# Patient Record
Sex: MALE | Race: OTHER | HISPANIC OR LATINO
[De-identification: names, ages, dates, MRNs, and addresses within clinical notes are randomized per-mention and may not be internally consistent; named-entity substitution may affect disease eponyms.]

---

## 2017-08-08 PROBLEM — Z00.00 ENCOUNTER FOR PREVENTIVE HEALTH EXAMINATION: Status: ACTIVE | Noted: 2017-08-08

## 2017-08-14 ENCOUNTER — APPOINTMENT (OUTPATIENT)
Dept: SURGERY | Facility: CLINIC | Age: 83
End: 2017-08-14

## 2017-08-14 VITALS
HEART RATE: 53 BPM | HEIGHT: 69 IN | OXYGEN SATURATION: 99 % | SYSTOLIC BLOOD PRESSURE: 195 MMHG | DIASTOLIC BLOOD PRESSURE: 80 MMHG | WEIGHT: 162.38 LBS | TEMPERATURE: 98 F | BODY MASS INDEX: 24.05 KG/M2

## 2017-08-18 ENCOUNTER — OUTPATIENT (OUTPATIENT)
Dept: OUTPATIENT SERVICES | Facility: HOSPITAL | Age: 83
LOS: 1 days | End: 2017-08-18
Payer: MEDICARE

## 2017-08-18 VITALS
DIASTOLIC BLOOD PRESSURE: 57 MMHG | HEART RATE: 50 BPM | WEIGHT: 162.92 LBS | SYSTOLIC BLOOD PRESSURE: 150 MMHG | TEMPERATURE: 98 F | HEIGHT: 69 IN | OXYGEN SATURATION: 98 % | RESPIRATION RATE: 16 BRPM

## 2017-08-18 DIAGNOSIS — D36.7 BENIGN NEOPLASM OF OTHER SPECIFIED SITES: ICD-10-CM

## 2017-08-18 DIAGNOSIS — Z01.818 ENCOUNTER FOR OTHER PREPROCEDURAL EXAMINATION: ICD-10-CM

## 2017-08-18 DIAGNOSIS — Z98.890 OTHER SPECIFIED POSTPROCEDURAL STATES: Chronic | ICD-10-CM

## 2017-08-18 DIAGNOSIS — Z98.42 CATARACT EXTRACTION STATUS, LEFT EYE: Chronic | ICD-10-CM

## 2017-08-18 DIAGNOSIS — I10 ESSENTIAL (PRIMARY) HYPERTENSION: ICD-10-CM

## 2017-08-18 PROCEDURE — G0463: CPT

## 2017-08-18 RX ORDER — SODIUM CHLORIDE 9 MG/ML
3 INJECTION INTRAMUSCULAR; INTRAVENOUS; SUBCUTANEOUS EVERY 8 HOURS
Qty: 0 | Refills: 0 | Status: DISCONTINUED | OUTPATIENT
Start: 2017-08-25 | End: 2017-08-25

## 2017-08-18 NOTE — H&P PST ADULT - HISTORY OF PRESENT ILLNESS
87 y/o male with PMH of hypertension, hyperlipidemia, BPH w/o LUTS, glaucoma (both eyes) on medication, presents to PST with complaints of mass localized on right shoulder. He is scheduled for Excision of Right Shoulder Mass on 8/18/2017

## 2017-08-18 NOTE — H&P PST ADULT - PROBLEM SELECTOR PLAN 2
Instructed to take antihypertensive medication with a sip of water the morning of surgery and follow up with PCP post surgery for management of hypertension and other comorbid conditions

## 2017-08-18 NOTE — H&P PST ADULT - ASSESSMENT
87 y/o male with PMH of hypertension, asymptomatic bradycardia, hyperlipidemia, BPH w/o LUTS, glaucoma (both eyes), and benign neoplasm of right shoulder scheduled for Excision of Right Shoulder Mass on 8/18/2017. Patient is at low risk for planned procedure

## 2017-08-18 NOTE — H&P PST ADULT - NEGATIVE GENERAL SYMPTOMS
no polyphagia/no polyuria/no malaise/no fatigue/no anorexia/no fever/no chills/no sweating/no weight gain/no polydipsia/no weight loss

## 2017-08-18 NOTE — H&P PST ADULT - PROBLEM SELECTOR PLAN 1
Scheduled for Excision of Right Shoulder Mass on 8/18/2017. Preoperative instructions discussed and given to patient. Verbalized understanding of same

## 2017-08-18 NOTE — H&P PST ADULT - NEGATIVE CARDIOVASCULAR SYMPTOMS
no dyspnea on exertion/no paroxysmal nocturnal dyspnea/no claudication/no peripheral edema/no orthopnea/no chest pain/no palpitations

## 2017-08-18 NOTE — H&P PST ADULT - RS GEN PE MLT RESP DETAILS PC
good air movement/respirations non-labored/no chest wall tenderness/no intercostal retractions/normal/no subcutaneous emphysema/breath sounds equal/no wheezes/no rales/no rhonchi/airway patent/clear to auscultation bilaterally

## 2017-08-18 NOTE — H&P PST ADULT - GASTROINTESTINAL DETAILS
no distention/no masses palpable/normal/bowel sounds normal/nontender/no organomegaly/no rebound tenderness/no rigidity/no guarding/soft

## 2017-08-18 NOTE — H&P PST ADULT - PMH
BPH without obstruction/lower urinary tract symptoms    Essential hypertension    Glaucoma (increased eye pressure)  both eyes  Hyperlipidemia, unspecified hyperlipidemia type

## 2017-08-18 NOTE — H&P PST ADULT - PSH
Cataract extraction status of left eye  w/lens implant - 2014  H/O hemicolectomy  1973  History of lung biopsy  2016 - negative results

## 2017-08-18 NOTE — H&P PST ADULT - CARDIOVASCULAR COMMENTS
asymptomatic bradycardia. Ran 20 marathons asymptomatic bradycardia. Ran 20 marathons. Case discussed with PCP who stated "he is fine, no follow up"

## 2017-08-18 NOTE — H&P PST ADULT - NEGATIVE GASTROINTESTINAL SYMPTOMS
no diarrhea/no change in bowel habits/no flatulence/no constipation/no abdominal pain/no vomiting/no nausea

## 2017-08-18 NOTE — H&P PST ADULT - CARDIOVASCULAR DETAILS
bradycardia/positive S1/positive S2 murmur/irregular rate and rhythm/bradycardia/positive S1/positive S2

## 2017-08-25 ENCOUNTER — OUTPATIENT (OUTPATIENT)
Dept: OUTPATIENT SERVICES | Facility: HOSPITAL | Age: 83
LOS: 1 days | Discharge: HOME | End: 2017-08-25
Payer: MEDICARE

## 2017-08-25 VITALS
WEIGHT: 162.92 LBS | HEART RATE: 50 BPM | RESPIRATION RATE: 16 BRPM | TEMPERATURE: 98 F | DIASTOLIC BLOOD PRESSURE: 57 MMHG | SYSTOLIC BLOOD PRESSURE: 150 MMHG | OXYGEN SATURATION: 98 % | HEIGHT: 69 IN

## 2017-08-25 VITALS
TEMPERATURE: 98 F | DIASTOLIC BLOOD PRESSURE: 62 MMHG | OXYGEN SATURATION: 99 % | SYSTOLIC BLOOD PRESSURE: 135 MMHG | RESPIRATION RATE: 16 BRPM | HEART RATE: 55 BPM

## 2017-08-25 DIAGNOSIS — Z98.890 OTHER SPECIFIED POSTPROCEDURAL STATES: Chronic | ICD-10-CM

## 2017-08-25 DIAGNOSIS — Z79.82 LONG TERM (CURRENT) USE OF ASPIRIN: ICD-10-CM

## 2017-08-25 DIAGNOSIS — D36.7 BENIGN NEOPLASM OF OTHER SPECIFIED SITES: ICD-10-CM

## 2017-08-25 DIAGNOSIS — N40.0 BENIGN PROSTATIC HYPERPLASIA WITHOUT LOWER URINARY TRACT SYMPTOMS: ICD-10-CM

## 2017-08-25 DIAGNOSIS — I10 ESSENTIAL (PRIMARY) HYPERTENSION: ICD-10-CM

## 2017-08-25 DIAGNOSIS — M67.411 GANGLION, RIGHT SHOULDER: ICD-10-CM

## 2017-08-25 DIAGNOSIS — M71.311 OTHER BURSAL CYST, RIGHT SHOULDER: ICD-10-CM

## 2017-08-25 DIAGNOSIS — Z87.891 PERSONAL HISTORY OF NICOTINE DEPENDENCE: ICD-10-CM

## 2017-08-25 DIAGNOSIS — E78.5 HYPERLIPIDEMIA, UNSPECIFIED: ICD-10-CM

## 2017-08-25 DIAGNOSIS — Z98.42 CATARACT EXTRACTION STATUS, LEFT EYE: Chronic | ICD-10-CM

## 2017-08-25 DIAGNOSIS — H40.9 UNSPECIFIED GLAUCOMA: ICD-10-CM

## 2017-08-25 PROCEDURE — 88304 TISSUE EXAM BY PATHOLOGIST: CPT | Mod: 26

## 2017-08-25 PROCEDURE — 23076 EXC SHOULDER TUM DEEP < 5 CM: CPT | Mod: RT

## 2017-08-25 PROCEDURE — 88304 TISSUE EXAM BY PATHOLOGIST: CPT

## 2017-08-25 RX ORDER — ACETAMINOPHEN WITH CODEINE 300MG-30MG
1 TABLET ORAL EVERY 6 HOURS
Qty: 0 | Refills: 0 | Status: DISCONTINUED | OUTPATIENT
Start: 2017-08-25 | End: 2017-08-25

## 2017-08-25 RX ORDER — ACETAMINOPHEN WITH CODEINE 300MG-30MG
1 TABLET ORAL
Qty: 8 | Refills: 0
Start: 2017-08-25 | End: 2017-08-27

## 2017-08-25 RX ORDER — SODIUM CHLORIDE 9 MG/ML
1000 INJECTION, SOLUTION INTRAVENOUS
Qty: 0 | Refills: 0 | Status: DISCONTINUED | OUTPATIENT
Start: 2017-08-25 | End: 2017-09-02

## 2017-08-25 RX ADMIN — SODIUM CHLORIDE 3 MILLILITER(S): 9 INJECTION INTRAMUSCULAR; INTRAVENOUS; SUBCUTANEOUS at 08:43

## 2017-08-25 NOTE — ASU DISCHARGE PLAN (ADULT/PEDIATRIC). - MEDICATION SUMMARY - MEDICATIONS TO TAKE
I will START or STAY ON the medications listed below when I get home from the hospital:    finasteride 5 mg oral tablet  -- 1 tab(s) by mouth once a day  -- Indication: For as directed    aspirin 81 mg oral tablet  -- 1 tab(s) by mouth once a day  -- Indication: For as directed    acetaminophen-codeine 300 mg-30 mg oral tablet  -- 1 tab(s) by mouth every 6 hours, As needed, Moderate Pain (4 - 6) MDD:4  -- Indication: For pain     losartan 50 mg oral tablet  -- 1 tab(s) by mouth once a day  -- Indication: For as directed    tamsulosin 0.4 mg oral capsule  -- 1 cap(s) by mouth once a day  -- Indication: For as directed    simvastatin 10 mg oral tablet  -- 1 tab(s) by mouth once a day (at bedtime)  -- Indication: For as directed    Combigan 0.2%-0.5% ophthalmic solution  -- 1 drop(s) to each affected eye every 12 hours  -- Indication: For as directed    Azopt 1% ophthalmic suspension  -- 1 drop(s) to each affected eye 3 times a day  -- Indication: For as directed    Lumigan 0.01% ophthalmic solution  -- 1 drop(s) to each affected eye once a day (in the evening)  -- Indication: For as directed    Centrum oral tablet  -- 1 tab(s) by mouth once a day  -- Indication: For as directed

## 2017-08-30 LAB — SURGICAL PATHOLOGY FINAL REPORT - CH: SIGNIFICANT CHANGE UP

## 2017-08-31 ENCOUNTER — APPOINTMENT (OUTPATIENT)
Dept: SURGERY | Facility: CLINIC | Age: 83
End: 2017-08-31

## 2017-09-06 PROBLEM — I10 HIGH BLOOD PRESSURE: Status: ACTIVE | Noted: 2017-08-14

## 2017-09-06 PROBLEM — Z63.4 WIDOWER: Status: ACTIVE | Noted: 2017-09-06

## 2017-09-06 PROBLEM — Z86.39 HISTORY OF HIGH CHOLESTEROL: Status: RESOLVED | Noted: 2017-09-06 | Resolved: 2017-09-06

## 2017-09-06 PROBLEM — Z87.891 FORMER SMOKER: Status: RESOLVED | Noted: 2017-08-14 | Resolved: 2017-09-06

## 2017-09-06 PROBLEM — F15.90 CAFFEINE USE: Status: ACTIVE | Noted: 2017-09-06

## 2017-09-06 PROBLEM — Z87.438 HISTORY OF BENIGN PROSTATIC HYPERPLASIA: Status: RESOLVED | Noted: 2017-09-06 | Resolved: 2017-09-06

## 2017-09-06 PROBLEM — Z82.3 FAMILY HISTORY OF CEREBROVASCULAR ACCIDENT (CVA): Status: ACTIVE | Noted: 2017-08-14

## 2017-09-06 PROBLEM — Z86.79 HISTORY OF ESSENTIAL HYPERTENSION: Status: RESOLVED | Noted: 2017-09-06 | Resolved: 2017-09-06

## 2017-09-06 PROBLEM — Z83.3 FAMILY HISTORY OF DIABETES MELLITUS: Status: ACTIVE | Noted: 2017-08-14

## 2017-09-06 PROBLEM — Z78.9 CONSUMES ALCOHOL OCCASIONALLY: Status: ACTIVE | Noted: 2017-08-14

## 2017-09-06 PROBLEM — E78.00 HIGH CHOLESTEROL: Status: ACTIVE | Noted: 2017-08-14

## 2017-09-06 PROBLEM — Z86.69 HISTORY OF GLAUCOMA: Status: RESOLVED | Noted: 2017-09-06 | Resolved: 2017-09-06

## 2017-09-07 ENCOUNTER — APPOINTMENT (OUTPATIENT)
Dept: SURGERY | Facility: CLINIC | Age: 83
End: 2017-09-07

## 2017-09-07 VITALS
BODY MASS INDEX: 24.59 KG/M2 | TEMPERATURE: 97.7 F | OXYGEN SATURATION: 97 % | SYSTOLIC BLOOD PRESSURE: 122 MMHG | HEIGHT: 69 IN | WEIGHT: 166 LBS | HEART RATE: 58 BPM | DIASTOLIC BLOOD PRESSURE: 69 MMHG

## 2017-09-07 DIAGNOSIS — Z86.79 PERSONAL HISTORY OF OTHER DISEASES OF THE CIRCULATORY SYSTEM: ICD-10-CM

## 2017-09-07 DIAGNOSIS — Z78.9 OTHER SPECIFIED HEALTH STATUS: ICD-10-CM

## 2017-09-07 DIAGNOSIS — I51.9 HEART DISEASE, UNSPECIFIED: ICD-10-CM

## 2017-09-07 DIAGNOSIS — Z86.39 PERSONAL HISTORY OF OTHER ENDOCRINE, NUTRITIONAL AND METABOLIC DISEASE: ICD-10-CM

## 2017-09-07 DIAGNOSIS — Z82.3 FAMILY HISTORY OF STROKE: ICD-10-CM

## 2017-09-07 DIAGNOSIS — F15.90 OTHER STIMULANT USE, UNSPECIFIED, UNCOMPLICATED: ICD-10-CM

## 2017-09-07 DIAGNOSIS — R22.30 LOCALIZED SWELLING, MASS AND LUMP, UNSPECIFIED UPPER LIMB: ICD-10-CM

## 2017-09-07 DIAGNOSIS — E78.00 PURE HYPERCHOLESTEROLEMIA, UNSPECIFIED: ICD-10-CM

## 2017-09-07 DIAGNOSIS — Z87.438 PERSONAL HISTORY OF OTHER DISEASES OF MALE GENITAL ORGANS: ICD-10-CM

## 2017-09-07 DIAGNOSIS — Z86.69 PERSONAL HISTORY OF OTHER DISEASES OF THE NERVOUS SYSTEM AND SENSE ORGANS: ICD-10-CM

## 2017-09-07 DIAGNOSIS — Z63.4 DISAPPEARANCE AND DEATH OF FAMILY MEMBER: ICD-10-CM

## 2017-09-07 DIAGNOSIS — I10 ESSENTIAL (PRIMARY) HYPERTENSION: ICD-10-CM

## 2017-09-07 DIAGNOSIS — Z87.891 PERSONAL HISTORY OF NICOTINE DEPENDENCE: ICD-10-CM

## 2017-09-07 DIAGNOSIS — Z83.3 FAMILY HISTORY OF DIABETES MELLITUS: ICD-10-CM

## 2017-09-07 RX ORDER — FINASTERIDE 5 MG/1
5 TABLET, FILM COATED ORAL
Refills: 0 | Status: ACTIVE | COMMUNITY

## 2017-09-07 RX ORDER — TAMSULOSIN HYDROCHLORIDE 0.4 MG/1
0.4 CAPSULE ORAL
Refills: 0 | Status: ACTIVE | COMMUNITY

## 2017-09-07 RX ORDER — SIMVASTATIN 10 MG/1
10 TABLET, FILM COATED ORAL
Refills: 0 | Status: ACTIVE | COMMUNITY

## 2017-09-07 RX ORDER — LOSARTAN POTASSIUM 50 MG/1
50 TABLET, FILM COATED ORAL
Refills: 0 | Status: ACTIVE | COMMUNITY

## 2017-09-07 RX ORDER — BRIMONIDINE TARTRATE, TIMOLOL MALEATE 2; 5 MG/ML; MG/ML
0.2-0.5 SOLUTION/ DROPS OPHTHALMIC
Refills: 0 | Status: ACTIVE | COMMUNITY

## 2017-09-07 SDOH — SOCIAL STABILITY - SOCIAL INSECURITY: DISSAPEARANCE AND DEATH OF FAMILY MEMBER: Z63.4

## 2019-06-09 PROBLEM — I51.9 HEART DISEASE: Status: ACTIVE | Noted: 2017-08-14

## 2023-01-17 PROBLEM — I10 ESSENTIAL (PRIMARY) HYPERTENSION: Chronic | Status: ACTIVE | Noted: 2017-08-18

## 2023-01-17 PROBLEM — H40.9 UNSPECIFIED GLAUCOMA: Chronic | Status: ACTIVE | Noted: 2017-08-18

## 2023-01-17 PROBLEM — E78.5 HYPERLIPIDEMIA, UNSPECIFIED: Chronic | Status: ACTIVE | Noted: 2017-08-18

## 2023-01-17 PROBLEM — N40.0 BENIGN PROSTATIC HYPERPLASIA WITHOUT LOWER URINARY TRACT SYMPTOMS: Chronic | Status: ACTIVE | Noted: 2017-08-18

## 2023-01-23 ENCOUNTER — APPOINTMENT (OUTPATIENT)
Dept: SURGERY | Facility: CLINIC | Age: 89
End: 2023-01-23
Payer: MEDICARE

## 2023-01-23 VITALS
HEART RATE: 79 BPM | HEIGHT: 60 IN | WEIGHT: 160 LBS | SYSTOLIC BLOOD PRESSURE: 178 MMHG | DIASTOLIC BLOOD PRESSURE: 64 MMHG | BODY MASS INDEX: 31.41 KG/M2

## 2023-01-23 DIAGNOSIS — M79.89 OTHER SPECIFIED SOFT TISSUE DISORDERS: ICD-10-CM

## 2023-01-23 PROCEDURE — 99203 OFFICE O/P NEW LOW 30 MIN: CPT

## 2023-01-23 RX ORDER — ASPIRIN ENTERIC COATED TABLETS 81 MG 81 MG/1
81 TABLET, DELAYED RELEASE ORAL
Refills: 0 | Status: COMPLETED | COMMUNITY
End: 2023-01-23

## 2023-01-25 PROBLEM — M79.89 SOFT TISSUE MASS: Status: ACTIVE | Noted: 2023-01-25

## 2023-01-25 NOTE — PLAN
[FreeTextEntry1] : Mr. ORANTES  was told significance of findings, options, risks and benefits were explained.  Informed consent for     and potential risks, benefits and alternatives (surgical options were discussed including non-surgical options or the option of no surgery) to the planned surgery were discussed in depth.  All surgical options were discussed including non-surgical treatments.  He wishes to proceed with surgery.  We will plan for surgery on at the next available date, pending any required insurance pre-certification or pre-approval. He agrees to obtain any necessary pre-operative evaluations and testing prior to surgery.\par Patient advised to seek immediate medical attention with any acute change in symptoms or with the development of any new or worsening symptoms.  Patient's questions and concerns addressed to patient's satisfaction, and patient verbalized an understanding of the information discussed.\par \par

## 2023-01-25 NOTE — PHYSICAL EXAM
[Alert] : alert [Oriented to Person] : oriented to person [Oriented to Place] : oriented to place [Oriented to Time] : oriented to time [Calm] : calm [de-identified] : He  is alert, well-groomed, and in NAD\par   [de-identified] : anicteric.  Nasal mucosa pink, septum midline. Oral mucosa pink.  Tongue midline, Pharynx without exudates.\par   [de-identified] : . [de-identified] : left shoulder mass is mobile, Firm,  Smooth, non-tender,   Well defined.  deep.  erythematous No palpable lymph nodes.   Mass size -  4 cm x   4 cm.

## 2023-01-25 NOTE — CONSULT LETTER
[Dear  ___] : Dear  [unfilled], [Consult Letter:] : I had the pleasure of evaluating your patient, [unfilled]. [Please see my note below.] : Please see my note below. [Consult Closing:] : Thank you very much for allowing me to participate in the care of this patient.  If you have any questions, please do not hesitate to contact me. [Sincerely,] : Sincerely, [FreeTextEntry3] : Vimal Nicholas MD, FACS

## 2023-01-25 NOTE — HISTORY OF PRESENT ILLNESS
[de-identified] : This is a 89 year  old patient who was referred by Dr. Weston Garcia with the chief complaint of having left shoulder mass .  He reports having this condition for 2 months. He denies any trauma to the area.   He denies any fever or  night sweats. Appetite is good and weight is stable.  He states that recently the mass started to  get  bigger and  more symptomatic. He wants to know if it could  be surgically  removed.\par \par

## 2023-02-01 ENCOUNTER — OUTPATIENT (OUTPATIENT)
Dept: OUTPATIENT SERVICES | Facility: HOSPITAL | Age: 89
LOS: 1 days | End: 2023-02-01
Payer: MEDICARE

## 2023-02-01 VITALS
WEIGHT: 160.06 LBS | DIASTOLIC BLOOD PRESSURE: 62 MMHG | RESPIRATION RATE: 18 BRPM | TEMPERATURE: 97 F | HEIGHT: 69 IN | HEART RATE: 62 BPM | SYSTOLIC BLOOD PRESSURE: 145 MMHG | OXYGEN SATURATION: 96 %

## 2023-02-01 DIAGNOSIS — E78.5 HYPERLIPIDEMIA, UNSPECIFIED: ICD-10-CM

## 2023-02-01 DIAGNOSIS — Z01.818 ENCOUNTER FOR OTHER PREPROCEDURAL EXAMINATION: ICD-10-CM

## 2023-02-01 DIAGNOSIS — Z98.890 OTHER SPECIFIED POSTPROCEDURAL STATES: Chronic | ICD-10-CM

## 2023-02-01 DIAGNOSIS — Z98.42 CATARACT EXTRACTION STATUS, LEFT EYE: Chronic | ICD-10-CM

## 2023-02-01 DIAGNOSIS — M79.89 OTHER SPECIFIED SOFT TISSUE DISORDERS: ICD-10-CM

## 2023-02-01 DIAGNOSIS — I10 ESSENTIAL (PRIMARY) HYPERTENSION: ICD-10-CM

## 2023-02-01 DIAGNOSIS — I48.91 UNSPECIFIED ATRIAL FIBRILLATION: ICD-10-CM

## 2023-02-01 PROCEDURE — G0463: CPT

## 2023-02-01 RX ORDER — ASPIRIN/CALCIUM CARB/MAGNESIUM 324 MG
1 TABLET ORAL
Qty: 0 | Refills: 0 | DISCHARGE

## 2023-02-01 RX ORDER — SODIUM CHLORIDE 9 MG/ML
3 INJECTION INTRAMUSCULAR; INTRAVENOUS; SUBCUTANEOUS EVERY 8 HOURS
Refills: 0 | Status: DISCONTINUED | OUTPATIENT
Start: 2023-02-01 | End: 2023-02-15

## 2023-02-01 NOTE — H&P PST ADULT - SKIN COMMENTS
SUBJECTIVE:                                                      Alta Lane is a 12 year old male, here for a routine health maintenance visit.    Patient was roomed by: Carmelina Shin    [facility] Sonam Alfonso, a trained medical scribe. [id number]]  - TDAP VACCINE (ADACEL) [[id number].Marybeth Siddiqui MD  [facility]     left shoulder mass about 3x3 cm

## 2023-02-01 NOTE — H&P PST ADULT - PROBLEM SELECTOR PLAN 1
Patient scheduled for excision of left shoulder mass on 2/8/23. Preop instruction given both verbal and written, instructed to take shower daily x 3 including the morning of surgery with chlorhexidine wash, bottle provided. Instructed to avoid aspirin and over the counter medications including vitamins and herbal medications one week before surgery.   Stop bang score is 3 , patient intermediate risk for DORIS.  Patient scheduled for Covid PCR on 2/4/23, results pending.  Blood work and EKG done by a PCP on 1/30/23. Medical optimization pending, report to be obtained.

## 2023-02-01 NOTE — H&P PST ADULT - PROBLEM SELECTOR PLAN 2
Patient instructed to take antihypertensive medications the morning of surgery with just a sips of water.

## 2023-02-01 NOTE — H&P PST ADULT - NSICDXPASTSURGICALHX_GEN_ALL_CORE_FT
PAST SURGICAL HISTORY:  Cataract extraction status of left eye w/lens implant - 2014    H/O hemicolectomy 1973    History of lung biopsy 2016 - negative results

## 2023-02-01 NOTE — H&P PST ADULT - ASSESSMENT
89 years old male with PMH of HTN, HLD, BPH, Atrial fibrillation on Xarelto presents to PST today for presurgical evaluation. Patient with left shoulder mass for 2 months, painless and is now scheduled for excision of left shoulder mass on 2/8/23.

## 2023-02-01 NOTE — H&P PST ADULT - NSICDXPASTMEDICALHX_GEN_ALL_CORE_FT
PAST MEDICAL HISTORY:  Atrial fibrillation     BPH without obstruction/lower urinary tract symptoms     Essential hypertension     Glaucoma (increased eye pressure) both eyes    Hyperlipidemia, unspecified hyperlipidemia type

## 2023-02-01 NOTE — H&P PST ADULT - HEIGHT IN CM
ELENI power PICC Left Placement 2/7/2020    Product number: [id number]   Lot Number: [id number]      Ultrasound: yes   L Brachial      Upper Arm Circumference: 35CM    Size: 50CM    Exposed Length: 0CM   Internal Length: 47CM   Cut: 3CM   Vein Measurement: 0.62CM  Bullseye obtained with VPS, tip of catheter in the lower 1/3 of the SVC at the CAJ, pressure held and 2x2 placed over biopatch, opsite placed, no bleeding noted, RN aware to cont to monitor for bleeding, okay to use    MagMe  2/7/2020  1:11 PM 175.26

## 2023-02-03 PROBLEM — I48.91 UNSPECIFIED ATRIAL FIBRILLATION: Chronic | Status: ACTIVE | Noted: 2023-02-01

## 2023-02-07 ENCOUNTER — TRANSCRIPTION ENCOUNTER (OUTPATIENT)
Age: 89
End: 2023-02-07

## 2023-02-08 ENCOUNTER — APPOINTMENT (OUTPATIENT)
Dept: SURGERY | Facility: HOSPITAL | Age: 89
End: 2023-02-08

## 2023-02-08 ENCOUNTER — TRANSCRIPTION ENCOUNTER (OUTPATIENT)
Age: 89
End: 2023-02-08

## 2023-02-08 ENCOUNTER — OUTPATIENT (OUTPATIENT)
Dept: OUTPATIENT SERVICES | Facility: HOSPITAL | Age: 89
LOS: 1 days | End: 2023-02-08
Payer: MEDICARE

## 2023-02-08 ENCOUNTER — RESULT REVIEW (OUTPATIENT)
Age: 89
End: 2023-02-08

## 2023-02-08 VITALS
HEIGHT: 69 IN | RESPIRATION RATE: 16 BRPM | WEIGHT: 160.06 LBS | SYSTOLIC BLOOD PRESSURE: 137 MMHG | TEMPERATURE: 98 F | OXYGEN SATURATION: 97 % | DIASTOLIC BLOOD PRESSURE: 62 MMHG | HEART RATE: 68 BPM

## 2023-02-08 VITALS
DIASTOLIC BLOOD PRESSURE: 52 MMHG | HEART RATE: 52 BPM | RESPIRATION RATE: 16 BRPM | TEMPERATURE: 98 F | OXYGEN SATURATION: 98 % | SYSTOLIC BLOOD PRESSURE: 120 MMHG

## 2023-02-08 DIAGNOSIS — M79.89 OTHER SPECIFIED SOFT TISSUE DISORDERS: ICD-10-CM

## 2023-02-08 DIAGNOSIS — Z98.42 CATARACT EXTRACTION STATUS, LEFT EYE: Chronic | ICD-10-CM

## 2023-02-08 DIAGNOSIS — Z98.890 OTHER SPECIFIED POSTPROCEDURAL STATES: Chronic | ICD-10-CM

## 2023-02-08 PROCEDURE — 23073 EXC SHOULDER TUM DEEP 5 CM/>: CPT | Mod: LT

## 2023-02-08 PROCEDURE — 23073 EXC SHOULDER TUM DEEP 5 CM/>: CPT | Mod: AS,LT

## 2023-02-08 PROCEDURE — 88305 TISSUE EXAM BY PATHOLOGIST: CPT | Mod: 26

## 2023-02-08 PROCEDURE — 88305 TISSUE EXAM BY PATHOLOGIST: CPT

## 2023-02-08 RX ORDER — LOSARTAN POTASSIUM 100 MG/1
1 TABLET, FILM COATED ORAL
Qty: 0 | Refills: 0 | DISCHARGE

## 2023-02-08 RX ORDER — ONDANSETRON 8 MG/1
4 TABLET, FILM COATED ORAL ONCE
Refills: 0 | Status: DISCONTINUED | OUTPATIENT
Start: 2023-02-08 | End: 2023-02-22

## 2023-02-08 RX ORDER — OXYCODONE AND ACETAMINOPHEN 5; 325 MG/1; MG/1
1 TABLET ORAL
Qty: 5 | Refills: 0
Start: 2023-02-08

## 2023-02-08 RX ORDER — OXYCODONE AND ACETAMINOPHEN 5; 325 MG/1; MG/1
1 TABLET ORAL EVERY 6 HOURS
Refills: 0 | Status: DISCONTINUED | OUTPATIENT
Start: 2023-02-08 | End: 2023-02-08

## 2023-02-08 RX ORDER — SIMVASTATIN 20 MG/1
1 TABLET, FILM COATED ORAL
Qty: 0 | Refills: 0 | DISCHARGE

## 2023-02-08 RX ORDER — RIVAROXABAN 15 MG-20MG
1 KIT ORAL
Qty: 0 | Refills: 0 | DISCHARGE

## 2023-02-08 RX ORDER — SODIUM CHLORIDE 9 MG/ML
3 INJECTION INTRAMUSCULAR; INTRAVENOUS; SUBCUTANEOUS EVERY 8 HOURS
Refills: 0 | Status: DISCONTINUED | OUTPATIENT
Start: 2023-02-08 | End: 2023-02-08

## 2023-02-08 RX ORDER — BIMATOPROST 0.3 MG/ML
1 SOLUTION/ DROPS OPHTHALMIC
Qty: 0 | Refills: 0 | DISCHARGE

## 2023-02-08 NOTE — BRIEF OPERATIVE NOTE - NSICDXBRIEFPROCEDURE_GEN_ALL_CORE_FT
PROCEDURES:  Excision of subfascial soft tissue tumor of upper arm; 5 cm or greater 08-Feb-2023 13:50:16  Brooke Pimentel

## 2023-02-08 NOTE — ASU DISCHARGE PLAN (ADULT/PEDIATRIC) - CARE PROVIDER_API CALL
Vimal Nicholas)  Surgery  [address] [facility], [place] [address]  Phone: [phone]  Fax: [phone]  Follow Up Time:    Vimal Nicholas)  Surgery  [address] [facility], [place] [address]  Phone: [phone]  Fax: [phone]  Follow Up Time: 2 weeks

## 2023-02-08 NOTE — ASU DISCHARGE PLAN (ADULT/PEDIATRIC) - NS MD DC FALL RISK RISK
For information on Fall & Injury Prevention, visit: [email or web address] OR  [email or web address] OR  https://www.cdc.gov/steadi/patient.html

## 2023-02-08 NOTE — ASU PATIENT PROFILE, ADULT - TEACHING/LEARNING FACTORS INFLUENCE READINESS TO LEARN
acuteness of illness/anxiety/lack of motivation Preop teaching done and emotional support provided to patient and family. Safety provided.

## 2023-02-08 NOTE — ASU PATIENT PROFILE, ADULT - FALL HARM RISK - HARM RISK INTERVENTIONS

## 2023-02-16 LAB — SURGICAL PATHOLOGY STUDY: SIGNIFICANT CHANGE UP

## 2023-03-02 ENCOUNTER — APPOINTMENT (OUTPATIENT)
Dept: SURGERY | Facility: CLINIC | Age: 89
End: 2023-03-02
Payer: MEDICARE

## 2023-03-02 DIAGNOSIS — C79.9 SECONDARY MALIGNANT NEOPLASM OF UNSPECIFIED SITE: ICD-10-CM

## 2023-03-02 PROCEDURE — 99024 POSTOP FOLLOW-UP VISIT: CPT

## 2023-03-02 NOTE — ASSESSMENT
[FreeTextEntry1] : Mr. ORANTES is doing well, with excellent post-operative recovery. The surgical incision is healing well and as expected. There is no evidence of infection or complication, and patient is progressing as expected. Post-operative wound care, activity, restrictions and precautions reinforced.  Pathology results were discussed in details.   He has  a Metastatic adenocarcinoma consistent with lung primary . case was also discussed with DR Garcia. Patient will see    Dr Garcia who will   refer him to the oncologist and monitor further care.        Patient's questions and concerns addressed to patient's satisfaction.\par

## 2023-03-02 NOTE — PHYSICAL EXAM
[Alert] : alert [Oriented to Person] : oriented to person [Oriented to Place] : oriented to place [Oriented to Time] : oriented to time [Calm] : calm [de-identified] : He  is alert, well-groomed, and in NAD\par   [de-identified] : anicteric.  Nasal mucosa pink, septum midline. Oral mucosa pink.  Tongue midline, Pharynx without exudates.\par   [de-identified] : left shoulder  Surgical wound is healing well.   no signs of  inflammation or infection.

## 2023-03-02 NOTE — DATA REVIEWED
[FreeTextEntry1] :   [facility] Accession Number : [id number]\par Patient:   DONTA ORANTES\par \par \par Accession:                             [id number]\par \par Collected Date/Time:                   2/8/2023 12:16 EST\par Received Date/Time:                    2/9/2023 08:00 EST\par \par Addendum Report - Auth (Verified)\par \par Addendum\par - Metastatic adenocarcinoma consistent with lung primary. See comment.\par \par Immunohistochemical Analysis\par Antibody - Results\par AE1/AE3 - Positive\par CK7 - Positive\par CK20 - Negative\par TTF1 - Positive\par CDX2 - Negative\par S100 - Negative\par p63 - Negative\par Napsin A - Positive\par CK5/6 - Negative\par JESSY-3 - Negative\par \par (KEYTRUDA) PD-L1 Head  T  Neck Squamous Cell Carcinoma (HNSCC) Results\par \par PD-L1 Head  T  Neck Squamous Cell Carcinoma\par - Combined Positive Score: 80 (CPS>=1/Expressed)\par \par Comment:\par The tumor is poorly differentiated and hence does not exhibit definitive\par \par glandular or squamous  features. Immunohistochemically the tumor is best\par classified as adenocarcinoma.\par \par This biopsy contains sufficient (>100) viable tumor cells).  A positive\par control for each antibody has been reviewed and accepted.\par \par This test was performed by an outside laboratory.    For all patient care\par and clinical decision making purposes refer solely to original laboratory\par report.   The information transcribed here is not the entire report.     This\par shortened version has been placed here for the purpose of the electronic\par record.\par \par This test was performed and interpreted by [facility]\par [employer] [address] [place] [address].     \par [phone].\par Specimen ID: [id number]\par Outside report electronically signed by: Irina Forte M.D.\par Block tested - 1C\par Verified by: Olivia Carpenter MD\par (Electronic Signature)\par \par Reported on: 02/23/23 20:52 EST, [address]\par Phone: [phone]   Fax: [phone]\par _________________________________________________________________\par Surgical Pathology Report - Auth (Verified)\par \par Specimen(s) Submitted\par 1  Left shoulder mass\par 2  Deep margin left shoulder mass\par \par Final Diagnosis\par 1. Mass, left shoulder, excision:\par - Invasive squamous cell carcinoma, poorly differentiated, completely\par excised, pendind immunohistochemical analysis.\par - Squamous cell carcinoma measures 2.5 cm, is located below and not\par connected to the dermis, extending into subcutaneous adipose tissue and\par infiltrating underlying striated muscle tissue.\par - All excision margins are negative for malignancy.\par \par 2. deep margin of left shoulder mass; excision:\par - Striated muscle, negative for  malignancy.\par \par Verified by: Olivia Carpenter MD\par (Electronic Signature)\par Reported on: 02/16/23 14:52 EST, [address]\par Phone: [phone]   Fax: [phone]\par _________________________________________________________________\par \par Clinical Information\par Left shoulder mass\par

## 2023-03-02 NOTE — HISTORY OF PRESENT ILLNESS
[de-identified] : Mr. ORANTES  is s/p wide  excision Left shoulder mass on 02/08/2023. Patient's pathology results were  consistent with Metastatic adenocarcinoma consistent with lung primary.  Today  Mr. ORANTES offers no complaints. patient reports no fever or  chills. patient reports occasional discomfort in the surgical area.  His surgical incisions are healing well. No signs of inflammation, infection or exudate. patient reports good bowel movements and appetite. \par

## 2023-03-02 NOTE — PLAN
[FreeTextEntry1] : .oncology consult as per DR Garcia\par Mr. ORANTES will follow up  if needed. Warning signs, follow up, and restrictions were discussed with the patient.

## 2023-08-16 LAB — SARS-COV-2 N GENE NPH QL NAA+PROBE: NOT DETECTED

## 2023-12-20 ENCOUNTER — INPATIENT (INPATIENT)
Facility: HOSPITAL | Age: 89
LOS: 1 days | Discharge: HOME | DRG: 194 | End: 2023-12-22
Attending: INTERNAL MEDICINE | Admitting: INTERNAL MEDICINE
Payer: MEDICARE

## 2023-12-20 VITALS
SYSTOLIC BLOOD PRESSURE: 152 MMHG | DIASTOLIC BLOOD PRESSURE: 91 MMHG | RESPIRATION RATE: 20 BRPM | OXYGEN SATURATION: 96 % | HEART RATE: 119 BPM | WEIGHT: 154.76 LBS | TEMPERATURE: 99 F

## 2023-12-20 DIAGNOSIS — Z98.890 OTHER SPECIFIED POSTPROCEDURAL STATES: Chronic | ICD-10-CM

## 2023-12-20 DIAGNOSIS — Z98.42 CATARACT EXTRACTION STATUS, LEFT EYE: Chronic | ICD-10-CM

## 2023-12-20 LAB
ALBUMIN SERPL ELPH-MCNC: 4 G/DL — SIGNIFICANT CHANGE UP (ref 3.5–5)
ALBUMIN SERPL ELPH-MCNC: 4 G/DL — SIGNIFICANT CHANGE UP (ref 3.5–5)
ALP SERPL-CCNC: 73 U/L — SIGNIFICANT CHANGE UP (ref 40–120)
ALP SERPL-CCNC: 73 U/L — SIGNIFICANT CHANGE UP (ref 40–120)
ALT FLD-CCNC: 40 U/L DA — SIGNIFICANT CHANGE UP (ref 10–60)
ALT FLD-CCNC: 40 U/L DA — SIGNIFICANT CHANGE UP (ref 10–60)
ANION GAP SERPL CALC-SCNC: 9 MMOL/L — SIGNIFICANT CHANGE UP (ref 5–17)
ANION GAP SERPL CALC-SCNC: 9 MMOL/L — SIGNIFICANT CHANGE UP (ref 5–17)
APPEARANCE UR: ABNORMAL
APPEARANCE UR: ABNORMAL
APTT BLD: 28.5 SEC — SIGNIFICANT CHANGE UP (ref 24.5–35.6)
APTT BLD: 28.5 SEC — SIGNIFICANT CHANGE UP (ref 24.5–35.6)
AST SERPL-CCNC: 59 U/L — HIGH (ref 10–40)
AST SERPL-CCNC: 59 U/L — HIGH (ref 10–40)
BACTERIA # UR AUTO: ABNORMAL /HPF
BACTERIA # UR AUTO: ABNORMAL /HPF
BASOPHILS # BLD AUTO: 0.02 K/UL — SIGNIFICANT CHANGE UP (ref 0–0.2)
BASOPHILS # BLD AUTO: 0.02 K/UL — SIGNIFICANT CHANGE UP (ref 0–0.2)
BASOPHILS NFR BLD AUTO: 0.4 % — SIGNIFICANT CHANGE UP (ref 0–2)
BASOPHILS NFR BLD AUTO: 0.4 % — SIGNIFICANT CHANGE UP (ref 0–2)
BILIRUB SERPL-MCNC: 1 MG/DL — SIGNIFICANT CHANGE UP (ref 0.2–1.2)
BILIRUB SERPL-MCNC: 1 MG/DL — SIGNIFICANT CHANGE UP (ref 0.2–1.2)
BILIRUB UR-MCNC: NEGATIVE — SIGNIFICANT CHANGE UP
BILIRUB UR-MCNC: NEGATIVE — SIGNIFICANT CHANGE UP
BUN SERPL-MCNC: 31 MG/DL — HIGH (ref 7–18)
BUN SERPL-MCNC: 31 MG/DL — HIGH (ref 7–18)
CALCIUM SERPL-MCNC: 9.3 MG/DL — SIGNIFICANT CHANGE UP (ref 8.4–10.5)
CALCIUM SERPL-MCNC: 9.3 MG/DL — SIGNIFICANT CHANGE UP (ref 8.4–10.5)
CHLORIDE SERPL-SCNC: 105 MMOL/L — SIGNIFICANT CHANGE UP (ref 96–108)
CHLORIDE SERPL-SCNC: 105 MMOL/L — SIGNIFICANT CHANGE UP (ref 96–108)
CO2 SERPL-SCNC: 22 MMOL/L — SIGNIFICANT CHANGE UP (ref 22–31)
CO2 SERPL-SCNC: 22 MMOL/L — SIGNIFICANT CHANGE UP (ref 22–31)
COLOR SPEC: YELLOW — SIGNIFICANT CHANGE UP
COLOR SPEC: YELLOW — SIGNIFICANT CHANGE UP
CREAT SERPL-MCNC: 1.54 MG/DL — HIGH (ref 0.5–1.3)
CREAT SERPL-MCNC: 1.54 MG/DL — HIGH (ref 0.5–1.3)
DIFF PNL FLD: ABNORMAL
DIFF PNL FLD: ABNORMAL
EGFR: 42 ML/MIN/1.73M2 — LOW
EGFR: 42 ML/MIN/1.73M2 — LOW
EOSINOPHIL # BLD AUTO: 0.14 K/UL — SIGNIFICANT CHANGE UP (ref 0–0.5)
EOSINOPHIL # BLD AUTO: 0.14 K/UL — SIGNIFICANT CHANGE UP (ref 0–0.5)
EOSINOPHIL NFR BLD AUTO: 2.5 % — SIGNIFICANT CHANGE UP (ref 0–6)
EOSINOPHIL NFR BLD AUTO: 2.5 % — SIGNIFICANT CHANGE UP (ref 0–6)
EPI CELLS # UR: PRESENT
EPI CELLS # UR: PRESENT
FLUAV H1 2009 PAND RNA SPEC QL NAA+PROBE: DETECTED
FLUAV H1 2009 PAND RNA SPEC QL NAA+PROBE: DETECTED
GLUCOSE SERPL-MCNC: 114 MG/DL — HIGH (ref 70–99)
GLUCOSE SERPL-MCNC: 114 MG/DL — HIGH (ref 70–99)
GLUCOSE UR QL: NEGATIVE MG/DL — SIGNIFICANT CHANGE UP
GLUCOSE UR QL: NEGATIVE MG/DL — SIGNIFICANT CHANGE UP
HCT VFR BLD CALC: 41.3 % — SIGNIFICANT CHANGE UP (ref 39–50)
HCT VFR BLD CALC: 41.3 % — SIGNIFICANT CHANGE UP (ref 39–50)
HGB BLD-MCNC: 13.8 G/DL — SIGNIFICANT CHANGE UP (ref 13–17)
HGB BLD-MCNC: 13.8 G/DL — SIGNIFICANT CHANGE UP (ref 13–17)
IMM GRANULOCYTES NFR BLD AUTO: 0.5 % — SIGNIFICANT CHANGE UP (ref 0–0.9)
IMM GRANULOCYTES NFR BLD AUTO: 0.5 % — SIGNIFICANT CHANGE UP (ref 0–0.9)
INR BLD: 1.07 RATIO — SIGNIFICANT CHANGE UP (ref 0.85–1.18)
INR BLD: 1.07 RATIO — SIGNIFICANT CHANGE UP (ref 0.85–1.18)
KETONES UR-MCNC: 15 MG/DL
KETONES UR-MCNC: 15 MG/DL
LACTATE SERPL-SCNC: 1.4 MMOL/L — SIGNIFICANT CHANGE UP (ref 0.7–2)
LACTATE SERPL-SCNC: 1.4 MMOL/L — SIGNIFICANT CHANGE UP (ref 0.7–2)
LEUKOCYTE ESTERASE UR-ACNC: ABNORMAL
LEUKOCYTE ESTERASE UR-ACNC: ABNORMAL
LYMPHOCYTES # BLD AUTO: 0.78 K/UL — LOW (ref 1–3.3)
LYMPHOCYTES # BLD AUTO: 0.78 K/UL — LOW (ref 1–3.3)
LYMPHOCYTES # BLD AUTO: 13.7 % — SIGNIFICANT CHANGE UP (ref 13–44)
LYMPHOCYTES # BLD AUTO: 13.7 % — SIGNIFICANT CHANGE UP (ref 13–44)
MAGNESIUM SERPL-MCNC: 2 MG/DL — SIGNIFICANT CHANGE UP (ref 1.6–2.6)
MAGNESIUM SERPL-MCNC: 2 MG/DL — SIGNIFICANT CHANGE UP (ref 1.6–2.6)
MCHC RBC-ENTMCNC: 32.9 PG — SIGNIFICANT CHANGE UP (ref 27–34)
MCHC RBC-ENTMCNC: 32.9 PG — SIGNIFICANT CHANGE UP (ref 27–34)
MCHC RBC-ENTMCNC: 33.4 GM/DL — SIGNIFICANT CHANGE UP (ref 32–36)
MCHC RBC-ENTMCNC: 33.4 GM/DL — SIGNIFICANT CHANGE UP (ref 32–36)
MCV RBC AUTO: 98.6 FL — SIGNIFICANT CHANGE UP (ref 80–100)
MCV RBC AUTO: 98.6 FL — SIGNIFICANT CHANGE UP (ref 80–100)
MONOCYTES # BLD AUTO: 0.63 K/UL — SIGNIFICANT CHANGE UP (ref 0–0.9)
MONOCYTES # BLD AUTO: 0.63 K/UL — SIGNIFICANT CHANGE UP (ref 0–0.9)
MONOCYTES NFR BLD AUTO: 11.1 % — SIGNIFICANT CHANGE UP (ref 2–14)
MONOCYTES NFR BLD AUTO: 11.1 % — SIGNIFICANT CHANGE UP (ref 2–14)
NEUTROPHILS # BLD AUTO: 4.09 K/UL — SIGNIFICANT CHANGE UP (ref 1.8–7.4)
NEUTROPHILS # BLD AUTO: 4.09 K/UL — SIGNIFICANT CHANGE UP (ref 1.8–7.4)
NEUTROPHILS NFR BLD AUTO: 71.8 % — SIGNIFICANT CHANGE UP (ref 43–77)
NEUTROPHILS NFR BLD AUTO: 71.8 % — SIGNIFICANT CHANGE UP (ref 43–77)
NITRITE UR-MCNC: NEGATIVE — SIGNIFICANT CHANGE UP
NITRITE UR-MCNC: NEGATIVE — SIGNIFICANT CHANGE UP
NRBC # BLD: 0 /100 WBCS — SIGNIFICANT CHANGE UP (ref 0–0)
NRBC # BLD: 0 /100 WBCS — SIGNIFICANT CHANGE UP (ref 0–0)
PH UR: 5.5 — SIGNIFICANT CHANGE UP (ref 5–8)
PH UR: 5.5 — SIGNIFICANT CHANGE UP (ref 5–8)
PHOSPHATE SERPL-MCNC: 3.4 MG/DL — SIGNIFICANT CHANGE UP (ref 2.5–4.5)
PHOSPHATE SERPL-MCNC: 3.4 MG/DL — SIGNIFICANT CHANGE UP (ref 2.5–4.5)
PLATELET # BLD AUTO: 134 K/UL — LOW (ref 150–400)
PLATELET # BLD AUTO: 134 K/UL — LOW (ref 150–400)
POTASSIUM SERPL-MCNC: 4.4 MMOL/L — SIGNIFICANT CHANGE UP (ref 3.5–5.3)
POTASSIUM SERPL-MCNC: 4.4 MMOL/L — SIGNIFICANT CHANGE UP (ref 3.5–5.3)
POTASSIUM SERPL-SCNC: 4.4 MMOL/L — SIGNIFICANT CHANGE UP (ref 3.5–5.3)
POTASSIUM SERPL-SCNC: 4.4 MMOL/L — SIGNIFICANT CHANGE UP (ref 3.5–5.3)
PROT SERPL-MCNC: 7.9 G/DL — SIGNIFICANT CHANGE UP (ref 6–8.3)
PROT SERPL-MCNC: 7.9 G/DL — SIGNIFICANT CHANGE UP (ref 6–8.3)
PROT UR-MCNC: 100 MG/DL
PROT UR-MCNC: 100 MG/DL
PROTHROM AB SERPL-ACNC: 12.2 SEC — SIGNIFICANT CHANGE UP (ref 9.5–13)
PROTHROM AB SERPL-ACNC: 12.2 SEC — SIGNIFICANT CHANGE UP (ref 9.5–13)
RAPID RVP RESULT: DETECTED
RAPID RVP RESULT: DETECTED
RBC # BLD: 4.19 M/UL — LOW (ref 4.2–5.8)
RBC # BLD: 4.19 M/UL — LOW (ref 4.2–5.8)
RBC # FLD: 13.9 % — SIGNIFICANT CHANGE UP (ref 10.3–14.5)
RBC # FLD: 13.9 % — SIGNIFICANT CHANGE UP (ref 10.3–14.5)
RBC CASTS # UR COMP ASSIST: 5 /HPF — HIGH (ref 0–4)
RBC CASTS # UR COMP ASSIST: 5 /HPF — HIGH (ref 0–4)
SARS-COV-2 RNA SPEC QL NAA+PROBE: SIGNIFICANT CHANGE UP
SARS-COV-2 RNA SPEC QL NAA+PROBE: SIGNIFICANT CHANGE UP
SODIUM SERPL-SCNC: 136 MMOL/L — SIGNIFICANT CHANGE UP (ref 135–145)
SODIUM SERPL-SCNC: 136 MMOL/L — SIGNIFICANT CHANGE UP (ref 135–145)
SP GR SPEC: 1.02 — SIGNIFICANT CHANGE UP (ref 1–1.03)
SP GR SPEC: 1.02 — SIGNIFICANT CHANGE UP (ref 1–1.03)
TROPONIN I, HIGH SENSITIVITY RESULT: 62.7 NG/L — SIGNIFICANT CHANGE UP
TROPONIN I, HIGH SENSITIVITY RESULT: 62.7 NG/L — SIGNIFICANT CHANGE UP
UROBILINOGEN FLD QL: 0.2 MG/DL — SIGNIFICANT CHANGE UP (ref 0.2–1)
UROBILINOGEN FLD QL: 0.2 MG/DL — SIGNIFICANT CHANGE UP (ref 0.2–1)
WBC # BLD: 5.69 K/UL — SIGNIFICANT CHANGE UP (ref 3.8–10.5)
WBC # BLD: 5.69 K/UL — SIGNIFICANT CHANGE UP (ref 3.8–10.5)
WBC # FLD AUTO: 5.69 K/UL — SIGNIFICANT CHANGE UP (ref 3.8–10.5)
WBC # FLD AUTO: 5.69 K/UL — SIGNIFICANT CHANGE UP (ref 3.8–10.5)
WBC UR QL: 45 /HPF — HIGH (ref 0–5)
WBC UR QL: 45 /HPF — HIGH (ref 0–5)

## 2023-12-20 PROCEDURE — 99285 EMERGENCY DEPT VISIT HI MDM: CPT

## 2023-12-20 PROCEDURE — 71045 X-RAY EXAM CHEST 1 VIEW: CPT | Mod: 26

## 2023-12-20 RX ORDER — SODIUM CHLORIDE 9 MG/ML
1000 INJECTION, SOLUTION INTRAVENOUS ONCE
Refills: 0 | Status: COMPLETED | OUTPATIENT
Start: 2023-12-20 | End: 2023-12-20

## 2023-12-20 RX ORDER — ACETAMINOPHEN 500 MG
1000 TABLET ORAL ONCE
Refills: 0 | Status: COMPLETED | OUTPATIENT
Start: 2023-12-20 | End: 2023-12-20

## 2023-12-20 RX ADMIN — Medication 1000 MILLIGRAM(S): at 22:15

## 2023-12-20 RX ADMIN — SODIUM CHLORIDE 1000 MILLILITER(S): 9 INJECTION, SOLUTION INTRAVENOUS at 21:45

## 2023-12-20 RX ADMIN — Medication 1000 MILLIGRAM(S): at 22:00

## 2023-12-20 RX ADMIN — SODIUM CHLORIDE 1000 MILLILITER(S): 9 INJECTION, SOLUTION INTRAVENOUS at 22:45

## 2023-12-20 RX ADMIN — Medication 400 MILLIGRAM(S): at 21:45

## 2023-12-20 NOTE — ED PROVIDER NOTE - PHYSICAL EXAMINATION
CONSTITUTIONAL: non-toxic, well appearing  SKIN: no rash, no petechiae.  EYES: PERRL, EOMI, pink conjunctiva, anicteric  ENT: tongue and uvular midline, no exudates, dry mucous membranes  NECK: Supple; no meningismus, no JVD  CARD: RRR, no murmurs, equal radial pulses bilaterally 2+  RESP: CTAB, no respiratory distress  ABD: Soft, non-tender, non-distended, no peritoneal signs, no CVA tenderness  EXT: Normal ROM x4. No edema.   NEURO: Alert, oriented. Neuro exam nonfocal, equal strength bilaterally. CN II-XII intact.   PSYCH: Cooperative, appropriate.

## 2023-12-20 NOTE — ED ADULT TRIAGE NOTE - CHIEF COMPLAINT QUOTE
Pt c/o cough, runny nose, fever, poor appetite X4 days, diarrhea X1 day due to medication (Gilotrif) pt is taking for lung cancer, last took Tylenol 1000AM today  hx lung cancer, hypothyroid, HTN, irregular heart beat, BPH Pt c/o productive cough, runny nose, fever, poor appetite X4 days, diarrhea X1 day due to medication (Gilotrif) pt is taking for lung cancer, last took Tylenol 1000AM today  hx lung cancer, hypothyroid, HTN, irregular heart beat, BPH

## 2023-12-20 NOTE — ED ADULT NURSE NOTE - NSFALLUNIVINTERV_ED_ALL_ED
Bed/Stretcher in lowest position, wheels locked, appropriate side rails in place/Call bell, personal items and telephone in reach/Instruct patient to call for assistance before getting out of bed/chair/stretcher/Non-slip footwear applied when patient is off stretcher/[place] to call system/Physically safe environment - no spills, clutter or unnecessary equipment/Purposeful proactive rounding/Room/bathroom lighting operational, light cord in reach Bed/Stretcher in lowest position, wheels locked, appropriate side rails in place/Call bell, personal items and telephone in reach/Instruct patient to call for assistance before getting out of bed/chair/stretcher/Non-slip footwear applied when patient is off stretcher/[place] to call system/Physically safe environment - no spills, clutter or unnecessary equipment/Purposeful proactive rounding/Room/bathroom lighting operational, light cord in reach

## 2023-12-20 NOTE — ED PROVIDER NOTE - PROGRESS NOTE DETAILS
Lucks-DO: pt with persistent diarrhea, generalized weakness, lives alone, will admit. Discussed with hospitalist re: admission.

## 2023-12-20 NOTE — ED PROVIDER NOTE - NS ED ATTENDING STATEMENT MOD
Sig - Route: Place 1-2 drops into both eyes 2 times daily - Both Eyes   Sent to pharmacy as: Dorzolamide HCl-Timolol Mal 22.3-6.8 MG/ML Ophthalmic Solution (COSOPT)   Class: E-Prescribe   Order: [id number]   E-Prescribing Status: Receipt confirmed by pharmacy (1/24/2023  3:26 PM CST)     Per pharmacy they are stating this is a high dose and wanting to confirm if this is correct     22.3-6.8 MG/ ML    Thanks,     V   Attending Only

## 2023-12-20 NOTE — ED PROVIDER NOTE - OBJECTIVE STATEMENT
89-year-old male with past medical history lung cancer on Gilotrif, hypothyroidism, hypertension, atrial fibrillation on Xarelto, BPH presents with flulike symptoms x 3 days.  Patient reports fevers Tmax 102F, nasal congestion, dry cough, and watery diarrhea with past 3 days.  Patient reports approximately 8 episodes of watery diarrhea today.  Patient reports generalized weakness, not eating and drinking due to decreased appetite.  Patient states he lives alone, friend at bedside expressing concern as patient lives alone and generally weak and has not been tolerating oral intake.  Denies any difficulty breathing, abdominal pain, vomiting, bloody stools, black tarry stools, dysuria, numbness, focal weakness, rash.  Denies any additional complaints.

## 2023-12-20 NOTE — ED ADULT NURSE NOTE - OBJECTIVE STATEMENT
Patient presented to the ED complaining of diarrhea. Patient states he is taking a medication for his lung ca and is supposed to take immodium with the medication however he skipped his doses because he thought he was taking too many medication.

## 2023-12-20 NOTE — ED PROVIDER NOTE - CLINICAL SUMMARY MEDICAL DECISION MAKING FREE TEXT BOX
Ran: 89-year-old male with past medical history lung cancer on Gilotrif, hypothyroidism, hypertension, atrial fibrillation on Xarelto, BPH presents with flulike symptoms x 3 days.  Patient reports fevers Tmax 102F, nasal congestion, dry cough, and watery diarrhea with past 3 days.  Patient reports approximately 8 episodes of watery diarrhea today.  Patient reports generalized weakness, not eating and drinking due to decreased appetite.  Patient states he lives alone, friend at bedside expressing concern as patient lives alone and generally weak and has not been tolerating oral intake.  Denies any difficulty breathing, abdominal pain, vomiting, bloody stools, black tarry stools, dysuria, numbness, focal weakness, rash.  Physical exam per above. Likely infectious trigger, ?viral syndrome, will obtain labs, imaging, provide supportive treatment, may require admission.

## 2023-12-20 NOTE — ED ADULT NURSE NOTE - CHIEF COMPLAINT QUOTE
Pt c/o productive cough, runny nose, fever, poor appetite X4 days, diarrhea X1 day due to medication (Gilotrif) pt is taking for lung cancer, last took Tylenol 1000AM today  hx lung cancer, hypothyroid, HTN, irregular heart beat, BPH

## 2023-12-21 DIAGNOSIS — I48.91 UNSPECIFIED ATRIAL FIBRILLATION: ICD-10-CM

## 2023-12-21 DIAGNOSIS — S37.009A UNSPECIFIED INJURY OF UNSPECIFIED KIDNEY, INITIAL ENCOUNTER: ICD-10-CM

## 2023-12-21 DIAGNOSIS — C34.90 MALIGNANT NEOPLASM OF UNSPECIFIED PART OF UNSPECIFIED BRONCHUS OR LUNG: ICD-10-CM

## 2023-12-21 DIAGNOSIS — J11.1 INFLUENZA DUE TO UNIDENTIFIED INFLUENZA VIRUS WITH OTHER RESPIRATORY MANIFESTATIONS: ICD-10-CM

## 2023-12-21 DIAGNOSIS — A41.9 SEPSIS, UNSPECIFIED ORGANISM: ICD-10-CM

## 2023-12-21 DIAGNOSIS — R19.7 DIARRHEA, UNSPECIFIED: ICD-10-CM

## 2023-12-21 DIAGNOSIS — I10 ESSENTIAL (PRIMARY) HYPERTENSION: ICD-10-CM

## 2023-12-21 DIAGNOSIS — J18.9 PNEUMONIA, UNSPECIFIED ORGANISM: ICD-10-CM

## 2023-12-21 DIAGNOSIS — E03.9 HYPOTHYROIDISM, UNSPECIFIED: ICD-10-CM

## 2023-12-21 DIAGNOSIS — J10.1 INFLUENZA DUE TO OTHER IDENTIFIED INFLUENZA VIRUS WITH OTHER RESPIRATORY MANIFESTATIONS: ICD-10-CM

## 2023-12-21 DIAGNOSIS — Z29.9 ENCOUNTER FOR PROPHYLACTIC MEASURES, UNSPECIFIED: ICD-10-CM

## 2023-12-21 LAB
C DIFF BY PCR RESULT: SIGNIFICANT CHANGE UP
C DIFF BY PCR RESULT: SIGNIFICANT CHANGE UP
CREAT ?TM UR-MCNC: 147 MG/DL — SIGNIFICANT CHANGE UP
CREAT ?TM UR-MCNC: 147 MG/DL — SIGNIFICANT CHANGE UP
GI PCR PANEL: SIGNIFICANT CHANGE UP
GI PCR PANEL: SIGNIFICANT CHANGE UP
SODIUM UR-SCNC: 50 MMOL/L — SIGNIFICANT CHANGE UP
SODIUM UR-SCNC: 50 MMOL/L — SIGNIFICANT CHANGE UP

## 2023-12-21 PROCEDURE — 99222 1ST HOSP IP/OBS MODERATE 55: CPT

## 2023-12-21 RX ORDER — INFLUENZA VIRUS VACCINE 15; 15; 15; 15 UG/.5ML; UG/.5ML; UG/.5ML; UG/.5ML
0.7 SUSPENSION INTRAMUSCULAR ONCE
Refills: 0 | Status: DISCONTINUED | OUTPATIENT
Start: 2023-12-21 | End: 2023-12-22

## 2023-12-21 RX ORDER — DORZOLAMIDE HYDROCHLORIDE TIMOLOL MALEATE 20; 5 MG/ML; MG/ML
0 SOLUTION/ DROPS OPHTHALMIC
Qty: 0 | Refills: 0 | DISCHARGE

## 2023-12-21 RX ORDER — SIMVASTATIN 20 MG/1
10 TABLET, FILM COATED ORAL AT BEDTIME
Refills: 0 | Status: DISCONTINUED | OUTPATIENT
Start: 2023-12-21 | End: 2023-12-22

## 2023-12-21 RX ORDER — MULTIVIT-MIN/FERROUS GLUCONATE 9 MG/15 ML
1 LIQUID (ML) ORAL
Qty: 0 | Refills: 0 | DISCHARGE

## 2023-12-21 RX ORDER — FINASTERIDE 5 MG/1
5 TABLET, FILM COATED ORAL DAILY
Refills: 0 | Status: DISCONTINUED | OUTPATIENT
Start: 2023-12-21 | End: 2023-12-22

## 2023-12-21 RX ORDER — AZITHROMYCIN 500 MG/1
500 TABLET, FILM COATED ORAL ONCE
Refills: 0 | Status: COMPLETED | OUTPATIENT
Start: 2023-12-21 | End: 2023-12-21

## 2023-12-21 RX ORDER — NETARSUDIL AND LATANOPROST OPHTHALMIC SOLUTION, 0.02%/0.005% .2; .05 MG/ML; MG/ML
0 SOLUTION/ DROPS OPHTHALMIC; TOPICAL
Qty: 0 | Refills: 0 | DISCHARGE

## 2023-12-21 RX ORDER — SODIUM CHLORIDE 9 MG/ML
1000 INJECTION, SOLUTION INTRAVENOUS
Refills: 0 | Status: DISCONTINUED | OUTPATIENT
Start: 2023-12-21 | End: 2023-12-22

## 2023-12-21 RX ORDER — AZITHROMYCIN 500 MG/1
500 TABLET, FILM COATED ORAL EVERY 24 HOURS
Refills: 0 | Status: DISCONTINUED | OUTPATIENT
Start: 2023-12-22 | End: 2023-12-22

## 2023-12-21 RX ORDER — METOPROLOL TARTRATE 50 MG
0 TABLET ORAL
Qty: 0 | Refills: 1 | DISCHARGE

## 2023-12-21 RX ORDER — BRIMONIDINE TARTRATE, TIMOLOL MALEATE 2; 5 MG/ML; MG/ML
1 SOLUTION/ DROPS OPHTHALMIC
Qty: 0 | Refills: 0 | DISCHARGE

## 2023-12-21 RX ORDER — BRINZOLAMIDE 10 MG/ML
1 SUSPENSION/ DROPS OPHTHALMIC
Qty: 0 | Refills: 0 | DISCHARGE

## 2023-12-21 RX ORDER — TAMSULOSIN HYDROCHLORIDE 0.4 MG/1
0.4 CAPSULE ORAL AT BEDTIME
Refills: 0 | Status: DISCONTINUED | OUTPATIENT
Start: 2023-12-21 | End: 2023-12-22

## 2023-12-21 RX ORDER — LEVOTHYROXINE SODIUM 125 MCG
0 TABLET ORAL
Qty: 0 | Refills: 4 | DISCHARGE

## 2023-12-21 RX ORDER — LEVOTHYROXINE SODIUM 125 MCG
25 TABLET ORAL DAILY
Refills: 0 | Status: DISCONTINUED | OUTPATIENT
Start: 2023-12-21 | End: 2023-12-22

## 2023-12-21 RX ORDER — CEFTRIAXONE 500 MG/1
INJECTION, POWDER, FOR SOLUTION INTRAMUSCULAR; INTRAVENOUS
Refills: 0 | Status: DISCONTINUED | OUTPATIENT
Start: 2023-12-21 | End: 2023-12-22

## 2023-12-21 RX ORDER — CEFTRIAXONE 500 MG/1
1000 INJECTION, POWDER, FOR SOLUTION INTRAMUSCULAR; INTRAVENOUS EVERY 24 HOURS
Refills: 0 | Status: DISCONTINUED | OUTPATIENT
Start: 2023-12-22 | End: 2023-12-22

## 2023-12-21 RX ORDER — AZITHROMYCIN 500 MG/1
TABLET, FILM COATED ORAL
Refills: 0 | Status: DISCONTINUED | OUTPATIENT
Start: 2023-12-21 | End: 2023-12-22

## 2023-12-21 RX ORDER — RIVAROXABAN 15 MG-20MG
15 KIT ORAL
Refills: 0 | Status: DISCONTINUED | OUTPATIENT
Start: 2023-12-21 | End: 2023-12-22

## 2023-12-21 RX ORDER — CEFTRIAXONE 500 MG/1
1000 INJECTION, POWDER, FOR SOLUTION INTRAMUSCULAR; INTRAVENOUS ONCE
Refills: 0 | Status: COMPLETED | OUTPATIENT
Start: 2023-12-21 | End: 2023-12-21

## 2023-12-21 RX ORDER — ACETAMINOPHEN 500 MG
650 TABLET ORAL EVERY 6 HOURS
Refills: 0 | Status: DISCONTINUED | OUTPATIENT
Start: 2023-12-21 | End: 2023-12-22

## 2023-12-21 RX ADMIN — SODIUM CHLORIDE 100 MILLILITER(S): 9 INJECTION, SOLUTION INTRAVENOUS at 04:09

## 2023-12-21 RX ADMIN — TAMSULOSIN HYDROCHLORIDE 0.4 MILLIGRAM(S): 0.4 CAPSULE ORAL at 22:09

## 2023-12-21 RX ADMIN — SIMVASTATIN 10 MILLIGRAM(S): 20 TABLET, FILM COATED ORAL at 22:04

## 2023-12-21 RX ADMIN — Medication 100 MILLIGRAM(S): at 22:08

## 2023-12-21 RX ADMIN — RIVAROXABAN 15 MILLIGRAM(S): KIT at 19:35

## 2023-12-21 RX ADMIN — SODIUM CHLORIDE 100 MILLILITER(S): 9 INJECTION, SOLUTION INTRAVENOUS at 19:36

## 2023-12-21 RX ADMIN — Medication 25 MICROGRAM(S): at 05:14

## 2023-12-21 RX ADMIN — CEFTRIAXONE 100 MILLIGRAM(S): 500 INJECTION, POWDER, FOR SOLUTION INTRAMUSCULAR; INTRAVENOUS at 04:09

## 2023-12-21 RX ADMIN — AZITHROMYCIN 255 MILLIGRAM(S): 500 TABLET, FILM COATED ORAL at 04:08

## 2023-12-21 RX ADMIN — Medication 75 MILLIGRAM(S): at 04:09

## 2023-12-21 RX ADMIN — Medication 30 MILLIGRAM(S): at 16:32

## 2023-12-21 RX ADMIN — Medication 30 MILLIGRAM(S): at 19:34

## 2023-12-21 NOTE — H&P ADULT - HISTORY OF PRESENT ILLNESS
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 Patient w/ Temp of 101F at home. Denies night sweats or weight loss. Denies recent travel or sick contact. He also mentioned having at least 8 to 10 episodes of non bloody, non mucoid or fatty stools for the last 2 days w/ mild generalized abdominal pain. Denies hematuria, hematochezia or melena. Patient follows with Dr Martinez at [facility] for his cancer treatment. He does not have any other concerns.      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 Patient w/ Temp of 101F at home. Denies night sweats or weight loss. Denies recent travel or sick contact. He also mentioned having at least 8 to 10 episodes of non bloody, non mucoid or fatty stools for the last 2 days w/ mild generalized abdominal pain. Denies hematuria, hematochezia or melena. Patient follows with Dr Martinez at [facility] for his cancer treatment. He does not have any other concerns.

## 2023-12-21 NOTE — CONSULT NOTE ADULT - SUBJECTIVE AND OBJECTIVE BOX
History of Present Illness:  Reason for Admission: Sepsis  History of Present Illness:   A 89 year old male, from home, w/ PMHx of Non­Small Cell Lung Cancer on Afatinib, AFib on Xarelto, HTN, BPH, and Hypothyroidism presented to the ED complaining of cough with yellow sputum, intermittent dyspnea, fever, chills, nasal congestion and rhinorrhea, and malaise for the last 3 days. Patient w/ Temp of 101F at home. Denies night sweats or weight loss. Denies recent travel or sick contact. He also mentioned having at least 8 to 10 episodes of non bloody, non mucoid or fatty stools for the last 2 days w/ mild generalized abdominal pain. Denies hematuria, hematochezia or melena. Patient follows with Dr Martinez at [facility] for his cancer treatment. He does not have any other concerns.        Review of Systems:  Other Review of Systems: All other review of systems negative, except as noted in HPI      Allergies and Intolerances:        Allergies:  	No Known Allergies:     Home Medications:   * Incomplete Medication History as of 21-Dec-2023 03:53 documented in Structured Notes  · 	acetaminophen-oxycodone 325 mg-5 mg oral tablet: Last Dose Taken:  , 1 tab(s) orally every 6 hours, As Needed -for moderate pain MDD:4  · 	metoprolol tartrate 25 mg oral tablet: 1 tab(s) orally 2 times a day  · 	finasteride 5 mg oral tablet: 1 tab(s) orally once a day  · 	simvastatin 10 mg oral tablet: 1 tab(s) orally once a day (at bedtime)  · 	losartan 50 mg oral tablet: 1 tab(s) orally once a day  · 	tamsulosin 0.4 mg oral capsule: 1 cap(s) orally once a day  · 	Xarelto 15 mg oral tablet: 1 tab(s) orally once a day (in the evening)  · 	metoprolol succinate ER 25 mg tablet,extended release 24 hr:   · 	levothyroxine 25 mcg tablet:   · 	finasteride 5 mg tablet:   · 	simvastatin 10 mg tablet:   · 	Centrum oral tablet: Last Dose Taken:  , 1 tab(s) orally once a day  · 	losartan 50 mg tablet:   · 	tamsulosin 0.4 mg capsule:   · 	Azopt 1% ophthalmic suspension: Last Dose Taken:  , 1 drop(s) to each affected eye 3 times a day  · 	dorzolamide 22.3 mg-timolol 6.8 mg/mL eye drops:   · 	Lumigan 0.01% ophthalmic solution: 1 drop(s) to each affected eye once a day (in the evening)  · 	Combigan 0.2%-0.5% ophthalmic solution: Last Dose Taken:  , 1 drop(s) to each affected eye every 12 hours  · 	Xarelto 15 mg tablet:   · 	Rocklatan 0.02 %-0.005 % eye drops:     .    Patient History:    Past Medical, Past Surgical, and Family History:  PAST MEDICAL HISTORY:  Atrial fibrillation     BPH without obstruction/lower urinary tract symptoms     Essential hypertension     Glaucoma (increased eye pressure) both eyes    Hyperlipidemia, unspecified hyperlipidemia type.     PAST SURGICAL HISTORY:  Cataract extraction status of left eye w/lens implant - 2014    H/O hemicolectomy 1973    History of lung biopsy 2016 - negative results.     FAMILY HISTORY:  Family history unknown. No pertinent family history of: Unknown.     Social History:  · Substance use	No  · Social History (marital status, living situation, occupation, and sexual history)	Lives at home.  Ambulates w/o assistance  Retired  Former smoker      Vital Signs Last 24 Hrs  T(C): 36.6 (21 Dec 2023 13:24), Max: 37.8 (20 Dec 2023 18:42)  T(F): 97.9 (21 Dec 2023 13:24), Max: 100 (20 Dec 2023 18:42)  HR: 87 (21 Dec 2023 13:24) (87 - 119)  BP: 115/73 (21 Dec 2023 13:24) (88/52 - 152/91)  BP(mean): 97 (21 Dec 2023 08:00) (97 - 97)  RR: 17 (21 Dec 2023 13:24) (17 - 20)  SpO2: 99% (21 Dec 2023 13:24) (95% - 99%)    Parameters below as of 21 Dec 2023 13:24  Patient On (Oxygen Delivery Method): room air      Physical Exam:   Physical Exam: PHYSICAL EXAMINATION:  GENERAL: NAD, lying comfortable in bed  HEAD:  Atraumatic, Normocephalic  EYES:  Conjunctiva and sclera clear, pupils are equal, round, and reactive to light and accommodation. Dry mucous membranes  NECK: Supple, No JVD, trachea is midline, no evidence of thyroid enlargement, no lymphadenopathy or tenderness.  CHEST/LUNG: Crackles noted on the right middle and lower zone and on the left lower zone, no rhonchi, wheezing, or rubs  HEART: Regular rate and rhythm; No murmurs, rubs, or gallops  ABDOMEN: Soft, Nontender, Nondistended; Bowel sounds present  NERVOUS SYSTEM:  Alert & Oriented X3; No focal sensory or motor deficits are noted; Recent and remote memory is intact; Appropriate mood and affect.  EXTREMITIES:  2+ Peripheral Pulses, No clubbing, cyanosis, or edema  SKIN: Warm, dry, and well perfused; Good turgor; No lesions, nodules or rashes are noted.         Laboratory:                           13.8   5.69  )-----------( 134      ( 20 Dec 2023 20:55 )             41.3   12-20    136  |  105  |  31<H>  ----------------------------<  114<H>  4.4   |  22  |  1.54<H>    Ca    9.3      20 Dec 2023 20:55  Phos  3.4     12-20  Mg     2.0     12-20    TPro  7.9  /  Alb  4.0  /  TBili  1.0  /  DBili  x   /  AST  59<H>  /  ALT  40  /  AlkPhos  73  12-20    Radiology:    < from: Xray Chest 1 View- PORTABLE-Urgent (12.20.23 @ 19:45) >    ACC: [id number] EXAM:  XR CHEST PORTABLE URGENT 1V   ORDERED BY: SHARAD ESCAMILLA     PROCEDURE DATE:  12/20/2023          INTERPRETATION:  AP semierect chest on December 20, 2023 at 7:18 PM.   Patient has sepsis, cough, and diarrhea.    COMPARISON:None available.    Heart size normal.    Lungs are clear.    IMPRESSION: No acute finding.      < end of copied text >      History of Present Illness:  Reason for Admission: Sepsis  History of Present Illness:   A 89 year old male, from home, w/ PMHx of Non­Small Cell Lung Cancer on Afatinib, AFib on Xarelto, HTN, BPH, and Hypothyroidism presented to the ED complaining of cough with yellow sputum, intermittent dyspnea, fever, chills, nasal congestion and rhinorrhea, and malaise for the last 3 days. Patient w/ Temp of 101F at home. Denies night sweats or weight loss. Denies recent travel or sick contact. He also mentioned having at least 8 to 10 episodes of non bloody, non mucoid or fatty stools for the last 2 days w/ mild generalized abdominal pain. Denies hematuria, hematochezia or melena. Patient follows with Dr Martinez at [facility] for his cancer treatment. He does not have any other concerns.        Review of Systems:  Other Review of Systems: All other review of systems negative, except as noted in HPI      Allergies and Intolerances:        Allergies:  	No Known Allergies:     Home Medications:   * Incomplete Medication History as of 21-Dec-2023 03:53 documented in Structured Notes  · 	acetaminophen-oxycodone 325 mg-5 mg oral tablet: Last Dose Taken:  , 1 tab(s) orally every 6 hours, As Needed -for moderate pain MDD:4  · 	metoprolol tartrate 25 mg oral tablet: 1 tab(s) orally 2 times a day  · 	finasteride 5 mg oral tablet: 1 tab(s) orally once a day  · 	simvastatin 10 mg oral tablet: 1 tab(s) orally once a day (at bedtime)  · 	losartan 50 mg oral tablet: 1 tab(s) orally once a day  · 	tamsulosin 0.4 mg oral capsule: 1 cap(s) orally once a day  · 	Xarelto 15 mg oral tablet: 1 tab(s) orally once a day (in the evening)  · 	metoprolol succinate ER 25 mg tablet,extended release 24 hr:   · 	levothyroxine 25 mcg tablet:   · 	finasteride 5 mg tablet:   · 	simvastatin 10 mg tablet:   · 	Centrum oral tablet: Last Dose Taken:  , 1 tab(s) orally once a day  · 	losartan 50 mg tablet:   · 	tamsulosin 0.4 mg capsule:   · 	Azopt 1% ophthalmic suspension: Last Dose Taken:  , 1 drop(s) to each affected eye 3 times a day  · 	dorzolamide 22.3 mg-timolol 6.8 mg/mL eye drops:   · 	Lumigan 0.01% ophthalmic solution: 1 drop(s) to each affected eye once a day (in the evening)  · 	Combigan 0.2%-0.5% ophthalmic solution: Last Dose Taken:  , 1 drop(s) to each affected eye every 12 hours  · 	Xarelto 15 mg tablet:   · 	Rocklatan 0.02 %-0.005 % eye drops:     .    Patient History:    Past Medical, Past Surgical, and Family History:  PAST MEDICAL HISTORY:  Atrial fibrillation     BPH without obstruction/lower urinary tract symptoms     Essential hypertension     Glaucoma (increased eye pressure) both eyes    Hyperlipidemia, unspecified hyperlipidemia type.     PAST SURGICAL HISTORY:  Cataract extraction status of left eye w/lens implant - 2014    H/O hemicolectomy 1973    History of lung biopsy 2016 - negative results.     FAMILY HISTORY:  Family history unknown. No pertinent family history of: Unknown.     Social History:  · Substance use	No  · Social History (marital status, living situation, occupation, and sexual history)	Lives at home.  Ambulates w/o assistance  Retired  Former smoker      Vital Signs Last 24 Hrs  T(C): 36.6 (21 Dec 2023 13:24), Max: 37.8 (20 Dec 2023 18:42)  T(F): 97.9 (21 Dec 2023 13:24), Max: 100 (20 Dec 2023 18:42)  HR: 87 (21 Dec 2023 13:24) (87 - 119)  BP: 115/73 (21 Dec 2023 13:24) (88/52 - 152/91)  BP(mean): 97 (21 Dec 2023 08:00) (97 - 97)  RR: 17 (21 Dec 2023 13:24) (17 - 20)  SpO2: 99% (21 Dec 2023 13:24) (95% - 99%)    Parameters below as of 21 Dec 2023 13:24  Patient On (Oxygen Delivery Method): room air      Physical Exam:   Physical Exam: PHYSICAL EXAMINATION:  GENERAL: NAD, lying comfortable in bed  HEAD:  Atraumatic, Normocephalic  EYES:  Conjunctiva and sclera clear, pupils are equal, round, and reactive to light and accommodation. Dry mucous membranes  NECK: Supple, No JVD, trachea is midline, no evidence of thyroid enlargement, no lymphadenopathy or tenderness.  CHEST/LUNG: Crackles noted on the right middle and lower zone and on the left lower zone, no rhonchi, wheezing, or rubs  HEART: Regular rate and rhythm; No murmurs, rubs, or gallops  ABDOMEN: Soft, Nontender, Nondistended; Bowel sounds present  NERVOUS SYSTEM:  Alert & Oriented X3; No focal sensory or motor deficits are noted; Recent and remote memory is intact; Appropriate mood and affect.  EXTREMITIES:  2+ Peripheral Pulses, No clubbing, cyanosis, or edema  SKIN: Warm, dry, and well perfused; Good turgor; No lesions, nodules or rashes are noted.         Laboratory:                           13.8   5.69  )-----------( 134      ( 20 Dec 2023 20:55 )             41.3   12-20    136  |  105  |  31<H>  ----------------------------<  114<H>  4.4   |  22  |  1.54<H>    Ca    9.3      20 Dec 2023 20:55  Phos  3.4     12-20  Mg     2.0     12-20    TPro  7.9  /  Alb  4.0  /  TBili  1.0  /  DBili  x   /  AST  59<H>  /  ALT  40  /  AlkPhos  73  12-20    Radiology:    < from: Xray Chest 1 View- PORTABLE-Urgent (12.20.23 @ 19:45) >    ACC: [id number] EXAM:  XR CHEST PORTABLE URGENT 1V   ORDERED BY: SHARAD ESCAMILLA     PROCEDURE DATE:  12/20/2023          INTERPRETATION:  AP semierect chest on December 20, 2023 at 7:18 PM.   Patient has sepsis, cough, and diarrhea.    COMPARISON:None available.    Heart size normal.    Lungs are clear.    IMPRESSION: No acute finding.      < end of copied text >

## 2023-12-21 NOTE — H&P ADULT - PROBLEM SELECTOR PLAN 8
MORNING TEAM CONFIRM MED[facility]  Hold medication for now in the setting of sepsis   Restart medications as tolerate later in the hospital course  DASH/TLC diet  Adjust medications as needed to meet target.

## 2023-12-21 NOTE — H&P ADULT - PROBLEM SELECTOR PLAN 9
Patient follows with Dr Martinez at [facility] for his cancer treatment.  Morning team to consult [facility] for further follow up Patient follows with Dr Martinez at [facility] for his cancer treatment.  Morning team to consult [facility] for further follow up

## 2023-12-21 NOTE — H&P ADULT - NS ATTEND AMEND GEN_ALL_CORE FT
89 year old male with a history of lung Ca , adenocarcinoma left upper arm, DM, A-fib, HTN, HLD was admitted with  c/o cough , fever, diarrhea.  Chest Xray showed no acute infiltartes  Positive for Influenza A   Symptomatic treatment. D/c planning.

## 2023-12-21 NOTE — H&P ADULT - PROBLEM SELECTOR PLAN 1
Likely secondary to Influenza, UTI and/or superimposed bacterial PNA.  No urinary symptoms.   F/U Blood and Urine cultures  Patient having diarrhea. F/U Stool studies  Normal lactate  Patient w/ Temp of 101F at home  s/p 1L LR Bolus  Continue IVF  Ceftriaxone and Azithromycin for PNA  Tamiflu renally adjusted for Influenza A  Isolation precautions  Guaifenesin PRN for cough

## 2023-12-21 NOTE — PATIENT PROFILE ADULT - FALL HARM RISK - UNIVERSAL INTERVENTIONS
Bed in lowest position, wheels locked, appropriate side rails in place/Call bell, personal items and telephone in reach/Instruct patient to call for assistance before getting out of bed or chair/Non-slip footwear when patient is out of bed/[place] to call system/Physically safe environment - no spills, clutter or unnecessary equipment/Purposeful Proactive Rounding/Room/bathroom lighting operational, light cord in reach Bed in lowest position, wheels locked, appropriate side rails in place/Call bell, personal items and telephone in reach/Instruct patient to call for assistance before getting out of bed or chair/Non-slip footwear when patient is out of bed/[place] to call system/Physically safe environment - no spills, clutter or unnecessary equipment/Purposeful Proactive Rounding/Room/bathroom lighting operational, light cord in reach

## 2023-12-21 NOTE — H&P ADULT - PROBLEM SELECTOR PLAN 4
As mentioned above  Isolation precautions for C Diff until ruled out  Diarrhea could also be related to current viral illness and/or medication side effect from Afatinib  F/U Stool studies  IVF

## 2023-12-21 NOTE — H&P ADULT - PROBLEM SELECTOR PLAN 5
Noted to have BUN/Cr 31/1.54  Unclear baseline  HONG vs HONG on CKD  F/U Urine studies  Calculate FeNa  s/p 1L LR Bolus  Continue IVF

## 2023-12-21 NOTE — H&P ADULT - NSHPPHYSICALEXAM_GEN_ALL_CORE
PHYSICAL EXAMINATION:  GENERAL: NAD, lying comfortable in bed  HEAD:  Atraumatic, Normocephalic  EYES:  Conjunctiva and sclera clear, pupils are equal, round, and reactive to light and accommodation. Dry mucous membranes  NECK: Supple, No JVD, trachea is midline, no evidence of thyroid enlargement, no lymphadenopathy or tenderness.  CHEST/LUNG: Crackles noted on the right middle and lower zone and on the left lower zone, no rhonchi, wheezing, or rubs  HEART: Regular rate and rhythm; No murmurs, rubs, or gallops  ABDOMEN: Soft, Nontender, Nondistended; Bowel sounds present  NERVOUS SYSTEM:  Alert & Oriented X3; No focal sensory or motor deficits are noted; Recent and remote memory is intact; Appropriate mood and affect.  EXTREMITIES:  2+ Peripheral Pulses, No clubbing, cyanosis, or edema  SKIN: Warm, dry, and well perfused; Good turgor; No lesions, nodules or rashes are noted.     Vital Signs Last 24 Hrs  T(C): 36.6 (21 Dec 2023 04:56), Max: 37.8 (20 Dec 2023 18:42)  T(F): 97.8 (21 Dec 2023 04:56), Max: 100 (20 Dec 2023 18:42)  HR: 103 (21 Dec 2023 04:56) (102 - 119)  BP: 112/69 (21 Dec 2023 04:56) (101/65 - 152/91)  BP(mean): --  RR: 18 (21 Dec 2023 04:56) (18 - 20)  SpO2: 97% (21 Dec 2023 04:56) (96% - 97%)    Parameters below as of 21 Dec 2023 04:56  Patient On (Oxygen Delivery Method): room air

## 2023-12-21 NOTE — H&P ADULT - PROBLEM SELECTOR PLAN 3
Infiltrates on the right middle and lower zones on CXR wt read  PSI/PORT Score 142 points Risk Class V  Ceftriaxone 1g IV for now  Azithromycin 500 mg IV for now   Guaifenesin PRN for cough  F/U Strep  F/U Mycoplasma  F/U Legionella  Tylenol PRN Infiltrates on the right middle and lower zones on CXR wt read  PSI/PORT Score 142 points Risk Class V  Ceftriaxone 1g IV for now  Azithromycin 500 mg IV for now   Guaifenesin PRN for cough  F/U Strep  F/U Mycoplasma  F/U Legionella  Tylenol PRN  Pulm Dr Murillo consulted

## 2023-12-21 NOTE — CONSULT NOTE ADULT - ASSESSMENT
Influenza A viral bronchitis  CXR clear, no evidence of PNA  on immunotherapy for lung ca  99% on RA, no supplemental O2 requirement  no wheezing on exam.  No leukocytosis.    Recommend:  conservative management for viral URI bronchitis  Albuterol MDI prn  Mucinex BID  no indication for abx or steroids at this time Influenza A viral bronchitis  CXR clear, no evidence of PNA  on immunotherapy for lung ca  99% on RA, no supplemental O2 requirement  no wheezing on exam.  No leukocytosis.    Recommend:  conservative management for viral URI bronchitis  Albuterol MDI prn  Mucinex BID  Tamiflu  no indication for abx or steroids at this time

## 2023-12-21 NOTE — H&P ADULT - ASSESSMENT
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 Admitted to medicine for sepsis likely secondary to Influenza, UTI,  and/or superimposed bacterial PNA.     ***MORNING TEAM CONFIRM MEDREC***

## 2023-12-21 NOTE — H&P ADULT - PROBLEM SELECTOR PLAN 6
Continue Xarelto 15 mg at dinner  MORNING TEAM CONFIRM MED[facility]  Goal HR <110  Rate control w/ Metoprolol.   Currently held in the setting of sepsis. Restart later in the hospital course as tolerated  DASH/TLC diet

## 2023-12-22 ENCOUNTER — TRANSCRIPTION ENCOUNTER (OUTPATIENT)
Age: 89
End: 2023-12-22

## 2023-12-22 VITALS
OXYGEN SATURATION: 96 % | SYSTOLIC BLOOD PRESSURE: 103 MMHG | RESPIRATION RATE: 16 BRPM | TEMPERATURE: 98 F | DIASTOLIC BLOOD PRESSURE: 51 MMHG | HEART RATE: 81 BPM

## 2023-12-22 LAB
ANION GAP SERPL CALC-SCNC: 7 MMOL/L — SIGNIFICANT CHANGE UP (ref 5–17)
ANION GAP SERPL CALC-SCNC: 7 MMOL/L — SIGNIFICANT CHANGE UP (ref 5–17)
BUN SERPL-MCNC: 29 MG/DL — HIGH (ref 7–18)
BUN SERPL-MCNC: 29 MG/DL — HIGH (ref 7–18)
CALCIUM SERPL-MCNC: 8.5 MG/DL — SIGNIFICANT CHANGE UP (ref 8.4–10.5)
CALCIUM SERPL-MCNC: 8.5 MG/DL — SIGNIFICANT CHANGE UP (ref 8.4–10.5)
CHLORIDE SERPL-SCNC: 111 MMOL/L — HIGH (ref 96–108)
CHLORIDE SERPL-SCNC: 111 MMOL/L — HIGH (ref 96–108)
CO2 SERPL-SCNC: 21 MMOL/L — LOW (ref 22–31)
CO2 SERPL-SCNC: 21 MMOL/L — LOW (ref 22–31)
CREAT SERPL-MCNC: 1.26 MG/DL — SIGNIFICANT CHANGE UP (ref 0.5–1.3)
CREAT SERPL-MCNC: 1.26 MG/DL — SIGNIFICANT CHANGE UP (ref 0.5–1.3)
CULTURE RESULTS: SIGNIFICANT CHANGE UP
EGFR: 54 ML/MIN/1.73M2 — LOW
EGFR: 54 ML/MIN/1.73M2 — LOW
GLUCOSE SERPL-MCNC: 102 MG/DL — HIGH (ref 70–99)
GLUCOSE SERPL-MCNC: 102 MG/DL — HIGH (ref 70–99)
HCT VFR BLD CALC: 36.3 % — LOW (ref 39–50)
HCT VFR BLD CALC: 36.3 % — LOW (ref 39–50)
HGB BLD-MCNC: 12.1 G/DL — LOW (ref 13–17)
HGB BLD-MCNC: 12.1 G/DL — LOW (ref 13–17)
M PNEUMO IGM SER-ACNC: 0.13 INDEX — SIGNIFICANT CHANGE UP (ref 0–0.9)
M PNEUMO IGM SER-ACNC: 0.13 INDEX — SIGNIFICANT CHANGE UP (ref 0–0.9)
MCHC RBC-ENTMCNC: 32.4 PG — SIGNIFICANT CHANGE UP (ref 27–34)
MCHC RBC-ENTMCNC: 32.4 PG — SIGNIFICANT CHANGE UP (ref 27–34)
MCHC RBC-ENTMCNC: 33.3 GM/DL — SIGNIFICANT CHANGE UP (ref 32–36)
MCHC RBC-ENTMCNC: 33.3 GM/DL — SIGNIFICANT CHANGE UP (ref 32–36)
MCV RBC AUTO: 97.3 FL — SIGNIFICANT CHANGE UP (ref 80–100)
MCV RBC AUTO: 97.3 FL — SIGNIFICANT CHANGE UP (ref 80–100)
MYCOPLASMA AG SPEC QL: NEGATIVE — SIGNIFICANT CHANGE UP
MYCOPLASMA AG SPEC QL: NEGATIVE — SIGNIFICANT CHANGE UP
NRBC # BLD: 0 /100 WBCS — SIGNIFICANT CHANGE UP (ref 0–0)
NRBC # BLD: 0 /100 WBCS — SIGNIFICANT CHANGE UP (ref 0–0)
PLATELET # BLD AUTO: 127 K/UL — LOW (ref 150–400)
PLATELET # BLD AUTO: 127 K/UL — LOW (ref 150–400)
POTASSIUM SERPL-MCNC: 4 MMOL/L — SIGNIFICANT CHANGE UP (ref 3.5–5.3)
POTASSIUM SERPL-MCNC: 4 MMOL/L — SIGNIFICANT CHANGE UP (ref 3.5–5.3)
POTASSIUM SERPL-SCNC: 4 MMOL/L — SIGNIFICANT CHANGE UP (ref 3.5–5.3)
POTASSIUM SERPL-SCNC: 4 MMOL/L — SIGNIFICANT CHANGE UP (ref 3.5–5.3)
RBC # BLD: 3.73 M/UL — LOW (ref 4.2–5.8)
RBC # BLD: 3.73 M/UL — LOW (ref 4.2–5.8)
RBC # FLD: 13.8 % — SIGNIFICANT CHANGE UP (ref 10.3–14.5)
RBC # FLD: 13.8 % — SIGNIFICANT CHANGE UP (ref 10.3–14.5)
SODIUM SERPL-SCNC: 139 MMOL/L — SIGNIFICANT CHANGE UP (ref 135–145)
SODIUM SERPL-SCNC: 139 MMOL/L — SIGNIFICANT CHANGE UP (ref 135–145)
SPECIMEN SOURCE: SIGNIFICANT CHANGE UP
WBC # BLD: 2.87 K/UL — LOW (ref 3.8–10.5)
WBC # BLD: 2.87 K/UL — LOW (ref 3.8–10.5)
WBC # FLD AUTO: 2.87 K/UL — LOW (ref 3.8–10.5)
WBC # FLD AUTO: 2.87 K/UL — LOW (ref 3.8–10.5)

## 2023-12-22 PROCEDURE — 83605 ASSAY OF LACTIC ACID: CPT

## 2023-12-22 PROCEDURE — 36415 COLL VENOUS BLD VENIPUNCTURE: CPT

## 2023-12-22 PROCEDURE — 85027 COMPLETE CBC AUTOMATED: CPT

## 2023-12-22 PROCEDURE — 99232 SBSQ HOSP IP/OBS MODERATE 35: CPT

## 2023-12-22 PROCEDURE — 84484 ASSAY OF TROPONIN QUANT: CPT

## 2023-12-22 PROCEDURE — 82570 ASSAY OF URINE CREATININE: CPT

## 2023-12-22 PROCEDURE — 86738 MYCOPLASMA ANTIBODY: CPT

## 2023-12-22 PROCEDURE — 87040 BLOOD CULTURE FOR BACTERIA: CPT

## 2023-12-22 PROCEDURE — 87045 FECES CULTURE AEROBIC BACT: CPT

## 2023-12-22 PROCEDURE — 87493 C DIFF AMPLIFIED PROBE: CPT

## 2023-12-22 PROCEDURE — 87177 OVA AND PARASITES SMEARS: CPT

## 2023-12-22 PROCEDURE — 96361 HYDRATE IV INFUSION ADD-ON: CPT

## 2023-12-22 PROCEDURE — 87507 IADNA-DNA/RNA PROBE TQ 12-25: CPT

## 2023-12-22 PROCEDURE — 87086 URINE CULTURE/COLONY COUNT: CPT

## 2023-12-22 PROCEDURE — 83735 ASSAY OF MAGNESIUM: CPT

## 2023-12-22 PROCEDURE — 85730 THROMBOPLASTIN TIME PARTIAL: CPT

## 2023-12-22 PROCEDURE — 80053 COMPREHEN METABOLIC PANEL: CPT

## 2023-12-22 PROCEDURE — 71045 X-RAY EXAM CHEST 1 VIEW: CPT

## 2023-12-22 PROCEDURE — 96374 THER/PROPH/DIAG INJ IV PUSH: CPT

## 2023-12-22 PROCEDURE — 99285 EMERGENCY DEPT VISIT HI MDM: CPT | Mod: 25

## 2023-12-22 PROCEDURE — 84300 ASSAY OF URINE SODIUM: CPT

## 2023-12-22 PROCEDURE — 81001 URINALYSIS AUTO W/SCOPE: CPT

## 2023-12-22 PROCEDURE — 87046 STOOL CULTR AEROBIC BACT EA: CPT

## 2023-12-22 PROCEDURE — 93005 ELECTROCARDIOGRAM TRACING: CPT

## 2023-12-22 PROCEDURE — 85610 PROTHROMBIN TIME: CPT

## 2023-12-22 PROCEDURE — 80048 BASIC METABOLIC PNL TOTAL CA: CPT

## 2023-12-22 PROCEDURE — 84100 ASSAY OF PHOSPHORUS: CPT

## 2023-12-22 PROCEDURE — 85025 COMPLETE CBC W/AUTO DIFF WBC: CPT

## 2023-12-22 PROCEDURE — 0225U NFCT DS DNA&RNA 21 SARSCOV2: CPT

## 2023-12-22 RX ORDER — RIVAROXABAN 15 MG-20MG
0 KIT ORAL
Qty: 0 | Refills: 3 | DISCHARGE

## 2023-12-22 RX ORDER — FINASTERIDE 5 MG/1
0 TABLET, FILM COATED ORAL
Qty: 0 | Refills: 0 | DISCHARGE

## 2023-12-22 RX ORDER — METOPROLOL TARTRATE 50 MG
1 TABLET ORAL
Qty: 0 | Refills: 0 | DISCHARGE

## 2023-12-22 RX ORDER — SIMVASTATIN 20 MG/1
0 TABLET, FILM COATED ORAL
Qty: 0 | Refills: 1 | DISCHARGE

## 2023-12-22 RX ORDER — ALBUTEROL 90 UG/1
2 AEROSOL, METERED ORAL
Qty: 1 | Refills: 0
Start: 2023-12-22 | End: 2023-12-26

## 2023-12-22 RX ORDER — LOSARTAN POTASSIUM 100 MG/1
0 TABLET, FILM COATED ORAL
Qty: 0 | Refills: 1 | DISCHARGE

## 2023-12-22 RX ORDER — TAMSULOSIN HYDROCHLORIDE 0.4 MG/1
1 CAPSULE ORAL
Qty: 0 | Refills: 0 | DISCHARGE

## 2023-12-22 RX ORDER — FINASTERIDE 5 MG/1
1 TABLET, FILM COATED ORAL
Qty: 0 | Refills: 0 | DISCHARGE

## 2023-12-22 RX ADMIN — AZITHROMYCIN 255 MILLIGRAM(S): 500 TABLET, FILM COATED ORAL at 04:46

## 2023-12-22 RX ADMIN — Medication 25 MICROGRAM(S): at 06:51

## 2023-12-22 RX ADMIN — Medication 30 MILLIGRAM(S): at 06:51

## 2023-12-22 RX ADMIN — Medication 600 MILLIGRAM(S): at 08:57

## 2023-12-22 RX ADMIN — CEFTRIAXONE 100 MILLIGRAM(S): 500 INJECTION, POWDER, FOR SOLUTION INTRAMUSCULAR; INTRAVENOUS at 04:38

## 2023-12-22 RX ADMIN — FINASTERIDE 5 MILLIGRAM(S): 5 TABLET, FILM COATED ORAL at 11:21

## 2023-12-22 NOTE — DISCHARGE NOTE PROVIDER - NSDCCPCAREPLAN_GEN_ALL_CORE_FT
PRINCIPAL DISCHARGE DIAGNOSIS  Diagnosis: Sepsis  Assessment and Plan of Treatment: You presented with fever and diarrhea, likey due to influenza, YOu were found to have Flu positive. YOu were seen by Pulmonologist , You were given medications to help you manage your symstoms. YOur primary care provider Dr Garcia saw you here .Your blood cultures are negative.  continue taking Tamiflu for 3 more days.  Follow up with Dr Garcia and PUlmonologsit Dr Hartley in 1-2 weeks      SECONDARY DISCHARGE DIAGNOSES  Diagnosis: Influenza A  Assessment and Plan of Treatment: Influenza (the flu) is an infection caused by the influenza virus. The virus spreads through direct contact with someone who has the flu. For example, a person with the virus on his or her hands can spread it by shaking hands with someone. You may be able to spread the flu to others for 1 week or longer after signs or symptoms appear.  Call your local emergency number (911 in the [place]) if:  You have trouble breathing, and your lips look purple or blue.  You have a seizure.  You have new pain or pressure in your chest.  When should I seek immediate care?  You are dizzy, or you are urinating less or not at all.  You have a headache with a stiff neck, and you feel tired or confused.  You have new pain or pressure in your chest.  Your symptoms, such as shortness of breath, vomiting, or diarrhea, get worse.  Your symptoms, such as fever and coughing, seem to get better, but then get worse.      Diagnosis: HTN (hypertension)  Assessment and Plan of Treatment: continue to take your lesartan    Diagnosis: Atrial fibrillation  Assessment and Plan of Treatment: contune to take your xarelto and metoprolol.    Diagnosis: Diarrhea  Assessment and Plan of Treatment: YOu diarrhea has resolved here.continue to keep yourself hydrated.

## 2023-12-22 NOTE — DISCHARGE NOTE PROVIDER - PROVIDER TOKENS
PROVIDER:[TOKEN:[[id number]],FOLLOWUP:[2 weeks]],PROVIDER:[TOKEN:[[id number]],FOLLOWUP:[2 weeks]] PROVIDER:[TOKEN:[[id number]],FOLLOWUP:[2 weeks]],PROVIDER:[TOKEN:[[id number]],FOLLOWUP:[2 weeks]]

## 2023-12-22 NOTE — PROGRESS NOTE ADULT - SUBJECTIVE AND OBJECTIVE BOX
History of Present Illness:  Reason for Admission: Sepsis  History of Present Illness:   A 89 year old male, from home, w/ PMHx of Non­Small Cell Lung Cancer on Afatinib, AFib on Xarelto, HTN, BPH, and Hypothyroidism presented to the ED complaining of cough with yellow sputum, intermittent dyspnea, fever, chills, nasal congestion and rhinorrhea, and malaise for the last 3 days. Patient w/ Temp of 101F at home. Denies night sweats or weight loss. Denies recent travel or sick contact. He also mentioned having at least 8 to 10 episodes of non bloody, non mucoid or fatty stools for the last 2 days w/ mild generalized abdominal pain. Denies hematuria, hematochezia or melena. Patient follows with Dr Martinez at [facility] for his cancer treatment. He does not have any other concerns.        Review of Systems:  Other Review of Systems: All other review of systems negative, except as noted in HPI      Allergies and Intolerances:        Allergies:  	No Known Allergies:     Home Medications:   * Incomplete Medication History as of 21-Dec-2023 03:53 documented in Structured Notes  · 	acetaminophen-oxycodone 325 mg-5 mg oral tablet: Last Dose Taken:  , 1 tab(s) orally every 6 hours, As Needed -for moderate pain MDD:4  · 	metoprolol tartrate 25 mg oral tablet: 1 tab(s) orally 2 times a day  · 	finasteride 5 mg oral tablet: 1 tab(s) orally once a day  · 	simvastatin 10 mg oral tablet: 1 tab(s) orally once a day (at bedtime)  · 	losartan 50 mg oral tablet: 1 tab(s) orally once a day  · 	tamsulosin 0.4 mg oral capsule: 1 cap(s) orally once a day  · 	Xarelto 15 mg oral tablet: 1 tab(s) orally once a day (in the evening)  · 	metoprolol succinate ER 25 mg tablet,extended release 24 hr:   · 	levothyroxine 25 mcg tablet:   · 	finasteride 5 mg tablet:   · 	simvastatin 10 mg tablet:   · 	Centrum oral tablet: Last Dose Taken:  , 1 tab(s) orally once a day  · 	losartan 50 mg tablet:   · 	tamsulosin 0.4 mg capsule:   · 	Azopt 1% ophthalmic suspension: Last Dose Taken:  , 1 drop(s) to each affected eye 3 times a day  · 	dorzolamide 22.3 mg-timolol 6.8 mg/mL eye drops:   · 	Lumigan 0.01% ophthalmic solution: 1 drop(s) to each affected eye once a day (in the evening)  · 	Combigan 0.2%-0.5% ophthalmic solution: Last Dose Taken:  , 1 drop(s) to each affected eye every 12 hours  · 	Xarelto 15 mg tablet:   · 	Rocklatan 0.02 %-0.005 % eye drops:     .    Patient History:    Past Medical, Past Surgical, and Family History:  PAST MEDICAL HISTORY:  Atrial fibrillation     BPH without obstruction/lower urinary tract symptoms     Essential hypertension     Glaucoma (increased eye pressure) both eyes    Hyperlipidemia, unspecified hyperlipidemia type.     PAST SURGICAL HISTORY:  Cataract extraction status of left eye w/lens implant - 2014    H/O hemicolectomy 1973    History of lung biopsy 2016 - negative results.     FAMILY HISTORY:  Family history unknown. No pertinent family history of: Unknown.     Social History:  · Substance use	No  · Social History (marital status, living situation, occupation, and sexual history)	Lives at home.  Ambulates w/o assistance  Retired  Former smoker      Vital Signs Last 24 Hrs  T(C): 36.6 (22 Dec 2023 11:44), Max: 36.7 (21 Dec 2023 13:17)  T(F): 97.8 (22 Dec 2023 11:44), Max: 98.1 (21 Dec 2023 13:17)  HR: 81 (22 Dec 2023 11:44) (81 - 99)  BP: 103/51 (22 Dec 2023 11:44) (103/51 - 146/83)  BP(mean): 65 (22 Dec 2023 11:44) (65 - 65)  RR: 16 (22 Dec 2023 11:44) (16 - 20)  SpO2: 96% (22 Dec 2023 11:44) (96% - 99%)    Parameters below as of 22 Dec 2023 11:44  Patient On (Oxygen Delivery Method): room air    Physical Exam:   Physical Exam: PHYSICAL EXAMINATION:  GENERAL: NAD, lying comfortable in bed  HEAD:  Atraumatic, Normocephalic  EYES:  Conjunctiva and sclera clear, pupils are equal, round, and reactive to light and accommodation. Dry mucous membranes  NECK: Supple, No JVD, trachea is midline, no evidence of thyroid enlargement, no lymphadenopathy or tenderness.  CHEST/LUNG: Crackles noted on the right middle and lower zone and on the left lower zone, no rhonchi, wheezing, or rubs  HEART: Regular rate and rhythm; No murmurs, rubs, or gallops  ABDOMEN: Soft, Nontender, Nondistended; Bowel sounds present  NERVOUS SYSTEM:  Alert & Oriented X3; No focal sensory or motor deficits are noted; Recent and remote memory is intact; Appropriate mood and affect.  EXTREMITIES:  2+ Peripheral Pulses, No clubbing, cyanosis, or edema  SKIN: Warm, dry, and well perfused; Good turgor; No lesions, nodules or rashes are noted.         Laboratory:                                      12.1   2.87  )-----------( 127      ( 22 Dec 2023 05:55 )             36.3   12-22    139  |  111<H>  |  29<H>  ----------------------------<  102<H>  4.0   |  21<L>  |  1.26    Ca    8.5      22 Dec 2023 05:55  Phos  3.4     12-20  Mg     2.0     12-20    TPro  7.9  /  Alb  4.0  /  TBili  1.0  /  DBili  x   /  AST  59<H>  /  ALT  40  /  AlkPhos  73  12-20      Radiology:    < from: Xray Chest 1 View- PORTABLE-Urgent (12.20.23 @ 19:45) >    ACC: [id number] EXAM:  XR CHEST PORTABLE URGENT 1V   ORDERED BY: SHARAD ESCAMILLA     PROCEDURE DATE:  12/20/2023          INTERPRETATION:  AP semierect chest on December 20, 2023 at 7:18 PM.   Patient has sepsis, cough, and diarrhea.    COMPARISON:None available.    Heart size normal.    Lungs are clear.    IMPRESSION: No acute finding.      < end of copied text >      History of Present Illness:  Reason for Admission: Sepsis  History of Present Illness:   A 89 year old male, from home, w/ PMHx of Non­Small Cell Lung Cancer on Afatinib, AFib on Xarelto, HTN, BPH, and Hypothyroidism presented to the ED complaining of cough with yellow sputum, intermittent dyspnea, fever, chills, nasal congestion and rhinorrhea, and malaise for the last 3 days. Patient w/ Temp of 101F at home. Denies night sweats or weight loss. Denies recent travel or sick contact. He also mentioned having at least 8 to 10 episodes of non bloody, non mucoid or fatty stools for the last 2 days w/ mild generalized abdominal pain. Denies hematuria, hematochezia or melena. Patient [facility] with Dr Martinez at [facility] for his cancer treatment. He does not have any other concerns.        Review of Systems:  Other Review of Systems: All other review of systems negative, except as noted in HPI      Allergies and Intolerances:        Allergies:  	No Known Allergies:     Home Medications:   * Incomplete Medication History as of 21-Dec-2023 03:53 documented in Structured Notes  · 	acetaminophen-oxycodone 325 mg-5 mg oral tablet: Last Dose Taken:  , 1 tab(s) orally every 6 hours, As Needed -for moderate pain MDD:4  · 	metoprolol tartrate 25 mg oral tablet: 1 tab(s) orally 2 times a day  · 	finasteride 5 mg oral tablet: 1 tab(s) orally once a day  · 	simvastatin 10 mg oral tablet: 1 tab(s) orally once a day (at bedtime)  · 	losartan 50 mg oral tablet: 1 tab(s) orally once a day  · 	tamsulosin 0.4 mg oral capsule: 1 cap(s) orally once a day  · 	Xarelto 15 mg oral tablet: 1 tab(s) orally once a day (in the evening)  · 	metoprolol succinate ER 25 mg tablet,extended release 24 hr:   · 	levothyroxine 25 mcg tablet:   · 	finasteride 5 mg tablet:   · 	simvastatin 10 mg tablet:   · 	Centrum oral tablet: Last Dose Taken:  , 1 tab(s) orally once a day  · 	losartan 50 mg tablet:   · 	tamsulosin 0.4 mg capsule:   · 	Azopt 1% ophthalmic suspension: Last Dose Taken:  , 1 drop(s) to each affected eye 3 times a day  · 	dorzolamide 22.3 mg-timolol 6.8 mg/mL eye drops:   · 	Lumigan 0.01% ophthalmic solution: 1 drop(s) to each affected eye once a day (in the evening)  · 	Combigan 0.2%-0.5% ophthalmic solution: Last Dose Taken:  , 1 drop(s) to each affected eye every 12 hours  · 	Xarelto 15 mg tablet:   · 	Rocklatan 0.02 %-0.005 % eye drops:     .    Patient History:    Past Medical, Past Surgical, and Family History:  PAST MEDICAL HISTORY:  Atrial fibrillation     BPH without obstruction/lower urinary tract symptoms     Essential hypertension     Glaucoma (increased eye pressure) both eyes    Hyperlipidemia, unspecified hyperlipidemia type.     PAST SURGICAL HISTORY:  Cataract extraction status of left eye w/lens implant - 2014    H/O hemicolectomy 1973    History of lung biopsy 2016 - negative results.     FAMILY HISTORY:  Family history unknown. No pertinent family history of: Unknown.     Social History:  · Substance use	No  · Social History (marital status, living situation, occupation, and sexual history)	Lives at home.  Ambulates w/o assistance  Retired  Former smoker      Vital Signs Last 24 Hrs  T(C): 36.6 (22 Dec 2023 11:44), Max: 36.7 (21 Dec 2023 13:17)  T(F): 97.8 (22 Dec 2023 11:44), Max: 98.1 (21 Dec 2023 13:17)  HR: 81 (22 Dec 2023 11:44) (81 - 99)  BP: 103/51 (22 Dec 2023 11:44) (103/51 - 146/83)  BP(mean): 65 (22 Dec 2023 11:44) (65 - 65)  RR: 16 (22 Dec 2023 11:44) (16 - 20)  SpO2: 96% (22 Dec 2023 11:44) (96% - 99%)    Parameters below as of 22 Dec 2023 11:44  Patient On (Oxygen Delivery Method): room air    Physical Exam:   Physical Exam: PHYSICAL EXAMINATION:  GENERAL: NAD, lying comfortable in bed  HEAD:  Atraumatic, Normocephalic  EYES:  Conjunctiva and sclera clear, pupils are equal, round, and reactive to light and accommodation. Dry mucous membranes  NECK: Supple, No JVD, trachea is midline, no evidence of thyroid enlargement, no lymphadenopathy or tenderness.  CHEST/LUNG: Crackles noted on the right middle and lower zone and on the left lower zone, no rhonchi, wheezing, or rubs  HEART: Regular rate and rhythm; No murmurs, rubs, or gallops  ABDOMEN: Soft, Nontender, Nondistended; Bowel sounds present  NERVOUS SYSTEM:  Alert & Oriented X3; No focal sensory or motor deficits are noted; Recent and remote memory is intact; Appropriate mood and affect.  EXTREMITIES:  2+ Peripheral Pulses, No clubbing, cyanosis, or edema  SKIN: Warm, dry, and well perfused; Good turgor; No lesions, nodules or rashes are noted.         Laboratory:                                      12.1   2.87  )-----------( 127      ( 22 Dec 2023 05:55 )             36.3   12-22    139  |  111<H>  |  29<H>  ----------------------------<  102<H>  4.0   |  21<L>  |  1.26    Ca    8.5      22 Dec 2023 05:55  Phos  3.4     12-20  Mg     2.0     12-20    TPro  7.9  /  Alb  4.0  /  TBili  1.0  /  DBili  x   /  AST  59<H>  /  ALT  40  /  AlkPhos  73  12-20      Radiology:    < from: Xray Chest 1 View- PORTABLE-Urgent (12.20.23 @ 19:45) >    ACC: [id number] EXAM:  XR CHEST PORTABLE URGENT 1V   ORDERED BY: SHARAD ESCAMILLA     PROCEDURE DATE:  12/20/2023          INTERPRETATION:  AP semierect chest on December 20, 2023 at 7:18 PM.   Patient has sepsis, cough, and diarrhea.    COMPARISON:None available.    Heart size normal.    Lungs are clear.    IMPRESSION: No acute finding.      < end of copied text >

## 2023-12-22 NOTE — DISCHARGE NOTE PROVIDER - CARE PROVIDER_API CALL
Weston Garcia  Internal Medicine  [address], [address]  [place] [address]  Phone: [phone]  Fax: [phone]  Follow Up Time: 2 weeks    Bonita Murillo  Pulmonary Disease  [address]  [place] [address]  Phone: [phone]  Fax: [phone]  Follow Up Time: 2 weeks   Weston Garcia  Internal Medicine  [address], [address]  [place] [address]  Phone: [phone]  Fax: [phone]  Follow Up Time: 2 weeks    Bonita Murillo  Pulmonary Disease  [address]  [place] [address]  Phone: [phone]  Fax: [phone]  Follow Up Time: 2 weeks

## 2023-12-22 NOTE — DISCHARGE NOTE PROVIDER - NSDCMRMEDTOKEN_GEN_ALL_CORE_FT
acetaminophen-oxycodone 325 mg-5 mg oral tablet: 1 tab(s) orally every 6 hours, As Needed -for moderate pain MDD:4  Albuterol (Eqv-ProAir HFA) 90 mcg/inh inhalation aerosol: 2 puff(s) inhaled every 6 hours  Azopt 1% ophthalmic suspension: 1 drop(s) to each affected eye 3 times a day  Centrum oral tablet: 1 tab(s) orally once a day  Combigan 0.2%-0.5% ophthalmic solution: 1 drop(s) to each affected eye every 12 hours  dorzolamide 22.3 mg-timolol 6.8 mg/mL eye drops:   finasteride 5 mg tablet: orally once a day  guaiFENesin 600 mg oral tablet, extended release: 1 tab(s) orally every 12 hours  levothyroxine 25 mcg tablet:   losartan 50 mg oral tablet: 1 tab(s) orally once a day  Lumigan 0.01% ophthalmic solution: 1 drop(s) to each affected eye once a day (in the evening)  metoprolol succinate ER 25 mg tablet,extended release 24 hr:   oseltamivir 75 mg oral capsule: 1 cap(s) orally 2 times a day  Rocklatan 0.02 %-0.005 % eye drops:   simvastatin 10 mg oral tablet: 1 tab(s) orally once a day (at bedtime)  tamsulosin 0.4 mg capsule: 1 tab(s) orally once a day (at bedtime)  Xarelto 15 mg oral tablet: 1 tab(s) orally once a day (in the evening)

## 2023-12-22 NOTE — DISCHARGE NOTE PROVIDER - CARE PROVIDERS DIRECT ADDRESSES
,DirectAddress_Unknown,[email or web address] ,DirectAddress_Unknown,[email or web address]

## 2023-12-22 NOTE — DISCHARGE NOTE NURSING/CASE MANAGEMENT/SOCIAL WORK - NSDCPEFALRISK_GEN_ALL_CORE
For information on Fall & Injury Prevention, visit: [email or web address] OR  [email or web address] OR  https://www.cdc.gov/steadi/patient.html For information on Fall & Injury Prevention, visit: [email or web address]/[email or web address] OR  [email or web address] OR  https://www.cdc.gov/steadi/patient.html

## 2023-12-22 NOTE — PROGRESS NOTE ADULT - SUBJECTIVE AND OBJECTIVE BOX
89 YEAR OLD MALE WITH A HISTIRY OF LUNG cA, htn,  dm, a-FIB was admitted with c/o fever, cough and diarrhea.  Chest Xray was negative for infiltrates.  Patient tested positive for Influenza A. Pulmonary consult is appreciated.  Today patient is feeling fine. Denies having cough, fever, diarrhea.    Vital Signs Last 24 Hrs  T(C): 36.3 (22 Dec 2023 04:58), Max: 36.7 (21 Dec 2023 13:17)  T(F): 97.3 (22 Dec 2023 04:58), Max: 98.1 (21 Dec 2023 13:17)  HR: 99 (22 Dec 2023 04:58) (86 - 100)  BP: 111/72 (22 Dec 2023 04:58) (88/52 - 146/83)  BP(mean): --  RR: 20 (22 Dec 2023 04:58) (17 - 20)  SpO2: 98% (22 Dec 2023 04:58) (95% - 99%)    Parameters below as of 22 Dec 2023 04:58  Patient On (Oxygen Delivery Method): room air    T(C): 36.3 (12-22-23 @ 04:58), Max: 36.7 (12-21-23 @ 13:17)  HR: 99 (12-22-23 @ 04:58) (86 - 100)  BP: 111/72 (12-22-23 @ 04:58) (88/52 - 146/83)  RR: 20 (12-22-23 @ 04:58) (17 - 20)  SpO2: 98% (12-22-23 @ 04:58) (95% - 99%)    CONSTITUTIONAL: Well groomed, no apparent distress  EYES: PERRLA and symmetric, EOMI, No conjunctival or scleral injection, non-icteric  ENMT: Oral mucosa with moist membranes. Normal dentition; no pharyngeal injection or exudates             NECK: Supple, symmetric and without tracheal deviation   RESP: No respiratory distress, no use of accessory muscles; CTA b/l, no WRR  CV: RRR, +S1S2, no MRG; no JVD; no peripheral edema  GI: Soft, NT, ND, no rebound, no guarding; no palpable masses; no hepatosplenomegaly; no hernia palpated  LYMPH: No cervical LAD or tenderness; no axillary LAD or tenderness; no inguinal LAD or tenderness  MSK: Normal gait; No digital clubbing or cyanosis; examination of the (head/neck/spine/ribs/pelvis, RUE, LUE, RLE, LLE) without misalignment,            Normal ROM without pain, no spinal tenderness, normal muscle strength/tone  SKIN: No rashes or ulcers noted; no subcutaneous nodules or induration palpable  NEURO: CN II-XII intact; normal reflexes in upper and lower extremities, sensation intact in upper and lower extremities b/l to light touch   PSYCH: Appropriate insight/judgment; A+O x 3, mood and affect appropriate, recent/remote memory intact    CBC Full  -  ( 22 Dec 2023 05:55 )  WBC Count : 2.87 K/uL  RBC Count : 3.73 M/uL  Hemoglobin : 12.1 g/dL  Hematocrit : 36.3 %  Platelet Count - Automated : 127 K/uL  Mean Cell Volume : 97.3 fl  Mean Cell Hemoglobin : 32.4 pg  Mean Cell Hemoglobin Concentration : 33.3 gm/dL  Auto Neutrophil # : x  Auto Lymphocyte # : x  Auto Monocyte # : x  Auto Eosinophil # : x  Auto Basophil # : x  Auto Neutrophil % : x  Auto Lymphocyte % : x  Auto Monocyte % : x  Auto Eosinophil % : x  Auto Basophil % : x    12-22    139  |  111<H>  |  29<H>  ----------------------------<  102<H>  4.0   |  21<L>  |  1.26    Ca    8.5      22 Dec 2023 05:55  Phos  3.4     12-20  Mg     2.0     12-20    TPro  7.9  /  Alb  4.0  /  TBili  1.0  /  DBili  x   /  AST  59<H>  /  ALT  40  /  AlkPhos  73  12-20

## 2023-12-22 NOTE — PROGRESS NOTE ADULT - ASSESSMENT
Influenza A viral bronchitis  CXR clear, no evidence of PNA  on immunotherapy for lung ca  99% on RA, no supplemental O2 requirement  no wheezing on exam.  No leukocytosis.    Recommend:  conservative management for viral URI bronchitis  Albuterol MDI prn  Mucinex BID  Tamiflu  no indication for abx or steroids at this time
89 year old male with a history of lung Ca, A-fib, DM HTN  was admitted with c/o cough, diarrhea, fever.  Tested positive for Influenza A.  All symptoms resolved.  All goals of treatment were met.  D/c home.

## 2023-12-22 NOTE — DISCHARGE NOTE NURSING/CASE MANAGEMENT/SOCIAL WORK - PATIENT PORTAL LINK FT
You can access the FollowMyHealth Patient Portal offered by [facility] by registering at the following website: [email or web address]. By joining [employer]’s FollowMyHealth portal, you will also be able to view your health information using other applications (apps) compatible with our system. You can access the FollowMyHealth Patient Portal offered by [facility] by registering at the following website: [email or web address]. By joining [employer]’s FollowMyHealth portal, you will also be able to view your health information using other applications (apps) compatible with our system.

## 2023-12-22 NOTE — DISCHARGE NOTE PROVIDER - HOSPITAL COURSE
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 Admitted to medicine for sepsis likely secondary to Influenza, UTI,  and/or superimposed bacterial PNA. pt was seen by Pulmonologist, no concern for PNA, abx d/c .  blood Cx negative. Pt diarrhea resolving, clinically improved. Pt is ambulating in room and hallway independently . stated feeling much better.  Pt is optimized for d/c with f/u appointment with PCP and Pulm as OP.

## 2023-12-23 LAB
CULTURE RESULTS: ABNORMAL
CULTURE RESULTS: ABNORMAL
SPECIMEN SOURCE: SIGNIFICANT CHANGE UP
SPECIMEN SOURCE: SIGNIFICANT CHANGE UP

## 2023-12-26 LAB
CULTURE RESULTS: SIGNIFICANT CHANGE UP
SPECIMEN SOURCE: SIGNIFICANT CHANGE UP

## 2024-04-04 ENCOUNTER — OUTPATIENT (OUTPATIENT)
Dept: OUTPATIENT SERVICES | Facility: HOSPITAL | Age: 89
LOS: 1 days | Discharge: HOME | End: 2024-04-04

## 2024-04-04 DIAGNOSIS — Z98.890 OTHER SPECIFIED POSTPROCEDURAL STATES: Chronic | ICD-10-CM

## 2024-04-04 DIAGNOSIS — Z98.42 CATARACT EXTRACTION STATUS, LEFT EYE: Chronic | ICD-10-CM

## 2024-04-08 DIAGNOSIS — C34.00 MALIGNANT NEOPLASM OF UNSPECIFIED MAIN BRONCHUS: ICD-10-CM

## 2024-05-29 ENCOUNTER — OUTPATIENT (OUTPATIENT)
Dept: OUTPATIENT SERVICES | Facility: HOSPITAL | Age: 89
LOS: 1 days | Discharge: HOME | End: 2024-05-29

## 2024-05-29 DIAGNOSIS — Z98.890 OTHER SPECIFIED POSTPROCEDURAL STATES: Chronic | ICD-10-CM

## 2024-05-29 DIAGNOSIS — Z98.42 CATARACT EXTRACTION STATUS, LEFT EYE: Chronic | ICD-10-CM

## 2024-06-10 DIAGNOSIS — C34.00 MALIGNANT NEOPLASM OF UNSPECIFIED MAIN BRONCHUS: ICD-10-CM

## 2024-06-14 ENCOUNTER — APPOINTMENT (OUTPATIENT)
Dept: HOME HEALTH SERVICES | Facility: HOME HEALTH | Age: 89
End: 2024-06-14

## 2024-10-04 ENCOUNTER — EMERGENCY (EMERGENCY)
Facility: HOSPITAL | Age: 89
LOS: 1 days | Discharge: HOME | End: 2024-10-04
Attending: EMERGENCY MEDICINE
Payer: MEDICARE

## 2024-10-04 VITALS
RESPIRATION RATE: 18 BRPM | TEMPERATURE: 97 F | OXYGEN SATURATION: 97 % | WEIGHT: 156.09 LBS | HEIGHT: 68 IN | DIASTOLIC BLOOD PRESSURE: 68 MMHG | HEART RATE: 67 BPM | SYSTOLIC BLOOD PRESSURE: 99 MMHG

## 2024-10-04 VITALS
DIASTOLIC BLOOD PRESSURE: 82 MMHG | TEMPERATURE: 98 F | SYSTOLIC BLOOD PRESSURE: 121 MMHG | OXYGEN SATURATION: 96 % | HEART RATE: 75 BPM | RESPIRATION RATE: 18 BRPM

## 2024-10-04 DIAGNOSIS — Z98.890 OTHER SPECIFIED POSTPROCEDURAL STATES: Chronic | ICD-10-CM

## 2024-10-04 DIAGNOSIS — Z98.42 CATARACT EXTRACTION STATUS, LEFT EYE: Chronic | ICD-10-CM

## 2024-10-04 LAB
ALBUMIN SERPL ELPH-MCNC: 3.6 G/DL — SIGNIFICANT CHANGE UP (ref 3.5–5)
ALP SERPL-CCNC: 109 U/L — SIGNIFICANT CHANGE UP (ref 40–120)
ALT FLD-CCNC: 73 U/L DA — HIGH (ref 10–60)
ANION GAP SERPL CALC-SCNC: 6 MMOL/L — SIGNIFICANT CHANGE UP (ref 5–17)
APPEARANCE UR: CLEAR — SIGNIFICANT CHANGE UP
APTT BLD: 37 SEC — HIGH (ref 24.5–35.6)
AST SERPL-CCNC: 49 U/L — HIGH (ref 10–40)
BACTERIA # UR AUTO: ABNORMAL /HPF
BASOPHILS # BLD AUTO: 0.03 K/UL — SIGNIFICANT CHANGE UP (ref 0–0.2)
BASOPHILS NFR BLD AUTO: 0.7 % — SIGNIFICANT CHANGE UP (ref 0–2)
BILIRUB SERPL-MCNC: 0.8 MG/DL — SIGNIFICANT CHANGE UP (ref 0.2–1.2)
BILIRUB UR-MCNC: NEGATIVE — SIGNIFICANT CHANGE UP
BUN SERPL-MCNC: 42 MG/DL — HIGH (ref 7–18)
CALCIUM SERPL-MCNC: 9 MG/DL — SIGNIFICANT CHANGE UP (ref 8.4–10.5)
CHLORIDE SERPL-SCNC: 109 MMOL/L — HIGH (ref 96–108)
CK SERPL-CCNC: 233 U/L — HIGH (ref 35–232)
CO2 SERPL-SCNC: 25 MMOL/L — SIGNIFICANT CHANGE UP (ref 22–31)
COLOR SPEC: YELLOW — SIGNIFICANT CHANGE UP
CREAT SERPL-MCNC: 1.69 MG/DL — HIGH (ref 0.5–1.3)
DIFF PNL FLD: NEGATIVE — SIGNIFICANT CHANGE UP
EGFR: 38 ML/MIN/1.73M2 — LOW
EOSINOPHIL # BLD AUTO: 0.2 K/UL — SIGNIFICANT CHANGE UP (ref 0–0.5)
EOSINOPHIL NFR BLD AUTO: 4.6 % — SIGNIFICANT CHANGE UP (ref 0–6)
EPI CELLS # UR: PRESENT
GLUCOSE SERPL-MCNC: 108 MG/DL — HIGH (ref 70–99)
GLUCOSE UR QL: NEGATIVE MG/DL — SIGNIFICANT CHANGE UP
HCT VFR BLD CALC: 34.1 % — LOW (ref 39–50)
HGB BLD-MCNC: 11.5 G/DL — LOW (ref 13–17)
IMM GRANULOCYTES NFR BLD AUTO: 0.2 % — SIGNIFICANT CHANGE UP (ref 0–0.9)
INR BLD: 1.58 RATIO — HIGH (ref 0.85–1.16)
KETONES UR-MCNC: NEGATIVE MG/DL — SIGNIFICANT CHANGE UP
LEUKOCYTE ESTERASE UR-ACNC: ABNORMAL
LYMPHOCYTES # BLD AUTO: 1.16 K/UL — SIGNIFICANT CHANGE UP (ref 1–3.3)
LYMPHOCYTES # BLD AUTO: 26.9 % — SIGNIFICANT CHANGE UP (ref 13–44)
MAGNESIUM SERPL-MCNC: 2.1 MG/DL — SIGNIFICANT CHANGE UP (ref 1.6–2.6)
MCHC RBC-ENTMCNC: 32.7 PG — SIGNIFICANT CHANGE UP (ref 27–34)
MCHC RBC-ENTMCNC: 33.7 GM/DL — SIGNIFICANT CHANGE UP (ref 32–36)
MCV RBC AUTO: 96.9 FL — SIGNIFICANT CHANGE UP (ref 80–100)
MONOCYTES # BLD AUTO: 0.44 K/UL — SIGNIFICANT CHANGE UP (ref 0–0.9)
MONOCYTES NFR BLD AUTO: 10.2 % — SIGNIFICANT CHANGE UP (ref 2–14)
NEUTROPHILS # BLD AUTO: 2.47 K/UL — SIGNIFICANT CHANGE UP (ref 1.8–7.4)
NEUTROPHILS NFR BLD AUTO: 57.4 % — SIGNIFICANT CHANGE UP (ref 43–77)
NITRITE UR-MCNC: NEGATIVE — SIGNIFICANT CHANGE UP
NRBC # BLD: 0 /100 WBCS — SIGNIFICANT CHANGE UP (ref 0–0)
PH UR: 6 — SIGNIFICANT CHANGE UP (ref 5–8)
PLATELET # BLD AUTO: 79 K/UL — LOW (ref 150–400)
POTASSIUM SERPL-MCNC: 4.9 MMOL/L — SIGNIFICANT CHANGE UP (ref 3.5–5.3)
POTASSIUM SERPL-SCNC: 4.9 MMOL/L — SIGNIFICANT CHANGE UP (ref 3.5–5.3)
PROT SERPL-MCNC: 6.5 G/DL — SIGNIFICANT CHANGE UP (ref 6–8.3)
PROT UR-MCNC: NEGATIVE MG/DL — SIGNIFICANT CHANGE UP
PROTHROM AB SERPL-ACNC: 18.3 SEC — HIGH (ref 9.9–13.4)
RBC # BLD: 3.52 M/UL — LOW (ref 4.2–5.8)
RBC # FLD: 15.6 % — HIGH (ref 10.3–14.5)
RBC CASTS # UR COMP ASSIST: 0 /HPF — SIGNIFICANT CHANGE UP (ref 0–4)
SODIUM SERPL-SCNC: 140 MMOL/L — SIGNIFICANT CHANGE UP (ref 135–145)
SP GR SPEC: 1.01 — SIGNIFICANT CHANGE UP (ref 1–1.03)
TROPONIN I, HIGH SENSITIVITY RESULT: 15.5 NG/L — SIGNIFICANT CHANGE UP
UROBILINOGEN FLD QL: 1 MG/DL — SIGNIFICANT CHANGE UP (ref 0.2–1)
WBC # BLD: 4.31 K/UL — SIGNIFICANT CHANGE UP (ref 3.8–10.5)
WBC # FLD AUTO: 4.31 K/UL — SIGNIFICANT CHANGE UP (ref 3.8–10.5)
WBC UR QL: 7 /HPF — HIGH (ref 0–5)

## 2024-10-04 PROCEDURE — 99285 EMERGENCY DEPT VISIT HI MDM: CPT

## 2024-10-04 PROCEDURE — 85610 PROTHROMBIN TIME: CPT

## 2024-10-04 PROCEDURE — 80053 COMPREHEN METABOLIC PANEL: CPT

## 2024-10-04 PROCEDURE — 96360 HYDRATION IV INFUSION INIT: CPT

## 2024-10-04 PROCEDURE — 71045 X-RAY EXAM CHEST 1 VIEW: CPT | Mod: 26

## 2024-10-04 PROCEDURE — 36415 COLL VENOUS BLD VENIPUNCTURE: CPT

## 2024-10-04 PROCEDURE — 87086 URINE CULTURE/COLONY COUNT: CPT

## 2024-10-04 PROCEDURE — 96361 HYDRATE IV INFUSION ADD-ON: CPT

## 2024-10-04 PROCEDURE — 82962 GLUCOSE BLOOD TEST: CPT

## 2024-10-04 PROCEDURE — 70450 CT HEAD/BRAIN W/O DYE: CPT | Mod: MC

## 2024-10-04 PROCEDURE — 83735 ASSAY OF MAGNESIUM: CPT

## 2024-10-04 PROCEDURE — 70450 CT HEAD/BRAIN W/O DYE: CPT | Mod: 26,MC

## 2024-10-04 PROCEDURE — 85025 COMPLETE CBC W/AUTO DIFF WBC: CPT

## 2024-10-04 PROCEDURE — 85730 THROMBOPLASTIN TIME PARTIAL: CPT

## 2024-10-04 PROCEDURE — 81001 URINALYSIS AUTO W/SCOPE: CPT

## 2024-10-04 PROCEDURE — 99285 EMERGENCY DEPT VISIT HI MDM: CPT | Mod: 25

## 2024-10-04 PROCEDURE — 82550 ASSAY OF CK (CPK): CPT

## 2024-10-04 PROCEDURE — 84484 ASSAY OF TROPONIN QUANT: CPT

## 2024-10-04 PROCEDURE — 71045 X-RAY EXAM CHEST 1 VIEW: CPT

## 2024-10-04 RX ORDER — MECLIZINE HYDROCLORIDE 25 MG/1
50 TABLET ORAL ONCE
Refills: 0 | Status: COMPLETED | OUTPATIENT
Start: 2024-10-04 | End: 2024-10-04

## 2024-10-04 RX ORDER — SODIUM CHLORIDE IRRIG SOLUTION 0.9 %
1000 SOLUTION, IRRIGATION IRRIGATION ONCE
Refills: 0 | Status: COMPLETED | OUTPATIENT
Start: 2024-10-04 | End: 2024-10-04

## 2024-10-04 RX ORDER — MECLIZINE HYDROCLORIDE 25 MG/1
1 TABLET ORAL
Qty: 10 | Refills: 0
Start: 2024-10-04 | End: 2024-10-08

## 2024-10-04 RX ADMIN — Medication 1000 MILLILITER(S): at 17:00

## 2024-10-04 RX ADMIN — Medication 1000 MILLILITER(S): at 19:09

## 2024-10-04 RX ADMIN — MECLIZINE HYDROCLORIDE 50 MILLIGRAM(S): 25 TABLET ORAL at 16:57

## 2024-10-04 NOTE — ED PROVIDER NOTE - PROGRESS NOTE DETAILS
johnson: improve. pt dizziness resolved. ambulating with steady gait. pt still c/o b.l thigh weakness sensation. work up no acute findings old cva noted on ct- offer admission and pt prefer to go home and return if anything worsens otherwise will see pcp - mild cristiana -based on ratio appears pre-renal - 1 l given

## 2024-10-04 NOTE — ED PROVIDER NOTE - MUSCULOSKELETAL, MLM
Spine appears normal, range of motion is not limited, no muscle or joint tenderness. strength 4/5 b/l upper and lower

## 2024-10-04 NOTE — ED PROVIDER NOTE - OBJECTIVE STATEMENT
89 yr old male with hx of Non­Small Cell Lung Cancer on Afatinib, AFib on Xarelto, HTN, BPH, and Hypothyroidism presents to ed c/o dizziness and worsening left leg proximal weakness since 12p. pt admits chronic unsteady gait x 2 months and weakness of legs having to use cane. no trauma. no fever, no incontinence, no abd pain, no paresthesia. pt states left leg weaker than usual and dizziness new. took xarelto this am.

## 2024-10-04 NOTE — ED PROVIDER NOTE - NIH STROKE SCALE: 10. DYSARTHRIA, QM
2023  Francoise Melgoza  : 1961    To whom it may concern    This is to certify that the above named patient has been under my care.    Francoise Melgoza was seen in the [facility] on 2023.  Please excuse Francoise from work today and until 2023.    At this time I am recommending that patient does not return to work due to ongoing symptoms related to her treatment.     If you have additional questions or concerns you can reach out to our office.    Patient is aware and will be requesting short term disability paper work to be completed by our office once obtained.      Thank you,        SIGNATURE:____________________________________________________                                                               Dr. Kristen Kerr      
(0) Normal

## 2024-10-04 NOTE — ED PROVIDER NOTE - CARDIAC, MLM
Standing/Walking/Toileting/Moving from bed to stretcher iregular rate, regular rhythm.  Heart sounds S1, S2.  No murmurs, rubs or gallops. 2+ femoral pulses, cap refill 2 sec toes b/l

## 2024-10-04 NOTE — ED PROVIDER NOTE - NEUROLOGICAL, MLM
Alert and oriented, no focal deficits, no motor or sensory deficits. cn ii-xii intact, no dysmetria, neck supple

## 2024-10-04 NOTE — ED ADULT NURSE NOTE - NSFALLHARMRISKINTERV_ED_ALL_ED

## 2024-10-04 NOTE — ED PROVIDER NOTE - NSFOLLOWUPINSTRUCTIONS_ED_ALL_ED_FT
dizziness- take medication as prescribed. return if worsens. see neurologist/pcp    thigh weakness- please monitor symptoms and make sure to remain active. return if worsens

## 2024-10-04 NOTE — ED PROVIDER NOTE - PATIENT PORTAL LINK FT
You can access the FollowMyHealth Patient Portal offered by [facility] by registering at the following website: [email or web address]. By joining [employer]’s FollowMyHealth portal, you will also be able to view your health information using other applications (apps) compatible with our system.

## 2024-10-04 NOTE — ED ADULT NURSE NOTE - OBJECTIVE STATEMENT
s/p dizziness today while walking pt reports dizziness is happening when he moves around pt denies any fall today no cp no sob no nausea no vomiting denies any recent illness pt AAOx4, breathing room air afib on cardiac monitoring pt denies any dizziness at this time

## 2024-10-04 NOTE — ED PROVIDER NOTE - CONSTITUTIONAL, MLM
Well appearing, awake, alert, oriented to person, place, time/situation and in no apparent distress. slender normal...

## 2024-10-04 NOTE — ED PROVIDER NOTE - CLINICAL SUMMARY MEDICAL DECISION MAKING FREE TEXT BOX
89 yr old male with hx of Non­Small Cell Lung Cancer on Afatinib, AFib on Xarelto, HTN, BPH, and Hypothyroidism presents to ed c/o dizziness and worsening left leg proximal weakness since 12p. pt admits chronic unsteady gait x 2 months and weakness of legs having to use cane. no trauma. no fever, no incontinence, no abd pain, no paresthesia. pt states left leg weaker than usual and dizziness new. took xarelto this am.    weakness possible uti vs rhabdo vs ic mass vs lytes imbalance vs anemia - clinically no focal sign suggestive of cva. labs, cth, ua, meds, re-assess

## 2024-10-06 LAB
CULTURE RESULTS: SIGNIFICANT CHANGE UP
SPECIMEN SOURCE: SIGNIFICANT CHANGE UP

## 2024-11-11 ENCOUNTER — INPATIENT (INPATIENT)
Facility: HOSPITAL | Age: 89
LOS: 7 days | Discharge: HOME | DRG: 193 | End: 2024-11-19
Attending: INTERNAL MEDICINE | Admitting: INTERNAL MEDICINE
Payer: MEDICARE

## 2024-11-11 VITALS
WEIGHT: 162.92 LBS | HEART RATE: 93 BPM | TEMPERATURE: 98 F | SYSTOLIC BLOOD PRESSURE: 113 MMHG | OXYGEN SATURATION: 97 % | RESPIRATION RATE: 18 BRPM | HEIGHT: 68 IN | DIASTOLIC BLOOD PRESSURE: 63 MMHG

## 2024-11-11 DIAGNOSIS — Z98.890 OTHER SPECIFIED POSTPROCEDURAL STATES: Chronic | ICD-10-CM

## 2024-11-11 DIAGNOSIS — Z98.42 CATARACT EXTRACTION STATUS, LEFT EYE: Chronic | ICD-10-CM

## 2024-11-11 PROCEDURE — 99285 EMERGENCY DEPT VISIT HI MDM: CPT

## 2024-11-11 PROCEDURE — 99053 MED SERV 10PM-8AM 24 HR FAC: CPT

## 2024-11-11 NOTE — ED ADULT TRIAGE NOTE - CHIEF COMPLAINT QUOTE
Pt  reports that  he has chest congestion , nasal congestion , sore throat  for 2 weeks .  Zithromax  finished 10 days ago  . pt  noticed cough with slight bloody sputum x2 weeks  , on Xarelto

## 2024-11-12 DIAGNOSIS — C34.90 MALIGNANT NEOPLASM OF UNSPECIFIED PART OF UNSPECIFIED BRONCHUS OR LUNG: ICD-10-CM

## 2024-11-12 DIAGNOSIS — J18.9 PNEUMONIA, UNSPECIFIED ORGANISM: ICD-10-CM

## 2024-11-12 DIAGNOSIS — Z29.9 ENCOUNTER FOR PROPHYLACTIC MEASURES, UNSPECIFIED: ICD-10-CM

## 2024-11-12 DIAGNOSIS — R06.02 SHORTNESS OF BREATH: ICD-10-CM

## 2024-11-12 DIAGNOSIS — Z87.438 PERSONAL HISTORY OF OTHER DISEASES OF MALE GENITAL ORGANS: ICD-10-CM

## 2024-11-12 DIAGNOSIS — E03.9 HYPOTHYROIDISM, UNSPECIFIED: ICD-10-CM

## 2024-11-12 DIAGNOSIS — I48.91 UNSPECIFIED ATRIAL FIBRILLATION: ICD-10-CM

## 2024-11-12 DIAGNOSIS — I10 ESSENTIAL (PRIMARY) HYPERTENSION: ICD-10-CM

## 2024-11-12 LAB
ALBUMIN SERPL ELPH-MCNC: 3.3 G/DL — LOW (ref 3.5–5)
ALBUMIN SERPL ELPH-MCNC: 3.3 G/DL — LOW (ref 3.5–5)
ALP SERPL-CCNC: 111 U/L — SIGNIFICANT CHANGE UP (ref 40–120)
ALP SERPL-CCNC: 114 U/L — SIGNIFICANT CHANGE UP (ref 40–120)
ALT FLD-CCNC: 64 U/L DA — HIGH (ref 10–60)
ALT FLD-CCNC: 64 U/L DA — HIGH (ref 10–60)
ANION GAP SERPL CALC-SCNC: 4 MMOL/L — LOW (ref 5–17)
ANION GAP SERPL CALC-SCNC: 4 MMOL/L — LOW (ref 5–17)
APTT BLD: 40.8 SEC — HIGH (ref 24.5–35.6)
APTT BLD: 41.1 SEC — HIGH (ref 24.5–35.6)
AST SERPL-CCNC: 40 U/L — SIGNIFICANT CHANGE UP (ref 10–40)
AST SERPL-CCNC: 41 U/L — HIGH (ref 10–40)
BASOPHILS # BLD AUTO: 0.03 K/UL — SIGNIFICANT CHANGE UP (ref 0–0.2)
BASOPHILS # BLD AUTO: 0.04 K/UL — SIGNIFICANT CHANGE UP (ref 0–0.2)
BASOPHILS NFR BLD AUTO: 0.6 % — SIGNIFICANT CHANGE UP (ref 0–2)
BASOPHILS NFR BLD AUTO: 0.9 % — SIGNIFICANT CHANGE UP (ref 0–2)
BILIRUB SERPL-MCNC: 0.9 MG/DL — SIGNIFICANT CHANGE UP (ref 0.2–1.2)
BILIRUB SERPL-MCNC: 1 MG/DL — SIGNIFICANT CHANGE UP (ref 0.2–1.2)
BUN SERPL-MCNC: 27 MG/DL — HIGH (ref 7–18)
BUN SERPL-MCNC: 28 MG/DL — HIGH (ref 7–18)
CALCIUM SERPL-MCNC: 8.6 MG/DL — SIGNIFICANT CHANGE UP (ref 8.4–10.5)
CALCIUM SERPL-MCNC: 8.8 MG/DL — SIGNIFICANT CHANGE UP (ref 8.4–10.5)
CHLORIDE SERPL-SCNC: 112 MMOL/L — HIGH (ref 96–108)
CHLORIDE SERPL-SCNC: 113 MMOL/L — HIGH (ref 96–108)
CO2 SERPL-SCNC: 24 MMOL/L — SIGNIFICANT CHANGE UP (ref 22–31)
CO2 SERPL-SCNC: 25 MMOL/L — SIGNIFICANT CHANGE UP (ref 22–31)
CREAT SERPL-MCNC: 1.31 MG/DL — HIGH (ref 0.5–1.3)
CREAT SERPL-MCNC: 1.41 MG/DL — HIGH (ref 0.5–1.3)
EGFR: 46 ML/MIN/1.73M2 — LOW
EGFR: 51 ML/MIN/1.73M2 — LOW
EOSINOPHIL # BLD AUTO: 0.27 K/UL — SIGNIFICANT CHANGE UP (ref 0–0.5)
EOSINOPHIL # BLD AUTO: 0.29 K/UL — SIGNIFICANT CHANGE UP (ref 0–0.5)
EOSINOPHIL NFR BLD AUTO: 5.8 % — SIGNIFICANT CHANGE UP (ref 0–6)
EOSINOPHIL NFR BLD AUTO: 6.2 % — HIGH (ref 0–6)
FLUAV AG NPH QL: SIGNIFICANT CHANGE UP
FLUBV AG NPH QL: SIGNIFICANT CHANGE UP
GLUCOSE SERPL-MCNC: 102 MG/DL — HIGH (ref 70–99)
GLUCOSE SERPL-MCNC: 99 MG/DL — SIGNIFICANT CHANGE UP (ref 70–99)
HCT VFR BLD CALC: 32.4 % — LOW (ref 39–50)
HCT VFR BLD CALC: 33.5 % — LOW (ref 39–50)
HGB BLD-MCNC: 11 G/DL — LOW (ref 13–17)
HGB BLD-MCNC: 11.3 G/DL — LOW (ref 13–17)
IMM GRANULOCYTES NFR BLD AUTO: 0.2 % — SIGNIFICANT CHANGE UP (ref 0–0.9)
IMM GRANULOCYTES NFR BLD AUTO: 0.4 % — SIGNIFICANT CHANGE UP (ref 0–0.9)
INR BLD: 2.32 RATIO — HIGH (ref 0.85–1.16)
INR BLD: 2.8 RATIO — HIGH (ref 0.85–1.16)
LACTATE SERPL-SCNC: 0.9 MMOL/L — SIGNIFICANT CHANGE UP (ref 0.7–2)
LYMPHOCYTES # BLD AUTO: 1 K/UL — SIGNIFICANT CHANGE UP (ref 1–3.3)
LYMPHOCYTES # BLD AUTO: 1.02 K/UL — SIGNIFICANT CHANGE UP (ref 1–3.3)
LYMPHOCYTES # BLD AUTO: 20 % — SIGNIFICANT CHANGE UP (ref 13–44)
LYMPHOCYTES # BLD AUTO: 23.3 % — SIGNIFICANT CHANGE UP (ref 13–44)
M PNEUMO IGM SER-ACNC: 0.13 INDEX — SIGNIFICANT CHANGE UP (ref 0–0.9)
MCHC RBC-ENTMCNC: 32.8 PG — SIGNIFICANT CHANGE UP (ref 27–34)
MCHC RBC-ENTMCNC: 32.9 PG — SIGNIFICANT CHANGE UP (ref 27–34)
MCHC RBC-ENTMCNC: 33.7 G/DL — SIGNIFICANT CHANGE UP (ref 32–36)
MCHC RBC-ENTMCNC: 34 G/DL — SIGNIFICANT CHANGE UP (ref 32–36)
MCV RBC AUTO: 97 FL — SIGNIFICANT CHANGE UP (ref 80–100)
MCV RBC AUTO: 97.1 FL — SIGNIFICANT CHANGE UP (ref 80–100)
MONOCYTES # BLD AUTO: 0.45 K/UL — SIGNIFICANT CHANGE UP (ref 0–0.9)
MONOCYTES # BLD AUTO: 0.64 K/UL — SIGNIFICANT CHANGE UP (ref 0–0.9)
MONOCYTES NFR BLD AUTO: 10.3 % — SIGNIFICANT CHANGE UP (ref 2–14)
MONOCYTES NFR BLD AUTO: 12.8 % — SIGNIFICANT CHANGE UP (ref 2–14)
MYCOPLASMA AG SPEC QL: NEGATIVE — SIGNIFICANT CHANGE UP
NEUTROPHILS # BLD AUTO: 2.59 K/UL — SIGNIFICANT CHANGE UP (ref 1.8–7.4)
NEUTROPHILS # BLD AUTO: 3.03 K/UL — SIGNIFICANT CHANGE UP (ref 1.8–7.4)
NEUTROPHILS NFR BLD AUTO: 59.1 % — SIGNIFICANT CHANGE UP (ref 43–77)
NEUTROPHILS NFR BLD AUTO: 60.4 % — SIGNIFICANT CHANGE UP (ref 43–77)
NRBC # BLD: 0 /100 WBCS — SIGNIFICANT CHANGE UP (ref 0–0)
NRBC # BLD: 0 /100 WBCS — SIGNIFICANT CHANGE UP (ref 0–0)
NT-PROBNP SERPL-SCNC: 5219 PG/ML — HIGH (ref 0–450)
PLATELET # BLD AUTO: 110 K/UL — LOW (ref 150–400)
PLATELET # BLD AUTO: 119 K/UL — LOW (ref 150–400)
POTASSIUM SERPL-MCNC: 4.4 MMOL/L — SIGNIFICANT CHANGE UP (ref 3.5–5.3)
POTASSIUM SERPL-MCNC: 4.6 MMOL/L — SIGNIFICANT CHANGE UP (ref 3.5–5.3)
POTASSIUM SERPL-SCNC: 4.4 MMOL/L — SIGNIFICANT CHANGE UP (ref 3.5–5.3)
POTASSIUM SERPL-SCNC: 4.6 MMOL/L — SIGNIFICANT CHANGE UP (ref 3.5–5.3)
PROCALCITONIN SERPL-MCNC: 0.07 NG/ML — SIGNIFICANT CHANGE UP (ref 0.02–0.1)
PROT SERPL-MCNC: 6.5 G/DL — SIGNIFICANT CHANGE UP (ref 6–8.3)
PROT SERPL-MCNC: 6.7 G/DL — SIGNIFICANT CHANGE UP (ref 6–8.3)
PROTHROM AB SERPL-ACNC: 26.7 SEC — HIGH (ref 9.9–13.4)
PROTHROM AB SERPL-ACNC: 32.1 SEC — HIGH (ref 9.9–13.4)
RBC # BLD: 3.34 M/UL — LOW (ref 4.2–5.8)
RBC # BLD: 3.45 M/UL — LOW (ref 4.2–5.8)
RBC # FLD: 16.3 % — HIGH (ref 10.3–14.5)
RBC # FLD: 16.6 % — HIGH (ref 10.3–14.5)
RSV RNA NPH QL NAA+NON-PROBE: SIGNIFICANT CHANGE UP
SARS-COV-2 RNA SPEC QL NAA+PROBE: SIGNIFICANT CHANGE UP
SODIUM SERPL-SCNC: 140 MMOL/L — SIGNIFICANT CHANGE UP (ref 135–145)
SODIUM SERPL-SCNC: 142 MMOL/L — SIGNIFICANT CHANGE UP (ref 135–145)
TROPONIN I, HIGH SENSITIVITY RESULT: 16.3 NG/L — SIGNIFICANT CHANGE UP
WBC # BLD: 4.38 K/UL — SIGNIFICANT CHANGE UP (ref 3.8–10.5)
WBC # BLD: 5.01 K/UL — SIGNIFICANT CHANGE UP (ref 3.8–10.5)
WBC # FLD AUTO: 4.38 K/UL — SIGNIFICANT CHANGE UP (ref 3.8–10.5)
WBC # FLD AUTO: 5.01 K/UL — SIGNIFICANT CHANGE UP (ref 3.8–10.5)

## 2024-11-12 PROCEDURE — 71045 X-RAY EXAM CHEST 1 VIEW: CPT | Mod: 26

## 2024-11-12 RX ORDER — AZITHROMYCIN 250 MG/1
500 TABLET, FILM COATED ORAL EVERY 24 HOURS
Refills: 0 | Status: DISCONTINUED | OUTPATIENT
Start: 2024-11-13 | End: 2024-11-15

## 2024-11-12 RX ORDER — CEFTRIAXONE SODIUM 1 G
1000 VIAL (EA) INJECTION EVERY 24 HOURS
Refills: 0 | Status: DISCONTINUED | OUTPATIENT
Start: 2024-11-12 | End: 2024-11-12

## 2024-11-12 RX ORDER — AZITHROMYCIN 250 MG/1
TABLET, FILM COATED ORAL
Refills: 0 | Status: DISCONTINUED | OUTPATIENT
Start: 2024-11-12 | End: 2024-11-15

## 2024-11-12 RX ORDER — CEFTRIAXONE SODIUM 1 G
VIAL (EA) INJECTION
Refills: 0 | Status: COMPLETED | OUTPATIENT
Start: 2024-11-12 | End: 2024-11-18

## 2024-11-12 RX ORDER — GUAIFENESIN/DEXTROMETHORPHAN 100-10MG/5
10 SYRUP ORAL EVERY 4 HOURS
Refills: 0 | Status: DISCONTINUED | OUTPATIENT
Start: 2024-11-12 | End: 2024-11-19

## 2024-11-12 RX ORDER — CEFTRIAXONE SODIUM 1 G
1000 VIAL (EA) INJECTION ONCE
Refills: 0 | Status: COMPLETED | OUTPATIENT
Start: 2024-11-12 | End: 2024-11-12

## 2024-11-12 RX ORDER — CEFTRIAXONE SODIUM 1 G
1000 VIAL (EA) INJECTION EVERY 24 HOURS
Refills: 0 | Status: COMPLETED | OUTPATIENT
Start: 2024-11-13 | End: 2024-11-17

## 2024-11-12 RX ORDER — AZITHROMYCIN 250 MG/1
500 TABLET, FILM COATED ORAL ONCE
Refills: 0 | Status: COMPLETED | OUTPATIENT
Start: 2024-11-12 | End: 2024-11-12

## 2024-11-12 RX ORDER — ACETAMINOPHEN 500MG 500 MG/1
650 TABLET, COATED ORAL EVERY 6 HOURS
Refills: 0 | Status: DISCONTINUED | OUTPATIENT
Start: 2024-11-12 | End: 2024-11-19

## 2024-11-12 RX ORDER — BENZONATATE 100 MG/1
100 CAPSULE ORAL EVERY 8 HOURS
Refills: 0 | Status: DISCONTINUED | OUTPATIENT
Start: 2024-11-12 | End: 2024-11-19

## 2024-11-12 RX ADMIN — Medication 10 MILLILITER(S): at 09:40

## 2024-11-12 RX ADMIN — AZITHROMYCIN 500 MILLIGRAM(S): 250 TABLET, FILM COATED ORAL at 07:34

## 2024-11-12 RX ADMIN — Medication 1000 MILLIGRAM(S): at 11:15

## 2024-11-12 NOTE — H&P ADULT - PROBLEM SELECTOR PLAN 2
c/w home med Xarelto -BNP elevated to 5 K  - pt endorses SOB worse when laying flat   -CXR noted with increased vascular congestion    -consider TTE

## 2024-11-12 NOTE — H&P ADULT - ASSESSMENT
89-year-old male history of A-fib on Xarelto.  Patient states has had productive coughing for approximately 2 weeks completed course of Zithromax 10 days ago.  Patient still symptomatic with coughing and occasional blood-tinged sputum.  Patient denies any significant weight loss or night sweats.  Patient does not endorse any fever.  No chest pain on my evaluation.   89-year-old male history of A-fib on Xarelto.  Patient states has had productive coughing for approximately 2 weeks completed course of Zithromax 10 days ago.  Patient still symptomatic with coughing and occasional blood-tinged sputum.  Patient denies any significant weight loss or night sweats.  Patient does not endorse any fever.  No chest pain on my evaluation.      male, from home, w/ PMHx of Non­Small Cell Lung Cancer on Afatinib, AFib on Xarelto, HTN, BPH, and Hypothyroidism    Pt is a 89 y/o M, AAOx3, ambulates with walker, lives alone at home, w/ w/ PMHx of Non­Small Cell Lung Cancer on oral chemotherapy, AFib on Xarelto, HTN, BPH, and Hypothyroidism who presented to the ED from his PCP Dr. Garcia's office for 2 weeks of worsening productive cough, SOB on exertion, and fatigue. Patient endorses increased sputum production which is usually white in color, however over last few days with worsening cough, he has also brought up blood tinged sputum and blood (pt states approx 1 cup full). Patient denies any fevers, chills, chest pain. Patient recently completed a Zithromax course 10 days ago, however his cough continues to persist and get worse. He endorses SOB that is worse when laying flat in bed and upon exertion. He denies any recent travel or known sick contacts. Denies any significant weight loss or night sweats. Patient states that his overall health and ambulatory abilities have declined in the last 2-3 months after he had to have his toe nail removed and started requiring a cane and walker to ambulate steadily. Admitted to medicine for R sided PNA.     PATIENT DOES NOT REMEMBER ALL OF HIS MEDICATIONS. PLEASE OBTAIN MED REC IN AM. Patient states he obtains medications from [facility].

## 2024-11-12 NOTE — H&P ADULT - HISTORY OF PRESENT ILLNESS
Pt is a 89 y/o M, AAOx3, ambulates with walker, lives alone at home, w/ w/ PMHx of Non­Small Cell Lung Cancer on oral chemotherapy, AFib on Xarelto, HTN, BPH, and Hypothyroidism who presented to the ED from his PCP Dr. Garcia's office for 2 weeks of worsening productive cough, SOB on exertion, and fatigue. Patient endorses increased sputum production which is usually white in color, however over last few days with worsening cough, he has also brought up blood tinged sputum and blood (pt states approx 1 cup full). Patient denies any fevers, chills, chest pain. Patient recently completed a Zithromax course 10 days ago, however his cough continues to persist and get worse. He endorses SOB that is worse when laying flat in bed and upon exertion. He denies any recent travel or known sick contacts. Denies any significant weight loss or night sweats. Patient states that his overall health and ambulatory abilities have declined in the last 2-3 months after he had to have his toe nail removed and started requiring a cane and walker to ambulate steadily. Admitted to medicine for R sided PNA.

## 2024-11-12 NOTE — H&P ADULT - NSHPSOCIALHISTORY_GEN_ALL_CORE
Lives alone at home, manages with help from friend  recently requiring use of walker  retired CPA (retired 3 years ago)  Former Smoker (quit 15+ years ago)

## 2024-11-12 NOTE — H&P ADULT - PROBLEM SELECTOR PLAN 4
pt with history of non small cell lung Ca on oral chemotherapy  Patient follows with Dr Martinez at [facility] for his cancer treatment.

## 2024-11-12 NOTE — ED PROVIDER NOTE - CLINICAL SUMMARY MEDICAL DECISION MAKING FREE TEXT BOX
Chest x-ray demonstrates right middle lobe wedge-shaped infiltrate compared to previous chest x-ray.  Case discussed with PMD Dr. Garcia.  Patient admitted for IV ceftriaxone and Zithromax and monitoring.  I had a detailed discussion with the patient and/or guardian regarding the historical points, exam findings, and any diagnostic results supporting the admit diagnosis.

## 2024-11-12 NOTE — H&P ADULT - PROBLEM SELECTOR PLAN 1
p/w sob, cough, and fever  CXR shows R middle lobe PNA  pt recently finished Z pack outpt  based on imaging and sxs  c/w azithromycin 500mg qd + rocephin 1g qd  f/u strep ag, legionella, procalcitonin, mycoplasma igm  f/u blood cultures  f/u procal  f/u flu, covid panel  tylenol prn p/w sob, cough for 2+ weeks  CXR shows R middle lobe PNA  pt recently finished Z pack outpt  WBC wnl    c/w azithromycin 500mg qd + rocephin 1g qd  f/u strep ag, legionella, procalcitonin, mycoplasma igm  f/u blood cultures  f/u procal  f/u flu, covid panel  supportive care- Mucinex, Benzonate  tylenol prn

## 2024-11-12 NOTE — PATIENT PROFILE ADULT - FALL HARM RISK - HARM RISK INTERVENTIONS

## 2024-11-12 NOTE — H&P ADULT - NSHPPHYSICALEXAM_GEN_ALL_CORE
PHYSICAL EXAM:  GENERAL: NAD, speaks in full sentences, no signs of respiratory distress  HEAD:  Atraumatic, Normocephalic  EYES: EOMI, PERRLA, conjunctiva and sclera clear  NECK: Supple, No JVD  CHEST/LUNG: Clear to auscultation bilaterally; No wheeze; No crackles; No accessory muscles used. +Occasional cough  HEART: Regular rate and rhythm; No murmurs;   ABDOMEN: Soft, Nontender, Nondistended; Bowel sounds present; No guarding  EXTREMITIES:  2+ Peripheral Pulses, No cyanosis or edema  PSYCH: AAOx3  NEUROLOGY: non-focal  SKIN: No rashes or lesions

## 2024-11-12 NOTE — H&P ADULT - NSHPREVIEWOFSYSTEMS_GEN_ALL_CORE
- CONSTITUTIONAL: Denies fever and chills  - HEENT: Denies changes in vision and hearing.  - RESPIRATORY: +SOB and cough  - CV: Denies chest pain and palpitations  - GI: Denies abdominal pain, nausea, vomiting and diarrhea.  - : Denies dysuria and urinary frequency.  - SKIN: Denies rash and pruritus.  - NEUROLOGICAL: Denies headache and syncope.  - PSYCHIATRIC: Denies recent changes in mood. Denies anxiety and depression.

## 2024-11-12 NOTE — PATIENT PROFILE ADULT - FUNCTIONAL ASSESSMENT - DAILY ACTIVITY 6.
Spoke to [place] at [address] [facility] who scheduled pt for 1300 today at [facility] diagnostics main for stat US LLE. Order entered per MD below. Spoke to pt and advised on MD message below; pt verbalized understanding. Advised on appt at [facility] for [address].  Scheduled appt ten 4 = No assist / stand by assistance

## 2024-11-12 NOTE — H&P ADULT - NS ATTEND AMEND GEN_ALL_CORE FT
89 year old male with a history of lung Ca, DM, HTN, A-fib was admitted with c/o cough ,   Patient was foud to have RML PNA  Admitted for IV AB treatment as treatment with oral AB failed.

## 2024-11-12 NOTE — ED PROVIDER NOTE - OBJECTIVE STATEMENT
89-year-old male history of A-fib on Xarelto.  Patient states has had productive coughing for approximately 2 weeks completed course of Zithromax 10 days ago.  Patient still symptomatic with coughing and occasional blood-tinged sputum.  Patient denies any significant weight loss or night sweats.  Patient does not endorse any fever.  No chest pain on my evaluation.

## 2024-11-12 NOTE — H&P ADULT - CONVERSATION DETAILS
An extensive goals of care conversation was held including options, risks and benefits of many medical treatments including CPR, intubation, and tube feeding. Pt chose for pt to be (DNR/DNI).    In case he is unable to make decisions, patient would like to name his health care proxy to be his friend Luly Kohli.

## 2024-11-12 NOTE — PATIENT PROFILE ADULT - FUNCTIONAL ASSESSMENT - DAILY ACTIVITY ASSESSMENT TYPE
[de-identified] : At this time, due to left knee osteoarthritis, the patient is advised to do home exercises and anti-inflammatories, only as needed.  He will return to the office only as needed.
Admission

## 2024-11-13 ENCOUNTER — RESULT REVIEW (OUTPATIENT)
Age: 89
End: 2024-11-13

## 2024-11-13 LAB
ANION GAP SERPL CALC-SCNC: 6 MMOL/L — SIGNIFICANT CHANGE UP (ref 5–17)
BUN SERPL-MCNC: 24 MG/DL — HIGH (ref 7–18)
CALCIUM SERPL-MCNC: 8.7 MG/DL — SIGNIFICANT CHANGE UP (ref 8.4–10.5)
CHLORIDE SERPL-SCNC: 113 MMOL/L — HIGH (ref 96–108)
CO2 SERPL-SCNC: 22 MMOL/L — SIGNIFICANT CHANGE UP (ref 22–31)
CREAT SERPL-MCNC: 1.28 MG/DL — SIGNIFICANT CHANGE UP (ref 0.5–1.3)
EGFR: 52 ML/MIN/1.73M2 — LOW
GLUCOSE SERPL-MCNC: 97 MG/DL — SIGNIFICANT CHANGE UP (ref 70–99)
HCT VFR BLD CALC: 32.5 % — LOW (ref 39–50)
HGB BLD-MCNC: 11.1 G/DL — LOW (ref 13–17)
LEGIONELLA AG UR QL: NEGATIVE — SIGNIFICANT CHANGE UP
MCHC RBC-ENTMCNC: 32.6 PG — SIGNIFICANT CHANGE UP (ref 27–34)
MCHC RBC-ENTMCNC: 34.2 G/DL — SIGNIFICANT CHANGE UP (ref 32–36)
MCV RBC AUTO: 95.6 FL — SIGNIFICANT CHANGE UP (ref 80–100)
NRBC # BLD: 0 /100 WBCS — SIGNIFICANT CHANGE UP (ref 0–0)
PLATELET # BLD AUTO: 119 K/UL — LOW (ref 150–400)
POTASSIUM SERPL-MCNC: 3.9 MMOL/L — SIGNIFICANT CHANGE UP (ref 3.5–5.3)
POTASSIUM SERPL-SCNC: 3.9 MMOL/L — SIGNIFICANT CHANGE UP (ref 3.5–5.3)
RBC # BLD: 3.4 M/UL — LOW (ref 4.2–5.8)
RBC # FLD: 16.6 % — HIGH (ref 10.3–14.5)
S PNEUM AG UR QL: NEGATIVE — SIGNIFICANT CHANGE UP
SODIUM SERPL-SCNC: 141 MMOL/L — SIGNIFICANT CHANGE UP (ref 135–145)
WBC # BLD: 5.16 K/UL — SIGNIFICANT CHANGE UP (ref 3.8–10.5)
WBC # FLD AUTO: 5.16 K/UL — SIGNIFICANT CHANGE UP (ref 3.8–10.5)

## 2024-11-13 RX ORDER — LEVOTHYROXINE SODIUM 150 MCG
25 TABLET ORAL DAILY
Refills: 0 | Status: DISCONTINUED | OUTPATIENT
Start: 2024-11-13 | End: 2024-11-19

## 2024-11-13 RX ORDER — RIVAROXABAN 10 MG/1
15 TABLET, FILM COATED ORAL
Refills: 0 | Status: DISCONTINUED | OUTPATIENT
Start: 2024-11-13 | End: 2024-11-19

## 2024-11-13 RX ORDER — TAMSULOSIN HYDROCHLORIDE 0.4 MG/1
0.4 CAPSULE ORAL AT BEDTIME
Refills: 0 | Status: DISCONTINUED | OUTPATIENT
Start: 2024-11-13 | End: 2024-11-19

## 2024-11-13 RX ADMIN — BENZONATATE 100 MILLIGRAM(S): 100 CAPSULE ORAL at 22:05

## 2024-11-13 RX ADMIN — BENZONATATE 100 MILLIGRAM(S): 100 CAPSULE ORAL at 12:38

## 2024-11-13 RX ADMIN — Medication 10 MILLILITER(S): at 06:40

## 2024-11-13 RX ADMIN — Medication 5 MILLIGRAM(S): at 12:39

## 2024-11-13 RX ADMIN — RIVAROXABAN 15 MILLIGRAM(S): 10 TABLET, FILM COATED ORAL at 18:23

## 2024-11-13 RX ADMIN — TAMSULOSIN HYDROCHLORIDE 0.4 MILLIGRAM(S): 0.4 CAPSULE ORAL at 22:05

## 2024-11-13 RX ADMIN — Medication 10 MILLILITER(S): at 18:26

## 2024-11-13 RX ADMIN — Medication 100 MILLIGRAM(S): at 08:05

## 2024-11-13 RX ADMIN — AZITHROMYCIN 255 MILLIGRAM(S): 250 TABLET, FILM COATED ORAL at 08:05

## 2024-11-13 NOTE — PROGRESS NOTE ADULT - ASSESSMENT
89 year old male with a history of lung cancer, A-fib on Xarelto,  DM, HTN, Hypothyroidism, BPH, lumbar radiculopathy was admitted for PNA.  Continue Ceftriaxone and Azithromycin  Blood glucose control.  Continue home meds .

## 2024-11-13 NOTE — PROGRESS NOTE ADULT - ASSESSMENT
89 y/o M, AAOx3, ambulates with walker, lives alone at home, w/ w/ PMHx of Non­Small Cell Lung Cancer on oral chemotherapy, AFib on Xarelto, HTN, BPH, and Hypothyroidism who presented to the ED from his PCP Dr. Garcia's office for 2 weeks of worsening productive cough, SOB on exertion, and fatigue, recently completed a Zithromax course 10 days ago, Admitted to medicine for R sided PNA, started on ceftriax, and azithro. PT consult pending

## 2024-11-14 LAB
ANION GAP SERPL CALC-SCNC: 5 MMOL/L — SIGNIFICANT CHANGE UP (ref 5–17)
BUN SERPL-MCNC: 25 MG/DL — HIGH (ref 7–18)
CALCIUM SERPL-MCNC: 8.9 MG/DL — SIGNIFICANT CHANGE UP (ref 8.4–10.5)
CHLORIDE SERPL-SCNC: 112 MMOL/L — HIGH (ref 96–108)
CO2 SERPL-SCNC: 24 MMOL/L — SIGNIFICANT CHANGE UP (ref 22–31)
CREAT SERPL-MCNC: 1.3 MG/DL — SIGNIFICANT CHANGE UP (ref 0.5–1.3)
EGFR: 51 ML/MIN/1.73M2 — LOW
GLUCOSE BLDC GLUCOMTR-MCNC: 83 MG/DL — SIGNIFICANT CHANGE UP (ref 70–99)
GLUCOSE SERPL-MCNC: 97 MG/DL — SIGNIFICANT CHANGE UP (ref 70–99)
HCT VFR BLD CALC: 32.1 % — LOW (ref 39–50)
HGB BLD-MCNC: 11.1 G/DL — LOW (ref 13–17)
MCHC RBC-ENTMCNC: 32.6 PG — SIGNIFICANT CHANGE UP (ref 27–34)
MCHC RBC-ENTMCNC: 34.6 G/DL — SIGNIFICANT CHANGE UP (ref 32–36)
MCV RBC AUTO: 94.4 FL — SIGNIFICANT CHANGE UP (ref 80–100)
NRBC # BLD: 0 /100 WBCS — SIGNIFICANT CHANGE UP (ref 0–0)
PLATELET # BLD AUTO: 115 K/UL — LOW (ref 150–400)
POTASSIUM SERPL-MCNC: 4.1 MMOL/L — SIGNIFICANT CHANGE UP (ref 3.5–5.3)
POTASSIUM SERPL-SCNC: 4.1 MMOL/L — SIGNIFICANT CHANGE UP (ref 3.5–5.3)
RBC # BLD: 3.4 M/UL — LOW (ref 4.2–5.8)
RBC # FLD: 16.6 % — HIGH (ref 10.3–14.5)
SODIUM SERPL-SCNC: 141 MMOL/L — SIGNIFICANT CHANGE UP (ref 135–145)
T3 SERPL-MCNC: 88 NG/DL — SIGNIFICANT CHANGE UP (ref 80–200)
T4 AB SER-ACNC: 8.4 UG/DL — SIGNIFICANT CHANGE UP (ref 4.6–12)
TSH SERPL-MCNC: 6.51 UU/ML — HIGH (ref 0.34–4.82)
WBC # BLD: 4.85 K/UL — SIGNIFICANT CHANGE UP (ref 3.8–10.5)
WBC # FLD AUTO: 4.85 K/UL — SIGNIFICANT CHANGE UP (ref 3.8–10.5)

## 2024-11-14 RX ADMIN — BENZONATATE 100 MILLIGRAM(S): 100 CAPSULE ORAL at 17:49

## 2024-11-14 RX ADMIN — Medication 100 MILLIGRAM(S): at 08:29

## 2024-11-14 RX ADMIN — AZITHROMYCIN 255 MILLIGRAM(S): 250 TABLET, FILM COATED ORAL at 08:29

## 2024-11-14 RX ADMIN — Medication 25 MICROGRAM(S): at 05:44

## 2024-11-14 RX ADMIN — TAMSULOSIN HYDROCHLORIDE 0.4 MILLIGRAM(S): 0.4 CAPSULE ORAL at 21:15

## 2024-11-14 RX ADMIN — BENZONATATE 100 MILLIGRAM(S): 100 CAPSULE ORAL at 08:28

## 2024-11-14 RX ADMIN — Medication 5 MILLIGRAM(S): at 12:22

## 2024-11-14 RX ADMIN — Medication 10 MILLILITER(S): at 12:22

## 2024-11-14 RX ADMIN — RIVAROXABAN 15 MILLIGRAM(S): 10 TABLET, FILM COATED ORAL at 17:50

## 2024-11-14 NOTE — PROGRESS NOTE ADULT - ASSESSMENT
89 year old male with a history of lung cancer, A-fib on Xarelto,  DM, HTN, Hypothyroidism, BPH, lumbar radiculopathy was admitted for PNA.  PATIENT IS FEELING MUCH BETTER.  Continue Ceftriaxone and Azithromycin  Blood glucose control.  Continue home meds .  If stable may d/c tomorrow on oral meds.  Case ws discussed with NP.

## 2024-11-14 NOTE — PHYSICAL THERAPY INITIAL EVALUATION ADULT - PERTINENT HX OF CURRENT PROBLEM, REHAB EVAL
Patient admitted to hospital 2-days ago (11/12/2024) from his PCP Dr. Garcia's office for 2 weeks of worsening productive cough, SOB on exertion, and fatigue. Patient endorses increased sputum production which is usually white in color, however over last few days with worsening cough, he has also brought up blood tinged sputum and blood. PMHx of Non­Small Cell Lung Cancer on oral chemotherapy, AFib on Xarelto, HTN, BPH, and Hypothyroidism.

## 2024-11-14 NOTE — PHYSICAL THERAPY INITIAL EVALUATION ADULT - ADDITIONAL COMMENTS
The patient uses a rollator and rolling walker independently, but receives assistance when needed from a friend.

## 2024-11-14 NOTE — PHYSICAL THERAPY INITIAL EVALUATION ADULT - AMBULATION SKILLS, REHAB EVAL
Patient owns a RW and rollator./independent/needs device Patient owns a RW and rollator./needs device and assist

## 2024-11-14 NOTE — PHYSICAL THERAPY INITIAL EVALUATION ADULT - DIAGNOSIS, PT EVAL
Pt. presents with worsening productive cough, SOB on exertion, and fatigue. Pt. presents w/deficits in Body Structures/Function (Impairments), incl: Strength, Balance, Endurance leading to deficits in performing activities of daily living.

## 2024-11-14 NOTE — PHYSICAL THERAPY INITIAL EVALUATION ADULT - ACTIVE RANGE OF MOTION EXAMINATION, REHAB EVAL
onelia. upper extremity Active ROM was WNL (within normal limits)/bilateral lower extremity Active ROM was WNL (within normal limits)

## 2024-11-14 NOTE — PHYSICAL THERAPY INITIAL EVALUATION ADULT - LIVES WITH, PROFILE
Patient lives alone in an apartment on the 1st floor, no steps. Patient has someone that comes daily to help him./alone Patient lives alone in an apartment on the 1st floor, no steps. The patient states that he has a private aide that comes daily to help him./alone

## 2024-11-14 NOTE — PROGRESS NOTE ADULT - ASSESSMENT
89 y/o M, AAOx3, ambulates with walker, lives alone at home, w/ w/ PMHx of Non­Small Cell Lung Cancer on oral chemotherapy, AFib on Xarelto, HTN, BPH, and Hypothyroidism who presented to the ED from his PCP Dr. Garcia's office for 2 weeks of worsening productive cough, SOB on exertion, and fatigue, recently completed a Zithromax course 10 days ago, Admitted to medicine for R sided PNA, started on ceftriax, and azithro. PT reccs HPT  Possible d/c tomorrow

## 2024-11-15 ENCOUNTER — TRANSCRIPTION ENCOUNTER (OUTPATIENT)
Age: 89
End: 2024-11-15

## 2024-11-15 DIAGNOSIS — I50.9 HEART FAILURE, UNSPECIFIED: ICD-10-CM

## 2024-11-15 LAB
ANION GAP SERPL CALC-SCNC: 4 MMOL/L — LOW (ref 5–17)
BUN SERPL-MCNC: 25 MG/DL — HIGH (ref 7–18)
CALCIUM SERPL-MCNC: 8.6 MG/DL — SIGNIFICANT CHANGE UP (ref 8.4–10.5)
CHLORIDE SERPL-SCNC: 112 MMOL/L — HIGH (ref 96–108)
CO2 SERPL-SCNC: 26 MMOL/L — SIGNIFICANT CHANGE UP (ref 22–31)
CREAT SERPL-MCNC: 1.31 MG/DL — HIGH (ref 0.5–1.3)
EGFR: 51 ML/MIN/1.73M2 — LOW
GLUCOSE SERPL-MCNC: 113 MG/DL — HIGH (ref 70–99)
HCT VFR BLD CALC: 32.5 % — LOW (ref 39–50)
HGB BLD-MCNC: 11 G/DL — LOW (ref 13–17)
MCHC RBC-ENTMCNC: 32.8 PG — SIGNIFICANT CHANGE UP (ref 27–34)
MCHC RBC-ENTMCNC: 33.8 G/DL — SIGNIFICANT CHANGE UP (ref 32–36)
MCV RBC AUTO: 97 FL — SIGNIFICANT CHANGE UP (ref 80–100)
NRBC # BLD: 0 /100 WBCS — SIGNIFICANT CHANGE UP (ref 0–0)
PLATELET # BLD AUTO: 114 K/UL — LOW (ref 150–400)
POTASSIUM SERPL-MCNC: 4 MMOL/L — SIGNIFICANT CHANGE UP (ref 3.5–5.3)
POTASSIUM SERPL-SCNC: 4 MMOL/L — SIGNIFICANT CHANGE UP (ref 3.5–5.3)
RBC # BLD: 3.35 M/UL — LOW (ref 4.2–5.8)
RBC # FLD: 16.7 % — HIGH (ref 10.3–14.5)
SODIUM SERPL-SCNC: 142 MMOL/L — SIGNIFICANT CHANGE UP (ref 135–145)
WBC # BLD: 3.72 K/UL — LOW (ref 3.8–10.5)
WBC # FLD AUTO: 3.72 K/UL — LOW (ref 3.8–10.5)

## 2024-11-15 RX ORDER — FUROSEMIDE 40 MG/1
20 TABLET ORAL ONCE
Refills: 0 | Status: COMPLETED | OUTPATIENT
Start: 2024-11-15 | End: 2024-11-15

## 2024-11-15 RX ORDER — CARVEDILOL 25 MG/1
3.12 TABLET, FILM COATED ORAL EVERY 12 HOURS
Refills: 0 | Status: DISCONTINUED | OUTPATIENT
Start: 2024-11-15 | End: 2024-11-19

## 2024-11-15 RX ORDER — SACUBITRIL AND VALSARTAN 24; 26 MG/1; MG/1
1 TABLET, FILM COATED ORAL
Refills: 0 | Status: DISCONTINUED | OUTPATIENT
Start: 2024-11-15 | End: 2024-11-17

## 2024-11-15 RX ORDER — DIGOXIN 250 MCG
125 TABLET ORAL EVERY OTHER DAY
Refills: 0 | Status: DISCONTINUED | OUTPATIENT
Start: 2024-11-15 | End: 2024-11-19

## 2024-11-15 RX ADMIN — CARVEDILOL 3.12 MILLIGRAM(S): 25 TABLET, FILM COATED ORAL at 17:55

## 2024-11-15 RX ADMIN — Medication 10 MILLILITER(S): at 05:05

## 2024-11-15 RX ADMIN — Medication 100 MILLIGRAM(S): at 07:45

## 2024-11-15 RX ADMIN — TAMSULOSIN HYDROCHLORIDE 0.4 MILLIGRAM(S): 0.4 CAPSULE ORAL at 21:29

## 2024-11-15 RX ADMIN — AZITHROMYCIN 255 MILLIGRAM(S): 250 TABLET, FILM COATED ORAL at 06:29

## 2024-11-15 RX ADMIN — SACUBITRIL AND VALSARTAN 1 TABLET(S): 24; 26 TABLET, FILM COATED ORAL at 17:54

## 2024-11-15 RX ADMIN — BENZONATATE 100 MILLIGRAM(S): 100 CAPSULE ORAL at 17:52

## 2024-11-15 RX ADMIN — BENZONATATE 100 MILLIGRAM(S): 100 CAPSULE ORAL at 05:03

## 2024-11-15 RX ADMIN — Medication 25 MICROGRAM(S): at 05:03

## 2024-11-15 RX ADMIN — Medication 5 MILLIGRAM(S): at 11:23

## 2024-11-15 RX ADMIN — Medication 10 MILLILITER(S): at 17:55

## 2024-11-15 RX ADMIN — RIVAROXABAN 15 MILLIGRAM(S): 10 TABLET, FILM COATED ORAL at 17:52

## 2024-11-15 RX ADMIN — Medication 125 MICROGRAM(S): at 16:14

## 2024-11-15 NOTE — CONSULT NOTE ADULT - SUBJECTIVE AND OBJECTIVE BOX
Date of Service  11-15-24 @ 12:56    CHIEF COMPLAINT:Patient is a 89y old  Male who presents with a chief complaint of PNA.      HPI:  Pt is a 89 y/o M, AAOx3, ambulates with walker, lives alone at home, w/ w/ PMHx of Non­Small Cell Lung Cancer on oral chemotherapy, AFib on Xarelto, HTN, BPH, and Hypothyroidism who presented to the ED from his PCP Dr. Garcia's office for 2 weeks of worsening productive cough, SOB on exertion, and fatigue. Patient endorses increased sputum production which is usually white in color, however over last few days with worsening cough, he has also brought up blood tinged sputum and blood (pt states approx 1 cup full). Patient denies any fevers, chills, chest pain. Patient recently completed a Zithromax course 10 days ago, however his cough continues to persist and get worse. He endorses SOB that is worse when laying flat in bed and upon exertion. He denies any recent travel or known sick contacts. Denies any significant weight loss or night sweats. Patient states that his overall health and ambulatory abilities have declined in the last 2-3 months after he had to have his toe nail removed and started requiring a cane and walker to ambulate steadily. Admitted to medicine for R sided PNA.          PAST MEDICAL & SURGICAL HISTORY:  Essential hypertension      Hyperlipidemia, unspecified hyperlipidemia type      BPH without obstruction/lower urinary tract symptoms      Glaucoma (increased eye pressure)  both eyes      Atrial fibrillation      H/O hemicolectomy  1973      Cataract extraction status of left eye  w/lens implant - 2014      History of lung biopsy  2016 - negative results          MEDICATIONS  (STANDING):  azithromycin  IVPB 500 milliGRAM(s) IV Intermittent every 24 hours  azithromycin  IVPB      cefTRIAXone   IVPB 1000 milliGRAM(s) IV Intermittent every 24 hours  cefTRIAXone   IVPB      finasteride 5 milliGRAM(s) Oral daily  levothyroxine 25 MICROGram(s) Oral daily  rivaroxaban 15 milliGRAM(s) Oral with dinner  tamsulosin 0.4 milliGRAM(s) Oral at bedtime    MEDICATIONS  (PRN):  acetaminophen     Tablet .. 650 milliGRAM(s) Oral every 6 hours PRN Temp greater or equal to 38C (100.4F), Mild Pain (1 - 3)  benzonatate 100 milliGRAM(s) Oral every 8 hours PRN Cough  guaifenesin/dextromethorphan Oral Liquid 10 milliLiter(s) Oral every 4 hours PRN Cough      FAMILY HISTORY:No hx o CAD      SOCIAL HISTORY:    [ x] Non-smoker    [x ] Alcohol-denies    Allergies    No Known Allergies    Intolerances    	    REVIEW OF SYSTEMS:  CONSTITUTIONAL: No fever, weight loss, or fatigue  EYES: No eye pain, visual disturbances, or discharge  ENT:  No difficulty hearing, tinnitus, vertigo; No sinus or throat pain  NECK: No pain or stiffness  RESPIRATORY: No cough, wheezing, chills or hemoptysis; + Shortness of Breath  CARDIOVASCULAR: No chest pain, palpitations, passing out, dizziness, or leg swelling,+orthopnea  GASTROINTESTINAL: No abdominal or epigastric pain. No nausea, vomiting, or hematemesis; No diarrhea or constipation. No melena or hematochezia.  GENITOURINARY: No dysuria, frequency, hematuria, or incontinence  NEUROLOGICAL: No headaches, memory loss, loss of strength, numbness, or tremors  SKIN: No itching, burning, rashes, or lesions   LYMPH Nodes: No enlarged glands  ENDOCRINE: No heat or cold intolerance; No hair loss  MUSCULOSKELETAL: No joint pain or swelling; No muscle, back, or extremity pain  PSYCHIATRIC: No depression, anxiety, mood swings, or difficulty sleeping  HEME/LYMPH: No easy bruising, or bleeding gums  ALLERGY AND IMMUNOLOGIC: No hives or eczema	    PHYSICAL EXAM:  T(C): 36.6 (11-15-24 @ 05:21), Max: 36.6 (11-14-24 @ 14:24)  HR: 93 (11-15-24 @ 05:21) (88 - 93)  BP: 123/76 (11-15-24 @ 05:21) (95/55 - 123/76)  RR: 17 (11-15-24 @ 05:21) (16 - 18)  SpO2: 94% (11-15-24 @ 05:21) (94% - 96%)  Wt(kg): --  I&O's Summary      Appearance: Normal	  HEENT:   Normal oral mucosa, PERRL, EOMI	  Lymphatic: No lymphadenopathy  Cardiovascular: Normal S1 S2, No JVD, No murmurs, No edema  Respiratory: Dec BS at bases	  Psychiatry: A & O x 3, Mood & affect appropriate  Gastrointestinal:  Soft, Non-tender, + BS	  Skin: No rashes, No ecchymoses, No cyanosis	  Neurologic: Non-focal  Extremities: Normal range of motion, No clubbing, cyanosis or edema  Vascular: Peripheral pulses palpable 2+ bilaterally        ECG:  afib,q anterior leads	    LABS:	 	                        11.0   3.72  )-----------( 114      ( 15 Nov 2024 06:13 )             32.5     11-15    142  |  112[H]  |  25[H]  ----------------------------<  113[H]  4.0   |  26  |  1.31[H]    Ca    8.6      15 Nov 2024 06:13      < from: CT Head No Cont (10.04.24 @ 17:39) >    ACC: [id number] EXAM:  CT BRAIN   ORDERED BY: LUIS ALBERTO ALFARO     PROCEDURE DATE:  10/04/2024          INTERPRETATION:  CLINICAL INFORMATION: lower leg weakness worse left side    COMPARISON: None.    Multiple axial sections were performed from the base skull to vertex   without contrast enhancement. Coronal and sagittal reconstructions were   performed    Parenchymal volume loss and chronic microvessel ischemic changes are   identified    Tiny foci of dystrophic desiccation are seen involving the high left   frontal cortex and posterior left vertex region. These findings are   likely related to old infections.    No evidence of acute hemorrhage is seen.    Evaluation of the osseous structures with the appropriate window appears   unremarkable    Thevisualized paranasal sinuses appear clear.    Partial opacification of the right mastoid region is seen which is   compatible with underlying inflammatory change.    Impression: Areas of dystrophic calcifications involving the brain as   described above.    --- End of Report ---            MENDEL GONGORA MD; Attending Radiologist  This document has been electronically signed. Oct  4 2024  6:06PM    < end of copied text >  < from: Xray Chest 1 View-PORTABLE IMMEDIATE (11.12.24 @ 01:27) >  ACC: [id number] EXAM:  XR CHEST PORTABLE IMMED 1V   ORDERED BY: KLEBER MALDONADO     PROCEDURE DATE:  11/12/2024          INTERPRETATION:  HISTORY: Shortness of breath    TECHNIQUE: A single AP view of the chest was obtained.    COMPARISON: 10/4/2024    FINDINGS: The heart size cannot be adequately assessed on this single   view. There are small bilateral pleural effusions and/or bibasilar   atelectasis, not seen previously. An underlying basilar pneumonia cannot   be entirely excluded. There is a patchy airspace opacity in the right   upper lobe, not seen previously. The hilar and mediastinal structures   appear unremarkable. The osseous structures are intact.    IMPRESSION: Small bilateral pleural effusions and/or bibasilar   atelectasis, not seenpreviously. A basilar pneumonia cannot be excluded.   Patchy airspace opacity in the right upper lobe, not seen previously,   likely pneumonia.    --- End of Report ---            TERRY BRIONES MD; Attending Radiologist  This document has been electronically signed. Nov 12 2024  9:45AM    < end of copied text >

## 2024-11-15 NOTE — DISCHARGE NOTE PROVIDER - HOSPITAL COURSE
89 y/o M, AAOx3, ambulates with walker, lives alone at home, w/ w/ PMHx of Non­Small Cell Lung Cancer on oral chemotherapy, AFib on Xarelto, HTN, BPH, and Hypothyroidism who presented to the ED from his PCP Dr. Garcia's office for 2 weeks of worsening productive cough, SOB on exertion, and fatigue after a 10 day course of abx. CXR shows R middle lobe PNA. Admitted to medicine for R sided PNA. Pt started on abx. strep ag, legionella, procalcitonin, mycoplasma igm NEG & blood cultures NGTD.     CXR noted with increased vascular congestion; echo showed TTE with mildly reduced EF 48%.     PT recc HOME PT.    case discussed with attending, pt medically stable for d/c . Please note that this a brief summary of hospital course please refer to daily progress notes and consult notes for full course and events

## 2024-11-15 NOTE — DISCHARGE NOTE PROVIDER - NSDCMRMEDTOKEN_GEN_ALL_CORE_FT
Albuterol (Eqv-ProAir HFA) 90 mcg/inh inhalation aerosol: 2 puff(s) inhaled every 6 hours  Azopt 1% ophthalmic suspension: 1 drop(s) to each affected eye 3 times a day  Centrum oral tablet: 1 tab(s) orally once a day  Combigan 0.2%-0.5% ophthalmic solution: 1 drop(s) to each affected eye every 12 hours  dorzolamide 22.3 mg-timolol 6.8 mg/mL eye drops:   finasteride 5 mg tablet: orally once a day  levothyroxine 25 mcg tablet:   Lumigan 0.01% ophthalmic solution: 1 drop(s) to each affected eye once a day (in the evening)  Rocklatan 0.02 %-0.005 % eye drops:   simvastatin 10 mg oral tablet: 1 tab(s) orally once a day (at bedtime)  tamsulosin 0.4 mg capsule: 1 tab(s) orally once a day (at bedtime)  Xarelto 15 mg oral tablet: 1 tab(s) orally once a day (in the evening)   Albuterol (Eqv-ProAir HFA) 90 mcg/inh inhalation aerosol: 2 puff(s) inhaled every 6 hours  Azopt 1% ophthalmic suspension: 1 drop(s) to each affected eye 3 times a day  carvedilol 3.125 mg oral tablet: 1 tab(s) orally every 12 hours  Centrum oral tablet: 1 tab(s) orally once a day  Combigan 0.2%-0.5% ophthalmic solution: 1 drop(s) to each affected eye every 12 hours  digoxin 125 mcg (0.125 mg) oral tablet: 1 tab(s) orally every other day  dorzolamide 22.3 mg-timolol 6.8 mg/mL eye drops:   finasteride 5 mg tablet: orally once a day  furosemide 20 mg oral tablet: 1 tab(s) orally once a day  levothyroxine 25 mcg tablet: 1 tab(s) orally once a day  Lumigan 0.01% ophthalmic solution: 1 drop(s) to each affected eye once a day (in the evening)  Rocklatan 0.02 %-0.005 % eye drops:   simvastatin 10 mg oral tablet: 1 tab(s) orally once a day (at bedtime)  tamsulosin 0.4 mg capsule: 1 tab(s) orally once a day (at bedtime)  Xarelto 15 mg oral tablet: 1 tab(s) orally once a day (in the evening)   Albuterol (Eqv-ProAir HFA) 90 mcg/inh inhalation aerosol: 2 puff(s) inhaled every 6 hours  Azopt 1% ophthalmic suspension: 1 drop(s) to each affected eye 3 times a day  carvedilol 3.125 mg oral tablet: 1 tab(s) orally every 12 hours  Centrum oral tablet: 1 tab(s) orally once a day  Combigan 0.2%-0.5% ophthalmic solution: 1 drop(s) to each affected eye every 12 hours  digoxin 125 mcg (0.125 mg) oral tablet: 1 tab(s) orally every other day  dorzolamide 22.3 mg-timolol 6.8 mg/mL eye drops:   finasteride 5 mg tablet: orally once a day  furosemide 20 mg oral tablet: 1 tab(s) orally once a day  levothyroxine 25 mcg tablet: 1 tab(s) orally once a day  lisinopril 2.5 mg oral tablet: 1 tab(s) orally once a day (at bedtime)  Lumigan 0.01% ophthalmic solution: 1 drop(s) to each affected eye once a day (in the evening)  Rocklatan 0.02 %-0.005 % eye drops:   simvastatin 10 mg oral tablet: 1 tab(s) orally once a day (at bedtime)  tamsulosin 0.4 mg capsule: 1 tab(s) orally once a day (at bedtime)  Xarelto 15 mg oral tablet: 1 tab(s) orally once a day (in the evening)   Albuterol (Eqv-ProAir HFA) 90 mcg/inh inhalation aerosol: 2 puff(s) inhaled every 6 hours  Azopt 1% ophthalmic suspension: 1 drop(s) to each affected eye 3 times a day  carvedilol 3.125 mg oral tablet: 1 tab(s) orally every 12 hours  cefpodoxime 200 mg oral tablet: 1 tab(s) orally 2 times a day Stop after 6 days  Centrum oral tablet: 1 tab(s) orally once a day  Combigan 0.2%-0.5% ophthalmic solution: 1 drop(s) to each affected eye every 12 hours  digoxin 125 mcg (0.125 mg) oral tablet: 1 tab(s) orally every other day  dorzolamide 22.3 mg-timolol 6.8 mg/mL eye drops:   finasteride 5 mg tablet: orally once a day  furosemide 20 mg oral tablet: 1 tab(s) orally once a day  levothyroxine 25 mcg tablet: 1 tab(s) orally once a day  lisinopril 2.5 mg oral tablet: 1 tab(s) orally once a day (at bedtime)  Lumigan 0.01% ophthalmic solution: 1 drop(s) to each affected eye once a day (in the evening)  Rocklatan 0.02 %-0.005 % eye drops:   simvastatin 10 mg oral tablet: 1 tab(s) orally once a day (at bedtime)  tamsulosin 0.4 mg capsule: 1 tab(s) orally once a day (at bedtime)  Xarelto 15 mg oral tablet: 1 tab(s) orally once a day (in the evening)   Albuterol (Eqv-ProAir HFA) 90 mcg/inh inhalation aerosol: 2 puff(s) inhaled every 6 hours  Azopt 1% ophthalmic suspension: 1 drop(s) to each affected eye 3 times a day  carvedilol 3.125 mg oral tablet: 1 tab(s) orally every 12 hours  cefpodoxime 200 mg oral tablet: 1 tab(s) orally 2 times a day Stop after 6 days  Centrum oral tablet: 1 tab(s) orally once a day  Combigan 0.2%-0.5% ophthalmic solution: 1 drop(s) to each affected eye every 12 hours  digoxin 125 mcg (0.125 mg) oral tablet: 1 tab(s) orally every other day  dorzolamide 22.3 mg-timolol 6.8 mg/mL eye drops:   finasteride 5 mg tablet: orally once a day  furosemide 20 mg oral tablet: 1 tab(s) orally once a day  levothyroxine 25 mcg tablet: 1 tab(s) orally once a day  lisinopril 2.5 mg oral tablet: 1 tab(s) orally once a day (at bedtime) Start on 11/21/24  Lumigan 0.01% ophthalmic solution: 1 drop(s) to each affected eye once a day (in the evening)  Rocklatan 0.02 %-0.005 % eye drops:   simvastatin 10 mg oral tablet: 1 tab(s) orally once a day (at bedtime)  tamsulosin 0.4 mg capsule: 1 tab(s) orally once a day (at bedtime)  Xarelto 15 mg oral tablet: 1 tab(s) orally once a day (in the evening)   Albuterol (Eqv-ProAir HFA) 90 mcg/inh inhalation aerosol: 2 puff(s) inhaled every 6 hours  Azopt 1% ophthalmic suspension: 1 drop(s) to each affected eye 3 times a day  carvedilol 3.125 mg oral tablet: 1 tab(s) orally every 12 hours  cefpodoxime 200 mg oral tablet: 1 tab(s) orally 2 times a day Stop after 6 days  Centrum oral tablet: 1 tab(s) orally once a day  Combigan 0.2%-0.5% ophthalmic solution: 1 drop(s) to each affected eye every 12 hours  digoxin 125 mcg (0.125 mg) oral tablet: 1 tab(s) orally every other day  dorzolamide 22.3 mg-timolol 6.8 mg/mL eye drops:   finasteride 5 mg tablet: orally once a day  furosemide 20 mg oral tablet: 1 tab(s) orally once a day  levothyroxine 25 mcg tablet: 1 tab(s) orally once a day  lisinopril 2.5 mg oral tablet: 1 tab(s) orally once a day (at bedtime) Start on 11/21/24  Lumigan 0.01% ophthalmic solution: 1 drop(s) to each affected eye once a day (in the evening)  midodrine 2.5 mg oral tablet: 1 tab(s) orally 3 times a day  Rocklatan 0.02 %-0.005 % eye drops:   simvastatin 10 mg oral tablet: 1 tab(s) orally once a day (at bedtime)  tamsulosin 0.4 mg capsule: 1 tab(s) orally once a day (at bedtime)  Xarelto 15 mg oral tablet: 1 tab(s) orally once a day (in the evening)   Albuterol (Eqv-ProAir HFA) 90 mcg/inh inhalation aerosol: 2 puff(s) inhaled every 6 hours  Azopt 1% ophthalmic suspension: 1 drop(s) to each affected eye 3 times a day  carvedilol 3.125 mg oral tablet: 1 tab(s) orally every 12 hours  cefpodoxime 200 mg oral tablet: 1 tab(s) orally 2 times a day Stop after 6 days  Centrum oral tablet: 1 tab(s) orally once a day  Combigan 0.2%-0.5% ophthalmic solution: 1 drop(s) to each affected eye every 12 hours  digoxin 125 mcg (0.125 mg) oral tablet: 1 tab(s) orally every other day  dorzolamide 22.3 mg-timolol 6.8 mg/mL eye drops:   finasteride 5 mg tablet: orally once a day  furosemide 20 mg oral tablet: 1 tab(s) orally once a day  levothyroxine 25 mcg tablet: 1 tab(s) orally once a day  lisinopril 2.5 mg oral tablet: 1 tab(s) orally once a day (at bedtime) Start on 11/21/24  Lumigan 0.01% ophthalmic solution: 1 drop(s) to each affected eye once a day (in the evening)  midodrine 2.5 mg oral tablet: 1 tab(s) orally 3 times a day  Rocklatan 0.02 %-0.005 % eye drops:   simvastatin 10 mg oral tablet: 1 tab(s) orally once a day (at bedtime)  Tagrisso 80 mg oral tablet: 80 milligram(s) orally once a day  tamsulosin 0.4 mg capsule: 1 tab(s) orally once a day (at bedtime)  Xarelto 15 mg oral tablet: 1 tab(s) orally once a day (in the evening)

## 2024-11-15 NOTE — DISCHARGE NOTE PROVIDER - PROVIDER TOKENS
PROVIDER:[TOKEN:[[id number]],FOLLOWUP:[1 week],ESTABLISHEDPATIENT:[T]] PROVIDER:[TOKEN:[[id number]],FOLLOWUP:[1 week],ESTABLISHEDPATIENT:[T]],PROVIDER:[TOKEN:[[id number]],FOLLOWUP:[1 week],ESTABLISHEDPATIENT:[T]]

## 2024-11-15 NOTE — PROGRESS NOTE ADULT - ASSESSMENT
89 year old male with a history of lung cancer, A-fib on Xarelto,  DM, HTN, Hypothyroidism, BPH, lumbar radiculopathy was admitted for PNA.  Coughing has significantly lessened but patient developed orthopnea.  Cardiology consult was obtained.  patient was started on Furosemide IV.  Continue Ceftriaxone and Azithromycin  Blood glucose control.  Continue home meds .  Case was discussed with NP.

## 2024-11-15 NOTE — DISCHARGE NOTE PROVIDER - NSDCCPCAREPLAN_GEN_ALL_CORE_FT
PRINCIPAL DISCHARGE DIAGNOSIS  Diagnosis: Pneumonia  Assessment and Plan of Treatment: You presented to the ED with complaints of shortness of breath, cough and fatigue. You had a chest xray which showed you have pneumonia. You were treated with IV antibiotics and your condition improved.   Pneumonia is a lung infection that can cause a fever, cough, and trouble breathing.  Continue all antibiotics as ordered until complete.  Nutrition is important, eat small frequent meals.  Get lots of rest and drink fluids.  Call your health care provider upon arrival home from hospital and make a follow up appointment for one week.  If your cough worsens, you develop fever greater than 101', you have shaking chills, a fast heartbeat, trouble breathing and/or feel your are breathing much faster than usual, call your healthcare provider.  Make sure you wash your hands frequently.        SECONDARY DISCHARGE DIAGNOSES  Diagnosis: Heart failure  Assessment and Plan of Treatment: You presented to the Ed with complaints of shortness of breath. You had an echocardiogram which showed :  1. Left atrium is mildly dilated.   2. Moderate to severe mitral regurgitation.   3. Aortic valve anatomy cannot be determined with reduced systolic excursion. Mild aortic stenosis.   4. The peak transaortic velocity is 1.28 m/s, peak transaortic gradient is 6.6 mmHg and mean transaortic gradient is 2.7 mmHg with an LVOT/aortic valve VTI ratio of 0.62. The effective orifice area is estimated at 1.95 cm² by the continuity equation.   5. Left ventricular systolic function is mildly decreased with an ejection fraction of 48 % by Naidu's method of disks.   6. The right ventricle is not well visualized.   7. Estimated pulmonary artery systolic pressure is 46 mmHg, consistent with mild pulmonary hypertension.  Heart failure is a condition in which the heart is no longer able to pump oxygen-rich blood to the rest of the body efficiently. When symptoms become severe, a hospital stay may be necessary.   Weigh yourself daily.  If you gain 3lbs in 3 days, or 5lbs in a week call your Health Care Provider.  Do not eat or drink foods containing more than 2000mg of salt (sodium) in your diet every day.  Call your Health Care Provider if you have any swelling or increased swelling in your feet, ankles, and/or stomach.  Take all of your medication as directed.  If you become dizzy call your Health Care Provider.      Diagnosis: Hypothyroidism  Assessment and Plan of Treatment: you do not make enough thyroid hormone  signs & symptoms of low levels of thyroid hormone - tired, getting cold easily, coarse or thin hair, constipation, shortness of breath, swelling, irregular periods  your doctor will do thyroid hormone blood tests at least once a year to monitor if medication dose is adequate  take your thyroid medicine as directed by your doctor & on empty stomach      Diagnosis: Hypertension  Assessment and Plan of Treatment: You have a history of high blood pressure. High blood pressure is a condition that puts you at risk for heart attack, stroke and kidney disease. During your hospital stay your blood pressure was controlled without medications. Please follow up with your PCP on when to resume your blood pressure medications. You can also help control your blood pressure by maintaining a healthy weight, eating a diet low in fat and rich in fruits and vegetables, reduce the amount of salt in your diet. Also, reduce alcohol and try to include some form of physical activity daily for at least 30 mins. Follow up with your medical doctor to establish long term blood pressure treatment goals.  Notify your doctor if you have any of the following symptoms:   Dizziness, Lightheadedness, Blurry vision, Headache, Chest pain, Shortness of breath      Diagnosis: History of BPH  Assessment and Plan of Treatment: You have an enlarged prostate gland which gets bigger as men get older - it is a very common problem and has nothing to do with prostate cancer  Call your doctor if you are urinating more frequently, have trouble starting to urinate, have weak stream, urine leaking or dribbling, and feeling as though bladder is not empty after urination  Your doctor will monitor your prostate with a rectal exam as well as urine or blood testing  You can help yourself by reducing the amount of fluid you drink before going to bed, limiting the amount of alcohol & caffeine you drink   Avoid cold & allergy medication that contain decongestants or antihistamines which make BPH symptoms worse  You can also "double void" by waiting a moment after urinating & trying again  Take your medication as prescribed - one medication helps to relax the muscle around the urethra and the other medication you may take prevents the prostate from growing more or even shrinking the prostate      Diagnosis: Lung cancer  Assessment and Plan of Treatment: You have a history of non small cell lung Ca on oral chemotherapy  Please follow up with Dr Martinez at [facility] for further management .  Please continue to take your medications as prescribed.    Diagnosis: Atrial fibrillation  Assessment and Plan of Treatment:   Atrial fibrillation is the most common heart rhythm problem & has the risk of stroke & heart attack  It helps if you control your blood pressure, not drink more than 1-2 alcohol drinks per day, cut down on caffeine, getting treatment for over active thyroid gland, & getting exercise  Call your doctor if you feel your heart racing or beating unusually, chest tightness or pain, lightheaded, faint, shortness of breath especially with exercise  It is important to take your heart medication as prescribed  You may be on anticoagulation which is very important to take as directed - you may need blood work to monitor drug levels       PRINCIPAL DISCHARGE DIAGNOSIS  Diagnosis: Pneumonia  Assessment and Plan of Treatment: You presented to the ED with complaints of shortness of breath, cough and fatigue. You had a chest xray which showed you have pneumonia. You were treated with IV antibiotics and your condition improved.   Pneumonia is a lung infection that can cause a fever, cough, and trouble breathing.  Continue all antibiotics as ordered until complete.   Continue oral antibiotics Cefpodoxime 200mg 2 times/day for 6 days.  Nutrition is important, eat small frequent meals.  Get lots of rest and drink fluids.  Call your health care provider upon arrival home from hospital and make a follow up appointment for one week.  If your cough worsens, you develop fever greater than 101', you have shaking chills, a fast heartbeat, trouble breathing and/or feel your are breathing much faster than usual, call your healthcare provider.  Make sure you wash your hands frequently.        SECONDARY DISCHARGE DIAGNOSES  Diagnosis: Atrial fibrillation  Assessment and Plan of Treatment:   Atrial fibrillation is the most common heart rhythm problem & has the risk of stroke & heart attack  It helps if you control your blood pressure, not drink more than 1-2 alcohol drinks per day, cut down on caffeine, getting treatment for over active thyroid gland, & getting exercise  Call your doctor if you feel your heart racing or beating unusually, chest tightness or pain, lightheaded, faint, shortness of breath especially with exercise  It is important to take your heart medication as prescribed  You may be on anticoagulation which is very important to take as directed - you may need blood work to monitor drug levels      Diagnosis: Hypothyroidism  Assessment and Plan of Treatment: you do not make enough thyroid hormone  signs & symptoms of low levels of thyroid hormone - tired, getting cold easily, coarse or thin hair, constipation, shortness of breath, swelling, irregular periods  your doctor will do thyroid hormone blood tests at least once a year to monitor if medication dose is adequate  take your thyroid medicine as directed by your doctor & on empty stomach      Diagnosis: Hypertension  Assessment and Plan of Treatment: You have a history of high blood pressure. High blood pressure is a condition that puts you at risk for heart attack, stroke and kidney disease. During your hospital stay your blood pressure was controlled without medications. Please follow up with your PCP on when to resume your blood pressure medications. You can also help control your blood pressure by maintaining a healthy weight, eating a diet low in fat and rich in fruits and vegetables, reduce the amount of salt in your diet. Also, reduce alcohol and try to include some form of physical activity daily for at least 30 mins. Follow up with your medical doctor to establish long term blood pressure treatment goals.  Notify your doctor if you have any of the following symptoms:   Dizziness, Lightheadedness, Blurry vision, Headache, Chest pain, Shortness of breath      Diagnosis: History of BPH  Assessment and Plan of Treatment: You have an enlarged prostate gland which gets bigger as men get older - it is a very common problem and has nothing to do with prostate cancer  Call your doctor if you are urinating more frequently, have trouble starting to urinate, have weak stream, urine leaking or dribbling, and feeling as though bladder is not empty after urination  Your doctor will monitor your prostate with a rectal exam as well as urine or blood testing  You can help yourself by reducing the amount of fluid you drink before going to bed, limiting the amount of alcohol & caffeine you drink   Avoid cold & allergy medication that contain decongestants or antihistamines which make BPH symptoms worse  You can also "double void" by waiting a moment after urinating & trying again  Take your medication as prescribed - one medication helps to relax the muscle around the urethra and the other medication you may take prevents the prostate from growing more or even shrinking the prostate      Diagnosis: Lung cancer  Assessment and Plan of Treatment: You have a history of non small cell lung Ca on oral chemotherapy  Please follow up with Dr Martinez at [facility] for further management .  Please continue to take your medications as prescribed.    Diagnosis: Heart failure  Assessment and Plan of Treatment: c    Diagnosis: Acute on chronic end-stage systolic heart failure  Assessment and Plan of Treatment:     Diagnosis: Acute systolic congestive heart failure  Assessment and Plan of Treatment:      PRINCIPAL DISCHARGE DIAGNOSIS  Diagnosis: Pneumonia  Assessment and Plan of Treatment: You presented to the ED with complaints of shortness of breath, cough and fatigue. You had a chest xray which showed you have pneumonia. You were treated with IV antibiotics and your condition improved.   Pneumonia is a lung infection that can cause a fever, cough, and trouble breathing.  Continue all antibiotics as ordered until complete.   Continue oral antibiotics Cefpodoxime 200mg 2 times/day for 6 days.  Nutrition is important, eat small frequent meals.  Get lots of rest and drink fluids.  Call your health care provider upon arrival home from hospital and make a follow up appointment for one week.  If your cough worsens, you develop fever greater than 101', you have shaking chills, a fast heartbeat, trouble breathing and/or feel your are breathing much faster than usual, call your healthcare provider.  Make sure you wash your hands frequently.        SECONDARY DISCHARGE DIAGNOSES  Diagnosis: Atrial fibrillation  Assessment and Plan of Treatment:   Atrial fibrillation is the most common heart rhythm problem & has the risk of stroke & heart attack  It helps if you control your blood pressure, not drink more than 1-2 alcohol drinks per day, cut down on caffeine, getting treatment for over active thyroid gland, & getting exercise  Call your doctor if you feel your heart racing or beating unusually, chest tightness or pain, lightheaded, faint, shortness of breath especially with exercise  It is important to take your heart medication as prescribed  You may be on anticoagulation which is very important to take as directed - you may need blood work to monitor drug levels      Diagnosis: Hypothyroidism  Assessment and Plan of Treatment: you do not make enough thyroid hormone  signs & symptoms of low levels of thyroid hormone - tired, getting cold easily, coarse or thin hair, constipation, shortness of breath, swelling, irregular periods  your doctor will do thyroid hormone blood tests at least once a year to monitor if medication dose is adequate  take your thyroid medicine as directed by your doctor & on empty stomach      Diagnosis: Hypertension  Assessment and Plan of Treatment: You have a history of high blood pressure. High blood pressure is a condition that puts you at risk for heart attack, stroke and kidney disease. During your hospital stay your blood pressure was controlled without medications. Please follow up with your PCP on when to resume your blood pressure medications. You can also help control your blood pressure by maintaining a healthy weight, eating a diet low in fat and rich in fruits and vegetables, reduce the amount of salt in your diet. Also, reduce alcohol and try to include some form of physical activity daily for at least 30 mins. Follow up with your medical doctor to establish long term blood pressure treatment goals.  Notify your doctor if you have any of the following symptoms:   Dizziness, Lightheadedness, Blurry vision, Headache, Chest pain, Shortness of breath      Diagnosis: History of BPH  Assessment and Plan of Treatment: You have an enlarged prostate gland which gets bigger as men get older - it is a very common problem and has nothing to do with prostate cancer  Call your doctor if you are urinating more frequently, have trouble starting to urinate, have weak stream, urine leaking or dribbling, and feeling as though bladder is not empty after urination  Your doctor will monitor your prostate with a rectal exam as well as urine or blood testing  You can help yourself by reducing the amount of fluid you drink before going to bed, limiting the amount of alcohol & caffeine you drink   Avoid cold & allergy medication that contain decongestants or antihistamines which make BPH symptoms worse  You can also "double void" by waiting a moment after urinating & trying again  Take your medication as prescribed - one medication helps to relax the muscle around the urethra and the other medication you may take prevents the prostate from growing more or even shrinking the prostate      Diagnosis: Lung cancer  Assessment and Plan of Treatment: You have a history of non small cell lung Ca on oral chemotherapy  Please follow up with Dr Martinez at [facility] for further management .  Please continue to take your medications as prescribed.    Diagnosis: Acute systolic congestive heart failure  Assessment and Plan of Treatment: You were noted with heart failure. You were evaluated by a heart doctor & started on some heart medications. Continue to take your medication as prescribed.   Follow up with your primary doctor & heart doctor after discharge.  Weigh yourself daily.  If you gain 3lbs in 3 days, or 5lbs in a week call your Health Care Provider.  Do not eat or drink foods containing more than 2000mg of salt (sodium) in your diet every day.  Call your Health Care Provider if you have any swelling or increased swelling in your feet, ankles, and/or stomach.  Take all of your medication as directed.  If you become dizzy call your Health Care Provider.      Diagnosis: Orthostatic hypotension  Assessment and Plan of Treatment: You were also noted with low blood pressure usually happening during change in body position, also known as orthostatic hypotension. You were evaluated by a heart doctor for this condition.  You are advised to start Midodrin 2.5mg 3 times/day for management of this condition.   Follow up with a cardiologist after dischrge.

## 2024-11-15 NOTE — DISCHARGE NOTE PROVIDER - CARE PROVIDERS DIRECT ADDRESSES
[email or web address]

## 2024-11-15 NOTE — CONSULT NOTE ADULT - ASSESSMENT
89 y/o M, AAOx3, ambulates with walker, lives alone at home, w/ w/ PMHx of Non­Small Cell Lung Cancer on oral chemotherapy,  Chronic AFib on Xarelto, HTN, BPH, and Hypothyroidism who presented to the ED from his PCP Dr. Garcia's office for 2 weeks of worsening productive cough, SOB on exertion, and fatigue,pneumonia,acute systolic HF with  mod-severe MR.  1.Acute systolic HF-IV lasix 20mg x1.Add dig .125mg qod,coreg 3.125mg bid and entresto 24/26.  2.Pneumonia-abx.  3.Chronic afib-xarelto,dig.coreg.  4.Hypothyroid-synthroid.  5.Lung ca-Onc f/u as outpatient.  6.PPI.

## 2024-11-15 NOTE — DISCHARGE NOTE PROVIDER - CARE PROVIDER_API CALL
Weston Garcia  Internal Medicine  [address], [address]  [place] [address]  Phone: [phone]  Fax: [phone]  Established Patient  Follow Up Time: 1 week   Weston Garcia  Internal Medicine  [address], [address]  [place] [address]  Phone: [phone]  Fax: [phone]  Established Patient  Follow Up Time: 1 week    Lupe Dominique  Cardiology  [address] [place] [address]  Phone: [phone]  Fax: [phone]  Established Patient  Follow Up Time: 1 week

## 2024-11-16 RX ORDER — FUROSEMIDE 40 MG/1
20 TABLET ORAL DAILY
Refills: 0 | Status: DISCONTINUED | OUTPATIENT
Start: 2024-11-16 | End: 2024-11-19

## 2024-11-16 RX ADMIN — Medication 5 MILLIGRAM(S): at 11:17

## 2024-11-16 RX ADMIN — RIVAROXABAN 15 MILLIGRAM(S): 10 TABLET, FILM COATED ORAL at 18:32

## 2024-11-16 RX ADMIN — Medication 100 MILLIGRAM(S): at 08:20

## 2024-11-16 RX ADMIN — CARVEDILOL 3.12 MILLIGRAM(S): 25 TABLET, FILM COATED ORAL at 05:58

## 2024-11-16 RX ADMIN — Medication 25 MICROGRAM(S): at 05:58

## 2024-11-16 RX ADMIN — SACUBITRIL AND VALSARTAN 1 TABLET(S): 24; 26 TABLET, FILM COATED ORAL at 18:32

## 2024-11-16 RX ADMIN — TAMSULOSIN HYDROCHLORIDE 0.4 MILLIGRAM(S): 0.4 CAPSULE ORAL at 22:21

## 2024-11-16 RX ADMIN — SACUBITRIL AND VALSARTAN 1 TABLET(S): 24; 26 TABLET, FILM COATED ORAL at 05:58

## 2024-11-16 RX ADMIN — CARVEDILOL 3.12 MILLIGRAM(S): 25 TABLET, FILM COATED ORAL at 18:31

## 2024-11-16 NOTE — PROGRESS NOTE ADULT - ASSESSMENT
89 y/o M, AAOx3, ambulates with walker, lives alone at home, w/ w/ PMHx of Non­Small Cell Lung Cancer on oral chemotherapy,  Chronic AFib on Xarelto, HTN, BPH, and Hypothyroidism who presented to the ED from his PCP Dr. Garcia's office for 2 weeks of worsening productive cough, SOB on exertion, and fatigue,pneumonia,acute systolic HF with  mod-severe MR.  1.Acute systolic HF-lasix 20mg po qd x1.Cont dig .125mg qod,coreg 3.125mg bid and entresto 24/26.  2.Pneumonia-abx.  3.Chronic afib-xarelto,dig.coreg.  4.Hypothyroid-synthroid.  5.Lung ca-Onc f/u as outpatient.  6.PPI.

## 2024-11-16 NOTE — PROGRESS NOTE ADULT - ASSESSMENT
89 year old male with a history of lung cancer, A-fib on Xarelto,  DM, HTN, Hypothyroidism, BPH, lumbar radiculopathy was admitted for PNA.  Coughing has significantly lessened but patient developed orthopnea.  Cardiology consult was obtained.  patient was started on Furosemide,  Digoxin, Entresto.  Continue Ceftriaxone and Azithromycin  Blood glucose control.  Continue home meds .  Start physical therapy.  Case was discussed with NP.

## 2024-11-17 LAB
ANION GAP SERPL CALC-SCNC: 5 MMOL/L — SIGNIFICANT CHANGE UP (ref 5–17)
BUN SERPL-MCNC: 24 MG/DL — HIGH (ref 7–18)
CALCIUM SERPL-MCNC: 8.7 MG/DL — SIGNIFICANT CHANGE UP (ref 8.4–10.5)
CHLORIDE SERPL-SCNC: 113 MMOL/L — HIGH (ref 96–108)
CO2 SERPL-SCNC: 24 MMOL/L — SIGNIFICANT CHANGE UP (ref 22–31)
CREAT SERPL-MCNC: 1.21 MG/DL — SIGNIFICANT CHANGE UP (ref 0.5–1.3)
CULTURE RESULTS: SIGNIFICANT CHANGE UP
CULTURE RESULTS: SIGNIFICANT CHANGE UP
EGFR: 56 ML/MIN/1.73M2 — LOW
GLUCOSE SERPL-MCNC: 107 MG/DL — HIGH (ref 70–99)
MAGNESIUM SERPL-MCNC: 2 MG/DL — SIGNIFICANT CHANGE UP (ref 1.6–2.6)
PHOSPHATE SERPL-MCNC: 3.7 MG/DL — SIGNIFICANT CHANGE UP (ref 2.5–4.5)
POTASSIUM SERPL-MCNC: 4.2 MMOL/L — SIGNIFICANT CHANGE UP (ref 3.5–5.3)
POTASSIUM SERPL-SCNC: 4.2 MMOL/L — SIGNIFICANT CHANGE UP (ref 3.5–5.3)
SODIUM SERPL-SCNC: 142 MMOL/L — SIGNIFICANT CHANGE UP (ref 135–145)
SPECIMEN SOURCE: SIGNIFICANT CHANGE UP
SPECIMEN SOURCE: SIGNIFICANT CHANGE UP

## 2024-11-17 PROCEDURE — 71045 X-RAY EXAM CHEST 1 VIEW: CPT | Mod: 26

## 2024-11-17 RX ORDER — GABAPENTIN 300 MG/1
100 CAPSULE ORAL AT BEDTIME
Refills: 0 | Status: DISCONTINUED | OUTPATIENT
Start: 2024-11-17 | End: 2024-11-19

## 2024-11-17 RX ADMIN — Medication 125 MICROGRAM(S): at 11:53

## 2024-11-17 RX ADMIN — Medication 5 MILLIGRAM(S): at 11:51

## 2024-11-17 RX ADMIN — Medication 25 MICROGRAM(S): at 05:21

## 2024-11-17 RX ADMIN — SACUBITRIL AND VALSARTAN 1 TABLET(S): 24; 26 TABLET, FILM COATED ORAL at 06:53

## 2024-11-17 RX ADMIN — GABAPENTIN 100 MILLIGRAM(S): 300 CAPSULE ORAL at 22:10

## 2024-11-17 RX ADMIN — Medication 100 MILLIGRAM(S): at 07:49

## 2024-11-17 RX ADMIN — FUROSEMIDE 20 MILLIGRAM(S): 40 TABLET ORAL at 06:53

## 2024-11-17 RX ADMIN — CARVEDILOL 3.12 MILLIGRAM(S): 25 TABLET, FILM COATED ORAL at 06:53

## 2024-11-17 RX ADMIN — RIVAROXABAN 15 MILLIGRAM(S): 10 TABLET, FILM COATED ORAL at 17:34

## 2024-11-17 RX ADMIN — TAMSULOSIN HYDROCHLORIDE 0.4 MILLIGRAM(S): 0.4 CAPSULE ORAL at 22:10

## 2024-11-17 NOTE — PROGRESS NOTE ADULT - ASSESSMENT
89 y/o M, AAOx3, ambulates with walker, lives alone at home, w/ w/ PMHx of Non­Small Cell Lung Cancer on oral chemotherapy,  Chronic AFib on Xarelto, HTN, BPH, and Hypothyroidism who presented to the ED from his PCP Dr. Garcia's office for 2 weeks of worsening productive cough, SOB on exertion, and fatigue,pneumonia,acute systolic HF with  mod-severe MR.  1.Acute systolic HF-lasix 20mg po qd x1.Cont dig .125mg qod,coreg 3.125mg bid and entresto 24/26.CXR ordered.  2.Pneumonia-abx.  3.Chronic afib-xarelto,dig.coreg.  4.Hypothyroid-synthroid.  5.Lung ca-Onc f/u as outpatient.  6.Dizzy-orthostatic bp.  7.PPI. 89 y/o M, AAOx3, ambulates with walker, lives alone at home, w/ w/ PMHx of Non­Small Cell Lung Cancer on oral chemotherapy,  Chronic AFib on Xarelto, HTN, BPH, and Hypothyroidism who presented to the ED from his PCP Dr. Garcia's office for 2 weeks of worsening productive cough, SOB on exertion, and fatigue,pneumonia,acute systolic HF with  mod-severe MR.  1.Acute systolic HF-lasix 20mg po qd.Cont dig .125mg qod,coreg 3.125mg bid and dc entresto 24/26 since orthostatic +.CXR ordered.  2.Pneumonia-abx.  3.Chronic afib-xarelto,dig.coreg.  4.Hypothyroid-synthroid.  5.Lung ca-Onc f/u as outpatient.  6.Dizzy-orthostatic bp+-d/c entresto..  7.PPI.

## 2024-11-18 ENCOUNTER — TRANSCRIPTION ENCOUNTER (OUTPATIENT)
Age: 89
End: 2024-11-18

## 2024-11-18 DIAGNOSIS — Z75.8 OTHER PROBLEMS RELATED TO MEDICAL FACILITIES AND OTHER HEALTH CARE: ICD-10-CM

## 2024-11-18 DIAGNOSIS — I50.21 ACUTE SYSTOLIC (CONGESTIVE) HEART FAILURE: ICD-10-CM

## 2024-11-18 LAB
ANION GAP SERPL CALC-SCNC: 6 MMOL/L — SIGNIFICANT CHANGE UP (ref 5–17)
BUN SERPL-MCNC: 27 MG/DL — HIGH (ref 7–18)
CALCIUM SERPL-MCNC: 8.4 MG/DL — SIGNIFICANT CHANGE UP (ref 8.4–10.5)
CHLORIDE SERPL-SCNC: 112 MMOL/L — HIGH (ref 96–108)
CO2 SERPL-SCNC: 23 MMOL/L — SIGNIFICANT CHANGE UP (ref 22–31)
CREAT SERPL-MCNC: 1.28 MG/DL — SIGNIFICANT CHANGE UP (ref 0.5–1.3)
EGFR: 52 ML/MIN/1.73M2 — LOW
GLUCOSE SERPL-MCNC: 103 MG/DL — HIGH (ref 70–99)
HCT VFR BLD CALC: 37.7 % — LOW (ref 39–50)
HGB BLD-MCNC: 12.7 G/DL — LOW (ref 13–17)
MAGNESIUM SERPL-MCNC: 2.2 MG/DL — SIGNIFICANT CHANGE UP (ref 1.6–2.6)
MCHC RBC-ENTMCNC: 31.5 PG — SIGNIFICANT CHANGE UP (ref 27–34)
MCHC RBC-ENTMCNC: 33.7 G/DL — SIGNIFICANT CHANGE UP (ref 32–36)
MCV RBC AUTO: 93.5 FL — SIGNIFICANT CHANGE UP (ref 80–100)
NRBC # BLD: 0 /100 WBCS — SIGNIFICANT CHANGE UP (ref 0–0)
PHOSPHATE SERPL-MCNC: 3.6 MG/DL — SIGNIFICANT CHANGE UP (ref 2.5–4.5)
PLATELET # BLD AUTO: 132 K/UL — LOW (ref 150–400)
POTASSIUM SERPL-MCNC: 4 MMOL/L — SIGNIFICANT CHANGE UP (ref 3.5–5.3)
POTASSIUM SERPL-SCNC: 4 MMOL/L — SIGNIFICANT CHANGE UP (ref 3.5–5.3)
RBC # BLD: 4.03 M/UL — LOW (ref 4.2–5.8)
RBC # FLD: 16.6 % — HIGH (ref 10.3–14.5)
SODIUM SERPL-SCNC: 141 MMOL/L — SIGNIFICANT CHANGE UP (ref 135–145)
WBC # BLD: 4.4 K/UL — SIGNIFICANT CHANGE UP (ref 3.8–10.5)
WBC # FLD AUTO: 4.4 K/UL — SIGNIFICANT CHANGE UP (ref 3.8–10.5)

## 2024-11-18 RX ORDER — CEFPODOXIME PROXETIL 100 MG/5ML
1 GRANULE, FOR SUSPENSION ORAL
Qty: 12 | Refills: 0
Start: 2024-11-18 | End: 2024-11-23

## 2024-11-18 RX ORDER — FUROSEMIDE 40 MG/1
1 TABLET ORAL
Qty: 30 | Refills: 0
Start: 2024-11-18 | End: 2024-12-17

## 2024-11-18 RX ORDER — CARVEDILOL 25 MG/1
1 TABLET, FILM COATED ORAL
Qty: 0 | Refills: 0 | DISCHARGE
Start: 2024-11-18

## 2024-11-18 RX ORDER — DIGOXIN 250 MCG
1 TABLET ORAL
Qty: 15 | Refills: 0
Start: 2024-11-18 | End: 2024-12-17

## 2024-11-18 RX ORDER — DIGOXIN 250 MCG
1 TABLET ORAL
Qty: 0 | Refills: 0 | DISCHARGE
Start: 2024-11-18

## 2024-11-18 RX ORDER — FUROSEMIDE 40 MG/1
1 TABLET ORAL
Qty: 0 | Refills: 0 | DISCHARGE
Start: 2024-11-18

## 2024-11-18 RX ORDER — CARVEDILOL 25 MG/1
1 TABLET, FILM COATED ORAL
Qty: 58 | Refills: 0
Start: 2024-11-18 | End: 2024-12-16

## 2024-11-18 RX ORDER — LISINOPRIL 20 MG/1
1 TABLET ORAL
Qty: 30 | Refills: 0
Start: 2024-11-18 | End: 2024-12-17

## 2024-11-18 RX ADMIN — TAMSULOSIN HYDROCHLORIDE 0.4 MILLIGRAM(S): 0.4 CAPSULE ORAL at 21:26

## 2024-11-18 RX ADMIN — RIVAROXABAN 15 MILLIGRAM(S): 10 TABLET, FILM COATED ORAL at 17:39

## 2024-11-18 RX ADMIN — ACETAMINOPHEN 500MG 650 MILLIGRAM(S): 500 TABLET, COATED ORAL at 22:00

## 2024-11-18 RX ADMIN — Medication 25 MICROGRAM(S): at 05:55

## 2024-11-18 RX ADMIN — Medication 5 MILLIGRAM(S): at 13:49

## 2024-11-18 RX ADMIN — GABAPENTIN 100 MILLIGRAM(S): 300 CAPSULE ORAL at 21:25

## 2024-11-18 RX ADMIN — ACETAMINOPHEN 500MG 650 MILLIGRAM(S): 500 TABLET, COATED ORAL at 21:26

## 2024-11-18 NOTE — PROGRESS NOTE ADULT - PROBLEM SELECTOR PLAN 9
Pt is from home, ambulatory  PT recs Home PT  Still hypotensive today 84/56  Will hold d/c today & monitor b/p overnight

## 2024-11-18 NOTE — PROGRESS NOTE ADULT - PROBLEM SELECTOR PLAN 1
p/w sob, cough for 2+ weeks  CXR shows R middle lobe PNA  pt recently finished Z pack outpt.  c/w azithromycin 500mg qd + rocephin 1g qd  strep ag, legionella, procalcitonin, mycoplasma igm NEG  blood cultures NGTD  procal WNL  flu, covid panel neg-  supportive care- Mucinex, Benzonate  tylenol prn
p/w sob, cough for 2+ weeks  CXR shows R middle lobe PNA  pt recently finished Z pack outpt.  c/w azithromycin 500mg qd + rocephin 1g qd  strep ag, legionella, procalcitonin, mycoplasma igm NEG  blood cultures NGTD  procal WNL  flu, covid panel neg-  supportive care- Mucinex, Benzonate  tylenol prn
p/w sob, cough for 2+ weeks  CXR shows R middle lobe PNA  pt recently finished Z pack outpt.  S/P azithromycin 500mg qd + rocephin 1g qd  strep ag, legionella, procalcitonin, mycoplasma igm NEG  blood cultures NGTD  procal WNL  flu, covid panel neg-  supportive care- Mucinex, Benzonate  tylenol prn

## 2024-11-18 NOTE — PROGRESS NOTE ADULT - ASSESSMENT
89 y/o M, AAOx3, ambulates with walker, lives alone at home, w/ w/ PMHx of Non­Small Cell Lung Cancer on oral chemotherapy,  Chronic AFib on Xarelto, HTN, BPH, and Hypothyroidism who presented to the ED from his PCP Dr. Garcia's office for 2 weeks of worsening productive cough, SOB on exertion, and fatigue,pneumonia,acute systolic HF with  mod-severe MR.  1.Acute systolic HF-lasix 20mg po qd.Cont dig .125mg qod,coreg 3.125mg bid and off entresto 24/26 since orthostatic +.Add lisinopril 2.5mg qhs in 3 days  2.Pneumonia-abx.  3.Chronic afib-xarelto,dig.coreg.  4.Hypothyroid-synthroid.  5.Lung ca-Onc f/u as outpatient.  6.Dizzy-orthostatic bp+-off entresto..  7.PPI.

## 2024-11-18 NOTE — DISCHARGE NOTE NURSING/CASE MANAGEMENT/SOCIAL WORK - FINANCIAL ASSISTANCE
[facility] provides services at a reduced cost to those who are determined to be eligible through [facility]’s financial assistance program. Information regarding [facility]’s financial assistance program can be found by going to [email or web address] or by calling [phone].

## 2024-11-18 NOTE — DISCHARGE NOTE NURSING/CASE MANAGEMENT/SOCIAL WORK - NSDCPEFALRISK_GEN_ALL_CORE
For information on Fall & Injury Prevention, visit: [email or web address] OR  [email or web address] OR  https://www.cdc.gov/steadi/patient.html

## 2024-11-18 NOTE — PROGRESS NOTE ADULT - PROBLEM SELECTOR PLAN 2
-BNP elevated to 5 K  - pt endorses SOB worse when laying flat   -CXR noted with increased vascular congestion  -TTE pending
-BNP elevated to 5 K  - pt endorses SOB worse when laying flat   -CXR noted with increased vascular congestion  -TTE with mildly reduced EF 48%  -mod to severe regurgitation
-BNP elevated to 5 K  - pt endorses SOB worse when laying flat   -CXR noted with increased vascular congestion  -TTE with mildly reduced EF 48%, mod to severe Mitral regurgitation  -Cardio: Dr Dominique consulted  -Started Lasix, Dig, Entresto, & Coreg  -Add lisinopril 2.5mg qhs in 3 days  - D/C Entresto iso hypotension

## 2024-11-18 NOTE — PROGRESS NOTE ADULT - PROBLEM SELECTOR PLAN 3
home med xarelto  resumed  HR controlled

## 2024-11-18 NOTE — PROGRESS NOTE ADULT - PROBLEM SELECTOR PLAN 6
noted to be on Flomax and finasteride  home meds resumed

## 2024-11-18 NOTE — PROGRESS NOTE ADULT - ASSESSMENT
89 y/o M, AAOx3, ambulates with walker, lives alone at home, w/ w/ PMHx of Non­Small Cell Lung Cancer on oral chemotherapy, AFib on Xarelto, HTN, BPH, and Hypothyroidism who presented to the ED from his PCP Dr. Garcia's office for 2 weeks of worsening productive cough, SOB on exertion, and fatigue, recently completed a Zithromax course 10 days ago, Admitted to medicine for R sided PNA, started on ceftriax, and azithro. PT reccs HPT

## 2024-11-18 NOTE — PROGRESS NOTE ADULT - PROBLEM SELECTOR PLAN 7
per [facility] noted to be on metoprolol and losartan  patient provided written home med list   will monitor off BP meds in light of hypotension
per [facility] noted to be on metoprolo and losartan  patient provided written home med list   will monitor off BP meds  BP controlled
per [facility] noted to be on metoprolo and losartan  patient provided written home med list   will monitor off BP meds  BP controlled

## 2024-11-18 NOTE — PROGRESS NOTE ADULT - ASSESSMENT
89 year old male with a history of lung cancer, A-fib on Xarelto,  DM, HTN, Hypothyroidism, BPH, lumbar radiculopathy was admitted for PNA.  Coughing has significantly lessened but patient developed orthopnea.  Cardiology consult was obtained.  patient was started on Furosemide,  Digoxin, Entresto.  Continue Ceftriaxone and Azithromycin  Blood glucose control.  Continue home meds .  Patient was about to be discharged when suddenly felt very dizzy and imbalanced.  BP was low at 85/60  Fluids  Cardiology f/u  Continue physical therapy.  Case was discussed with NP.

## 2024-11-18 NOTE — DISCHARGE NOTE NURSING/CASE MANAGEMENT/SOCIAL WORK - PATIENT PORTAL LINK FT
You can access the FollowMyHealth Patient Portal offered by [facility] by registering at the following website: [email or web address]. By joining [employer]’s FollowMyHealth portal, you will also be able to view your health information using other applications (apps) compatible with our system.

## 2024-11-18 NOTE — PROGRESS NOTE ADULT - PROBLEM SELECTOR PLAN 4
pt with history of non small cell lung Ca on oral chemotherapy  Patient follows with Dr Martinez at [facility] for his cancer treatment
pt with history of non small cell lung Ca on oral chemotherapy  Patient follows with Dr Martinez at [facility] for his cancer treatment
pt with history of non small cell lung Ca on oral chemotherapy  Patient follows with Dr Martinez at [facility] for his cancer treatment

## 2024-11-18 NOTE — CHART NOTE - NSCHARTNOTEFT_GEN_A_CORE
Patient was noted with hypotension 84/56, pt denies dizziness or lightheadedness. Lasix & Carvedilol on hold today due to persistent hypotension. Patient is A&Ox3, mental status intact, verbalizes no symptoms.  I spoke with Dr. Garcia & updated him of pt's clinical status regarding persistent hypotension. In light of persistent hypotension, discharge is held today.

## 2024-11-19 VITALS — DIASTOLIC BLOOD PRESSURE: 61 MMHG | HEART RATE: 77 BPM | SYSTOLIC BLOOD PRESSURE: 102 MMHG

## 2024-11-19 LAB
ANION GAP SERPL CALC-SCNC: 6 MMOL/L — SIGNIFICANT CHANGE UP (ref 5–17)
BUN SERPL-MCNC: 31 MG/DL — HIGH (ref 7–18)
CALCIUM SERPL-MCNC: 8.6 MG/DL — SIGNIFICANT CHANGE UP (ref 8.4–10.5)
CHLORIDE SERPL-SCNC: 113 MMOL/L — HIGH (ref 96–108)
CO2 SERPL-SCNC: 23 MMOL/L — SIGNIFICANT CHANGE UP (ref 22–31)
CREAT SERPL-MCNC: 1.35 MG/DL — HIGH (ref 0.5–1.3)
EGFR: 49 ML/MIN/1.73M2 — LOW
GLUCOSE SERPL-MCNC: 95 MG/DL — SIGNIFICANT CHANGE UP (ref 70–99)
HCT VFR BLD CALC: 35.9 % — LOW (ref 39–50)
HGB BLD-MCNC: 12 G/DL — LOW (ref 13–17)
MCHC RBC-ENTMCNC: 32.4 PG — SIGNIFICANT CHANGE UP (ref 27–34)
MCHC RBC-ENTMCNC: 33.4 G/DL — SIGNIFICANT CHANGE UP (ref 32–36)
MCV RBC AUTO: 97 FL — SIGNIFICANT CHANGE UP (ref 80–100)
NRBC # BLD: 0 /100 WBCS — SIGNIFICANT CHANGE UP (ref 0–0)
PLATELET # BLD AUTO: 128 K/UL — LOW (ref 150–400)
POTASSIUM SERPL-MCNC: 4.1 MMOL/L — SIGNIFICANT CHANGE UP (ref 3.5–5.3)
POTASSIUM SERPL-SCNC: 4.1 MMOL/L — SIGNIFICANT CHANGE UP (ref 3.5–5.3)
RBC # BLD: 3.7 M/UL — LOW (ref 4.2–5.8)
RBC # FLD: 16.6 % — HIGH (ref 10.3–14.5)
SODIUM SERPL-SCNC: 142 MMOL/L — SIGNIFICANT CHANGE UP (ref 135–145)
WBC # BLD: 3.98 K/UL — SIGNIFICANT CHANGE UP (ref 3.8–10.5)
WBC # FLD AUTO: 3.98 K/UL — SIGNIFICANT CHANGE UP (ref 3.8–10.5)

## 2024-11-19 RX ORDER — MIDODRINE HYDROCHLORIDE 5 MG/1
1 TABLET ORAL
Qty: 90 | Refills: 0
Start: 2024-11-19 | End: 2024-12-18

## 2024-11-19 RX ORDER — MIDODRINE HYDROCHLORIDE 5 MG/1
2.5 TABLET ORAL THREE TIMES A DAY
Refills: 0 | Status: DISCONTINUED | OUTPATIENT
Start: 2024-11-19 | End: 2024-11-19

## 2024-11-19 RX ORDER — OSIMERTINIB 40 1/1
1 TABLET, FILM COATED ORAL
Refills: 0 | DISCHARGE

## 2024-11-19 RX ORDER — OSIMERTINIB 40 1/1
0 TABLET, FILM COATED ORAL
Refills: 0 | DISCHARGE

## 2024-11-19 RX ADMIN — Medication 125 MICROGRAM(S): at 11:02

## 2024-11-19 RX ADMIN — Medication 5 MILLIGRAM(S): at 11:05

## 2024-11-19 RX ADMIN — FUROSEMIDE 20 MILLIGRAM(S): 40 TABLET ORAL at 05:51

## 2024-11-19 RX ADMIN — CARVEDILOL 3.12 MILLIGRAM(S): 25 TABLET, FILM COATED ORAL at 05:53

## 2024-11-19 RX ADMIN — Medication 25 MICROGRAM(S): at 05:51

## 2024-11-19 RX ADMIN — MIDODRINE HYDROCHLORIDE 2.5 MILLIGRAM(S): 5 TABLET ORAL at 11:05

## 2024-11-19 NOTE — PROGRESS NOTE ADULT - ASSESSMENT
89 y/o M, AAOx3, ambulates with walker, lives alone at home, w/ w/ PMHx of Non­Small Cell Lung Cancer on oral chemotherapy,  Chronic AFib on Xarelto, HTN, BPH, and Hypothyroidism who presented to the ED from his PCP Dr. Garcia's office for 2 weeks of worsening productive cough, SOB on exertion, and fatigue,pneumonia,acute systolic HF with  mod-severe MR.  1.Acute systolic HF-lasix 20mg po qd.Cont dig .125mg qod,coreg 3.125mg bid and off entresto 24/26 since orthostatic +.Add lisinopril 2.5mg qhs in 2 days.  2.Pneumonia-abx.  3.Chronic afib-xarelto,dig.coreg.  4.Hypothyroid-synthroid.  5.Lung ca-Onc f/u as outpatient.  6.Dizzy-orthostatic bp+-off entresto..  7.Hypotension-midodrine 2.52mg tid.  8.PPI.

## 2024-11-19 NOTE — DIETITIAN INITIAL EVALUATION ADULT - PERTINENT LABORATORY DATA
11-19    142  |  113[H]  |  31[H]  ----------------------------<  95  4.1   |  23  |  1.35[H]    Ca    8.6      19 Nov 2024 06:15  Phos  3.6     11-18  Mg     2.2     11-18

## 2024-11-19 NOTE — DIETITIAN INITIAL EVALUATION ADULT - PROBLEM SELECTOR PLAN 4
pt with history of non small cell lung Ca on oral chemotherapy  Patient follows with Dr Martinez at [facility] for his cancer treatment.

## 2024-11-19 NOTE — DIETITIAN INITIAL EVALUATION ADULT - ORAL INTAKE PTA/DIET HISTORY
Spoke to pt at bedside, pt reported no new food allergies or intolerances. Pt's intake was good PTA. Reported UBW:160 lbs, no recent significant weight changes. HIE: 154 lbs -12/12/23.  Nutrition interview: No recent episodes of nausea, vomiting, diarrhea or constipation per pt. Pt stated his coughing has dramatically improved the last two days. Last BM noted on 11/19 per pt. Denies any chewing/swallowing difficulties. Intake is % per pt. Food preferences explored and forwarded to dietary.

## 2024-11-19 NOTE — PHARMACOTHERAPY INTERVENTION NOTE - COMMENTS
Provided patient counseling on pt's new medications and side effects of those medications.  Also provided pt with printed information. Briefly went over inpatient medications as well.  Answered pt's questions. 
Pt requested melatonin 5mg at bedtime for sleep.  He said that he was on this medication at home. 
Benzonatate and guaifenesin DM are both prn cough.  Recommended to adjust prn indication, or make one of the agents around the clock, or d/c one of them. 
Pt is on Tagrisso (osimertinib) 80mg po daily (for lung cancer) from home. Pt had requested that he continue to take this medication while in the hospital.  Pt has his own supply.   NP covering the pt was informed.

## 2024-11-19 NOTE — PROGRESS NOTE ADULT - ASSESSMENT
89 year old male with a history of lung cancer, A-fib on Xarelto,  DM, HTN, Hypothyroidism, BPH, lumbar radiculopathy was admitted for PNA.  Coughing has significantly lessened but patient developed orthopnea.  Cardiology consult was obtained.  patient was started on Furosemide,  Digoxin, Entresto.  Continue Ceftriaxone and Azithromycin  Blood glucose control.  Continue home meds .  Patient's condition markedly improved.  He was discharged today in stable condition.  All goals of treatment were met.  Case was discussed with NP.

## 2024-11-19 NOTE — DIETITIAN INITIAL EVALUATION ADULT - PROBLEM SELECTOR PLAN 1
p/w sob, cough for 2+ weeks  CXR shows R middle lobe PNA  pt recently finished Z pack outpt  WBC wnl    c/w azithromycin 500mg qd + rocephin 1g qd  f/u strep ag, legionella, procalcitonin, mycoplasma igm  f/u blood cultures  f/u procal  f/u flu, covid panel  supportive care- Mucinex, Benzonate  tylenol prn

## 2024-11-19 NOTE — DIETITIAN INITIAL EVALUATION ADULT - PERTINENT MEDS FT
MEDICATIONS  (STANDING):  carvedilol 3.125 milliGRAM(s) Oral every 12 hours  digoxin     Tablet 125 MICROGram(s) Oral every other day  finasteride 5 milliGRAM(s) Oral daily  furosemide    Tablet 20 milliGRAM(s) Oral daily  gabapentin 100 milliGRAM(s) Oral at bedtime  levothyroxine 25 MICROGram(s) Oral daily  midodrine. 2.5 milliGRAM(s) Oral three times a day  rivaroxaban 15 milliGRAM(s) Oral with dinner  tamsulosin 0.4 milliGRAM(s) Oral at bedtime    MEDICATIONS  (PRN):  acetaminophen     Tablet .. 650 milliGRAM(s) Oral every 6 hours PRN Temp greater or equal to 38C (100.4F), Mild Pain (1 - 3)  benzonatate 100 milliGRAM(s) Oral every 8 hours PRN Cough  guaifenesin/dextromethorphan Oral Liquid 10 milliLiter(s) Oral every 4 hours PRN Cough

## 2024-11-19 NOTE — PROGRESS NOTE ADULT - SUBJECTIVE AND OBJECTIVE BOX
ATTENDING MD PROGRESS NOTE    89 year old male with a history of Lung cancer, A-fib, DM,  HTN, HLD, lumbar radiculopathy, BPH, Hypothyroidism   was admitted with c/o cough and profound weakness.  Patient  was found to have PNA.  Today patient is feeling better, coughing is practically gone.  C/o orthopnea, leg swelling.      Vital Signs Last 24 Hrs  T(C): 36.8 (15 Nov 2024 14:12), Max: 36.8 (15 Nov 2024 14:12)  T(F): 98.2 (15 Nov 2024 14:12), Max: 98.2 (15 Nov 2024 14:12)  HR: 84 (15 Nov 2024 14:12) (84 - 93)  BP: 97/53 (15 Nov 2024 14:12) (95/55 - 123/76)  BP(mean): 84 (14 Nov 2024 20:42) (84 - 84)  RR: 16 (15 Nov 2024 14:12) (16 - 18)  SpO2: 95% (15 Nov 2024 14:12) (94% - 96%)    Parameters below as of 15 Nov 2024 14:12  Patient On (Oxygen Delivery Method): room air      T(C): 36.8 (11-15-24 @ 14:12), Max: 36.8 (11-15-24 @ 14:12)  HR: 84 (11-15-24 @ 14:12) (84 - 93)  BP: 97/53 (11-15-24 @ 14:12) (95/55 - 123/76)  RR: 16 (11-15-24 @ 14:12) (16 - 18)  SpO2: 95% (11-15-24 @ 14:12) (94% - 96%)    CONSTITUTIONAL: Well groomed, no apparent distress  EYES: PERRLA and symmetric, EOMI, No conjunctival or scleral injection, non-icteric  ENMT: Oral mucosa with moist membranes. Normal dentition; no pharyngeal injection or exudates             NECK: Supple, symmetric and without tracheal deviation   RESP: rales at right base, no use of accessory muscles; CTA b/l, no WRR  CV: RRR, +S1S2, no MRG; no JVD; no peripheral edema  GI: Soft, NT, ND, no rebound, no guarding; no palpable masses; no hepatosplenomegaly; no hernia palpated  LYMPH: No cervical LAD or tenderness; no axillary LAD or tenderness; no inguinal LAD or tenderness  MSK: Normal gait; No digital clubbing or cyanosis; examination of the (head/neck/spine/ribs/pelvis, RUE, LUE, RLE, LLE) without misalignment,            Normal ROM without pain, no spinal tenderness, normal muscle strength/tone  SKIN: No rashes or ulcers noted; no subcutaneous nodules or induration palpable  NEURO: CN II-XII intact; normal reflexes in upper and lower extremities, sensation intact in upper and lower extremities b/l to light touch   PSYCH: Appropriate insight/judgment; A+O x 3, mood and affect appropriate, recent/remote memory intact      CBC Full  -  ( 15 Nov 2024 06:13 )  WBC Count : 3.72 K/uL  RBC Count : 3.35 M/uL  Hemoglobin : 11.0 g/dL  Hematocrit : 32.5 %  Platelet Count - Automated : 114 K/uL  Mean Cell Volume : 97.0 fl  Mean Cell Hemoglobin : 32.8 pg  Mean Cell Hemoglobin Concentration : 33.8 g/dL  Auto Neutrophil # : x  Auto Lymphocyte # : x  Auto Monocyte # : x  Auto Eosinophil # : x  Auto Basophil # : x  Auto Neutrophil % : x  Auto Lymphocyte % : x  Auto Monocyte % : x  Auto Eosinophil % : x  Auto Basophil % : x      11-15    142  |  112[H]  |  25[H]  ----------------------------<  113[H]  4.0   |  26  |  1.31[H]    Ca    8.6      15 Nov 2024 06:13      MEDICATIONS  (STANDING):  azithromycin  IVPB 500 milliGRAM(s) IV Intermittent every 24 hours  azithromycin  IVPB      carvedilol 3.125 milliGRAM(s) Oral every 12 hours  cefTRIAXone   IVPB 1000 milliGRAM(s) IV Intermittent every 24 hours  cefTRIAXone   IVPB      digoxin     Tablet 125 MICROGram(s) Oral every other day  finasteride 5 milliGRAM(s) Oral daily  furosemide   Injectable 20 milliGRAM(s) IV Push once  levothyroxine 25 MICROGram(s) Oral daily  rivaroxaban 15 milliGRAM(s) Oral with dinner  sacubitril 24 mG/valsartan 26 mG 1 Tablet(s) Oral two times a day  tamsulosin 0.4 milliGRAM(s) Oral at bedtime      
ATTENDING MD PROGRESS NOTE    89 year old male with a history of Lung cancer, A-fib, DM,  HTN, HLD, lumbar radiculopathy, BPH, Hypothyroidism   was admitted with c/o cough and profound weakness.  Patient  was found to have PNA.  Today patient is feeling better, coughing is practically gone.  Patient developed orthopnea, leg swelling.  Started on Furosemide and Digoxin.  Patient is feeling better, orthopnea has markedly lessened.  Patient was seen in am, offered no complaints.    Vital Signs Last 24 Hrs  T(C): 36.9 (19 Nov 2024 12:15), Max: 36.9 (19 Nov 2024 12:15)  T(F): 98.5 (19 Nov 2024 12:15), Max: 98.5 (19 Nov 2024 12:15)  HR: 77 (19 Nov 2024 13:22) (77 - 95)  BP: 102/61 (19 Nov 2024 13:22) (90/57 - 116/73)  BP(mean): 87 (19 Nov 2024 05:00) (87 - 87)  RR: 18 (19 Nov 2024 12:15) (18 - 18)  SpO2: 96% (19 Nov 2024 12:15) (95% - 96%)    Parameters below as of 19 Nov 2024 12:15  Patient On (Oxygen Delivery Method): room air      T(C): 36.9 (11-19-24 @ 12:15), Max: 36.9 (11-19-24 @ 12:15)  HR: 77 (11-19-24 @ 13:22) (77 - 95)  BP: 102/61 (11-19-24 @ 13:22) (90/57 - 116/73)  RR: 18 (11-19-24 @ 12:15) (18 - 18)  SpO2: 96% (11-19-24 @ 12:15) (95% - 96%)    CONSTITUTIONAL: Well groomed, no apparent distress  EYES: PERRLA and symmetric, EOMI, No conjunctival or scleral injection, non-icteric  ENMT: Oral mucosa with moist membranes. Normal dentition; no pharyngeal injection or exudates             NECK: Supple, symmetric and without tracheal deviation   RESP: No respiratory distress, no use of accessory muscles; CTA b/l, no WRR  CV: RRR, +S1S2, no MRG; no JVD; no peripheral edema  GI: Soft, NT, ND, no rebound, no guarding; no palpable masses; no hepatosplenomegaly; no hernia palpated  LYMPH: No cervical LAD or tenderness; no axillary LAD or tenderness; no inguinal LAD or tenderness  MSK: Normal gait; No digital clubbing or cyanosis; examination of the (head/neck/spine/ribs/pelvis, RUE, LUE, RLE, LLE) without misalignment,            Normal ROM without pain, no spinal tenderness, normal muscle strength/tone  SKIN: No rashes or ulcers noted; no subcutaneous nodules or induration palpable  NEURO: CN II-XII intact; normal reflexes in upper and lower extremities, sensation intact in upper and lower extremities b/l to light touch   PSYCH: Appropriate insight/judgment; A+O x 3, mood and affect appropriate, recent/remote memory intact                          12.0   3.98  )-----------( 128      ( 19 Nov 2024 06:15 )             35.9       11-19    142  |  113[H]  |  31[H]  ----------------------------<  95  4.1   |  23  |  1.35[H]    Ca    8.6      19 Nov 2024 06:15  Phos  3.6     11-18  Mg     2.2     11-18      
Date of Service 11-16-24 @ 11:38    CHIEF COMPLAINT:Patient is a 89y old  Male who presents with a chief complaint of PNA.Pt appears comfortable.    	  REVIEW OF SYSTEMS:  CONSTITUTIONAL: No fever, weight loss, or fatigue  EYES: No eye pain, visual disturbances, or discharge  ENT:  No difficulty hearing, tinnitus, vertigo; No sinus or throat pain  NECK: No pain or stiffness  RESPIRATORY: No cough, wheezing, chills or hemoptysis; No Shortness of Breath  CARDIOVASCULAR: No chest pain, palpitations, passing out, dizziness, or leg swelling  GASTROINTESTINAL: No abdominal or epigastric pain. No nausea, vomiting, or hematemesis; No diarrhea or constipation. No melena or hematochezia.  GENITOURINARY: No dysuria, frequency, hematuria, or incontinence  NEUROLOGICAL: No headaches, memory loss, loss of strength, numbness, or tremors  SKIN: No itching, burning, rashes, or lesions   LYMPH Nodes: No enlarged glands  ENDOCRINE: No heat or cold intolerance; No hair loss  MUSCULOSKELETAL: No joint pain or swelling; No muscle, back, or extremity pain  PSYCHIATRIC: No depression, anxiety, mood swings, or difficulty sleeping  HEME/LYMPH: No easy bruising, or bleeding gums  ALLERGY AND IMMUNOLOGIC: No hives or eczema	      PHYSICAL EXAM:  T(C): 36.8 (11-16-24 @ 05:00), Max: 36.8 (11-15-24 @ 14:12)  HR: 82 (11-16-24 @ 05:00) (82 - 95)  BP: 119/79 (11-16-24 @ 05:00) (97/53 - 119/79)  RR: 18 (11-16-24 @ 05:00) (16 - 18)  SpO2: 99% (11-16-24 @ 05:00) (95% - 99%)  Wt(kg): --  I&O's Summary      Appearance: Normal	  HEENT:   Normal oral mucosa, PERRL, EOMI	  Lymphatic: No lymphadenopathy  Cardiovascular: Normal S1 S2, No JVD, No murmurs, No edema  Respiratory: DEc BS at bases  Psychiatry: A & O x 3, Mood & affect appropriate  Gastrointestinal:  Soft, Non-tender, + BS	  Skin: No rashes, No ecchymoses, No cyanosis	  Neurologic: Non-focal  Extremities: Normal range of motion, No clubbing, cyanosis or edema  Vascular: Peripheral pulses palpable 2+ bilaterally    MEDICATIONS  (STANDING):  carvedilol 3.125 milliGRAM(s) Oral every 12 hours  cefTRIAXone   IVPB 1000 milliGRAM(s) IV Intermittent every 24 hours  cefTRIAXone   IVPB      digoxin     Tablet 125 MICROGram(s) Oral every other day  finasteride 5 milliGRAM(s) Oral daily  furosemide    Tablet 20 milliGRAM(s) Oral daily  levothyroxine 25 MICROGram(s) Oral daily  rivaroxaban 15 milliGRAM(s) Oral with dinner  sacubitril 24 mG/valsartan 26 mG 1 Tablet(s) Oral two times a day  tamsulosin 0.4 milliGRAM(s) Oral at bedtime      LABS:	 	                          11.0   3.72  )-----------( 114      ( 15 Nov 2024 06:13 )             32.5     11-15    142  |  112[H]  |  25[H]  ----------------------------<  113[H]  4.0   |  26  |  1.31[H]    Ca    8.6      15 Nov 2024 06:13      proBNP:   Lipid Profile:   HgA1c:   TSH: Thyroid Stimulating Hormone, Serum: 6.51 uU/mL (11-14 @ 07:20)      	        
Date of Service 11-19-24 @ 12:17    CHIEF COMPLAINT:Patient is a 89y old  Male who presents with a chief complaint of PNA.Pt appears comfortable.    	  REVIEW OF SYSTEMS:  CONSTITUTIONAL: No fever, weight loss, or fatigue  EYES: No eye pain, visual disturbances, or discharge  ENT:  No difficulty hearing, tinnitus, vertigo; No sinus or throat pain  NECK: No pain or stiffness  RESPIRATORY: No cough, wheezing, chills or hemoptysis; No Shortness of Breath  CARDIOVASCULAR: No chest pain, palpitations, passing out, dizziness, or leg swelling  GASTROINTESTINAL: No abdominal or epigastric pain. No nausea, vomiting, or hematemesis; No diarrhea or constipation. No melena or hematochezia.  GENITOURINARY: No dysuria, frequency, hematuria, or incontinence  NEUROLOGICAL: No headaches, memory loss, loss of strength, numbness, or tremors  SKIN: No itching, burning, rashes, or lesions   LYMPH Nodes: No enlarged glands  ENDOCRINE: No heat or cold intolerance; No hair loss  MUSCULOSKELETAL: No joint pain or swelling; No muscle, back, or extremity pain  PSYCHIATRIC: No depression, anxiety, mood swings, or difficulty sleeping  HEME/LYMPH: No easy bruising, or bleeding gums  ALLERGY AND IMMUNOLOGIC: No hives or eczema	        PHYSICAL EXAM:  T(C): 36.4 (11-19-24 @ 05:00), Max: 36.6 (11-18-24 @ 20:14)  HR: 88 (11-19-24 @ 05:00) (82 - 94)  BP: 90/57 (11-19-24 @ 11:11) (84/56 - 116/73)  RR: 18 (11-19-24 @ 05:00) (17 - 18)  SpO2: 95% (11-19-24 @ 05:00) (95% - 96%)  Wt(kg): --  I&O's Summary      Appearance: Normal	  HEENT:   Normal oral mucosa, PERRL, EOMI	  Lymphatic: No lymphadenopathy  Cardiovascular: Normal S1 S2, No JVD, No murmurs, No edema  Respiratory: Lungs clear to auscultation	  Psychiatry: A & O x 3, Mood & affect appropriate  Gastrointestinal:  Soft, Non-tender, + BS	  Skin: No rashes, No ecchymoses, No cyanosis	  Neurologic: Non-focal  Extremities: Normal range of motion, No clubbing, cyanosis or edema  Vascular: Peripheral pulses palpable 2+ bilaterally    MEDICATIONS  (STANDING):  carvedilol 3.125 milliGRAM(s) Oral every 12 hours  digoxin     Tablet 125 MICROGram(s) Oral every other day  finasteride 5 milliGRAM(s) Oral daily  furosemide    Tablet 20 milliGRAM(s) Oral daily  gabapentin 100 milliGRAM(s) Oral at bedtime  levothyroxine 25 MICROGram(s) Oral daily  midodrine. 2.5 milliGRAM(s) Oral three times a day  rivaroxaban 15 milliGRAM(s) Oral with dinner  tamsulosin 0.4 milliGRAM(s) Oral at bedtime      	  	  LABS:	 	                      12.0   3.98  )-----------( 128      ( 19 Nov 2024 06:15 )             35.9     11-19    142  |  113[H]  |  31[H]  ----------------------------<  95  4.1   |  23  |  1.35[H]    Ca    8.6      19 Nov 2024 06:15  Phos  3.6     11-18  Mg     2.2     11-18        TSH: Thyroid Stimulating Hormone, Serum: 6.51 uU/mL (11-14 @ 07:20)      	        
ATTENDING MD PROGRESS NOTE    89 year old male with a history of Lung cancer, A-fib, DM,  HTN, HLD, lumbar radiculopathy, BPH, Hypothyroidism   was admitted with c/o cough and profound weakness.  Patient  was found to have PNA.  Today patient is feeling better, still coughing.      Vital Signs Last 24 Hrs  T(C): 36.6 (13 Nov 2024 14:30), Max: 36.8 (12 Nov 2024 20:56)  T(F): 97.9 (13 Nov 2024 14:30), Max: 98.2 (12 Nov 2024 20:56)  HR: 85 (13 Nov 2024 14:30) (85 - 95)  BP: 115/71 (13 Nov 2024 14:30) (106/66 - 117/73)  BP(mean): 88 (13 Nov 2024 05:00) (88 - 88)  RR: 16 (13 Nov 2024 14:30) (16 - 18)  SpO2: 95% (13 Nov 2024 14:30) (95% - 97%)    Parameters below as of 13 Nov 2024 14:30  Patient On (Oxygen Delivery Method): nasal cannula        T(C): 36.6 (11-13-24 @ 14:30), Max: 36.8 (11-12-24 @ 20:56)  HR: 85 (11-13-24 @ 14:30) (85 - 95)  BP: 115/71 (11-13-24 @ 14:30) (106/66 - 117/73)  RR: 16 (11-13-24 @ 14:30) (16 - 18)  SpO2: 95% (11-13-24 @ 14:30) (95% - 97%)    CONSTITUTIONAL: Well groomed, no apparent distress  EYES: PERRLA and symmetric, EOMI, No conjunctival or scleral injection, non-icteric  ENMT: Oral mucosa with moist membranes. Normal dentition; no pharyngeal injection or exudates             NECK: Supple, symmetric and without tracheal deviation   RESP: bibasilar rales, no use of accessory muscles; CTA b/l, no WRR  CV: RRR, +S1S2, no MRG; no JVD; no peripheral edema  GI: Soft, NT, ND, no rebound, no guarding; no palpable masses; no hepatosplenomegaly; no hernia palpated  LYMPH: No cervical LAD or tenderness; no axillary LAD or tenderness; no inguinal LAD or tenderness  MSK: Normal gait; No digital clubbing or cyanosis; examination of the (head/neck/spine/ribs/pelvis, RUE, LUE, RLE, LLE) without misalignment,            Normal ROM without pain, no spinal tenderness, normal muscle strength/tone  SKIN: No rashes or ulcers noted; no subcutaneous nodules or induration palpable  NEURO: CN II-XII intact; normal reflexes in upper and lower extremities, sensation intact in upper and lower extremities b/l to light touch   PSYCH: Appropriate insight/judgment; A+O x 3, mood and affect appropriate, recent/remote memory intact      MEDICATIONS  (STANDING):  azithromycin  IVPB 500 milliGRAM(s) IV Intermittent every 24 hours  azithromycin  IVPB      cefTRIAXone   IVPB      cefTRIAXone   IVPB 1000 milliGRAM(s) IV Intermittent every 24 hours  finasteride 5 milliGRAM(s) Oral daily  levothyroxine 25 MICROGram(s) Oral daily  rivaroxaban 15 milliGRAM(s) Oral with dinner  tamsulosin 0.4 milliGRAM(s) Oral at bedtime      CBC Full  -  ( 13 Nov 2024 06:35 )  WBC Count : 5.16 K/uL  RBC Count : 3.40 M/uL  Hemoglobin : 11.1 g/dL  Hematocrit : 32.5 %  Platelet Count - Automated : 119 K/uL  Mean Cell Volume : 95.6 fl  Mean Cell Hemoglobin : 32.6 pg  Mean Cell Hemoglobin Concentration : 34.2 g/dL  Auto Neutrophil # : x  Auto Lymphocyte # : x  Auto Monocyte # : x  Auto Eosinophil # : x  Auto Basophil # : x  Auto Neutrophil % : x  Auto Lymphocyte % : x  Auto Monocyte % : x  Auto Eosinophil % : x  Auto Basophil % : x      11-13    141  |  113[H]  |  24[H]  ----------------------------<  97  3.9   |  22  |  1.28    Ca    8.7      13 Nov 2024 06:35    TPro  6.5  /  Alb  3.3[L]  /  TBili  1.0  /  DBili  x   /  AST  40  /  ALT  64[H]  /  AlkPhos  111  11-12    
ATTENDING MD PROGRESS NOTE    89 year old male with a history of Lung cancer, A-fib, DM,  HTN, HLD, lumbar radiculopathy, BPH, Hypothyroidism   was admitted with c/o cough and profound weakness.  Patient  was found to have PNA.  Today patient is feeling better, coughing is practically gone, denies dyspnea.     Vital Signs Last 24 Hrs  T(C): 36.6 (14 Nov 2024 14:24), Max: 36.8 (14 Nov 2024 05:25)  T(F): 97.8 (14 Nov 2024 14:24), Max: 98.3 (14 Nov 2024 05:25)  HR: 89 (14 Nov 2024 14:24) (86 - 97)  BP: 95/55 (14 Nov 2024 14:24) (95/55 - 115/77)  BP(mean): 74 (14 Nov 2024 10:45) (74 - 74)  RR: 16 (14 Nov 2024 14:24) (16 - 16)  SpO2: 96% (14 Nov 2024 14:24) (94% - 96%)    Parameters below as of 14 Nov 2024 14:24  Patient On (Oxygen Delivery Method): room air      T(C): 36.6 (11-14-24 @ 14:24), Max: 36.8 (11-14-24 @ 05:25)  HR: 89 (11-14-24 @ 14:24) (86 - 97)  BP: 95/55 (11-14-24 @ 14:24) (95/55 - 115/77)  RR: 16 (11-14-24 @ 14:24) (16 - 16)  SpO2: 96% (11-14-24 @ 14:24) (94% - 96%)    CONSTITUTIONAL: Well groomed, no apparent distress  EYES: PERRLA and symmetric, EOMI, No conjunctival or scleral injection, non-icteric  ENMT: Oral mucosa with moist membranes. Normal dentition; no pharyngeal injection or exudates             NECK: Supple, symmetric and without tracheal deviation   RESP: No respiratory distress, no use of accessory muscles; CTA b/l, no WRR  CV: RRR, +S1S2, no MRG; no JVD; no peripheral edema  GI: Soft, NT, ND, no rebound, no guarding; no palpable masses; no hepatosplenomegaly; no hernia palpated  LYMPH: No cervical LAD or tenderness; no axillary LAD or tenderness; no inguinal LAD or tenderness  MSK: Normal gait; No digital clubbing or cyanosis; examination of the (head/neck/spine/ribs/pelvis, RUE, LUE, RLE, LLE) without misalignment,            Normal ROM without pain, no spinal tenderness, normal muscle strength/tone  SKIN: No rashes or ulcers noted; no subcutaneous nodules or induration palpable  NEURO: CN II-XII intact; normal reflexes in upper and lower extremities, sensation intact in upper and lower extremities b/l to light touch   PSYCH: Appropriate insight/judgment; A+O x 3, mood and affect appropriate, recent/remote memory intact                          11.1   4.85  )-----------( 115      ( 14 Nov 2024 07:20 )             32.1     11-14    141  |  112[H]  |  25[H]  ----------------------------<  97  4.1   |  24  |  1.30    Ca    8.9      14 Nov 2024 07:20      MEDICATIONS  (STANDING):  azithromycin  IVPB 500 milliGRAM(s) IV Intermittent every 24 hours  azithromycin  IVPB      cefTRIAXone   IVPB 1000 milliGRAM(s) IV Intermittent every 24 hours  cefTRIAXone   IVPB      finasteride 5 milliGRAM(s) Oral daily  levothyroxine 25 MICROGram(s) Oral daily  rivaroxaban 15 milliGRAM(s) Oral with dinner  tamsulosin 0.4 milliGRAM(s) Oral at bedtime        
ATTENDING MD PROGRESS NOTE    89 year old male with a history of Lung cancer, A-fib, DM,  HTN, HLD, lumbar radiculopathy, BPH, Hypothyroidism   was admitted with c/o cough and profound weakness.  Patient  was found to have PNA.  Today patient is feeling better, coughing is practically gone.  Patient developed orthopnea, leg swelling.  Started on Furosemide and Digoxin.    Vital Signs Last 24 Hrs  T(C): 36.8 (16 Nov 2024 05:00), Max: 36.8 (15 Nov 2024 14:12)  T(F): 98.3 (16 Nov 2024 05:00), Max: 98.3 (16 Nov 2024 05:00)  HR: 82 (16 Nov 2024 05:00) (82 - 95)  BP: 119/79 (16 Nov 2024 05:00) (97/53 - 119/79)  BP(mean): 92 (16 Nov 2024 05:00) (92 - 92)  RR: 18 (16 Nov 2024 05:00) (16 - 18)  SpO2: 99% (16 Nov 2024 05:00) (95% - 99%)    Parameters below as of 16 Nov 2024 05:00  Patient On (Oxygen Delivery Method): room air      T(C): 36.8 (11-16-24 @ 05:00), Max: 36.8 (11-15-24 @ 14:12)  HR: 82 (11-16-24 @ 05:00) (82 - 95)  BP: 119/79 (11-16-24 @ 05:00) (97/53 - 119/79)  RR: 18 (11-16-24 @ 05:00) (16 - 18)  SpO2: 99% (11-16-24 @ 05:00) (95% - 99%)    CONSTITUTIONAL: Well groomed, no apparent distress  EYES: PERRLA and symmetric, EOMI, No conjunctival or scleral injection, non-icteric  ENMT: Oral mucosa with moist membranes. Normal dentition; no pharyngeal injection or exudates             NECK: Supple, symmetric and without tracheal deviation   RESP: No respiratory distress, no use of accessory muscles; CTA b/l, no WRR  CV: RRR, +S1S2, no MRG; no JVD; no peripheral edema  GI: Soft, NT, ND, no rebound, no guarding; no palpable masses; no hepatosplenomegaly; no hernia palpated  LYMPH: No cervical LAD or tenderness; no axillary LAD or tenderness; no inguinal LAD or tenderness  MSK: Normal gait; No digital clubbing or cyanosis; examination of the (head/neck/spine/ribs/pelvis, RUE, LUE, RLE, LLE) without misalignment,            Normal ROM without pain, no spinal tenderness, normal muscle strength/tone  SKIN: No rashes or ulcers noted; no subcutaneous nodules or induration palpable  NEURO: CN II-XII intact; normal reflexes in upper and lower extremities, sensation intact in upper and lower extremities b/l to light touch   PSYCH: Appropriate insight/judgment; A+O x 3, mood and affect appropriate, recent/remote memory intact    CBC Full  -  ( 15 Nov 2024 06:13 )  WBC Count : 3.72 K/uL  RBC Count : 3.35 M/uL  Hemoglobin : 11.0 g/dL  Hematocrit : 32.5 %  Platelet Count - Automated : 114 K/uL  Mean Cell Volume : 97.0 fl  Mean Cell Hemoglobin : 32.8 pg  Mean Cell Hemoglobin Concentration : 33.8 g/dL  Auto Neutrophil # : x  Auto Lymphocyte # : x  Auto Monocyte # : x  Auto Eosinophil # : x  Auto Basophil # : x  Auto Neutrophil % : x  Auto Lymphocyte % : x  Auto Monocyte % : x  Auto Eosinophil % : x  Auto Basophil % : x      11-15    142  |  112[H]  |  25[H]  ----------------------------<  113[H]  4.0   |  26  |  1.31[H]    Ca    8.6      15 Nov 2024 06:13      MEDICATIONS  (STANDING):  carvedilol 3.125 milliGRAM(s) Oral every 12 hours  cefTRIAXone   IVPB 1000 milliGRAM(s) IV Intermittent every 24 hours  cefTRIAXone   IVPB      digoxin     Tablet 125 MICROGram(s) Oral every other day  finasteride 5 milliGRAM(s) Oral daily  furosemide    Tablet 20 milliGRAM(s) Oral daily  levothyroxine 25 MICROGram(s) Oral daily  rivaroxaban 15 milliGRAM(s) Oral with dinner  sacubitril 24 mG/valsartan 26 mG 1 Tablet(s) Oral two times a day  tamsulosin 0.4 milliGRAM(s) Oral at bedtime        
Date of Service 11-17-24 @ 11:35    CHIEF COMPLAINT:Patient is a 89y old  Male who presents with a chief complaint of PNA.Pt reports get dizzy when walked to bathroom.    	  REVIEW OF SYSTEMS:  CONSTITUTIONAL: No fever, weight loss, or fatigue  EYES: No eye pain, visual disturbances, or discharge  ENT:  No difficulty hearing, tinnitus, vertigo; No sinus or throat pain  NECK: No pain or stiffness  RESPIRATORY: No cough, wheezing, chills or hemoptysis; No Shortness of Breath  CARDIOVASCULAR: No chest pain, palpitations, passing out, dizziness, or leg swelling  GASTROINTESTINAL: No abdominal or epigastric pain. No nausea, vomiting, or hematemesis; No diarrhea or constipation. No melena or hematochezia.  GENITOURINARY: No dysuria, frequency, hematuria, or incontinence  NEUROLOGICAL: No headaches, memory loss, loss of strength, numbness, or tremors  SKIN: No itching, burning, rashes, or lesions   LYMPH Nodes: No enlarged glands  ENDOCRINE: No heat or cold intolerance; No hair loss  MUSCULOSKELETAL: No joint pain or swelling; No muscle, back, or extremity pain  PSYCHIATRIC: No depression, anxiety, mood swings, or difficulty sleeping  HEME/LYMPH: No easy bruising, or bleeding gums  ALLERGY AND IMMUNOLOGIC: No hives or eczema	      PHYSICAL EXAM:  T(C): 36.5 (11-17-24 @ 05:33), Max: 37 (11-16-24 @ 13:18)  HR: 81 (11-17-24 @ 05:33) (78 - 84)  BP: 114/71 (11-17-24 @ 05:33) (97/63 - 114/71)  RR: 18 (11-17-24 @ 05:33) (18 - 18)  SpO2: 95% (11-17-24 @ 05:33) (95% - 98%)  Wt(kg): --  I&O's Summary    16 Nov 2024 07:01  -  17 Nov 2024 07:00  --------------------------------------------------------  IN: 0 mL / OUT: 1200 mL / NET: -1200 mL        Appearance: Normal	  HEENT:   Normal oral mucosa, PERRL, EOMI	  Lymphatic: No lymphadenopathy  Cardiovascular: Normal S1 S2, No JVD, No murmurs, No edema  Respiratory: Dec BS at bases	  Psychiatry: A & O x 3, Mood & affect appropriate  Gastrointestinal:  Soft, Non-tender, + BS	  Skin: No rashes, No ecchymoses, No cyanosis	  Neurologic: Non-focal  Extremities: Normal range of motion, No clubbing, cyanosis or edema  Vascular: Peripheral pulses palpable 2+ bilaterally    MEDICATIONS  (STANDING):  carvedilol 3.125 milliGRAM(s) Oral every 12 hours  digoxin     Tablet 125 MICROGram(s) Oral every other day  finasteride 5 milliGRAM(s) Oral daily  furosemide    Tablet 20 milliGRAM(s) Oral daily  levothyroxine 25 MICROGram(s) Oral daily  rivaroxaban 15 milliGRAM(s) Oral with dinner  sacubitril 24 mG/valsartan 26 mG 1 Tablet(s) Oral two times a day  tamsulosin 0.4 milliGRAM(s) Oral at bedtime      TELEMETRY: 	    ECG:  	  RADIOLOGY:  OTHER: 	  	  LABS:	 	    CARDIAC MARKERS:            11-17    142  |  113[H]  |  24[H]  ----------------------------<  107[H]  4.2   |  24  |  1.21    Ca    8.7      17 Nov 2024 06:18  Phos  3.7     11-17  Mg     2.0     11-17      proBNP:   Lipid Profile:   HgA1c:   TSH: Thyroid Stimulating Hormone, Serum: 6.51 uU/mL (11-14 @ 07:20)      	        
NP Note discussed with  Primary Attending    Patient is a 89y old  Male who presents with a chief complaint of PNA (12 Nov 2024 03:25)      INTERVAL HPI/OVERNIGHT EVENTS: no acute events overnight     MEDICATIONS  (STANDING):  azithromycin  IVPB      azithromycin  IVPB 500 milliGRAM(s) IV Intermittent every 24 hours  cefTRIAXone   IVPB 1000 milliGRAM(s) IV Intermittent every 24 hours  cefTRIAXone   IVPB        MEDICATIONS  (PRN):  acetaminophen     Tablet .. 650 milliGRAM(s) Oral every 6 hours PRN Temp greater or equal to 38C (100.4F), Mild Pain (1 - 3)  benzonatate 100 milliGRAM(s) Oral every 8 hours PRN Cough  guaifenesin/dextromethorphan Oral Liquid 10 milliLiter(s) Oral every 4 hours PRN Cough      __________________________________________________  REVIEW OF SYSTEMS:    CONSTITUTIONAL: No fever,   EYES: no acute visual disturbances  NECK: No pain or stiffness  RESPIRATORY: No cough; No shortness of breath  CARDIOVASCULAR: No chest pain, no palpitations  GASTROINTESTINAL: No pain. No nausea or vomiting; No diarrhea   NEUROLOGICAL: No headache or numbness, no tremors  MUSCULOSKELETAL: No joint pain, no muscle pain  GENITOURINARY: no dysuria, no frequency, no hesitancy  PSYCHIATRY: no depression , no anxiety  ALL OTHER  ROS negative        Vital Signs Last 24 Hrs  T(C): 36.7 (13 Nov 2024 05:00), Max: 36.8 (12 Nov 2024 20:56)  T(F): 98.1 (13 Nov 2024 05:00), Max: 98.2 (12 Nov 2024 20:56)  HR: 95 (13 Nov 2024 05:00) (85 - 95)  BP: 117/73 (13 Nov 2024 05:00) (95/95 - 117/73)  BP(mean): 88 (13 Nov 2024 05:00) (88 - 88)  RR: 18 (13 Nov 2024 05:00) (18 - 18)  SpO2: 97% (13 Nov 2024 05:00) (95% - 97%)    Parameters below as of 13 Nov 2024 05:00  Patient On (Oxygen Delivery Method): room air        ________________________________________________  PHYSICAL EXAM:  GENERAL: NAD  HEENT: Normocephalic  NECK : supple  CHEST/LUNG: Clear to auscultation bilaterally  HEART: S1 S2  regular  ABDOMEN: Soft, Nontender, Nondistended; Bowel sounds present  EXTREMITIES: no edema  SKIN: warm and dry; no rash  NERVOUS SYSTEM:  Awake and alert; Oriented  to place, person and time    _________________________________________________  LABS:                        11.1   5.16  )-----------( 119      ( 13 Nov 2024 06:35 )             32.5     11-13    141  |  113[H]  |  24[H]  ----------------------------<  97  3.9   |  22  |  1.28    Ca    8.7      13 Nov 2024 06:35    TPro  6.5  /  Alb  3.3[L]  /  TBili  1.0  /  DBili  x   /  AST  40  /  ALT  64[H]  /  AlkPhos  111  11-12    PT/INR - ( 12 Nov 2024 05:05 )   PT: 26.7 sec;   INR: 2.32 ratio         PTT - ( 12 Nov 2024 05:05 )  PTT:40.8 sec  Urinalysis Basic - ( 13 Nov 2024 06:35 )    Color: x / Appearance: x / SG: x / pH: x  Gluc: 97 mg/dL / Ketone: x  / Bili: x / Urobili: x   Blood: x / Protein: x / Nitrite: x   Leuk Esterase: x / RBC: x / WBC x   Sq Epi: x / Non Sq Epi: x / Bacteria: x      CAPILLARY BLOOD GLUCOSE            RADIOLOGY & ADDITIONAL TESTS:  < from: Xray Chest 1 View-PORTABLE IMMEDIATE (11.12.24 @ 01:27) >  FINDINGS: The heart size cannot be adequately assessed on this single   view. There are small bilateral pleural effusions and/or bibasilar   atelectasis, not seen previously. An underlying basilar pneumonia cannot   be entirely excluded. There is a patchy airspace opacity in the right   upper lobe, not seen previously. The hilar and mediastinal structures   appear unremarkable. The osseous structures are intact.    IMPRESSION: Small bilateral pleural effusions and/or bibasilar   atelectasis, not seenpreviously. A basilar pneumonia cannot be excluded.   Patchy airspace opacity in the right upper lobe, not seen previously,   likely pneumonia.    --- End of Report ---    < end of copied text >      Imaging Personally Reviewed:  YES    Consultant(s) Notes Reviewed:   YES    Plan of care was discussed with patient and /or primary care giver; all questions and concerns were addressed and care was aligned with patient's wishes.    
ATTENDING MD PROGRESS NOTE    89 year old male with a history of Lung cancer, A-fib, DM,  HTN, HLD, lumbar radiculopathy, BPH, Hypothyroidism   was admitted with c/o cough and profound weakness.  Patient  was found to have PNA.  Today patient is feeling better, coughing is practically gone.  Patient developed orthopnea, leg swelling.  Started on Furosemide and Digoxin.  Patient is feeling better, orthopnea has markedly lessened.      Vital Signs Last 24 Hrs  T(C): 36.5 (17 Nov 2024 05:33), Max: 37 (16 Nov 2024 13:18)  T(F): 97.7 (17 Nov 2024 05:33), Max: 98.6 (16 Nov 2024 13:18)  HR: 81 (17 Nov 2024 05:33) (78 - 84)  BP: 114/71 (17 Nov 2024 05:33) (97/63 - 114/71)  BP(mean): 80 (17 Nov 2024 05:00) (80 - 80)  RR: 18 (17 Nov 2024 05:33) (18 - 18)  SpO2: 95% (17 Nov 2024 05:33) (95% - 98%)    Parameters below as of 17 Nov 2024 05:33  Patient On (Oxygen Delivery Method): room air        T(C): 36.5 (11-17-24 @ 05:33), Max: 37 (11-16-24 @ 13:18)  HR: 81 (11-17-24 @ 05:33) (78 - 84)  BP: 114/71 (11-17-24 @ 05:33) (97/63 - 114/71)  RR: 18 (11-17-24 @ 05:33) (18 - 18)  SpO2: 95% (11-17-24 @ 05:33) (95% - 98%)    CONSTITUTIONAL: Well groomed, no apparent distress  EYES: PERRLA and symmetric, EOMI, No conjunctival or scleral injection, non-icteric  ENMT: Oral mucosa with moist membranes. Normal dentition; no pharyngeal injection or exudates             NECK: Supple, symmetric and without tracheal deviation   RESP: No respiratory distress, no use of accessory muscles; CTA b/l, no WRR  CV: RRR, +S1S2, no MRG; no JVD; no peripheral edema  GI: Soft, NT, ND, no rebound, no guarding; no palpable masses; no hepatosplenomegaly; no hernia palpated  LYMPH: No cervical LAD or tenderness; no axillary LAD or tenderness; no inguinal LAD or tenderness  MSK: Normal gait; No digital clubbing or cyanosis; examination of the (head/neck/spine/ribs/pelvis, RUE, LUE, RLE, LLE) without misalignment,            Normal ROM without pain, no spinal tenderness, normal muscle strength/tone  SKIN: No rashes or ulcers noted; no subcutaneous nodules or induration palpable  NEURO: CN II-XII intact; normal reflexes in upper and lower extremities, sensation intact in upper and lower extremities b/l to light touch   PSYCH: Appropriate insight/judgment; A+O x 3, mood and affect appropriate, recent/remote memory intact      11-17    142  |  113[H]  |  24[H]  ----------------------------<  107[H]  4.2   |  24  |  1.21    Ca    8.7      17 Nov 2024 06:18  Phos  3.7     11-17  Mg     2.0     11-17      MEDICATIONS  (STANDING):  carvedilol 3.125 milliGRAM(s) Oral every 12 hours  digoxin     Tablet 125 MICROGram(s) Oral every other day  finasteride 5 milliGRAM(s) Oral daily  furosemide    Tablet 20 milliGRAM(s) Oral daily  levothyroxine 25 MICROGram(s) Oral daily  rivaroxaban 15 milliGRAM(s) Oral with dinner  sacubitril 24 mG/valsartan 26 mG 1 Tablet(s) Oral two times a day  tamsulosin 0.4 milliGRAM(s) Oral at bedtime    MEDICATIONS  (PRN):  acetaminophen     Tablet .. 650 milliGRAM(s) Oral every 6 hours PRN Temp greater or equal to 38C (100.4F), Mild Pain (1 - 3)  benzonatate 100 milliGRAM(s) Oral every 8 hours PRN Cough  guaifenesin/dextromethorphan Oral Liquid 10 milliLiter(s) Oral every 4 hours PRN Cough      
ATTENDING MD PROGRESS NOTE    89 year old male with a history of Lung cancer, A-fib, DM,  HTN, HLD, lumbar radiculopathy, BPH, Hypothyroidism   was admitted with c/o cough and profound weakness.  Patient  was found to have PNA.  Today patient is feeling better, coughing is practically gone.  Patient developed orthopnea, leg swelling.  Started on Furosemide and Digoxin.  Patient is feeling better, orthopnea has markedly lessened.  Patient was in good condition this morning and was scheduled    to be discharged when he suddenly felt very dizzy and imbalanced.   Discharge was canceled.    Vital Signs Last 24 Hrs  T(C): 36.3 (18 Nov 2024 13:40), Max: 36.3 (17 Nov 2024 20:45)  T(F): 97.3 (18 Nov 2024 13:40), Max: 97.3 (17 Nov 2024 20:45)  HR: 84 (18 Nov 2024 13:40) (83 - 86)  BP: 84/56 (18 Nov 2024 13:40) (84/56 - 98/61)  BP(mean): 67 (18 Nov 2024 05:48) (67 - 74)  RR: 17 (18 Nov 2024 13:40) (17 - 18)  SpO2: 96% (18 Nov 2024 13:40) (96% - 96%)    Parameters below as of 18 Nov 2024 13:40  Patient On (Oxygen Delivery Method): room air      T(C): 36.3 (11-18-24 @ 13:40), Max: 36.3 (11-17-24 @ 20:45)  HR: 84 (11-18-24 @ 13:40) (83 - 86)  BP: 84/56 (11-18-24 @ 13:40) (84/56 - 98/61)  RR: 17 (11-18-24 @ 13:40) (17 - 18)  SpO2: 96% (11-18-24 @ 13:40) (96% - 96%)    CONSTITUTIONAL: Well groomed, no apparent distress  EYES: PERRLA and symmetric, EOMI, No conjunctival or scleral injection, non-icteric  ENMT: Oral mucosa with moist membranes. Normal dentition; no pharyngeal injection or exudates             NECK: Supple, symmetric and without tracheal deviation   RESP: No respiratory distress, no use of accessory muscles; CTA b/l, no WRR  CV: RRR, +S1S2, no MRG; no JVD; no peripheral edema  GI: Soft, NT, ND, no rebound, no guarding; no palpable masses; no hepatosplenomegaly; no hernia palpated  LYMPH: No cervical LAD or tenderness; no axillary LAD or tenderness; no inguinal LAD or tenderness  MSK: Normal gait; No digital clubbing or cyanosis; examination of the (head/neck/spine/ribs/pelvis, RUE, LUE, RLE, LLE) without misalignment,            Normal ROM without pain, no spinal tenderness, normal muscle strength/tone  SKIN: No rashes or ulcers noted; no subcutaneous nodules or induration palpable  NEURO: CN II-XII intact; normal reflexes in upper and lower extremities, sensation intact in upper and lower extremities b/l to light touch   PSYCH: Appropriate insight/judgment; A+O x 3, mood and affect appropriate, recent/remote memory intact                          12.7   4.40  )-----------( 132      ( 18 Nov 2024 06:25 )             37.7       11-18    141  |  112[H]  |  27[H]  ----------------------------<  103[H]  4.0   |  23  |  1.28    Ca    8.4      18 Nov 2024 06:25  Phos  3.6     11-18  Mg     2.2     11-18      MEDICATIONS  (STANDING):  carvedilol 3.125 milliGRAM(s) Oral every 12 hours  digoxin     Tablet 125 MICROGram(s) Oral every other day  finasteride 5 milliGRAM(s) Oral daily  furosemide    Tablet 20 milliGRAM(s) Oral daily  gabapentin 100 milliGRAM(s) Oral at bedtime  levothyroxine 25 MICROGram(s) Oral daily  rivaroxaban 15 milliGRAM(s) Oral with dinner  tamsulosin 0.4 milliGRAM(s) Oral at bedtime        
Date of Service 11-18-24 @ 11:47    CHIEF COMPLAINT:Patient is a 89y old  Male who presents with a chief complaint of PNA.Pt appears comfortable.    	  REVIEW OF SYSTEMS:  CONSTITUTIONAL: No fever, weight loss, or fatigue  EYES: No eye pain, visual disturbances, or discharge  ENT:  No difficulty hearing, tinnitus, vertigo; No sinus or throat pain  NECK: No pain or stiffness  RESPIRATORY: No cough, wheezing, chills or hemoptysis; No Shortness of Breath  CARDIOVASCULAR: No chest pain, palpitations, passing out, dizziness, or leg swelling  GASTROINTESTINAL: No abdominal or epigastric pain. No nausea, vomiting, or hematemesis; No diarrhea or constipation. No melena or hematochezia.  GENITOURINARY: No dysuria, frequency, hematuria, or incontinence  NEUROLOGICAL: No headaches, memory loss, loss of strength, numbness, or tremors  SKIN: No itching, burning, rashes, or lesions   LYMPH Nodes: No enlarged glands  ENDOCRINE: No heat or cold intolerance; No hair loss  MUSCULOSKELETAL: No joint pain or swelling; No muscle, back, or extremity pain  PSYCHIATRIC: No depression, anxiety, mood swings, or difficulty sleeping  HEME/LYMPH: No easy bruising, or bleeding gums  ALLERGY AND IMMUNOLOGIC: No hives or eczema	      PHYSICAL EXAM:  T(C): 36.2 (11-18-24 @ 05:48), Max: 36.6 (11-17-24 @ 12:16)  HR: 83 (11-18-24 @ 05:48) (79 - 86)  BP: 92/54 (11-18-24 @ 05:48) (83/54 - 98/61)  RR: 17 (11-18-24 @ 05:48) (16 - 19)  SpO2: 96% (11-18-24 @ 05:48) (95% - 96%)  Wt(kg): --  I&O's Summary      Appearance: Normal	  HEENT:   Normal oral mucosa, PERRL, EOMI	  Lymphatic: No lymphadenopathy  Cardiovascular: Normal S1 S2, No JVD, No murmurs, No edema  Respiratory: Lungs clear to auscultation	  Psychiatry: A & O x 3, Mood & affect appropriate  Gastrointestinal:  Soft, Non-tender, + BS	  Skin: No rashes, No ecchymoses, No cyanosis	  Neurologic: Non-focal  Extremities: Normal range of motion, No clubbing, cyanosis or edema  Vascular: Peripheral pulses palpable 2+ bilaterally    MEDICATIONS  (STANDING):  carvedilol 3.125 milliGRAM(s) Oral every 12 hours  digoxin     Tablet 125 MICROGram(s) Oral every other day  finasteride 5 milliGRAM(s) Oral daily  furosemide    Tablet 20 milliGRAM(s) Oral daily  gabapentin 100 milliGRAM(s) Oral at bedtime  levothyroxine 25 MICROGram(s) Oral daily  rivaroxaban 15 milliGRAM(s) Oral with dinner  tamsulosin 0.4 milliGRAM(s) Oral at bedtime      	  	  LABS:	 	    CARDIAC MARKERS:                                12.7   4.40  )-----------( 132      ( 18 Nov 2024 06:25 )             37.7     11-18    141  |  112[H]  |  27[H]  ----------------------------<  103[H]  4.0   |  23  |  1.28    Ca    8.4      18 Nov 2024 06:25  Phos  3.6     11-18  Mg     2.2     11-18        TSH: Thyroid Stimulating Hormone, Serum: 6.51 uU/mL (11-14 @ 07:20)    < from: Xray Chest 1 View- PORTABLE-Urgent (Xray Chest 1 View- PORTABLE-Urgent .) (11.17.24 @ 12:17) >  ACC: [id number] EXAM:  XR CHEST PORTABLE URGENT 1V   ORDERED BY: HORACIO WOO     PROCEDURE DATE:  11/17/2024          INTERPRETATION:  Exam:XR CHEST URGENT    clinical history:chf    Improved aeration since November 12. Residual bilateral pleural   effusions. No pneumothorax.    IMPRESSION: Improved aeration    --- End of Report ---            MITRA GARCIA MD; Attending Radiologist  This document has been electronically signed. Nov 17 2024  6:25PM    < end of copied text >    	        
NP Note discussed with  primary attending    Patient is a 89y old  Male who presents with a chief complaint of PNA (18 Nov 2024 17:00)      INTERVAL HPI/OVERNIGHT EVENTS: no new complaints    MEDICATIONS  (STANDING):  carvedilol 3.125 milliGRAM(s) Oral every 12 hours  digoxin     Tablet 125 MICROGram(s) Oral every other day  finasteride 5 milliGRAM(s) Oral daily  furosemide    Tablet 20 milliGRAM(s) Oral daily  gabapentin 100 milliGRAM(s) Oral at bedtime  levothyroxine 25 MICROGram(s) Oral daily  rivaroxaban 15 milliGRAM(s) Oral with dinner  tamsulosin 0.4 milliGRAM(s) Oral at bedtime    MEDICATIONS  (PRN):  acetaminophen     Tablet .. 650 milliGRAM(s) Oral every 6 hours PRN Temp greater or equal to 38C (100.4F), Mild Pain (1 - 3)  benzonatate 100 milliGRAM(s) Oral every 8 hours PRN Cough  guaifenesin/dextromethorphan Oral Liquid 10 milliLiter(s) Oral every 4 hours PRN Cough      __________________________________________________  REVIEW OF SYSTEMS:    CONSTITUTIONAL: No fever,   EYES: no acute visual disturbances  NECK: No pain or stiffness  RESPIRATORY: No cough; No shortness of breath  CARDIOVASCULAR: No chest pain, no palpitations  GASTROINTESTINAL: No pain. No nausea or vomiting; No diarrhea   NEUROLOGICAL: No headache or numbness, no tremors  MUSCULOSKELETAL: No joint pain, no muscle pain  GENITOURINARY: no dysuria, no frequency, no hesitancy  PSYCHIATRY: no depression , no anxiety  ALL OTHER  ROS negative        Vital Signs Last 24 Hrs  T(C): 36.3 (18 Nov 2024 13:40), Max: 36.3 (17 Nov 2024 20:45)  T(F): 97.3 (18 Nov 2024 13:40), Max: 97.3 (17 Nov 2024 20:45)  HR: 84 (18 Nov 2024 13:40) (83 - 86)  BP: 84/56 (18 Nov 2024 13:40) (84/56 - 98/61)  BP(mean): 67 (18 Nov 2024 05:48) (67 - 74)  RR: 17 (18 Nov 2024 13:40) (17 - 18)  SpO2: 96% (18 Nov 2024 13:40) (96% - 96%)    Parameters below as of 18 Nov 2024 13:40  Patient On (Oxygen Delivery Method): room air        ________________________________________________  PHYSICAL EXAM:  GENERAL: NAD  HEENT: Normocephalic;  conjunctivae and sclerae clear; moist mucous membranes;   NECK : supple  CHEST/LUNG: Clear to ausculitation bilaterally with good air entry   HEART: S1 S2  regular; no murmurs, gallops or rubs  ABDOMEN: Soft, Nontender, Nondistended; Bowel sounds present  EXTREMITIES: no cyanosis; no edema; no calf tenderness  SKIN: warm and dry; no rash  NERVOUS SYSTEM:  Awake and alert; Oriented  to place, person and time ; no new deficits    _________________________________________________  LABS:                        12.7   4.40  )-----------( 132      ( 18 Nov 2024 06:25 )             37.7     11-18    141  |  112[H]  |  27[H]  ----------------------------<  103[H]  4.0   |  23  |  1.28    Ca    8.4      18 Nov 2024 06:25  Phos  3.6     11-18  Mg     2.2     11-18        Urinalysis Basic - ( 18 Nov 2024 06:25 )    Color: x / Appearance: x / SG: x / pH: x  Gluc: 103 mg/dL / Ketone: x  / Bili: x / Urobili: x   Blood: x / Protein: x / Nitrite: x   Leuk Esterase: x / RBC: x / WBC x   Sq Epi: x / Non Sq Epi: x / Bacteria: x      CAPILLARY BLOOD GLUCOSE            RADIOLOGY & ADDITIONAL TESTS:  < from: Xray Chest 1 View- PORTABLE-Urgent (Xray Chest 1 View- PORTABLE-Urgent .) (11.17.24 @ 12:17) >  Improved aeration since November 12. Residual bilateral pleural   effusions. No pneumothorax.    IMPRESSION: Improved aeration    --- End of Report ---      < end of copied text >    Imaging Personally Reviewed:  YES    Consultant(s) Notes Reviewed:   YES    Care Discussed with Consultants :     Plan of care was discussed with patient and /or primary care giver; all questions and concerns were addressed and care was aligned with patient's wishes.    
NP Note discussed with  Primary Attending    Patient is a 89y old  Male who presents with a chief complaint of PNA (13 Nov 2024 16:19)      INTERVAL HPI/OVERNIGHT EVENTS: no acute events overnight    MEDICATIONS  (STANDING):  azithromycin  IVPB      azithromycin  IVPB 500 milliGRAM(s) IV Intermittent every 24 hours  cefTRIAXone   IVPB      cefTRIAXone   IVPB 1000 milliGRAM(s) IV Intermittent every 24 hours  finasteride 5 milliGRAM(s) Oral daily  levothyroxine 25 MICROGram(s) Oral daily  rivaroxaban 15 milliGRAM(s) Oral with dinner  tamsulosin 0.4 milliGRAM(s) Oral at bedtime    MEDICATIONS  (PRN):  acetaminophen     Tablet .. 650 milliGRAM(s) Oral every 6 hours PRN Temp greater or equal to 38C (100.4F), Mild Pain (1 - 3)  benzonatate 100 milliGRAM(s) Oral every 8 hours PRN Cough  guaifenesin/dextromethorphan Oral Liquid 10 milliLiter(s) Oral every 4 hours PRN Cough      __________________________________________________  REVIEW OF SYSTEMS:    CONSTITUTIONAL: No fever,   EYES: no acute visual disturbances  NECK: No pain or stiffness  RESPIRATORY: No cough; No shortness of breath  CARDIOVASCULAR: No chest pain, no palpitations  GASTROINTESTINAL: No pain. No nausea or vomiting; No diarrhea   NEUROLOGICAL: No headache or numbness, no tremors  MUSCULOSKELETAL: No joint pain, no muscle pain  GENITOURINARY: no dysuria, no frequency, no hesitancy  PSYCHIATRY: no depression , no anxiety  ALL OTHER  ROS negative        Vital Signs Last 24 Hrs  T(C): 36.8 (14 Nov 2024 05:25), Max: 36.8 (14 Nov 2024 05:25)  T(F): 98.3 (14 Nov 2024 05:25), Max: 98.3 (14 Nov 2024 05:25)  HR: 97 (14 Nov 2024 10:45) (85 - 97)  BP: 100/61 (14 Nov 2024 10:45) (100/61 - 115/77)  BP(mean): 74 (14 Nov 2024 10:45) (74 - 74)  RR: 16 (14 Nov 2024 05:25) (16 - 16)  SpO2: 95% (14 Nov 2024 10:45) (94% - 96%)    Parameters below as of 14 Nov 2024 10:45  Patient On (Oxygen Delivery Method): room air        ________________________________________________  PHYSICAL EXAM:  GENERAL: NAD  HEENT: Normocephalic  NECK : supple  CHEST/LUNG: Clear to auscultation bilaterally   HEART: S1 S2  regular  ABDOMEN: Soft, Nontender, Nondistended; Bowel sounds present  EXTREMITIES: no edema  SKIN: warm and dry; no rash  NERVOUS SYSTEM:  Awake and alert; Oriented  to place, person and time    _________________________________________________  LABS:                        11.1   4.85  )-----------( 115      ( 14 Nov 2024 07:20 )             32.1     11-14    141  |  112[H]  |  25[H]  ----------------------------<  97  4.1   |  24  |  1.30    Ca    8.9      14 Nov 2024 07:20        Urinalysis Basic - ( 14 Nov 2024 07:20 )    Color: x / Appearance: x / SG: x / pH: x  Gluc: 97 mg/dL / Ketone: x  / Bili: x / Urobili: x   Blood: x / Protein: x / Nitrite: x   Leuk Esterase: x / RBC: x / WBC x   Sq Epi: x / Non Sq Epi: x / Bacteria: x      CAPILLARY BLOOD GLUCOSE      POCT Blood Glucose.: 83 mg/dL (14 Nov 2024 07:31)        RADIOLOGY & ADDITIONAL TESTS:    < from: Xray Chest 1 View-PORTABLE IMMEDIATE (11.12.24 @ 01:27) >  FINDINGS: The heart size cannot be adequately assessed on this single   view. There are small bilateral pleural effusions and/or bibasilar   atelectasis, not seen previously. An underlying basilar pneumonia cannot   be entirely excluded. There is a patchy airspace opacity in the right   upper lobe, not seen previously. The hilar and mediastinal structures   appear unremarkable. The osseous structures are intact.    IMPRESSION: Small bilateral pleural effusions and/or bibasilar   atelectasis, not seenpreviously. A basilar pneumonia cannot be excluded.   Patchy airspace opacity in the right upper lobe, not seen previously,   likely pneumonia.    --- End of Report ---    < end of copied text >    Imaging Personally Reviewed:  YES    Consultant(s) Notes Reviewed:   YES    Plan of care was discussed with patient and /or primary care giver; all questions and concerns were addressed and care was aligned with patient's wishes.

## 2024-11-19 NOTE — DIETITIAN INITIAL EVALUATION ADULT - PROBLEM SELECTOR PLAN 2
-BNP elevated to 5 K  - pt endorses SOB worse when laying flat   -CXR noted with increased vascular congestion    -consider TTE

## 2024-11-19 NOTE — PROGRESS NOTE ADULT - PROVIDER SPECIALTY LIST ADULT
Cardiology
Internal Medicine
Internal Medicine
Cardiology
Cardiology
Internal Medicine
Internal Medicine
Cardiology
Internal Medicine

## 2024-11-21 RX ORDER — LISINOPRIL 20 MG/1
1 TABLET ORAL
Qty: 30 | Refills: 0
Start: 2024-11-21 | End: 2024-12-20

## 2024-11-26 ENCOUNTER — OUTPATIENT (OUTPATIENT)
Dept: OUTPATIENT SERVICES | Facility: HOSPITAL | Age: 89
LOS: 1 days | Discharge: HOME | End: 2024-11-26

## 2024-11-26 DIAGNOSIS — Z98.890 OTHER SPECIFIED POSTPROCEDURAL STATES: Chronic | ICD-10-CM

## 2024-11-26 DIAGNOSIS — Z98.42 CATARACT EXTRACTION STATUS, LEFT EYE: Chronic | ICD-10-CM

## 2024-11-26 PROCEDURE — 86901 BLOOD TYPING SEROLOGIC RH(D): CPT

## 2024-11-26 PROCEDURE — 36415 COLL VENOUS BLD VENIPUNCTURE: CPT

## 2024-11-26 PROCEDURE — 86738 MYCOPLASMA ANTIBODY: CPT

## 2024-11-26 PROCEDURE — 97530 THERAPEUTIC ACTIVITIES: CPT

## 2024-11-26 PROCEDURE — 87449 NOS EACH ORGANISM AG IA: CPT

## 2024-11-26 PROCEDURE — 83605 ASSAY OF LACTIC ACID: CPT

## 2024-11-26 PROCEDURE — 71045 X-RAY EXAM CHEST 1 VIEW: CPT

## 2024-11-26 PROCEDURE — 84480 ASSAY TRIIODOTHYRONINE (T3): CPT

## 2024-11-26 PROCEDURE — 83880 ASSAY OF NATRIURETIC PEPTIDE: CPT

## 2024-11-26 PROCEDURE — 80053 COMPREHEN METABOLIC PANEL: CPT

## 2024-11-26 PROCEDURE — 84484 ASSAY OF TROPONIN QUANT: CPT

## 2024-11-26 PROCEDURE — 84145 PROCALCITONIN (PCT): CPT

## 2024-11-26 PROCEDURE — 83735 ASSAY OF MAGNESIUM: CPT

## 2024-11-26 PROCEDURE — 87040 BLOOD CULTURE FOR BACTERIA: CPT

## 2024-11-26 PROCEDURE — 84436 ASSAY OF TOTAL THYROXINE: CPT

## 2024-11-26 PROCEDURE — 85025 COMPLETE CBC W/AUTO DIFF WBC: CPT

## 2024-11-26 PROCEDURE — 87899 AGENT NOS ASSAY W/OPTIC: CPT

## 2024-11-26 PROCEDURE — 85610 PROTHROMBIN TIME: CPT

## 2024-11-26 PROCEDURE — 80048 BASIC METABOLIC PNL TOTAL CA: CPT

## 2024-11-26 PROCEDURE — 0241U: CPT

## 2024-11-26 PROCEDURE — 86850 RBC ANTIBODY SCREEN: CPT

## 2024-11-26 PROCEDURE — 84443 ASSAY THYROID STIM HORMONE: CPT

## 2024-11-26 PROCEDURE — 93005 ELECTROCARDIOGRAM TRACING: CPT

## 2024-11-26 PROCEDURE — 84100 ASSAY OF PHOSPHORUS: CPT

## 2024-11-26 PROCEDURE — 93306 TTE W/DOPPLER COMPLETE: CPT

## 2024-11-26 PROCEDURE — 82962 GLUCOSE BLOOD TEST: CPT

## 2024-11-26 PROCEDURE — 86900 BLOOD TYPING SEROLOGIC ABO: CPT

## 2024-11-26 PROCEDURE — 87637 SARSCOV2&INF A&B&RSV AMP PRB: CPT

## 2024-11-26 PROCEDURE — 85027 COMPLETE CBC AUTOMATED: CPT

## 2024-11-26 PROCEDURE — 85730 THROMBOPLASTIN TIME PARTIAL: CPT

## 2024-11-26 PROCEDURE — 99285 EMERGENCY DEPT VISIT HI MDM: CPT

## 2024-11-26 PROCEDURE — 97116 GAIT TRAINING THERAPY: CPT

## 2024-11-27 DIAGNOSIS — C34.00 MALIGNANT NEOPLASM OF UNSPECIFIED MAIN BRONCHUS: ICD-10-CM

## 2024-12-12 ENCOUNTER — EMERGENCY (EMERGENCY)
Facility: HOSPITAL | Age: 89
LOS: 1 days | Discharge: ANOTHER HOSPITAL | End: 2024-12-12
Attending: STUDENT IN AN ORGANIZED HEALTH CARE EDUCATION/TRAINING PROGRAM
Payer: MEDICARE

## 2024-12-12 ENCOUNTER — INPATIENT (INPATIENT)
Facility: HOSPITAL | Age: 89
LOS: 6 days | Discharge: HOME HEALTH | End: 2024-12-19
Attending: STUDENT IN AN ORGANIZED HEALTH CARE EDUCATION/TRAINING PROGRAM | Admitting: STUDENT IN AN ORGANIZED HEALTH CARE EDUCATION/TRAINING PROGRAM
Payer: MEDICARE

## 2024-12-12 VITALS
WEIGHT: 149.91 LBS | SYSTOLIC BLOOD PRESSURE: 158 MMHG | HEIGHT: 68 IN | TEMPERATURE: 98 F | DIASTOLIC BLOOD PRESSURE: 88 MMHG | OXYGEN SATURATION: 97 % | HEART RATE: 86 BPM

## 2024-12-12 VITALS
DIASTOLIC BLOOD PRESSURE: 84 MMHG | OXYGEN SATURATION: 98 % | RESPIRATION RATE: 18 BRPM | HEART RATE: 86 BPM | TEMPERATURE: 98 F | SYSTOLIC BLOOD PRESSURE: 150 MMHG

## 2024-12-12 VITALS
RESPIRATION RATE: 16 BRPM | HEIGHT: 68 IN | WEIGHT: 149.91 LBS | TEMPERATURE: 98 F | DIASTOLIC BLOOD PRESSURE: 68 MMHG | HEART RATE: 76 BPM | OXYGEN SATURATION: 99 % | SYSTOLIC BLOOD PRESSURE: 124 MMHG

## 2024-12-12 DIAGNOSIS — Z98.890 OTHER SPECIFIED POSTPROCEDURAL STATES: Chronic | ICD-10-CM

## 2024-12-12 DIAGNOSIS — Z98.42 CATARACT EXTRACTION STATUS, LEFT EYE: Chronic | ICD-10-CM

## 2024-12-12 LAB
ALBUMIN SERPL ELPH-MCNC: 3.8 G/DL — SIGNIFICANT CHANGE UP (ref 3.5–5)
ALP SERPL-CCNC: 99 U/L — SIGNIFICANT CHANGE UP (ref 40–120)
ALT FLD-CCNC: 45 U/L DA — SIGNIFICANT CHANGE UP (ref 10–60)
ANION GAP SERPL CALC-SCNC: 5 MMOL/L — SIGNIFICANT CHANGE UP (ref 5–17)
APTT BLD: 34.1 SEC — SIGNIFICANT CHANGE UP (ref 24.5–35.6)
AST SERPL-CCNC: 34 U/L — SIGNIFICANT CHANGE UP (ref 10–40)
BASOPHILS # BLD AUTO: 0.04 K/UL — SIGNIFICANT CHANGE UP (ref 0–0.2)
BASOPHILS NFR BLD AUTO: 0.7 % — SIGNIFICANT CHANGE UP (ref 0–2)
BILIRUB SERPL-MCNC: 0.7 MG/DL — SIGNIFICANT CHANGE UP (ref 0.2–1.2)
BUN SERPL-MCNC: 37 MG/DL — HIGH (ref 7–18)
CALCIUM SERPL-MCNC: 9 MG/DL — SIGNIFICANT CHANGE UP (ref 8.4–10.5)
CHLORIDE SERPL-SCNC: 110 MMOL/L — HIGH (ref 96–108)
CO2 SERPL-SCNC: 26 MMOL/L — SIGNIFICANT CHANGE UP (ref 22–31)
CREAT SERPL-MCNC: 1.37 MG/DL — HIGH (ref 0.5–1.3)
EGFR: 48 ML/MIN/1.73M2 — LOW
EOSINOPHIL # BLD AUTO: 0.26 K/UL — SIGNIFICANT CHANGE UP (ref 0–0.5)
EOSINOPHIL NFR BLD AUTO: 4.8 % — SIGNIFICANT CHANGE UP (ref 0–6)
GLUCOSE SERPL-MCNC: 92 MG/DL — SIGNIFICANT CHANGE UP (ref 70–99)
HCT VFR BLD CALC: 35.6 % — LOW (ref 39–50)
HGB BLD-MCNC: 11.9 G/DL — LOW (ref 13–17)
IMM GRANULOCYTES NFR BLD AUTO: 0.2 % — SIGNIFICANT CHANGE UP (ref 0–0.9)
INR BLD: 1.41 RATIO — HIGH (ref 0.85–1.16)
LYMPHOCYTES # BLD AUTO: 1.17 K/UL — SIGNIFICANT CHANGE UP (ref 1–3.3)
LYMPHOCYTES # BLD AUTO: 21.7 % — SIGNIFICANT CHANGE UP (ref 13–44)
MCHC RBC-ENTMCNC: 32.8 PG — SIGNIFICANT CHANGE UP (ref 27–34)
MCHC RBC-ENTMCNC: 33.4 G/DL — SIGNIFICANT CHANGE UP (ref 32–36)
MCV RBC AUTO: 98.1 FL — SIGNIFICANT CHANGE UP (ref 80–100)
MONOCYTES # BLD AUTO: 0.62 K/UL — SIGNIFICANT CHANGE UP (ref 0–0.9)
MONOCYTES NFR BLD AUTO: 11.5 % — SIGNIFICANT CHANGE UP (ref 2–14)
NEUTROPHILS # BLD AUTO: 3.29 K/UL — SIGNIFICANT CHANGE UP (ref 1.8–7.4)
NEUTROPHILS NFR BLD AUTO: 61.1 % — SIGNIFICANT CHANGE UP (ref 43–77)
NRBC # BLD: 0 /100 WBCS — SIGNIFICANT CHANGE UP (ref 0–0)
PLATELET # BLD AUTO: 101 K/UL — LOW (ref 150–400)
POTASSIUM SERPL-MCNC: 4.5 MMOL/L — SIGNIFICANT CHANGE UP (ref 3.5–5.3)
POTASSIUM SERPL-SCNC: 4.5 MMOL/L — SIGNIFICANT CHANGE UP (ref 3.5–5.3)
PROT SERPL-MCNC: 7 G/DL — SIGNIFICANT CHANGE UP (ref 6–8.3)
PROTHROM AB SERPL-ACNC: 16.4 SEC — HIGH (ref 9.9–13.4)
RBC # BLD: 3.63 M/UL — LOW (ref 4.2–5.8)
RBC # FLD: 16.3 % — HIGH (ref 10.3–14.5)
SODIUM SERPL-SCNC: 141 MMOL/L — SIGNIFICANT CHANGE UP (ref 135–145)
WBC # BLD: 5.39 K/UL — SIGNIFICANT CHANGE UP (ref 3.8–10.5)
WBC # FLD AUTO: 5.39 K/UL — SIGNIFICANT CHANGE UP (ref 3.8–10.5)

## 2024-12-12 PROCEDURE — 85610 PROTHROMBIN TIME: CPT

## 2024-12-12 PROCEDURE — 80053 COMPREHEN METABOLIC PANEL: CPT

## 2024-12-12 PROCEDURE — 99053 MED SERV 10PM-8AM 24 HR FAC: CPT

## 2024-12-12 PROCEDURE — 36415 COLL VENOUS BLD VENIPUNCTURE: CPT

## 2024-12-12 PROCEDURE — 85730 THROMBOPLASTIN TIME PARTIAL: CPT

## 2024-12-12 PROCEDURE — 96375 TX/PRO/DX INJ NEW DRUG ADDON: CPT | Mod: XU

## 2024-12-12 PROCEDURE — 99285 EMERGENCY DEPT VISIT HI MDM: CPT

## 2024-12-12 PROCEDURE — 99285 EMERGENCY DEPT VISIT HI MDM: CPT | Mod: 25

## 2024-12-12 PROCEDURE — 87040 BLOOD CULTURE FOR BACTERIA: CPT

## 2024-12-12 PROCEDURE — 70487 CT MAXILLOFACIAL W/DYE: CPT | Mod: MC

## 2024-12-12 PROCEDURE — 85025 COMPLETE CBC W/AUTO DIFF WBC: CPT

## 2024-12-12 PROCEDURE — 96374 THER/PROPH/DIAG INJ IV PUSH: CPT | Mod: XU

## 2024-12-12 PROCEDURE — 70487 CT MAXILLOFACIAL W/DYE: CPT | Mod: 26,MC

## 2024-12-12 RX ORDER — VANCOMYCIN HCL 900 MCG/MG
1000 POWDER (GRAM) MISCELLANEOUS ONCE
Refills: 0 | Status: COMPLETED | OUTPATIENT
Start: 2024-12-12 | End: 2024-12-12

## 2024-12-12 RX ORDER — CEFEPIME 2 G/1
1000 INJECTION, POWDER, FOR SOLUTION INTRAVENOUS ONCE
Refills: 0 | Status: COMPLETED | OUTPATIENT
Start: 2024-12-12 | End: 2024-12-12

## 2024-12-12 RX ADMIN — Medication 250 MILLIGRAM(S): at 15:23

## 2024-12-12 RX ADMIN — CEFEPIME 100 MILLIGRAM(S): 2 INJECTION, POWDER, FOR SOLUTION INTRAVENOUS at 14:51

## 2024-12-12 NOTE — ED PROVIDER NOTE - PROGRESS NOTE DETAILS
MD Mohr: I&D performed by OMFS. Cultures sent. Per OMFS: would like to start unasyn and hold patient until AM for re-evaluation with attending and team. Bharat Flower, ED attending: OMFS recommended no surgical intervention at this time. Per discussion with the patient's son, the patient had previously been on oral Abx for this facial infection prescribed by his PCP (though he cannot recall which one). The patient has now failed trial of PO antibiotics and is at high risk of worsening infection if we discharge and simply restart PO antibiotics. Patient's son raised this concern with us as well. I agree that the patient should be admitted for IV antibiotics. Patient is not a candidate for CDU given his active cancer status, AF on Xarelto, and high risk for falls. Will admit to Medicine to continue IV antibiotics and monitoring for response.

## 2024-12-12 NOTE — ED PROVIDER NOTE - PHYSICAL EXAMINATION
Right preauricular area 2.5 cm diameter abscess with fluctuance and central white discoloration.  Induration of the top part of the helix with the erythema extending to face adjacent to the lobe on the preauricular side.  No postauricular erythema induration or tenderness to percussion.  No discharge from the ear.  No open wounds.

## 2024-12-12 NOTE — ED ADULT NURSE NOTE - NSFALLCONCLUSION_ED_ALL_ED
Preventive Health Maintenance:   Immunizations recommendations reviewed.   Cancer Screening Recommendations reviewed as appropriate to patient.  Preventative diet & exercise related life-style changes discussed & recommended.    Injury prevention counseling completed: seatbelts, helmets, smoke detectors,  & sun safety discussed .    Adequate calcium and vitamin d intake reviewed and recommended.    Self-breast exam taught & recommended.    Counseled on alcohol usage & associated health risks & benefits.    Proper usage and side effects of medications reviewed & discussed.    Take medication as directed.    Patient education completed on disease process, etiology & prognosis.    Return to clinic as clinically indicated as discussed with patient who verbalized understanding of & agreement with the plan.    Written handout given.        Universal Safety Interventions

## 2024-12-12 NOTE — ED PROVIDER NOTE - CLINICAL SUMMARY MEDICAL DECISION MAKING FREE TEXT BOX
In summary this patient was transferred for a concerning preauricular abscess, already given antibiotics but no incision and drainage performed, pending OMFS evaluation here, service already notified. Will repeat basic labs + get type and screen. Abscess already draining, I applied additional pressure for symptomatic relief. Repeat antibiotics to be dosed when due. In summary this patient was transferred for a concerning preauricular abscess, already given antibiotics but no incision and drainage performed, pending OMFS evaluation here, service already notified. Will repeat basic labs + get type and screen. Abscess already draining, I applied additional pressure for symptomatic relief. Repeat antibiotics to be dosed when due.    OMFS requests patient to be held until morning for repeat evaluation, wound culture sent to lab. No CDU beds available.

## 2024-12-12 NOTE — ED PROVIDER NOTE - ATTENDING CONTRIBUTION TO CARE
89-year-old male, past medical history of lung cancer, A-fib (on Xarelto), DM2, hypertension, and hyperlipidemia, transferred from [facility] for right periauricular abscess 1 week.  Patient was transferred for OMFS evaluation.  Patient noted to fail outpatient cefpodoxime.  Labs and imaging reviewed at outside hospital: Labs unremarkable; CT significant for Right preauricular multiloculated abscess w/ surrounding cellulitic changes. Patient was started on cefepime and vancomycin at outside hospital. VSS currently. Physical exam significant for erythematous swelling to superior preauricular region with actively drainage of purulent material; no crepitus palpated. Plan to repeat basic labs and consult OMFS for evaluation and I&D. Dispo pending results and reassessment.

## 2024-12-12 NOTE — ED ADULT NURSE NOTE - DRUG PRE-SCREENING (DAST -1)
Getting SOB with lying flat need office eval for volume status and consideration of chest xray  
Statement Selected

## 2024-12-12 NOTE — ED ADULT NURSE NOTE - CHIEF COMPLAINT QUOTE
pt. transferred from [facility] for facial abscess on the R ear. Pt. received cefepime and vanco prior to arrival. minimal drainage noted from abscess. Hx of afib.

## 2024-12-12 NOTE — ED ADULT TRIAGE NOTE - CHIEF COMPLAINT QUOTE
pt. transferred from [facility] for facial abscess on the R ear. Pt. received cefepime and vanco prior to arrival. minimal drainage noted from abscess. Hx of afib.

## 2024-12-12 NOTE — ED ADULT NURSE NOTE - OBJECTIVE STATEMENT
Pt arrives to room 2 A&Ox3 and ambulatory with a walker at baseline. PH of Hypotension, BPH and HLD. Pt comes to ED as transfer from [place] for Maxofacial evaluation. Pt states x1 week ago went to PCP for lump on R ear. Pt states he was given antibiotics without relief of symptoms. Pt states he woke up today and noticed purulent drainage from R ear abscess. Denies headache, dizziness, chest pain, SOB, fevers, chills, nausea, vomiting and diarrhea at this time. Respirations even and unlabored on room air. Pt noted with R ear abscess with serosanguinous drainage. Pt arrives with 20 gauge to L forearm, labs drawn and sent. Plan of care ongoing, comfort measures provided and safety measures maintained. Awaiting consult.

## 2024-12-12 NOTE — ED PROVIDER NOTE - PHYSICAL EXAMINATION
General: alert, oriented to person, time, place  Psych: mood appropriate  Head: normocephalic; atraumatic  Eyes: conjunctivae clear bilaterally, sclerae anicteric  ENT: erythematous swelling to superior preauricular region with actively drainage of purulent material; no crepitus palpated  Cardio: non-tachycardic; skin warm and well perfused  Resp: normal respiratory effort; no accessory muscle use  GI: no active vomiting  Neuro: normal sensation, moving all four extremities equally  Skin: No evidence of rash or bruising  MSK: normal movement of all extremities  Lymph/Vasc: no LE edema

## 2024-12-12 NOTE — ED ADULT TRIAGE NOTE - CHIEF COMPLAINT QUOTE
walked  in with  c/o  abscess on the  rt  ear  . pt  on antibiotic  oral not working. denies  any  fever

## 2024-12-12 NOTE — ED ADULT NURSE NOTE - NSFALLUNIVINTERV_ED_ALL_ED
Bed/Stretcher in lowest position, wheels locked, appropriate side rails in place/Call bell, personal items and telephone in reach/Instruct patient to call for assistance before getting out of bed/chair/stretcher/Non-slip footwear applied when patient is off stretcher/[place] to call system/Physically safe environment - no spills, clutter or unnecessary equipment/Purposeful proactive rounding/Room/bathroom lighting operational, light cord in reach

## 2024-12-12 NOTE — ED PROVIDER NOTE - CLINICAL SUMMARY MEDICAL DECISION MAKING FREE TEXT BOX
89-year-old male, presents with right sided facial abscess adjacent to the top of the ear for 1 week.  Failed outpatient oral cefpodoxime.  Denies any systemic symptoms.  Vital signs within norm limits, afebrile.  Location and appearance of abscess suggest a possible preauricular sinus abscess.  Will do CT to assess extent of the infection.  Will start patient on vancomycin for MRSA/gram-positive coverage and cefepime for Pseudomonas coverage as the helix appeared to be early perichondritis.  Will also do blood cultures, labs and reassess.  Patient will need admission for IV antibiotics and will need incision and drainage

## 2024-12-12 NOTE — CONSULT NOTE ADULT - ASSESSMENT
89 M with PMH of lung cancer (on Tagrisso), A.fib (on Xarelto), BPH, HLD, HTN and DM, transferred to [facility] ED from [facility] for a right preauricular abscess (2.4x 1.5 cm) present for one week.    -  -  -  -  -    Carmita Estevez  Oral & Maxillofacial Surgery   #[id number]  Available on Teams    89 M with PMH of lung cancer (on Tagrisso), A.fib (on Xarelto), BPH, HLD, HTN and DM, transferred to [facility] ED from [facility] for a right preauricular abscess (2.4x 1.5 cm) present for one week.    Procedure:   drainage of preauricular abscess with blunt instrument.  1 carpule of 2% lido with 1:100,00 epi delivered via local infiltration.  culture swabs done  access to the existing opening with blunt hemostat acheived and inoculations broken  Remaining keratinaceous material manipulated out with finger pressure.  Site irrigated with normal saline, covered with guaze, no sutures placed.  Post op instructions discussed with patient.    - Multimodal pain management  - rec Unasyn  - F/U culturesx2  - OMFS will re-eval during AM rounds    Carmita Estevez  Oral & Maxillofacial Surgery   #[id number]  Available on Teams

## 2024-12-12 NOTE — ED PROVIDER NOTE - OBJECTIVE STATEMENT
89 year-old male, history of lung cancer (on Tagrisso), A.fib (on Xarelto), DM, referred by PCP for further evaluation of R ear abscess x 1 week. Gradual onset of progressively worsening painful mass above R ear. Was started on Cefpodoxime ~ 1 week ago without improvement. Denies any fever, chills or any other complaints.

## 2024-12-12 NOTE — ED PROVIDER NOTE - ATTENDING APP SHARED VISIT CONTRIBUTION OF CARE
89 year old male with right facial abscess x 1 week with failure of outpatient treatment, no fevers or systemic symptoms. Plan includes labs, imaging, empiric abx, anticipate admission vs. transfer.

## 2024-12-12 NOTE — CONSULT NOTE ADULT - SUBJECTIVE AND OBJECTIVE BOX
89 M with PMH of lung cancer (on Tagrisso), A.fib (on Xarelto), BPH, HLD, HTN and DM, transferred to [facility] ED from [facility] for a right preauricular abscess present for one week with concerns for early perichondritis.    [place]:    Physical Exam:   General: AAOx3, NAD  H: Normocephalic             Maxillofacial:             Intraoral: FOM soft/NT/NE, no draining purulence  E: PERRLA, EOMI  E: Hearing grossly intact, no otorrhea  N: No septal hematoma, no crepitus, no epistaxis  T: Trachea midline  Neuro: No CN V1-3 or VII neuro deficits.     Vitals:     Vital Signs Last 24 Hrs  T(C): 36.8 (12 Dec 2024 22:55), Max: 36.9 (12 Dec 2024 14:03)  T(F): 98.3 (12 Dec 2024 22:55), Max: 98.4 (12 Dec 2024 14:03)  HR: 86 (12 Dec 2024 22:55) (70 - 86)  BP: 158/88 (12 Dec 2024 22:55) (124/68 - 158/88)  BP(mean): --  RR: 18 (12 Dec 2024 22:00) (16 - 18)  SpO2: 97% (12 Dec 2024 22:55) (97% - 99%)        I&O's Detail      Medications:    MEDICATIONS  (STANDING):    MEDICATIONS  (PRN):      Labs:                          11.9   5.39  )-----------( 101      ( 12 Dec 2024 14:35 )             35.6       12-12    141  |  110[H]  |  37[H]  ----------------------------<  92  4.5   |  26  |  1.37[H]    Ca    9.0      12 Dec 2024 14:35    TPro  7.0  /  Alb  3.8  /  TBili  0.7  /  DBili  x   /  AST  34  /  ALT  45  /  AlkPhos  99  12-12    CT:    IMPRESSION:    Right preauricular multiloculated abscess measuring approximately 2.4 x 1.5 cm in greatest dimension. There is inflammation in the surrounding subcutaneous soft tissues. Rest of the exam is unremarkable.      --- End of Report ---     89 M with PMH of lung cancer (on Tagrisso), A.fib (on Xarelto), BPH, HLD, HTN and DM, transferred to [facility] ED from [facility] for a right preauricular abscess present for one week with concerns for early perichondritis.    [place]:  Pt reports a h/o pneumonia 2 weeks ago. Since last Wednesday pt felt a pimple on his ear. He saw his PCP on Friday who recommended warm compresses and antibiotics. Pt reports that the pimple continued to grow and got bigger until it popped yesterday afternoon. Reports pain 8/10    Physical Exam:   General: AAOx3, NAD  H: Normocephalic             Maxillofacial: 2x1.5cm cystic lesion noted on the preauricular area on the helical crux of the right ear with actively draining serosanguinous/ blood and keratinaceous material. 3mm opening noted at the site of drainage with another 0.5mm drainage site anterior to it. TOP +ve with erythema extending down on the right face             Intraoral: FOM soft/NT/NE, no draining purulence, occlusion stable, no active drainage or signs of infection intraorally  E: PERRLA, EOMI  E: Hearing grossly intact, no otorrhea  N: No septal hematoma, no crepitus, no epistaxis  T: Trachea midline  Neuro: No CN V1-3 or VII neuro deficits.     Vitals:     Vital Signs Last 24 Hrs  T(C): 36.8 (12 Dec 2024 22:55), Max: 36.9 (12 Dec 2024 14:03)  T(F): 98.3 (12 Dec 2024 22:55), Max: 98.4 (12 Dec 2024 14:03)  HR: 86 (12 Dec 2024 22:55) (70 - 86)  BP: 158/88 (12 Dec 2024 22:55) (124/68 - 158/88)  BP(mean): --  RR: 18 (12 Dec 2024 22:00) (16 - 18)  SpO2: 97% (12 Dec 2024 22:55) (97% - 99%)        I&O's Detail      Medications:    MEDICATIONS  (STANDING):    MEDICATIONS  (PRN):      Labs:                          11.9   5.39  )-----------( 101      ( 12 Dec 2024 14:35 )             35.6       12-12    141  |  110[H]  |  37[H]  ----------------------------<  92  4.5   |  26  |  1.37[H]    Ca    9.0      12 Dec 2024 14:35    TPro  7.0  /  Alb  3.8  /  TBili  0.7  /  DBili  x   /  AST  34  /  ALT  45  /  AlkPhos  99  12-12    CT:    IMPRESSION:    Right preauricular multiloculated abscess measuring approximately 2.4 x 1.5 cm in greatest dimension. There is inflammation in the surrounding subcutaneous soft tissues. Rest of the exam is unremarkable.      --- End of Report ---

## 2024-12-12 NOTE — ED PROVIDER NOTE - OBJECTIVE STATEMENT
89 year-old male, history of lung cancer (on Tagrisso), A.fib (on Xarelto), DM, transferred here from [facility] for a right preauricular abscess present for one week with concern by initial team for early perichondritis, case discussed with OMFS DAILY Baker for transfer. Patient given cefepime and vancomycin there, labwork otherwise nonactionable as far as cell counts and electrolytes.

## 2024-12-12 NOTE — ED ADULT NURSE NOTE - NSFALLHARMRISKINTERV_ED_ALL_ED

## 2024-12-13 DIAGNOSIS — L02.811 CUTANEOUS ABSCESS OF HEAD [ANY PART, EXCEPT FACE]: ICD-10-CM

## 2024-12-13 DIAGNOSIS — C34.90 MALIGNANT NEOPLASM OF UNSPECIFIED PART OF UNSPECIFIED BRONCHUS OR LUNG: ICD-10-CM

## 2024-12-13 DIAGNOSIS — H40.9 UNSPECIFIED GLAUCOMA: ICD-10-CM

## 2024-12-13 DIAGNOSIS — N40.0 BENIGN PROSTATIC HYPERPLASIA WITHOUT LOWER URINARY TRACT SYMPTOMS: ICD-10-CM

## 2024-12-13 DIAGNOSIS — I10 ESSENTIAL (PRIMARY) HYPERTENSION: ICD-10-CM

## 2024-12-13 DIAGNOSIS — H60.01 ABSCESS OF RIGHT EXTERNAL EAR: ICD-10-CM

## 2024-12-13 DIAGNOSIS — I48.20 CHRONIC ATRIAL FIBRILLATION, UNSPECIFIED: ICD-10-CM

## 2024-12-13 LAB
ALBUMIN SERPL ELPH-MCNC: 3.9 G/DL — SIGNIFICANT CHANGE UP (ref 3.3–5)
ALP SERPL-CCNC: 100 U/L — SIGNIFICANT CHANGE UP (ref 40–120)
ALT FLD-CCNC: 34 U/L — SIGNIFICANT CHANGE UP (ref 4–41)
ANION GAP SERPL CALC-SCNC: 12 MMOL/L — SIGNIFICANT CHANGE UP (ref 7–14)
AST SERPL-CCNC: 34 U/L — SIGNIFICANT CHANGE UP (ref 4–40)
BASOPHILS # BLD AUTO: 0.08 K/UL — SIGNIFICANT CHANGE UP (ref 0–0.2)
BASOPHILS NFR BLD AUTO: 1.8 % — SIGNIFICANT CHANGE UP (ref 0–2)
BILIRUB SERPL-MCNC: 1 MG/DL — SIGNIFICANT CHANGE UP (ref 0.2–1.2)
BLD GP AB SCN SERPL QL: NEGATIVE — SIGNIFICANT CHANGE UP
BUN SERPL-MCNC: 30 MG/DL — HIGH (ref 7–23)
CALCIUM SERPL-MCNC: 9 MG/DL — SIGNIFICANT CHANGE UP (ref 8.4–10.5)
CHLORIDE SERPL-SCNC: 105 MMOL/L — SIGNIFICANT CHANGE UP (ref 98–107)
CO2 SERPL-SCNC: 23 MMOL/L — SIGNIFICANT CHANGE UP (ref 22–31)
CREAT SERPL-MCNC: 1.18 MG/DL — SIGNIFICANT CHANGE UP (ref 0.5–1.3)
EGFR: 58 ML/MIN/1.73M2 — LOW
EOSINOPHIL # BLD AUTO: 0.27 K/UL — SIGNIFICANT CHANGE UP (ref 0–0.5)
EOSINOPHIL NFR BLD AUTO: 6.1 % — HIGH (ref 0–6)
GLUCOSE SERPL-MCNC: 106 MG/DL — HIGH (ref 70–99)
HCT VFR BLD CALC: 34.2 % — LOW (ref 39–50)
HGB BLD-MCNC: 11.5 G/DL — LOW (ref 13–17)
IANC: 2.8 K/UL — SIGNIFICANT CHANGE UP (ref 1.8–7.4)
LYMPHOCYTES # BLD AUTO: 0.61 K/UL — LOW (ref 1–3.3)
LYMPHOCYTES # BLD AUTO: 13.9 % — SIGNIFICANT CHANGE UP (ref 13–44)
MCHC RBC-ENTMCNC: 31.5 PG — SIGNIFICANT CHANGE UP (ref 27–34)
MCHC RBC-ENTMCNC: 33.6 G/DL — SIGNIFICANT CHANGE UP (ref 32–36)
MCV RBC AUTO: 93.7 FL — SIGNIFICANT CHANGE UP (ref 80–100)
MONOCYTES # BLD AUTO: 0.23 K/UL — SIGNIFICANT CHANGE UP (ref 0–0.9)
MONOCYTES NFR BLD AUTO: 5.2 % — SIGNIFICANT CHANGE UP (ref 2–14)
NEUTROPHILS # BLD AUTO: 3.14 K/UL — SIGNIFICANT CHANGE UP (ref 1.8–7.4)
NEUTROPHILS NFR BLD AUTO: 70.4 % — SIGNIFICANT CHANGE UP (ref 43–77)
PLATELET # BLD AUTO: 94 K/UL — LOW (ref 150–400)
POTASSIUM SERPL-MCNC: 4.1 MMOL/L — SIGNIFICANT CHANGE UP (ref 3.5–5.3)
POTASSIUM SERPL-SCNC: 4.1 MMOL/L — SIGNIFICANT CHANGE UP (ref 3.5–5.3)
PROT SERPL-MCNC: 6.6 G/DL — SIGNIFICANT CHANGE UP (ref 6–8.3)
RBC # BLD: 3.65 M/UL — LOW (ref 4.2–5.8)
RBC # FLD: 16 % — HIGH (ref 10.3–14.5)
RH IG SCN BLD-IMP: POSITIVE — SIGNIFICANT CHANGE UP
SODIUM SERPL-SCNC: 140 MMOL/L — SIGNIFICANT CHANGE UP (ref 135–145)
WBC # BLD: 4.41 K/UL — SIGNIFICANT CHANGE UP (ref 3.8–10.5)
WBC # FLD AUTO: 4.41 K/UL — SIGNIFICANT CHANGE UP (ref 3.8–10.5)

## 2024-12-13 PROCEDURE — 99223 1ST HOSP IP/OBS HIGH 75: CPT

## 2024-12-13 RX ORDER — DIGOXIN 250 MCG
125 TABLET ORAL DAILY
Refills: 0 | Status: DISCONTINUED | OUTPATIENT
Start: 2024-12-13 | End: 2024-12-13

## 2024-12-13 RX ORDER — LEVOTHYROXINE SODIUM 150 MCG
25 TABLET ORAL DAILY
Refills: 0 | Status: DISCONTINUED | OUTPATIENT
Start: 2024-12-13 | End: 2024-12-19

## 2024-12-13 RX ORDER — RIVAROXABAN 10 MG/1
15 TABLET, FILM COATED ORAL DAILY
Refills: 0 | Status: DISCONTINUED | OUTPATIENT
Start: 2024-12-13 | End: 2024-12-19

## 2024-12-13 RX ORDER — DIGOXIN 250 MCG
125 TABLET ORAL DAILY
Refills: 0 | Status: DISCONTINUED | OUTPATIENT
Start: 2024-12-13 | End: 2024-12-19

## 2024-12-13 RX ORDER — LISINOPRIL 20 MG/1
2.5 TABLET ORAL DAILY
Refills: 0 | Status: DISCONTINUED | OUTPATIENT
Start: 2024-12-13 | End: 2024-12-16

## 2024-12-13 RX ORDER — CYANOCOBALAMIN/FOLIC AC/VIT B6 1-2.2-25MG
1 TABLET ORAL DAILY
Refills: 0 | Status: DISCONTINUED | OUTPATIENT
Start: 2024-12-13 | End: 2024-12-19

## 2024-12-13 RX ORDER — AMPICILLIN AND SULBACTAM 1; .5 G/1; G/1
3 INJECTION, POWDER, FOR SOLUTION INTRAVENOUS EVERY 6 HOURS
Refills: 0 | Status: DISCONTINUED | OUTPATIENT
Start: 2024-12-13 | End: 2024-12-19

## 2024-12-13 RX ORDER — TAMSULOSIN HYDROCHLORIDE 0.4 MG/1
0.4 CAPSULE ORAL AT BEDTIME
Refills: 0 | Status: DISCONTINUED | OUTPATIENT
Start: 2024-12-13 | End: 2024-12-19

## 2024-12-13 RX ORDER — OSIMERTINIB 40 1/1
80 TABLET, FILM COATED ORAL
Refills: 0 | DISCHARGE

## 2024-12-13 RX ORDER — MIDODRINE HYDROCHLORIDE 5 MG/1
2.5 TABLET ORAL THREE TIMES A DAY
Refills: 0 | Status: DISCONTINUED | OUTPATIENT
Start: 2024-12-13 | End: 2024-12-15

## 2024-12-13 RX ORDER — ACETAMINOPHEN 500MG 500 MG/1
650 TABLET, COATED ORAL EVERY 6 HOURS
Refills: 0 | Status: DISCONTINUED | OUTPATIENT
Start: 2024-12-13 | End: 2024-12-17

## 2024-12-13 RX ORDER — CARVEDILOL 25 MG/1
3.12 TABLET, FILM COATED ORAL EVERY 12 HOURS
Refills: 0 | Status: COMPLETED | OUTPATIENT
Start: 2024-12-13 | End: 2024-12-17

## 2024-12-13 RX ORDER — ALBUTEROL 90 MCG
2 AEROSOL (GRAM) INHALATION EVERY 6 HOURS
Refills: 0 | Status: DISCONTINUED | OUTPATIENT
Start: 2024-12-13 | End: 2024-12-19

## 2024-12-13 RX ORDER — DORZOLAMIDE HYDROCHLORIDE AND TIMOLOL MALEATE 20; 5 MG/ML; MG/ML
1 SOLUTION OPHTHALMIC EVERY 12 HOURS
Refills: 0 | Status: COMPLETED | OUTPATIENT
Start: 2024-12-13 | End: 2024-12-18

## 2024-12-13 RX ORDER — LATANOPROST 50 UG/ML
1 SOLUTION/ DROPS OPHTHALMIC AT BEDTIME
Refills: 0 | Status: DISCONTINUED | OUTPATIENT
Start: 2024-12-13 | End: 2024-12-19

## 2024-12-13 RX ORDER — AMPICILLIN AND SULBACTAM 1; .5 G/1; G/1
INJECTION, POWDER, FOR SOLUTION INTRAVENOUS
Refills: 0 | Status: DISCONTINUED | OUTPATIENT
Start: 2024-12-13 | End: 2024-12-19

## 2024-12-13 RX ORDER — FUROSEMIDE 40 MG/1
20 TABLET ORAL DAILY
Refills: 0 | Status: DISCONTINUED | OUTPATIENT
Start: 2024-12-13 | End: 2024-12-16

## 2024-12-13 RX ORDER — AMPICILLIN AND SULBACTAM 1; .5 G/1; G/1
3 INJECTION, POWDER, FOR SOLUTION INTRAVENOUS ONCE
Refills: 0 | Status: COMPLETED | OUTPATIENT
Start: 2024-12-13 | End: 2024-12-13

## 2024-12-13 RX ADMIN — AMPICILLIN AND SULBACTAM 200 GRAM(S): 1; .5 INJECTION, POWDER, FOR SOLUTION INTRAVENOUS at 19:13

## 2024-12-13 RX ADMIN — Medication 5 MILLIGRAM(S): at 11:01

## 2024-12-13 RX ADMIN — CARVEDILOL 3.12 MILLIGRAM(S): 25 TABLET, FILM COATED ORAL at 19:12

## 2024-12-13 RX ADMIN — AMPICILLIN AND SULBACTAM 200 GRAM(S): 1; .5 INJECTION, POWDER, FOR SOLUTION INTRAVENOUS at 11:02

## 2024-12-13 RX ADMIN — Medication 10 MILLIGRAM(S): at 21:48

## 2024-12-13 RX ADMIN — Medication 125 MICROGRAM(S): at 11:02

## 2024-12-13 RX ADMIN — AMPICILLIN AND SULBACTAM 200 GRAM(S): 1; .5 INJECTION, POWDER, FOR SOLUTION INTRAVENOUS at 08:31

## 2024-12-13 RX ADMIN — TAMSULOSIN HYDROCHLORIDE 0.4 MILLIGRAM(S): 0.4 CAPSULE ORAL at 21:48

## 2024-12-13 RX ADMIN — LATANOPROST 1 DROP(S): 50 SOLUTION/ DROPS OPHTHALMIC at 21:48

## 2024-12-13 RX ADMIN — DORZOLAMIDE HYDROCHLORIDE AND TIMOLOL MALEATE 1 DROP(S): 20; 5 SOLUTION OPHTHALMIC at 14:05

## 2024-12-13 RX ADMIN — Medication 25 MICROGRAM(S): at 11:02

## 2024-12-13 RX ADMIN — MIDODRINE HYDROCHLORIDE 2.5 MILLIGRAM(S): 5 TABLET ORAL at 19:53

## 2024-12-13 RX ADMIN — MIDODRINE HYDROCHLORIDE 2.5 MILLIGRAM(S): 5 TABLET ORAL at 11:02

## 2024-12-13 RX ADMIN — Medication 2 MILLIGRAM(S): at 00:48

## 2024-12-13 RX ADMIN — AMPICILLIN AND SULBACTAM 200 GRAM(S): 1; .5 INJECTION, POWDER, FOR SOLUTION INTRAVENOUS at 01:49

## 2024-12-13 RX ADMIN — DORZOLAMIDE HYDROCHLORIDE AND TIMOLOL MALEATE 1 DROP(S): 20; 5 SOLUTION OPHTHALMIC at 21:48

## 2024-12-13 RX ADMIN — FUROSEMIDE 20 MILLIGRAM(S): 40 TABLET ORAL at 11:01

## 2024-12-13 NOTE — ED ADULT NURSE REASSESSMENT NOTE - NS ED NURSE REASSESS COMMENT FT1
patient received from Night shift nurse. A&O x 4 presenting with an abscess on his right ear, there is a clean bandage on his right ear with no drainage noted. Denies any headache, changes in hearing or vision at this time. States that his ear is tender from the drainage procedure but does not want any medication for it. breathing is even and unlabored and the patient is in no acute distress at this time. Plan of care ongoing.

## 2024-12-13 NOTE — PROGRESS NOTE ADULT - ASSESSMENT
89M with PMH of lung cancer (on Tagrisso), A.fib (on Xarelto), BPH, HLD, HTN and DM, transferred to [facility] ED from [facility] for a right preauricular abscess (2.4x 1.5 cm) present for one week.    Recommendations:  - No acute OMFS surgical intervention, out-patient FU in 1wk, please call #[phone].  - F/U culturesx2

## 2024-12-13 NOTE — PATIENT PROFILE ADULT - NSPROGENPREVTRANSF_GEN_A_NUR
Attended one of two groups    Problem: Ineffective Coping  Goal: Participates in unit activities  Description: Interventions:  - Provide therapeutic environment   - Provide required programming   - Redirect inappropriate behaviors   Outcome: Progressing no history of blood product transfusion

## 2024-12-13 NOTE — PATIENT PROFILE ADULT - FALL HARM RISK - HARM RISK INTERVENTIONS

## 2024-12-13 NOTE — ED ADULT NURSE REASSESSMENT NOTE - NS ED NURSE REASSESS COMMENT FT1
Patient is resting comfortably in bed, no complaints at this time besides baseline tenderness in left ear. patients denies any dizziness, weakness, SOB, or nausea at this time. Report given, pending clean room. plan of care ongoing.

## 2024-12-13 NOTE — ED ADULT NURSE REASSESSMENT NOTE - NS ED NURSE REASSESS COMMENT FT1
Patient drained about 300 ccs of d Patient drained about 300 ccs of clear yellow urine with no issues. Pending placement for room upstairs. Plan of care ongoing.

## 2024-12-13 NOTE — H&P ADULT - NSHPPHYSICALEXAM_GEN_ALL_CORE
T(C): 36.3 (12-13-24 @ 13:53), Max: 36.8 (12-12-24 @ 22:55)  HR: 80 (12-13-24 @ 13:53) (70 - 90)  BP: 127/81 (12-13-24 @ 13:53) (105/56 - 158/88)  RR: 17 (12-13-24 @ 13:53) (16 - 18)  SpO2: 100% (12-13-24 @ 13:53) (97% - 100%)    GENERAL: No acute distress  HEAD:  Atraumatic, Normocephalic  ENT: EOMI, PERRLA, conjunctiva and sclera clear, Neck supple, No JVD, moist mucosa, no pharyngeal erythema, no tonsillar enlargement or exudate  CHEST/LUNG: Clear to auscultation bilaterally; No wheeze, equal breath sounds bilaterally   HEART: Regular rate and rhythm; No murmurs, rubs, or gallops  ABDOMEN: Soft, Nontender, Nondistended; Bowel sounds present, no organomegaly  EXTREMITIES:  2+ Peripheral Pulses, No clubbing, cyanosis, or edema  PSYCH: AAOx3, normal affect, normal behavior   NEUROLOGY: non-focal, cranial nerves intact  SKIN: Normal color, No rashes or lesions

## 2024-12-13 NOTE — H&P ADULT - NSHPLABSRESULTS_GEN_ALL_CORE
.  LABS:                         11.5   4.41  )-----------( 94       ( 13 Dec 2024 00:14 )             34.2     12-13    140  |  105  |  30[H]  ----------------------------<  106[H]  4.1   |  23  |  1.18    Ca    9.0      13 Dec 2024 00:14    TPro  6.6  /  Alb  3.9  /  TBili  1.0  /  DBili  x   /  AST  34  /  ALT  34  /  AlkPhos  100  12-13    PT/INR - ( 12 Dec 2024 14:35 )   PT: 16.4 sec;   INR: 1.41 ratio         PTT - ( 12 Dec 2024 14:35 )  PTT:34.1 sec  Urinalysis Basic - ( 13 Dec 2024 00:14 )    Color: x / Appearance: x / SG: x / pH: x  Gluc: 106 mg/dL / Ketone: x  / Bili: x / Urobili: x   Blood: x / Protein: x / Nitrite: x   Leuk Esterase: x / RBC: x / WBC x   Sq Epi: x / Non Sq Epi: x / Bacteria: x                RADIOLOGY, EKG & ADDITIONAL TESTS: Reviewed.

## 2024-12-13 NOTE — ED ADULT NURSE REASSESSMENT NOTE - NS ED NURSE REASSESS COMMENT FT1
Report received from rony Pineda. Pt sitting up in stretcher resting comfortably. Respirations even and unlabored on room air. No acute distress noted. Pt endorsing relief of pain with medication. Denies headache, dizziness, chest pain and SOB at this time. Pt assisted with urinal at this time. No active bleeding noted from R ear abscess. Plan of care ongoing, comfort measures provided and safety measures maintained. Awaiting disposition.

## 2024-12-13 NOTE — H&P ADULT - TIME BILLING
Reviewing laboratory data, consultants' recommendations, documentation in [place], performing medically appropriate examinations/evaluations, discussion with patient/family/RN/ACP/Residents and interdisciplinary staff (such as , social workers, etc), counseling patient/family/care giver, ordering medically appropriate medication, tests, or procedures

## 2024-12-13 NOTE — PROGRESS NOTE ADULT - SUBJECTIVE AND OBJECTIVE BOX
89 M with PMH of lung cancer (on Tagrisso), A.fib (on Xarelto), BPH, HLD, HTN and DM, transferred to [facility] ED from [facility] for a right preauricular abscess present for one week with concerns for early perichondritis.    Interval: NAEON, AVSS on RA    Physical Exam:   General: AAOx3, NAD  H: Normocephalic             Maxillofacial: 2x1.5cm cystic lesion noted on the preauricular area on the helical crux of the right ear with actively draining serosanguinous/ blood and keratinaceous material. 3mm opening noted at the site of drainage with another 0.5mm drainage site anterior to it. TOP +ve with erythema extending down on the right face             Intraoral: FOM soft/NT/NE, no draining purulence, occlusion stable, no active drainage or signs of infection intraorally  E: PERRLA, EOMI  E: Hearing grossly intact, no otorrhea  N: No septal hematoma, no crepitus, no epistaxis  T: Trachea midline  Neuro: No CN V1-3 or VII neuro deficits.     Vital Signs Last 24 Hrs  T(C): 36.4 (13 Dec 2024 08:55), Max: 36.9 (12 Dec 2024 14:03)  T(F): 97.6 (13 Dec 2024 08:55), Max: 98.4 (12 Dec 2024 14:03)  HR: 80 (13 Dec 2024 08:55) (70 - 88)  BP: 122/77 (13 Dec 2024 08:55) (120/70 - 158/88)  BP(mean): --  RR: 16 (13 Dec 2024 08:55) (16 - 18)  SpO2: 99% (13 Dec 2024 08:55) (97% - 100%)    Parameters below as of 13 Dec 2024 08:55  Patient On (Oxygen Delivery Method): room air    MEDICATIONS  (STANDING):  ampicillin/sulbactam  IVPB 3 Gram(s) IV Intermittent every 6 hours  ampicillin/sulbactam  IVPB        MEDICATIONS  (PRN):    I&O's Detail    Labs:                      11.5   4.41  )-----------( 94       ( 13 Dec 2024 00:14 )             34.2   12-13    140  |  105  |  30[H]  ----------------------------<  106[H]  4.1   |  23  |  1.18    Ca    9.0      13 Dec 2024 00:14    TPro  6.6  /  Alb  3.9  /  TBili  1.0  /  DBili  x   /  AST  34  /  ALT  34  /  AlkPhos  100  12-13

## 2024-12-13 NOTE — H&P ADULT - NSHPREVIEWOFSYSTEMS_GEN_ALL_CORE
REVIEW OF SYSTEMS:    CONSTITUTIONAL: No weakness, fevers or chills, no weight loss  EYES/ENT: + Right ear pain, No visual changes;  No dysphagia or odynophagia, no tinnitus  NECK: No pain or stiffness  RESPIRATORY: No cough, wheezing, hemoptysis; No shortness of breath  CARDIOVASCULAR: No chest pain or palpitations; No lower extremity edema  GASTROINTESTINAL: No abdominal or epigastric pain. No nausea, vomiting, or hematemesis; No diarrhea or constipation. No melena or hematochezia.  MUSCULOSKELETAL: No joint pain, swelling, erythema or warmth, no back pain  GENITOURINARY: No dysuria, frequency or hematuria, no suprapubic pain  NEUROLOGICAL: No numbness or weakness, no headache, no syncope, no gait abnormalities   SKIN: No itching, burning, rashes, or lesions   All other review of systems is negative unless indicated above.

## 2024-12-13 NOTE — H&P ADULT - PROBLEM SELECTOR PLAN 3
- Hypercoagulable state secondary to active malignancy + CHADS VASC 3  - Continue Digoxin, Coreg and Xarelto

## 2024-12-13 NOTE — H&P ADULT - ASSESSMENT
89 year-old male, history of lung cancer (on Tagrisso), A.fib (on Xarelto), DM a/w periauricular abscess s/p I&D. Admitted for IV abx

## 2024-12-13 NOTE — ED ADULT NURSE REASSESSMENT NOTE - NS ED NURSE REASSESS COMMENT FT1
Break RN: Received report from JASMIN Agrawal. Pt is A&Ox4, resting in stretcher with no complaints at this time. Respirations even and unlabored, chest rise equal b/l. Pt denies chest pain, SOB, abdominal pain, N/V/D, h/a, dizziness, numbness/tingling or any urinary symptoms at this time. Pt given food and juice at this time. No acute distress noted. Safety maintained throughout.

## 2024-12-13 NOTE — H&P ADULT - PROBLEM SELECTOR PLAN 2
- Continue Tagrisso  - f/u outpt oncologist Dr Martinez - Continue Tagrisso- not on formulary patient's aide/family would need to bring from home. Pt lives alone   - f/u outpt oncologist Dr Martinez - Continue Tagrisso- not on formulary. patient's aide/family would need to bring from home. Pt lives alone   - f/u outpt oncologist Dr Martinez

## 2024-12-13 NOTE — H&P ADULT - HISTORY OF PRESENT ILLNESS
89 year-old male, history of lung cancer (on Tagrisso), A.fib (on Xarelto), DM, 89 year-old male, history of lung cancer (on Tagrisso), A.fib (on Xarelto), DM sent from onc clinic for R ear infection.  Pt has had R ear pain for about 1 week. His pcp prescribed a course of Cefpodoxime. However, his pain has not improved and the day prior to admission noted discharge from his ear. He denies any fevers, no change in hearing.  He was seen at oncology clinic and sent to ED at [facility] for ear infection refractory to oral abx. CT at [facility] showed periauricular abscess so pt was transferred to  [facility] for ENT eval. Pt is now s/p I and D. His pain has improved.     In ED pt was given Unasyn, Morphine 2mg IV, Midodrine 2.5mg and Digoxin 125  VS: 105/56  90  97.6  16  99% on RA

## 2024-12-13 NOTE — PATIENT PROFILE ADULT - FUNCTIONAL SCREEN CURRENT LEVEL: SWALLOWING (IF SCORE 2 OR MORE FOR ANY ITEM, CONSULT REHAB SERVICES), MLM)
No adenopathy or splenomegaly. No cervical or inguinal lymphadenopathy.
0 = swallows foods/liquids without difficulty

## 2024-12-13 NOTE — PATIENT PROFILE ADULT - NSPROGENOTHERPROVIDER_GEN_A_NUR
none

## 2024-12-13 NOTE — PATIENT PROFILE ADULT - FUNCTIONAL ASSESSMENT - BASIC MOBILITY 6.
3-calculated by average/Not able to assess (calculate score using [facility] averaging method)

## 2024-12-13 NOTE — ED ADULT NURSE REASSESSMENT NOTE - NS ED NURSE REASSESS COMMENT FT1
Upon reassessment pt sleeping in stretcher at this time, arousable to verbal and tactile stimuli. Respirations even and unlabored on room air. No acute distress noted. Denies any complaints at this time. No drainage noted from R ear at this time. Plan of care ongoing, comfort measures provided and safety measures maintained. Awaiting OMFS.

## 2024-12-14 LAB
ANION GAP SERPL CALC-SCNC: 13 MMOL/L — SIGNIFICANT CHANGE UP (ref 7–14)
BASOPHILS # BLD AUTO: 0.02 K/UL — SIGNIFICANT CHANGE UP (ref 0–0.2)
BASOPHILS NFR BLD AUTO: 0.5 % — SIGNIFICANT CHANGE UP (ref 0–2)
BUN SERPL-MCNC: 28 MG/DL — HIGH (ref 7–23)
CALCIUM SERPL-MCNC: 8.8 MG/DL — SIGNIFICANT CHANGE UP (ref 8.4–10.5)
CHLORIDE SERPL-SCNC: 105 MMOL/L — SIGNIFICANT CHANGE UP (ref 98–107)
CO2 SERPL-SCNC: 24 MMOL/L — SIGNIFICANT CHANGE UP (ref 22–31)
CREAT SERPL-MCNC: 1.34 MG/DL — HIGH (ref 0.5–1.3)
EGFR: 49 ML/MIN/1.73M2 — LOW
EOSINOPHIL # BLD AUTO: 0.26 K/UL — SIGNIFICANT CHANGE UP (ref 0–0.5)
EOSINOPHIL NFR BLD AUTO: 6.7 % — HIGH (ref 0–6)
GLUCOSE SERPL-MCNC: 98 MG/DL — SIGNIFICANT CHANGE UP (ref 70–99)
GRAM STN FLD: ABNORMAL
HCT VFR BLD CALC: 35.8 % — LOW (ref 39–50)
HGB BLD-MCNC: 12.1 G/DL — LOW (ref 13–17)
IANC: 2.23 K/UL — SIGNIFICANT CHANGE UP (ref 1.8–7.4)
IMM GRANULOCYTES NFR BLD AUTO: 0.3 % — SIGNIFICANT CHANGE UP (ref 0–0.9)
LYMPHOCYTES # BLD AUTO: 0.97 K/UL — LOW (ref 1–3.3)
LYMPHOCYTES # BLD AUTO: 25.1 % — SIGNIFICANT CHANGE UP (ref 13–44)
MAGNESIUM SERPL-MCNC: 2.2 MG/DL — SIGNIFICANT CHANGE UP (ref 1.6–2.6)
MCHC RBC-ENTMCNC: 31.8 PG — SIGNIFICANT CHANGE UP (ref 27–34)
MCHC RBC-ENTMCNC: 33.8 G/DL — SIGNIFICANT CHANGE UP (ref 32–36)
MCV RBC AUTO: 94.2 FL — SIGNIFICANT CHANGE UP (ref 80–100)
MONOCYTES # BLD AUTO: 0.37 K/UL — SIGNIFICANT CHANGE UP (ref 0–0.9)
MONOCYTES NFR BLD AUTO: 9.6 % — SIGNIFICANT CHANGE UP (ref 2–14)
MRSA PCR RESULT.: SIGNIFICANT CHANGE UP
NEUTROPHILS # BLD AUTO: 2.23 K/UL — SIGNIFICANT CHANGE UP (ref 1.8–7.4)
NEUTROPHILS NFR BLD AUTO: 57.8 % — SIGNIFICANT CHANGE UP (ref 43–77)
NRBC # BLD: 0 /100 WBCS — SIGNIFICANT CHANGE UP (ref 0–0)
NRBC # FLD: 0 K/UL — SIGNIFICANT CHANGE UP (ref 0–0)
PHOSPHATE SERPL-MCNC: 3.2 MG/DL — SIGNIFICANT CHANGE UP (ref 2.5–4.5)
PLATELET # BLD AUTO: 103 K/UL — LOW (ref 150–400)
POTASSIUM SERPL-MCNC: 4.3 MMOL/L — SIGNIFICANT CHANGE UP (ref 3.5–5.3)
POTASSIUM SERPL-SCNC: 4.3 MMOL/L — SIGNIFICANT CHANGE UP (ref 3.5–5.3)
RBC # BLD: 3.8 M/UL — LOW (ref 4.2–5.8)
RBC # FLD: 16 % — HIGH (ref 10.3–14.5)
S AUREUS DNA NOSE QL NAA+PROBE: SIGNIFICANT CHANGE UP
SODIUM SERPL-SCNC: 142 MMOL/L — SIGNIFICANT CHANGE UP (ref 135–145)
SPECIMEN SOURCE: SIGNIFICANT CHANGE UP
WBC # BLD: 3.86 K/UL — SIGNIFICANT CHANGE UP (ref 3.8–10.5)
WBC # FLD AUTO: 3.86 K/UL — SIGNIFICANT CHANGE UP (ref 3.8–10.5)

## 2024-12-14 PROCEDURE — 99232 SBSQ HOSP IP/OBS MODERATE 35: CPT

## 2024-12-14 RX ORDER — CHLORHEXIDINE GLUCONATE 1.2 MG/ML
1 RINSE ORAL
Refills: 0 | Status: DISCONTINUED | OUTPATIENT
Start: 2024-12-14 | End: 2024-12-19

## 2024-12-14 RX ORDER — SODIUM CHLORIDE 9 MG/ML
1000 INJECTION, SOLUTION INTRAMUSCULAR; INTRAVENOUS; SUBCUTANEOUS
Refills: 0 | Status: DISCONTINUED | OUTPATIENT
Start: 2024-12-14 | End: 2024-12-17

## 2024-12-14 RX ADMIN — SODIUM CHLORIDE 50 MILLILITER(S): 9 INJECTION, SOLUTION INTRAMUSCULAR; INTRAVENOUS; SUBCUTANEOUS at 11:38

## 2024-12-14 RX ADMIN — RIVAROXABAN 15 MILLIGRAM(S): 10 TABLET, FILM COATED ORAL at 13:24

## 2024-12-14 RX ADMIN — MIDODRINE HYDROCHLORIDE 2.5 MILLIGRAM(S): 5 TABLET ORAL at 18:49

## 2024-12-14 RX ADMIN — AMPICILLIN AND SULBACTAM 200 GRAM(S): 1; .5 INJECTION, POWDER, FOR SOLUTION INTRAVENOUS at 05:36

## 2024-12-14 RX ADMIN — AMPICILLIN AND SULBACTAM 200 GRAM(S): 1; .5 INJECTION, POWDER, FOR SOLUTION INTRAVENOUS at 11:38

## 2024-12-14 RX ADMIN — CHLORHEXIDINE GLUCONATE 1 APPLICATION(S): 1.2 RINSE ORAL at 05:44

## 2024-12-14 RX ADMIN — Medication 1 TABLET(S): at 11:32

## 2024-12-14 RX ADMIN — CARVEDILOL 3.12 MILLIGRAM(S): 25 TABLET, FILM COATED ORAL at 05:37

## 2024-12-14 RX ADMIN — DORZOLAMIDE HYDROCHLORIDE AND TIMOLOL MALEATE 1 DROP(S): 20; 5 SOLUTION OPHTHALMIC at 18:50

## 2024-12-14 RX ADMIN — AMPICILLIN AND SULBACTAM 200 GRAM(S): 1; .5 INJECTION, POWDER, FOR SOLUTION INTRAVENOUS at 18:48

## 2024-12-14 RX ADMIN — LATANOPROST 1 DROP(S): 50 SOLUTION/ DROPS OPHTHALMIC at 23:15

## 2024-12-14 RX ADMIN — LISINOPRIL 2.5 MILLIGRAM(S): 20 TABLET ORAL at 05:44

## 2024-12-14 RX ADMIN — DORZOLAMIDE HYDROCHLORIDE AND TIMOLOL MALEATE 1 DROP(S): 20; 5 SOLUTION OPHTHALMIC at 05:38

## 2024-12-14 RX ADMIN — MIDODRINE HYDROCHLORIDE 2.5 MILLIGRAM(S): 5 TABLET ORAL at 13:23

## 2024-12-14 RX ADMIN — Medication 10 MILLIGRAM(S): at 23:15

## 2024-12-14 RX ADMIN — FUROSEMIDE 20 MILLIGRAM(S): 40 TABLET ORAL at 05:37

## 2024-12-14 RX ADMIN — AMPICILLIN AND SULBACTAM 200 GRAM(S): 1; .5 INJECTION, POWDER, FOR SOLUTION INTRAVENOUS at 23:16

## 2024-12-14 RX ADMIN — MIDODRINE HYDROCHLORIDE 2.5 MILLIGRAM(S): 5 TABLET ORAL at 05:36

## 2024-12-14 RX ADMIN — Medication 25 MICROGRAM(S): at 05:37

## 2024-12-14 RX ADMIN — Medication 125 MICROGRAM(S): at 05:36

## 2024-12-14 RX ADMIN — AMPICILLIN AND SULBACTAM 200 GRAM(S): 1; .5 INJECTION, POWDER, FOR SOLUTION INTRAVENOUS at 01:00

## 2024-12-14 RX ADMIN — Medication 5 MILLIGRAM(S): at 11:32

## 2024-12-14 RX ADMIN — TAMSULOSIN HYDROCHLORIDE 0.4 MILLIGRAM(S): 0.4 CAPSULE ORAL at 23:16

## 2024-12-14 NOTE — PROGRESS NOTE ADULT - ASSESSMENT
89M with PMH of lung cancer (on Tagrisso), A.fib (on Xarelto), BPH, HLD, HTN and DM, transferred to [facility] ED from [facility] for a right preauricular abscess (2.4x 1.5 cm) present for one week.    Recommendations:  - Continue abx  - Patient to FU output in 1wk following discharge, please call #[phone] for appointment  - No additional OMFS interventions neede, will sign off at this time.  Please re-engage with any additional concerns    Nataly Tolbert DMD (Jin)  Oral and Maxillofacial Surgery, PGY-1  [facility] OMFS Pager #[phone]  [facility] Pager: [phone]  [facility] Pager: [phone]  Available on Teams

## 2024-12-14 NOTE — PROGRESS NOTE ADULT - SUBJECTIVE AND OBJECTIVE BOX
Dr. Barbie Simmons  Pager [phone]    PROGRESS NOTE:     Patient is a 89y old  Male who presents with a chief complaint of pre-auricular abscess (14 Dec 2024 08:03)      SUBJECTIVE / OVERNIGHT EVENTS: denies chest pain or sob , feels better, Rt preauricular abscess drained yesterday  ADDITIONAL REVIEW OF SYSTEMS: afebrile     MEDICATIONS  (STANDING):  ampicillin/sulbactam  IVPB 3 Gram(s) IV Intermittent every 6 hours  ampicillin/sulbactam  IVPB      atorvastatin 10 milliGRAM(s) Oral at bedtime  carvedilol 3.125 milliGRAM(s) Oral every 12 hours  chlorhexidine 2% Cloths 1 Application(s) Topical <User Schedule>  digoxin     Tablet 125 MICROGram(s) Oral daily  dorzolamide 2%/timolol 0.5% Ophthalmic Solution 1 Drop(s) Both EYES every 12 hours  finasteride 5 milliGRAM(s) Oral daily  furosemide    Tablet 20 milliGRAM(s) Oral daily  latanoprost 0.005% Ophthalmic Solution 1 Drop(s) Both EYES at bedtime  levothyroxine 25 MICROGram(s) Oral daily  lisinopril 2.5 milliGRAM(s) Oral daily  midodrine. 2.5 milliGRAM(s) Oral three times a day  multivitamin 1 Tablet(s) Oral daily  rivaroxaban 15 milliGRAM(s) Oral daily  sodium chloride 0.9%. 1000 milliLiter(s) (50 mL/Hr) IV Continuous <Continuous>  tamsulosin 0.4 milliGRAM(s) Oral at bedtime    MEDICATIONS  (PRN):  acetaminophen     Tablet .. 650 milliGRAM(s) Oral every 6 hours PRN Temp greater or equal to 38C (100.4F), Mild Pain (1 - 3), Moderate Pain (4 - 6)  albuterol    90 MICROgram(s) HFA Inhaler 2 Puff(s) Inhalation every 6 hours PRN Shortness of Breath and/or Wheezing      CAPILLARY BLOOD GLUCOSE        I&O's Summary    13 Dec 2024 07:01  -  14 Dec 2024 07:00  --------------------------------------------------------  IN: 250 mL / OUT: 0 mL / NET: 250 mL        PHYSICAL EXAM:  Vital Signs Last 24 Hrs  T(C): 36.4 (14 Dec 2024 15:28), Max: 36.7 (13 Dec 2024 18:45)  T(F): 97.5 (14 Dec 2024 15:28), Max: 98.1 (13 Dec 2024 18:45)  HR: 59 (14 Dec 2024 15:28) (59 - 79)  BP: 97/68 (14 Dec 2024 15:28) (95/60 - 119/66)  BP(mean): --  RR: 17 (14 Dec 2024 15:28) (17 - 116)  SpO2: 98% (14 Dec 2024 15:28) (95% - 100%)    Parameters below as of 14 Dec 2024 05:00  Patient On (Oxygen Delivery Method): room air    GENERAL: No acute distress  HEAD:  right preauricular drained, wound dressing in place   CHEST/LUNG: Clear to auscultation bilaterally; No wheeze, equal breath sounds bilaterally   HEART: Regular rate and rhythm; No murmurs, rubs, or gallops  ABDOMEN: Soft, Nontender, Nondistended; Bowel sounds present, no organomegaly  EXTREMITIES:  2+ Peripheral Pulses, No clubbing, cyanosis, or edema  PSYCH: AAOx3, normal affect, normal behavior   NEUROLOGY: non-focal, cranial nerves intact  SKIN: Normal color, No rashes or lesions    LABS:                        12.1   3.86  )-----------( 103      ( 14 Dec 2024 04:23 )             35.8     12-14    142  |  105  |  28[H]  ----------------------------<  98  4.3   |  24  |  1.34[H]    Ca    8.8      14 Dec 2024 04:23  Phos  3.2     12-14  Mg     2.20     12-14    TPro  6.6  /  Alb  3.9  /  TBili  1.0  /  DBili  x   /  AST  34  /  ALT  34  /  AlkPhos  100  12-13          Urinalysis Basic - ( 14 Dec 2024 04:23 )    Color: x / Appearance: x / SG: x / pH: x  Gluc: 98 mg/dL / Ketone: x  / Bili: x / Urobili: x   Blood: x / Protein: x / Nitrite: x   Leuk Esterase: x / RBC: x / WBC x   Sq Epi: x / Non Sq Epi: x / Bacteria: x        Culture - Wound Aerobic/Anaerobic (collected 13 Dec 2024 08:27)  Source: Skin/Wound  Preliminary Report (14 Dec 2024 14:47):    Few Enterococcus faecalis    Culture - Abscess with Gram Stain (collected 13 Dec 2024 08:27)  Source: .Abscess  Gram Stain (14 Dec 2024 00:46):    Moderate polymorphonuclear leukocytes per low power field    Few Gram Positive Rods per oil power field  Preliminary Report (14 Dec 2024 14:20):    Few Enterococcus faecalis    Culture - Blood (collected 12 Dec 2024 14:50)  Source: .Blood BLOOD  Preliminary Report (13 Dec 2024 21:01):    No growth at 24 hours    Culture - Blood (collected 12 Dec 2024 14:35)  Source: .Blood BLOOD  Preliminary Report (13 Dec 2024 21:01):    No growth at 24 hours        RADIOLOGY & ADDITIONAL TESTS:  Results Reviewed:   Imaging Personally Reviewed:  Electrocardiogram Personally Reviewed:    COORDINATION OF CARE:  Care Discussed with Consultants/Other Providers [Y/N]:  Prior or Outpatient Records Reviewed [Y/N]:

## 2024-12-14 NOTE — PROGRESS NOTE ADULT - SUBJECTIVE AND OBJECTIVE BOX
89 M with PMH of lung cancer (on Tagrisso), A.fib (on Xarelto), BPH, HLD, HTN and DM, transferred to [facility] ED from [facility] for a right preauricular abscess present for one week with concerns for early perichondritis.    Interval: NAEON, AVSS on RA. Culture results gram positive rods    Physical Exam:   General: AAOx3, NAD  H: Normocephalic             Maxillofacial: 2x1.5cm cystic lesion noted on the preauricular area on the helical crux of the R ear with actively draining serosanguinous and keratinaceous material. 3mm opening noted at the site of drainage with another 0.5mm drainage site anterior to it. TOP +ve with erythema extending down on the R face             Intraoral: FOM soft/NT/NE, no draining purulence, occlusion stable, no active drainage or signs of infection intraorally  E: PERRLA, EOMI  E: Hearing grossly intact, no otorrhea  N: No septal hematoma, no crepitus, no epistaxis  T: Trachea midline  Neuro: No CN V1-3 or VII neuro deficits.     Vitals:  Vital Signs Last 24 Hrs  T(C): 36.4 (14 Dec 2024 05:00), Max: 36.7 (13 Dec 2024 18:45)  T(F): 97.6 (14 Dec 2024 05:00), Max: 98.1 (13 Dec 2024 18:45)  HR: 63 (14 Dec 2024 05:00) (63 - 90)  BP: 105/65 (14 Dec 2024 05:00) (95/60 - 127/81)  BP(mean): --  RR: 17 (14 Dec 2024 05:00) (16 - 116)  SpO2: 95% (14 Dec 2024 05:00) (95% - 100%)    Parameters below as of 14 Dec 2024 05:00  Patient On (Oxygen Delivery Method): room air        I&O's Detail    13 Dec 2024 07:01  -  14 Dec 2024 07:00  --------------------------------------------------------  IN:    Oral Fluid: 250 mL  Total IN: 250 mL    OUT:  Total OUT: 0 mL    Total NET: 250 mL          Medications:    MEDICATIONS  (STANDING):  ampicillin/sulbactam  IVPB 3 Gram(s) IV Intermittent every 6 hours  ampicillin/sulbactam  IVPB      atorvastatin 10 milliGRAM(s) Oral at bedtime  carvedilol 3.125 milliGRAM(s) Oral every 12 hours  chlorhexidine 2% Cloths 1 Application(s) Topical <User Schedule>  digoxin     Tablet 125 MICROGram(s) Oral daily  dorzolamide 2%/timolol 0.5% Ophthalmic Solution 1 Drop(s) Both EYES every 12 hours  finasteride 5 milliGRAM(s) Oral daily  furosemide    Tablet 20 milliGRAM(s) Oral daily  latanoprost 0.005% Ophthalmic Solution 1 Drop(s) Both EYES at bedtime  levothyroxine 25 MICROGram(s) Oral daily  lisinopril 2.5 milliGRAM(s) Oral daily  midodrine. 2.5 milliGRAM(s) Oral three times a day  multivitamin 1 Tablet(s) Oral daily  rivaroxaban 15 milliGRAM(s) Oral daily  tamsulosin 0.4 milliGRAM(s) Oral at bedtime    MEDICATIONS  (PRN):  acetaminophen     Tablet .. 650 milliGRAM(s) Oral every 6 hours PRN Temp greater or equal to 38C (100.4F), Mild Pain (1 - 3), Moderate Pain (4 - 6)  albuterol    90 MICROgram(s) HFA Inhaler 2 Puff(s) Inhalation every 6 hours PRN Shortness of Breath and/or Wheezing      Labs:                          12.1   3.86  )-----------( 103      ( 14 Dec 2024 04:23 )             35.8       12-14    142  |  105  |  28[H]  ----------------------------<  98  4.3   |  24  |  1.34[H]    Ca    8.8      14 Dec 2024 04:23  Phos  3.2     12-14  Mg     2.20     12-14    TPro  6.6  /  Alb  3.9  /  TBili  1.0  /  DBili  x   /  AST  34  /  ALT  34  /  AlkPhos  100  12-13

## 2024-12-14 NOTE — PROGRESS NOTE ADULT - PROBLEM SELECTOR PLAN 1
- s/p I and D of right preauricular abscess  - c/w Unasyn, abscess culture growing E. faecalis, f/up  sensitivity  - Pain significantly improved. Tylenol prn pain  - dispo: likely DC tomorrow on Augmentin, outpt f/up OMFS   output follow up in 1wk following discharge, please call #[phone] for appointment

## 2024-12-15 LAB
-  AMPICILLIN: SIGNIFICANT CHANGE UP
-  AMPICILLIN: SIGNIFICANT CHANGE UP
-  GENTAMICIN SYNERGY: SIGNIFICANT CHANGE UP
-  STREPTOMYCIN SYNERGY: SIGNIFICANT CHANGE UP
-  VANCOMYCIN: SIGNIFICANT CHANGE UP
-  VANCOMYCIN: SIGNIFICANT CHANGE UP
ANION GAP SERPL CALC-SCNC: 10 MMOL/L — SIGNIFICANT CHANGE UP (ref 7–14)
BASOPHILS # BLD AUTO: 0.03 K/UL — SIGNIFICANT CHANGE UP (ref 0–0.2)
BASOPHILS NFR BLD AUTO: 0.9 % — SIGNIFICANT CHANGE UP (ref 0–2)
BUN SERPL-MCNC: 31 MG/DL — HIGH (ref 7–23)
CALCIUM SERPL-MCNC: 8.4 MG/DL — SIGNIFICANT CHANGE UP (ref 8.4–10.5)
CHLORIDE SERPL-SCNC: 106 MMOL/L — SIGNIFICANT CHANGE UP (ref 98–107)
CHLORIDE UR-SCNC: 67 MMOL/L — SIGNIFICANT CHANGE UP
CO2 SERPL-SCNC: 24 MMOL/L — SIGNIFICANT CHANGE UP (ref 22–31)
CREAT ?TM UR-MCNC: 51 MG/DL — SIGNIFICANT CHANGE UP
CREAT SERPL-MCNC: 1.34 MG/DL — HIGH (ref 0.5–1.3)
CULTURE RESULTS: ABNORMAL
CULTURE RESULTS: ABNORMAL
EGFR: 49 ML/MIN/1.73M2 — LOW
EOSINOPHIL # BLD AUTO: 0.35 K/UL — SIGNIFICANT CHANGE UP (ref 0–0.5)
EOSINOPHIL NFR BLD AUTO: 10.3 % — HIGH (ref 0–6)
GLUCOSE SERPL-MCNC: 106 MG/DL — HIGH (ref 70–99)
HCT VFR BLD CALC: 32.4 % — LOW (ref 39–50)
HGB BLD-MCNC: 11 G/DL — LOW (ref 13–17)
IANC: 1.65 K/UL — LOW (ref 1.8–7.4)
IMM GRANULOCYTES NFR BLD AUTO: 0.3 % — SIGNIFICANT CHANGE UP (ref 0–0.9)
LYMPHOCYTES # BLD AUTO: 0.91 K/UL — LOW (ref 1–3.3)
LYMPHOCYTES # BLD AUTO: 26.8 % — SIGNIFICANT CHANGE UP (ref 13–44)
MAGNESIUM SERPL-MCNC: 2.1 MG/DL — SIGNIFICANT CHANGE UP (ref 1.6–2.6)
MCHC RBC-ENTMCNC: 31.9 PG — SIGNIFICANT CHANGE UP (ref 27–34)
MCHC RBC-ENTMCNC: 34 G/DL — SIGNIFICANT CHANGE UP (ref 32–36)
MCV RBC AUTO: 93.9 FL — SIGNIFICANT CHANGE UP (ref 80–100)
METHOD TYPE: SIGNIFICANT CHANGE UP
METHOD TYPE: SIGNIFICANT CHANGE UP
MONOCYTES # BLD AUTO: 0.44 K/UL — SIGNIFICANT CHANGE UP (ref 0–0.9)
MONOCYTES NFR BLD AUTO: 13 % — SIGNIFICANT CHANGE UP (ref 2–14)
NEUTROPHILS # BLD AUTO: 1.65 K/UL — LOW (ref 1.8–7.4)
NEUTROPHILS NFR BLD AUTO: 48.7 % — SIGNIFICANT CHANGE UP (ref 43–77)
NRBC # BLD: 0 /100 WBCS — SIGNIFICANT CHANGE UP (ref 0–0)
NRBC # FLD: 0 K/UL — SIGNIFICANT CHANGE UP (ref 0–0)
ORGANISM # SPEC MICROSCOPIC CNT: ABNORMAL
PHOSPHATE SERPL-MCNC: 3.5 MG/DL — SIGNIFICANT CHANGE UP (ref 2.5–4.5)
PLATELET # BLD AUTO: 93 K/UL — LOW (ref 150–400)
POTASSIUM SERPL-MCNC: 4.2 MMOL/L — SIGNIFICANT CHANGE UP (ref 3.5–5.3)
POTASSIUM SERPL-SCNC: 4.2 MMOL/L — SIGNIFICANT CHANGE UP (ref 3.5–5.3)
POTASSIUM UR-SCNC: 25.5 MMOL/L — SIGNIFICANT CHANGE UP
RBC # BLD: 3.45 M/UL — LOW (ref 4.2–5.8)
RBC # FLD: 16.1 % — HIGH (ref 10.3–14.5)
SODIUM SERPL-SCNC: 140 MMOL/L — SIGNIFICANT CHANGE UP (ref 135–145)
SODIUM UR-SCNC: 89 MMOL/L — SIGNIFICANT CHANGE UP
SPECIMEN SOURCE: SIGNIFICANT CHANGE UP
SPECIMEN SOURCE: SIGNIFICANT CHANGE UP
WBC # BLD: 3.39 K/UL — LOW (ref 3.8–10.5)
WBC # FLD AUTO: 3.39 K/UL — LOW (ref 3.8–10.5)

## 2024-12-15 PROCEDURE — 99232 SBSQ HOSP IP/OBS MODERATE 35: CPT

## 2024-12-15 RX ADMIN — CARVEDILOL 3.12 MILLIGRAM(S): 25 TABLET, FILM COATED ORAL at 05:31

## 2024-12-15 RX ADMIN — AMPICILLIN AND SULBACTAM 200 GRAM(S): 1; .5 INJECTION, POWDER, FOR SOLUTION INTRAVENOUS at 05:32

## 2024-12-15 RX ADMIN — CHLORHEXIDINE GLUCONATE 1 APPLICATION(S): 1.2 RINSE ORAL at 05:34

## 2024-12-15 RX ADMIN — DORZOLAMIDE HYDROCHLORIDE AND TIMOLOL MALEATE 1 DROP(S): 20; 5 SOLUTION OPHTHALMIC at 05:32

## 2024-12-15 RX ADMIN — DORZOLAMIDE HYDROCHLORIDE AND TIMOLOL MALEATE 1 DROP(S): 20; 5 SOLUTION OPHTHALMIC at 17:09

## 2024-12-15 RX ADMIN — TAMSULOSIN HYDROCHLORIDE 0.4 MILLIGRAM(S): 0.4 CAPSULE ORAL at 21:51

## 2024-12-15 RX ADMIN — Medication 25 MICROGRAM(S): at 05:31

## 2024-12-15 RX ADMIN — RIVAROXABAN 15 MILLIGRAM(S): 10 TABLET, FILM COATED ORAL at 11:50

## 2024-12-15 RX ADMIN — MIDODRINE HYDROCHLORIDE 2.5 MILLIGRAM(S): 5 TABLET ORAL at 05:32

## 2024-12-15 RX ADMIN — MIDODRINE HYDROCHLORIDE 2.5 MILLIGRAM(S): 5 TABLET ORAL at 11:50

## 2024-12-15 RX ADMIN — Medication 1 TABLET(S): at 11:51

## 2024-12-15 RX ADMIN — FUROSEMIDE 20 MILLIGRAM(S): 40 TABLET ORAL at 05:30

## 2024-12-15 RX ADMIN — AMPICILLIN AND SULBACTAM 200 GRAM(S): 1; .5 INJECTION, POWDER, FOR SOLUTION INTRAVENOUS at 23:30

## 2024-12-15 RX ADMIN — Medication 5 MILLIGRAM(S): at 11:51

## 2024-12-15 RX ADMIN — AMPICILLIN AND SULBACTAM 200 GRAM(S): 1; .5 INJECTION, POWDER, FOR SOLUTION INTRAVENOUS at 11:48

## 2024-12-15 RX ADMIN — LATANOPROST 1 DROP(S): 50 SOLUTION/ DROPS OPHTHALMIC at 22:04

## 2024-12-15 RX ADMIN — AMPICILLIN AND SULBACTAM 200 GRAM(S): 1; .5 INJECTION, POWDER, FOR SOLUTION INTRAVENOUS at 17:08

## 2024-12-15 RX ADMIN — Medication 10 MILLIGRAM(S): at 21:51

## 2024-12-15 RX ADMIN — LISINOPRIL 2.5 MILLIGRAM(S): 20 TABLET ORAL at 05:31

## 2024-12-15 RX ADMIN — Medication 125 MICROGRAM(S): at 05:31

## 2024-12-15 NOTE — PHYSICAL THERAPY INITIAL EVALUATION ADULT - PERTINENT HX OF CURRENT PROBLEM, REHAB EVAL
Pt is a 89 year old male who presented as a transfer for a right preauricular abscess status post drainage 12/14.

## 2024-12-15 NOTE — PHYSICAL THERAPY INITIAL EVALUATION ADULT - ADDITIONAL COMMENTS
Pt lives alone in an apartment on the first floor, 0 steps. prior to admission Pt was independent with all mobility/ADLs and ambulated with a straight cane. Pt stated he owns a rollator and a rolling walker.     Pt. left comfortable in bedside chair, call bell in reach and in NAD. RNNicolasa, aware of session and pt current position.

## 2024-12-15 NOTE — PROGRESS NOTE ADULT - PROBLEM SELECTOR PLAN 1
- s/p I and D of right preauricular abscess  - c/w Unasyn, abscess culture growing E. faecalis and Prevotella, sensitivity reviewed  - Pain significantly improved. Tylenol prn pain  - Dispo: DC tomorrow on Augmentin, outpt f/up OMFS   output follow up in 1wk following discharge, please call #[phone] for appointment  PT recs no skilled needs. - s/p I and D of right preauricular abscess  - c/w Unasyn, abscess culture growing E. faecalis and Prevotella, sensitivity reviewed  - Pain significantly improved. Tylenol prn pain  - Dispo: DC tomorrow on Augmentin to complete 10 day course, outpt f/up OMFS   output follow up in 1wk following discharge, please call #[phone] for appointment  PT recs no skilled needs.

## 2024-12-15 NOTE — PROGRESS NOTE ADULT - SUBJECTIVE AND OBJECTIVE BOX
Dr. Barbie Simmons  Pager [phone]    PROGRESS NOTE:     Patient is a 89y old  Male who presents with a chief complaint of periauricular abscess (14 Dec 2024 17:12)      SUBJECTIVE / OVERNIGHT EVENTS: denies chest pain or sob   ADDITIONAL REVIEW OF SYSTEMS: afebrile     MEDICATIONS  (STANDING):  ampicillin/sulbactam  IVPB 3 Gram(s) IV Intermittent every 6 hours  ampicillin/sulbactam  IVPB      atorvastatin 10 milliGRAM(s) Oral at bedtime  carvedilol 3.125 milliGRAM(s) Oral every 12 hours  chlorhexidine 2% Cloths 1 Application(s) Topical <User Schedule>  digoxin     Tablet 125 MICROGram(s) Oral daily  dorzolamide 2%/timolol 0.5% Ophthalmic Solution 1 Drop(s) Both EYES every 12 hours  finasteride 5 milliGRAM(s) Oral daily  furosemide    Tablet 20 milliGRAM(s) Oral daily  latanoprost 0.005% Ophthalmic Solution 1 Drop(s) Both EYES at bedtime  levothyroxine 25 MICROGram(s) Oral daily  lisinopril 2.5 milliGRAM(s) Oral daily  midodrine. 2.5 milliGRAM(s) Oral three times a day  multivitamin 1 Tablet(s) Oral daily  rivaroxaban 15 milliGRAM(s) Oral daily  sodium chloride 0.9%. 1000 milliLiter(s) (50 mL/Hr) IV Continuous <Continuous>  tamsulosin 0.4 milliGRAM(s) Oral at bedtime    MEDICATIONS  (PRN):  acetaminophen     Tablet .. 650 milliGRAM(s) Oral every 6 hours PRN Temp greater or equal to 38C (100.4F), Mild Pain (1 - 3), Moderate Pain (4 - 6)  albuterol    90 MICROgram(s) HFA Inhaler 2 Puff(s) Inhalation every 6 hours PRN Shortness of Breath and/or Wheezing      CAPILLARY BLOOD GLUCOSE        I&O's Summary      PHYSICAL EXAM:  Vital Signs Last 24 Hrs  T(C): 36.3 (15 Dec 2024 12:53), Max: 36.4 (14 Dec 2024 15:28)  T(F): 97.3 (15 Dec 2024 12:53), Max: 97.5 (14 Dec 2024 15:28)  HR: 66 (15 Dec 2024 12:53) (59 - 71)  BP: 90/50 (15 Dec 2024 12:53) (90/50 - 114/56)  BP(mean): --  RR: 16 (15 Dec 2024 12:53) (16 - 18)  SpO2: 98% (15 Dec 2024 12:53) (96% - 98%)    Parameters below as of 15 Dec 2024 12:53  Patient On (Oxygen Delivery Method): room air      GENERAL: No acute distress  HEAD:  right preauricular abscess drained, wound dressing in place   CHEST/LUNG: Clear to auscultation bilaterally; No wheeze, equal breath sounds bilaterally   HEART: Regular rate and rhythm; No murmurs, rubs, or gallops  ABDOMEN: Soft, Nontender, Nondistended; Bowel sounds present, no organomegaly  EXTREMITIES:  2+ Peripheral Pulses, No clubbing, cyanosis, or edema  PSYCH: AAOx3, normal affect, normal behavior   NEUROLOGY: non-focal, cranial nerves intact  SKIN: Normal color, No rashes or lesions      LABS:                        11.0   3.39  )-----------( 93       ( 15 Dec 2024 05:49 )             32.4     12-15    140  |  106  |  31[H]  ----------------------------<  106[H]  4.2   |  24  |  1.34[H]    Ca    8.4      15 Dec 2024 05:49  Phos  3.5     12-15  Mg     2.10     12-15            Urinalysis Basic - ( 15 Dec 2024 05:49 )    Color: x / Appearance: x / SG: x / pH: x  Gluc: 106 mg/dL / Ketone: x  / Bili: x / Urobili: x   Blood: x / Protein: x / Nitrite: x   Leuk Esterase: x / RBC: x / WBC x   Sq Epi: x / Non Sq Epi: x / Bacteria: x        Culture - Wound Aerobic/Anaerobic (collected 13 Dec 2024 08:27)  Source: Skin/Wound  Final Report (15 Dec 2024 11:29):    Few Enterococcus faecalis    Few Prevotella disiens "Susceptibilities not performed"  Organism: Enterococcus faecalis (15 Dec 2024 11:29)  Organism: Enterococcus faecalis (15 Dec 2024 11:29)    Culture - Abscess with Gram Stain (collected 13 Dec 2024 08:27)  Source: .Abscess  Gram Stain (14 Dec 2024 00:46):    Moderate polymorphonuclear leukocytes per low power field    Few Gram Positive Rods per oil power field  Final Report (15 Dec 2024 12:15):    Few Enterococcus faecalis    Few Prevotella disiens "Susceptibilities not performed"  Organism: Enterococcus faecalis (15 Dec 2024 12:15)  Organism: Enterococcus faecalis (15 Dec 2024 12:15)        RADIOLOGY & ADDITIONAL TESTS:  Results Reviewed:   Imaging Personally Reviewed:  Electrocardiogram Personally Reviewed:    COORDINATION OF CARE:  Care Discussed with Consultants/Other Providers [Y/N]:  Prior or Outpatient Records Reviewed [Y/N]:

## 2024-12-16 ENCOUNTER — TRANSCRIPTION ENCOUNTER (OUTPATIENT)
Age: 89
End: 2024-12-16

## 2024-12-16 DIAGNOSIS — I95.9 HYPOTENSION, UNSPECIFIED: ICD-10-CM

## 2024-12-16 LAB
ANION GAP SERPL CALC-SCNC: 11 MMOL/L — SIGNIFICANT CHANGE UP (ref 7–14)
BASOPHILS # BLD AUTO: 0.02 K/UL — SIGNIFICANT CHANGE UP (ref 0–0.2)
BASOPHILS NFR BLD AUTO: 0.6 % — SIGNIFICANT CHANGE UP (ref 0–2)
BUN SERPL-MCNC: 26 MG/DL — HIGH (ref 7–23)
CALCIUM SERPL-MCNC: 8.6 MG/DL — SIGNIFICANT CHANGE UP (ref 8.4–10.5)
CHLORIDE SERPL-SCNC: 107 MMOL/L — SIGNIFICANT CHANGE UP (ref 98–107)
CO2 SERPL-SCNC: 23 MMOL/L — SIGNIFICANT CHANGE UP (ref 22–31)
CREAT SERPL-MCNC: 1.27 MG/DL — SIGNIFICANT CHANGE UP (ref 0.5–1.3)
EGFR: 53 ML/MIN/1.73M2 — LOW
EOSINOPHIL # BLD AUTO: 0.28 K/UL — SIGNIFICANT CHANGE UP (ref 0–0.5)
EOSINOPHIL NFR BLD AUTO: 8.3 % — HIGH (ref 0–6)
GLUCOSE SERPL-MCNC: 92 MG/DL — SIGNIFICANT CHANGE UP (ref 70–99)
HCT VFR BLD CALC: 33.1 % — LOW (ref 39–50)
HGB BLD-MCNC: 11.2 G/DL — LOW (ref 13–17)
IANC: 1.76 K/UL — LOW (ref 1.8–7.4)
IMM GRANULOCYTES NFR BLD AUTO: 0.3 % — SIGNIFICANT CHANGE UP (ref 0–0.9)
LYMPHOCYTES # BLD AUTO: 0.87 K/UL — LOW (ref 1–3.3)
LYMPHOCYTES # BLD AUTO: 25.7 % — SIGNIFICANT CHANGE UP (ref 13–44)
MAGNESIUM SERPL-MCNC: 2.1 MG/DL — SIGNIFICANT CHANGE UP (ref 1.6–2.6)
MCHC RBC-ENTMCNC: 32 PG — SIGNIFICANT CHANGE UP (ref 27–34)
MCHC RBC-ENTMCNC: 33.8 G/DL — SIGNIFICANT CHANGE UP (ref 32–36)
MCV RBC AUTO: 94.6 FL — SIGNIFICANT CHANGE UP (ref 80–100)
MONOCYTES # BLD AUTO: 0.45 K/UL — SIGNIFICANT CHANGE UP (ref 0–0.9)
MONOCYTES NFR BLD AUTO: 13.3 % — SIGNIFICANT CHANGE UP (ref 2–14)
NEUTROPHILS # BLD AUTO: 1.76 K/UL — LOW (ref 1.8–7.4)
NEUTROPHILS NFR BLD AUTO: 51.8 % — SIGNIFICANT CHANGE UP (ref 43–77)
NRBC # BLD: 0 /100 WBCS — SIGNIFICANT CHANGE UP (ref 0–0)
NRBC # FLD: 0 K/UL — SIGNIFICANT CHANGE UP (ref 0–0)
PHOSPHATE SERPL-MCNC: 3.2 MG/DL — SIGNIFICANT CHANGE UP (ref 2.5–4.5)
PLATELET # BLD AUTO: 85 K/UL — LOW (ref 150–400)
POTASSIUM SERPL-MCNC: 4 MMOL/L — SIGNIFICANT CHANGE UP (ref 3.5–5.3)
POTASSIUM SERPL-SCNC: 4 MMOL/L — SIGNIFICANT CHANGE UP (ref 3.5–5.3)
RBC # BLD: 3.5 M/UL — LOW (ref 4.2–5.8)
RBC # FLD: 15.9 % — HIGH (ref 10.3–14.5)
SODIUM SERPL-SCNC: 141 MMOL/L — SIGNIFICANT CHANGE UP (ref 135–145)
WBC # BLD: 3.39 K/UL — LOW (ref 3.8–10.5)
WBC # FLD AUTO: 3.39 K/UL — LOW (ref 3.8–10.5)

## 2024-12-16 PROCEDURE — 99232 SBSQ HOSP IP/OBS MODERATE 35: CPT

## 2024-12-16 RX ORDER — SODIUM CHLORIDE 9 MG/ML
500 INJECTION, SOLUTION INTRAMUSCULAR; INTRAVENOUS; SUBCUTANEOUS ONCE
Refills: 0 | Status: COMPLETED | OUTPATIENT
Start: 2024-12-16 | End: 2024-12-16

## 2024-12-16 RX ORDER — AMOXICILLIN/POTASSIUM CLAV 250-125 MG
875 TABLET ORAL
Qty: 14 | Refills: 0
Start: 2024-12-16 | End: 2024-12-22

## 2024-12-16 RX ORDER — MIDODRINE HYDROCHLORIDE 5 MG/1
2.5 TABLET ORAL ONCE
Refills: 0 | Status: DISCONTINUED | OUTPATIENT
Start: 2024-12-16 | End: 2024-12-16

## 2024-12-16 RX ADMIN — CHLORHEXIDINE GLUCONATE 1 APPLICATION(S): 1.2 RINSE ORAL at 05:56

## 2024-12-16 RX ADMIN — CARVEDILOL 3.12 MILLIGRAM(S): 25 TABLET, FILM COATED ORAL at 05:56

## 2024-12-16 RX ADMIN — LATANOPROST 1 DROP(S): 50 SOLUTION/ DROPS OPHTHALMIC at 21:32

## 2024-12-16 RX ADMIN — FUROSEMIDE 20 MILLIGRAM(S): 40 TABLET ORAL at 05:56

## 2024-12-16 RX ADMIN — LISINOPRIL 2.5 MILLIGRAM(S): 20 TABLET ORAL at 05:56

## 2024-12-16 RX ADMIN — AMPICILLIN AND SULBACTAM 200 GRAM(S): 1; .5 INJECTION, POWDER, FOR SOLUTION INTRAVENOUS at 17:23

## 2024-12-16 RX ADMIN — Medication 25 MICROGRAM(S): at 05:56

## 2024-12-16 RX ADMIN — Medication 125 MICROGRAM(S): at 05:56

## 2024-12-16 RX ADMIN — Medication 1 TABLET(S): at 11:24

## 2024-12-16 RX ADMIN — SODIUM CHLORIDE 500 MILLILITER(S): 9 INJECTION, SOLUTION INTRAMUSCULAR; INTRAVENOUS; SUBCUTANEOUS at 14:02

## 2024-12-16 RX ADMIN — DORZOLAMIDE HYDROCHLORIDE AND TIMOLOL MALEATE 1 DROP(S): 20; 5 SOLUTION OPHTHALMIC at 17:22

## 2024-12-16 RX ADMIN — AMPICILLIN AND SULBACTAM 200 GRAM(S): 1; .5 INJECTION, POWDER, FOR SOLUTION INTRAVENOUS at 05:56

## 2024-12-16 RX ADMIN — Medication 10 MILLIGRAM(S): at 21:32

## 2024-12-16 RX ADMIN — Medication 5 MILLIGRAM(S): at 11:25

## 2024-12-16 RX ADMIN — AMPICILLIN AND SULBACTAM 200 GRAM(S): 1; .5 INJECTION, POWDER, FOR SOLUTION INTRAVENOUS at 11:25

## 2024-12-16 RX ADMIN — TAMSULOSIN HYDROCHLORIDE 0.4 MILLIGRAM(S): 0.4 CAPSULE ORAL at 21:32

## 2024-12-16 RX ADMIN — RIVAROXABAN 15 MILLIGRAM(S): 10 TABLET, FILM COATED ORAL at 11:24

## 2024-12-16 RX ADMIN — DORZOLAMIDE HYDROCHLORIDE AND TIMOLOL MALEATE 1 DROP(S): 20; 5 SOLUTION OPHTHALMIC at 05:56

## 2024-12-16 NOTE — DISCHARGE NOTE PROVIDER - PROVIDER TOKENS
PROVIDER:[TOKEN:[[id number]],ESTABLISHEDPATIENT:[T]] PROVIDER:[TOKEN:[[id number]],ESTABLISHEDPATIENT:[T]],PROVIDER:[TOKEN:[[id number]]]

## 2024-12-16 NOTE — PROGRESS NOTE ADULT - PROBLEM SELECTOR PLAN 1
- s/p I and D of right preauricular abscess  - c/w Unasyn, abscess culture growing E. faecalis and Prevotella, sensitivity reviewed  - Pain significantly improved. Tylenol prn pain  - Dispo: DC tomorrow on Augmentin to complete 10 day course, outpt f/up OMFS   output follow up in 1wk following discharge, please call #[phone] for appointment  PT recs no skilled needs.

## 2024-12-16 NOTE — DIETITIAN INITIAL EVALUATION ADULT - PHYSCIAL ASSESSMENT
Patient reports UBW of ~158lbs about a month ago. Dosing weight 66.1kg/145.7lbs (12/13). Patient reports weight loss, but unknown quantity.  Weight trend is fluctuated per [facility] weight history: 73.9kg(11/11); 72.6kg(11/4); 68.9kg(10/9); 71.8kg(10/5); 68.9kg(8/27); 67.1kg(7/10); 68.9kg(5/27); 70.2kg(12/21). Weight loss of 7.8kg/10.5%x 1 month. Patient is on Lasix, fluid loss and suboptimal po intake in the past 3 weeks both contribute to weight loss. Will continue monitor his weights.

## 2024-12-16 NOTE — DIETITIAN INITIAL EVALUATION ADULT - ORAL INTAKE PTA/DIET HISTORY
Patient reports decreased PO intake in the past 3 weeks at home PTA. Denies prior use of nutrition shakes/ vitamins PTA. No other reported issues.

## 2024-12-16 NOTE — DIETITIAN INITIAL EVALUATION ADULT - REASON FOR ADMISSION
Per chart review, 89 year-old male, history of lung cancer (on Tagrisso), A.fib (on Xarelto), DM a/w periauricular abscess s/p I&D. Admitted for IV abx

## 2024-12-16 NOTE — DISCHARGE NOTE PROVIDER - NSDCFUADDINST_GEN_ALL_CORE_FT
Dressing on R Periauricular site can be changed with (4*4 gauze and tape) Qd   Dressing on R Periauricular site can be changed with (4*4 gauze and tape) Qd and wash with saline as needed   Dressing on R Periauricular site can be changed with (4*4 gauze and tape) daily and wash with saline as needed

## 2024-12-16 NOTE — PROGRESS NOTE ADULT - PROBLEM SELECTOR PLAN 1
- s/p I and D of right preauricular abscess  - c/w Unasyn, abscess culture growing E. faecalis and Prevotella, sensitivity reviewed  - Pain significantly improved. Tylenol prn pain  - Dispo: DC tomorrow on Augmentin to complete 10 day course, outpt f/up OMFS   output follow up in 1wk following discharge, please call #[phone] for appointment  PT recs no skilled needs.

## 2024-12-16 NOTE — PROGRESS NOTE ADULT - SUBJECTIVE AND OBJECTIVE BOX
Hospitalist Progress Note  Balbina Roberts MD Pager # [phone]    OVERNIGHT EVENTS: KEIRA     SUBJECTIVE / INTERVAL HPI: Patient seen and examined at bedside. Patient reports that the pain in his ear is significantly improved. He denies any other symptoms.     VITAL SIGNS:  Vital Signs Last 24 Hrs  T(C): 36.8 (16 Dec 2024 13:18), Max: 36.8 (16 Dec 2024 13:18)  T(F): 98.3 (16 Dec 2024 13:18), Max: 98.3 (16 Dec 2024 13:18)  HR: 78 (16 Dec 2024 13:18) (66 - 78)  BP: 94/48 (16 Dec 2024 15:15) (85/60 - 124/50)  BP(mean): --  RR: 18 (16 Dec 2024 13:18) (17 - 18)  SpO2: 98% (16 Dec 2024 13:18) (98% - 98%)    Parameters below as of 16 Dec 2024 13:18  Patient On (Oxygen Delivery Method): room air        PHYSICAL EXAM:    General: Comfortable  HEENT: NC/AT; PERRL, anicteric sclera; MMM. Right ear with bandage - crusted over with blood - no purulent material observed.   Neck: supple  Cardiovascular: +S1/S2; RRR  Respiratory: CTA B/L; no W/R/R  Gastrointestinal: soft, NT/ND; +BSx4  Extremities: WWP; no edema, clubbing or cyanosis  Vascular: 2+ radial, DP/PT pulses B/L  Neurological: AAOx3; no focal deficits    MEDICATIONS:  MEDICATIONS  (STANDING):  ampicillin/sulbactam  IVPB 3 Gram(s) IV Intermittent every 6 hours  ampicillin/sulbactam  IVPB      atorvastatin 10 milliGRAM(s) Oral at bedtime  carvedilol 3.125 milliGRAM(s) Oral every 12 hours  chlorhexidine 2% Cloths 1 Application(s) Topical <User Schedule>  digoxin     Tablet 125 MICROGram(s) Oral daily  dorzolamide 2%/timolol 0.5% Ophthalmic Solution 1 Drop(s) Both EYES every 12 hours  finasteride 5 milliGRAM(s) Oral daily  latanoprost 0.005% Ophthalmic Solution 1 Drop(s) Both EYES at bedtime  levothyroxine 25 MICROGram(s) Oral daily  multivitamin 1 Tablet(s) Oral daily  rivaroxaban 15 milliGRAM(s) Oral daily  sodium chloride 0.9%. 1000 milliLiter(s) (50 mL/Hr) IV Continuous <Continuous>  tamsulosin 0.4 milliGRAM(s) Oral at bedtime    MEDICATIONS  (PRN):  acetaminophen     Tablet .. 650 milliGRAM(s) Oral every 6 hours PRN Temp greater or equal to 38C (100.4F), Mild Pain (1 - 3), Moderate Pain (4 - 6)  albuterol    90 MICROgram(s) HFA Inhaler 2 Puff(s) Inhalation every 6 hours PRN Shortness of Breath and/or Wheezing      ALLERGIES:  Allergies    No Known Allergies    Intolerances        LABS:                        11.2   3.39  )-----------( 85       ( 16 Dec 2024 06:53 )             33.1     12-16    141  |  107  |  26[H]  ----------------------------<  92  4.0   |  23  |  1.27    Ca    8.6      16 Dec 2024 06:53  Phos  3.2     12-16  Mg     2.10     12-16        Urinalysis Basic - ( 16 Dec 2024 06:53 )    Color: x / Appearance: x / SG: x / pH: x  Gluc: 92 mg/dL / Ketone: x  / Bili: x / Urobili: x   Blood: x / Protein: x / Nitrite: x   Leuk Esterase: x / RBC: x / WBC x   Sq Epi: x / Non Sq Epi: x / Bacteria: x      CAPILLARY BLOOD GLUCOSE          RADIOLOGY & ADDITIONAL TESTS: Reviewed.    ASSESSMENT:    PLAN:

## 2024-12-16 NOTE — CHART NOTE - NSCHARTNOTEFT_GEN_A_CORE
Per OMFS change R preauricular dressing daily with 4x4 gauze, wash with saline as needed. Pt and pt's friend to be educated at bedside at discharge.

## 2024-12-16 NOTE — DISCHARGE NOTE PROVIDER - NSDCMRMEDTOKEN_GEN_ALL_CORE_FT
Albuterol (Eqv-ProAir HFA) 90 mcg/inh inhalation aerosol: 2 puff(s) inhaled every 6 hours  Azopt 1% ophthalmic suspension: 1 drop(s) to each affected eye 3 times a day  carvedilol 3.125 mg oral tablet: 1 tab(s) orally every 12 hours  Centrum oral tablet: 1 tab(s) orally once a day  Combigan 0.2%-0.5% ophthalmic solution: 1 drop(s) to each affected eye every 12 hours  digoxin 125 mcg (0.125 mg) oral tablet: 1 tab(s) orally every other day  dorzolamide 22.3 mg-timolol 6.8 mg/mL eye drops:   finasteride 5 mg tablet: orally once a day  furosemide 20 mg oral tablet: 1 tab(s) orally once a day  levothyroxine 25 mcg tablet: 1 tab(s) orally once a day  lisinopril 2.5 mg oral tablet: 1 tab(s) orally once a day (at bedtime) Start on 11/21/24  Lumigan 0.01% ophthalmic solution: 1 drop(s) to each affected eye once a day (in the evening)  midodrine 2.5 mg oral tablet: 1 tab(s) orally 3 times a day  Rocklatan 0.02 %-0.005 % eye drops:   simvastatin 10 mg oral tablet: 1 tab(s) orally once a day (at bedtime)  Tagrisso 80 mg oral tablet: 80 milligram(s) orally once a day  tamsulosin 0.4 mg capsule: 1 tab(s) orally once a day (at bedtime)  Xarelto 15 mg oral tablet: 1 tab(s) orally once a day (in the evening)   Albuterol (Eqv-ProAir HFA) 90 mcg/inh inhalation aerosol: 2 puff(s) inhaled every 6 hours  amoxicillin-clavulanate 875 mg-125 mg oral tablet: 875 milligram(s) orally every 12 hours Take 1 pill every 12 hours through 12/22 MDD: 2 pills  Azopt 1% ophthalmic suspension: 1 drop(s) to each affected eye 3 times a day  carvedilol 3.125 mg oral tablet: 1 tab(s) orally every 12 hours  Centrum oral tablet: 1 tab(s) orally once a day  Combigan 0.2%-0.5% ophthalmic solution: 1 drop(s) to each affected eye every 12 hours  digoxin 125 mcg (0.125 mg) oral tablet: 1 tab(s) orally every other day  dorzolamide 22.3 mg-timolol 6.8 mg/mL eye drops:   finasteride 5 mg tablet: orally once a day  furosemide 20 mg oral tablet: 1 tab(s) orally once a day  levothyroxine 25 mcg tablet: 1 tab(s) orally once a day  lisinopril 2.5 mg oral tablet: 1 tab(s) orally once a day (at bedtime) Start on 11/21/24  Lumigan 0.01% ophthalmic solution: 1 drop(s) to each affected eye once a day (in the evening)  midodrine 2.5 mg oral tablet: 1 tab(s) orally 3 times a day  Rocklatan 0.02 %-0.005 % eye drops:   simvastatin 10 mg oral tablet: 1 tab(s) orally once a day (at bedtime)  Tagrisso 80 mg oral tablet: 80 milligram(s) orally once a day  tamsulosin 0.4 mg capsule: 1 tab(s) orally once a day (at bedtime)  Xarelto 15 mg oral tablet: 1 tab(s) orally once a day (in the evening)   Albuterol (Eqv-ProAir HFA) 90 mcg/inh inhalation aerosol: 2 puff(s) inhaled every 6 hours  amoxicillin-clavulanate 875 mg-125 mg oral tablet: 875 milligram(s) orally every 12 hours Take 1 pill every 12 hours through 12/22 MDD: 2 pills  Azopt 1% ophthalmic suspension: 1 drop(s) to each affected eye 3 times a day  Centrum oral tablet: 1 tab(s) orally once a day  Combigan 0.2%-0.5% ophthalmic solution: 1 drop(s) to each affected eye every 12 hours  digoxin 125 mcg (0.125 mg) oral tablet: 1 tab(s) orally every other day  dorzolamide 22.3 mg-timolol 6.8 mg/mL eye drops:   finasteride 5 mg tablet: orally once a day  levothyroxine 25 mcg tablet: 1 tab(s) orally once a day  Lumigan 0.01% ophthalmic solution: 1 drop(s) to each affected eye once a day (in the evening)  midodrine 5 mg oral tablet: 1 tab(s) orally 3 times a day  Rocklatan 0.02 %-0.005 % eye drops:   simvastatin 10 mg oral tablet: 1 tab(s) orally once a day (at bedtime)  Tagrisso 80 mg oral tablet: 80 milligram(s) orally once a day  tamsulosin 0.4 mg capsule: 1 tab(s) orally once a day (at bedtime)  Toprol-XL 25 mg oral tablet, extended release: 1 tab(s) orally once a day Please take 1/2 tablet daily  Xarelto 15 mg oral tablet: 1 tab(s) orally once a day (in the evening)

## 2024-12-16 NOTE — DIETITIAN INITIAL EVALUATION ADULT - OTHER INFO
Met patient at bedside. Patient alert, A&O x4. Patient reports he has better appetite in house. Per RN flowsheets intake is %. Patient denies any in house nausea, vomiting, diarrhea, or constipation. Last BM noted on 12/14 per RN flowsheets. Bowel regimen is not in placed. No reported chewing/swallowing issues. No known food allergies. Medications notable for Lasix. Multivitamins noted for micronutrient coverage. Recommend d/c DASH/TLC diet and liberalize to regular diet to promote PO intake. RD will provide Orgain Shake 1x daily (220cal, 16gm pro) to optimize nutrition. Encourage PO intake and honor food preferences as able. Educated on the importance of meeting adequate protein-energy needs. Discussed strategies to increase calorie/protein intake while appetite is suboptimal. Encouraged patient to consume small frequent meals to increase protein calorie intake. Encouraged consumption of oral nutrition supplement to optimize protein-energy intake. RD to remain available for further nutritional interventions as indicated

## 2024-12-16 NOTE — DIETITIAN INITIAL EVALUATION ADULT - PROBLEM SELECTOR PLAN 2
- Continue Tagrisso- not on formulary. patient's aide/family would need to bring from home. Pt lives alone   - f/u outpt oncologist Dr Martinez

## 2024-12-16 NOTE — DISCHARGE NOTE PROVIDER - DETAILS OF MALNUTRITION DIAGNOSIS/DIAGNOSES
This patient has been assessed with a concern for Malnutrition and was treated during this hospitalization for the following Nutrition diagnosis/diagnoses:     -  12/16/2024: Moderate protein-calorie malnutrition

## 2024-12-16 NOTE — DISCHARGE NOTE PROVIDER - ATTENDING DISCHARGE PHYSICAL EXAMINATION:
.  VITAL SIGNS:  T(C): 36.8 (12-19-24 @ 13:43), Max: 36.8 (12-19-24 @ 13:43)  T(F): 98.3 (12-19-24 @ 13:43), Max: 98.3 (12-19-24 @ 13:43)  HR: 71 (12-19-24 @ 13:43) (71 - 80)  BP: 99/52 (12-19-24 @ 13:43) (99/52 - 128/69)  BP(mean): --  RR: 16 (12-19-24 @ 13:43) (16 - 18)  SpO2: 98% (12-19-24 @ 13:43) (98% - 99%)  Wt(kg): --    PHYSICAL EXAM:    Constitutional: Comfortable and resting in bed   Head: NC/AT  Eyes: PERRL, EOMI, anicteric sclera  ENT: no nasal discharge; uvula midline, no oropharyngeal erythema or exudates; MMM - right ear abscess wound without purulence  - healing.   Neck: supple; no JVD or thyromegaly  Respiratory: CTA B/L; no W/R/R, no retractions  Cardiac: +S1/S2; RRR; no M/R/G; PMI non-displaced  Gastrointestinal: abdomen soft, NT/ND; no rebound or guarding; +BSx4  Extremities: WWP, no clubbing or cyanosis; no peripheral edema  Musculoskeletal: NROM x4; no joint swelling, tenderness or erythema  Vascular: 2+ radial, femoral, DP/PT pulses B/L  Neurologic: AAOx3; CNII-XII grossly intact; no focal deficits  Psychiatric: affect and characteristics of appearance, verbalizations, behaviors are appropriate

## 2024-12-16 NOTE — DISCHARGE NOTE PROVIDER - NSDCCPCAREPLAN_GEN_ALL_CORE_FT
PRINCIPAL DISCHARGE DIAGNOSIS  Diagnosis: Periauricular abscess, right  Assessment and Plan of Treatment: You had an abscess on your right face that was drained by oral maxillofacial surgery (OMFS) with a drain placed. Culture of the abscess fluid showed bacteria so you were started on IV antibiotics. On discharge you were prescribed oral antibiotics, please take these every 12 hrs through 12/22. Please change the dressing on your wound site every day and clean with saline as needed. Follow up with OMFS outpatient within a week for follow up about your drain.      SECONDARY DISCHARGE DIAGNOSES  Diagnosis: Metastatic non-small cell lung cancer  Assessment and Plan of Treatment: Please follow up with your oncologist Dr. Martinez.    Diagnosis: Hypotension  Assessment and Plan of Treatment: Your blood pressure was low during admission so your lisinopril and lasix were stopped. You were given IV fluids to increase your blood pressure. Please follow up with your primary doctor to follow up about adjusting your blood pressure medication.    Diagnosis: BPH (benign prostatic hyperplasia)  Assessment and Plan of Treatment: Please continue your finasteride and tamsulosin as instructed and follow up with your primary doctor for further monitoring.    Diagnosis: Glaucoma (increased eye pressure)  Assessment and Plan of Treatment: Please continue your dorzolamide and latanoprost eye drops as instructed and follow up with your primary doctor for further monitoring.    Diagnosis: Chronic atrial fibrillation  Assessment and Plan of Treatment:      PRINCIPAL DISCHARGE DIAGNOSIS  Diagnosis: Periauricular abscess, right  Assessment and Plan of Treatment: You had an abscess on your right face that was drained by oral maxillofacial surgery (OMFS) with a drain placed. Culture of the abscess fluid showed bacteria so you were started on IV antibiotics. On discharge you were prescribed oral antibiotics, please take these every 12 hrs through 12/22. Please change the dressing on your wound site every day and clean with saline as needed. Follow up with OMFS outpatient within a week for follow up about your drain.      SECONDARY DISCHARGE DIAGNOSES  Diagnosis: Metastatic non-small cell lung cancer  Assessment and Plan of Treatment: Please follow up with your oncologist Dr. Martinez for further monitoring.    Diagnosis: Hypotension  Assessment and Plan of Treatment: Your blood pressure was low during admission so your lisinopril and lasix were stopped. You were given IV fluids to increase your blood pressure. Please follow up with your primary doctor to follow up about adjusting your blood pressure medication.    Diagnosis: BPH (benign prostatic hyperplasia)  Assessment and Plan of Treatment: Please continue your finasteride and tamsulosin as instructed and follow up with your primary doctor for further monitoring.    Diagnosis: Glaucoma (increased eye pressure)  Assessment and Plan of Treatment: Please continue your dorzolamide and latanoprost eye drops as instructed and follow up with your primary doctor for further monitoring.    Diagnosis: Chronic atrial fibrillation  Assessment and Plan of Treatment: You have a history of irregular heart rate, which was stable during this admission. Continue your Xarelto and carvedilol as instructed and follow up with your primary doctor or cardiologist for further monitoring.     PRINCIPAL DISCHARGE DIAGNOSIS  Diagnosis: Periauricular abscess, right  Assessment and Plan of Treatment: You had an abscess on your right face that was drained by oral maxillofacial surgery (OMFS) with a drain placed. Culture of the abscess fluid showed bacteria so you were started on IV antibiotics. On discharge you were prescribed oral antibiotics, please take these every 12 hrs through 12/22. Please change the dressing on your wound site every day and clean with saline as needed. Follow up with OMFS outpatient within a week for follow up about your drain.      SECONDARY DISCHARGE DIAGNOSES  Diagnosis: Metastatic non-small cell lung cancer  Assessment and Plan of Treatment: Please follow up with your oncologist Dr. Martinez for further monitoring.    Diagnosis: Hypotension  Assessment and Plan of Treatment: Your blood pressure was low during admission so your lisinopril and lasix were stopped. You were given IV fluids to increase your blood pressure. Please follow up with your primary doctor to follow up about adjusting your blood pressure medication.    Diagnosis: BPH (benign prostatic hyperplasia)  Assessment and Plan of Treatment: Please continue your finasteride and tamsulosin as instructed and follow up with your primary doctor for further monitoring.    Diagnosis: Glaucoma (increased eye pressure)  Assessment and Plan of Treatment: Please continue your dorzolamide and latanoprost eye drops as instructed and follow up with your primary doctor for further monitoring.    Diagnosis: Chronic atrial fibrillation  Assessment and Plan of Treatment: You have a history of irregular heart rate, which was stable during this admission. Continue your Xarelto, Coreg, and Digoxin as instructed and follow up with your primary doctor or cardiologist for further monitoring.    Diagnosis: Hypothyroidism  Assessment and Plan of Treatment: Your TSH was stable this admission. Continue your levothyroxine as instructed and follow up with your primary doctor for further montioring.     PRINCIPAL DISCHARGE DIAGNOSIS  Diagnosis: Periauricular abscess, right  Assessment and Plan of Treatment: You had an abscess on your right face that was drained by oral maxillofacial surgery (OMFS) with a drain placed. Culture of the abscess fluid showed bacteria so you were started on IV antibiotics. On discharge you were prescribed oral antibiotics, please take these every 12 hrs through 12/22. Please change the dressing on your wound site every day and clean with saline as needed. [place] [facility] [place] You have an appointment scheduled with them (Dr. Carr at [facility] 01/08/2025 at 9 AM.      SECONDARY DISCHARGE DIAGNOSES  Diagnosis: Chronic atrial fibrillation  Assessment and Plan of Treatment: You have a history of irregular heart rate, which was stable during this admission. We stopped your Coreg and you were started on Metoprolol 12.5 mg daily instead. Please continue your Xarelto, Digoxin and Metoprolol as instructed and follow up with your primary doctor or cardiologist for further monitoring.    Diagnosis: BPH (benign prostatic hyperplasia)  Assessment and Plan of Treatment: Please continue your finasteride and tamsulosin as instructed and follow up with your primary doctor for further monitoring.    Diagnosis: Glaucoma (increased eye pressure)  Assessment and Plan of Treatment: Please continue your dorzolamide and latanoprost eye drops as instructed and follow up with your primary doctor for further monitoring.    Diagnosis: Metastatic non-small cell lung cancer  Assessment and Plan of Treatment: Please follow up with your oncologist Dr. Martinez for further monitoring.    Diagnosis: Hypotension  Assessment and Plan of Treatment: Your blood pressure was low during admission so your lisinopril and lasix were stopped. You were given IV fluids to increase your blood pressure. You were started on Midodrine. Please continue to take Midodrine as instructed. Please follow up with your primary doctor to follow up about adjusting your blood pressure medication.    Diagnosis: Hypothyroidism  Assessment and Plan of Treatment: Your TSH was stable this admission. Continue your levothyroxine as instructed and follow up with your primary doctor for further montioring.

## 2024-12-16 NOTE — DIETITIAN INITIAL EVALUATION ADULT - ADD RECOMMEND
1. Recommend d/c DASH/TLC diet and liberalize to regular diet to promote PO intake  2. RD will provide Orgain Shake 1x daily (220cal, 16gm pro).   3. Encourage PO intake and honor food preferences as able.   4. Please consistently document %PO intake in nursing flowsheets  5. Monitor PO intake, Labs, weights, BMs, and skin integrity.

## 2024-12-16 NOTE — PROGRESS NOTE ADULT - SUBJECTIVE AND OBJECTIVE BOX
{\rtf1\[id number]\ansi\nkfpvhl7641\ftnbj\uc1\deff0  {\fonttbl{\f0 \fnil Segoe UI;}{\f1 \fnil \fcharset0 Segoe UI;}{\f2 \fnil Times New Sanya;}}  {\colortbl ;\aan319\bfvgp830\fagx062 ;\red0\green0\blue0 ;\red0\green0\dwkm904 ;\red0\green0\blue0 ;}  {\stylesheet{\f0\fs20 Normal;}{\cs1 Default Paragraph Font;}{\cs2\f0\fs16 Line Number;}{\cs3\f2\fs24\ul\cf3 Hyperlink;}}  {\*\revtbl{Unknown;}}  \mbdvty32049\xsmkaw76904\vyktd8323\riynd8149\aasan2150\ifytj8067\xxjwrvd126\tmenznp178\nogrowautofit\lcgpwk102\formshade\nofeaturethrottle1\dntblnsbdb\fet4\aendnotes\aftnnrlc\pgbrdrhead\pgbrdrfoot  \sectd\ljazzn51782\mgmmtz82688\guttersxn0\dwegkkkn0376\sqfgwbqa2227\ylswwsot7104\mjvuczhh8350\vocxbjp271\vcyrmlz086\sbkpage\pgncont\pgndec  \plain\plain\f0\fs24\ql\plain\f0\fs24\plain\f0\fs20\lapw1795\hich\f0\dbch\f0\loch\f0\fs20 c/w IV Unasyn\par  PT to to f/u ---> 12/15 PT \par  \par  \plain\f1\fs16\favp9359\hich\f1\dbch\f1\loch\f1\cf2\fs16 Pain significantly improved. Tylenol prn pain\par  - Dispo: DC tomorrow on Augmentin to complete 10 day course, outpt f/up OMFS\par   output follow up in 1wk following discharge, please call #[phone] for appointment\par  PT recs no skilled needs.\plain\f1\fs16\whdm9994\hich\f1\dbch\f1\loch\f1\cf2\fs16\strike\plain\f0\fs20\zbxw4066\hich\f0\dbch\f0\loch\f0\fs20\par  }   Patient is a 89y old  Male who presents with a chief complaint of periauricular abscess (16 Dec 2024 09:02)      SUBJECTIVE / OVERNIGHT EVENTS:  Patient is Ox3  Resting in bed.  Explained we are looking to transition to PO Augmentin for home as per prior MD  He wants to see OMF as he still has dressing to L ear---> OMGF called to see [id number]  Also he needs PT to f/u    MEDICATIONS  (STANDING):  ampicillin/sulbactam  IVPB 3 Gram(s) IV Intermittent every 6 hours  ampicillin/sulbactam  IVPB      atorvastatin 10 milliGRAM(s) Oral at bedtime  carvedilol 3.125 milliGRAM(s) Oral every 12 hours  chlorhexidine 2% Cloths 1 Application(s) Topical <User Schedule>  digoxin     Tablet 125 MICROGram(s) Oral daily  dorzolamide 2%/timolol 0.5% Ophthalmic Solution 1 Drop(s) Both EYES every 12 hours  finasteride 5 milliGRAM(s) Oral daily  furosemide    Tablet 20 milliGRAM(s) Oral daily  latanoprost 0.005% Ophthalmic Solution 1 Drop(s) Both EYES at bedtime  levothyroxine 25 MICROGram(s) Oral daily  lisinopril 2.5 milliGRAM(s) Oral daily  multivitamin 1 Tablet(s) Oral daily  rivaroxaban 15 milliGRAM(s) Oral daily  sodium chloride 0.9%. 1000 milliLiter(s) (50 mL/Hr) IV Continuous <Continuous>  tamsulosin 0.4 milliGRAM(s) Oral at bedtime    MEDICATIONS  (PRN):  acetaminophen     Tablet .. 650 milliGRAM(s) Oral every 6 hours PRN Temp greater or equal to 38C (100.4F), Mild Pain (1 - 3), Moderate Pain (4 - 6)  albuterol    90 MICROgram(s) HFA Inhaler 2 Puff(s) Inhalation every 6 hours PRN Shortness of Breath and/or Wheezing          PHYSICAL EXAM:  GENERAL: NAD,   HEAD:  Atraumatic, Normocephalic  EYES: EOMI, PERRLA, conjunctiva and sclera clear  R ear --> still with packing that has dry  blood  NECK: Supple, No JVD  CHEST/LUNG: Clear to auscultation bilaterally; No wheeze  HEART: Regular rate and rhythm; No murmurs, rubs, or gallops  ABDOMEN: Soft, Nontender, Nondistended; Bowel sounds present  EXTREMITIES:  2+ Peripheral Pulses, No clubbing, cyanosis, or edema  PSYCH: AAOx3  NEUROLOGY: non-focal  [place] at bedside    LABS:                        11.2   3.39  )-----------( 85       ( 16 Dec 2024 06:53 )             33.1     12-16    141  |  107  |  26[H]  ----------------------------<  92  4.0   |  23  |  1.27    Ca    8.6      16 Dec 2024 06:53  Phos  3.2     12-16  Mg     2.10     12-16        Consultant(s) Notes Reviewed:      Care Discussed with Consultants/Other Providers:  VASU Drew [id number]    c/w IV Unasyn  PT to to f/u ---> 12/15 PT   OMF called to see [phone]    Pain significantly improved. Tylenol prn pain  - Dispo: DC tomorrow on Augmentin to complete 10 day course, outpt f/up OMFS   output follow up in 1wk following discharge, please call #[phone] for appointment  PT recs no skilled needs.?????--> PT to see

## 2024-12-16 NOTE — DIETITIAN INITIAL EVALUATION ADULT - PERTINENT LABORATORY DATA
12-16    141  |  107  |  26[H]  ----------------------------<  92  4.0   |  23  |  1.27    Ca    8.6      16 Dec 2024 06:53  Phos  3.2     12-16  Mg     2.10     12-16

## 2024-12-16 NOTE — DISCHARGE NOTE PROVIDER - HOSPITAL COURSE
89 y/o male, with a PmHx of Lung Cancer (on Tagrisso), BPH, HLD, Hypothyroidism, HTN, Glaucoma, Afib (on Xarelto), transferred from [facility] for a right preauricular abscess present for one week with concern by initial team for early perichondritis. OMFS consulted, s/p drainage 12/14, wound culture and gram stain positive for enterococcus faecalis, provitella disiens on IV Unasyn transitioned to PO augmentin. C/c/b HONG, FeNa indeterminate, IVF prn. C/c/b hypotension, s/p NS bolus and midodrine. Fu repeat BP.    ---HEM/ONC---  #hx NSLCLC on Tagruisso  - follows with Dr. Doris Martinez at [facility]    ---ENT---  #Preauricular Abcess  - 12/12 CT Maxofacial - Right preauricular multiloculated abscess measuring approximately 2.4 x 1.5 cm in greatest dimension. There is inflammation in the surrounding subcutaneous soft tissues. Rest of the exam is unremarkable.  - OMSF consulted,  s/p drainage 12/13  - on Unasyn IV, susceptibilties sensitive to Unasyn --> transition to augmentin PO on d/c per pharmacy  - Per OMSF change dressing qd wiht 4x4 gauze and clean with saline as needed  - Wound Culture: ++Enterococcus feacalis, few provitella disiens    #Glaucoma  - Continue home eye drops.    ---ENDO---  #Hypothyroidism  - monitor tft's  - cont Levothyroxine    ------  #BPH  - cont Flomax and Finasteride.    ---CV---  #Chronic atrial fibrillation.   - Hypercoagulable state secondary to active malignancy  - CHADS VASC 3  - Continue Digoxin, Coreg and Xarelto.    #HTN  - Continue Lasix, Coreg.    #Hypotension  - Pt hypotensive to 70s with repeat manual 85/60- on 4 BP meds and reports takes midodrine 2.5 mg at home  - Lasix and lisinopril on hold__  - S/p 500 ml bolus --> fu repeat BP___     89 y/o male, with a PmHx of Lung Cancer (on Tagrisso), BPH, HLD, Hypothyroidism, HTN, Glaucoma, Afib (on Xarelto), transferred from [facility] for a right preauricular abscess present for one week with concern by initial team for early perichondritis. OMFS consulted, s/p drainage 12/14, wound culture and gram stain positive for enterococcus faecalis, provitella disiens on IV Unasyn transitioned to PO augmentin. C/c/b HONG, FeNa indeterminate, IVF prn. C/c/b hypotension, s/p NS bolus and lasix and lisinopril held. Monitor BP off meds.    ---HEM/ONC---  #hx NSLCLC on Tagruisso  - follows with Dr. Doris Martinez at [facility]    ---ENT---  #Preauricular Abcess  - 12/12 CT Maxofacial - Right preauricular multiloculated abscess measuring approximately 2.4 x 1.5 cm in greatest dimension. There is inflammation in the surrounding subcutaneous soft tissues. Rest of the exam is unremarkable.  - OMSF consulted,  s/p drainage 12/13  - on Unasyn IV, susceptibilties sensitive to Unasyn --> transition to augmentin PO on d/c per pharmacy  - Per OMSF change dressing qd wiht 4x4 gauze and clean with saline as needed, pt and friend were educated  - Wound Culture: ++Enterococcus feacalis, few provitella disiens  - Fu outpt 12/18 9 am with Dr. Carr at [facility] dental clini    #Glaucoma  - Continue home eye drops.    ---ENDO---  #Hypothyroidism  - monitor tft's  - cont Levothyroxine    ------  #BPH  - cont Flomax and Finasteride.    ---CV---  #Chronic atrial fibrillation.   - Hypercoagulable state secondary to active malignancy  - CHADS VASC 3  - Continue Digoxin, Coreg and Xarelto.    #HTN  - Continue Lasix, Coreg.    #Hypotension  - Pt hypotensive to 70s with repeat manual 85/60- on 4 BP meds and reports takes midodrine 2.5 mg at home  - Lasix and lisinopril on hold__  - S/p 500 ml bolus --> repeat BP 94/48  - Cont to monitor BP while off lasix and lisinopril       89 y/o male, with a PmHx of Lung Cancer (on Tagrisso), BPH, HLD, Hypothyroidism, HTN, Glaucoma, Afib (on Xarelto), transferred from [facility] for a right preauricular abscess present for one week with concern by initial team for early perichondritis. OMFS consulted, s/p drainage 12/14, wound culture and gram stain positive for enterococcus faecalis, provitella disiens on IV Unasyn transitioned to PO augmentin. C/c/b HONG, FeNa indeterminate, IVF prn. C/c/b hypotension, s/p NS bolus and lasix and lisinopril held. Monitor BP off meds.    ---HEM/ONC---  #hx NSLCLC on Tagruisso  - follows with Dr. Doris Martinez at [facility]    ---ENT---  #Preauricular Abcess  - 12/12 CT Maxofacial - Right preauricular multiloculated abscess measuring approximately 2.4 x 1.5 cm in greatest dimension. There is inflammation in the surrounding subcutaneous soft tissues. Rest of the exam is unremarkable.  - OMSF consulted,  s/p drainage 12/13  - on Unasyn IV, susceptibilties sensitive to Unasyn --> transition to augmentin PO on d/c per pharmacy  - Per OMSF change dressing qd with 4x4 gauze and clean with saline as needed, pt and friend were educated  - Wound Culture: ++Enterococcus feacalis, few provitella disiens  - Fu outpt 12/18 9 am with Dr. Carr at [facility] dental clinic     #Glaucoma  - Continue home eye drops.    ---ENDO---  #Hypothyroidism  - monitor tft's  - cont Levothyroxine    ------  #BPH  - cont Flomax and Finasteride.    ---CV---  #Chronic atrial fibrillation.   - Hypercoagulable state secondary to active malignancy  - CHADS VASC 3  - Continue Digoxin, Coreg and Xarelto.    #HTN  - Continue Coreg  -- Lasix and Lisonopril on hold for hypotension    #Hypotension  - Pt hypotensive to 70s with repeat manual 85/60- on 4 BP meds and reports takes midodrine 2.5 mg at home  - Lasix and lisinopril on hold  - S/p 500 ml bolus --> repeat BP 94/48  - Cont to monitor BP while off lasix and lisinopril       89 y/o male, with a PmHx of Lung Cancer (on Tagrisso), BPH, HLD, Hypothyroidism, HTN, Glaucoma, Afib (on Xarelto), transferred from [facility] for a right preauricular abscess present for one week with concern by initial team for early perichondritis. OMFS consulted, s/p drainage 12/14, wound culture and gram stain positive for enterococcus faecalis, provitella disiens on IV Unasyn transitioned to PO augmentin. C/c/b HONG, FeNa indeterminate, IVF prn. C/c/b hypotension, s/p NS bolus and lasix and lisinopril held. Monitor BP off meds.    ---HEM/ONC---  #hx NSLCLC on Tagruisso  - follows with Dr. Doris Martinez at [facility]    ---ENT---  #Preauricular Abcess  - 12/12 CT Maxofacial - Right preauricular multiloculated abscess measuring approximately 2.4 x 1.5 cm in greatest dimension. There is inflammation in the surrounding subcutaneous soft tissues. Rest of the exam is unremarkable.  - OMSF consulted,  s/p drainage 12/13  - on Unasyn IV, susceptibilties sensitive to Unasyn --> transition to augmentin PO on d/c per pharmacy  - Per OMSF change dressing qd with 4x4 gauze and clean with saline as needed, pt and friend were educated  - Wound Culture: ++Enterococcus feacalis, few provitella disiens  - Fu outpt 01/089 am with Dr. Carr at [facility] dental [facility]     #Glaucoma  - Continue home eye drops.    ---ENDO---  #Hypothyroidism  - monitor tft's  - cont Levothyroxine    ------  #BPH  - cont Flomax and Finasteride.    ---CV---  #Chronic atrial fibrillation.   - Hypercoagulable state secondary to active malignancy  - CHADS VASC 3  - D/c'ed Coreg  - Continue Digoxin, Metoprolol and Xarelto.    #HTN  - Stopped Coreg, started Metoprolol 25--> switched to 12.5 d/t hypotension    -- Lasix and Lisonopril on hold for hypotension    #Hypotension  - Pt hypotensive to 70s with repeat manual 85/60- on 4 BP meds and reports takes midodrine 2.5 mg at home  - Lasix and lisinopril on hold  - S/p 500 ml bolus --> repeat BP 94/48  - Cont to monitor BP while off lasix and lisinopril  - Started midodrine 5 mg TID

## 2024-12-16 NOTE — DISCHARGE NOTE PROVIDER - NSDCFUADDAPPT_GEN_ALL_CORE_FT
Follow up with oral and maxillofacial surgery outpatient in 1 week following discharge, please call #[phone] for appointment   Follow up with oral and maxillofacial surgery outpatient in 1 week following discharge, please call #[phone] for appointment.       Follow up with oral and maxillofacial surgery outpatient on 12/18 at 9 am (be there by 8:30 for paperwork) with Dr Carr. Enter the main entrance by the [facility]'s [place] and ask for the dental clinic. #[phone] for further questions.       Follow up with oral and maxillofacial surgery outpatient on 01/08 at 9 am (be there by 8:30 for paperwork) with Dr Carr. Enter the main entrance by the [facility] and ask for the dental clinic. Please call #[phone] for further questions or concerns.

## 2024-12-16 NOTE — DISCHARGE NOTE PROVIDER - CARE PROVIDERS DIRECT ADDRESSES
,[email or web address]

## 2024-12-16 NOTE — DISCHARGE NOTE PROVIDER - CARE PROVIDER_API CALL
Doris Martinez  Medical Oncology  [address], [address]  [place] [address]  Phone: [phone]  Fax: [phone]  Established Patient  Follow Up Time:    Doris Martinez  Medical Oncology  [address], [address]  [place] [address]  Phone: [phone]  Fax: [phone]  Established Patient  Follow Up Time:     Moshe Carr  Oral/Maxillofacial Surgery  [address] [place] [address]  Phone: [phone]  Fax: [phone]  Follow Up Time:

## 2024-12-16 NOTE — CHART NOTE - NSCHARTNOTEFT_GEN_A_CORE
OMFS      Dressing on R Periauricular site can be changed with (4*4 gauze and tape) Qd.     Output follow up in 1wk following discharge, please call #[phone] to book an appointment.

## 2024-12-16 NOTE — DIETITIAN INITIAL EVALUATION ADULT - PERTINENT MEDS FT
MEDICATIONS  (STANDING):  ampicillin/sulbactam  IVPB 3 Gram(s) IV Intermittent every 6 hours  ampicillin/sulbactam  IVPB      atorvastatin 10 milliGRAM(s) Oral at bedtime  carvedilol 3.125 milliGRAM(s) Oral every 12 hours  chlorhexidine 2% Cloths 1 Application(s) Topical <User Schedule>  digoxin     Tablet 125 MICROGram(s) Oral daily  dorzolamide 2%/timolol 0.5% Ophthalmic Solution 1 Drop(s) Both EYES every 12 hours  finasteride 5 milliGRAM(s) Oral daily  furosemide    Tablet 20 milliGRAM(s) Oral daily  latanoprost 0.005% Ophthalmic Solution 1 Drop(s) Both EYES at bedtime  levothyroxine 25 MICROGram(s) Oral daily  lisinopril 2.5 milliGRAM(s) Oral daily  multivitamin 1 Tablet(s) Oral daily  rivaroxaban 15 milliGRAM(s) Oral daily  sodium chloride 0.9%. 1000 milliLiter(s) (50 mL/Hr) IV Continuous <Continuous>  tamsulosin 0.4 milliGRAM(s) Oral at bedtime    MEDICATIONS  (PRN):  acetaminophen     Tablet .. 650 milliGRAM(s) Oral every 6 hours PRN Temp greater or equal to 38C (100.4F), Mild Pain (1 - 3), Moderate Pain (4 - 6)  albuterol    90 MICROgram(s) HFA Inhaler 2 Puff(s) Inhalation every 6 hours PRN Shortness of Breath and/or Wheezing

## 2024-12-16 NOTE — PROGRESS NOTE ADULT - ASSESSMENT
89 year-old male, history of lung cancer (on Tagrisso), A.fib (on Xarelto), DM a/w periauricular abscess s/p I&D. Admitted for IV abx  89 year-old male, history of lung cancer (on Tagrisso), A.fib (on Xarelto), DM a/w periauricular abscess s/p I&D. Admitted for IV abx     F/u PT in this 89 yo man going home alone  f/u OMF

## 2024-12-17 LAB
ADD ON TEST-SPECIMEN IN LAB: SIGNIFICANT CHANGE UP
ALBUMIN SERPL ELPH-MCNC: 3.4 G/DL — SIGNIFICANT CHANGE UP (ref 3.3–5)
ALP SERPL-CCNC: 87 U/L — SIGNIFICANT CHANGE UP (ref 40–120)
ALT FLD-CCNC: 27 U/L — SIGNIFICANT CHANGE UP (ref 4–41)
ANION GAP SERPL CALC-SCNC: 10 MMOL/L — SIGNIFICANT CHANGE UP (ref 7–14)
AST SERPL-CCNC: 31 U/L — SIGNIFICANT CHANGE UP (ref 4–40)
BASOPHILS # BLD AUTO: 0.03 K/UL — SIGNIFICANT CHANGE UP (ref 0–0.2)
BASOPHILS NFR BLD AUTO: 0.8 % — SIGNIFICANT CHANGE UP (ref 0–2)
BILIRUB SERPL-MCNC: 0.6 MG/DL — SIGNIFICANT CHANGE UP (ref 0.2–1.2)
BUN SERPL-MCNC: 22 MG/DL — SIGNIFICANT CHANGE UP (ref 7–23)
CALCIUM SERPL-MCNC: 8.5 MG/DL — SIGNIFICANT CHANGE UP (ref 8.4–10.5)
CHLORIDE SERPL-SCNC: 108 MMOL/L — HIGH (ref 98–107)
CO2 SERPL-SCNC: 24 MMOL/L — SIGNIFICANT CHANGE UP (ref 22–31)
CREAT SERPL-MCNC: 1.35 MG/DL — HIGH (ref 0.5–1.3)
CULTURE RESULTS: SIGNIFICANT CHANGE UP
CULTURE RESULTS: SIGNIFICANT CHANGE UP
EGFR: 49 ML/MIN/1.73M2 — LOW
EOSINOPHIL # BLD AUTO: 0.34 K/UL — SIGNIFICANT CHANGE UP (ref 0–0.5)
EOSINOPHIL NFR BLD AUTO: 9.1 % — HIGH (ref 0–6)
GLUCOSE SERPL-MCNC: 88 MG/DL — SIGNIFICANT CHANGE UP (ref 70–99)
HCT VFR BLD CALC: 33.1 % — LOW (ref 39–50)
HGB BLD-MCNC: 11.2 G/DL — LOW (ref 13–17)
IANC: 1.97 K/UL — SIGNIFICANT CHANGE UP (ref 1.8–7.4)
IMM GRANULOCYTES NFR BLD AUTO: 0.3 % — SIGNIFICANT CHANGE UP (ref 0–0.9)
LYMPHOCYTES # BLD AUTO: 0.94 K/UL — LOW (ref 1–3.3)
LYMPHOCYTES # BLD AUTO: 25.1 % — SIGNIFICANT CHANGE UP (ref 13–44)
MAGNESIUM SERPL-MCNC: 2 MG/DL — SIGNIFICANT CHANGE UP (ref 1.6–2.6)
MCHC RBC-ENTMCNC: 32.1 PG — SIGNIFICANT CHANGE UP (ref 27–34)
MCHC RBC-ENTMCNC: 33.8 G/DL — SIGNIFICANT CHANGE UP (ref 32–36)
MCV RBC AUTO: 94.8 FL — SIGNIFICANT CHANGE UP (ref 80–100)
MONOCYTES # BLD AUTO: 0.45 K/UL — SIGNIFICANT CHANGE UP (ref 0–0.9)
MONOCYTES NFR BLD AUTO: 12 % — SIGNIFICANT CHANGE UP (ref 2–14)
NEUTROPHILS # BLD AUTO: 1.97 K/UL — SIGNIFICANT CHANGE UP (ref 1.8–7.4)
NEUTROPHILS NFR BLD AUTO: 52.7 % — SIGNIFICANT CHANGE UP (ref 43–77)
NRBC # BLD: 0 /100 WBCS — SIGNIFICANT CHANGE UP (ref 0–0)
NRBC # FLD: 0 K/UL — SIGNIFICANT CHANGE UP (ref 0–0)
PHOSPHATE SERPL-MCNC: 3.2 MG/DL — SIGNIFICANT CHANGE UP (ref 2.5–4.5)
PLATELET # BLD AUTO: 85 K/UL — LOW (ref 150–400)
POTASSIUM SERPL-MCNC: 4 MMOL/L — SIGNIFICANT CHANGE UP (ref 3.5–5.3)
POTASSIUM SERPL-SCNC: 4 MMOL/L — SIGNIFICANT CHANGE UP (ref 3.5–5.3)
PROT SERPL-MCNC: 5.9 G/DL — LOW (ref 6–8.3)
RBC # BLD: 3.49 M/UL — LOW (ref 4.2–5.8)
RBC # FLD: 15.9 % — HIGH (ref 10.3–14.5)
SODIUM SERPL-SCNC: 142 MMOL/L — SIGNIFICANT CHANGE UP (ref 135–145)
SPECIMEN SOURCE: SIGNIFICANT CHANGE UP
SPECIMEN SOURCE: SIGNIFICANT CHANGE UP
WBC # BLD: 3.74 K/UL — LOW (ref 3.8–10.5)
WBC # FLD AUTO: 3.74 K/UL — LOW (ref 3.8–10.5)

## 2024-12-17 PROCEDURE — 99232 SBSQ HOSP IP/OBS MODERATE 35: CPT

## 2024-12-17 RX ORDER — METOPROLOL TARTRATE 100 MG/1
25 TABLET, FILM COATED ORAL DAILY
Refills: 0 | Status: DISCONTINUED | OUTPATIENT
Start: 2024-12-18 | End: 2024-12-18

## 2024-12-17 RX ORDER — SODIUM CHLORIDE 9 MG/ML
1000 INJECTION, SOLUTION INTRAMUSCULAR; INTRAVENOUS; SUBCUTANEOUS
Refills: 0 | Status: DISCONTINUED | OUTPATIENT
Start: 2024-12-17 | End: 2024-12-18

## 2024-12-17 RX ADMIN — AMPICILLIN AND SULBACTAM 200 GRAM(S): 1; .5 INJECTION, POWDER, FOR SOLUTION INTRAVENOUS at 00:13

## 2024-12-17 RX ADMIN — AMPICILLIN AND SULBACTAM 200 GRAM(S): 1; .5 INJECTION, POWDER, FOR SOLUTION INTRAVENOUS at 05:29

## 2024-12-17 RX ADMIN — Medication 125 MICROGRAM(S): at 05:28

## 2024-12-17 RX ADMIN — DORZOLAMIDE HYDROCHLORIDE AND TIMOLOL MALEATE 1 DROP(S): 20; 5 SOLUTION OPHTHALMIC at 05:29

## 2024-12-17 RX ADMIN — TAMSULOSIN HYDROCHLORIDE 0.4 MILLIGRAM(S): 0.4 CAPSULE ORAL at 21:44

## 2024-12-17 RX ADMIN — AMPICILLIN AND SULBACTAM 200 GRAM(S): 1; .5 INJECTION, POWDER, FOR SOLUTION INTRAVENOUS at 18:30

## 2024-12-17 RX ADMIN — SODIUM CHLORIDE 75 MILLILITER(S): 9 INJECTION, SOLUTION INTRAMUSCULAR; INTRAVENOUS; SUBCUTANEOUS at 10:28

## 2024-12-17 RX ADMIN — CHLORHEXIDINE GLUCONATE 1 APPLICATION(S): 1.2 RINSE ORAL at 05:29

## 2024-12-17 RX ADMIN — Medication 25 MICROGRAM(S): at 05:28

## 2024-12-17 RX ADMIN — Medication 1 TABLET(S): at 11:46

## 2024-12-17 RX ADMIN — Medication 10 MILLIGRAM(S): at 21:44

## 2024-12-17 RX ADMIN — CARVEDILOL 3.12 MILLIGRAM(S): 25 TABLET, FILM COATED ORAL at 05:28

## 2024-12-17 RX ADMIN — DORZOLAMIDE HYDROCHLORIDE AND TIMOLOL MALEATE 1 DROP(S): 20; 5 SOLUTION OPHTHALMIC at 18:30

## 2024-12-17 RX ADMIN — Medication 5 MILLIGRAM(S): at 11:46

## 2024-12-17 RX ADMIN — AMPICILLIN AND SULBACTAM 200 GRAM(S): 1; .5 INJECTION, POWDER, FOR SOLUTION INTRAVENOUS at 11:43

## 2024-12-17 RX ADMIN — CARVEDILOL 3.12 MILLIGRAM(S): 25 TABLET, FILM COATED ORAL at 18:30

## 2024-12-17 RX ADMIN — LATANOPROST 1 DROP(S): 50 SOLUTION/ DROPS OPHTHALMIC at 21:44

## 2024-12-17 RX ADMIN — RIVAROXABAN 15 MILLIGRAM(S): 10 TABLET, FILM COATED ORAL at 11:46

## 2024-12-17 NOTE — CONSULT NOTE ADULT - ASSESSMENT
TTE 11/13/2024   1. Left atrium is mildly dilated.   2. Moderate to severe mitral regurgitation.   3. Aortic valve anatomy cannot be determined with reduced systolic excursion. Mild aortic stenosis.   4. The peak transaortic velocity is 1.28 m/s, peak transaortic gradient is 6.6 mmHg and mean transaortic gradient is 2.7 mmHg with an LVOT/aortic valve VTI ratio of 0.62. The effective orifice area is estimated at 1.95 cm² by the continuity equation.   5. Left ventricular systolic function is mildly decreased with an ejection fraction of 48 % by Naidu's method of disks.   6. The right ventricle is not well visualized.   7. Estimated pulmonary artery systolic pressure is 46 mmHg, consistent with mild pulmonary hypertension.      A/P    89 year-old male, history of Non­Small Cell Lung Cancer on oral chemotherapy, systolic HF with  mod-severe MR, Chronic AFib on Xarelto, HTN, BPH, Hypothyroidism DM a/w periauricular abscess s/p I&D.     1. systolic HF  - TTE 11/2024 mildly decrease LVSF  - SBP 80s-110s  - d/vasiliy lasix 20, lisinopril 2.5mg, coreg 3.125 bid  - started Toprol 25 Qd, c/w digoxin 125mcg qd    2. Periauricular abscess, right.   - s/p I and D of right preauricular abscess  - on Abx    3. Chronic atrial fibrillation.   - Digoxin, Xarelto  - switch coreg to toprol 25    4. CKD3  - Cr elevated  - held lasix and lisinopril, on IVF   - monitor respi status, monitor Cr

## 2024-12-17 NOTE — CONSULT NOTE ADULT - SUBJECTIVE AND OBJECTIVE BOX
Edd Franks MD  Interventional Cardiology / Endovascular Specialist  [place] Office : [address] [place] [address] [place] [address]  Tel:   [place] Office : [address] [facility] [place] [address]  Tel: [phone]  Cell : [phone]    HISTORY OF PRESENTING ILLNESS:  HPI:  89 year-old male, history of lung cancer (on Tagrisso), A.fib (on Xarelto), DM sent from onc clinic for R ear infection.  Pt has had R ear pain for about 1 week. His pcp prescribed a course of Cefpodoxime. However, his pain has not improved and the day prior to admission noted discharge from his ear. He denies any fevers, no change in hearing.  He was seen at oncology clinic and sent to ED at [facility] for ear infection refractory to oral abx. CT at [facility] showed periauricular abscess so pt was transferred to  [facility] for ENT eval. Pt is now s/p I and D. His pain has improved.       	  MEDICATIONS:  carvedilol 3.125 milliGRAM(s) Oral every 12 hours  digoxin     Tablet 125 MICROGram(s) Oral daily  rivaroxaban 15 milliGRAM(s) Oral daily    ampicillin/sulbactam  IVPB 3 Gram(s) IV Intermittent every 6 hours  ampicillin/sulbactam  IVPB        albuterol    90 MICROgram(s) HFA Inhaler 2 Puff(s) Inhalation every 6 hours PRN        atorvastatin 10 milliGRAM(s) Oral at bedtime  finasteride 5 milliGRAM(s) Oral daily  levothyroxine 25 MICROGram(s) Oral daily    chlorhexidine 2% Cloths 1 Application(s) Topical <User Schedule>  dorzolamide 2%/timolol 0.5% Ophthalmic Solution 1 Drop(s) Both EYES every 12 hours  latanoprost 0.005% Ophthalmic Solution 1 Drop(s) Both EYES at bedtime  multivitamin 1 Tablet(s) Oral daily  sodium chloride 0.9%. 1000 milliLiter(s) IV Continuous <Continuous>  tamsulosin 0.4 milliGRAM(s) Oral at bedtime      PAST MEDICAL/SURGICAL HISTORY  PAST MEDICAL & SURGICAL HISTORY:  Essential hypertension      Hyperlipidemia, unspecified hyperlipidemia type      BPH without obstruction/lower urinary tract symptoms      Glaucoma (increased eye pressure)  both eyes      Atrial fibrillation      H/O hemicolectomy  1973      Cataract extraction status of left eye  w/lens implant - 2014      History of lung biopsy  2016 - negative results          SOCIAL HISTORY: Substance Use (street drugs): ( x ) never used  (  ) other:    FAMILY HISTORY:  No pertinent family history in first degree relatives            PHYSICAL EXAM:  T(C): 36.8 (12-17-24 @ 12:59), Max: 36.8 (12-17-24 @ 05:12)  HR: 50 (12-17-24 @ 12:59) (50 - 67)  BP: 90/44 (12-17-24 @ 12:59) (84/36 - 118/63)  RR: 18 (12-17-24 @ 12:59) (18 - 18)  SpO2: 99% (12-17-24 @ 12:59) (95% - 99%)  Wt(kg): --  I&O's Summary    16 Dec 2024 07:01  -  17 Dec 2024 07:00  --------------------------------------------------------  IN: 750 mL / OUT: 0 mL / NET: 750 mL          GENERAL: NAD  EYES: EOMI, PERRLA, conjunctiva and sclera clear  ENMT: No tonsillar erythema, exudates, or enlargement; Moist mucous membranes, Good dentition, No lesions  Cardiovascular: Normal S1 S2, No JVD, No murmurs, No edema  Respiratory: Lungs clear to auscultation	  Gastrointestinal:  Soft, Non-tender, + BS	  Extremities: No edema                                    11.2   3.74  )-----------( 85       ( 17 Dec 2024 07:15 )             33.1     12-17    142  |  108[H]  |  22  ----------------------------<  88  4.0   |  24  |  1.35[H]    Ca    8.5      17 Dec 2024 07:15  Phos  3.2     12-17  Mg     2.00     12-17    TPro  5.9[L]  /  Alb  3.4  /  TBili  0.6  /  DBili  x   /  AST  31  /  ALT  27  /  AlkPhos  87  12-17    proBNP:   Lipid Profile:   HgA1c:   TSH:     Consultant(s) Notes Reviewed:  [x ] YES  [ ] NO    Care Discussed with Consultants/Other Providers [ x] YES  [ ] NO    Imaging Personally Reviewed independently:  [x] YES  [ ] NO    All labs, radiologic studies, vitals, orders and medications list reviewed. Patient is seen and examined at bedside. Case discussed with medical team.

## 2024-12-17 NOTE — PROGRESS NOTE ADULT - PROBLEM SELECTOR PLAN 1
- s/p I and D of right preauricular abscess  - c/w Unasyn, abscess culture growing E. faecalis and Prevotella, sensitivity reviewed  - Pain significantly improved. Tylenol prn pain  - Dispo: DC tomorrow on Augmentin to complete 10 day course, outpt f/up OMFS   output follow up in 1wk following discharge, please call #[phone] for appointment  PT recs no skilled needs.

## 2024-12-17 NOTE — PROGRESS NOTE ADULT - SUBJECTIVE AND OBJECTIVE BOX
Hospitalist Progress Note  Balbina Roberts MD Pager # [phone]    OVERNIGHT EVENTS: KEIRA    SUBJECTIVE / INTERVAL HPI: Patient seen and examined at bedside. Patient reports no dizziness/lightheadedness. Pain in the ear has improved.     VITAL SIGNS:  Vital Signs Last 24 Hrs  T(C): 36.8 (17 Dec 2024 12:59), Max: 36.8 (17 Dec 2024 05:12)  T(F): 98.3 (17 Dec 2024 12:59), Max: 98.3 (17 Dec 2024 12:59)  HR: 50 (17 Dec 2024 12:59) (50 - 67)  BP: 90/44 (17 Dec 2024 12:59) (84/36 - 118/63)  BP(mean): --  RR: 18 (17 Dec 2024 12:59) (18 - 18)  SpO2: 99% (17 Dec 2024 12:59) (95% - 99%)    Parameters below as of 17 Dec 2024 12:59  Patient On (Oxygen Delivery Method): room air        PHYSICAL EXAM:    General: Comfortable and resting in bed   HEENT: NC/AT; PERRL, anicteric sclera; MMM. Right ear with dried blood - no purulence.   Neck: supple  Cardiovascular: +S1/S2; RRR  Respiratory: CTA B/L; no W/R/R  Gastrointestinal: soft, NT/ND; +BSx4  Extremities: WWP; no edema, clubbing or cyanosis  Vascular: 2+ radial, DP/PT pulses B/L  Neurological: AAOx3; no focal deficits    MEDICATIONS:  MEDICATIONS  (STANDING):  ampicillin/sulbactam  IVPB 3 Gram(s) IV Intermittent every 6 hours  ampicillin/sulbactam  IVPB      atorvastatin 10 milliGRAM(s) Oral at bedtime  carvedilol 3.125 milliGRAM(s) Oral every 12 hours  chlorhexidine 2% Cloths 1 Application(s) Topical <User Schedule>  digoxin     Tablet 125 MICROGram(s) Oral daily  dorzolamide 2%/timolol 0.5% Ophthalmic Solution 1 Drop(s) Both EYES every 12 hours  finasteride 5 milliGRAM(s) Oral daily  latanoprost 0.005% Ophthalmic Solution 1 Drop(s) Both EYES at bedtime  levothyroxine 25 MICROGram(s) Oral daily  multivitamin 1 Tablet(s) Oral daily  rivaroxaban 15 milliGRAM(s) Oral daily  sodium chloride 0.9%. 1000 milliLiter(s) (75 mL/Hr) IV Continuous <Continuous>  tamsulosin 0.4 milliGRAM(s) Oral at bedtime    MEDICATIONS  (PRN):  acetaminophen     Tablet .. 650 milliGRAM(s) Oral every 6 hours PRN Temp greater or equal to 38C (100.4F), Mild Pain (1 - 3), Moderate Pain (4 - 6)  albuterol    90 MICROgram(s) HFA Inhaler 2 Puff(s) Inhalation every 6 hours PRN Shortness of Breath and/or Wheezing      ALLERGIES:  Allergies    No Known Allergies    Intolerances        LABS:                        11.2   3.74  )-----------( 85       ( 17 Dec 2024 07:15 )             33.1     12-17    142  |  108[H]  |  22  ----------------------------<  88  4.0   |  24  |  1.35[H]    Ca    8.5      17 Dec 2024 07:15  Phos  3.2     12-17  Mg     2.00     12-17    TPro  5.9[L]  /  Alb  3.4  /  TBili  0.6  /  DBili  x   /  AST  31  /  ALT  27  /  AlkPhos  87  12-17      Urinalysis Basic - ( 17 Dec 2024 07:15 )    Color: x / Appearance: x / SG: x / pH: x  Gluc: 88 mg/dL / Ketone: x  / Bili: x / Urobili: x   Blood: x / Protein: x / Nitrite: x   Leuk Esterase: x / RBC: x / WBC x   Sq Epi: x / Non Sq Epi: x / Bacteria: x      CAPILLARY BLOOD GLUCOSE          RADIOLOGY & ADDITIONAL TESTS: Reviewed.    ASSESSMENT:    PLAN:

## 2024-12-18 ENCOUNTER — TRANSCRIPTION ENCOUNTER (OUTPATIENT)
Age: 89
End: 2024-12-18

## 2024-12-18 ENCOUNTER — APPOINTMENT (OUTPATIENT)
Age: 89
End: 2024-12-18

## 2024-12-18 LAB
ALBUMIN SERPL ELPH-MCNC: 3.3 G/DL — SIGNIFICANT CHANGE UP (ref 3.3–5)
ALP SERPL-CCNC: 85 U/L — SIGNIFICANT CHANGE UP (ref 40–120)
ALT FLD-CCNC: 26 U/L — SIGNIFICANT CHANGE UP (ref 4–41)
ANION GAP SERPL CALC-SCNC: 10 MMOL/L — SIGNIFICANT CHANGE UP (ref 7–14)
AST SERPL-CCNC: 29 U/L — SIGNIFICANT CHANGE UP (ref 4–40)
BASOPHILS # BLD AUTO: 0.02 K/UL — SIGNIFICANT CHANGE UP (ref 0–0.2)
BASOPHILS NFR BLD AUTO: 0.5 % — SIGNIFICANT CHANGE UP (ref 0–2)
BILIRUB SERPL-MCNC: 0.6 MG/DL — SIGNIFICANT CHANGE UP (ref 0.2–1.2)
BUN SERPL-MCNC: 24 MG/DL — HIGH (ref 7–23)
CALCIUM SERPL-MCNC: 8.7 MG/DL — SIGNIFICANT CHANGE UP (ref 8.4–10.5)
CHLORIDE SERPL-SCNC: 108 MMOL/L — HIGH (ref 98–107)
CO2 SERPL-SCNC: 23 MMOL/L — SIGNIFICANT CHANGE UP (ref 22–31)
CREAT SERPL-MCNC: 1.23 MG/DL — SIGNIFICANT CHANGE UP (ref 0.5–1.3)
EGFR: 55 ML/MIN/1.73M2 — LOW
EOSINOPHIL # BLD AUTO: 0.36 K/UL — SIGNIFICANT CHANGE UP (ref 0–0.5)
EOSINOPHIL NFR BLD AUTO: 8.7 % — HIGH (ref 0–6)
GLUCOSE SERPL-MCNC: 87 MG/DL — SIGNIFICANT CHANGE UP (ref 70–99)
HCT VFR BLD CALC: 33.1 % — LOW (ref 39–50)
HGB BLD-MCNC: 11.2 G/DL — LOW (ref 13–17)
IANC: 2.28 K/UL — SIGNIFICANT CHANGE UP (ref 1.8–7.4)
IMM GRANULOCYTES NFR BLD AUTO: 0.5 % — SIGNIFICANT CHANGE UP (ref 0–0.9)
LYMPHOCYTES # BLD AUTO: 1.02 K/UL — SIGNIFICANT CHANGE UP (ref 1–3.3)
LYMPHOCYTES # BLD AUTO: 24.6 % — SIGNIFICANT CHANGE UP (ref 13–44)
MAGNESIUM SERPL-MCNC: 2 MG/DL — SIGNIFICANT CHANGE UP (ref 1.6–2.6)
MCHC RBC-ENTMCNC: 32.1 PG — SIGNIFICANT CHANGE UP (ref 27–34)
MCHC RBC-ENTMCNC: 33.8 G/DL — SIGNIFICANT CHANGE UP (ref 32–36)
MCV RBC AUTO: 94.8 FL — SIGNIFICANT CHANGE UP (ref 80–100)
MONOCYTES # BLD AUTO: 0.44 K/UL — SIGNIFICANT CHANGE UP (ref 0–0.9)
MONOCYTES NFR BLD AUTO: 10.6 % — SIGNIFICANT CHANGE UP (ref 2–14)
NEUTROPHILS # BLD AUTO: 2.28 K/UL — SIGNIFICANT CHANGE UP (ref 1.8–7.4)
NEUTROPHILS NFR BLD AUTO: 55.1 % — SIGNIFICANT CHANGE UP (ref 43–77)
NRBC # BLD: 0 /100 WBCS — SIGNIFICANT CHANGE UP (ref 0–0)
NRBC # FLD: 0 K/UL — SIGNIFICANT CHANGE UP (ref 0–0)
PHOSPHATE SERPL-MCNC: 3.3 MG/DL — SIGNIFICANT CHANGE UP (ref 2.5–4.5)
PLATELET # BLD AUTO: 89 K/UL — LOW (ref 150–400)
POTASSIUM SERPL-MCNC: 4.1 MMOL/L — SIGNIFICANT CHANGE UP (ref 3.5–5.3)
POTASSIUM SERPL-SCNC: 4.1 MMOL/L — SIGNIFICANT CHANGE UP (ref 3.5–5.3)
PROT SERPL-MCNC: 5.7 G/DL — LOW (ref 6–8.3)
RBC # BLD: 3.49 M/UL — LOW (ref 4.2–5.8)
RBC # FLD: 15.9 % — HIGH (ref 10.3–14.5)
SODIUM SERPL-SCNC: 141 MMOL/L — SIGNIFICANT CHANGE UP (ref 135–145)
WBC # BLD: 4.14 K/UL — SIGNIFICANT CHANGE UP (ref 3.8–10.5)
WBC # FLD AUTO: 4.14 K/UL — SIGNIFICANT CHANGE UP (ref 3.8–10.5)

## 2024-12-18 PROCEDURE — 99232 SBSQ HOSP IP/OBS MODERATE 35: CPT

## 2024-12-18 RX ORDER — MIDODRINE HYDROCHLORIDE 5 MG/1
5 TABLET ORAL THREE TIMES A DAY
Refills: 0 | Status: DISCONTINUED | OUTPATIENT
Start: 2024-12-18 | End: 2024-12-19

## 2024-12-18 RX ORDER — SODIUM CHLORIDE 9 MG/ML
1000 INJECTION, SOLUTION INTRAMUSCULAR; INTRAVENOUS; SUBCUTANEOUS
Refills: 0 | Status: DISCONTINUED | OUTPATIENT
Start: 2024-12-18 | End: 2024-12-19

## 2024-12-18 RX ORDER — METOPROLOL TARTRATE 100 MG/1
12.5 TABLET, FILM COATED ORAL DAILY
Refills: 0 | Status: DISCONTINUED | OUTPATIENT
Start: 2024-12-19 | End: 2024-12-19

## 2024-12-18 RX ORDER — SODIUM CHLORIDE 9 MG/ML
1000 INJECTION, SOLUTION INTRAMUSCULAR; INTRAVENOUS; SUBCUTANEOUS
Refills: 0 | Status: DISCONTINUED | OUTPATIENT
Start: 2024-12-18 | End: 2024-12-18

## 2024-12-18 RX ADMIN — Medication 5 MILLIGRAM(S): at 11:46

## 2024-12-18 RX ADMIN — TAMSULOSIN HYDROCHLORIDE 0.4 MILLIGRAM(S): 0.4 CAPSULE ORAL at 21:29

## 2024-12-18 RX ADMIN — MIDODRINE HYDROCHLORIDE 5 MILLIGRAM(S): 5 TABLET ORAL at 16:19

## 2024-12-18 RX ADMIN — AMPICILLIN AND SULBACTAM 200 GRAM(S): 1; .5 INJECTION, POWDER, FOR SOLUTION INTRAVENOUS at 17:40

## 2024-12-18 RX ADMIN — AMPICILLIN AND SULBACTAM 200 GRAM(S): 1; .5 INJECTION, POWDER, FOR SOLUTION INTRAVENOUS at 00:31

## 2024-12-18 RX ADMIN — LATANOPROST 1 DROP(S): 50 SOLUTION/ DROPS OPHTHALMIC at 21:29

## 2024-12-18 RX ADMIN — RIVAROXABAN 15 MILLIGRAM(S): 10 TABLET, FILM COATED ORAL at 11:46

## 2024-12-18 RX ADMIN — Medication 25 MICROGRAM(S): at 06:00

## 2024-12-18 RX ADMIN — Medication 10 MILLIGRAM(S): at 21:29

## 2024-12-18 RX ADMIN — AMPICILLIN AND SULBACTAM 200 GRAM(S): 1; .5 INJECTION, POWDER, FOR SOLUTION INTRAVENOUS at 11:46

## 2024-12-18 RX ADMIN — SODIUM CHLORIDE 62.5 MILLILITER(S): 9 INJECTION, SOLUTION INTRAMUSCULAR; INTRAVENOUS; SUBCUTANEOUS at 12:40

## 2024-12-18 RX ADMIN — Medication 1 TABLET(S): at 11:46

## 2024-12-18 RX ADMIN — AMPICILLIN AND SULBACTAM 200 GRAM(S): 1; .5 INJECTION, POWDER, FOR SOLUTION INTRAVENOUS at 23:01

## 2024-12-18 RX ADMIN — Medication 125 MICROGRAM(S): at 06:00

## 2024-12-18 RX ADMIN — METOPROLOL TARTRATE 25 MILLIGRAM(S): 100 TABLET, FILM COATED ORAL at 06:00

## 2024-12-18 RX ADMIN — DORZOLAMIDE HYDROCHLORIDE AND TIMOLOL MALEATE 1 DROP(S): 20; 5 SOLUTION OPHTHALMIC at 05:17

## 2024-12-18 RX ADMIN — AMPICILLIN AND SULBACTAM 200 GRAM(S): 1; .5 INJECTION, POWDER, FOR SOLUTION INTRAVENOUS at 05:14

## 2024-12-18 RX ADMIN — CHLORHEXIDINE GLUCONATE 1 APPLICATION(S): 1.2 RINSE ORAL at 05:17

## 2024-12-18 NOTE — DISCHARGE NOTE NURSING/CASE MANAGEMENT/SOCIAL WORK - NSDCPEFALRISK_GEN_ALL_CORE
For information on Fall & Injury Prevention, visit: [email or web address] OR  [email or web address] OR  https://www.cdc.gov/steadi/patient.html

## 2024-12-18 NOTE — DISCHARGE NOTE NURSING/CASE MANAGEMENT/SOCIAL WORK - FINANCIAL ASSISTANCE
[facility] provides services at a reduced cost to those who are determined to be eligible through [facility]’s financial assistance program. Information regarding [facility]’s financial assistance program can be found by going to [email or web address] or by calling [phone].

## 2024-12-18 NOTE — DISCHARGE NOTE NURSING/CASE MANAGEMENT/SOCIAL WORK - NSDCFUADDAPPT_GEN_ALL_CORE_FT
Follow up with oral and maxillofacial surgery outpatient on 12/18 at 9 am (be there by 8:30 for paperwork) with Dr Carr. [facility] [place] and ask for the dental clinic. #[phone] for further questions.

## 2024-12-18 NOTE — PROGRESS NOTE ADULT - SUBJECTIVE AND OBJECTIVE BOX
Edd Franks MD   Interventional Cardiology / Endovascular Specialist   [place] Office : [phone] [place] [address] [place] [address]  Tel:    [place] Office : [address] [facility] [place] [address]  Tel: [phone]   Cell : [phone]      Pt seen and examined at bedside, not in distress  	  MEDICATIONS:  digoxin     Tablet 125 MICROGram(s) Oral daily  metoprolol succinate ER 12.5 milliGRAM(s) Oral daily  rivaroxaban 15 milliGRAM(s) Oral daily    ampicillin/sulbactam  IVPB 3 Gram(s) IV Intermittent every 6 hours  ampicillin/sulbactam  IVPB        albuterol    90 MICROgram(s) HFA Inhaler 2 Puff(s) Inhalation every 6 hours PRN        atorvastatin 10 milliGRAM(s) Oral at bedtime  finasteride 5 milliGRAM(s) Oral daily  levothyroxine 25 MICROGram(s) Oral daily    chlorhexidine 2% Cloths 1 Application(s) Topical <User Schedule>  latanoprost 0.005% Ophthalmic Solution 1 Drop(s) Both EYES at bedtime  multivitamin 1 Tablet(s) Oral daily  sodium chloride 0.9%. 1000 milliLiter(s) IV Continuous <Continuous>  tamsulosin 0.4 milliGRAM(s) Oral at bedtime      PAST MEDICAL/SURGICAL HISTORY  PAST MEDICAL & SURGICAL HISTORY:  Essential hypertension      Hyperlipidemia, unspecified hyperlipidemia type      BPH without obstruction/lower urinary tract symptoms      Glaucoma (increased eye pressure)  both eyes      Atrial fibrillation      H/O hemicolectomy  1973      Cataract extraction status of left eye  w/lens implant - 2014      History of lung biopsy  2016 - negative results          SOCIAL HISTORY: Substance Use (street drugs): ( x ) never used  (  ) other:    FAMILY HISTORY:  No pertinent family history in first degree relatives          PHYSICAL EXAM:  T(C): 36.3 (12-18-24 @ 12:14), Max: 36.7 (12-17-24 @ 20:57)  HR: 66 (12-18-24 @ 12:14) (66 - 73)  BP: 92/48 (12-18-24 @ 12:08) (92/48 - 121/61)  RR: 17 (12-18-24 @ 12:14) (17 - 18)  SpO2: 97% (12-18-24 @ 12:14) (95% - 97%)  Wt(kg): --  I&O's Summary    17 Dec 2024 07:01  -  18 Dec 2024 07:00  --------------------------------------------------------  IN: 750 mL / OUT: 0 mL / NET: 750 mL          GENERAL: NAD  EYES:   PERRLA   ENMT:   Moist mucous membranes, Good dentition, No lesions  Cardiovascular: Normal S1 S2, No JVD, No murmurs, No edema  Respiratory: Lungs clear to auscultation	  Gastrointestinal:  Soft, Non-tender, + BS	  Extremities: no edema                                    11.2   4.14  )-----------( 89       ( 18 Dec 2024 05:45 )             33.1     12-18    141  |  108[H]  |  24[H]  ----------------------------<  87  4.1   |  23  |  1.23    Ca    8.7      18 Dec 2024 05:45  Phos  3.3     12-18  Mg     2.00     12-18    TPro  5.7[L]  /  Alb  3.3  /  TBili  0.6  /  DBili  x   /  AST  29  /  ALT  26  /  AlkPhos  85  12-18    proBNP:   Lipid Profile:   HgA1c:   TSH:     Consultant(s) Notes Reviewed:  [x ] YES  [ ] NO    Care Discussed with Consultants/Other Providers [ x] YES  [ ] NO    Imaging Personally Reviewed independently:  [x] YES  [ ] NO    All labs, radiologic studies, vitals, orders and medications list reviewed. Patient is seen and examined at bedside. Case discussed with medical team.

## 2024-12-18 NOTE — PROGRESS NOTE ADULT - ASSESSMENT
TTE 11/13/2024   1. Left atrium is mildly dilated.   2. Moderate to severe mitral regurgitation.   3. Aortic valve anatomy cannot be determined with reduced systolic excursion. Mild aortic stenosis.   4. The peak transaortic velocity is 1.28 m/s, peak transaortic gradient is 6.6 mmHg and mean transaortic gradient is 2.7 mmHg with an LVOT/aortic valve VTI ratio of 0.62. The effective orifice area is estimated at 1.95 cm² by the continuity equation.   5. Left ventricular systolic function is mildly decreased with an ejection fraction of 48 % by Naidu's method of disks.   6. The right ventricle is not well visualized.   7. Estimated pulmonary artery systolic pressure is 46 mmHg, consistent with mild pulmonary hypertension.      A/P    89 year-old male, history of Non­Small Cell Lung Cancer on oral chemotherapy, systolic HF with  mod-severe MR, Chronic AFib on Xarelto, HTN, BPH, Hypothyroidism DM a/w periauricular abscess s/p I&D.     1. systolic HF  - TTE 11/2024 mildly decrease LVSF  - SBP 80s-110s  - d/vasiliy lasix 20, lisinopril 2.5mg, coreg 3.125 bid  - BP soft, decrease Toprol to 12.5 Qd, plan for IVf c/w digoxin 125mcg qd  - no objection for d/c planning from cardiac standpoint if BP ok    2. Periauricular abscess, right.   - s/p I and D of right preauricular abscess  - on Abx    3. Chronic atrial fibrillation.   - Digoxin, Xarelto  - switched coreg to toprol   - decrease toprol to 12.5 for low BP    4. CKD3  - Cr elevated  - held lasix and lisinopril, on IVF   - monitor respi status, monitor Cr   TTE 11/13/2024   1. Left atrium is mildly dilated.   2. Moderate to severe mitral regurgitation.   3. Aortic valve anatomy cannot be determined with reduced systolic excursion. Mild aortic stenosis.   4. The peak transaortic velocity is 1.28 m/s, peak transaortic gradient is 6.6 mmHg and mean transaortic gradient is 2.7 mmHg with an LVOT/aortic valve VTI ratio of 0.62. The effective orifice area is estimated at 1.95 cm² by the continuity equation.   5. Left ventricular systolic function is mildly decreased with an ejection fraction of 48 % by Naidu's method of disks.   6. The right ventricle is not well visualized.   7. Estimated pulmonary artery systolic pressure is 46 mmHg, consistent with mild pulmonary hypertension.      A/P    89 year-old male, history of Non­Small Cell Lung Cancer on oral chemotherapy, systolic HF with  mod-severe MR, Chronic AFib on Xarelto, HTN, BPH, Hypothyroidism DM a/w periauricular abscess s/p I&D.     1. systolic HF  - TTE 11/2024 mildly decrease LVSF  - SBP 80s-110s  - d/vasiliy lasix 20, lisinopril 2.5mg, coreg 3.125 bid  - BP soft, decrease Toprol to 12.5 Qd, plan for IVf c/w digoxin 125mcg qd  - restart midodrine 5 mg po TID     2. Periauricular abscess, right.   - s/p I and D of right preauricular abscess  - on Abx    3. Chronic atrial fibrillation.   - Digoxin, Xarelto  - switched coreg to toprol   - decrease toprol to 12.5 for low BP    4. CKD3  - Cr elevated  - held lasix and lisinopril, on IVF   - monitor respi status, monitor Cr

## 2024-12-18 NOTE — DISCHARGE NOTE NURSING/CASE MANAGEMENT/SOCIAL WORK - PATIENT PORTAL LINK FT
You can access the FollowMyHealth Patient Portal offered by [facility] by registering at the following website: [email or web address]. By joining [facility]’s FollowMyHealth portal, you will also be able to view your health information using other applications (apps) compatible with our system.

## 2024-12-19 VITALS
RESPIRATION RATE: 16 BRPM | TEMPERATURE: 98 F | DIASTOLIC BLOOD PRESSURE: 52 MMHG | OXYGEN SATURATION: 98 % | HEART RATE: 71 BPM | SYSTOLIC BLOOD PRESSURE: 99 MMHG

## 2024-12-19 DIAGNOSIS — Z51.5 ENCOUNTER FOR PALLIATIVE CARE: ICD-10-CM

## 2024-12-19 DIAGNOSIS — Z71.89 OTHER SPECIFIED COUNSELING: ICD-10-CM

## 2024-12-19 LAB
ALBUMIN SERPL ELPH-MCNC: 3.4 G/DL — SIGNIFICANT CHANGE UP (ref 3.3–5)
ALP SERPL-CCNC: 103 U/L — SIGNIFICANT CHANGE UP (ref 40–120)
ALT FLD-CCNC: 28 U/L — SIGNIFICANT CHANGE UP (ref 4–41)
ANION GAP SERPL CALC-SCNC: 12 MMOL/L — SIGNIFICANT CHANGE UP (ref 7–14)
AST SERPL-CCNC: 32 U/L — SIGNIFICANT CHANGE UP (ref 4–40)
BASOPHILS # BLD AUTO: 0.04 K/UL — SIGNIFICANT CHANGE UP (ref 0–0.2)
BASOPHILS NFR BLD AUTO: 1 % — SIGNIFICANT CHANGE UP (ref 0–2)
BILIRUB SERPL-MCNC: 0.4 MG/DL — SIGNIFICANT CHANGE UP (ref 0.2–1.2)
BUN SERPL-MCNC: 27 MG/DL — HIGH (ref 7–23)
CALCIUM SERPL-MCNC: 8.8 MG/DL — SIGNIFICANT CHANGE UP (ref 8.4–10.5)
CHLORIDE SERPL-SCNC: 108 MMOL/L — HIGH (ref 98–107)
CO2 SERPL-SCNC: 21 MMOL/L — LOW (ref 22–31)
CREAT SERPL-MCNC: 1.28 MG/DL — SIGNIFICANT CHANGE UP (ref 0.5–1.3)
EGFR: 52 ML/MIN/1.73M2 — LOW
EOSINOPHIL # BLD AUTO: 0.33 K/UL — SIGNIFICANT CHANGE UP (ref 0–0.5)
EOSINOPHIL NFR BLD AUTO: 7.8 % — HIGH (ref 0–6)
GLUCOSE SERPL-MCNC: 90 MG/DL — SIGNIFICANT CHANGE UP (ref 70–99)
HCT VFR BLD CALC: 33.2 % — LOW (ref 39–50)
HGB BLD-MCNC: 11.2 G/DL — LOW (ref 13–17)
IANC: 2.41 K/UL — SIGNIFICANT CHANGE UP (ref 1.8–7.4)
IMM GRANULOCYTES NFR BLD AUTO: 0.5 % — SIGNIFICANT CHANGE UP (ref 0–0.9)
LYMPHOCYTES # BLD AUTO: 0.99 K/UL — LOW (ref 1–3.3)
LYMPHOCYTES # BLD AUTO: 23.5 % — SIGNIFICANT CHANGE UP (ref 13–44)
MAGNESIUM SERPL-MCNC: 2.1 MG/DL — SIGNIFICANT CHANGE UP (ref 1.6–2.6)
MCHC RBC-ENTMCNC: 31.5 PG — SIGNIFICANT CHANGE UP (ref 27–34)
MCHC RBC-ENTMCNC: 33.7 G/DL — SIGNIFICANT CHANGE UP (ref 32–36)
MCV RBC AUTO: 93.3 FL — SIGNIFICANT CHANGE UP (ref 80–100)
MONOCYTES # BLD AUTO: 0.42 K/UL — SIGNIFICANT CHANGE UP (ref 0–0.9)
MONOCYTES NFR BLD AUTO: 10 % — SIGNIFICANT CHANGE UP (ref 2–14)
NEUTROPHILS # BLD AUTO: 2.41 K/UL — SIGNIFICANT CHANGE UP (ref 1.8–7.4)
NEUTROPHILS NFR BLD AUTO: 57.2 % — SIGNIFICANT CHANGE UP (ref 43–77)
NRBC # BLD: 0 /100 WBCS — SIGNIFICANT CHANGE UP (ref 0–0)
NRBC # FLD: 0 K/UL — SIGNIFICANT CHANGE UP (ref 0–0)
PHOSPHATE SERPL-MCNC: 3.4 MG/DL — SIGNIFICANT CHANGE UP (ref 2.5–4.5)
PLATELET # BLD AUTO: 87 K/UL — LOW (ref 150–400)
POTASSIUM SERPL-MCNC: 4.2 MMOL/L — SIGNIFICANT CHANGE UP (ref 3.5–5.3)
POTASSIUM SERPL-SCNC: 4.2 MMOL/L — SIGNIFICANT CHANGE UP (ref 3.5–5.3)
PROT SERPL-MCNC: 5.9 G/DL — LOW (ref 6–8.3)
RBC # BLD: 3.56 M/UL — LOW (ref 4.2–5.8)
RBC # FLD: 15.6 % — HIGH (ref 10.3–14.5)
SODIUM SERPL-SCNC: 141 MMOL/L — SIGNIFICANT CHANGE UP (ref 135–145)
WBC # BLD: 4.21 K/UL — SIGNIFICANT CHANGE UP (ref 3.8–10.5)
WBC # FLD AUTO: 4.21 K/UL — SIGNIFICANT CHANGE UP (ref 3.8–10.5)

## 2024-12-19 PROCEDURE — 99497 ADVNCD CARE PLAN 30 MIN: CPT | Mod: 25

## 2024-12-19 PROCEDURE — 99223 1ST HOSP IP/OBS HIGH 75: CPT

## 2024-12-19 PROCEDURE — 99239 HOSP IP/OBS DSCHRG MGMT >30: CPT | Mod: GC

## 2024-12-19 RX ORDER — MIDODRINE HYDROCHLORIDE 5 MG/1
1 TABLET ORAL
Qty: 90 | Refills: 0
Start: 2024-12-19 | End: 2025-01-17

## 2024-12-19 RX ORDER — METOPROLOL TARTRATE 100 MG/1
1 TABLET, FILM COATED ORAL
Qty: 15 | Refills: 0
Start: 2024-12-19 | End: 2025-01-17

## 2024-12-19 RX ADMIN — Medication 1 TABLET(S): at 11:43

## 2024-12-19 RX ADMIN — AMPICILLIN AND SULBACTAM 200 GRAM(S): 1; .5 INJECTION, POWDER, FOR SOLUTION INTRAVENOUS at 17:26

## 2024-12-19 RX ADMIN — Medication 5 MILLIGRAM(S): at 11:43

## 2024-12-19 RX ADMIN — Medication 25 MICROGRAM(S): at 06:53

## 2024-12-19 RX ADMIN — MIDODRINE HYDROCHLORIDE 5 MILLIGRAM(S): 5 TABLET ORAL at 17:27

## 2024-12-19 RX ADMIN — METOPROLOL TARTRATE 12.5 MILLIGRAM(S): 100 TABLET, FILM COATED ORAL at 06:54

## 2024-12-19 RX ADMIN — AMPICILLIN AND SULBACTAM 200 GRAM(S): 1; .5 INJECTION, POWDER, FOR SOLUTION INTRAVENOUS at 11:45

## 2024-12-19 RX ADMIN — MIDODRINE HYDROCHLORIDE 5 MILLIGRAM(S): 5 TABLET ORAL at 06:58

## 2024-12-19 RX ADMIN — Medication 125 MICROGRAM(S): at 06:54

## 2024-12-19 RX ADMIN — AMPICILLIN AND SULBACTAM 200 GRAM(S): 1; .5 INJECTION, POWDER, FOR SOLUTION INTRAVENOUS at 06:33

## 2024-12-19 RX ADMIN — CHLORHEXIDINE GLUCONATE 1 APPLICATION(S): 1.2 RINSE ORAL at 06:59

## 2024-12-19 RX ADMIN — RIVAROXABAN 15 MILLIGRAM(S): 10 TABLET, FILM COATED ORAL at 11:43

## 2024-12-19 RX ADMIN — MIDODRINE HYDROCHLORIDE 5 MILLIGRAM(S): 5 TABLET ORAL at 11:44

## 2024-12-19 NOTE — PROVIDER CONTACT NOTE (OTHER) - BACKGROUND
89 year old male with PM[facility]  Admitted with right preauricular abscess, drained 12/14
Patient hypotensive this morning.
89 [facility]  Admitted with right preauricular abscess, drained 12/14
Pt admitted for abscess of his ear. Pt has PMHx of lung cancer.

## 2024-12-19 NOTE — CONSULT NOTE ADULT - ASSESSMENT
Mr. Enriquez is a 89 year-old male, history of lung cancer (on Tagrisso), A.fib (on Xarelto), DM a/w periauricular abscess s/p I&D. Admitted for IV antibiotics. Patient seen and evaluated as part of geriatric oncology program      Patient evaluated for Geriatric Oncology Program.   Mentation - AAOx3. B-CAM Score: negatvie  Mobility - Patient able to ambulate.   AM-PAC Functional Assessment: Basic Mobility Score 20  AM-PAC Functional Assessment: Daily Activity Score  20  Medication -  on xarelto  What matters most - HCP and MOLST completed - yes     - Pt shared he was having pain and drainage from  R ear and was on oral abx from PCP initially   - now s/p I and D of right preauricular abscess  - Evaluated by OMFS  - Currently on IV abx which is being switched to oal augmentin on dc     Metastatic non-small cell lung cancer.   -Follows Dr. Reed at [facility]   - Currently on oral Tagrisso which patient/family brought in and now used as non-formulary  - Per patient, from his understanding his cancer is primarily in the lung from most recent scans     Chronic atrial fibrillation.   - Currently on Digoxin, Xarelto and metoprolol  - Pt reports few falls at home    Mr. Enriquez is a 89 year-old male, history of lung cancer (on Tagrisso), A.fib (on Xarelto), DM a/w periauricular abscess s/p I&D. Admitted for IV antibiotics. Patient seen and evaluated as part of geriatric oncology program      Patient evaluated for Geriatric Oncology Program.   Mentation - AAOx3. B-CAM Score: negatvie  Mobility - Patient able to ambulate.   AM-PAC Functional Assessment: Basic Mobility Score 20  AM-PAC Functional Assessment: Daily Activity Score  20  Medication -  on xarelto  What matters most - HCP and MOLST completed - yes

## 2024-12-19 NOTE — PROVIDER CONTACT NOTE (OTHER) - RECOMMENDATIONS
Provider notified, per provider medications being re-evaluated.
ACP notified and made aware
fluids ordered for pt 75 ml/hr normal saline
ACP notified and made aware

## 2024-12-19 NOTE — CONSULT NOTE ADULT - PROBLEM SELECTOR RECOMMENDATION 9
- Pt shared he was having pain and drainage from  R ear and was on oral abx from PCP initially   - now s/p I and D of right preauricular abscess  - Evaluated by OMFS  - Currently on IV abx which is being switched to oal augmentin on dc

## 2024-12-19 NOTE — PROGRESS NOTE ADULT - PROBLEM SELECTOR PROBLEM 7
Glaucoma (increased eye pressure)

## 2024-12-19 NOTE — CONSULT NOTE ADULT - SUBJECTIVE AND OBJECTIVE BOX
Date of [id number] @ 12:36  HPI:  89 year-old male, history of lung cancer (on Tagrisso), A.fib (on Xarelto), DM sent from onc clinic for R ear infection.  Pt has had R ear pain for about 1 week. His pcp prescribed a course of Cefpodoxime. However, his pain has not improved and the day prior to admission noted discharge from his ear. He denies any fevers, no change in hearing.  He was seen at oncology clinic and sent to ED at [facility] for ear infection refractory to oral abx. CT at [facility] showed periauricular abscess so pt was transferred to  [facility] for ENT eval. Pt is now s/p I and D. His pain has improved.     In ED pt was given Unasyn, Morphine 2mg IV, Midodrine 2.5mg and Digoxin 125  VS: 105/56  90  97.6  16  99% on RA (13 Dec 2024 14:08)      PERTINENT PM/SXH:   Essential hypertension    Hyperlipidemia, unspecified hyperlipidemia type    BPH without obstruction/lower urinary tract symptoms    Glaucoma (increased eye pressure)    Atrial fibrillation      H/O hemicolectomy    Cataract extraction status of left eye    History of lung biopsy      FAMILY HISTORY:  No pertinent family history in first degree relatives      Family Hx substance abuse [ ]yes [ ]no    ITEMS NOT CHECKED ARE NOT PRESENT    SOCIAL HISTORY:   Significant other/partner[ ]  Children[ ]  Sabianist/Spirituality:  Substance hx:  [ ]   Tobacco hx:  [ ]   Alcohol hx: [ ]   Home Opioid hx:  [ ] I-Stop Reference No:  Living Situation: [ ]Home  [ ]Long term care  [ ]Rehab [ ]Other    ADVANCE DIRECTIVES:    DNR/MOLST  [ ]  Living Will  [ ]   DECISION MAKER(s):  [ ] Health Care Proxy(s)  [ ] Surrogate(s)  [ ] Guardian           Name(s): Phone Number(s):    BASELINE (I)ADL(s) (prior to admission):  [place]: [ ]Total  [ ] Moderate [ ]Dependent    Allergies    No Known Allergies    Intolerances    MEDICATIONS  (STANDING):  ampicillin/sulbactam  IVPB 3 Gram(s) IV Intermittent every 6 hours  ampicillin/sulbactam  IVPB      atorvastatin 10 milliGRAM(s) Oral at bedtime  chlorhexidine 2% Cloths 1 Application(s) Topical <User Schedule>  digoxin     Tablet 125 MICROGram(s) Oral daily  finasteride 5 milliGRAM(s) Oral daily  latanoprost 0.005% Ophthalmic Solution 1 Drop(s) Both EYES at bedtime  levothyroxine 25 MICROGram(s) Oral daily  metoprolol succinate ER 12.5 milliGRAM(s) Oral daily  midodrine. 5 milliGRAM(s) Oral three times a day  multivitamin 1 Tablet(s) Oral daily  rivaroxaban 15 milliGRAM(s) Oral daily  sodium chloride 0.9%. 1000 milliLiter(s) (62.5 mL/Hr) IV Continuous <Continuous>  tamsulosin 0.4 milliGRAM(s) Oral at bedtime    MEDICATIONS  (PRN):  albuterol    90 MICROgram(s) HFA Inhaler 2 Puff(s) Inhalation every 6 hours PRN Shortness of Breath and/or Wheezing    PRESENT SYMPTOMS: [ ]Unable to self-report  [ ] CPOT [ ] PAINADs [ ] RDOS  Source if other than patient:  [ ]Family   [ ]Team     Pain: [ ]yes [ ]no  QOL impact -   Location -                    Aggravating factors -  Quality -  Radiation -  Timing-  Severity (0-10 scale):  Minimal acceptable level/pain goal (0-10 scale):     CPOT:    https://www.sccm.org/getattachment/[email or web address]/Critical-Care-Pain-Observation-Tool-(CPOT)    PAIN AD Score:   http://geriatrictoolkit.[facility]/cog/painad.pdf (press ctrl +  left click to view)    Dyspnea:                           [ ]Mild [ ]Moderate [ ]Severe    RDOS:  0 to 2  minimal or no respiratory distress   3  mild distress  4 to 6 moderate distress  >7 severe distress  https://[email or web address]/handouts/hen/Respiratory_Distress_Observation_Scale.pdf (Ctrl +  left click to view)     Anxiety:                             [ ]Mild [ ]Moderate [ ]Severe  Fatigue:                             [ ]Mild [ ]Moderate [ ]Severe  Nausea:                             [ ]Mild [ ]Moderate [ ]Severe  Loss of appetite:              [ ]Mild [ ]Moderate [ ]Severe  Constipation:                    [ ]Mild [ ]Moderate [ ]Severe    PCSSQ[Palliative Care Spiritual Screening Question]   Severity (0-10): deferred  Score of 4 or > indicate consideration of Chaplaincy referral.  Chaplaincy Referral: [ ] yes [ ] refused [ ] following [ ] Deferred     Caregiver [place]? : [ ] yes [ ] no [ ] Deferred [ ] Declined             Social work referral [ ] Patient & Family Centered Care Referral [ ]     Anticipatory Grief present?:  [ ] yes [ ] no  [ ] Deferred                  Social work referral [ ] Chaplaincy Referral[ ]    Other Symptoms:  [ x]All other review of systems negative   [ ] Unable to obtain ROS due to poor mental status     Palliative Performance Status Version 2:         %    http://npcrc.org/files/news/palliative_performance_scale_ppsv2.pdf    PHYSICAL EXAM:  Vital Signs Last 24 Hrs  T(C): 36.6 (19 Dec 2024 05:55), Max: 36.7 (18 Dec 2024 21:23)  T(F): 97.9 (19 Dec 2024 05:55), Max: 98.1 (18 Dec 2024 21:23)  HR: 77 (19 Dec 2024 05:55) (77 - 80)  BP: 128/69 (19 Dec 2024 05:55) (126/68 - 128/69)  BP(mean): --  RR: 18 (19 Dec 2024 05:55) (17 - 18)  SpO2: 99% (19 Dec 2024 05:55) (99% - 99%)    Parameters below as of 19 Dec 2024 05:55  Patient On (Oxygen Delivery Method): room air     I&O's Summary    18 Dec 2024 07:01  -  19 Dec 2024 07:00  --------------------------------------------------------  IN: 1400 mL / OUT: 0 mL / NET: 1400 mL    19 Dec 2024 07:01  -  19 Dec 2024 12:36  --------------------------------------------------------  IN: 300 mL / OUT: 0 mL / NET: 300 mL      GENERAL: [ ]Cachexia    [ x]Alert  [ ]Oriented x   [ ]Lethargic  [ ]Unarousable  [x ]Verbal  [ ]Non-Verbal  Behavioral:   [ ] Anxiety  [ ] Delirium [ ] Agitation [ ] Other  HEENT:  [ ]Normal   [x ]Dry mouth   [ ]ET Tube/Trach  [ ]Oral lesions  PULMONARY:   [ x]Clear [ ]Tachypnea  [ ]Audible excessive secretions   [ ]Rhonchi        [ ]Right [ ]Left [ ]Bilateral  [ ]Crackles        [ ]Right [ ]Left [ ]Bilateral  [ ]Wheezing     [ ]Right [ ]Left [ ]Bilateral  [ ]Diminished breath sounds [ ]right [ ]left [ ]bilateral  CARDIOVASCULAR:    [x ]Regular [ ]Irregular [ ]Tachy  [ ]Kan [ ]Murmur [ ]Other  GASTROINTESTINAL:  [x ]Soft  [ ]Distended   [ ]+BS  [x ]Non tender [ ]Tender  [ ]Other [ ]PEG [ ]OGT/ NGT  Last BM:  GENITOURINARY:  [ x]Normal [ ] Incontinent   [ ]Oliguria/Anuria   [ ]Dominguez  MUSCULOSKELETAL:   [ ]Normal   [ x]Weakness  [ ]Bed/Wheelchair bound [ ]Edema  NEUROLOGIC:   [ ]No focal deficits  [ x]Cognitive impairment  [ ]Dysphagia [ ]Dysarthria [ ]Paresis [ ]Other   SKIN:   [ ]Normal  [ ]Rash  [ ]Other  [ ]Pressure ulcer(s)       Present on admission [ ]y [ ]n      CRITICAL CARE:  [ ] Shock Present  [ ]Septic [ ]Cardiogenic [ ]Neurologic [ ]Hypovolemic  [ ]  Vasopressors [ ]  Inotropes   [ ]Respiratory failure present [ ]Mechanical ventilation [ ]Non-invasive ventilatory support [ ]High flow    [ ]Acute  [ ]Chronic [ ]Hypoxic  [ ]Hypercarbic [ ]Other  [ ]Other organ failure     LABS:                        11.2   4.21  )-----------( 87       ( 19 Dec 2024 05:57 )             33.2   12-19    141  |  108[H]  |  27[H]  ----------------------------<  90  4.2   |  21[L]  |  1.28    Ca    8.8      19 Dec 2024 05:57  Phos  3.4     12-19  Mg     2.10     12-19    TPro  5.9[L]  /  Alb  3.4  /  TBili  0.4  /  DBili  x   /  AST  32  /  ALT  28  /  AlkPhos  103  12-19      Urinalysis Basic - ( 19 Dec 2024 05:57 )    Color: x / Appearance: x / SG: x / pH: x  Gluc: 90 mg/dL / Ketone: x  / Bili: x / Urobili: x   Blood: x / Protein: x / Nitrite: x   Leuk Esterase: x / RBC: x / WBC x   Sq Epi: x / Non Sq Epi: x / Bacteria: x      RADIOLOGY & ADDITIONAL STUDIES:    PROTEIN CALORIE MALNUTRITION PRESENT: [ ]mild [ ]moderate [ ]severe [ ]underweight [ ]morbid obesity  https://www.andeal.org/vault/2440/web/files/ONC/Table_Clinical%20Characteristics%20to%20Document%20Malnutrition-White%20JV%20et%20al%202012.pdf    Height (cm): 172.7 (12-13-24 @ 18:45), 172.7 (12-12-24 @ 14:03), 172.7 (11-11-24 @ 23:23)  Weight (kg): 66.1 (12-13-24 @ 18:45), 68 (12-12-24 @ 14:03), 73.9 (11-11-24 @ 23:23)  BMI (kg/m2): 22.2 (12-13-24 @ 18:45), 22.8 (12-12-24 @ 14:03), 24.8 (11-11-24 @ 23:23)    [ ]PPSV2 < or = to 30% [ ]significant weight loss  [ ]poor nutritional intake  [ ]anasarca[ ]Artificial Nutrition      Other REFERRALS:  [ ]Hospice  [ ]Child Life  [ ]Social Work  [ ]Case management [ ]Holistic Therapy     Goals of Care Document:  Date of [id number] @ 12:36  HPI:  89 year-old male, history of lung cancer (on Tagrisso), A.fib (on Xarelto), DM sent from onc clinic for R ear infection.  Pt has had R ear pain for about 1 week. His pcp prescribed a course of Cefpodoxime. However, his pain has not improved and the day prior to admission noted discharge from his ear. He denies any fevers, no change in hearing.  He was seen at oncology clinic and sent to ED at [facility] for ear infection refractory to oral abx. CT at [facility] showed periauricular abscess so pt was transferred to  [facility] for ENT eval. Pt is now s/p I and D. His pain has improved.     In ED pt was given Unasyn, Morphine 2mg IV, Midodrine 2.5mg and Digoxin 125  VS: 105/56  90  97.6  16  99% on RA (13 Dec 2024 14:08)    Palliative consulted for complex medical decision making and symptom management in the setting of serious illness. Patient evaluated as part of geriatric oncology program.       PERTINENT PM/SXH:   Essential hypertension    Hyperlipidemia, unspecified hyperlipidemia type    BPH without obstruction/lower urinary tract symptoms    Glaucoma (increased eye pressure)    Atrial fibrillation      H/O hemicolectomy    Cataract extraction status of left eye    History of lung biopsy      FAMILY HISTORY:  No pertinent family history in first degree relatives      Family Hx substance abuse [ ]yes [ ]no    ITEMS NOT CHECKED ARE NOT PRESENT    SOCIAL HISTORY:   Significant other/partner[ ]  Children[x ]  Gnosticism/Spirituality: Samaritan  Substance hx:  [ ]   Tobacco hx:  [ x]   Alcohol hx: [ ]   Home Opioid hx:  [ x] I-Stop Reference No: [id number]  Living Situation: [ x]Home  [ ]Long term care  [ ]Rehab [ ]Other    ADVANCE DIRECTIVES:    DNR/MOLST  [ ]  Living Will  [ ]   DECISION MAKER(s):  [x ] Health Care Proxy(s)  [ ] Surrogate(s)  [ ] Guardian           Name(s): Phone Number(s): Luly Kohli     BASELINE (I)ADL(s) (prior to admission):  [place]: [ ]Total  [ ] Moderate [ ]Dependent    Allergies    No Known Allergies    Intolerances    MEDICATIONS  (STANDING):  ampicillin/sulbactam  IVPB 3 Gram(s) IV Intermittent every 6 hours  ampicillin/sulbactam  IVPB      atorvastatin 10 milliGRAM(s) Oral at bedtime  chlorhexidine 2% Cloths 1 Application(s) Topical <User Schedule>  digoxin     Tablet 125 MICROGram(s) Oral daily  finasteride 5 milliGRAM(s) Oral daily  latanoprost 0.005% Ophthalmic Solution 1 Drop(s) Both EYES at bedtime  levothyroxine 25 MICROGram(s) Oral daily  metoprolol succinate ER 12.5 milliGRAM(s) Oral daily  midodrine. 5 milliGRAM(s) Oral three times a day  multivitamin 1 Tablet(s) Oral daily  rivaroxaban 15 milliGRAM(s) Oral daily  sodium chloride 0.9%. 1000 milliLiter(s) (62.5 mL/Hr) IV Continuous <Continuous>  tamsulosin 0.4 milliGRAM(s) Oral at bedtime    MEDICATIONS  (PRN):  albuterol    90 MICROgram(s) HFA Inhaler 2 Puff(s) Inhalation every 6 hours PRN Shortness of Breath and/or Wheezing    PRESENT SYMPTOMS: [ ]Unable to self-report  [ ] CPOT [ ] PAINADs [ ] RDOS  Source if other than patient:  [ ]Family   [ ]Team     Pain: [ ]yes [x ]no  QOL impact -   Location -                    Aggravating factors -  Quality -  Radiation -  Timing-  Severity (0-10 scale):  Minimal acceptable level/pain goal (0-10 scale):     CPOT:    https://www.sccm.org/getattachment/[email or web address]-[email or web address]-6i4z-0492j0470x2x/Critical-Care-Pain-Observation-Tool-(CPOT)    PAIN AD Score:   http://geriatrictoolkit.[place]/cog/painad.pdf (press ctrl +  left click to view)    Dyspnea:                           [ ]Mild [ ]Moderate [ ]Severe    RDOS:  0 to 2  minimal or no respiratory distress   3  mild distress  4 to 6 moderate distress  >7 severe distress  https://[email or web address]/handouts/hen/Respiratory_Distress_Observation_Scale.pdf (Ctrl +  left click to view)     Anxiety:                             [ ]Mild [ ]Moderate [ ]Severe  Fatigue:                             [ ]Mild [ ]Moderate [ ]Severe  Nausea:                             [ ]Mild [ ]Moderate [ ]Severe  Loss of appetite:              [ ]Mild [ ]Moderate [ ]Severe  Constipation:                    [ ]Mild [ ]Moderate [ ]Severe    PCSSQ[Palliative Care Spiritual Screening Question]   Severity (0-10): deferred  Score of 4 or > indicate consideration of Chaplaincy referral.  Chaplaincy Referral: [ ] yes [ ] refused [ ] following [ ] Deferred     Caregiver [place]? : [ ] yes [ ] no [ ] Deferred [ ] Declined             Social work referral [ ] Patient & Family Centered Care Referral [ ]     Anticipatory Grief present?:  [ ] yes [ ] no  [ ] Deferred                  Social work referral [ ] Chaplaincy Referral[ ]    Other Symptoms:  [ x]All other review of systems negative       Palliative Performance Status Version 2:         %    http://npcrc.org/files/news/palliative_performance_scale_ppsv2.pdf    PHYSICAL EXAM:  Vital Signs Last 24 Hrs  T(C): 36.6 (19 Dec 2024 05:55), Max: 36.7 (18 Dec 2024 21:23)  T(F): 97.9 (19 Dec 2024 05:55), Max: 98.1 (18 Dec 2024 21:23)  HR: 77 (19 Dec 2024 05:55) (77 - 80)  BP: 128/69 (19 Dec 2024 05:55) (126/68 - 128/69)  BP(mean): --  RR: 18 (19 Dec 2024 05:55) (17 - 18)  SpO2: 99% (19 Dec 2024 05:55) (99% - 99%)    Parameters below as of 19 Dec 2024 05:55  Patient On (Oxygen Delivery Method): room air     I&O's Summary    18 Dec 2024 07:01  -  19 Dec 2024 07:00  --------------------------------------------------------  IN: 1400 mL / OUT: 0 mL / NET: 1400 mL    19 Dec 2024 07:01  -  19 Dec 2024 12:36  --------------------------------------------------------  IN: 300 mL / OUT: 0 mL / NET: 300 mL      GENERAL: [ ]Cachexia    [ x]Alert  [ ]Oriented x   [ ]Lethargic  [ ]Unarousable  [x ]Verbal  [ ]Non-Verbal  Behavioral:   [ ] Anxiety  [ ] Delirium [ ] Agitation [ ] Other  HEENT:  [ ]Normal   [x ]Dry mouth   [ ]ET Tube/Trach  [ ]Oral lesions  PULMONARY:   [ x]Clear [ ]Tachypnea  [ ]Audible excessive secretions   [ ]Rhonchi        [ ]Right [ ]Left [ ]Bilateral  [ ]Crackles        [ ]Right [ ]Left [ ]Bilateral  [ ]Wheezing     [ ]Right [ ]Left [ ]Bilateral  [ ]Diminished breath sounds [ ]right [ ]left [ ]bilateral  CARDIOVASCULAR:    [x ]Regular [ ]Irregular [ ]Tachy  [ ]Kan [ ]Murmur [ ]Other  GASTROINTESTINAL:  [x ]Soft  [ ]Distended   [ ]+BS  [x ]Non tender [ ]Tender  [ ]Other [ ]PEG [ ]OGT/ NGT  Last BM:  GENITOURINARY:  [ x]Normal [ ] Incontinent   [ ]Oliguria/Anuria   [ ]Dominguez  MUSCULOSKELETAL:   [ ]Normal   [ x]Weakness  [ ]Bed/Wheelchair bound [ ]Edema  NEUROLOGIC:   [ ]No focal deficits  [ x]Cognitive impairment  [ ]Dysphagia [ ]Dysarthria [ ]Paresis [ ]Other   SKIN:   [ ]Normal  [ ]Rash  [ ]Other  [ ]Pressure ulcer(s)       Present on admission [ ]y [ ]n      CRITICAL CARE:  [ ] Shock Present  [ ]Septic [ ]Cardiogenic [ ]Neurologic [ ]Hypovolemic  [ ]  Vasopressors [ ]  Inotropes   [ ]Respiratory failure present [ ]Mechanical ventilation [ ]Non-invasive ventilatory support [ ]High flow    [ ]Acute  [ ]Chronic [ ]Hypoxic  [ ]Hypercarbic [ ]Other  [ ]Other organ failure     LABS:                        11.2   4.21  )-----------( 87       ( 19 Dec 2024 05:57 )             33.2   12-19    141  |  108[H]  |  27[H]  ----------------------------<  90  4.2   |  21[L]  |  1.28    Ca    8.8      19 Dec 2024 05:57  Phos  3.4     12-19  Mg     2.10     12-19    TPro  5.9[L]  /  Alb  3.4  /  TBili  0.4  /  DBili  x   /  AST  32  /  ALT  28  /  AlkPhos  103  12-19      Urinalysis Basic - ( 19 Dec 2024 05:57 )    Color: x / Appearance: x / SG: x / pH: x  Gluc: 90 mg/dL / Ketone: x  / Bili: x / Urobili: x   Blood: x / Protein: x / Nitrite: x   Leuk Esterase: x / RBC: x / WBC x   Sq Epi: x / Non Sq Epi: x / Bacteria: x      RADIOLOGY & ADDITIONAL STUDIES:    PROTEIN CALORIE MALNUTRITION PRESENT: [ ]mild [ ]moderate [ ]severe [ ]underweight [ ]morbid obesity  https://www.andeal.org/vault/6140/web/files/ONC/Table_Clinical%20Characteristics%20to%20Document%20Malnutrition-White%20JV%20et%20al%202012.pdf    Height (cm): 172.7 (12-13-24 @ 18:45), 172.7 (12-12-24 @ 14:03), 172.7 (11-11-24 @ 23:23)  Weight (kg): 66.1 (12-13-24 @ 18:45), 68 (12-12-24 @ 14:03), 73.9 (11-11-24 @ 23:23)  BMI (kg/m2): 22.2 (12-13-24 @ 18:45), 22.8 (12-12-24 @ 14:03), 24.8 (11-11-24 @ 23:23)    [ ]PPSV2 < or = to 30% [ ]significant weight loss  [ ]poor nutritional intake  [ ]anasarca[ ]Artificial Nutrition      Other REFERRALS:  [ ]Hospice  [ ]Child Life  [ ]Social Work  [ ]Case management [ ]Holistic Therapy     Goals of Care Document:  Date of [id number] @ 12:36  HPI:  89 year-old male, history of lung cancer (on Tagrisso), A.fib (on Xarelto), DM sent from onc clinic for R ear infection.  Pt has had R ear pain for about 1 week. His pcp prescribed a course of Cefpodoxime. However, his pain has not improved and the day prior to admission noted discharge from his ear. He denies any fevers, no change in hearing.  He was seen at oncology clinic and sent to ED at [facility] for ear infection refractory to oral abx. CT at [facility] showed periauricular abscess so pt was transferred to  [facility] for ENT eval. Pt is now s/p I and D. His pain has improved.     In ED pt was given Unasyn, Morphine 2mg IV, Midodrine 2.5mg and Digoxin 125  VS: 105/56  90  97.6  16  99% on RA (13 Dec 2024 14:08)    Palliative consulted for complex medical decision making and symptom management in the setting of serious illness. Patient evaluated as part of geriatric oncology program.       PERTINENT PM/SXH:   Essential hypertension    Hyperlipidemia, unspecified hyperlipidemia type    BPH without obstruction/lower urinary tract symptoms    Glaucoma (increased eye pressure)    Atrial fibrillation      H/O hemicolectomy    Cataract extraction status of left eye    History of lung biopsy      FAMILY HISTORY:  No pertinent family history in first degree relatives      Family Hx substance abuse [ ]yes [ ]no    ITEMS NOT CHECKED ARE NOT PRESENT    SOCIAL HISTORY:   Significant other/partner[ ]  Children[x ]  Alevism/Spirituality: Restorationist  Substance hx:  [ ]   Tobacco hx:  [ x]   Alcohol hx: [ ]   Home Opioid hx:  [ x] I-Stop Reference No: [id number]  Living Situation: [ x]Home  [ ]Long term care  [ ]Rehab [ ]Other    Living Situation: [ x]Home  [ ]Long term care  [ ]Rehab [ ]Other  Does the patient live alone: [ x]yes [ ]no  Any services (e.g. HHA, home care) [ ]yes [ x]no   What is the social support: friends, Jewish, senior center  How do you plan to get back and forth for appointments: access a ride; friend Luly Kohli sometimes goes to doctors with him    ADVANCE DIRECTIVES:    DNR/MOLST  [ ]  Living Will  [ ]   DECISION MAKER(s):  [x ] Health Care Proxy(s)  [ ] Surrogate(s)  [ ] Guardian           Name(s): Phone Number(s): Luly Kohli     BASELINE (I)ADL(s) (prior to admission):  [place]: [ x]Total  [ ] Moderate [ ]Dependent    Allergies    No Known Allergies    Intolerances    MEDICATIONS  (STANDING):  ampicillin/sulbactam  IVPB 3 Gram(s) IV Intermittent every 6 hours  ampicillin/sulbactam  IVPB      atorvastatin 10 milliGRAM(s) Oral at bedtime  chlorhexidine 2% Cloths 1 Application(s) Topical <User Schedule>  digoxin     Tablet 125 MICROGram(s) Oral daily  finasteride 5 milliGRAM(s) Oral daily  latanoprost 0.005% Ophthalmic Solution 1 Drop(s) Both EYES at bedtime  levothyroxine 25 MICROGram(s) Oral daily  metoprolol succinate ER 12.5 milliGRAM(s) Oral daily  midodrine. 5 milliGRAM(s) Oral three times a day  multivitamin 1 Tablet(s) Oral daily  rivaroxaban 15 milliGRAM(s) Oral daily  sodium chloride 0.9%. 1000 milliLiter(s) (62.5 mL/Hr) IV Continuous <Continuous>  tamsulosin 0.4 milliGRAM(s) Oral at bedtime    MEDICATIONS  (PRN):  albuterol    90 MICROgram(s) HFA Inhaler 2 Puff(s) Inhalation every 6 hours PRN Shortness of Breath and/or Wheezing    PRESENT SYMPTOMS: [ ]Unable to self-report  [ ] CPOT [ ] PAINADs [ ] RDOS  Source if other than patient:  [ ]Family   [ ]Team     Pain: [ ]yes [x ]no  QOL impact -   Location -                    Aggravating factors -  Quality -  Radiation -  Timing-  Severity (0-10 scale):  Minimal acceptable level/pain goal (0-10 scale):     CPOT:    https://www.sccm.org/getattachment/nrg49l35-6u7g-5e1c-1g5z-7458x4177h9r/Critical-Care-Pain-Observation-Tool-(CPOT)    PAIN AD Score:   http://geriatrictoolkit.[facility]/cog/painad.pdf (press ctrl +  left click to view)    Dyspnea:                           [ ]Mild [ ]Moderate [ ]Severe    RDOS:  0 to 2  minimal or no respiratory distress   3  mild distress  4 to 6 moderate distress  >7 severe distress  https://homecareinformation.[email or web address]/handouts/hen/Respiratory_Distress_Observation_Scale.pdf (Ctrl +  left click to view)     Anxiety:                             [ ]Mild [ ]Moderate [ ]Severe  Fatigue:                             [ ]Mild [ ]Moderate [ ]Severe  Nausea:                             [ ]Mild [ ]Moderate [ ]Severe  Loss of appetite:              [ ]Mild [ ]Moderate [ ]Severe  Constipation:                    [ ]Mild [ ]Moderate [ ]Severe    PCSSQ[Palliative Care Spiritual Screening Question]   Severity (0-10): deferred  Score of 4 or > indicate consideration of Chaplaincy referral.  Chaplaincy Referral: [ ] yes [ ] refused [ ] following [ ] Deferred     Caregiver [place]? : [ ] yes [ ] no [ ] Deferred [ ] Declined             Social work referral [ ] Patient & Family Centered Care Referral [ ]     Anticipatory Grief present?:  [ ] yes [ ] no  [ ] Deferred                  Social work referral [ ] Chaplaincy Referral[ ]    Other Symptoms:  [ x]All other review of systems negative       Palliative Performance Status Version 2:         %    http://npcrc.org/files/news/palliative_performance_scale_ppsv2.pdf    PHYSICAL EXAM:  Vital Signs Last 24 Hrs  T(C): 36.6 (19 Dec 2024 05:55), Max: 36.7 (18 Dec 2024 21:23)  T(F): 97.9 (19 Dec 2024 05:55), Max: 98.1 (18 Dec 2024 21:23)  HR: 77 (19 Dec 2024 05:55) (77 - 80)  BP: 128/69 (19 Dec 2024 05:55) (126/68 - 128/69)  BP(mean): --  RR: 18 (19 Dec 2024 05:55) (17 - 18)  SpO2: 99% (19 Dec 2024 05:55) (99% - 99%)    Parameters below as of 19 Dec 2024 05:55  Patient On (Oxygen Delivery Method): room air     I&O's Summary    18 Dec 2024 07:01  -  19 Dec 2024 07:00  --------------------------------------------------------  IN: 1400 mL / OUT: 0 mL / NET: 1400 mL    19 Dec 2024 07:01  -  19 Dec 2024 12:36  --------------------------------------------------------  IN: 300 mL / OUT: 0 mL / NET: 300 mL      GENERAL: [ ]Cachexia    [ x]Alert  [ ]Oriented x   [ ]Lethargic  [ ]Unarousable  [x ]Verbal  [ ]Non-Verbal  Behavioral:   [ ] Anxiety  [ ] Delirium [ ] Agitation [ ] Other  HEENT:  [ ]Normal   [x ]Dry mouth   [ ]ET Tube/Trach  [ ]Oral lesions  PULMONARY:   [ x]Clear [ ]Tachypnea  [ ]Audible excessive secretions   [ ]Rhonchi        [ ]Right [ ]Left [ ]Bilateral  [ ]Crackles        [ ]Right [ ]Left [ ]Bilateral  [ ]Wheezing     [ ]Right [ ]Left [ ]Bilateral  [ ]Diminished breath sounds [ ]right [ ]left [ ]bilateral  CARDIOVASCULAR:    [x ]Regular [ ]Irregular [ ]Tachy  [ ]Kan [ ]Murmur [ ]Other  GASTROINTESTINAL:  [x ]Soft  [ ]Distended   [ ]+BS  [x ]Non tender [ ]Tender  [ ]Other [ ]PEG [ ]OGT/ NGT  Last BM:  GENITOURINARY:  [ x]Normal [ ] Incontinent   [ ]Oliguria/Anuria   [ ]Dominguez  MUSCULOSKELETAL:   [ ]Normal   [ x]Weakness  [ ]Bed/Wheelchair bound [ ]Edema  NEUROLOGIC:   [ ]No focal deficits  [ x]Cognitive impairment  [ ]Dysphagia [ ]Dysarthria [ ]Paresis [ ]Other   SKIN:   [ ]Normal  [ ]Rash  [ ]Other  [ ]Pressure ulcer(s)       Present on admission [ ]y [ ]n      CRITICAL CARE:  [ ] Shock Present  [ ]Septic [ ]Cardiogenic [ ]Neurologic [ ]Hypovolemic  [ ]  Vasopressors [ ]  Inotropes   [ ]Respiratory failure present [ ]Mechanical ventilation [ ]Non-invasive ventilatory support [ ]High flow    [ ]Acute  [ ]Chronic [ ]Hypoxic  [ ]Hypercarbic [ ]Other  [ ]Other organ failure     LABS:                        11.2   4.21  )-----------( 87       ( 19 Dec 2024 05:57 )             33.2   12-19    141  |  108[H]  |  27[H]  ----------------------------<  90  4.2   |  21[L]  |  1.28    Ca    8.8      19 Dec 2024 05:57  Phos  3.4     12-19  Mg     2.10     12-19    TPro  5.9[L]  /  Alb  3.4  /  TBili  0.4  /  DBili  x   /  AST  32  /  ALT  28  /  AlkPhos  103  12-19      Urinalysis Basic - ( 19 Dec 2024 05:57 )    Color: x / Appearance: x / SG: x / pH: x  Gluc: 90 mg/dL / Ketone: x  / Bili: x / Urobili: x   Blood: x / Protein: x / Nitrite: x   Leuk Esterase: x / RBC: x / WBC x   Sq Epi: x / Non Sq Epi: x / Bacteria: x      RADIOLOGY & ADDITIONAL STUDIES:    PROTEIN CALORIE MALNUTRITION PRESENT: [ ]mild [ ]moderate [ ]severe [ ]underweight [ ]morbid obesity  https://www.andeal.org/vault/2440/web/files/ONC/Table_Clinical%20Characteristics%20to%20Document%20Malnutrition-White%20JV%20et%20al%202012.pdf    Height (cm): 172.7 (12-13-24 @ 18:45), 172.7 (12-12-24 @ 14:03), 172.7 (11-11-24 @ 23:23)  Weight (kg): 66.1 (12-13-24 @ 18:45), 68 (12-12-24 @ 14:03), 73.9 (11-11-24 @ 23:23)  BMI (kg/m2): 22.2 (12-13-24 @ 18:45), 22.8 (12-12-24 @ 14:03), 24.8 (11-11-24 @ 23:23)    [ ]PPSV2 < or = to 30% [ ]significant weight loss  [ ]poor nutritional intake  [ ]anasarca[ ]Artificial Nutrition      Other REFERRALS:  [ ]Hospice  [ ]Child Life  [ ]Social Work  [ ]Case management [ ]Holistic Therapy     Goals of Care Document:

## 2024-12-19 NOTE — PROGRESS NOTE ADULT - PROBLEM SELECTOR PROBLEM 5
HTN (hypertension)
BPH (benign prostatic hyperplasia)
HTN (hypertension)
HTN (hypertension)
Gabapentin Counseling: I discussed with the patient the risks of gabapentin including but not limited to dizziness, somnolence, fatigue and ataxia.

## 2024-12-19 NOTE — CONSULT NOTE ADULT - TIME BILLING
Time spent for extensive review of the physical chart, electronic medical record, and documentation to obtain collateral information including but not limited to:  [x ] Inpatient records (ED, H&P, primary team, and consultants if applicable, care coordination)  [x] Inpatient values/results (biomarkers, immunoassays, imaging, and microbiology results)  [x] Current or proposed treatment plans  [x] Discussion with the primary team  [x] Discussion with the patient, surrogate decision maker, or family    Time spent: >75 mins (20 mins separately on ACP)

## 2024-12-19 NOTE — PROGRESS NOTE ADULT - PROBLEM SELECTOR PLAN 2
- Continue Tagrisso- not on formulary. patient's aide/family would need to bring from home. Pt lives alone   - f/u outpt oncologist Dr Martinez
- Continue Tagrisso- not on formulary. patient's aide/family would need to bring from home. Pt lives alone   - f/u outpt oncologist Dr Martinez
SBP 70-80s. He remains on lasix, coreg, digoxin and lisinopril. Pt was started on midodrine outpatient for soft BPs. Held lasix and lisinopril yesterday - SBP improved in the AM but then dropped to the 80s again.   - Cards consult  - C/w coreg and digoxin for now
- Continue Tagrisso- not on formulary. patient's aide/family would need to bring from home. Pt lives alone   - f/u outpt oncologist Dr Martinez
SBP 70-80s. He remains on lasix, coreg, digoxin and lisinopril. Pt was started on midodrine outpatient for soft BPs. Held lasix and lisinopril (home meds). D/c'ed coreg and started on [id number] qd however still with soft BPs today.   - Cards consulted   - Recommending decrease dose of metoprolol to 12.5mgqd   - Start midodrine 5mg TID
SBP 70-80s. He remains on lasix, coreg, digoxin and lisinopril. Pt was started on midodrine outpatient for soft BPs. Given low BP today, hold lasix and lisinopril   - C/w coreg and digoxin for now   - s/p fluid challenge   -  Monitor BP off of anti-hypertensives   - If BP improved tomorrow plan for d/c/
unk

## 2024-12-19 NOTE — PROGRESS NOTE ADULT - PROBLEM SELECTOR PROBLEM 6
BPH (benign prostatic hyperplasia)
Glaucoma (increased eye pressure)
BPH (benign prostatic hyperplasia)
Glaucoma (increased eye pressure)
Glaucoma (increased eye pressure)
BPH (benign prostatic hyperplasia)

## 2024-12-19 NOTE — PROGRESS NOTE ADULT - PROBLEM SELECTOR PLAN 5
- Hold lasix and lisinopril   - Metoprolol 25mg qd
- Hold lasix and lisinopril   - C/w coreg   - mild azotemia Cr 1.34, close to baseline, CKD3, gentle hydration
- Continue Flomax and Finasteride
- Continue Flomax and Finasteride
- Hold lasix and lisinopril   - C/w coreg   - mild azotemia Cr 1.34, close to baseline, CKD3, gentle hydration
- Continue Flomax and Finasteride

## 2024-12-19 NOTE — PROGRESS NOTE ADULT - NUTRITIONAL ASSESSMENT
This patient has been assessed with a concern for Malnutrition and has been determined to have a diagnosis/diagnoses of Moderate protein-calorie malnutrition.    This patient is being managed with:   Diet Regular-  Entered: Dec 16 2024  1:32PM  

## 2024-12-19 NOTE — PROGRESS NOTE ADULT - TIME BILLING
Review of laboratory data, radiology results, consultants' recommendations, documentation in [place], discussion with patient/advanced care providers and interdisciplinary staff (such as , social workers, etc). Interventions were performed as documented above.
Review of laboratory data, radiology results, consultants' recommendations, documentation in [place], discussion with patient/advanced care providers and interdisciplinary staff (such as , social workers, etc). Interventions were performed as documented above.
Review of laboratory data, radiology results, consultants' recommendations, documentation in [place], discussion with patient/advanced care providers and interdisciplinary staff (such as , social workers, etc). Interventions were performed as documented above.

## 2024-12-19 NOTE — PROGRESS NOTE ADULT - PROVIDER SPECIALTY LIST ADULT
Hospitalist
Cardiology
OMFS
OMFS
Cardiology
Hospitalist

## 2024-12-19 NOTE — CHART NOTE - NSCHARTNOTEFT_GEN_A_CORE
Received notification from RN that patient wishes to rescind his DNR/DNI. MOLST form reviewed with patient. Patient states that if he becomes "unconscious" he wants chest compressions. Explained to patient that he would placed on invasive mechanical ventilation. Patient states that he would want invasive ventilation for up to 4 days, then if no improvement wants to be extubated. Patient also would want, if necessary, feeding tube (life long), antibiotics, IV fluids, and blood transfusions. Would trial hemodialysis. Patient states that when he signed the previous MOLST, he did not realize that he would not receive CPR, based on his decision. Patient adamantly wants CPR and other life prolonging interventions, if he becomes "unconscious". Accepts all medical treatments available.    Prior MOLST revoked and new MOLST filled out and witnessed by RN at bedside.

## 2024-12-19 NOTE — PROGRESS NOTE ADULT - PROBLEM SELECTOR PLAN 4
- Continue Lasix, Coreg  - mild azotemia Cr 1.34, close to baseline, CKD3, gentle hydration
- Continue Lasix, Coreg  - mild azotemia Cr 1.34, close to baseline, CKD3, gentle hydration
- Hypercoagulable state secondary to active malignancy + CHADS VASC 3  - Continue Digoxin, Coreg and Xarelto
- Continue Lasix, Coreg  - mild azotemia Cr 1.34, close to baseline, CKD3, gentle hydration
- Hypercoagulable state secondary to active malignancy + CHADS VASC 3  - Continue Digoxin and xarelto   - Decrease metoprolol dose to 12.5mg qd
- Hypercoagulable state secondary to active malignancy + CHADS VASC 3  - Continue Digoxin, Coreg and Xarelto

## 2024-12-19 NOTE — PROGRESS NOTE ADULT - SUBJECTIVE AND OBJECTIVE BOX
Edd Franks MD   Interventional Cardiology / Endovascular Specialist   [place] Office : [address] [place] [address] [place] [address]  Tel:    [place] Office : [address] [facility] [place] [address]  Tel: [phone]   Cell : [phone]      Pt is lying in bed comfortable not in distress, no chest pains no SOB no palpitations  	  MEDICATIONS:  digoxin     Tablet 125 MICROGram(s) Oral daily  metoprolol succinate ER 12.5 milliGRAM(s) Oral daily  midodrine. 5 milliGRAM(s) Oral three times a day  rivaroxaban 15 milliGRAM(s) Oral daily    ampicillin/sulbactam  IVPB 3 Gram(s) IV Intermittent every 6 hours  ampicillin/sulbactam  IVPB        albuterol    90 MICROgram(s) HFA Inhaler 2 Puff(s) Inhalation every 6 hours PRN        atorvastatin 10 milliGRAM(s) Oral at bedtime  finasteride 5 milliGRAM(s) Oral daily  levothyroxine 25 MICROGram(s) Oral daily    chlorhexidine 2% Cloths 1 Application(s) Topical <User Schedule>  latanoprost 0.005% Ophthalmic Solution 1 Drop(s) Both EYES at bedtime  multivitamin 1 Tablet(s) Oral daily  sodium chloride 0.9%. 1000 milliLiter(s) IV Continuous <Continuous>  tamsulosin 0.4 milliGRAM(s) Oral at bedtime      PAST MEDICAL/SURGICAL HISTORY  PAST MEDICAL & SURGICAL HISTORY:  Essential hypertension      Hyperlipidemia, unspecified hyperlipidemia type      BPH without obstruction/lower urinary tract symptoms      Glaucoma (increased eye pressure)  both eyes      Atrial fibrillation      H/O hemicolectomy  1973      Cataract extraction status of left eye  w/lens implant - 2014      History of lung biopsy  2016 - negative results          SOCIAL HISTORY: Substance Use (street drugs): ( x ) never used  (  ) other:    FAMILY HISTORY:  No pertinent family history in first degree relatives          PHYSICAL EXAM:  T(C): 36.6 (12-19-24 @ 05:55), Max: 36.7 (12-18-24 @ 21:23)  HR: 77 (12-19-24 @ 05:55) (77 - 80)  BP: 128/69 (12-19-24 @ 05:55) (126/68 - 128/69)  RR: 18 (12-19-24 @ 05:55) (17 - 18)  SpO2: 99% (12-19-24 @ 05:55) (99% - 99%)  Wt(kg): --  I&O's Summary    18 Dec 2024 07:01  -  19 Dec 2024 07:00  --------------------------------------------------------  IN: 1400 mL / OUT: 0 mL / NET: 1400 mL    19 Dec 2024 07:01  -  19 Dec 2024 13:08  --------------------------------------------------------  IN: 300 mL / OUT: 0 mL / NET: 300 mL          GENERAL: NAD  EYES:   PERRLA   ENMT:   Moist mucous membranes, Good dentition, No lesions  Cardiovascular: Normal S1 S2, No JVD, No murmurs, No edema  Respiratory: Lungs clear to auscultation	  Gastrointestinal:  Soft, Non-tender, + BS	  Extremities: no edema                                    11.2   4.21  )-----------( 87       ( 19 Dec 2024 05:57 )             33.2     12-19    141  |  108[H]  |  27[H]  ----------------------------<  90  4.2   |  21[L]  |  1.28    Ca    8.8      19 Dec 2024 05:57  Phos  3.4     12-19  Mg     2.10     12-19    TPro  5.9[L]  /  Alb  3.4  /  TBili  0.4  /  DBili  x   /  AST  32  /  ALT  28  /  AlkPhos  103  12-19    proBNP:   Lipid Profile:   HgA1c:   TSH:     Consultant(s) Notes Reviewed:  [x ] YES  [ ] NO    Care Discussed with Consultants/Other Providers [ x] YES  [ ] NO    Imaging Personally Reviewed independently:  [x] YES  [ ] NO    All labs, radiologic studies, vitals, orders and medications list reviewed. Patient is seen and examined at bedside. Case discussed with medical team.

## 2024-12-19 NOTE — PROGRESS NOTE ADULT - PROBLEM SELECTOR PLAN 6
- Continue home eye drops
- Continue Flomax and Finasteride
- Continue home eye drops
- Continue Flomax and Finasteride
- Continue home eye drops
- Continue Flomax and Finasteride

## 2024-12-19 NOTE — CONSULT NOTE ADULT - PROBLEM SELECTOR RECOMMENDATION 4
see separate [facility] note from earlier today; in summary, patient had previously completed both HCP MOLST forms. However, both forms were missing important signatures/witnesses. Previous HCP form and MOLST found in patient window and printed. I reviewed with patient his previous HCP and MOLST form and he confirmed that his HCP is still friend: Luly Kohli ([phone]) and second agent is his friend/former co-worker  Earl Hale [phone].  In addition patient confirmed his wishes for DNR/DNI to allow natural death. Patient signed both new HCP and the MOLST form. I provided him with several copies of each document so that he can keep it for himself and give other copies to his HCPs.     I was informed in the evening that patient and Luly are having discrepancy with above and patient may want cpr/intubation. I attempted to speak to the patient and Luly but due to significant language barrier (Luly prefers Occitan) I spoke to the nurses and advised them to have primary team speak to patient and Luly with  and confirm what his wishes are and document and revoke molst if that's truly his wishes. I also reached out and called ACP  Lindsay Nolen to advise the same.

## 2024-12-19 NOTE — CONSULT NOTE ADULT - PROBLEM SELECTOR RECOMMENDATION 3
-Follows Dr. eRed at [facility]   - Currently on oral Tagrisso which patient/family brought in and now used as non-formulary  - Per patient, from his understanding his cancer is primarily in the lung from most recent scans

## 2024-12-19 NOTE — PROGRESS NOTE ADULT - PROBLEM SELECTOR PROBLEM 2
Hypotension
Metastatic non-small cell lung cancer
Hypotension
Hypotension

## 2024-12-19 NOTE — PROGRESS NOTE ADULT - PROBLEM SELECTOR PROBLEM 1
Periauricular abscess, right

## 2024-12-19 NOTE — PROVIDER CONTACT NOTE (OTHER) - ASSESSMENT
Pt hypotensive to 92/48, otherwise VSS  Pt resting in the chair with no complaints
Pt hypotensive to 99/52, otherwise VSS  Pt resting in bed with no complaints
blood pressure 84/38. patient showing no signs of distress.
Patient's BP is now 94/48 s/p bolus.

## 2024-12-19 NOTE — PROGRESS NOTE ADULT - PROBLEM SELECTOR PROBLEM 4
Chronic atrial fibrillation
Chronic atrial fibrillation
HTN (hypertension)
Chronic atrial fibrillation

## 2024-12-19 NOTE — PROGRESS NOTE ADULT - REASON FOR ADMISSION
periauricular abscess
pre-auricular abscess
periauricular abscess

## 2024-12-19 NOTE — PROGRESS NOTE ADULT - SUBJECTIVE AND OBJECTIVE BOX
Pt. seen and examined on 12/18/24.     Hospitalist Progress Note  Balbina Roberts MD Pager # [phone]    OVERNIGHT EVENTS: KEIRA    SUBJECTIVE / INTERVAL HPI: Patient seen and examined at bedside. Patient reports no dizziness or lightheadedness. No ear pain or discomfort. Overall, feels well and is ready to go home.     VITAL SIGNS:  Vital Signs Last 24 Hrs  T(C): 36.8 (19 Dec 2024 13:43), Max: 36.8 (19 Dec 2024 13:43)  T(F): 98.3 (19 Dec 2024 13:43), Max: 98.3 (19 Dec 2024 13:43)  HR: 71 (19 Dec 2024 13:43) (71 - 80)  BP: 99/52 (19 Dec 2024 13:43) (99/52 - 128/69)  BP(mean): --  RR: 16 (19 Dec 2024 13:43) (16 - 18)  SpO2: 98% (19 Dec 2024 13:43) (98% - 99%)    Parameters below as of 19 Dec 2024 13:43  Patient On (Oxygen Delivery Method): room air        PHYSICAL EXAM:    General: Comfortable and resting in bed   HEENT: NC/AT; PERRL, anicteric sclera; MMM. Right ear with a bandage - crusted over - nonpurulent.   Neck: supple  Cardiovascular: +S1/S2; RRR  Respiratory: CTA B/L; no W/R/R  Gastrointestinal: soft, NT/ND; +BSx4  Extremities: WWP; no edema, clubbing or cyanosis  Vascular: 2+ radial, DP/PT pulses B/L  Neurological: AAOx3; no focal deficits    MEDICATIONS:  MEDICATIONS  (STANDING):  ampicillin/sulbactam  IVPB 3 Gram(s) IV Intermittent every 6 hours  ampicillin/sulbactam  IVPB      atorvastatin 10 milliGRAM(s) Oral at bedtime  chlorhexidine 2% Cloths 1 Application(s) Topical <User Schedule>  digoxin     Tablet 125 MICROGram(s) Oral daily  finasteride 5 milliGRAM(s) Oral daily  latanoprost 0.005% Ophthalmic Solution 1 Drop(s) Both EYES at bedtime  levothyroxine 25 MICROGram(s) Oral daily  metoprolol succinate ER 12.5 milliGRAM(s) Oral daily  midodrine. 5 milliGRAM(s) Oral three times a day  multivitamin 1 Tablet(s) Oral daily  rivaroxaban 15 milliGRAM(s) Oral daily  sodium chloride 0.9%. 1000 milliLiter(s) (62.5 mL/Hr) IV Continuous <Continuous>  tamsulosin 0.4 milliGRAM(s) Oral at bedtime    MEDICATIONS  (PRN):  albuterol    90 MICROgram(s) HFA Inhaler 2 Puff(s) Inhalation every 6 hours PRN Shortness of Breath and/or Wheezing      ALLERGIES:  Allergies    No Known Allergies    Intolerances        LABS:                        11.2   4.21  )-----------( 87       ( 19 Dec 2024 05:57 )             33.2     12-19    141  |  108[H]  |  27[H]  ----------------------------<  90  4.2   |  21[L]  |  1.28    Ca    8.8      19 Dec 2024 05:57  Phos  3.4     12-19  Mg     2.10     12-19    TPro  5.9[L]  /  Alb  3.4  /  TBili  0.4  /  DBili  x   /  AST  32  /  ALT  28  /  AlkPhos  103  12-19      Urinalysis Basic - ( 19 Dec 2024 05:57 )    Color: x / Appearance: x / SG: x / pH: x  Gluc: 90 mg/dL / Ketone: x  / Bili: x / Urobili: x   Blood: x / Protein: x / Nitrite: x   Leuk Esterase: x / RBC: x / WBC x   Sq Epi: x / Non Sq Epi: x / Bacteria: x      CAPILLARY BLOOD GLUCOSE          RADIOLOGY & ADDITIONAL TESTS: Reviewed.    ASSESSMENT:    PLAN:

## 2024-12-19 NOTE — PROGRESS NOTE ADULT - ASSESSMENT
TTE 11/13/2024   1. Left atrium is mildly dilated.   2. Moderate to severe mitral regurgitation.   3. Aortic valve anatomy cannot be determined with reduced systolic excursion. Mild aortic stenosis.   4. The peak transaortic velocity is 1.28 m/s, peak transaortic gradient is 6.6 mmHg and mean transaortic gradient is 2.7 mmHg with an LVOT/aortic valve VTI ratio of 0.62. The effective orifice area is estimated at 1.95 cm² by the continuity equation.   5. Left ventricular systolic function is mildly decreased with an ejection fraction of 48 % by Naidu's method of disks.   6. The right ventricle is not well visualized.   7. Estimated pulmonary artery systolic pressure is 46 mmHg, consistent with mild pulmonary hypertension.      A/P    89 year-old male, history of Non­Small Cell Lung Cancer on oral chemotherapy, systolic HF with  mod-severe MR, Chronic AFib on Xarelto, HTN, BPH, Hypothyroidism DM a/w periauricular abscess s/p I&D.     1. systolic HF  - TTE 11/2024 mildly decrease LVSF  - SBP 80s-110s, d/vasiliy lasix 20, lisinopril 2.5mg, coreg 3.125 bid  - BP soft, decrease Toprol to 12.5 Qd, s/p IVf   - c/w  midodrine 5 mg po TID, toprol 12.5 qd, digoxin 125mcg qd, please resume lasix 20mg PO on discharge    2. Periauricular abscess, right.   - s/p I and D of right preauricular abscess  - on Abx    3. Chronic atrial fibrillation.   - Digoxin, Xarelto  - switched coreg to toprol,  decreased toprol to 12.5 for low BP  - Digoxin, Xarelto, toprol 12.5mg    4. CKD3  - Cr elevated  - held lasix and lisinopril, on IVF   - monitor respi status, monitor Cr, improving  - please resume lasix 20mg PO on discharge   TTE 11/13/2024   1. Left atrium is mildly dilated.   2. Moderate to severe mitral regurgitation.   3. Aortic valve anatomy cannot be determined with reduced systolic excursion. Mild aortic stenosis.   4. The peak transaortic velocity is 1.28 m/s, peak transaortic gradient is 6.6 mmHg and mean transaortic gradient is 2.7 mmHg with an LVOT/aortic valve VTI ratio of 0.62. The effective orifice area is estimated at 1.95 cm² by the continuity equation.   5. Left ventricular systolic function is mildly decreased with an ejection fraction of 48 % by Naidu's method of disks.   6. The right ventricle is not well visualized.   7. Estimated pulmonary artery systolic pressure is 46 mmHg, consistent with mild pulmonary hypertension.      A/P    89 year-old male, history of Non­Small Cell Lung Cancer on oral chemotherapy, systolic HF with  mod-severe MR, Chronic AFib on Xarelto, HTN, BPH, Hypothyroidism DM a/w periauricular abscess s/p I&D.     1. systolic HF  - TTE 11/2024 mildly decrease LVSF  - SBP 80s-110s, d/vasiliy lasix 20, lisinopril 2.5mg, coreg 3.125 bid  - BP soft, decrease Toprol to 12.5 Qd, s/p IVf   - c/w  midodrine 5 mg po TID, toprol 12.5 qd, digoxin 125mcg qd, please resume lasix 20mg PO on discharge  -f/u as outpt    2. Periauricular abscess, right.   - s/p I and D of right preauricular abscess  - on Abx    3. Chronic atrial fibrillation.   - Digoxin, Xarelto  - switched coreg to toprol,  decreased toprol to 12.5 for low BP  - Digoxin, Xarelto, toprol 12.5mg    4. CKD3  - Cr elevated  - held lasix and lisinopril, on IVF   - monitor respi status, monitor Cr, improving  - please resume lasix 20mg PO on discharge

## 2024-12-19 NOTE — PROVIDER CONTACT NOTE (OTHER) - SITUATION
pt was presenting with hypotension
Patient found to be hypotensive earlier, patient is s/p 500cc bolus of normal saline.
Pt BP 92/48 manual
Pt BP 99/52

## 2024-12-19 NOTE — PROGRESS NOTE ADULT - PROBLEM SELECTOR PLAN 1
- s/p I and D of right preauricular abscess  - c/w Unasyn, abscess culture growing E. faecalis and Prevotella, sensitivity reviewed  - Pain significantly improved. Tylenol prn pain  - Dispo: DC on Augmentin to complete 10 day course, outpt f/up OMFS   output follow up in 1wk following discharge, please call #[phone] for appointment  PT recs no skilled needs.

## 2024-12-19 NOTE — GOALS OF CARE CONVERSATION - ADVANCED CARE PLANNING - CONVERSATION DETAILS
Palliative consulted for complex medical decision making and symptom management in the setting of lung cancer.  Spoke with pt to introduce role and scope of palliative care team as supportive in the setting of complex comorbidities/serious illnesses, to assist with complex medical decision making and any associated pain or symptom management. We reviewed baseline function, comorbidities, current hospital course and plan of care.  Patient has a good understanding of current hospitalization sharing he was admitted for infection of his ear which is improving with improvement of pain and drainage. He is planned to go home today.     Regarding his home situation, he lives alone and has his friend Luly Kohli who visits him 3x/week. He is independent of all ADLS, walks with a walker and cane after recent left foot issue for which he was seeing podiatry. Does report some falls at home.  Regarding his cancer he shared that he follows Dr. Elkins in [facility], currently on daily Tagrisso and has follow up appt coming up. From his understanding, his cancer is still localized to his lungs from recent PET scans.    He has two children, son in [place] and daughter in [place]. His wife passed away 10 years ago after long rios with diabetes and stroke and he served as her caregiver along with Luly. 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 He has good support from his community, including the [employer] [place] and his Islam.     Counseled patient regarding role of HCP and provided reassurance that patient will continue to make their own medical decisions and that a proxy would substitute only in the event that the patient was incapable of doing so. Patient amenable to completing HCP paperwork -- previously filled out in 2023 but missing second witness. New HCP form filled, confirming that it's still Luly Kohli ([phone]) and second agent is his friend/former co-worker  Earl Hale [phone].    Discussed advanced directives including CPR and mechanical ventilation in setting of malignancy. Patient previously completed MOLST for DNR/DNI (trial of noninvasive during Nov 2024 admission) but missing physician signature. His wishes still stand, completed new MOLST for DNR/DNI (Trial of nonivasive ventilation)     Provided patient with copies of HCP and molst forms.

## 2024-12-19 NOTE — CONSULT NOTE ADULT - PROBLEM SELECTOR RECOMMENDATION 5
Thank you for allowing us to participate in your patient's care. We will continue to follow with you. Please page [phone] for any q's or c's. The Geriatric and Palliative Medicine service has coverage 24 hours a day/ 7 days a week to provide medical recommendations regarding symptom management needs via telephone.    Arti Villalta MD  Palliative Medicine

## 2024-12-19 NOTE — PROGRESS NOTE ADULT - PROBLEM SELECTOR PLAN 3
- Continue Tagrisso- not on formulary. patient's aide/family would need to bring from home. Pt lives alone   - f/u outpt oncologist Dr Martinez
- Continue Tagrisso- not on formulary. patient's aide/family would need to bring from home. Pt lives alone   - f/u outpt oncologist Dr Martinez
- Hypercoagulable state secondary to active malignancy + CHADS VASC 3  - Continue Digoxin, Coreg and Xarelto
- Continue Tagrisso- not on formulary. patient's aide/family would need to bring from home. Pt lives alone   - f/u outpt oncologist Dr Martinez

## 2024-12-19 NOTE — PROGRESS NOTE ADULT - PROBLEM SELECTOR PROBLEM 3
Chronic atrial fibrillation
Chronic atrial fibrillation
Metastatic non-small cell lung cancer
Chronic atrial fibrillation
Metastatic non-small cell lung cancer
Metastatic non-small cell lung cancer

## 2024-12-27 ENCOUNTER — INPATIENT (INPATIENT)
Facility: HOSPITAL | Age: 89
LOS: 3 days | Discharge: HOME | DRG: 603 | End: 2024-12-31
Attending: INTERNAL MEDICINE | Admitting: INTERNAL MEDICINE
Payer: MEDICARE

## 2024-12-27 VITALS
HEIGHT: 68 IN | WEIGHT: 154.1 LBS | HEART RATE: 73 BPM | RESPIRATION RATE: 18 BRPM | OXYGEN SATURATION: 99 % | SYSTOLIC BLOOD PRESSURE: 128 MMHG | TEMPERATURE: 98 F | DIASTOLIC BLOOD PRESSURE: 79 MMHG

## 2024-12-27 DIAGNOSIS — Z98.42 CATARACT EXTRACTION STATUS, LEFT EYE: Chronic | ICD-10-CM

## 2024-12-27 DIAGNOSIS — Z98.890 OTHER SPECIFIED POSTPROCEDURAL STATES: Chronic | ICD-10-CM

## 2024-12-27 PROCEDURE — 99285 EMERGENCY DEPT VISIT HI MDM: CPT | Mod: 25

## 2024-12-27 PROCEDURE — 10061 I&D ABSCESS COMP/MULTIPLE: CPT

## 2024-12-27 RX ORDER — AMPICILLIN SODIUM AND SULBACTAM SODIUM 100; 50 MG/ML; MG/ML
3 INJECTION, POWDER, FOR SOLUTION INTRAVENOUS ONCE
Refills: 0 | Status: COMPLETED | OUTPATIENT
Start: 2024-12-27 | End: 2024-12-28

## 2024-12-27 NOTE — ED PROVIDER NOTE - PHYSICAL EXAMINATION
1.5 cm raised lesion to rt preauricular draining small serosanguinous liquid  no palpable fluctuance  s/l surrounding erythema  no induration or TTP  no surrounding LA

## 2024-12-27 NOTE — ED PROVIDER NOTE - OBJECTIVE STATEMENT
89-year-old male history of lung cancer on Tagrisso, HLD, HTN, BPH, hypothyroidism, A-fib on Xarelto, right preauricular abscess status post recent drainage 12/14 by OMFS at [facility], IV Unasyn transitioned to p.o. Augmentin here after drainage from area again after completing antibiotic yesterday.  Patient daughter states she is worried that collection will start again.  Reports that patient did not improve when on oral antibiotics prescribed by his PCP prior to drainage being performed.  Patient denies any pain, fever, chills or weakness.  [place]  Wilder [id number] used to facilitate Tajik Interpretation.

## 2024-12-27 NOTE — ED PROVIDER NOTE - PROGRESS NOTE DETAILS
Odalys: CT maxillofacial with small abscess in the periauricular area improved from prior.  I performed I&D to the area and sent a wound culture.  Minimal purulent discharge.  Will admit for IV antibiotics.

## 2024-12-27 NOTE — ED ADULT TRIAGE NOTE - CHIEF COMPLAINT QUOTE
Right outer  ear pus today w redness, finish Augmentin yesterday, no fever  Hx periauricular abscess

## 2024-12-27 NOTE — ED ADULT TRIAGE NOTE - BMI (KG/M2)
CHIEF COMPLAINT:    Chief Complaint   Patient presents with   • URI     sinus congestion       HISTORY OF PRESENT ILLNESS:  Niharika Rankin is a 18 year old female who presents with a 2 weeks h/o rhinorrhea, congestion, cough and seen a week ago and on Omnicef and not better. Has more sinus pressure. No fever..   Has some anxiety issued and worrying a lot about her illness. Dad states spends a lot of time thinking and looking up illnesses on web sites and ends of being more anxious.    Associated symptoms;      Otalgia - absent      Otorrhea - absent      Rhinorrhea - clear, thick and congestion      Fever - none    INTAKE/OUTPUT;  Slightly decreased po intake; Slightly decreased urinary output    Other symptoms;  reviewed and accepted nurse's note  Recent illnesses; sinusitis - dx 1 week ago  Sick contacts; contacts w/ similar symptoms and attends school     Past Medical History:   Diagnosis Date   • Eczema    • PAST MEDICAL HISTORY     [facility]       Patient Active Problem List   Diagnosis   • Atopic dermatitis   • Acne   • Acute allergic rhinitis     Family History   Problem Relation Age of Onset   • Allergic Rhinitis Mother    • Allergic Rhinitis Father    • Asthma Sister    • Allergies Other    • Hypertension Other    • Cancer Other    • Diabetes Other    • Stroke Other    • Hyperlipidemia Other      Medications and allergies reviewed with patient.    EXAM:  Vital signs -   Visit Vitals  /70 (BP Location: [facility], Patient Position: Sitting, Cuff Size: Regular)   Pulse 80   Temp 98 °F (36.7 °C) (Tympanic)   Resp 16   Wt 58.3 kg   LMP 02/11/2019     General - alert, active, comfortable, not toxic, and in no acute distress  HEENT - Eyes - conjunctiva and sclera are not inflamed          Ears - external ears - normal.  canals - clear.  TM's - normal          Nose - purulent nasal discharge and tender over max sinuses bilaterally            Throat - moist mucous membranes, +2 tonsils w/o erythema, exudates or  petechia  Neck - supple and small, benign anterior cervical nodes bilaterally  Lungs - Clear to auscultation, no wheezing, crackles or retractions  Heart - Regular rate and rhythm w/o murmurs  Skin - Normal, no lesions, rash or ecchymosis    ASSESSMENT AND PLAN:  See diagnosis, orders/medications, and follow-up instructions.  Sinusitis-will treat with Antibiotic under orders,  if no better in 3-4 days or worsens to call, otherwise recheck as needed.  Anxiety  Refer to Behavioral Health  Gave handout of numbers  Spent >50% of time, 15 minutes discussing above.    Referral done           23.4

## 2024-12-27 NOTE — ED PROVIDER NOTE - CLINICAL SUMMARY MEDICAL DECISION MAKING FREE TEXT BOX
89-year-old male well-appearing with recent preauricular abscess status post drainage by OMFS on 12/14 here for drainage from wound area after completing p.o. antibiotics yesterday.  Patient has slight drainage without any obvious signs of collection.  Plan to perform labs provide IV antibiotics and repeat imaging of area to see if further OMFS intervention is warranted

## 2024-12-28 DIAGNOSIS — I48.91 UNSPECIFIED ATRIAL FIBRILLATION: ICD-10-CM

## 2024-12-28 DIAGNOSIS — H40.9 UNSPECIFIED GLAUCOMA: ICD-10-CM

## 2024-12-28 DIAGNOSIS — L02.01 CUTANEOUS ABSCESS OF FACE: ICD-10-CM

## 2024-12-28 DIAGNOSIS — E78.5 HYPERLIPIDEMIA, UNSPECIFIED: ICD-10-CM

## 2024-12-28 DIAGNOSIS — N40.0 BENIGN PROSTATIC HYPERPLASIA WITHOUT LOWER URINARY TRACT SYMPTOMS: ICD-10-CM

## 2024-12-28 DIAGNOSIS — Z29.9 ENCOUNTER FOR PROPHYLACTIC MEASURES, UNSPECIFIED: ICD-10-CM

## 2024-12-28 DIAGNOSIS — C34.90 MALIGNANT NEOPLASM OF UNSPECIFIED PART OF UNSPECIFIED BRONCHUS OR LUNG: ICD-10-CM

## 2024-12-28 DIAGNOSIS — I10 ESSENTIAL (PRIMARY) HYPERTENSION: ICD-10-CM

## 2024-12-28 LAB
ALBUMIN SERPL ELPH-MCNC: 3.4 G/DL — LOW (ref 3.5–5)
ALP SERPL-CCNC: 102 U/L — SIGNIFICANT CHANGE UP (ref 40–120)
ALT FLD-CCNC: 35 U/L DA — SIGNIFICANT CHANGE UP (ref 10–60)
ANION GAP SERPL CALC-SCNC: 2 MMOL/L — LOW (ref 5–17)
APTT BLD: 38.9 SEC — HIGH (ref 24.5–35.6)
AST SERPL-CCNC: 23 U/L — SIGNIFICANT CHANGE UP (ref 10–40)
BASOPHILS # BLD AUTO: 0.04 K/UL — SIGNIFICANT CHANGE UP (ref 0–0.2)
BASOPHILS NFR BLD AUTO: 0.9 % — SIGNIFICANT CHANGE UP (ref 0–2)
BILIRUB SERPL-MCNC: 0.6 MG/DL — SIGNIFICANT CHANGE UP (ref 0.2–1.2)
BUN SERPL-MCNC: 36 MG/DL — HIGH (ref 7–18)
CALCIUM SERPL-MCNC: 9.3 MG/DL — SIGNIFICANT CHANGE UP (ref 8.4–10.5)
CHLORIDE SERPL-SCNC: 114 MMOL/L — HIGH (ref 96–108)
CO2 SERPL-SCNC: 25 MMOL/L — SIGNIFICANT CHANGE UP (ref 22–31)
CREAT SERPL-MCNC: 1.32 MG/DL — HIGH (ref 0.5–1.3)
EGFR: 50 ML/MIN/1.73M2 — LOW
EOSINOPHIL # BLD AUTO: 0.22 K/UL — SIGNIFICANT CHANGE UP (ref 0–0.5)
EOSINOPHIL NFR BLD AUTO: 5 % — SIGNIFICANT CHANGE UP (ref 0–6)
GLUCOSE SERPL-MCNC: 112 MG/DL — HIGH (ref 70–99)
HCT VFR BLD CALC: 33.5 % — LOW (ref 39–50)
HGB BLD-MCNC: 11.3 G/DL — LOW (ref 13–17)
IMM GRANULOCYTES NFR BLD AUTO: 0.5 % — SIGNIFICANT CHANGE UP (ref 0–0.9)
INR BLD: 2.29 RATIO — HIGH (ref 0.85–1.16)
LACTATE SERPL-SCNC: 1.2 MMOL/L — SIGNIFICANT CHANGE UP (ref 0.7–2)
LYMPHOCYTES # BLD AUTO: 1.02 K/UL — SIGNIFICANT CHANGE UP (ref 1–3.3)
LYMPHOCYTES # BLD AUTO: 23.1 % — SIGNIFICANT CHANGE UP (ref 13–44)
MCHC RBC-ENTMCNC: 32.8 PG — SIGNIFICANT CHANGE UP (ref 27–34)
MCHC RBC-ENTMCNC: 33.7 G/DL — SIGNIFICANT CHANGE UP (ref 32–36)
MCV RBC AUTO: 97.1 FL — SIGNIFICANT CHANGE UP (ref 80–100)
MONOCYTES # BLD AUTO: 0.56 K/UL — SIGNIFICANT CHANGE UP (ref 0–0.9)
MONOCYTES NFR BLD AUTO: 12.7 % — SIGNIFICANT CHANGE UP (ref 2–14)
NEUTROPHILS # BLD AUTO: 2.55 K/UL — SIGNIFICANT CHANGE UP (ref 1.8–7.4)
NEUTROPHILS NFR BLD AUTO: 57.8 % — SIGNIFICANT CHANGE UP (ref 43–77)
NRBC # BLD: 0 /100 WBCS — SIGNIFICANT CHANGE UP (ref 0–0)
PLATELET # BLD AUTO: 115 K/UL — LOW (ref 150–400)
POTASSIUM SERPL-MCNC: 4.1 MMOL/L — SIGNIFICANT CHANGE UP (ref 3.5–5.3)
POTASSIUM SERPL-SCNC: 4.1 MMOL/L — SIGNIFICANT CHANGE UP (ref 3.5–5.3)
PROT SERPL-MCNC: 6.8 G/DL — SIGNIFICANT CHANGE UP (ref 6–8.3)
PROTHROM AB SERPL-ACNC: 26.3 SEC — HIGH (ref 9.9–13.4)
RBC # BLD: 3.45 M/UL — LOW (ref 4.2–5.8)
RBC # FLD: 16.3 % — HIGH (ref 10.3–14.5)
SODIUM SERPL-SCNC: 141 MMOL/L — SIGNIFICANT CHANGE UP (ref 135–145)
WBC # BLD: 4.41 K/UL — SIGNIFICANT CHANGE UP (ref 3.8–10.5)
WBC # FLD AUTO: 4.41 K/UL — SIGNIFICANT CHANGE UP (ref 3.8–10.5)

## 2024-12-28 PROCEDURE — 70487 CT MAXILLOFACIAL W/DYE: CPT | Mod: 26,MC

## 2024-12-28 RX ORDER — MIDODRINE HYDROCHLORIDE 5 MG/1
5 TABLET ORAL THREE TIMES A DAY
Refills: 0 | Status: DISCONTINUED | OUTPATIENT
Start: 2024-12-28 | End: 2024-12-29

## 2024-12-28 RX ORDER — LEVOTHYROXINE SODIUM 175 UG/1
25 TABLET ORAL DAILY
Refills: 0 | Status: DISCONTINUED | OUTPATIENT
Start: 2024-12-28 | End: 2024-12-31

## 2024-12-28 RX ORDER — DORZOLAMIDE/TIMOLOL/PF 2 %-0.5 %
1 DROPS OPHTHALMIC (EYE)
Refills: 0 | Status: DISCONTINUED | OUTPATIENT
Start: 2024-12-28 | End: 2024-12-31

## 2024-12-28 RX ORDER — RIVAROXABAN 2.5 MG/1
15 TABLET, FILM COATED ORAL DAILY
Refills: 0 | Status: DISCONTINUED | OUTPATIENT
Start: 2024-12-28 | End: 2024-12-31

## 2024-12-28 RX ORDER — AMPICILLIN SODIUM AND SULBACTAM SODIUM 100; 50 MG/ML; MG/ML
3 INJECTION, POWDER, FOR SOLUTION INTRAVENOUS EVERY 6 HOURS
Refills: 0 | Status: DISCONTINUED | OUTPATIENT
Start: 2024-12-28 | End: 2024-12-31

## 2024-12-28 RX ORDER — GINKGO BILOBA 40 MG
3 CAPSULE ORAL AT BEDTIME
Refills: 0 | Status: DISCONTINUED | OUTPATIENT
Start: 2024-12-28 | End: 2024-12-31

## 2024-12-28 RX ORDER — ALBUTEROL SULFATE 90 UG/1
2 INHALANT RESPIRATORY (INHALATION) EVERY 6 HOURS
Refills: 0 | Status: DISCONTINUED | OUTPATIENT
Start: 2024-12-28 | End: 2024-12-31

## 2024-12-28 RX ORDER — ONDANSETRON 4 MG/1
4 TABLET ORAL EVERY 8 HOURS
Refills: 0 | Status: DISCONTINUED | OUTPATIENT
Start: 2024-12-28 | End: 2024-12-31

## 2024-12-28 RX ORDER — ATORVASTATIN CALCIUM 40 MG/1
10 TABLET, FILM COATED ORAL AT BEDTIME
Refills: 0 | Status: DISCONTINUED | OUTPATIENT
Start: 2024-12-28 | End: 2024-12-31

## 2024-12-28 RX ORDER — DIGOXIN 250 MCG
125 TABLET ORAL
Refills: 0 | Status: DISCONTINUED | OUTPATIENT
Start: 2024-12-28 | End: 2024-12-31

## 2024-12-28 RX ORDER — MAG HYDROX/ALUMINUM HYD/SIMETH 200-200-20
30 SUSPENSION, ORAL (FINAL DOSE FORM) ORAL EVERY 4 HOURS
Refills: 0 | Status: DISCONTINUED | OUTPATIENT
Start: 2024-12-28 | End: 2024-12-31

## 2024-12-28 RX ORDER — METOPROLOL TARTRATE 50 MG
12.5 TABLET ORAL DAILY
Refills: 0 | Status: DISCONTINUED | OUTPATIENT
Start: 2024-12-28 | End: 2024-12-31

## 2024-12-28 RX ORDER — LISINOPRIL 30 MG/1
2.5 TABLET ORAL DAILY
Refills: 0 | Status: DISCONTINUED | OUTPATIENT
Start: 2024-12-28 | End: 2024-12-31

## 2024-12-28 RX ORDER — FUROSEMIDE 20 MG
20 TABLET ORAL DAILY
Refills: 0 | Status: DISCONTINUED | OUTPATIENT
Start: 2024-12-28 | End: 2024-12-31

## 2024-12-28 RX ORDER — ACETAMINOPHEN 80 MG/.8ML
650 SOLUTION/ DROPS ORAL EVERY 6 HOURS
Refills: 0 | Status: DISCONTINUED | OUTPATIENT
Start: 2024-12-28 | End: 2024-12-31

## 2024-12-28 RX ORDER — FINASTERIDE 5 MG
5 TABLET ORAL DAILY
Refills: 0 | Status: DISCONTINUED | OUTPATIENT
Start: 2024-12-28 | End: 2024-12-31

## 2024-12-28 RX ORDER — TAMSULOSIN HYDROCHLORIDE 0.4 MG/1
0.4 CAPSULE ORAL AT BEDTIME
Refills: 0 | Status: DISCONTINUED | OUTPATIENT
Start: 2024-12-28 | End: 2024-12-31

## 2024-12-28 RX ORDER — LATANOPROST 50 UG/ML
1 SOLUTION OPHTHALMIC AT BEDTIME
Refills: 0 | Status: DISCONTINUED | OUTPATIENT
Start: 2024-12-28 | End: 2024-12-31

## 2024-12-28 RX ADMIN — RIVAROXABAN 15 MILLIGRAM(S): 2.5 TABLET, FILM COATED ORAL at 12:02

## 2024-12-28 RX ADMIN — AMPICILLIN SODIUM AND SULBACTAM SODIUM 200 GRAM(S): 100; 50 INJECTION, POWDER, FOR SOLUTION INTRAVENOUS at 12:02

## 2024-12-28 RX ADMIN — Medication 125 MICROGRAM(S): at 17:59

## 2024-12-28 RX ADMIN — MIDODRINE HYDROCHLORIDE 5 MILLIGRAM(S): 5 TABLET ORAL at 12:02

## 2024-12-28 RX ADMIN — LEVOTHYROXINE SODIUM 25 MICROGRAM(S): 175 TABLET ORAL at 17:59

## 2024-12-28 RX ADMIN — Medication 5 MILLIGRAM(S): at 12:01

## 2024-12-28 RX ADMIN — ATORVASTATIN CALCIUM 10 MILLIGRAM(S): 40 TABLET, FILM COATED ORAL at 21:49

## 2024-12-28 RX ADMIN — Medication 12.5 MILLIGRAM(S): at 17:59

## 2024-12-28 RX ADMIN — MIDODRINE HYDROCHLORIDE 5 MILLIGRAM(S): 5 TABLET ORAL at 17:42

## 2024-12-28 RX ADMIN — TAMSULOSIN HYDROCHLORIDE 0.4 MILLIGRAM(S): 0.4 CAPSULE ORAL at 21:49

## 2024-12-28 RX ADMIN — Medication 20 MILLIGRAM(S): at 17:59

## 2024-12-28 RX ADMIN — AMPICILLIN SODIUM AND SULBACTAM SODIUM 200 GRAM(S): 100; 50 INJECTION, POWDER, FOR SOLUTION INTRAVENOUS at 17:45

## 2024-12-28 RX ADMIN — AMPICILLIN SODIUM AND SULBACTAM SODIUM 200 GRAM(S): 100; 50 INJECTION, POWDER, FOR SOLUTION INTRAVENOUS at 00:40

## 2024-12-28 RX ADMIN — LISINOPRIL 2.5 MILLIGRAM(S): 30 TABLET ORAL at 17:59

## 2024-12-28 NOTE — H&P ADULT - PROBLEM SELECTOR PLAN 4
Known hx of afib  Not in RVR    -cont home BB/CCB  -cont eliquis/xarelto/warfarin Known hx of afib  Not in RVR    -cont home BB  -cont xarelto    cards Dr. Dominique following , follow up recs

## 2024-12-28 NOTE — H&P ADULT - NSHPREVIEWOFSYSTEMS_GEN_ALL_CORE
- CONSTITUTIONAL: Denies fever and chills  - HEENT: Denies changes in vision and hearing.  - RESPIRATORY: Denies SOB and cough.  - CV: Denies chest pain and palpitations  - GI: Denies abdominal pain, nausea, vomiting and diarrhea.  - : Denies dysuria and urinary frequency.  - SKIN: Denies rash and pruritus. + wound / swelling in front of R ear, + pain   - NEUROLOGICAL: Denies headache and syncope.  - PSYCHIATRIC: Denies recent changes in mood. Denies anxiety and depression.

## 2024-12-28 NOTE — H&P ADULT - PROBLEM SELECTOR PLAN 7
c/w xarelto c/w home meds     Pt family will bring in his home medications ( not available in pharmacy )

## 2024-12-28 NOTE — ED ADULT NURSE NOTE - NS ED NURSE REPORT GIVEN TO FT
CONSTITUTIONAL: Well-developed; well-nourished; in no acute distress.   SKIN: warm, dry  HEAD: Normocephalic; atraumatic.  EYES: PERRL, EOMI, normal sclera and conjunctiva   ENT: No nasal discharge; airway clear.  NECK: Supple; non tender.  CARD:  Regular rate and rhythm.   RESP: NO inc WOB   ABD: soft ntnd  EXT: Normal ROM.
mulugeta

## 2024-12-28 NOTE — ED ADULT NURSE NOTE - OBJECTIVE STATEMENT
Patient is a 89y/o male presenting to the ED c/o Right outer  ear pus today w redness, finish Augmentin yesterday, no fever  Hx periauricular abscess

## 2024-12-28 NOTE — H&P ADULT - PROBLEM SELECTOR PLAN 1
Hx  of facial abscess recently drained at [facility]       c/w unasyn   Dr. Miller consulted Hx  of facial abscess recently drained at [facility]   wound recurring and refractory to OP Treatment ( s/p OP Abx course)     CT face-RIGHT PREAURICULAR MULTILOCULATED ABSCESS MEASURING APPROXIMATELY 1.4 X 0.6 CM IN GREATEST DIMENSION, DECREASED IN SIZE   c/w unasyn for now  Dr. Miller consulted  f/u BCX, wound Cx

## 2024-12-28 NOTE — H&P ADULT - PROBLEM SELECTOR PLAN 6
- pt takes rosuvastatin 20 mg at home  - c/w atorvastatin 40 mg   - f/u lipid profile  - Dash Diet - c/w home statin   - Dash Diet

## 2024-12-28 NOTE — PATIENT PROFILE ADULT - FALL HARM RISK - HARM RISK INTERVENTIONS

## 2024-12-28 NOTE — PROGRESS NOTE ADULT - SUBJECTIVE AND OBJECTIVE BOX
Chief Complaint: wound check.    · Chief Complaint: The patient is a 89y Male complaining of wound check.  · HPI Objective Statement: 89-year-old male history of lung cancer on Tagrisso, HLD, HTN, BPH, hypothyroidism, A-fib on Xarelto, right preauricular abscess status post recent drainage 12/14 by OMFS at [facility], IV Unasyn transitioned to p.o. Augmentin here after drainage from area again after completing antibiotic yesterday.  Patient daughter states she is worried that collection will start again.  Reports that patient did not improve when on oral antibiotics prescribed by his PCP prior to drainage being performed.  Patient denies any pain, fever, chills or weakness.  [employer]  Wilder [id number] used to facilitate Kazakh Interpretation.    HIV:    HIV Test Questions:  · In accordance with [place] State law, we offer every patient who comes to our ED an HIV test. Would you like to be tested today?	Opt out    HEPATITIS C TEST QUESTIONS:    Hepatitis C Test Questions:  · In accordance with [facility] Law, we offer every patient a Hepatitis C test. Would you like to be tested today?	Opt out    ALLERGIES AND HOME MEDICATIONS:   Allergies:        Allergies:  	No Known Allergies:     Home Medications:   * Patient Currently Takes Medications as of 19-Dec-2024 12:31 documented in Structured Notes  · 	midodrine 5 mg oral tablet: 1 tab(s) orally 3 times a day  · 	Toprol-XL 25 mg oral tablet, extended release: 1 tab(s) orally once a day Please take 1/2 tablet daily  · 	amoxicillin-clavulanate 875 mg-125 mg oral tablet: 875 milligram(s) orally every 12 hours Take 1 pill every 12 hours through 12/22 MDD: 2 pills  · 	digoxin 125 mcg (0.125 mg) oral tablet: 1 tab(s) orally every other day  · 	Albuterol (Eqv-ProAir HFA) 90 mcg/inh inhalation aerosol: 2 puff(s) inhaled every 6 hours  · 	dorzolamide 22.3 mg-timolol 6.8 mg/mL eye drops:   · 	tamsulosin 0.4 mg capsule: 1 tab(s) orally once a day (at bedtime)  · 	Rocklatan 0.02 %-0.005 % eye drops:   · 	finasteride 5 mg tablet: orally once a day  · 	levothyroxine 25 mcg tablet: 1 tab(s) orally once a day  · 	Xarelto 15 mg oral tablet: 1 tab(s) orally once a day (in the evening)  · 	Centrum oral tablet: 1 tab(s) orally once a day  · 	Lumigan 0.01% ophthalmic solution: 1 drop(s) to each affected eye once a day (in the evening)  · 	Azopt 1% ophthalmic suspension: 1 drop(s) to each affected eye 3 times a day  · 	Combigan 0.2%-0.5% ophthalmic solution: 1 drop(s) to each affected eye every 12 hours  · 	Tagrisso 80 mg oral tablet: 80 milligram(s) orally once a day  · 	simvastatin 10 mg oral tablet: 1 tab(s) orally once a day (at bedtime)    REVIEW OF SYSTEMS:    Review of Systems:  · SKIN: - - -  · Skin [+]: drainage  · ROS STATEMENT: all other ROS negative except as per HPI    PHYSICAL EXAM:   · Physical Examination: 1.5 cm raised lesion to rt preauricular draining small serosanguinous liquid  	no palpable fluctuance  	s/l surrounding erythema  	no induration or TTP  no surrounding LA  · CONSTITUTIONAL: Well appearing, awake, alert, oriented to person, place, time/situation and in no apparent distress.  · CARDIAC: Normal rate, regular rhythm.  Heart sounds S1, S2.  No murmurs, rubs or gallops.  · RESPIRATORY: Breath sounds clear and equal bilaterally.  · PSYCHIATRIC: Alert and oriented to person, place, time/situation. normal mood and affect. no apparent risk to self or others.  · HEME LYMPH: No adenopathy or splenomegaly. No cervical or inguinal lymphadenopathy.    CURRENT ORDERS/:   · 	Intake and Output,   Frequency:  every 2 hours, 27-Dec-2024, Active, Standard  · 	Vital Signs,   Frequency:  See Frequency Below  	  Every: 15 minute(s)   For: 1 hour(s)  	  Every: 30 minute(s)   For: 2 hour(s)  	  Every: 1 hour(s)   For: 4 hour(s)  	  Every: 4 hour(s) until discharge, 27-Dec-2024, Active, Standard  · 	Cardiac Monitor Bedside,   Time/Priority:  STAT, 27-Dec-2024, Active, Standard  · 	IV Insert,   Time/Priority:  STAT  	  Additional Instructions:  Peripheral Line: 1, 27-Dec-2024, Active, Standard  · 	Pulse Oximetry,   Frequency: <Continuous>  	  Additional Instructions:  NOTIFY Provider if oxygen saturation is LESS THAN 90%.  This order is for active ED patients only., 27-Dec-2024, Active, Standard  Attending note     Impression:  #. R side   Facial abscess/cellulitis  -failure of po/outpatient treatment  -will start IV abx  -please contact dr Cartwright/ID to initiate iv abx  -local care and dressing  -pain control    #atrial fibrillation-stable ,rate controll  -cont current meds-xarelto    #lung cancer by hx-stable ,  -cont oral meds- on tagrisso    #bph-chronic,stable on current meds    d/w staff plan ogf care,.full h/p to follow,still pending admittion

## 2024-12-28 NOTE — H&P ADULT - ASSESSMENT
89-year-old male history of lung cancer on Tagrisso, HLD, HTN, BPH, hypothyroidism, A-fib on Xarelto, right preauricular abscess status post recent drainage 12/14 by OMFS at [facility], IV Unasyn transitioned to p.o. Augmentin here after drainage from area again after completing antibiotic yesterday.  Patient daughter states she is worried that collection will start again.  Reports that patient did not improve when on oral antibiotics prescribed by his PCP prior to drainage being performed.  Patient denies any pain, fever, chills or weakness.  [place]  Wilder [id number] used to facilitate Faroese Interpretation.    In the ER     VS- /81, RR 18, HR 74, afebrile, sats 95% RA     labs s/o     s/p unasyn    previous CT scan face     RIGHT PREAURICULAR MULTILOCULATED ABSCESS MEASURING APPROXIMATELY 1.4 X   0.6 CM IN GREATEST DIMENSION, DECREASED IN SIZE FROM THE PRIOR STUDY   DATED 12/12/2024. THERE IS INFLAMMATION IN THE SURROUNDING SUBCUTANEOUS   SOFT TISSUES.       s/p IND by ER - BCx, wound Cx collected     Admitted to medicine for R sided facial abscess on IV ABx, IDx consulted  89-year-old male history of lung cancer on Tagrisso, HLD, HTN, BPH, hypothyroidism, A-fib on Xarelto, right preauricular abscess status post recent drainage 12/14 by OMFS at [facility], IV Unasyn transitioned to p.o. Augmentin here after drainage from area again after completing antibiotic ( last dose 2 days ago) . In the ER , VSS . S/p unasyn. S/p IND by ER - BCx, wound Cx collected     Admitted to medicine for R sided facial abscess on IV ABx, IDx consulted, cardiology following for readjustment of home medications

## 2024-12-28 NOTE — ED ADULT NURSE NOTE - BIRTH SEX
Male Solaraze Pregnancy And Lactation Text: This medication is Pregnancy Category B and is considered safe. There is some data to suggest avoiding during the third trimester. It is unknown if this medication is excreted in breast milk.

## 2024-12-28 NOTE — H&P ADULT - HISTORY OF PRESENT ILLNESS
89-year-old male history of lung cancer on Tagrisso, HLD, HTN, BPH, hypothyroidism, A-fib on Xarelto, right preauricular abscess status post recent drainage 12/14 by OMFS at [facility], IV Unasyn transitioned to p.o. Augmentin here after drainage from area again after completing antibiotic ( last dose 2 days ago) .  Patient's family  was  worried that collection will start again.  Reports that patient did not improve when on oral antibiotics prescribed by his PCP prior to drainage being performed.  Patient denies any pain, fever, chills or weakness.     In the ER , VS- /81, RR 18, HR 74, afebrile, sats 95% RA     labs s/o     s/p unasyn    previous CT scan face w IV Contrast     RIGHT PREAURICULAR MULTILOCULATED ABSCESS MEASURING APPROXIMATELY 1.4 X   0.6 CM IN GREATEST DIMENSION, DECREASED IN SIZE FROM THE PRIOR STUDY   DATED 12/12/2024. THERE IS INFLAMMATION IN THE SURROUNDING SUBCUTANEOUS   SOFT TISSUES.       s/p IND by ER - BCx, wound Cx collected

## 2024-12-28 NOTE — H&P ADULT - PROBLEM SELECTOR PLAN 2
h/o HTN on  Monitor BP  c/w home meds -  Lower MAP 20-25% in next 2-4 hrs h/o HTN   Monitor BP  c/w home meds with holding parameters   Lower MAP 20-25% in next 2-4 hrs  DASH diet

## 2024-12-28 NOTE — CONSULT NOTE ADULT - SUBJECTIVE AND OBJECTIVE BOX
Date of Service  12-28-24 @ 11:56    CHIEF COMPLAINT:Patient is a 89y old  Male who presents with a chief complaint of Facial abscess.    HPI:Patient is a 89 yr old male with PMHX of Chronic Afib,Systolic HF,Lung Ca,BPH,Orthostatic hypotension,Hypothyroidism, s/p recent hospital stay at [facility] Rt periauricular abcess and d/c home on po abx. He presents to ER with drainage from abcess, s/p I and D in ER,admitted for IV ABX.Patient denies and cp,sob,edema,PND or orthopnea.      PAST MEDICAL & SURGICAL HISTORY:  Essential hypertension      Hyperlipidemia, unspecified hyperlipidemia type      BPH without obstruction/lower urinary tract symptoms      Glaucoma (increased eye pressure)  both eyes      Atrial fibrillation      H/O hemicolectomy  1973      Cataract extraction status of left eye  w/lens implant - 2014      History of lung biopsy  2016 - negative results          MEDICATIONS  (STANDING):  ampicillin/sulbactam  IVPB 3 Gram(s) IV Intermittent every 6 hours  atorvastatin 10 milliGRAM(s) Oral at bedtime  digoxin     Tablet 125 MICROGram(s) Oral <User Schedule>  dorzolamide 2%/timolol 0.5% Ophthalmic Solution 1 Drop(s) Both EYES two times a day  finasteride 5 milliGRAM(s) Oral daily  latanoprost 0.005% Ophthalmic Solution 1 Drop(s) Both EYES at bedtime  levothyroxine 25 MICROGram(s) Oral daily  metoprolol succinate ER 12.5 milliGRAM(s) Oral daily  midodrine. 5 milliGRAM(s) Oral three times a day  rivaroxaban 15 milliGRAM(s) Oral daily  tamsulosin 0.4 milliGRAM(s) Oral at bedtime    MEDICATIONS  (PRN):  acetaminophen     Tablet .. 650 milliGRAM(s) Oral every 6 hours PRN Temp greater or equal to 38C (100.4F), Mild Pain (1 - 3)  albuterol    90 MICROgram(s) HFA Inhaler 2 Puff(s) Inhalation every 6 hours PRN Bronchospasm  aluminum hydroxide/magnesium hydroxide/simethicone Suspension 30 milliLiter(s) Oral every 4 hours PRN Dyspepsia  melatonin 3 milliGRAM(s) Oral at bedtime PRN Insomnia  ondansetron Injectable 4 milliGRAM(s) IV Push every 8 hours PRN Nausea and/or Vomiting      FAMILY HISTORY:  No hx of CAD        SOCIAL HISTORY:    [x ] ex-smoker    [x ] Alcohol-denies    Allergies    No Known Allergies    Intolerances    	    REVIEW OF SYSTEMS:  CONSTITUTIONAL: No fever, weight loss, or fatigue  EYES: No eye pain, visual disturbances, or discharge  ENT:  No difficulty hearing, tinnitus, vertigo; No sinus or throat pain  NECK: No pain or stiffness  RESPIRATORY: No cough, wheezing, chills or hemoptysis; No Shortness of Breath  CARDIOVASCULAR: No chest pain, palpitations, passing out, dizziness, or leg swelling  GASTROINTESTINAL: No abdominal or epigastric pain. No nausea, vomiting, or hematemesis; No diarrhea or constipation. No melena or hematochezia.  GENITOURINARY: No dysuria, frequency, hematuria, or incontinence  NEUROLOGICAL: No headaches, memory loss, loss of strength, numbness, or tremors  SKIN: No itching, burning, rashes, or lesions   LYMPH Nodes: No enlarged glands  ENDOCRINE: No heat or cold intolerance; No hair loss  MUSCULOSKELETAL: No joint pain or swelling; No muscle, back, or extremity pain  PSYCHIATRIC: No depression, anxiety, mood swings, or difficulty sleeping  HEME/LYMPH: No easy bruising, or bleeding gums  ALLERGY AND IMMUNOLOGIC: No hives or eczema	        PHYSICAL EXAM:  T(C): 36.6 (12-28-24 @ 11:24), Max: 36.9 (12-27-24 @ 21:05)  HR: 77 (12-28-24 @ 11:24) (71 - 81)  BP: 119/70 (12-28-24 @ 11:24) (119/70 - 148/70)  RR: 18 (12-28-24 @ 11:24) (18 - 18)  SpO2: 96% (12-28-24 @ 11:24) (95% - 99%)  Wt(kg): --  I&O's Summary      Appearance: Normal	  HEENT:   Normal oral mucosa, PERRL, EOMI	  Lymphatic: No lymphadenopathy  Cardiovascular: Normal S1 S2, No JVD, No murmurs, No edema  Respiratory: Lungs clear to auscultation	  Psychiatry: A & O x 3, Mood & affect appropriate  Gastrointestinal:  Soft, Non-tender, + BS	  Skin: No rashes, No ecchymoses, No cyanosis	  Neurologic: Non-focal  Extremities: Normal range of motion, No clubbing, cyanosis or edema  Vascular: Peripheral pulses palpable 2+ bilaterally    	  	  LABS:	 	                        11.3   4.41  )-----------( 115      ( 27 Dec 2024 23:14 )             33.5     12-27    141  |  114[H]  |  36[H]  ----------------------------<  112[H]  4.1   |  25  |  1.32[H]    Ca    9.3      27 Dec 2024 23:14    TPro  6.8  /  Alb  3.4[L]  /  TBili  0.6  /  DBili  x   /  AST  23  /  ALT  35  /  AlkPhos  102  12-27    < from: CT Maxillofacial w/ IV Cont (12.28.24 @ 02:01) >  ACC: [id number] EXAM:  CT MAXILLOFACIAL  IC   ORDERED BY: PATRICK COUCH     PROCEDURE DATE:  12/28/2024          INTERPRETATION:  Clinical Indication:  R preauricular abscess/cellulitis   - assess extent of infection  Comparison: 12-12-24.    Technique: Multidetector axial imaging of the facial bones was obtained   after the intravenous administration of 90 cc of Omnipaque 350. 10 cc was   discarded. Sagittal and coronal reformatted images were produced.    Findings:    There is a right preauricular multiloculated abscess measuring   approximately 1.4 x 0.6 cm in greatest dimension, decreased in size from   the prior study dated 12/12/2024. There is inflammation in the   surrounding subcutaneous soft tissues.    Rest of the facial soft tissues are unremarkable. Facial osseous   structures are intact.    The paranasal sinuses are clear.    Visualized portions of the brain and orbits are unremarkable.    IMPRESSION:    RIGHT PREAURICULAR MULTILOCULATED ABSCESS MEASURING APPROXIMATELY 1.4 X   0.6 CM IN GREATEST DIMENSION, DECREASED IN SIZE FROM THE PRIOR STUDY   DATED 12/12/2024. THERE IS INFLAMMATION IN THE SURROUNDING SUBCUTANEOUS   SOFT TISSUES.    --- End of Report ---            LYNETTE HUGHES MD; Attending Radiologist  This document has been electronically signed. Dec 28 2024  2:48AM    < end of copied text >  < from: TTE W or WO Ultrasound Enhancing Agent (11.13.24 @ 15:08) >    CONCLUSIONS:      1. Left atrium is mildly dilated.   2. Moderate to severe mitral regurgitation.   3. Aortic valve anatomy cannot be determined with reduced systolic excursion. Mild aortic stenosis.   4. The peak transaortic velocity is 1.28 m/s, peak transaortic gradient is 6.6 mmHg and mean transaortic gradient is 2.7 mmHg with an LVOT/aortic valve VTI ratio of 0.62. The effective orifice area is estimated at 1.95 cm² by the continuity equation.   5. Left ventricular systolic function is mildly decreased with an ejection fraction of 48 % by Naidu's method of disks.   6. The right ventricle is not well visualized.   7. Estimated pulmonary artery systolic pressure is 46 mmHg, consistent with mild pulmonary hypertension.    < end of copied text >

## 2024-12-28 NOTE — H&P ADULT - NSHPPHYSICALEXAM_GEN_ALL_CORE
PHYSICAL EXAM:  GENERAL: NAD, speaks in full sentences, no signs of respiratory distress  HEAD:  Atraumatic, Normocephalic  EYES: EOMI, PERRLA, conjunctiva and sclera clear  NECK: Supple, No JVD  CHEST/LUNG: Clear to auscultation bilaterally; No wheeze; No crackles; No accessory muscles used  HEART: Regular rate and rhythm; No murmurs;   ABDOMEN: Soft, Nontender, Nondistended; Bowel sounds present; No guarding  EXTREMITIES:  2+ Peripheral Pulses, No cyanosis or edema  PSYCH: AAOx3  NEUROLOGY: non-focal  SKIN:+ wound over R preauricular area - dressing dry and intact

## 2024-12-29 LAB
ALBUMIN SERPL ELPH-MCNC: 3.2 G/DL — LOW (ref 3.5–5)
ALP SERPL-CCNC: 88 U/L — SIGNIFICANT CHANGE UP (ref 40–120)
ALT FLD-CCNC: 29 U/L DA — SIGNIFICANT CHANGE UP (ref 10–60)
ANION GAP SERPL CALC-SCNC: 8 MMOL/L — SIGNIFICANT CHANGE UP (ref 5–17)
AST SERPL-CCNC: 22 U/L — SIGNIFICANT CHANGE UP (ref 10–40)
BASOPHILS # BLD AUTO: 0.03 K/UL — SIGNIFICANT CHANGE UP (ref 0–0.2)
BASOPHILS NFR BLD AUTO: 0.8 % — SIGNIFICANT CHANGE UP (ref 0–2)
BILIRUB SERPL-MCNC: 0.8 MG/DL — SIGNIFICANT CHANGE UP (ref 0.2–1.2)
BUN SERPL-MCNC: 23 MG/DL — HIGH (ref 7–18)
CALCIUM SERPL-MCNC: 8.8 MG/DL — SIGNIFICANT CHANGE UP (ref 8.4–10.5)
CHLORIDE SERPL-SCNC: 111 MMOL/L — HIGH (ref 96–108)
CO2 SERPL-SCNC: 23 MMOL/L — SIGNIFICANT CHANGE UP (ref 22–31)
CREAT SERPL-MCNC: 1.18 MG/DL — SIGNIFICANT CHANGE UP (ref 0.5–1.3)
EGFR: 58 ML/MIN/1.73M2 — LOW
EOSINOPHIL # BLD AUTO: 0.19 K/UL — SIGNIFICANT CHANGE UP (ref 0–0.5)
EOSINOPHIL NFR BLD AUTO: 5 % — SIGNIFICANT CHANGE UP (ref 0–6)
FLUAV AG NPH QL: SIGNIFICANT CHANGE UP
FLUBV AG NPH QL: SIGNIFICANT CHANGE UP
GLUCOSE SERPL-MCNC: 98 MG/DL — SIGNIFICANT CHANGE UP (ref 70–99)
HCT VFR BLD CALC: 32.2 % — LOW (ref 39–50)
HGB BLD-MCNC: 11 G/DL — LOW (ref 13–17)
IMM GRANULOCYTES NFR BLD AUTO: 0.3 % — SIGNIFICANT CHANGE UP (ref 0–0.9)
LYMPHOCYTES # BLD AUTO: 0.99 K/UL — LOW (ref 1–3.3)
LYMPHOCYTES # BLD AUTO: 26.1 % — SIGNIFICANT CHANGE UP (ref 13–44)
MAGNESIUM SERPL-MCNC: 2.3 MG/DL — SIGNIFICANT CHANGE UP (ref 1.6–2.6)
MCHC RBC-ENTMCNC: 32 PG — SIGNIFICANT CHANGE UP (ref 27–34)
MCHC RBC-ENTMCNC: 34.2 G/DL — SIGNIFICANT CHANGE UP (ref 32–36)
MCV RBC AUTO: 93.6 FL — SIGNIFICANT CHANGE UP (ref 80–100)
MONOCYTES # BLD AUTO: 0.43 K/UL — SIGNIFICANT CHANGE UP (ref 0–0.9)
MONOCYTES NFR BLD AUTO: 11.3 % — SIGNIFICANT CHANGE UP (ref 2–14)
NEUTROPHILS # BLD AUTO: 2.15 K/UL — SIGNIFICANT CHANGE UP (ref 1.8–7.4)
NEUTROPHILS NFR BLD AUTO: 56.5 % — SIGNIFICANT CHANGE UP (ref 43–77)
NRBC # BLD: 0 /100 WBCS — SIGNIFICANT CHANGE UP (ref 0–0)
PHOSPHATE SERPL-MCNC: 3.6 MG/DL — SIGNIFICANT CHANGE UP (ref 2.5–4.5)
PLATELET # BLD AUTO: 108 K/UL — LOW (ref 150–400)
POTASSIUM SERPL-MCNC: 3.9 MMOL/L — SIGNIFICANT CHANGE UP (ref 3.5–5.3)
POTASSIUM SERPL-SCNC: 3.9 MMOL/L — SIGNIFICANT CHANGE UP (ref 3.5–5.3)
PROT SERPL-MCNC: 6.1 G/DL — SIGNIFICANT CHANGE UP (ref 6–8.3)
RBC # BLD: 3.44 M/UL — LOW (ref 4.2–5.8)
RBC # FLD: 16.8 % — HIGH (ref 10.3–14.5)
RSV RNA NPH QL NAA+NON-PROBE: SIGNIFICANT CHANGE UP
SARS-COV-2 RNA SPEC QL NAA+PROBE: SIGNIFICANT CHANGE UP
SODIUM SERPL-SCNC: 142 MMOL/L — SIGNIFICANT CHANGE UP (ref 135–145)
T4 FREE SERPL-MCNC: 1.1 NG/DL — SIGNIFICANT CHANGE UP (ref 0.9–1.7)
TSH SERPL-MCNC: 4.69 UU/ML — SIGNIFICANT CHANGE UP (ref 0.34–4.82)
WBC # BLD: 3.8 K/UL — SIGNIFICANT CHANGE UP (ref 3.8–10.5)
WBC # FLD AUTO: 3.8 K/UL — SIGNIFICANT CHANGE UP (ref 3.8–10.5)

## 2024-12-29 RX ORDER — OSIMERTINIB 80 1/1
80 TABLET, FILM COATED ORAL DAILY
Refills: 0 | Status: DISCONTINUED | OUTPATIENT
Start: 2024-12-29 | End: 2024-12-29

## 2024-12-29 RX ORDER — MIDODRINE HYDROCHLORIDE 5 MG/1
10 TABLET ORAL THREE TIMES A DAY
Refills: 0 | Status: DISCONTINUED | OUTPATIENT
Start: 2024-12-29 | End: 2024-12-31

## 2024-12-29 RX ORDER — SODIUM CHLORIDE 9 MG/ML
500 INJECTION, SOLUTION INTRAMUSCULAR; INTRAVENOUS; SUBCUTANEOUS ONCE
Refills: 0 | Status: COMPLETED | OUTPATIENT
Start: 2024-12-29 | End: 2024-12-29

## 2024-12-29 RX ORDER — OSIMERTINIB 80 1/1
80 TABLET, FILM COATED ORAL DAILY
Refills: 0 | Status: DISCONTINUED | OUTPATIENT
Start: 2024-12-29 | End: 2024-12-30

## 2024-12-29 RX ADMIN — LATANOPROST 1 DROP(S): 50 SOLUTION OPHTHALMIC at 21:27

## 2024-12-29 RX ADMIN — Medication 5 MILLIGRAM(S): at 11:15

## 2024-12-29 RX ADMIN — LATANOPROST 1 DROP(S): 50 SOLUTION OPHTHALMIC at 01:26

## 2024-12-29 RX ADMIN — AMPICILLIN SODIUM AND SULBACTAM SODIUM 200 GRAM(S): 100; 50 INJECTION, POWDER, FOR SOLUTION INTRAVENOUS at 11:15

## 2024-12-29 RX ADMIN — MIDODRINE HYDROCHLORIDE 10 MILLIGRAM(S): 5 TABLET ORAL at 17:15

## 2024-12-29 RX ADMIN — OSIMERTINIB 80 MILLIGRAM(S): 80 TABLET, FILM COATED ORAL at 14:36

## 2024-12-29 RX ADMIN — AMPICILLIN SODIUM AND SULBACTAM SODIUM 200 GRAM(S): 100; 50 INJECTION, POWDER, FOR SOLUTION INTRAVENOUS at 17:15

## 2024-12-29 RX ADMIN — Medication 20 MILLIGRAM(S): at 06:23

## 2024-12-29 RX ADMIN — LEVOTHYROXINE SODIUM 25 MICROGRAM(S): 175 TABLET ORAL at 06:23

## 2024-12-29 RX ADMIN — ATORVASTATIN CALCIUM 10 MILLIGRAM(S): 40 TABLET, FILM COATED ORAL at 21:27

## 2024-12-29 RX ADMIN — MIDODRINE HYDROCHLORIDE 5 MILLIGRAM(S): 5 TABLET ORAL at 06:23

## 2024-12-29 RX ADMIN — AMPICILLIN SODIUM AND SULBACTAM SODIUM 200 GRAM(S): 100; 50 INJECTION, POWDER, FOR SOLUTION INTRAVENOUS at 06:24

## 2024-12-29 RX ADMIN — RIVAROXABAN 15 MILLIGRAM(S): 2.5 TABLET, FILM COATED ORAL at 11:15

## 2024-12-29 RX ADMIN — Medication 1 DROP(S): at 01:26

## 2024-12-29 RX ADMIN — Medication 1 DROP(S): at 06:24

## 2024-12-29 RX ADMIN — MIDODRINE HYDROCHLORIDE 5 MILLIGRAM(S): 5 TABLET ORAL at 11:15

## 2024-12-29 RX ADMIN — AMPICILLIN SODIUM AND SULBACTAM SODIUM 200 GRAM(S): 100; 50 INJECTION, POWDER, FOR SOLUTION INTRAVENOUS at 23:31

## 2024-12-29 RX ADMIN — LISINOPRIL 2.5 MILLIGRAM(S): 30 TABLET ORAL at 06:23

## 2024-12-29 RX ADMIN — TAMSULOSIN HYDROCHLORIDE 0.4 MILLIGRAM(S): 0.4 CAPSULE ORAL at 21:27

## 2024-12-29 RX ADMIN — Medication 1 DROP(S): at 17:16

## 2024-12-29 RX ADMIN — Medication 12.5 MILLIGRAM(S): at 06:24

## 2024-12-29 RX ADMIN — AMPICILLIN SODIUM AND SULBACTAM SODIUM 200 GRAM(S): 100; 50 INJECTION, POWDER, FOR SOLUTION INTRAVENOUS at 01:29

## 2024-12-29 NOTE — PROGRESS NOTE ADULT - SUBJECTIVE AND OBJECTIVE BOX
Date of Service 12-29-24 @ 10:44    CHIEF COMPLAINT:Patient is a 89y old  Male who presents with a chief complaint of Facial abscess.Pt appears comfortable.    	  REVIEW OF SYSTEMS:  CONSTITUTIONAL: No fever, weight loss, or fatigue  EYES: No eye pain, visual disturbances, or discharge  ENT:  No difficulty hearing, tinnitus, vertigo; No sinus or throat pain  NECK: No pain or stiffness  RESPIRATORY: No cough, wheezing, chills or hemoptysis; No Shortness of Breath  CARDIOVASCULAR: No chest pain, palpitations, passing out, dizziness, or leg swelling  GASTROINTESTINAL: No abdominal or epigastric pain. No nausea, vomiting, or hematemesis; No diarrhea or constipation. No melena or hematochezia.  GENITOURINARY: No dysuria, frequency, hematuria, or incontinence  NEUROLOGICAL: No headaches, memory loss, loss of strength, numbness, or tremors  SKIN: No itching, burning, rashes, or lesions   LYMPH Nodes: No enlarged glands  ENDOCRINE: No heat or cold intolerance; No hair loss  MUSCULOSKELETAL: No joint pain or swelling; No muscle, back, or extremity pain  PSYCHIATRIC: No depression, anxiety, mood swings, or difficulty sleeping  HEME/LYMPH: No easy bruising, or bleeding gums  ALLERGY AND IMMUNOLOGIC: No hives or eczema	      PHYSICAL EXAM:  T(C): 36.7 (12-29-24 @ 07:49), Max: 37 (12-29-24 @ 04:02)  HR: 58 (12-29-24 @ 07:49) (57 - 77)  BP: 114/55 (12-29-24 @ 07:49) (105/67 - 129/58)  RR: 18 (12-29-24 @ 07:49) (17 - 18)  SpO2: 97% (12-29-24 @ 07:49) (95% - 97%)  Wt(kg): --  I&O's Summary      Appearance: Normal	  HEENT:   Normal oral mucosa, PERRL, EOMI	  Lymphatic: No lymphadenopathy  Cardiovascular: Normal S1 S2, No JVD, No murmurs, No edema  Respiratory: Lungs clear to auscultation	  Psychiatry: A & O x 3, Mood & affect appropriate  Gastrointestinal:  Soft, Non-tender, + BS	  Skin: No rashes, No ecchymoses, No cyanosis	  Neurologic: Non-focal  Extremities: Normal range of motion, No clubbing, cyanosis or edema  Vascular: Peripheral pulses palpable 2+ bilaterally    MEDICATIONS  (STANDING):  ampicillin/sulbactam  IVPB 3 Gram(s) IV Intermittent every 6 hours  atorvastatin 10 milliGRAM(s) Oral at bedtime  digoxin     Tablet 125 MICROGram(s) Oral <User Schedule>  dorzolamide 2%/timolol 0.5% Ophthalmic Solution 1 Drop(s) Both EYES two times a day  finasteride 5 milliGRAM(s) Oral daily  furosemide    Tablet 20 milliGRAM(s) Oral daily  latanoprost 0.005% Ophthalmic Solution 1 Drop(s) Both EYES at bedtime  levothyroxine 25 MICROGram(s) Oral daily  lisinopril 2.5 milliGRAM(s) Oral daily  metoprolol succinate ER 12.5 milliGRAM(s) Oral daily  midodrine. 5 milliGRAM(s) Oral three times a day  osimertinib 80 milliGRAM(s) Oral daily  rivaroxaban 15 milliGRAM(s) Oral daily  tamsulosin 0.4 milliGRAM(s) Oral at bedtime      	  	  LABS:	 	                         11.0   3.80  )-----------( 108      ( 29 Dec 2024 06:50 )             32.2     12-29    142  |  111[H]  |  23[H]  ----------------------------<  98  3.9   |  23  |  1.18    Ca    8.8      29 Dec 2024 06:50  Phos  3.6     12-29  Mg     2.3     12-29    TPro  6.1  /  Alb  3.2[L]  /  TBili  0.8  /  DBili  x   /  AST  22  /  ALT  29  /  AlkPhos  88  12-29    proBNP:   Lipid Profile:   HgA1c:   TSH: Thyroid Stimulating Hormone, Serum: 4.69 uU/mL (12-29 @ 06:50)

## 2024-12-29 NOTE — PROGRESS NOTE ADULT - SUBJECTIVE AND OBJECTIVE BOX
INTERVAL HPI/OVERNIGHT EVENTS:  Patient seen,no acute issues,afebrile  VITAL SIGNS:  T(F): 98.1 (12-29-24 @ 07:49)  HR: 58 (12-29-24 @ 07:49)  BP: 114/55 (12-29-24 @ 07:49)  RR: 18 (12-29-24 @ 07:49)  SpO2: 97% (12-29-24 @ 07:49)  Wt(kg): --    PHYSICAL EXAM:  awake  Constitutional:  Eyes:  ENMT:perrla  Neck:  Respiratory:clear  Cardiovascular:s1s2,m-none  Gastrointestinal:soft bs pos  Extremities:  Vascular:  Neurological:no focal deficit  Musculoskeletal:    MEDICATIONS  (STANDING):  ampicillin/sulbactam  IVPB 3 Gram(s) IV Intermittent every 6 hours  atorvastatin 10 milliGRAM(s) Oral at bedtime  digoxin     Tablet 125 MICROGram(s) Oral <User Schedule>  dorzolamide 2%/timolol 0.5% Ophthalmic Solution 1 Drop(s) Both EYES two times a day  finasteride 5 milliGRAM(s) Oral daily  furosemide    Tablet 20 milliGRAM(s) Oral daily  latanoprost 0.005% Ophthalmic Solution 1 Drop(s) Both EYES at bedtime  levothyroxine 25 MICROGram(s) Oral daily  lisinopril 2.5 milliGRAM(s) Oral daily  metoprolol succinate ER 12.5 milliGRAM(s) Oral daily  midodrine. 5 milliGRAM(s) Oral three times a day  osimertinib 80 milliGRAM(s) Oral daily  rivaroxaban 15 milliGRAM(s) Oral daily  tamsulosin 0.4 milliGRAM(s) Oral at bedtime    MEDICATIONS  (PRN):  acetaminophen     Tablet .. 650 milliGRAM(s) Oral every 6 hours PRN Temp greater or equal to 38C (100.4F), Mild Pain (1 - 3)  albuterol    90 MICROgram(s) HFA Inhaler 2 Puff(s) Inhalation every 6 hours PRN Bronchospasm  aluminum hydroxide/magnesium hydroxide/simethicone Suspension 30 milliLiter(s) Oral every 4 hours PRN Dyspepsia  melatonin 3 milliGRAM(s) Oral at bedtime PRN Insomnia  ondansetron Injectable 4 milliGRAM(s) IV Push every 8 hours PRN Nausea and/or Vomiting      Allergies    No Known Allergies    Intolerances        LABS:                        11.0   3.80  )-----------( x        ( 29 Dec 2024 06:50 )             32.2     12-29    142  |  111[H]  |  23[H]  ----------------------------<  98  3.9   |  23  |  1.18    Ca    8.8      29 Dec 2024 06:50  Phos  3.6     12-29  Mg     2.3     12-29    TPro  6.1  /  Alb  3.2[L]  /  TBili  0.8  /  DBili  x   /  AST  22  /  ALT  29  /  AlkPhos  88  12-29    PT/INR - ( 27 Dec 2024 23:14 )   PT: 26.3 sec;   INR: 2.29 ratio         PTT - ( 27 Dec 2024 23:14 )  PTT:38.9 sec  Urinalysis Basic - ( 29 Dec 2024 06:50 )    Color: x / Appearance: x / SG: x / pH: x  Gluc: 98 mg/dL / Ketone: x  / Bili: x / Urobili: x   Blood: x / Protein: x / Nitrite: x   Leuk Esterase: x / RBC: x / WBC x   Sq Epi: x / Non Sq Epi: x / Bacteria: x        RADIOLOGY & ADDITIONAL TESTS:       Assessment:  · Assessment	  89-year-old male history of lung cancer on Tagrisso, HLD, HTN, BPH, hypothyroidism, A-fib on Xarelto, right preauricular abscess status post recent drainage 12/14 by OMFS at [facility], IV Unasyn transitioned to p.o. Augmentin here after drainage from area again after completing antibiotic ( last dose 2 days ago) . In the ER , VSS . S/p unasyn. S/p IND by ER - BCx, wound Cx collected     Admitted to medicine for R sided facial abscess on IV ABx, IDx consulted, cardiology following for readjustment of home medications        Problem/Plan - 1:  ·  Problem: Facial abscess.   ·  Plan: Hx  of facial abscess recently drained at [facility]   wound recurring and refractory to OP Treatment ( s/p OP Abx course)   c/w unasyn for now  Dr. Miller consulted  f/u BCX, wound Cx.     Problem/Plan - 2:  ·  Problem: Essential hypertension.   ·  Plan: h/o HTN   Monitor BP  c/w home meds with holding parameters   DASH diet.     Problem/Plan - 3:  ·  Problem: BPH without obstruction/lower urinary tract symptoms.   ·  Plan: c/w home meds tamsulosin, finasteride.     Problem/Plan - 4:  ·  Problem: Atrial fibrillation.   ·  Plan: Known hx of afib  Not in RVR  -cont home BB  -cont xarelto     Problem/Plan - 5:  ·  Problem: Glaucoma (increased eye pressure).   ·  Plan: c/w home eye drops.     Problem/Plan - 6:  ·  Problem: Hyperlipidemia, unspecified hyperlipidemia type.   ·  Plan: - c/w home statin   - Dash Diet.     Problem/Plan - 7:  ·  Problem: Lung cancer.  ·  Plan: c/w home meds     Pt family will bring in his home medications ( not available in pharmacy ).     Problem/Plan - 8:  ·  Problem: Preventive measure.   ·  Plan: c/w xarelto.

## 2024-12-29 NOTE — CHART NOTE - NSCHARTNOTEFT_GEN_A_CORE
EVENT: RN called notifying low BP    SUBJECTIVE: Patient seen and examined at bedside. Alert, awake , oriented x 3 , offers no new complaints    OBJECTIVE:  Vital Signs Last 24 Hrs  T(C): 36.8 (29 Dec 2024 11:04), Max: 37 (29 Dec 2024 04:02)  T(F): 98.2 (29 Dec 2024 11:04), Max: 98.6 (29 Dec 2024 04:02)  HR: 60 (29 Dec 2024 13:40) (57 - 68)  BP: 84/45 (29 Dec 2024 13:40) (84/45 - 129/58)  BP(mean): --  RR: 16 (29 Dec 2024 13:40) (16 - 18)  SpO2: 98% (29 Dec 2024 13:40) (95% - 98%)    Parameters below as of 29 Dec 2024 13:40  Patient On (Oxygen Delivery Method): room air        LABS:                        11.0   3.80  )-----------( 108      ( 29 Dec 2024 06:50 )             32.2     12-29    142  |  111[H]  |  23[H]  ----------------------------<  98  3.9   |  23  |  1.18    Ca    8.8      29 Dec 2024 06:50  Phos  3.6     12-29  Mg     2.3     12-29    TPro  6.1  /  Alb  3.2[L]  /  TBili  0.8  /  DBili  x   /  AST  22  /  ALT  29  /  AlkPhos  88  12-29      EKG:   IMGAGING:    ASSESSMENT:  HPI:  89-year-old male history of lung cancer on Tagrisso, HLD, HTN, BPH, hypothyroidism, A-fib on Xarelto, right preauricular abscess status post recent drainage 12/14 by OMFS at [facility], IV Unasyn transitioned to p.o. Augmentin here after drainage from area again after completing antibiotic ( last dose 2 days ago) .  Patient's family  was  worried that collection will start again.  Reports that patient did not improve when on oral antibiotics prescribed by his PCP prior to drainage being performed.  Patient denies any pain, fever, chills or weakness.     In the ER , VS- /81, RR 18, HR 74, afebrile, sats 95% RA     labs s/o     s/p unasyn    previous CT scan face w IV Contrast     RIGHT PREAURICULAR MULTILOCULATED ABSCESS MEASURING APPROXIMATELY 1.4 X   0.6 CM IN GREATEST DIMENSION, DECREASED IN SIZE FROM THE PRIOR STUDY   DATED 12/12/2024. THERE IS INFLAMMATION IN THE SURROUNDING SUBCUTANEOUS   SOFT TISSUES.       s/p IND by ER - BCx, wound Cx collected  (28 Dec 2024 11:30)      PLAN:   Continue with Midodrine 5 mg tid  No IV bolus for now  Discussed with Cardiologist Dr Dominique EVENT: RN called notifying low BP    SUBJECTIVE: Patient seen and examined at bedside. Alert, awake , oriented x 3 , offers no new complaints    OBJECTIVE:  Vital Signs Last 24 Hrs  T(C): 36.8 (29 Dec 2024 11:04), Max: 37 (29 Dec 2024 04:02)  T(F): 98.2 (29 Dec 2024 11:04), Max: 98.6 (29 Dec 2024 04:02)  HR: 60 (29 Dec 2024 13:40) (57 - 68)  BP: 84/45 (29 Dec 2024 13:40) (84/45 - 129/58)  BP(mean): --  RR: 16 (29 Dec 2024 13:40) (16 - 18)  SpO2: 98% (29 Dec 2024 13:40) (95% - 98%)    Parameters below as of 29 Dec 2024 13:40  Patient On (Oxygen Delivery Method): room air        LABS:                        11.0   3.80  )-----------( 108      ( 29 Dec 2024 06:50 )             32.2     12-29    142  |  111[H]  |  23[H]  ----------------------------<  98  3.9   |  23  |  1.18    Ca    8.8      29 Dec 2024 06:50  Phos  3.6     12-29  Mg     2.3     12-29    TPro  6.1  /  Alb  3.2[L]  /  TBili  0.8  /  DBili  x   /  AST  22  /  ALT  29  /  AlkPhos  88  12-29      EKG:   IMGAGING:    ASSESSMENT:  HPI:  89-year-old male history of lung cancer on Tagrisso, HLD, HTN, BPH, hypothyroidism, A-fib on Xarelto, right preauricular abscess status post recent drainage 12/14 by OMFS at [facility], IV Unasyn transitioned to p.o. Augmentin here after drainage from area again after completing antibiotic ( last dose 2 days ago) .  Patient's family  was  worried that collection will start again.  Reports that patient did not improve when on oral antibiotics prescribed by his PCP prior to drainage being performed.  Patient denies any pain, fever, chills or weakness.     In the ER , VS- /81, RR 18, HR 74, afebrile, sats 95% RA     labs s/o     s/p unasyn    previous CT scan face w IV Contrast     RIGHT PREAURICULAR MULTILOCULATED ABSCESS MEASURING APPROXIMATELY 1.4 X   0.6 CM IN GREATEST DIMENSION, DECREASED IN SIZE FROM THE PRIOR STUDY   DATED 12/12/2024. THERE IS INFLAMMATION IN THE SURROUNDING SUBCUTANEOUS   SOFT TISSUES.       s/p IND by ER - BCx, wound Cx collected  (28 Dec 2024 11:30)      PLAN:   Raise Midodrine to 10 mg tid  No IV bolus for now  Discussed with Cardiologist Dr Dominique

## 2024-12-29 NOTE — CONSULT NOTE ADULT - ENMT COMMENTS
spoke with pt in office scheduled at [facility] on march 10 arrive at 230 pm ofelia BERNAL----[employer] instructional packet handed to pt in office   right earlobe open wound but without any drainage , mild swelling and erythema of lower earlobe and right side of face right ear redness

## 2024-12-29 NOTE — PROGRESS NOTE ADULT - ASSESSMENT
89 yr old male with PMHX of Chronic Afib,Systolic HFMR,,Lung Ca,BPH,Orthostatic hypotension,Hypothyroidism, s/p recent hospital stay at [facility] Rt periauricular abcess and d/c home on po abx. He presents to ER with drainage from abcess, s/p I and D in ER,admitted for IV ABX.  1.Abcess-IV abx, F/U cx.  2.Hypothyroid-synthroid.  3.Chronic afib-xarelto,dig lopressor.  4.Chronic systolic HF-b blocker, low dose ace and lasix.  5.Lung Ca-on oral chemo.  6.Orthostatic hypotension-midodrine.  7.BPH-flomax,proscar.  8.PPI.

## 2024-12-29 NOTE — CONSULT NOTE ADULT - ASSESSMENT
89 yr old male with PMHX of Chronic Afib,Systolic HFMR,,Lung Ca,BPH,Orthostatic hypotension,Hypothyroidism, s/p recent hospital stay at [facility] Rt periauricular abcess and d/c home on po abx. He presents to ER with drainage from abcess, s/p I and D in ER,admitted for IV ABX.  1.Abcess-IV abx, F/U cx.  2.Hypothyroid-synthroid.  3.Chronic afib-xarelto,dig lopressor.  4.Chronis sytolic HF-b blocker,resume low dose ace and lasix.  5.Lung Ca-on oral chemo.  6.Orthostatic hypotension-midodrine.  7.BPH-flomax,proscar.  8.PPI.
Right ear abscess  Right facial cellulitis      Plan - Cont Unasyn 3 gms iv q6hrs

## 2024-12-29 NOTE — CONSULT NOTE ADULT - SUBJECTIVE AND OBJECTIVE BOX
HPI:  89-year-old male history of lung cancer on Tagrisso, HLD, HTN, BPH, hypothyroidism, A-fib on Xarelto, right preauricular abscess status post recent drainage 12/14 by OMFS at [facility], IV Unasyn transitioned to p.o. Augmentin here after drainage from area again after completing antibiotic ( last dose 2 days ago) .  Patient's family  was  worried that collection will start again.  Reports that patient did not improve when on oral antibiotics prescribed by his PCP prior to drainage being performed.  Patient denies any pain, fever, chills or weakness.     In the ER , VS- /81, RR 18, HR 74, afebrile, sats 95% RA     labs s/o     s/p unasyn    previous CT scan face w IV Contrast     RIGHT PREAURICULAR MULTILOCULATED ABSCESS MEASURING APPROXIMATELY 1.4 X   0.6 CM IN GREATEST DIMENSION, DECREASED IN SIZE FROM THE PRIOR STUDY   DATED 12/12/2024. THERE IS INFLAMMATION IN THE SURROUNDING SUBCUTANEOUS   SOFT TISSUES.       s/p IND by ER - BCx, wound Cx collected  (28 Dec 2024 11:30)                        PAST MEDICAL & SURGICAL HISTORY:  Essential hypertension      Hyperlipidemia, unspecified hyperlipidemia type      BPH without obstruction/lower urinary tract symptoms      Glaucoma (increased eye pressure)  both eyes      Atrial fibrillation      H/O hemicolectomy  1973      Cataract extraction status of left eye  w/lens implant - 2014      History of lung biopsy  2016 - negative results          No Known Allergies      Meds:  acetaminophen     Tablet .. 650 milliGRAM(s) Oral every 6 hours PRN  albuterol    90 MICROgram(s) HFA Inhaler 2 Puff(s) Inhalation every 6 hours PRN  aluminum hydroxide/magnesium hydroxide/simethicone Suspension 30 milliLiter(s) Oral every 4 hours PRN  ampicillin/sulbactam  IVPB 3 Gram(s) IV Intermittent every 6 hours  atorvastatin 10 milliGRAM(s) Oral at bedtime  digoxin     Tablet 125 MICROGram(s) Oral <User Schedule>  dorzolamide 2%/timolol 0.5% Ophthalmic Solution 1 Drop(s) Both EYES two times a day  finasteride 5 milliGRAM(s) Oral daily  furosemide    Tablet 20 milliGRAM(s) Oral daily  latanoprost 0.005% Ophthalmic Solution 1 Drop(s) Both EYES at bedtime  levothyroxine 25 MICROGram(s) Oral daily  lisinopril 2.5 milliGRAM(s) Oral daily  melatonin 3 milliGRAM(s) Oral at bedtime PRN  metoprolol succinate ER 12.5 milliGRAM(s) Oral daily  midodrine. 10 milliGRAM(s) Oral three times a day  ondansetron Injectable 4 milliGRAM(s) IV Push every 8 hours PRN  osimertinib 80 milliGRAM(s) Oral daily  rivaroxaban 15 milliGRAM(s) Oral daily  tamsulosin 0.4 milliGRAM(s) Oral at bedtime      SOCIAL HISTORY:  Smoker:  YES / NO        PACK YEARS:                         WHEN QUIT?  ETOH use:  YES / NO               FREQUENCY / QUANTITY:  Ilicit Drug use:  YES / NO  Occupation:  Assisted device use (Cane / Walker):  Live with:    FAMILY HISTORY:  No pertinent family history in first degree relatives        VITALS:  Vital Signs Last 24 Hrs  T(C): 36.5 (29 Dec 2024 16:26), Max: 37 (29 Dec 2024 04:02)  T(F): 97.7 (29 Dec 2024 16:26), Max: 98.6 (29 Dec 2024 04:02)  HR: 54 (29 Dec 2024 16:26) (54 - 68)  BP: 106/52 (29 Dec 2024 16:26) (84/45 - 129/58)  BP(mean): --  RR: 16 (29 Dec 2024 16:26) (16 - 18)  SpO2: 97% (29 Dec 2024 16:26) (95% - 98%)    Parameters below as of 29 Dec 2024 16:26  Patient On (Oxygen Delivery Method): room air        LABS/DIAGNOSTIC TESTS:                          11.0   3.80  )-----------( 108      ( 29 Dec 2024 06:50 )             32.2     WBC Count: 3.80 K/uL (12-29 @ 06:50)  WBC Count: 4.41 K/uL (12-27 @ 23:14)      12-29    142  |  111[H]  |  23[H]  ----------------------------<  98  3.9   |  23  |  1.18    Ca    8.8      29 Dec 2024 06:50  Phos  3.6     12-29  Mg     2.3     12-29    TPro  6.1  /  Alb  3.2[L]  /  TBili  0.8  /  DBili  x   /  AST  22  /  ALT  29  /  AlkPhos  88  12-29      Urinalysis Basic - ( 29 Dec 2024 06:50 )    Color: x / Appearance: x / SG: x / pH: x  Gluc: 98 mg/dL / Ketone: x  / Bili: x / Urobili: x   Blood: x / Protein: x / Nitrite: x   Leuk Esterase: x / RBC: x / WBC x   Sq Epi: x / Non Sq Epi: x / Bacteria: x        LIVER FUNCTIONS - ( 29 Dec 2024 06:50 )  Alb: 3.2 g/dL / Pro: 6.1 g/dL / ALK PHOS: 88 U/L / ALT: 29 U/L DA / AST: 22 U/L / GGT: x             PT/INR - ( 27 Dec 2024 23:14 )   PT: 26.3 sec;   INR: 2.29 ratio         PTT - ( 27 Dec 2024 23:14 )  PTT:38.9 sec    LACTATE:    ABG -     CULTURES:   Drainage  12-28 @ 04:00   No growth  --  --      .Blood BLOOD  12-28 @ 00:40   No growth at 24 hours  --  --      .Blood BLOOD  12-28 @ 00:30   No growth at 24 hours  --  --      .Abscess  12-13 @ 08:27   Few Enterococcus faecalis  Few Prevotella disiens "Susceptibilities not performed"  --  Enterococcus faecalis      .Blood BLOOD  12-12 @ 14:50   No growth at 5 days  --  --      .Blood BLOOD  12-12 @ 14:35   No growth at 5 days  --  --      .Blood BLOOD  11-12 @ 01:20   No growth at 5 days  --  --      .Blood BLOOD  11-12 @ 01:01   No growth at 5 days  --  --      Clean Catch  10-04 @ 19:00   <10,000 CFU/mL Normal Urogenital Shara  --  --          RADIOLOGY:< from: CT Maxillofacial w/ IV Cont (12.28.24 @ 02:01) >  ACC: [id number] EXAM:  CT MAXILLOFACIAL  IC   ORDERED BY: PATRICK COUCH     PROCEDURE DATE:  12/28/2024          INTERPRETATION:  Clinical Indication:  R preauricular abscess/cellulitis   - assess extent of infection  Comparison: 12-12-24.    Technique: Multidetector axial imaging of the facial bones was obtained   after the intravenous administration of 90 cc of Omnipaque 350. 10 cc was   discarded. Sagittal and coronal reformatted images were produced.    Findings:    There is a right preauricular multiloculated abscess measuring   approximately 1.4 x 0.6 cm in greatest dimension, decreased in size from   the prior study dated 12/12/2024. There is inflammation in the   surrounding subcutaneous soft tissues.    Rest of the facial soft tissues are unremarkable. Facial osseous   structures are intact.    The paranasal sinuses are clear.    Visualized portions of the brain and orbits are unremarkable.    IMPRESSION:    RIGHT PREAURICULAR MULTILOCULATED ABSCESS MEASURING APPROXIMATELY 1.4 X   0.6 CM IN GREATEST DIMENSION, DECREASED IN SIZE FROM THE PRIOR STUDY   DATED 12/12/2024. THERE IS INFLAMMATION IN THE SURROUNDING SUBCUTANEOUS   SOFT TISSUES.    --- End of Report ---            LYNETTE HUGHES MD; Attending Radiologist  This document has been electronically signed. Dec 28 2024  2:48AM    < end of copied text >        ROS  [  ] UNABLE TO ELICIT               HPI:  89-year-old male history of lung cancer on Tagrisso, HLD, HTN, BPH, hypothyroidism, A-fib on Xarelto, right preauricular abscess status post recent drainage 12/14 by OMFS at [facility], IV Unasyn transitioned to p.o. Augmentin here after drainage from area again after completing antibiotic ( last dose 2 days ago) .  Patient's family  was  worried that collection will start again.  Reports that patient did not improve when on oral antibiotics prescribed by his PCP prior to drainage being performed.  Patient denies any pain, fever, chills or weakness.     In the ER , VS- /81, RR 18, HR 74, afebrile, sats 95% RA     labs s/o     s/p unasyn    previous CT scan face w IV Contrast     RIGHT PREAURICULAR MULTILOCULATED ABSCESS MEASURING APPROXIMATELY 1.4 X   0.6 CM IN GREATEST DIMENSION, DECREASED IN SIZE FROM THE PRIOR STUDY   DATED 12/12/2024. THERE IS INFLAMMATION IN THE SURROUNDING SUBCUTANEOUS   SOFT TISSUES.       s/p IND by ER - BCx, wound Cx collected  (28 Dec 2024 11:30)            History as above, asked to see this patient who was recently at [facility] with a right earlobe abscess and had a I&D and treated with Unasyn and then discharged on augmenti which ended 2 days ago and noticed that his earlobe and right cheek where becoming red and that he had some drainage from the ear and so came to the hospital. He had no fevers or chills and has mild pain in the ear only. He has no other complaints. He had a repeat CT and shows that he still has an abscess in the earlobe but is much smaller at 1.4 x 0.6 cm in size ( less than 1/2 the size). He had grown ou Enterococcus and Prevotella sp from his abscess cultures previously. He is currently on Unasyn.            PAST MEDICAL & SURGICAL HISTORY:  Essential hypertension      Hyperlipidemia, unspecified hyperlipidemia type      BPH without obstruction/lower urinary tract symptoms      Glaucoma (increased eye pressure)  both eyes      Atrial fibrillation      H/O hemicolectomy  1973      Cataract extraction status of left eye  w/lens implant - 2014      History of lung biopsy  2016 - negative results          No Known Allergies      Meds:  acetaminophen     Tablet .. 650 milliGRAM(s) Oral every 6 hours PRN  albuterol    90 MICROgram(s) HFA Inhaler 2 Puff(s) Inhalation every 6 hours PRN  aluminum hydroxide/magnesium hydroxide/simethicone Suspension 30 milliLiter(s) Oral every 4 hours PRN  ampicillin/sulbactam  IVPB 3 Gram(s) IV Intermittent every 6 hours  atorvastatin 10 milliGRAM(s) Oral at bedtime  digoxin     Tablet 125 MICROGram(s) Oral <User Schedule>  dorzolamide 2%/timolol 0.5% Ophthalmic Solution 1 Drop(s) Both EYES two times a day  finasteride 5 milliGRAM(s) Oral daily  furosemide    Tablet 20 milliGRAM(s) Oral daily  latanoprost 0.005% Ophthalmic Solution 1 Drop(s) Both EYES at bedtime  levothyroxine 25 MICROGram(s) Oral daily  lisinopril 2.5 milliGRAM(s) Oral daily  melatonin 3 milliGRAM(s) Oral at bedtime PRN  metoprolol succinate ER 12.5 milliGRAM(s) Oral daily  midodrine. 10 milliGRAM(s) Oral three times a day  ondansetron Injectable 4 milliGRAM(s) IV Push every 8 hours PRN  osimertinib 80 milliGRAM(s) Oral daily  rivaroxaban 15 milliGRAM(s) Oral daily  tamsulosin 0.4 milliGRAM(s) Oral at bedtime      SOCIAL HISTORY:  Smoker:  no  ETOH use:  no    FAMILY HISTORY:  No pertinent family history in first degree relatives        VITALS:  Vital Signs Last 24 Hrs  T(C): 36.5 (29 Dec 2024 16:26), Max: 37 (29 Dec 2024 04:02)  T(F): 97.7 (29 Dec 2024 16:26), Max: 98.6 (29 Dec 2024 04:02)  HR: 54 (29 Dec 2024 16:26) (54 - 68)  BP: 106/52 (29 Dec 2024 16:26) (84/45 - 129/58)  BP(mean): --  RR: 16 (29 Dec 2024 16:26) (16 - 18)  SpO2: 97% (29 Dec 2024 16:26) (95% - 98%)    Parameters below as of 29 Dec 2024 16:26  Patient On (Oxygen Delivery Method): room air        LABS/DIAGNOSTIC TESTS:                          11.0   3.80  )-----------( 108      ( 29 Dec 2024 06:50 )             32.2     WBC Count: 3.80 K/uL (12-29 @ 06:50)  WBC Count: 4.41 K/uL (12-27 @ 23:14)      12-29    142  |  111[H]  |  23[H]  ----------------------------<  98  3.9   |  23  |  1.18    Ca    8.8      29 Dec 2024 06:50  Phos  3.6     12-29  Mg     2.3     12-29    TPro  6.1  /  Alb  3.2[L]  /  TBili  0.8  /  DBili  x   /  AST  22  /  ALT  29  /  AlkPhos  88  12-29      Urinalysis Basic - ( 29 Dec 2024 06:50 )    Color: x / Appearance: x / SG: x / pH: x  Gluc: 98 mg/dL / Ketone: x  / Bili: x / Urobili: x   Blood: x / Protein: x / Nitrite: x   Leuk Esterase: x / RBC: x / WBC x   Sq Epi: x / Non Sq Epi: x / Bacteria: x        LIVER FUNCTIONS - ( 29 Dec 2024 06:50 )  Alb: 3.2 g/dL / Pro: 6.1 g/dL / ALK PHOS: 88 U/L / ALT: 29 U/L DA / AST: 22 U/L / GGT: x             PT/INR - ( 27 Dec 2024 23:14 )   PT: 26.3 sec;   INR: 2.29 ratio         PTT - ( 27 Dec 2024 23:14 )  PTT:38.9 sec    LACTATE:    ABG -     CULTURES:   Drainage  12-28 @ 04:00   No growth  --  --      .Blood BLOOD  12-28 @ 00:40   No growth at 24 hours  --  --      .Blood BLOOD  12-28 @ 00:30   No growth at 24 hours  --  --      .Abscess  12-13 @ 08:27   Few Enterococcus faecalis  Few Prevotella disiens "Susceptibilities not performed"  --  Enterococcus faecalis      .Blood BLOOD  12-12 @ 14:50   No growth at 5 days  --  --      .Blood BLOOD  12-12 @ 14:35   No growth at 5 days  --  --      .Blood BLOOD  11-12 @ 01:20   No growth at 5 days  --  --      .Blood BLOOD  11-12 @ 01:01   No growth at 5 days  --  --      Clean Catch  10-04 @ 19:00   <10,000 CFU/mL Normal Urogenital Shara  --  --          RADIOLOGY:< from: CT Maxillofacial w/ IV Cont (12.28.24 @ 02:01) >  ACC: [id number] EXAM:  CT MAXILLOFACIAL  IC   ORDERED BY: PATRICK COUCH     PROCEDURE DATE:  12/28/2024          INTERPRETATION:  Clinical Indication:  R preauricular abscess/cellulitis   - assess extent of infection  Comparison: 12-12-24.    Technique: Multidetector axial imaging of the facial bones was obtained   after the intravenous administration of 90 cc of Omnipaque 350. 10 cc was   discarded. Sagittal and coronal reformatted images were produced.    Findings:    There is a right preauricular multiloculated abscess measuring   approximately 1.4 x 0.6 cm in greatest dimension, decreased in size from   the prior study dated 12/12/2024. There is inflammation in the   surrounding subcutaneous soft tissues.    Rest of the facial soft tissues are unremarkable. Facial osseous   structures are intact.    The paranasal sinuses are clear.    Visualized portions of the brain and orbits are unremarkable.    IMPRESSION:    RIGHT PREAURICULAR MULTILOCULATED ABSCESS MEASURING APPROXIMATELY 1.4 X   0.6 CM IN GREATEST DIMENSION, DECREASED IN SIZE FROM THE PRIOR STUDY   DATED 12/12/2024. THERE IS INFLAMMATION IN THE SURROUNDING SUBCUTANEOUS   SOFT TISSUES.    --- End of Report ---            LYNETTE HUGHES MD; Attending Radiologist  This document has been electronically signed. Dec 28 2024  2:48AM    < end of copied text >        ROS  [  ] UNABLE TO ELICIT

## 2024-12-30 ENCOUNTER — TRANSCRIPTION ENCOUNTER (OUTPATIENT)
Age: 89
End: 2024-12-30

## 2024-12-30 DIAGNOSIS — I95.1 ORTHOSTATIC HYPOTENSION: ICD-10-CM

## 2024-12-30 DIAGNOSIS — I50.22 CHRONIC SYSTOLIC (CONGESTIVE) HEART FAILURE: ICD-10-CM

## 2024-12-30 LAB
ALBUMIN SERPL ELPH-MCNC: 3.1 G/DL — LOW (ref 3.5–5)
ALP SERPL-CCNC: 96 U/L — SIGNIFICANT CHANGE UP (ref 40–120)
ALT FLD-CCNC: 32 U/L DA — SIGNIFICANT CHANGE UP (ref 10–60)
ANION GAP SERPL CALC-SCNC: 7 MMOL/L — SIGNIFICANT CHANGE UP (ref 5–17)
AST SERPL-CCNC: 22 U/L — SIGNIFICANT CHANGE UP (ref 10–40)
BASOPHILS # BLD AUTO: 0.05 K/UL — SIGNIFICANT CHANGE UP (ref 0–0.2)
BASOPHILS NFR BLD AUTO: 1.3 % — SIGNIFICANT CHANGE UP (ref 0–2)
BILIRUB SERPL-MCNC: 0.8 MG/DL — SIGNIFICANT CHANGE UP (ref 0.2–1.2)
BUN SERPL-MCNC: 29 MG/DL — HIGH (ref 7–18)
CALCIUM SERPL-MCNC: 8.6 MG/DL — SIGNIFICANT CHANGE UP (ref 8.4–10.5)
CHLORIDE SERPL-SCNC: 112 MMOL/L — HIGH (ref 96–108)
CO2 SERPL-SCNC: 23 MMOL/L — SIGNIFICANT CHANGE UP (ref 22–31)
CREAT SERPL-MCNC: 1.39 MG/DL — HIGH (ref 0.5–1.3)
CULTURE RESULTS: NO GROWTH — SIGNIFICANT CHANGE UP
EGFR: 47 ML/MIN/1.73M2 — LOW
EOSINOPHIL # BLD AUTO: 0.26 K/UL — SIGNIFICANT CHANGE UP (ref 0–0.5)
EOSINOPHIL NFR BLD AUTO: 6.9 % — HIGH (ref 0–6)
GLUCOSE SERPL-MCNC: 112 MG/DL — HIGH (ref 70–99)
HCT VFR BLD CALC: 34 % — LOW (ref 39–50)
HGB BLD-MCNC: 11.7 G/DL — LOW (ref 13–17)
IMM GRANULOCYTES NFR BLD AUTO: 0.3 % — SIGNIFICANT CHANGE UP (ref 0–0.9)
LYMPHOCYTES # BLD AUTO: 1.06 K/UL — SIGNIFICANT CHANGE UP (ref 1–3.3)
LYMPHOCYTES # BLD AUTO: 28 % — SIGNIFICANT CHANGE UP (ref 13–44)
MAGNESIUM SERPL-MCNC: 2.4 MG/DL — SIGNIFICANT CHANGE UP (ref 1.6–2.6)
MCHC RBC-ENTMCNC: 32.7 PG — SIGNIFICANT CHANGE UP (ref 27–34)
MCHC RBC-ENTMCNC: 34.4 G/DL — SIGNIFICANT CHANGE UP (ref 32–36)
MCV RBC AUTO: 95 FL — SIGNIFICANT CHANGE UP (ref 80–100)
MONOCYTES # BLD AUTO: 0.51 K/UL — SIGNIFICANT CHANGE UP (ref 0–0.9)
MONOCYTES NFR BLD AUTO: 13.5 % — SIGNIFICANT CHANGE UP (ref 2–14)
NEUTROPHILS # BLD AUTO: 1.9 K/UL — SIGNIFICANT CHANGE UP (ref 1.8–7.4)
NEUTROPHILS NFR BLD AUTO: 50 % — SIGNIFICANT CHANGE UP (ref 43–77)
NRBC # BLD: 0 /100 WBCS — SIGNIFICANT CHANGE UP (ref 0–0)
PHOSPHATE SERPL-MCNC: 3.4 MG/DL — SIGNIFICANT CHANGE UP (ref 2.5–4.5)
PLATELET # BLD AUTO: 102 K/UL — LOW (ref 150–400)
POTASSIUM SERPL-MCNC: 4 MMOL/L — SIGNIFICANT CHANGE UP (ref 3.5–5.3)
POTASSIUM SERPL-SCNC: 4 MMOL/L — SIGNIFICANT CHANGE UP (ref 3.5–5.3)
PROT SERPL-MCNC: 6.1 G/DL — SIGNIFICANT CHANGE UP (ref 6–8.3)
RBC # BLD: 3.58 M/UL — LOW (ref 4.2–5.8)
RBC # FLD: 16.3 % — HIGH (ref 10.3–14.5)
SODIUM SERPL-SCNC: 142 MMOL/L — SIGNIFICANT CHANGE UP (ref 135–145)
SPECIMEN SOURCE: SIGNIFICANT CHANGE UP
WBC # BLD: 3.79 K/UL — LOW (ref 3.8–10.5)
WBC # FLD AUTO: 3.79 K/UL — LOW (ref 3.8–10.5)

## 2024-12-30 RX ORDER — OSIMERTINIB 80 1/1
80 TABLET, FILM COATED ORAL DAILY
Refills: 0 | Status: DISCONTINUED | OUTPATIENT
Start: 2024-12-30 | End: 2024-12-31

## 2024-12-30 RX ADMIN — AMPICILLIN SODIUM AND SULBACTAM SODIUM 200 GRAM(S): 100; 50 INJECTION, POWDER, FOR SOLUTION INTRAVENOUS at 23:30

## 2024-12-30 RX ADMIN — ATORVASTATIN CALCIUM 10 MILLIGRAM(S): 40 TABLET, FILM COATED ORAL at 21:53

## 2024-12-30 RX ADMIN — Medication 125 MICROGRAM(S): at 05:20

## 2024-12-30 RX ADMIN — LATANOPROST 1 DROP(S): 50 SOLUTION OPHTHALMIC at 21:53

## 2024-12-30 RX ADMIN — LEVOTHYROXINE SODIUM 25 MICROGRAM(S): 175 TABLET ORAL at 05:19

## 2024-12-30 RX ADMIN — AMPICILLIN SODIUM AND SULBACTAM SODIUM 200 GRAM(S): 100; 50 INJECTION, POWDER, FOR SOLUTION INTRAVENOUS at 05:18

## 2024-12-30 RX ADMIN — AMPICILLIN SODIUM AND SULBACTAM SODIUM 200 GRAM(S): 100; 50 INJECTION, POWDER, FOR SOLUTION INTRAVENOUS at 19:03

## 2024-12-30 RX ADMIN — Medication 20 MILLIGRAM(S): at 05:19

## 2024-12-30 RX ADMIN — Medication 3 MILLIGRAM(S): at 21:54

## 2024-12-30 RX ADMIN — MIDODRINE HYDROCHLORIDE 10 MILLIGRAM(S): 5 TABLET ORAL at 19:04

## 2024-12-30 RX ADMIN — MIDODRINE HYDROCHLORIDE 10 MILLIGRAM(S): 5 TABLET ORAL at 05:19

## 2024-12-30 RX ADMIN — Medication 1 DROP(S): at 05:19

## 2024-12-30 RX ADMIN — Medication 12.5 MILLIGRAM(S): at 05:20

## 2024-12-30 RX ADMIN — MIDODRINE HYDROCHLORIDE 10 MILLIGRAM(S): 5 TABLET ORAL at 14:05

## 2024-12-30 RX ADMIN — TAMSULOSIN HYDROCHLORIDE 0.4 MILLIGRAM(S): 0.4 CAPSULE ORAL at 21:53

## 2024-12-30 RX ADMIN — LISINOPRIL 2.5 MILLIGRAM(S): 30 TABLET ORAL at 05:19

## 2024-12-30 RX ADMIN — RIVAROXABAN 15 MILLIGRAM(S): 2.5 TABLET, FILM COATED ORAL at 14:05

## 2024-12-30 RX ADMIN — AMPICILLIN SODIUM AND SULBACTAM SODIUM 200 GRAM(S): 100; 50 INJECTION, POWDER, FOR SOLUTION INTRAVENOUS at 14:06

## 2024-12-30 RX ADMIN — Medication 1 DROP(S): at 19:04

## 2024-12-30 RX ADMIN — OSIMERTINIB 160 MILLIGRAM(S): 80 TABLET, FILM COATED ORAL at 14:06

## 2024-12-30 RX ADMIN — Medication 5 MILLIGRAM(S): at 14:05

## 2024-12-30 NOTE — PROGRESS NOTE ADULT - SUBJECTIVE AND OBJECTIVE BOX
89y Male is under our care for     MEDS:  ampicillin/sulbactam  IVPB 3 Gram(s) IV Intermittent every 6 hours    ALLERGIES: Allergies    No Known Allergies    Intolerances    REVIEW OF SYSTEMS:  [  ] Not able to elicit  General:	  Chest:	  GI:	  :  Skin:	  Musculoskeletal:	  Neuro:	    VITALS:  Vital Signs Last 24 Hrs  T(C): 36.5 (30 Dec 2024 04:58), Max: 37 (29 Dec 2024 21:42)  T(F): 97.7 (30 Dec 2024 04:58), Max: 98.6 (29 Dec 2024 21:42)  HR: 78 (30 Dec 2024 04:58) (54 - 78)  BP: 129/84 (30 Dec 2024 04:58) (84/45 - 129/84)  BP(mean): --  RR: 17 (30 Dec 2024 04:58) (16 - 17)  SpO2: 96% (30 Dec 2024 04:58) (96% - 98%)    Parameters below as of 30 Dec 2024 04:58  Patient On (Oxygen Delivery Method): room air    PHYSICAL EXAM:  HEENT:  Neck:  Respiratory:  Cardiovascular:  Gastrointestinal:  :  Extremities:  Skin:  Ortho:  Neuro:    LABS/DIAGNOSTIC TESTS:                        11.7   3.79  )-----------( 102      ( 30 Dec 2024 07:00 )             34.0     WBC Count: 3.79 K/uL (12-30 @ 07:00)  WBC Count: 3.80 K/uL (12-29 @ 06:50)  WBC Count: 4.41 K/uL (12-27 @ 23:14)    12-30    142  |  112[H]  |  29[H]  ----------------------------<  112[H]  4.0   |  23  |  1.39[H]    Ca    8.6      30 Dec 2024 07:00  Phos  3.4     12-30  Mg     2.4     12-30    TPro  6.1  /  Alb  3.1[L]  /  TBili  0.8  /  DBili  x   /  AST  22  /  ALT  32  /  AlkPhos  96  12-30    CULTURES:   Drainage  12-28 @ 04:00   No growth  --  --    .Blood BLOOD  12-28 @ 00:40   No growth at 48 Hours  --  --    .Blood BLOOD  12-28 @ 00:30   No growth at 48 Hours  --  --    .Abscess  12-13 @ 08:27   Few Enterococcus faecalis  Few Prevotella disiens "Susceptibilities not performed"  --  Enterococcus faecalis    .Blood BLOOD  12-12 @ 14:50   No growth at 5 days  --  --    .Blood BLOOD  12-12 @ 14:35   No growth at 5 days  --  --    .Blood BLOOD  11-12 @ 01:20   No growth at 5 days  --  --    .Blood BLOOD  11-12 @ 01:01   No growth at 5 days  --  --    Clean Catch  10-04 @ 19:00   <10,000 CFU/mL Normal Urogenital Shara  --  --    RADIOLOGY:  no new studies     89y Male sitting up in bed and in no acute distress, denies any new complaints. Endorses the pain to his right ear has improved. Denies nausea, vomiting or diarrhea. No fevers or chills.     MEDS:  ampicillin/sulbactam  IVPB 3 Gram(s) IV Intermittent every 6 hours    ALLERGIES: Allergies    No Known Allergies    Intolerances    REVIEW OF SYSTEMS:  [  ] Not able to elicit  General: no fevers no malaise  Chest: no cough no sob  GI: no nvd  : no urinary sxs   Skin: no rashes  Musculoskeletal: right ear pain improved  Neuro: no ha's no dizziness     VITALS:  Vital Signs Last 24 Hrs  T(C): 36.5 (30 Dec 2024 04:58), Max: 37 (29 Dec 2024 21:42)  T(F): 97.7 (30 Dec 2024 04:58), Max: 98.6 (29 Dec 2024 21:42)  HR: 78 (30 Dec 2024 04:58) (54 - 78)  BP: 129/84 (30 Dec 2024 04:58) (84/45 - 129/84)  BP(mean): --  RR: 17 (30 Dec 2024 04:58) (16 - 17)  SpO2: 96% (30 Dec 2024 04:58) (96% - 98%)    Parameters below as of 30 Dec 2024 04:58  Patient On (Oxygen Delivery Method): room air    PHYSICAL EXAM:  HEENT: normocephalic, conjunctivae and sclerae clear; moist mucous membranes  Neck: supple no LN's   Respiratory: lungs clear no rales  Cardiovascular: S1 S2 irreg no murmurs  Gastrointestinal: +BS with soft, nondistended abdomen; nontender  Extremities: no edema  Skin: right preauricular mild redness and edema; no purulent drainage noted. right side of face decreased redness   Ortho: no erythema or joint swelling  Neuro: AAO x 3    LABS/DIAGNOSTIC TESTS:                        11.7   3.79  )-----------( 102      ( 30 Dec 2024 07:00 )             34.0     WBC Count: 3.79 K/uL (12-30 @ 07:00)  WBC Count: 3.80 K/uL (12-29 @ 06:50)  WBC Count: 4.41 K/uL (12-27 @ 23:14)    12-30    142  |  112[H]  |  29[H]  ----------------------------<  112[H]  4.0   |  23  |  1.39[H]    Ca    8.6      30 Dec 2024 07:00  Phos  3.4     12-30  Mg     2.4     12-30    TPro  6.1  /  Alb  3.1[L]  /  TBili  0.8  /  DBili  x   /  AST  22  /  ALT  32  /  AlkPhos  96  12-30    CULTURES:   Drainage  12-28 @ 04:00   No growth  --  --    .Blood BLOOD  12-28 @ 00:40   No growth at 48 Hours  --  --    .Blood BLOOD  12-28 @ 00:30   No growth at 48 Hours  --  --    .Abscess  12-13 @ 08:27   Few Enterococcus faecalis  Few Prevotella disiens "Susceptibilities not performed"  --  Enterococcus faecalis    .Blood BLOOD  12-12 @ 14:50   No growth at 5 days  --  --    .Blood BLOOD  12-12 @ 14:35   No growth at 5 days  --  --    .Blood BLOOD  11-12 @ 01:20   No growth at 5 days  --  --    .Blood BLOOD  11-12 @ 01:01   No growth at 5 days  --  --    Clean Catch  10-04 @ 19:00   <10,000 CFU/mL Normal Urogenital Shara  --  --    RADIOLOGY:  no new studies

## 2024-12-30 NOTE — DISCHARGE NOTE PROVIDER - REASON FOR ADMISSION
SW met with pt very briefly following her consultation in radiation oncology as she had requested information about Advance Directives. SW met with pt and provided documents, as well as a brief explanation of the content and purpose. Pt requested opportunity to meet with SW to complete, and plan established to meet on 1/8/2024 as pt will have chemotherapy that day. Pt endorsed a distress of 2/10, stating she feels prepared to move through treatment. Supportive services at the cancer center explained, and SW will continue to follow through radiation treatment. Facial abscess

## 2024-12-30 NOTE — DISCHARGE NOTE PROVIDER - NSDCCPCAREPLAN_GEN_ALL_CORE_FT
PRINCIPAL DISCHARGE DIAGNOSIS  Diagnosis: Facial abscess  Assessment and Plan of Treatment: You were admitted because of a right sided facial abscess. CT of your face showed a multiloculated abscess measuring approximately 1.4 x 0.6 cm. Infectious disease was consulted and you were started on IV antibiotics. Your abscess decreased in size.  Keep the wound clean and dry.. PLEASE TAKE AUGMENTIN 875 TWO TIMES A DAY FOR A TOTAL OF 14 DAYS. Avoid touching the wound and watch for signs of infection like increased pain, swelling, or fever. Rest and avoid strenuous activity.   Please follow up with your PCP within 1 week of discharge for further care.        PRINCIPAL DISCHARGE DIAGNOSIS  Diagnosis: Facial abscess  Assessment and Plan of Treatment: You were admitted because of a right sided facial abscess. CT of your face showed a multiloculated abscess measuring approximately 1.4 x 0.6 cm. Infectious disease was consulted and you were started on IV antibiotics. Your abscess decreased in size.  Keep the wound clean and dry. PLEASE TAKE AUGMENTIN 875 TWO TIMES A DAY FOR A TOTAL OF 14 DAYS. Avoid touching the wound and watch for signs of infection like increased pain, swelling, or fever. Rest and avoid strenuous activity.   Please follow up with your PCP within 1 week of discharge for further care.        PRINCIPAL DISCHARGE DIAGNOSIS  Diagnosis: Facial abscess  Assessment and Plan of Treatment: You were admitted because of a right sided facial abscess. CT of your face showed a multiloculated abscess measuring approximately 1.4 x 0.6 cm. Infectious disease was consulted and you were started on IV antibiotics. Your abscess decreased in size.  Keep the wound clean and dry. PLEASE TAKE AUGMENTIN 875 MG TWO TIMES A DAY FOR A TOTAL OF 13 DAYS STARTING JAN 1. Avoid touching the wound and watch for signs of infection like increased pain, swelling, or fever. Rest and avoid strenuous activity.   Please follow up with your PCP within 1 week of discharge for further care.         SECONDARY DISCHARGE DIAGNOSES  Diagnosis: Essential hypertension  Assessment and Plan of Treatment: You have a history of Hypertension. On this admission, your Blood Pressure was adequately controlled. Your blood pressure target is 120-140/80-90, maintain healthy lifestyle, low salt diet, avoid fatty food, weight loss, exercise regularly or stay active as tolerated 30 mins X 3 times per week. Notify your doctor if you have any of the following symptoms: (Dizziness, Lightheadedness, Blurry vision, Headache, Chest pain, Shortness of breath.)  Please continue taking your home medications and follow-up with your PCP in 1 week from discharge to adjust medications as needed.    Diagnosis: Chronic systolic congestive heart failure  Assessment and Plan of Treatment: You have history of CHF (congestive heart failure). You were treated with furosemide to help with diuresis which has improved your respiratory symptoms and leg swelling. Please weigh yourself daily and If you gain 3lbs in 3 days, or 5lbs in a week and /or have any swelling or increased swelling in your feet, ankles, and/or stomach call your Health Care Provider.Do not eat or drink foods containing more than 2000 mg of salt (sodium) in your diet every day and limit fluids to 800cc to 1000 cc per day.  PLEASE START TAKING LISINOPRIL 20 MG ONCE A DAY. PLEASE START TAKING LISINOPRIL 2.5 MG ONCE A DAY.   Please take your blood pressure medications as prescribed and follow up with your PCP/Cardiologist in 1 week from discharge to adjust medications as needed.    Diagnosis: BPH without obstruction/lower urinary tract symptoms  Assessment and Plan of Treatment: You have history of BPH (benign prostatic hyperplasia) which means that you have an age associated prostate gland enlargement that can cause difficulties with urination. You were continued on your HOME medications tamsulosin and finasteride during your hospital stay. Please continue to take your HOME medications and  follow up with your PCP in a week from discharge.    Diagnosis: Atrial fibrillation  Assessment and Plan of Treatment: You have a history of atrial fibrillation. Please continue to take your HOME medication of Xarelto.   Please follow up with your PCP and Cardiologist in 1 week from discharge    Diagnosis: Orthostatic hypotension  Assessment and Plan of Treatment: You were found to have orthostatic hypotension in the hospital. You were treated with midodrine.   You are recommended to increase your fluids intake and maintain adequate hydration and do not rise from a seated position too quickly, as this could cause your blood pressure to drop (Sit for 2-3 minutes before standing or walking from lying down position).  You are recommended to follow up with your PCP in 1 week from discharge for further recommendations. Keep yourself hydrated.    Diagnosis: Glaucoma (increased eye pressure)  Assessment and Plan of Treatment: You have a history of glaucoma. You were continued on your home eye drops (latanoprost).   Please follow up with your PCP within 1 week of discharge.    Diagnosis: Hyperlipidemia, unspecified hyperlipidemia type  Assessment and Plan of Treatment: You have history of Hyperlipidemia. You were continued on your home medications. Please take your medication as prescribed. Maintain healthy lifestyle, low fat diet, exercise regularly and check your lipid levels routinely.   Please follow up with your PCP in 1 week from discharge.    Diagnosis: Lung cancer  Assessment and Plan of Treatment: You have a history of lung cancer. You are taking the medication osimertinib. Please continue to take your home medication as prescribed and follow up with your PCP within 1 week of discharge.

## 2024-12-30 NOTE — PROGRESS NOTE ADULT - ASSESSMENT
Right ear abscess  Right facial cellulitis    Plan -   ·	Cont Unasyn 3 gms iv q6hrs Right ear abscess  Right facial cellulitis    Plan -   ·	Cont Unasyn 3 gms iv q6hrs  ·	Can DC with Augmentin 875mg po bid for 2 weeks.  ·	DC planning  ·	reconsult prn  Right ear abscess  Right facial cellulitis - improving nicely    Plan -   ·	Cont Unasyn 3 gms iv q6hrs  ·	Can DC home tomorrow with Augmentin 875mg po bid for 2 weeks.  ·	DC planning  ·	reconsult prn

## 2024-12-30 NOTE — DISCHARGE NOTE PROVIDER - NSDCMRMEDTOKEN_GEN_ALL_CORE_FT
Albuterol (Eqv-ProAir HFA) 90 mcg/inh inhalation aerosol: 2 puff(s) inhaled every 6 hours  Azopt 1% ophthalmic suspension: 1 drop(s) to each affected eye 3 times a day  Centrum oral tablet: 1 tab(s) orally once a day  Combigan 0.2%-0.5% ophthalmic solution: 1 drop(s) to each affected eye every 12 hours  digoxin 125 mcg (0.125 mg) oral tablet: 1 tab(s) orally every other day  dorzolamide 22.3 mg-timolol 6.8 mg/mL eye drops:   finasteride 5 mg tablet: orally once a day  levothyroxine 25 mcg tablet: 1 tab(s) orally once a day  Lumigan 0.01% ophthalmic solution: 1 drop(s) to each affected eye once a day (in the evening)  Rocklatan 0.02 %-0.005 % eye drops:   simvastatin 10 mg oral tablet: 1 tab(s) orally once a day (at bedtime)  Tagrisso 80 mg oral tablet: 80 milligram(s) orally once a day  tamsulosin 0.4 mg capsule: 1 tab(s) orally once a day (at bedtime)  Toprol-XL 25 mg oral tablet, extended release: 1 tab(s) orally once a day Please take 1/2 tablet daily  Xarelto 15 mg oral tablet: 1 tab(s) orally once a day (in the evening)   Albuterol (Eqv-ProAir HFA) 90 mcg/inh inhalation aerosol: 2 puff(s) inhaled every 6 hours  Azopt 1% ophthalmic suspension: 1 drop(s) to each affected eye 3 times a day  Centrum oral tablet: 1 tab(s) orally once a day  Combigan 0.2%-0.5% ophthalmic solution: 1 drop(s) to each affected eye every 12 hours  digoxin 125 mcg (0.125 mg) oral tablet: 1 tab(s) orally every other day  dorzolamide 22.3 mg-timolol 6.8 mg/mL eye drops:   finasteride 5 mg tablet: orally once a day  Lasix 20 mg oral tablet: 1 tab(s) orally once a day  levothyroxine 25 mcg tablet: 1 tab(s) orally once a day  Lumigan 0.01% ophthalmic solution: 1 drop(s) to each affected eye once a day (in the evening)  Rocklatan 0.02 %-0.005 % eye drops:   simvastatin 10 mg oral tablet: 1 tab(s) orally once a day (at bedtime)  Tagrisso 80 mg oral tablet: 80 milligram(s) orally once a day  tamsulosin 0.4 mg capsule: 1 tab(s) orally once a day (at bedtime)  Toprol-XL 25 mg oral tablet, extended release: 1 tab(s) orally once a day Please take 1/2 tablet daily  Xarelto 15 mg oral tablet: 1 tab(s) orally once a day (in the evening)  Zestril 2.5 mg oral tablet: 1 tab(s) orally once a day   Albuterol (Eqv-ProAir HFA) 90 mcg/inh inhalation aerosol: 2 puff(s) inhaled every 6 hours  amoxicillin-clavulanate 875 mg-125 mg oral tablet: 875 milligram(s) orally 2 times a day  Azopt 1% ophthalmic suspension: 1 drop(s) to each affected eye 3 times a day  Centrum oral tablet: 1 tab(s) orally once a day  Combigan 0.2%-0.5% ophthalmic solution: 1 drop(s) to each affected eye every 12 hours  digoxin 125 mcg (0.125 mg) oral tablet: 1 tab(s) orally every other day  dorzolamide 22.3 mg-timolol 6.8 mg/mL eye drops:   finasteride 5 mg tablet: orally once a day  Lasix 20 mg oral tablet: 1 tab(s) orally once a day  levothyroxine 25 mcg tablet: 1 tab(s) orally once a day  Lumigan 0.01% ophthalmic solution: 1 drop(s) to each affected eye once a day (in the evening)  Rocklatan 0.02 %-0.005 % eye drops:   simvastatin 10 mg oral tablet: 1 tab(s) orally once a day (at bedtime)  Tagrisso 80 mg oral tablet: 80 milligram(s) orally once a day  tamsulosin 0.4 mg capsule: 1 tab(s) orally once a day (at bedtime)  Toprol-XL 25 mg oral tablet, extended release: 1 tab(s) orally once a day Please take 1/2 tablet daily  Xarelto 15 mg oral tablet: 1 tab(s) orally once a day (in the evening)  Zestril 2.5 mg oral tablet: 1 tab(s) orally once a day

## 2024-12-30 NOTE — DISCHARGE NOTE PROVIDER - PROVIDER TOKENS
PROVIDER:[TOKEN:[[id number]]] PROVIDER:[TOKEN:[[id number]]],PROVIDER:[TOKEN:[[id number]]]

## 2024-12-30 NOTE — PROGRESS NOTE ADULT - ASSESSMENT
89-year-old male history of lung cancer on Tagrisso, HLD, HTN, BPH, hypothyroidism, A-fib on Xarelto, right preauricular abscess status post recent drainage 12/14 by OMFS at [facility], IV Unasyn transitioned to p.o. Augmentin here after drainage from area again after completing antibiotic ( last dose 2 days ago) . In the ER , VSS . S/p unasyn. S/p IND by ER - BCx, wound Cx collected. Admitted to medicine for R sided facial abscess on IV ABx, IDx consulted, cardiology following for readjustment of home medications

## 2024-12-30 NOTE — DISCHARGE NOTE PROVIDER - NSDCFUSCHEDAPPT_GEN_ALL_CORE_FT
Ana María Varela  [facility]  JUD Zuniga  Scheduled Appointment: 01/02/2025    [facility]  JUD Zuniga  Scheduled Appointment: 01/02/2025

## 2024-12-30 NOTE — DISCHARGE NOTE PROVIDER - CARE PROVIDERS DIRECT ADDRESSES
[email or web address]

## 2024-12-30 NOTE — PROGRESS NOTE ADULT - ASSESSMENT
89 yr old male with PMHX of Chronic Afib,Systolic HFMR,,Lung Ca,BPH,Orthostatic hypotension,Hypothyroidism, s/p recent hospital stay at [facility] Rt periauricular abcess and d/c home on po abx. He presents to ER with drainage from abcess, s/p I and D in ER,admitted for IV ABX.  1.Abcess-IV abx, F/U cx.  2.Hypothyroid-synthroid.  3.Chronic afib-xarelto,dig lopressor.  4.Chronic systolic HF-b blocker, low dose ace and lasix.  5.Lung Ca-on oral chemo.  6.Orthostatic hypotension-midodrine.  7.BPH-flomax,proscar.  8.PPI.

## 2024-12-30 NOTE — PROGRESS NOTE ADULT - SUBJECTIVE AND OBJECTIVE BOX
PGY-1 Progress Note discussed with attending      PLEASE CONTACT ON CALL TEAM:  - On Call Team (Please refer to Naun) FROM 5:00 PM - 8:30PM  - Nightfloat Team FROM 8:30 -7:30 AM    INTERVAL HPI/OVERNIGHT EVENTS: No acute events overnight.  Patient examined at bedside this AM.  Patient denies acute complaints. Reports that pain on abscess site has significantly improved. Denies any fever/chils.       REVIEW OF SYSTEMS:  CONSTITUTIONAL: No fever, weight loss, or fatigue  RESPIRATORY: No cough, wheezing, chills or hemoptysis; No shortness of breath  CARDIOVASCULAR: No chest pain, palpitations, dizziness, or leg swelling  GASTROINTESTINAL: No abdominal pain. No nausea, vomiting, or hematemesis; No diarrhea or constipation. No melena or hematochezia.  GENITOURINARY: No dysuria or hematuria, urinary frequency  NEUROLOGICAL: No headaches, memory loss, loss of strength, numbness, or tremors  SKIN: No itching, burning, rashes, or lesions     MEDICATIONS  (STANDING):  ampicillin/sulbactam  IVPB 3 Gram(s) IV Intermittent every 6 hours  atorvastatin 10 milliGRAM(s) Oral at bedtime  digoxin     Tablet 125 MICROGram(s) Oral <User Schedule>  dorzolamide 2%/timolol 0.5% Ophthalmic Solution 1 Drop(s) Both EYES two times a day  finasteride 5 milliGRAM(s) Oral daily  furosemide    Tablet 20 milliGRAM(s) Oral daily  latanoprost 0.005% Ophthalmic Solution 1 Drop(s) Both EYES at bedtime  levothyroxine 25 MICROGram(s) Oral daily  lisinopril 2.5 milliGRAM(s) Oral daily  metoprolol succinate ER 12.5 milliGRAM(s) Oral daily  midodrine. 10 milliGRAM(s) Oral three times a day  osimertinib 80 milliGRAM(s) Oral daily  rivaroxaban 15 milliGRAM(s) Oral daily  tamsulosin 0.4 milliGRAM(s) Oral at bedtime    MEDICATIONS  (PRN):  acetaminophen     Tablet .. 650 milliGRAM(s) Oral every 6 hours PRN Temp greater or equal to 38C (100.4F), Mild Pain (1 - 3)  albuterol    90 MICROgram(s) HFA Inhaler 2 Puff(s) Inhalation every 6 hours PRN Bronchospasm  aluminum hydroxide/magnesium hydroxide/simethicone Suspension 30 milliLiter(s) Oral every 4 hours PRN Dyspepsia  melatonin 3 milliGRAM(s) Oral at bedtime PRN Insomnia  ondansetron Injectable 4 milliGRAM(s) IV Push every 8 hours PRN Nausea and/or Vomiting      Vital Signs Last 24 Hrs  T(C): 36.5 (30 Dec 2024 04:58), Max: 37 (29 Dec 2024 21:42)  T(F): 97.7 (30 Dec 2024 04:58), Max: 98.6 (29 Dec 2024 21:42)  HR: 78 (30 Dec 2024 04:58) (54 - 78)  BP: 129/84 (30 Dec 2024 04:58) (89/44 - 129/84)  BP(mean): --  RR: 17 (30 Dec 2024 04:58) (16 - 17)  SpO2: 96% (30 Dec 2024 04:58) (96% - 98%)    Parameters below as of 30 Dec 2024 04:58  Patient On (Oxygen Delivery Method): room air        PHYSICAL EXAMINATION:  GENERAL: NAD  HEAD:  Atraumatic, Normocephalic. Abscess site right preauricular measures about 0.5 cm. Clean, dry, intact, wrapped by band-aid - non-purulent.   EYES:  conjunctiva and sclera clear  NECK: Supple, No JVD, Normal thyroid  CHEST/LUNG: Clear to auscultation. Clear to percussion bilaterally; No rales, rhonchi, wheezing, or rubs  HEART: Regular rate and rhythm; No murmurs, rubs, or gallops  ABDOMEN: Soft, Nontender, Nondistended; Bowel sounds present, no pain or masses on palpation  NERVOUS SYSTEM:  Alert & Oriented X3  EXTREMITIES:  2+ Peripheral Pulses, No clubbing, cyanosis, or edema  SKIN: warm dry                          11.7   3.79  )-----------( 102      ( 30 Dec 2024 07:00 )             34.0     12-30    142  |  112[H]  |  29[H]  ----------------------------<  112[H]  4.0   |  23  |  1.39[H]    Ca    8.6      30 Dec 2024 07:00  Phos  3.4     12-30  Mg     2.4     12-30    TPro  6.1  /  Alb  3.1[L]  /  TBili  0.8  /  DBili  x   /  AST  22  /  ALT  32  /  AlkPhos  96  12-30    LIVER FUNCTIONS - ( 30 Dec 2024 07:00 )  Alb: 3.1 g/dL / Pro: 6.1 g/dL / ALK PHOS: 96 U/L / ALT: 32 U/L DA / AST: 22 U/L / GGT: x                   I&O's Summary    29 Dec 2024 07:01  -  30 Dec 2024 07:00  --------------------------------------------------------  IN: 0 mL / OUT: 500 mL / NET: -500 mL          Culture - Wound Aerobic (collected 28 Dec 2024 04:00)  Source: Drainage  Final Report (30 Dec 2024 07:35):    No growth    Culture - Blood (collected 28 Dec 2024 00:40)  Source: .Blood BLOOD  Preliminary Report (30 Dec 2024 06:01):    No growth at 48 Hours    Culture - Blood (collected 28 Dec 2024 00:30)  Source: .Blood BLOOD  Preliminary Report (30 Dec 2024 06:01):    No growth at 48 Hours        CAPILLARY BLOOD GLUCOSE      RADIOLOGY & ADDITIONAL TESTS:  < from: CT Maxillofacial w/ IV Cont (12.28.24 @ 02:01) >    ACC: [id number] EXAM:  CT MAXILLOFACIAL  IC   ORDERED BY: PATRICK COUCH     PROCEDURE DATE:  12/28/2024          INTERPRETATION:  Clinical Indication:  R preauricular abscess/cellulitis   - assess extent of infection  Comparison: 12-12-24.    Technique: Multidetector axial imaging of the facial bones was obtained   after the intravenous administration of 90 cc of Omnipaque 350. 10 cc was   discarded. Sagittal and coronal reformatted images were produced.    Findings:    There is a right preauricular multiloculated abscess measuring   approximately 1.4 x 0.6 cm in greatest dimension, decreased in size from   the prior study dated 12/12/2024. There is inflammation in the   surrounding subcutaneous soft tissues.    Rest of the facial soft tissues are unremarkable. Facial osseous   structures are intact.    The paranasal sinuses are clear.    Visualized portions of the brain and orbits are unremarkable.    IMPRESSION:    RIGHT PREAURICULAR MULTILOCULATED ABSCESS MEASURING APPROXIMATELY 1.4 X   0.6 CM IN GREATEST DIMENSION, DECREASED IN SIZE FROM THE PRIOR STUDY   DATED 12/12/2024. THERE IS INFLAMMATION IN THE SURROUNDING SUBCUTANEOUS   SOFT TISSUES.    --- End of Report ---            LYNETTE HUGHES MD; Attending Radiologist  This document has been electronically signed. Dec 28 2024  2:48AM    < end of copied text >

## 2024-12-30 NOTE — PROGRESS NOTE ADULT - SUBJECTIVE AND OBJECTIVE BOX
Date of Service 12-30-24 @ 09:59    CHIEF COMPLAINT:Patient is a 89y old  Male who presents with a chief complaint of Facial abscess.Pt appears comfortable,s/p hypotension,midodrine inc 10mg tid..    	  REVIEW OF SYSTEMS:  CONSTITUTIONAL: No fever, weight loss, or fatigue  EYES: No eye pain, visual disturbances, or discharge  ENT:  No difficulty hearing, tinnitus, vertigo; No sinus or throat pain  NECK: No pain or stiffness  RESPIRATORY: No cough, wheezing, chills or hemoptysis; No Shortness of Breath  CARDIOVASCULAR: No chest pain, palpitations, passing out, dizziness, or leg swelling  GASTROINTESTINAL: No abdominal or epigastric pain. No nausea, vomiting, or hematemesis; No diarrhea or constipation. No melena or hematochezia.  GENITOURINARY: No dysuria, frequency, hematuria, or incontinence  NEUROLOGICAL: No headaches, memory loss, loss of strength, numbness, or tremors  SKIN: No itching, burning, rashes, or lesions   LYMPH Nodes: No enlarged glands  ENDOCRINE: No heat or cold intolerance; No hair loss  MUSCULOSKELETAL: No joint pain or swelling; No muscle, back, or extremity pain  PSYCHIATRIC: No depression, anxiety, mood swings, or difficulty sleeping  HEME/LYMPH: No easy bruising, or bleeding gums  ALLERGY AND IMMUNOLOGIC: No hives or eczema	    PHYSICAL EXAM:  T(C): 36.5 (12-30-24 @ 04:58), Max: 37 (12-29-24 @ 21:42)  HR: 78 (12-30-24 @ 04:58) (54 - 78)  BP: 129/84 (12-30-24 @ 04:58) (84/45 - 129/84)  RR: 17 (12-30-24 @ 04:58) (16 - 18)  SpO2: 96% (12-30-24 @ 04:58) (96% - 98%)  Wt(kg): --  I&O's Summary    29 Dec 2024 07:01  -  30 Dec 2024 07:00  --------------------------------------------------------  IN: 0 mL / OUT: 500 mL / NET: -500 mL        Appearance: Normal	  HEENT:   Normal oral mucosa, PERRL, EOMI	  Lymphatic: No lymphadenopathy  Cardiovascular: Normal S1 S2, No JVD, No murmurs, No edema  Respiratory: Lungs clear to auscultation	  Psychiatry: A & O x 3, Mood & affect appropriate  Gastrointestinal:  Soft, Non-tender, + BS	  Skin: No rashes, No ecchymoses, No cyanosis	  Neurologic: Non-focal  Extremities: Normal range of motion, No clubbing, cyanosis or edema  Vascular: Peripheral pulses palpable 2+ bilaterally    MEDICATIONS  (STANDING):  ampicillin/sulbactam  IVPB 3 Gram(s) IV Intermittent every 6 hours  atorvastatin 10 milliGRAM(s) Oral at bedtime  digoxin     Tablet 125 MICROGram(s) Oral <User Schedule>  dorzolamide 2%/timolol 0.5% Ophthalmic Solution 1 Drop(s) Both EYES two times a day  finasteride 5 milliGRAM(s) Oral daily  furosemide    Tablet 20 milliGRAM(s) Oral daily  latanoprost 0.005% Ophthalmic Solution 1 Drop(s) Both EYES at bedtime  levothyroxine 25 MICROGram(s) Oral daily  lisinopril 2.5 milliGRAM(s) Oral daily  metoprolol succinate ER 12.5 milliGRAM(s) Oral daily  midodrine. 10 milliGRAM(s) Oral three times a day  osimertinib 80 milliGRAM(s) Oral daily  rivaroxaban 15 milliGRAM(s) Oral daily  tamsulosin 0.4 milliGRAM(s) Oral at bedtime      LABS:	 	                       11.7   3.79  )-----------( 102      ( 30 Dec 2024 07:00 )             34.0     12-30    142  |  112[H]  |  29[H]  ----------------------------<  112[H]  4.0   |  23  |  1.39[H]    Ca    8.6      30 Dec 2024 07:00  Phos  3.4     12-30  Mg     2.4     12-30    TPro  6.1  /  Alb  3.1[L]  /  TBili  0.8  /  DBili  x   /  AST  22  /  ALT  32  /  AlkPhos  96  12-30      TSH: Thyroid Stimulating Hormone, Serum: 4.69 uU/mL (12-29 @ 06:50)      	        Culture - Wound Aerobic (12.28.24 @ 04:00)   Specimen Source: Drainage  Culture Results:   No growthCulture - Blood (12.28.24 @ 00:40)   Specimen Source: .Blood BLOOD  Culture Results:   No growth at 48 Hours

## 2024-12-30 NOTE — DISCHARGE NOTE PROVIDER - CARE PROVIDER_API CALL
Weston Garcia  Internal Medicine  [address], [address]  [place] [address]  Phone: [phone]  Fax: [phone]  Follow Up Time:    Weston Garcia  Internal Medicine  [address], [address]  [place] [address]  Phone: [phone]  Fax: [phone]  Follow Up Time:     Ana María Varela  [facility]  [address], [address]  [place] [address]  Phone: [phone]  Fax: [phone]  Follow Up Time:

## 2024-12-30 NOTE — DISCHARGE NOTE PROVIDER - HOSPITAL COURSE
89-year-old male history of lung cancer on Tagrisso, HLD, HTN, BPH, hypothyroidism, A-fib on Xarelto, right preauricular abscess status post recent drainage 12/14 by OMFS at [facility], IV Unasyn transitioned to p.o. Augmentin here after drainage from area again after completing antibiotic ( last dose 2 days ago) . In the ER , VSS . S/p unasyn. S/p IND by ER - BCx, wound Cx collected. Admitted to medicine for R sided facial abscess on IV ABx.    In the hospital, CT Maxilofacial showed RIGHT PREAURICULAR MULTILOCULATED ABSCESS MEASURING APPROXIMATELY 1.4 X   0.6 CM IN GREATEST DIMENSION, DECREASED IN SIZE FROM THE PRIOR STUDY. DATED 12/12/2024. THERE IS INFLAMMATION IN THE SURROUNDING SUBCUTANEOUS   SOFT TISSUES. ID was consulted, patient was started on unasyn 3 grams IV q 6.     Given patient's improved clinical status and current hemodynamic stability, decision was made to discharge. Discussed with attending. Plan to discharge patient on PO augmentin 875 BID for 2 weeks. 89-year-old male history of lung cancer on Tagrisso, HLD, HTN, BPH, hypothyroidism, A-fib on Xarelto, right preauricular abscess status post recent drainage 12/14 by OMFS at [facility], IV Unasyn transitioned to p.o. Augmentin here after drainage from area again after completing antibiotic ( last dose 2 days ago) . In the ER , VSS . S/p unasyn. S/p IND by ER - BCx, wound Cx collected. Admitted to medicine for R sided facial abscess on IV ABx.    In the hospital, CT Maxilofacial showed RIGHT PREAURICULAR MULTILOCULATED ABSCESS MEASURING APPROXIMATELY 1.4 X   0.6 CM IN GREATEST DIMENSION, DECREASED IN SIZE FROM THE PRIOR STUDY. DATED 12/12/2024. THERE IS INFLAMMATION IN THE SURROUNDING SUBCUTANEOUS   SOFT TISSUES. ID was consulted, patient was started on unasyn 3 grams IV q 6.   asdf    Given patient's improved clinical status and current hemodynamic stability, decision was made to discharge. Discussed with attending. Plan to discharge patient on PO augmentin 875 BID for 2 weeks. 89-year-old male history of lung cancer on Tagrisso, HLD, HTN, BPH, hypothyroidism, A-fib on Xarelto, right preauricular abscess status post recent drainage 12/14 by OMFS at [facility], IV Unasyn transitioned to p.o. Augmentin here after drainage from area again after completing antibiotic ( last dose 2 days ago) . In the ER , VSS . S/p unasyn. S/p IND by ER - BCx, wound Cx collected. Admitted to medicine for R sided facial abscess on IV ABx.    In the hospital, CT Maxilofacial showed RIGHT PREAURICULAR MULTILOCULATED ABSCESS MEASURING APPROXIMATELY 1.4 X   0.6 CM IN GREATEST DIMENSION, DECREASED IN SIZE FROM THE PRIOR STUDY. DATED 12/12/2024. THERE IS INFLAMMATION IN THE SURROUNDING SUBCUTANEOUS   SOFT TISSUES. ID was consulted, patient was started on unasyn 3 grams IV q 6.    Given patient's improved clinical status and current hemodynamic stability, decision was made to discharge. Discussed with attending. Plan to discharge patient on PO augmentin 875 BID for 2 weeks. 89-year-old male history of lung cancer on Tagrisso, HLD, HTN, BPH, hypothyroidism, A-fib on Xarelto, right preauricular abscess status post recent drainage 12/14 by OMFS at [facility], IV Unasyn transitioned to p.o. Augmentin here after drainage from area again after completing antibiotic ( last dose 2 days ago) . In the ER , VSS . S/p unasyn. S/p IND by ER - BCx, wound Cx collected. Admitted to medicine for R sided facial abscess on IV ABx.    In the hospital, CT Maxilofacial showed RIGHT PREAURICULAR MULTILOCULATED ABSCESS MEASURING APPROXIMATELY 1.4 X   0.6 CM IN GREATEST DIMENSION, DECREASED IN SIZE FROM THE PRIOR STUDY. DATED 12/12/2024. THERE IS INFLAMMATION IN THE SURROUNDING SUBCUTANEOUS   SOFT TISSUES. ID was consulted, patient was started on unasyn 3 grams IV q 6.    Given patient's improved clinical status and current hemodynamic stability, decision was made to discharge. Discussed with attending. Plan to discharge patient on PO augmentin 875 BID for 13 days.

## 2024-12-31 ENCOUNTER — TRANSCRIPTION ENCOUNTER (OUTPATIENT)
Age: 89
End: 2024-12-31

## 2024-12-31 VITALS
TEMPERATURE: 98 F | HEART RATE: 66 BPM | OXYGEN SATURATION: 98 % | SYSTOLIC BLOOD PRESSURE: 107 MMHG | DIASTOLIC BLOOD PRESSURE: 66 MMHG | RESPIRATION RATE: 17 BRPM

## 2024-12-31 LAB
ALBUMIN SERPL ELPH-MCNC: 3 G/DL — LOW (ref 3.5–5)
ALP SERPL-CCNC: 83 U/L — SIGNIFICANT CHANGE UP (ref 40–120)
ALT FLD-CCNC: 26 U/L DA — SIGNIFICANT CHANGE UP (ref 10–60)
ANION GAP SERPL CALC-SCNC: 5 MMOL/L — SIGNIFICANT CHANGE UP (ref 5–17)
AST SERPL-CCNC: 19 U/L — SIGNIFICANT CHANGE UP (ref 10–40)
BASOPHILS # BLD AUTO: 0.04 K/UL — SIGNIFICANT CHANGE UP (ref 0–0.2)
BASOPHILS NFR BLD AUTO: 1 % — SIGNIFICANT CHANGE UP (ref 0–2)
BILIRUB SERPL-MCNC: 0.9 MG/DL — SIGNIFICANT CHANGE UP (ref 0.2–1.2)
BUN SERPL-MCNC: 27 MG/DL — HIGH (ref 7–18)
CALCIUM SERPL-MCNC: 8.5 MG/DL — SIGNIFICANT CHANGE UP (ref 8.4–10.5)
CHLORIDE SERPL-SCNC: 111 MMOL/L — HIGH (ref 96–108)
CO2 SERPL-SCNC: 26 MMOL/L — SIGNIFICANT CHANGE UP (ref 22–31)
CREAT SERPL-MCNC: 1.38 MG/DL — HIGH (ref 0.5–1.3)
EGFR: 48 ML/MIN/1.73M2 — LOW
EOSINOPHIL # BLD AUTO: 0.25 K/UL — SIGNIFICANT CHANGE UP (ref 0–0.5)
EOSINOPHIL NFR BLD AUTO: 6.2 % — HIGH (ref 0–6)
GLUCOSE SERPL-MCNC: 100 MG/DL — HIGH (ref 70–99)
HCT VFR BLD CALC: 33.5 % — LOW (ref 39–50)
HGB BLD-MCNC: 11.3 G/DL — LOW (ref 13–17)
IMM GRANULOCYTES NFR BLD AUTO: 0.2 % — SIGNIFICANT CHANGE UP (ref 0–0.9)
LYMPHOCYTES # BLD AUTO: 1.19 K/UL — SIGNIFICANT CHANGE UP (ref 1–3.3)
LYMPHOCYTES # BLD AUTO: 29.3 % — SIGNIFICANT CHANGE UP (ref 13–44)
MAGNESIUM SERPL-MCNC: 2.2 MG/DL — SIGNIFICANT CHANGE UP (ref 1.6–2.6)
MCHC RBC-ENTMCNC: 32.4 PG — SIGNIFICANT CHANGE UP (ref 27–34)
MCHC RBC-ENTMCNC: 33.7 G/DL — SIGNIFICANT CHANGE UP (ref 32–36)
MCV RBC AUTO: 96 FL — SIGNIFICANT CHANGE UP (ref 80–100)
MONOCYTES # BLD AUTO: 0.45 K/UL — SIGNIFICANT CHANGE UP (ref 0–0.9)
MONOCYTES NFR BLD AUTO: 11.1 % — SIGNIFICANT CHANGE UP (ref 2–14)
NEUTROPHILS # BLD AUTO: 2.12 K/UL — SIGNIFICANT CHANGE UP (ref 1.8–7.4)
NEUTROPHILS NFR BLD AUTO: 52.2 % — SIGNIFICANT CHANGE UP (ref 43–77)
NRBC # BLD: 0 /100 WBCS — SIGNIFICANT CHANGE UP (ref 0–0)
PHOSPHATE SERPL-MCNC: 3.6 MG/DL — SIGNIFICANT CHANGE UP (ref 2.5–4.5)
PLATELET # BLD AUTO: 96 K/UL — LOW (ref 150–400)
POTASSIUM SERPL-MCNC: 3.9 MMOL/L — SIGNIFICANT CHANGE UP (ref 3.5–5.3)
POTASSIUM SERPL-SCNC: 3.9 MMOL/L — SIGNIFICANT CHANGE UP (ref 3.5–5.3)
PROT SERPL-MCNC: 5.8 G/DL — LOW (ref 6–8.3)
RBC # BLD: 3.49 M/UL — LOW (ref 4.2–5.8)
RBC # FLD: 16.5 % — HIGH (ref 10.3–14.5)
SODIUM SERPL-SCNC: 142 MMOL/L — SIGNIFICANT CHANGE UP (ref 135–145)
WBC # BLD: 4.06 K/UL — SIGNIFICANT CHANGE UP (ref 3.8–10.5)
WBC # FLD AUTO: 4.06 K/UL — SIGNIFICANT CHANGE UP (ref 3.8–10.5)

## 2024-12-31 PROCEDURE — 96374 THER/PROPH/DIAG INJ IV PUSH: CPT | Mod: XU

## 2024-12-31 PROCEDURE — 87040 BLOOD CULTURE FOR BACTERIA: CPT

## 2024-12-31 PROCEDURE — 80053 COMPREHEN METABOLIC PANEL: CPT

## 2024-12-31 PROCEDURE — 83605 ASSAY OF LACTIC ACID: CPT

## 2024-12-31 PROCEDURE — 87070 CULTURE OTHR SPECIMN AEROBIC: CPT

## 2024-12-31 PROCEDURE — 85610 PROTHROMBIN TIME: CPT

## 2024-12-31 PROCEDURE — 99285 EMERGENCY DEPT VISIT HI MDM: CPT | Mod: 25

## 2024-12-31 PROCEDURE — 36415 COLL VENOUS BLD VENIPUNCTURE: CPT

## 2024-12-31 PROCEDURE — 87637 SARSCOV2&INF A&B&RSV AMP PRB: CPT

## 2024-12-31 PROCEDURE — 84100 ASSAY OF PHOSPHORUS: CPT

## 2024-12-31 PROCEDURE — 85025 COMPLETE CBC W/AUTO DIFF WBC: CPT

## 2024-12-31 PROCEDURE — 85730 THROMBOPLASTIN TIME PARTIAL: CPT

## 2024-12-31 PROCEDURE — 83735 ASSAY OF MAGNESIUM: CPT

## 2024-12-31 PROCEDURE — 70487 CT MAXILLOFACIAL W/DYE: CPT | Mod: MC

## 2024-12-31 PROCEDURE — 10060 I&D ABSCESS SIMPLE/SINGLE: CPT

## 2024-12-31 PROCEDURE — 84443 ASSAY THYROID STIM HORMONE: CPT

## 2024-12-31 PROCEDURE — 84439 ASSAY OF FREE THYROXINE: CPT

## 2024-12-31 RX ORDER — AMOXICILLIN/POTASSIUM CLAV 875-125 MG
875 TABLET ORAL
Qty: 26 | Refills: 0
Start: 2024-12-31 | End: 2025-01-12

## 2024-12-31 RX ADMIN — AMPICILLIN SODIUM AND SULBACTAM SODIUM 200 GRAM(S): 100; 50 INJECTION, POWDER, FOR SOLUTION INTRAVENOUS at 05:20

## 2024-12-31 RX ADMIN — Medication 5 MILLIGRAM(S): at 11:45

## 2024-12-31 RX ADMIN — Medication 12.5 MILLIGRAM(S): at 05:18

## 2024-12-31 RX ADMIN — MIDODRINE HYDROCHLORIDE 10 MILLIGRAM(S): 5 TABLET ORAL at 11:45

## 2024-12-31 RX ADMIN — Medication 20 MILLIGRAM(S): at 05:19

## 2024-12-31 RX ADMIN — RIVAROXABAN 15 MILLIGRAM(S): 2.5 TABLET, FILM COATED ORAL at 11:45

## 2024-12-31 RX ADMIN — LEVOTHYROXINE SODIUM 25 MICROGRAM(S): 175 TABLET ORAL at 05:19

## 2024-12-31 RX ADMIN — MIDODRINE HYDROCHLORIDE 10 MILLIGRAM(S): 5 TABLET ORAL at 05:19

## 2024-12-31 RX ADMIN — LISINOPRIL 2.5 MILLIGRAM(S): 30 TABLET ORAL at 05:19

## 2024-12-31 RX ADMIN — OSIMERTINIB 80 MILLIGRAM(S): 80 TABLET, FILM COATED ORAL at 11:45

## 2024-12-31 RX ADMIN — Medication 1 DROP(S): at 05:19

## 2024-12-31 NOTE — PROGRESS NOTE ADULT - ASSESSMENT
89 yr old male with PMHX of Chronic Afib,Systolic HFMR,,Lung Ca,BPH,Orthostatic hypotension,Hypothyroidism, s/p recent hospital stay at [facility] Rt periauricular abcess and d/c home on po abx. He presents to ER with drainage from abcess, s/p I and D in ER,admitted for IV ABX.  1.Abcess-IV abx.  2.Hypothyroid-synthroid.  3.Chronic afib-xarelto,dig lopressor.  4.Chronic systolic HF-b blocker, low dose ace and lasix.  5.Lung Ca-on oral chemo.  6.Orthostatic hypotension-midodrine.  7.BPH-flomax,proscar.  8.PPI.

## 2024-12-31 NOTE — PROGRESS NOTE ADULT - PROBLEM SELECTOR PLAN 2
h/o HTN   Monitor BP  c/w home meds with holding parameters   Lower MAP 20-25% in next 2-4 hrs  DASH diet
h/o HTN   Monitor BP  c/w home meds with holding parameters   Lower MAP 20-25% in next 2-4 hrs  DASH diet

## 2024-12-31 NOTE — PROGRESS NOTE ADULT - PROVIDER SPECIALTY LIST ADULT
Cardiology
Internal Medicine
Internal Medicine
Infectious Disease
Internal Medicine
Internal Medicine
Cardiology
Cardiology
Internal Medicine

## 2024-12-31 NOTE — PROGRESS NOTE ADULT - PROBLEM SELECTOR PLAN 1
Hx  of facial abscess recently drained at [facility]   wound recurring and refractory to OP Treatment ( s/p OP Abx course)   CT face-RIGHT PREAURICULAR MULTILOCULATED ABSCESS MEASURING APPROXIMATELY 1.4 X 0.6 CM IN GREATEST DIMENSION, DECREASED IN SIZE   BCx NGTD  Abscess culture: E faecalis  Wound culture: E faecalis  Drainage - no growth    c/w unasyn q 6 finished  Dr. Miller consulted - d/c on augmentin 875 BID for 2 weeks
Hx  of facial abscess recently drained at [facility]   wound recurring and refractory to OP Treatment ( s/p OP Abx course)   CT face-RIGHT PREAURICULAR MULTILOCULATED ABSCESS MEASURING APPROXIMATELY 1.4 X 0.6 CM IN GREATEST DIMENSION, DECREASED IN SIZE   BCx NGTD  Abscess culture: E faecalis  Wound culture: E faecalis  Drainage - no growth    c/w unasyn q 6 for one more day  Dr. Miller consulted - d/c on augmentin 875 BID for 2 weeks

## 2024-12-31 NOTE — DISCHARGE NOTE NURSING/CASE MANAGEMENT/SOCIAL WORK - FINANCIAL ASSISTANCE
[facility] provides services at a reduced cost to those who are determined to be eligible through [facility]’s financial assistance program. Information regarding [facility]’s financial assistance program can be found by going to [email or web address] or by calling [phone].

## 2024-12-31 NOTE — DISCHARGE NOTE NURSING/CASE MANAGEMENT/SOCIAL WORK - PATIENT PORTAL LINK FT
You can access the FollowMyHealth Patient Portal offered by [facility] by registering at the following website: [email or web address]. By joining [facility]’s FollowMyHealth portal, you will also be able to view your health information using other applications (apps) compatible with our system.

## 2024-12-31 NOTE — PROGRESS NOTE ADULT - PROBLEM SELECTOR PLAN 5
Known hx of afib  Not in RVR    -cont home BB  -cont xarelto  - digoxin    cards Dr. Dominique following
Known hx of afib  Not in RVR    -cont home BB  -cont xarelto  - digoxin    cards Dr. Dominique following

## 2024-12-31 NOTE — PROGRESS NOTE ADULT - SUBJECTIVE AND OBJECTIVE BOX
PGY-1 Progress Note discussed with attending      PLEASE CONTACT ON CALL TEAM:  - On Call Team (Please refer to Naun) FROM 5:00 PM - 8:30PM  - Nightfloat Team FROM 8:30 -7:30 AM    INTERVAL HPI/OVERNIGHT EVENTS: No acute events overnight.  Patient examined at bedside this AM.  Patient denies acute complaints       REVIEW OF SYSTEMS:  CONSTITUTIONAL: No fever, weight loss, or fatigue  RESPIRATORY: No cough, wheezing, chills or hemoptysis; No shortness of breath  CARDIOVASCULAR: No chest pain, palpitations, dizziness, or leg swelling  GASTROINTESTINAL: No abdominal pain. No nausea, vomiting, or hematemesis; No diarrhea or constipation. No melena or hematochezia.  GENITOURINARY: No dysuria or hematuria, urinary frequency  NEUROLOGICAL: No headaches, memory loss, loss of strength, numbness, or tremors  SKIN: No itching, burning, rashes, or lesions     MEDICATIONS  (STANDING):  atorvastatin 10 milliGRAM(s) Oral at bedtime  digoxin     Tablet 125 MICROGram(s) Oral <User Schedule>  dorzolamide 2%/timolol 0.5% Ophthalmic Solution 1 Drop(s) Both EYES two times a day  finasteride 5 milliGRAM(s) Oral daily  furosemide    Tablet 20 milliGRAM(s) Oral daily  latanoprost 0.005% Ophthalmic Solution 1 Drop(s) Both EYES at bedtime  levothyroxine 25 MICROGram(s) Oral daily  lisinopril 2.5 milliGRAM(s) Oral daily  metoprolol succinate ER 12.5 milliGRAM(s) Oral daily  midodrine. 10 milliGRAM(s) Oral three times a day  osimertinib 80 milliGRAM(s) Oral daily  rivaroxaban 15 milliGRAM(s) Oral daily  tamsulosin 0.4 milliGRAM(s) Oral at bedtime    MEDICATIONS  (PRN):  acetaminophen     Tablet .. 650 milliGRAM(s) Oral every 6 hours PRN Temp greater or equal to 38C (100.4F), Mild Pain (1 - 3)  albuterol    90 MICROgram(s) HFA Inhaler 2 Puff(s) Inhalation every 6 hours PRN Bronchospasm  aluminum hydroxide/magnesium hydroxide/simethicone Suspension 30 milliLiter(s) Oral every 4 hours PRN Dyspepsia  melatonin 3 milliGRAM(s) Oral at bedtime PRN Insomnia  ondansetron Injectable 4 milliGRAM(s) IV Push every 8 hours PRN Nausea and/or Vomiting      Vital Signs Last 24 Hrs  T(C): 36.4 (31 Dec 2024 13:46), Max: 36.9 (30 Dec 2024 20:51)  T(F): 97.6 (31 Dec 2024 13:46), Max: 98.5 (30 Dec 2024 20:51)  HR: 66 (31 Dec 2024 13:46) (56 - 66)  BP: 107/66 (31 Dec 2024 13:46) (105/65 - 115/66)  BP(mean): --  RR: 17 (31 Dec 2024 13:46) (16 - 17)  SpO2: 98% (31 Dec 2024 13:46) (95% - 98%)    Parameters below as of 31 Dec 2024 13:46  Patient On (Oxygen Delivery Method): room air        PHYSICAL EXAMINATION:  GENERAL: NAD  HEAD:  Atraumatic, Normocephalic, abscess healing preauricular  EYES:  conjunctiva and sclera clear  NECK: Supple, No JVD, Normal thyroid  CHEST/LUNG: Clear to auscultation. Clear to percussion bilaterally; No rales, rhonchi, wheezing, or rubs  HEART: Regular rate and rhythm; No murmurs, rubs, or gallops  ABDOMEN: Soft, Nontender, Nondistended; Bowel sounds present, no pain or masses on palpation  NERVOUS SYSTEM:  Alert & Oriented X3  EXTREMITIES:  2+ Peripheral Pulses, No clubbing, cyanosis, or edema  SKIN: warm dry                          11.3   4.06  )-----------( 96       ( 31 Dec 2024 06:52 )             33.5     12-31    142  |  111[H]  |  27[H]  ----------------------------<  100[H]  3.9   |  26  |  1.38[H]    Ca    8.5      31 Dec 2024 06:52  Phos  3.6     12-31  Mg     2.2     12-31    TPro  5.8[L]  /  Alb  3.0[L]  /  TBili  0.9  /  DBili  x   /  AST  19  /  ALT  26  /  AlkPhos  83  12-31    LIVER FUNCTIONS - ( 31 Dec 2024 06:52 )  Alb: 3.0 g/dL / Pro: 5.8 g/dL / ALK PHOS: 83 U/L / ALT: 26 U/L DA / AST: 19 U/L / GGT: x                   I&O's Summary          CAPILLARY BLOOD GLUCOSE      RADIOLOGY & ADDITIONAL TESTS:

## 2024-12-31 NOTE — PROGRESS NOTE ADULT - SUBJECTIVE AND OBJECTIVE BOX
Date of Service 12-31-24 @ 10:08    CHIEF COMPLAINT:Patient is a 89y old  Male who presents with a chief complaint of Facial abscess .Pt appears comfortable.    	  REVIEW OF SYSTEMS:  CONSTITUTIONAL: No fever, weight loss, or fatigue  EYES: No eye pain, visual disturbances, or discharge  ENT:  No difficulty hearing, tinnitus, vertigo; No sinus or throat pain  NECK: No pain or stiffness  RESPIRATORY: No cough, wheezing, chills or hemoptysis; No Shortness of Breath  CARDIOVASCULAR: No chest pain, palpitations, passing out, dizziness, or leg swelling  GASTROINTESTINAL: No abdominal or epigastric pain. No nausea, vomiting, or hematemesis; No diarrhea or constipation. No melena or hematochezia.  GENITOURINARY: No dysuria, frequency, hematuria, or incontinence  NEUROLOGICAL: No headaches, memory loss, loss of strength, numbness, or tremors  SKIN: No itching, burning, rashes, or lesions   LYMPH Nodes: No enlarged glands  ENDOCRINE: No heat or cold intolerance; No hair loss  MUSCULOSKELETAL: No joint pain or swelling; No muscle, back, or extremity pain  PSYCHIATRIC: No depression, anxiety, mood swings, or difficulty sleeping  HEME/LYMPH: No easy bruising, or bleeding gums  ALLERGY AND IMMUNOLOGIC: No hives or eczema	      PHYSICAL EXAM:  T(C): 36.3 (12-31-24 @ 04:55), Max: 36.9 (12-30-24 @ 20:51)  HR: 56 (12-31-24 @ 04:55) (56 - 73)  BP: 105/65 (12-31-24 @ 04:55) (95/57 - 115/66)  RR: 16 (12-31-24 @ 04:55) (16 - 17)  SpO2: 97% (12-31-24 @ 04:55) (95% - 97%)  Wt(kg): --  I&O's Summary      Appearance: Normal	  HEENT:   Normal oral mucosa, PERRL, EOMI	  Lymphatic: No lymphadenopathy  Cardiovascular: Normal S1 S2, No JVD, No murmurs, No edema  Respiratory: Lungs clear to auscultation	  Psychiatry: A & O x 3, Mood & affect appropriate  Gastrointestinal:  Soft, Non-tender, + BS	  Skin: No rashes, No ecchymoses, No cyanosis	  Neurologic: Non-focal  Extremities: Normal range of motion, No clubbing, cyanosis or edema  Vascular: Peripheral pulses palpable 2+ bilaterally    MEDICATIONS  (STANDING):  atorvastatin 10 milliGRAM(s) Oral at bedtime  digoxin     Tablet 125 MICROGram(s) Oral <User Schedule>  dorzolamide 2%/timolol 0.5% Ophthalmic Solution 1 Drop(s) Both EYES two times a day  finasteride 5 milliGRAM(s) Oral daily  furosemide    Tablet 20 milliGRAM(s) Oral daily  latanoprost 0.005% Ophthalmic Solution 1 Drop(s) Both EYES at bedtime  levothyroxine 25 MICROGram(s) Oral daily  lisinopril 2.5 milliGRAM(s) Oral daily  metoprolol succinate ER 12.5 milliGRAM(s) Oral daily  midodrine. 10 milliGRAM(s) Oral three times a day  osimertinib 80 milliGRAM(s) Oral daily  rivaroxaban 15 milliGRAM(s) Oral daily  tamsulosin 0.4 milliGRAM(s) Oral at bedtime      	  	  LABS:	 	                              11.3   4.06  )-----------( 96       ( 31 Dec 2024 06:52 )             33.5     12-31    142  |  111[H]  |  27[H]  ----------------------------<  100[H]  3.9   |  26  |  1.38[H]    Ca    8.5      31 Dec 2024 06:52  Phos  3.6     12-31  Mg     2.2     12-31    TPro  5.8[L]  /  Alb  3.0[L]  /  TBili  0.9  /  DBili  x   /  AST  19  /  ALT  26  /  AlkPhos  83  12-31      TSH: Thyroid Stimulating Hormone, Serum: 4.69 uU/mL (12-29 @ 06:50)      	    blood cx-.

## 2024-12-31 NOTE — PROGRESS NOTE ADULT - PROBLEM SELECTOR PROBLEM 3
Anesthesia Post Evaluation    Patient: Kaylee Clement    Procedure(s) Performed: Procedure(s) (LRB):  LEFT RELEASE, CARPAL TUNNEL (Left)    Final Anesthesia Type: general      Patient location during evaluation: PACU  Patient participation: Yes- Able to Participate  Level of consciousness: awake and alert  Post-procedure vital signs: reviewed and stable  Pain management: adequate  Airway patency: patent  JOHNNY mitigation strategies: Multimodal analgesia  PONV status at discharge: No PONV  Anesthetic complications: no      Cardiovascular status: blood pressure returned to baseline and hemodynamically stable  Respiratory status: unassisted  Hydration status: euvolemic  Follow-up not needed.              Vitals Value Taken Time   BP 95/47 11/08/24 0847   Temp 36.6 °C (97.9 °F) 11/08/24 0837   Pulse 61 11/08/24 0850   Resp 21 11/08/24 0850   SpO2 95 % 11/08/24 0850   Vitals shown include unfiled device data.      No case tracking events are documented in the log.      Pain/Melissa Score: Pain Rating Prior to Med Admin: 5 (11/8/2024  7:01 AM)  Melissa Score: 5 (11/8/2024  8:35 AM)          
Chronic systolic congestive heart failure
Chronic systolic congestive heart failure

## 2024-12-31 NOTE — PROGRESS NOTE ADULT - SUBJECTIVE AND OBJECTIVE BOX
INTERVAL HPI/OVERNIGHT EVENTS:  Patient seen,doing better,afebrile  VITAL SIGNS:  T(F): 97.4 (12-31-24 @ 04:55)  HR: 56 (12-31-24 @ 04:55)  BP: 105/65 (12-31-24 @ 04:55)  RR: 16 (12-31-24 @ 04:55)  SpO2: 97% (12-31-24 @ 04:55)  Wt(kg): --    PHYSICAL EXAM:  awake  Constitutional:  Eyes:  ENMT:perrla,r ear mild tenderness  Neck:  Respiratory:clear  Cardiovascular:s1s2,m-none  Gastrointestinal:soft,bs pos  Extremities:  Vascular:  Neurological:no focal deficit  Musculoskeletal:    MEDICATIONS  (STANDING):  ampicillin/sulbactam  IVPB 3 Gram(s) IV Intermittent every 6 hours  atorvastatin 10 milliGRAM(s) Oral at bedtime  digoxin     Tablet 125 MICROGram(s) Oral <User Schedule>  dorzolamide 2%/timolol 0.5% Ophthalmic Solution 1 Drop(s) Both EYES two times a day  finasteride 5 milliGRAM(s) Oral daily  furosemide    Tablet 20 milliGRAM(s) Oral daily  latanoprost 0.005% Ophthalmic Solution 1 Drop(s) Both EYES at bedtime  levothyroxine 25 MICROGram(s) Oral daily  lisinopril 2.5 milliGRAM(s) Oral daily  metoprolol succinate ER 12.5 milliGRAM(s) Oral daily  midodrine. 10 milliGRAM(s) Oral three times a day  osimertinib 80 milliGRAM(s) Oral daily  rivaroxaban 15 milliGRAM(s) Oral daily  tamsulosin 0.4 milliGRAM(s) Oral at bedtime    MEDICATIONS  (PRN):  acetaminophen     Tablet .. 650 milliGRAM(s) Oral every 6 hours PRN Temp greater or equal to 38C (100.4F), Mild Pain (1 - 3)  albuterol    90 MICROgram(s) HFA Inhaler 2 Puff(s) Inhalation every 6 hours PRN Bronchospasm  aluminum hydroxide/magnesium hydroxide/simethicone Suspension 30 milliLiter(s) Oral every 4 hours PRN Dyspepsia  melatonin 3 milliGRAM(s) Oral at bedtime PRN Insomnia  ondansetron Injectable 4 milliGRAM(s) IV Push every 8 hours PRN Nausea and/or Vomiting      Allergies    No Known Allergies    Intolerances        LABS:                        11.3   4.06  )-----------( 96       ( 31 Dec 2024 06:52 )             33.5     12-31    142  |  111[H]  |  27[H]  ----------------------------<  100[H]  3.9   |  26  |  1.38[H]    Ca    8.5      31 Dec 2024 06:52  Phos  3.6     12-31  Mg     2.2     12-31    TPro  5.8[L]  /  Alb  3.0[L]  /  TBili  0.9  /  DBili  x   /  AST  19  /  ALT  26  /  AlkPhos  83  12-31      Urinalysis Basic - ( 31 Dec 2024 06:52 )    Color: x / Appearance: x / SG: x / pH: x  Gluc: 100 mg/dL / Ketone: x  / Bili: x / Urobili: x   Blood: x / Protein: x / Nitrite: x   Leuk Esterase: x / RBC: x / WBC x   Sq Epi: x / Non Sq Epi: x / Bacteria: x        RADIOLOGY & ADDITIONAL TESTS:      Assessment and Plan:   · Assessment	  89-year-old male history of lung cancer on Tagrisso, HLD, HTN, BPH, hypothyroidism, A-fib on Xarelto, right preauricular abscess status post recent drainage 12/14 by OMFS at [facility], IV Unasyn transitioned to p.o. Augmentin here after drainage from area again after completing antibiotic ( last dose 2 days ago) . In the ER , VSS . S/p unasyn. S/p IND by ER - BCx, wound Cx collected. Admitted to medicine for R sided facial abscess on IV ABx, IDx consulted, cardiology following for readjustment of home medications          Problem/Plan - 1:  ·  Problem: Facial abscess.   ·  Plan: Hx  of facial abscess recently drained at [facility]   wound recurring and refractory to OP Treatment ( s/p OP Abx course)   Abscess culture: E faecalis  Wound culture: E faecalis  Drainage - no growth  c/w unasyn q 6 for one more day  Dr. Miller consulted - d/c on augmentin 875 BID for 2 weeks.     Problem/Plan - 2:  ·  Problem: Essential hypertension.   ·  Plan: h/o HTN   Monitor BP  c/w home meds with holding parameters   Lower MAP 20-25% in next 2-4 hrs  DASH diet.     Problem/Plan - 3:  ·  Problem: Chronic systolic congestive heart failure.   ·  Plan: c.w b blocker, lisinopril, lasix  Dr. Diez cardiology [facility].     Problem/Plan - 4:  ·  Problem: BPH without obstruction/lower urinary tract symptoms.   ·  Plan: c/w home meds tamsulosin, finasteride.     Problem/Plan - 5:  ·  Problem: Atrial fibrillation.   ·  Plan: Known hx of afib  Not in RVR    -cont home BB  -cont xarelto  - digoxin    cards Dr. Dominique following.     Problem/Plan - 6:  ·  Problem: Orthostatic hypotension.   ·  Plan: c.w midodrine 10 mg TID.     Problem/Plan - 7:  ·  Problem: Glaucoma (increased eye pressure).   ·  Plan: c/w home eye drops.     Problem/Plan - 8:  ·  Problem: Hyperlipidemia, unspecified hyperlipidemia type.   ·  Plan: - c/w home statin   - Dash Diet.     Problem/Plan - 9:  ·  Problem: Lung cancer.   ·  Plan: c/w osimertinib    Pt family will bring in his home medications ( not available in pharmacy ).     Problem/Plan - 10:  ·  Problem: Preventive measure.   ·  Plan; c/w xarelto.

## 2024-12-31 NOTE — PROGRESS NOTE ADULT - PROBLEM SELECTOR PLAN 9
c/w osimertinib    Pt family will bring in his home medications ( not available in pharmacy )
c/w osimertinib    Pt family will bring in his home medications ( not available in pharmacy )

## 2024-12-31 NOTE — PROGRESS NOTE ADULT - ASSESSMENT
89-year-old male history of lung cancer on Tagrisso, HLD, HTN, BPH, hypothyroidism, A-fib on Xarelto, right preauricular abscess status post recent drainage 12/14 by OMFS at [facility], IV Unasyn transitioned to p.o. Augmentin here after drainage from area again after completing antibiotic ( last dose 2 days ago) . In the ER , VSS . S/p unasyn. S/p IND by ER - BCx, wound Cx collected. Admitted to medicine for R sided facial abscess on IV ABx, IDx consulted, cardiology following for readjustment of home medications

## 2024-12-31 NOTE — PROGRESS NOTE ADULT - REASON FOR ADMISSION
Facial abscess
r facial abscess/cellulitis
Facial abscess

## 2024-12-31 NOTE — PROGRESS NOTE ADULT - PROBLEM SELECTOR PLAN 3
sameer b blocker, lisinopril, lasix  Dr. Diez cardiology [facility]
sameer b blocker, lisinopril, lasix  Dr. Diez cardiology [facility]

## 2025-01-01 ENCOUNTER — OUTPATIENT (OUTPATIENT)
Dept: OUTPATIENT SERVICES | Facility: HOSPITAL | Age: 89
LOS: 1 days | Discharge: HOME | End: 2025-01-01
Payer: MEDICARE

## 2025-01-01 DIAGNOSIS — Z98.890 OTHER SPECIFIED POSTPROCEDURAL STATES: Chronic | ICD-10-CM

## 2025-01-01 DIAGNOSIS — Z98.42 CATARACT EXTRACTION STATUS, LEFT EYE: Chronic | ICD-10-CM

## 2025-01-01 PROCEDURE — 77334 RADIATION TREATMENT AID(S): CPT | Mod: 26

## 2025-01-01 PROCEDURE — 77263 THER RADIOLOGY TX PLNG CPLX: CPT

## 2025-01-01 PROCEDURE — 77333 RADIATION TREATMENT AID(S): CPT | Mod: 26,59

## 2025-01-01 RX ORDER — FUROSEMIDE 20 MG
1 TABLET ORAL
Qty: 14 | Refills: 0
Start: 2025-01-01 | End: 2025-01-14

## 2025-01-01 RX ORDER — LISINOPRIL 30 MG/1
1 TABLET ORAL
Qty: 14 | Refills: 0
Start: 2025-01-01 | End: 2025-01-14

## 2025-01-01 RX ORDER — LISINOPRIL 30 MG/1
1 TABLET ORAL
Qty: 30 | Refills: 0
Start: 2025-01-01 | End: 2025-01-30

## 2025-01-01 RX ORDER — FUROSEMIDE 20 MG
1 TABLET ORAL
Qty: 30 | Refills: 0
Start: 2025-01-01 | End: 2025-01-30

## 2025-01-02 ENCOUNTER — APPOINTMENT (OUTPATIENT)
Dept: HOME HEALTH SERVICES | Facility: HOME HEALTH | Age: 89
End: 2025-01-02
Payer: MEDICARE

## 2025-01-02 VITALS
HEART RATE: 52 BPM | OXYGEN SATURATION: 98 % | DIASTOLIC BLOOD PRESSURE: 60 MMHG | SYSTOLIC BLOOD PRESSURE: 110 MMHG | RESPIRATION RATE: 16 BRPM

## 2025-01-02 DIAGNOSIS — Z87.438 PERSONAL HISTORY OF OTHER DISEASES OF MALE GENITAL ORGANS: ICD-10-CM

## 2025-01-02 DIAGNOSIS — N18.31 CHRONIC KIDNEY DISEASE, STAGE 3A: ICD-10-CM

## 2025-01-02 DIAGNOSIS — H40.9 UNSPECIFIED GLAUCOMA: ICD-10-CM

## 2025-01-02 DIAGNOSIS — E03.9 HYPOTHYROIDISM, UNSPECIFIED: ICD-10-CM

## 2025-01-02 DIAGNOSIS — I48.91 UNSPECIFIED ATRIAL FIBRILLATION: ICD-10-CM

## 2025-01-02 DIAGNOSIS — N13.8 BENIGN PROSTATIC HYPERPLASIA WITH LOWER URINARY TRACT SYMPMS: ICD-10-CM

## 2025-01-02 DIAGNOSIS — C79.9 SECONDARY MALIGNANT NEOPLASM OF UNSPECIFIED SITE: ICD-10-CM

## 2025-01-02 DIAGNOSIS — L02.01 CUTANEOUS ABSCESS OF FACE: ICD-10-CM

## 2025-01-02 DIAGNOSIS — Z86.79 PERSONAL HISTORY OF OTHER DISEASES OF THE CIRCULATORY SYSTEM: ICD-10-CM

## 2025-01-02 DIAGNOSIS — Z86.39 PERSONAL HISTORY OF OTHER ENDOCRINE, NUTRITIONAL AND METABOLIC DISEASE: ICD-10-CM

## 2025-01-02 DIAGNOSIS — N40.1 BENIGN PROSTATIC HYPERPLASIA WITH LOWER URINARY TRACT SYMPMS: ICD-10-CM

## 2025-01-02 DIAGNOSIS — D69.6 THROMBOCYTOPENIA, UNSPECIFIED: ICD-10-CM

## 2025-01-02 DIAGNOSIS — E78.00 PURE HYPERCHOLESTEROLEMIA, UNSPECIFIED: ICD-10-CM

## 2025-01-02 DIAGNOSIS — C34.90 MALIGNANT NEOPLASM OF UNSPECIFIED PART OF UNSPECIFIED BRONCHUS OR LUNG: ICD-10-CM

## 2025-01-02 LAB
CULTURE RESULTS: SIGNIFICANT CHANGE UP
CULTURE RESULTS: SIGNIFICANT CHANGE UP
SPECIMEN SOURCE: SIGNIFICANT CHANGE UP
SPECIMEN SOURCE: SIGNIFICANT CHANGE UP

## 2025-01-02 PROCEDURE — 99345 HOME/RES VST NEW HIGH MDM 75: CPT

## 2025-01-02 RX ORDER — FUROSEMIDE 20 MG/1
20 TABLET ORAL
Qty: 90 | Refills: 1 | Status: ACTIVE | COMMUNITY
Start: 2025-01-02

## 2025-01-02 RX ORDER — LISINOPRIL 2.5 MG/1
2.5 TABLET ORAL DAILY
Qty: 90 | Refills: 1 | Status: ACTIVE | COMMUNITY
Start: 2025-01-02

## 2025-01-02 RX ORDER — OSIMERTINIB 80 1/1
80 TABLET, FILM COATED ORAL DAILY
Refills: 0 | Status: ACTIVE | COMMUNITY
Start: 2025-01-02

## 2025-01-02 RX ORDER — BIMATOPROST 0.1 MG/ML
0.01 SOLUTION/ DROPS OPHTHALMIC
Refills: 0 | Status: ACTIVE | COMMUNITY
Start: 2025-01-02

## 2025-01-02 RX ORDER — DORZOLAMIDE HYDROCHLORIDE 20 MG/ML
2 SOLUTION OPHTHALMIC
Refills: 0 | Status: ACTIVE | COMMUNITY
Start: 2025-01-02

## 2025-01-02 RX ORDER — METOPROLOL SUCCINATE 25 MG/1
25 TABLET, EXTENDED RELEASE ORAL DAILY
Qty: 45 | Refills: 0 | Status: ACTIVE | COMMUNITY
Start: 2025-01-02

## 2025-01-02 RX ORDER — AMOXICILLIN AND CLAVULANATE POTASSIUM 875; 125 MG/1; MG/1
875-125 TABLET, COATED ORAL
Refills: 0 | Status: ACTIVE | COMMUNITY
Start: 2025-01-02

## 2025-01-02 RX ORDER — NETARSUDIL AND LATANOPROST OPHTHALMIC SOLUTION, 0.02%/0.005% .2; .05 MG/ML; MG/ML
0.02-0.005 SOLUTION/ DROPS OPHTHALMIC; TOPICAL
Refills: 0 | Status: ACTIVE | COMMUNITY
Start: 2025-01-02

## 2025-01-02 RX ORDER — ALBUTEROL SULFATE 90 UG/1
108 (90 BASE) INHALANT RESPIRATORY (INHALATION)
Refills: 0 | Status: ACTIVE | COMMUNITY
Start: 2025-01-02

## 2025-01-02 RX ORDER — DIGOXIN 125 UG/1
125 TABLET ORAL
Qty: 45 | Refills: 3 | Status: ACTIVE | COMMUNITY
Start: 2025-01-02

## 2025-01-02 RX ORDER — LEVOTHYROXINE SODIUM 0.03 MG/1
25 TABLET ORAL DAILY
Qty: 90 | Refills: 3 | Status: ACTIVE | COMMUNITY
Start: 2025-01-02

## 2025-01-02 RX ORDER — ROSUVASTATIN CALCIUM 5 MG/1
5 TABLET, FILM COATED ORAL DAILY
Qty: 90 | Refills: 3 | Status: ACTIVE | COMMUNITY
Start: 2025-01-02 | End: 1900-01-01

## 2025-01-02 RX ORDER — RIVAROXABAN 15 MG/1
15 TABLET, FILM COATED ORAL
Qty: 30 | Refills: 5 | Status: ACTIVE | COMMUNITY
Start: 2025-01-02

## 2025-01-02 RX ORDER — BRINZOLAMIDE 10 MG/ML
1 SUSPENSION/ DROPS OPHTHALMIC
Qty: 3 | Refills: 3 | Status: ACTIVE | COMMUNITY
Start: 2025-01-02

## 2025-01-10 ENCOUNTER — APPOINTMENT (OUTPATIENT)
Dept: OTOLARYNGOLOGY | Facility: CLINIC | Age: 89
End: 2025-01-10
Payer: MEDICARE

## 2025-01-10 VITALS
HEART RATE: 72 BPM | OXYGEN SATURATION: 97 % | DIASTOLIC BLOOD PRESSURE: 78 MMHG | SYSTOLIC BLOOD PRESSURE: 134 MMHG | HEIGHT: 68 IN | WEIGHT: 152 LBS | BODY MASS INDEX: 23.04 KG/M2

## 2025-01-10 DIAGNOSIS — H90.3 SENSORINEURAL HEARING LOSS, BILATERAL: ICD-10-CM

## 2025-01-10 DIAGNOSIS — H61.23 IMPACTED CERUMEN, BILATERAL: ICD-10-CM

## 2025-01-10 DIAGNOSIS — Q18.1 PREAURICULAR SINUS AND CYST: ICD-10-CM

## 2025-01-10 DIAGNOSIS — H93.293 OTHER ABNORMAL AUDITORY PERCEPTIONS, BILATERAL: ICD-10-CM

## 2025-01-10 PROCEDURE — 92567 TYMPANOMETRY: CPT

## 2025-01-10 PROCEDURE — 92557 COMPREHENSIVE HEARING TEST: CPT

## 2025-01-10 PROCEDURE — 99203 OFFICE O/P NEW LOW 30 MIN: CPT | Mod: 25

## 2025-01-10 PROCEDURE — G0268 REMOVAL OF IMPACTED WAX MD: CPT

## 2025-01-23 RX ORDER — AMOXICILLIN AND CLAVULANATE POTASSIUM 875; 125 MG/1; MG/1
875-125 TABLET, COATED ORAL
Qty: 28 | Refills: 0 | Status: ACTIVE | COMMUNITY
Start: 2025-01-23 | End: 1900-01-01

## 2025-02-03 ENCOUNTER — APPOINTMENT (OUTPATIENT)
Dept: OTOLARYNGOLOGY | Facility: CLINIC | Age: 89
End: 2025-02-03
Payer: MEDICARE

## 2025-02-03 DIAGNOSIS — K11.8 OTHER DISEASES OF SALIVARY GLANDS: ICD-10-CM

## 2025-02-03 PROCEDURE — 31575 DIAGNOSTIC LARYNGOSCOPY: CPT

## 2025-02-03 PROCEDURE — 99214 OFFICE O/P EST MOD 30 MIN: CPT | Mod: 25

## 2025-02-03 RX ORDER — AMOXICILLIN AND CLAVULANATE POTASSIUM 875; 125 MG/1; MG/1
875-125 TABLET, COATED ORAL
Qty: 20 | Refills: 1 | Status: ACTIVE | COMMUNITY
Start: 2025-02-03 | End: 1900-01-01

## 2025-02-03 RX ORDER — AMOXICILLIN AND CLAVULANATE POTASSIUM 875; 125 MG/1; MG/1
875-125 TABLET, COATED ORAL
Qty: 10 | Refills: 0 | Status: DISCONTINUED | COMMUNITY
Start: 2025-02-03 | End: 2025-02-03

## 2025-02-05 NOTE — PATIENT PROFILE ADULT - NSPROEXTENSIONSOFSELF_GEN_A_NUR
Refills have been requested for the following medications:         oxyCODONE-acetaminophen (PERCOCET) 5-325 MG tablet [Ina Barillas]     Preferred pharmacy: [id number] - [place], [place] - [address] [place] [address] [facility] & [address] [place] [address]   none

## 2025-02-06 ENCOUNTER — APPOINTMENT (OUTPATIENT)
Dept: RADIATION ONCOLOGY | Facility: CLINIC | Age: 89
End: 2025-02-06

## 2025-02-06 ENCOUNTER — NON-APPOINTMENT (OUTPATIENT)
Age: 89
End: 2025-02-06

## 2025-02-06 VITALS — WEIGHT: 152 LBS | RESPIRATION RATE: 18 BRPM | BODY MASS INDEX: 23.04 KG/M2 | HEIGHT: 68 IN

## 2025-02-06 DIAGNOSIS — Z85.118 PERSONAL HISTORY OF OTHER MALIGNANT NEOPLASM OF BRONCHUS AND LUNG: ICD-10-CM

## 2025-02-06 DIAGNOSIS — Z86.79 PERSONAL HISTORY OF OTHER DISEASES OF THE CIRCULATORY SYSTEM: ICD-10-CM

## 2025-02-06 DIAGNOSIS — Z87.448 PERSONAL HISTORY OF OTHER DISEASES OF URINARY SYSTEM: ICD-10-CM

## 2025-02-06 DIAGNOSIS — Z80.0 FAMILY HISTORY OF MALIGNANT NEOPLASM OF DIGESTIVE ORGANS: ICD-10-CM

## 2025-02-06 DIAGNOSIS — Z86.69 PERSONAL HISTORY OF OTHER DISEASES OF THE NERVOUS SYSTEM AND SENSE ORGANS: ICD-10-CM

## 2025-02-06 DIAGNOSIS — I10 ESSENTIAL (PRIMARY) HYPERTENSION: ICD-10-CM

## 2025-02-06 DIAGNOSIS — E78.5 HYPERLIPIDEMIA, UNSPECIFIED: ICD-10-CM

## 2025-02-06 PROCEDURE — 99205 OFFICE O/P NEW HI 60 MIN: CPT

## 2025-02-10 ENCOUNTER — APPOINTMENT (OUTPATIENT)
Dept: CT IMAGING | Facility: CLINIC | Age: 89
End: 2025-02-10
Payer: MEDICARE

## 2025-02-10 PROCEDURE — 70491 CT SOFT TISSUE NECK W/DYE: CPT

## 2025-02-13 ENCOUNTER — INPATIENT (INPATIENT)
Facility: HOSPITAL | Age: 89
LOS: 5 days | Discharge: HOME | DRG: 392 | End: 2025-02-19
Attending: INTERNAL MEDICINE | Admitting: INTERNAL MEDICINE
Payer: MEDICARE

## 2025-02-13 VITALS
OXYGEN SATURATION: 98 % | SYSTOLIC BLOOD PRESSURE: 103 MMHG | RESPIRATION RATE: 16 BRPM | TEMPERATURE: 98 F | WEIGHT: 149.91 LBS | HEIGHT: 68 IN | HEART RATE: 84 BPM | DIASTOLIC BLOOD PRESSURE: 58 MMHG

## 2025-02-13 DIAGNOSIS — R19.7 DIARRHEA, UNSPECIFIED: ICD-10-CM

## 2025-02-13 DIAGNOSIS — Z98.890 OTHER SPECIFIED POSTPROCEDURAL STATES: Chronic | ICD-10-CM

## 2025-02-13 DIAGNOSIS — K52.9 NONINFECTIVE GASTROENTERITIS AND COLITIS, UNSPECIFIED: ICD-10-CM

## 2025-02-13 DIAGNOSIS — E03.9 HYPOTHYROIDISM, UNSPECIFIED: ICD-10-CM

## 2025-02-13 DIAGNOSIS — I10 ESSENTIAL (PRIMARY) HYPERTENSION: ICD-10-CM

## 2025-02-13 DIAGNOSIS — I48.91 UNSPECIFIED ATRIAL FIBRILLATION: ICD-10-CM

## 2025-02-13 DIAGNOSIS — Z87.438 PERSONAL HISTORY OF OTHER DISEASES OF MALE GENITAL ORGANS: ICD-10-CM

## 2025-02-13 DIAGNOSIS — Z29.9 ENCOUNTER FOR PROPHYLACTIC MEASURES, UNSPECIFIED: ICD-10-CM

## 2025-02-13 DIAGNOSIS — H60.01 ABSCESS OF RIGHT EXTERNAL EAR: ICD-10-CM

## 2025-02-13 DIAGNOSIS — Z98.42 CATARACT EXTRACTION STATUS, LEFT EYE: Chronic | ICD-10-CM

## 2025-02-13 DIAGNOSIS — E78.5 HYPERLIPIDEMIA, UNSPECIFIED: ICD-10-CM

## 2025-02-13 LAB
ALBUMIN SERPL ELPH-MCNC: 3.6 G/DL — SIGNIFICANT CHANGE UP (ref 3.5–5)
ALP SERPL-CCNC: 107 U/L — SIGNIFICANT CHANGE UP (ref 40–120)
ALT FLD-CCNC: 27 U/L DA — SIGNIFICANT CHANGE UP (ref 10–60)
ANION GAP SERPL CALC-SCNC: 2 MMOL/L — LOW (ref 5–17)
AST SERPL-CCNC: 24 U/L — SIGNIFICANT CHANGE UP (ref 10–40)
BASOPHILS # BLD AUTO: 0.01 K/UL — SIGNIFICANT CHANGE UP (ref 0–0.2)
BASOPHILS NFR BLD AUTO: 0.3 % — SIGNIFICANT CHANGE UP (ref 0–2)
BILIRUB SERPL-MCNC: 1.3 MG/DL — HIGH (ref 0.2–1.2)
BUN SERPL-MCNC: 26 MG/DL — HIGH (ref 7–18)
CALCIUM SERPL-MCNC: 9.1 MG/DL — SIGNIFICANT CHANGE UP (ref 8.4–10.5)
CHLORIDE SERPL-SCNC: 110 MMOL/L — HIGH (ref 96–108)
CO2 SERPL-SCNC: 24 MMOL/L — SIGNIFICANT CHANGE UP (ref 22–31)
CREAT SERPL-MCNC: 1.39 MG/DL — HIGH (ref 0.5–1.3)
EGFR: 47 ML/MIN/1.73M2 — LOW
EOSINOPHIL # BLD AUTO: 0.08 K/UL — SIGNIFICANT CHANGE UP (ref 0–0.5)
EOSINOPHIL NFR BLD AUTO: 2.2 % — SIGNIFICANT CHANGE UP (ref 0–6)
FLUAV AG NPH QL: SIGNIFICANT CHANGE UP
FLUBV AG NPH QL: SIGNIFICANT CHANGE UP
GLUCOSE SERPL-MCNC: 138 MG/DL — HIGH (ref 70–99)
HCT VFR BLD CALC: 39.4 % — SIGNIFICANT CHANGE UP (ref 39–50)
HGB BLD-MCNC: 13 G/DL — SIGNIFICANT CHANGE UP (ref 13–17)
IMM GRANULOCYTES NFR BLD AUTO: 0.3 % — SIGNIFICANT CHANGE UP (ref 0–0.9)
LACTATE SERPL-SCNC: 1.2 MMOL/L — SIGNIFICANT CHANGE UP (ref 0.7–2)
LIDOCAIN IGE QN: 17 U/L — SIGNIFICANT CHANGE UP (ref 13–75)
LYMPHOCYTES # BLD AUTO: 0.3 K/UL — LOW (ref 1–3.3)
LYMPHOCYTES # BLD AUTO: 8.4 % — LOW (ref 13–44)
MAGNESIUM SERPL-MCNC: 2 MG/DL — SIGNIFICANT CHANGE UP (ref 1.6–2.6)
MCHC RBC-ENTMCNC: 32.2 PG — SIGNIFICANT CHANGE UP (ref 27–34)
MCHC RBC-ENTMCNC: 33 G/DL — SIGNIFICANT CHANGE UP (ref 32–36)
MCV RBC AUTO: 97.5 FL — SIGNIFICANT CHANGE UP (ref 80–100)
MONOCYTES # BLD AUTO: 0.31 K/UL — SIGNIFICANT CHANGE UP (ref 0–0.9)
MONOCYTES NFR BLD AUTO: 8.7 % — SIGNIFICANT CHANGE UP (ref 2–14)
NEUTROPHILS # BLD AUTO: 2.87 K/UL — SIGNIFICANT CHANGE UP (ref 1.8–7.4)
NEUTROPHILS NFR BLD AUTO: 80.1 % — HIGH (ref 43–77)
NRBC BLD AUTO-RTO: 0 /100 WBCS — SIGNIFICANT CHANGE UP (ref 0–0)
PHOSPHATE SERPL-MCNC: 2.7 MG/DL — SIGNIFICANT CHANGE UP (ref 2.5–4.5)
PLATELET # BLD AUTO: 119 K/UL — LOW (ref 150–400)
POTASSIUM SERPL-MCNC: 5.3 MMOL/L — SIGNIFICANT CHANGE UP (ref 3.5–5.3)
POTASSIUM SERPL-SCNC: 5.3 MMOL/L — SIGNIFICANT CHANGE UP (ref 3.5–5.3)
PROT SERPL-MCNC: 7.2 G/DL — SIGNIFICANT CHANGE UP (ref 6–8.3)
RBC # BLD: 4.04 M/UL — LOW (ref 4.2–5.8)
RBC # FLD: 15.8 % — HIGH (ref 10.3–14.5)
RSV RNA NPH QL NAA+NON-PROBE: SIGNIFICANT CHANGE UP
SARS-COV-2 RNA SPEC QL NAA+PROBE: SIGNIFICANT CHANGE UP
SODIUM SERPL-SCNC: 136 MMOL/L — SIGNIFICANT CHANGE UP (ref 135–145)
WBC # BLD: 3.58 K/UL — LOW (ref 3.8–10.5)
WBC # FLD AUTO: 3.58 K/UL — LOW (ref 3.8–10.5)

## 2025-02-13 PROCEDURE — 99285 EMERGENCY DEPT VISIT HI MDM: CPT

## 2025-02-13 PROCEDURE — 74177 CT ABD & PELVIS W/CONTRAST: CPT | Mod: 26

## 2025-02-13 RX ORDER — LISINOPRIL 5 MG/1
2.5 TABLET ORAL DAILY
Refills: 0 | Status: DISCONTINUED | OUTPATIENT
Start: 2025-02-13 | End: 2025-02-13

## 2025-02-13 RX ORDER — FUROSEMIDE 10 MG/ML
20 INJECTION INTRAMUSCULAR; INTRAVENOUS DAILY
Refills: 0 | Status: DISCONTINUED | OUTPATIENT
Start: 2025-02-13 | End: 2025-02-13

## 2025-02-13 RX ORDER — RIVAROXABAN 10 MG/1
15 TABLET, FILM COATED ORAL DAILY
Refills: 0 | Status: DISCONTINUED | OUTPATIENT
Start: 2025-02-13 | End: 2025-02-19

## 2025-02-13 RX ORDER — DORZOLAMIDE 20 MG/ML
1 SOLUTION/ DROPS OPHTHALMIC EVERY 8 HOURS
Refills: 0 | Status: DISCONTINUED | OUTPATIENT
Start: 2025-02-14 | End: 2025-02-19

## 2025-02-13 RX ORDER — DIGOXIN 250 UG/1
125 TABLET ORAL
Refills: 0 | Status: DISCONTINUED | OUTPATIENT
Start: 2025-02-13 | End: 2025-02-14

## 2025-02-13 RX ORDER — MAGNESIUM, ALUMINUM HYDROXIDE 200-200 MG
30 TABLET,CHEWABLE ORAL EVERY 4 HOURS
Refills: 0 | Status: DISCONTINUED | OUTPATIENT
Start: 2025-02-13 | End: 2025-02-19

## 2025-02-13 RX ORDER — FINASTERIDE 1 MG/1
5 TABLET, FILM COATED ORAL DAILY
Refills: 0 | Status: DISCONTINUED | OUTPATIENT
Start: 2025-02-13 | End: 2025-02-19

## 2025-02-13 RX ORDER — ALBUTEROL SULFATE 2.5 MG/3ML
2 VIAL, NEBULIZER (ML) INHALATION EVERY 6 HOURS
Refills: 0 | Status: DISCONTINUED | OUTPATIENT
Start: 2025-02-13 | End: 2025-02-19

## 2025-02-13 RX ORDER — ONDANSETRON HCL/PF 4 MG/2 ML
4 VIAL (ML) INJECTION EVERY 8 HOURS
Refills: 0 | Status: DISCONTINUED | OUTPATIENT
Start: 2025-02-13 | End: 2025-02-19

## 2025-02-13 RX ORDER — ACETAMINOPHEN 500 MG/5ML
650 LIQUID (ML) ORAL EVERY 6 HOURS
Refills: 0 | Status: DISCONTINUED | OUTPATIENT
Start: 2025-02-13 | End: 2025-02-19

## 2025-02-13 RX ORDER — ONDANSETRON HCL/PF 4 MG/2 ML
4 VIAL (ML) INJECTION ONCE
Refills: 0 | Status: COMPLETED | OUTPATIENT
Start: 2025-02-13 | End: 2025-02-13

## 2025-02-13 RX ORDER — MELATONIN 5 MG
3 TABLET ORAL AT BEDTIME
Refills: 0 | Status: DISCONTINUED | OUTPATIENT
Start: 2025-02-13 | End: 2025-02-19

## 2025-02-13 RX ORDER — TAMSULOSIN HYDROCHLORIDE 0.4 MG/1
0.4 CAPSULE ORAL AT BEDTIME
Refills: 0 | Status: DISCONTINUED | OUTPATIENT
Start: 2025-02-13 | End: 2025-02-19

## 2025-02-13 RX ORDER — ATORVASTATIN CALCIUM 80 MG/1
10 TABLET, FILM COATED ORAL AT BEDTIME
Refills: 0 | Status: DISCONTINUED | OUTPATIENT
Start: 2025-02-13 | End: 2025-02-19

## 2025-02-13 RX ORDER — METOPROLOL SUCCINATE 50 MG/1
12.5 TABLET, EXTENDED RELEASE ORAL DAILY
Refills: 0 | Status: DISCONTINUED | OUTPATIENT
Start: 2025-02-13 | End: 2025-02-19

## 2025-02-13 RX ORDER — CYST/ALA/Q10/PHOS.SER/DHA/BROC 100-20-50
1 POWDER (GRAM) ORAL DAILY
Refills: 0 | Status: DISCONTINUED | OUTPATIENT
Start: 2025-02-13 | End: 2025-02-18

## 2025-02-13 RX ORDER — LEVOTHYROXINE SODIUM 300 MCG
25 TABLET ORAL DAILY
Refills: 0 | Status: DISCONTINUED | OUTPATIENT
Start: 2025-02-13 | End: 2025-02-19

## 2025-02-13 RX ORDER — DORZOLAMIDE 20 MG/ML
1 SOLUTION/ DROPS OPHTHALMIC ONCE
Refills: 0 | Status: COMPLETED | OUTPATIENT
Start: 2025-02-13 | End: 2025-02-13

## 2025-02-13 RX ORDER — DORZOLAMIDE 20 MG/ML
SOLUTION/ DROPS OPHTHALMIC
Refills: 0 | Status: DISCONTINUED | OUTPATIENT
Start: 2025-02-13 | End: 2025-02-19

## 2025-02-13 RX ADMIN — Medication 1000 MILLILITER(S): at 15:41

## 2025-02-13 RX ADMIN — Medication 1000 MILLILITER(S): at 14:41

## 2025-02-13 RX ADMIN — DORZOLAMIDE 1 DROP(S): 20 SOLUTION/ DROPS OPHTHALMIC at 22:53

## 2025-02-13 RX ADMIN — Medication 20 MILLIGRAM(S): at 14:41

## 2025-02-13 RX ADMIN — Medication 4 MILLIGRAM(S): at 14:41

## 2025-02-13 RX ADMIN — TAMSULOSIN HYDROCHLORIDE 0.4 MILLIGRAM(S): 0.4 CAPSULE ORAL at 21:18

## 2025-02-13 RX ADMIN — ATORVASTATIN CALCIUM 10 MILLIGRAM(S): 80 TABLET, FILM COATED ORAL at 21:18

## 2025-02-13 NOTE — ED PROVIDER NOTE - ATTENDING CONTRIBUTION TO CARE
I have personally performed a history and physical exam on this patient and personally directed the management of the patient.    Mr. Enriquez is a 89y male w/ a pmhx of lung cancer on Tagrisso,HLD, HTN, BPH, hypothyroidism, A-fib on Xarelto, right preauricular abscess presenting to the ED with one day of nausea, vomiting, diarrhea and fatigueSince this morning.  Vital signs stable belly soft no peritoneal signs.  – Will hydrate, antiemetics, check labs and rule out electrolyte abnormalities, GI PCR to eval for C. difficile, viral swab to eval for respiratory infection, reassess and continue to monitor.

## 2025-02-13 NOTE — H&P ADULT - PROBLEM SELECTOR PLAN 4
- pt takes rosuvastatin 20 mg at home  - c/w atorvastatin 40 mg   - f/u lipid profile  - Dash Diet - c/w atorvastatin conversion   - Dash Diet

## 2025-02-13 NOTE — PATIENT PROFILE ADULT - DIETITIAN.
dietitian/nutrition services
Additional Notes: Denies receiving the influenza vaccine.
Quality 110: Preventive Care And Screening: Influenza Immunization: Influenza Immunization not Administered for Documented Reasons.
Detail Level: Detailed

## 2025-02-13 NOTE — H&P ADULT - NSHPPHYSICALEXAM_GEN_ALL_CORE
PHYSICAL EXAM:  GENERAL: NAD, speaks in full sentences, no signs of respiratory distress  HEAD:  Atraumatic, Normocephalic, palpable R auricular pain   EYES: EOMI, PERRLA, conjunctiva and sclera clear  NECK: Supple, No JVD  CHEST/LUNG: Clear to auscultation bilaterally; No wheeze; No crackles; No accessory muscles used  HEART: Regular rate and rhythm; No murmurs;   ABDOMEN: Soft, Nontender, Nondistended; + Bowel sounds increased ; No guarding  EXTREMITIES:  2+ Peripheral Pulses, No cyanosis or edema  PSYCH: AAOx3  NEUROLOGY: non-focal  SKIN: No rashes or lesions, chronic skin changes over face and BLLE

## 2025-02-13 NOTE — ED ADULT TRIAGE NOTE - DIRECT TO ROOM CARE INITIATED:
- Stable  - Attends therapy which she finds helpful  - Offered to  increase Celexa to 40 mg daily but patient prefers to stay at 20 mg at this time    - Follow up with psychiatrist at the end of the month 13-Feb-2025 13:17

## 2025-02-13 NOTE — ED PROVIDER NOTE - PROGRESS NOTE DETAILS
Labs showing mild dehydration.  Vital signs stable resting comfortably.  CT showing enterocolitis.  Will admit to medicine for further management and evaluation.  Resting comfortably.

## 2025-02-13 NOTE — H&P ADULT - PROBLEM SELECTOR PLAN 7
Known hx of afib  Not in RVR    -cont home BB/CCB  -cont eliquis/xarelto/warfarin Known hx of afib  Not in RVR  On digoxin, lopressor     -cont home meds   -cont xarelto

## 2025-02-13 NOTE — H&P ADULT - PROBLEM SELECTOR PLAN 2
s/p recent hospitalizations for his preauricular abscess 12/14 and 12/28  that both resulted in drainage by OMFS at [facility], IV Unasyn transitioned to p.o. Augmentin, completed 8 days prior   reports continued pus from site     - IDx consulted

## 2025-02-13 NOTE — H&P ADULT - HISTORY OF PRESENT ILLNESS
Mr. Enriquez is a 89y male from home w / a PMHx of lung cancer on Tagrisso,HLD, HTN, BPH, hypothyroidism, A-fib on Xarelto, right preauricular abscess presenting to the ED with one day of nausea, vomiting, diarrhea and fatigue.   As per chart review , Pt has two recent hospitalizations for his preauricular abscess 12/14 and 12/28  that both resulted in drainage by OMFS at [facility], IV Unasyn transitioned to p.o. Augmentin, completed 8 days prior to today.     Upon waking up this morning at 4am he had 7 episodes of brown watery diarrhea. He tried to eat breakfast and take his medications but threw up shortly after eating. This episode was associated with chills, feeling feverish and generalized weakness. Denies chest pain, SOB, urinary symptoms     In the ER     /69, RR 18, , afebrile, sats 97% RA   Labs s/o WBC 3 - neutrophilic shift , plt 119, BUN/ Cr 26/ 1.39, flu panel neg   CTAP ? entercolitis   BCx collected   GI PCR pending collection    Patient endorses an eighth episode of watery diarrhea in the ER

## 2025-02-13 NOTE — ED ADULT NURSE NOTE - OBJECTIVE STATEMENT
89 yo male lying on bed c/o diarrhea associated with nausea and vomiting that started at around 4:00 AM today. Patient denies abdominal pain, fever, or chills at this time.

## 2025-02-13 NOTE — ED PROVIDER NOTE - CLINICAL SUMMARY MEDICAL DECISION MAKING FREE TEXT BOX
Mr. Enriquez is a 89y male w/ a pmhx of lung cancer on Tagrisso, HLD, HTN, BPH, hypothyroidism, A-fib on Xarelto, right preauricular abscess presenting to the ED with one day of nausea, vomiting, diarrhea and fatigue. VSS on presentation and physical exam was benign. Plan for IV hydration, zofran/pepcid for nausea, and cbc/cmp, rvp, gi pcr, to investigate diarrhea causes. We will reassess patient after medical intervention and labs result.

## 2025-02-13 NOTE — H&P ADULT - ASSESSMENT
Mr. Enriquez is a 89y male from home w / a PMHx of lung cancer on Tagrisso,HLD, HTN, BPH, hypothyroidism, A-fib on Xarelto, right preauricular abscess presenting to the ED with one day of nausea, vomiting, diarrhea and fatigue.   As per chart review , Pt has two recent hospitalizations for his preauricular abscess 12/14 and 12/28  that both resulted in drainage by OMFS at [facility], IV Unasyn transitioned to p.o. Augmentin, completed 8 days prior to today.     Upon waking up this morning at 4am he had 7 episodes of brown watery diarrhea. He tried to eat breakfast and take his medications but threw up shortly after eating. This episode was associated with chills, feeling feverish and generalized weakness. Denies chest pain, SOB, urinary symptoms     In the ER     /69, RR 18, , afebrile, sats 97% RA   Labs s/o WBC 3 - neutrophilic shift , plt 119, BUN/ Cr 26/ 1.39, flu panel neg   CTAP ? entercolitis   BCx collected   GI PCR pending collection    Patient endorses an eighth episode of watery diarrhea in the ER  Mr. Enriquez is a 89y male from home w / a PMHx of lung cancer on Tagrisso,HLD, HTN, BPH, hypothyroidism, A-fib on Xarelto, right preauricular abscess presenting to the ED with one day of nausea, vomiting, diarrhea and fatigue. Admitted to medicine for watery diarrhea, enterocolitis on CT scan , on contact isolation for C diff r/o

## 2025-02-13 NOTE — H&P ADULT - NSHPREVIEWOFSYSTEMS_GEN_ALL_CORE
- CONSTITUTIONAL: Denies fever and chills  - HEENT: Denies changes in vision and hearing.  - RESPIRATORY: Denies SOB and cough.  - CV: Denies chest pain and palpitations  - GI: + diarrhea - watery   - : Denies dysuria and urinary frequency.  - SKIN: Denies rash and pruritus.  - NEUROLOGICAL: Denies headache and syncope.  - PSYCHIATRIC: Denies recent changes in mood. Denies anxiety and depression.

## 2025-02-13 NOTE — ED ADULT NURSE NOTE - NSFALLRISKINTERV_ED_ALL_ED

## 2025-02-13 NOTE — PATIENT PROFILE ADULT - FALL HARM RISK - HARM RISK INTERVENTIONS

## 2025-02-13 NOTE — ED ADULT NURSE NOTE - NSFALLHARMRISKINTERV_ED_ALL_ED

## 2025-02-13 NOTE — ED PROVIDER NOTE - OBJECTIVE STATEMENT
Mr. Enriquez is a 89y male w/ a pmhx of lung cancer on Tagrisso,  HLD, HTN, BPH, hypothyroidism, A-fib on Xarelto, right preauricular abscess presenting to the ED with one day of nausea, vomiting, diarrhea and fatigue. Pt has two recent hospitalizations for his preauricular abscess 12/14 and 12/28  that both resulted in drainage by OMFS at [facility], IV Unasyn transitioned to p.o. Augmentin, completed 8 days prior to today. Upon waking up this morning at 4am he had 7 episodes of brown watery diarrhea. He tried to eat breakfast and take his medications but threw up shortly after eating. This episode was associated with chills, feeling feverish and generalized weakness. Mr. Enriquez is a 89y male w/ a pmhx of lung cancer on Tagrisso,HLD, HTN, BPH, hypothyroidism, A-fib on Xarelto, right preauricular abscess presenting to the ED with one day of nausea, vomiting, diarrhea and fatigue. Pt has two recent hospitalizations for his preauricular abscess 12/14 and 12/28  that both resulted in drainage by OMFS at [facility], IV Unasyn transitioned to p.o. Augmentin, completed 8 days prior to today. Upon waking up this morning at 4am he had 7 episodes of brown watery diarrhea. He tried to eat breakfast and take his medications but threw up shortly after eating. This episode was associated with chills, feeling feverish and generalized weakness. Denies any other past medical history, allergies, daily alcohol use, smoke, IV drug use.

## 2025-02-13 NOTE — ED PROVIDER NOTE - PHYSICAL EXAMINATION
CONSTITUTIONAL: Well groomed, no apparent distress  EYES: PERRLA and symmetric, EOMI, No conjunctival or scleral injection, non-icteric  ENMT: Oral mucosa with moist membranes.  RESP: No respiratory distress, no use of accessory muscles; CTA b/l, no WRR  CV: RRR, +S1S2, no MRG; no JVD; 1+ b/l peripheral edema  GI: Soft, NT, ND, no rebound, no guarding  MSK:  Normal ROM without pain, normal muscle strength/tone  NEURO: CN II-XII intact; sensation intact in upper and lower extremities b/l to light touch   PSYCH: Appropriate insight/judgment; A+O x 3

## 2025-02-13 NOTE — H&P ADULT - PROBLEM SELECTOR PLAN 1
Hx of watery diarrhea     f/u GI PCR  c diff r/o contact isolation Hx of watery diarrhea 8 episodes   /69, RR 18, , afebrile, sats 97% RA   Labs s/o WBC 3 - neutrophilic shift , plt 119, BUN/ Cr 26/ 1.39, flu panel neg   CTAP ? entercolitis   BCx collected   patient appears euvolemic  Hold lasix for now   f/u GI PCR  c diff r/o contact isolation  IDx consulted Hx of watery diarrhea 8 episodes   /69, RR 18, , afebrile, sats 97% RA   Labs s/o WBC 3 - neutrophilic shift , plt 119, BUN/ Cr 26/ 1.39, flu panel neg   CTAP ? entercolitis   BCx collected   patient appears euvolemic  Hold lasix for now   f/u GI PCR  c diff r/o contact isolation  IDx consulted Dr. Miller

## 2025-02-13 NOTE — H&P ADULT - PROBLEM SELECTOR PLAN 3
h/o HTN on  Monitor BP  c/w home meds -  Lower MAP 20-25% in next 2-4 hrs h/o HTN   Monitor BP  c/w home meds w/ holding parameter

## 2025-02-14 ENCOUNTER — NON-APPOINTMENT (OUTPATIENT)
Age: 89
End: 2025-02-14

## 2025-02-14 ENCOUNTER — TRANSCRIPTION ENCOUNTER (OUTPATIENT)
Age: 89
End: 2025-02-14

## 2025-02-14 DIAGNOSIS — Z75.8 OTHER PROBLEMS RELATED TO MEDICAL FACILITIES AND OTHER HEALTH CARE: ICD-10-CM

## 2025-02-14 DIAGNOSIS — K52.9 NONINFECTIVE GASTROENTERITIS AND COLITIS, UNSPECIFIED: ICD-10-CM

## 2025-02-14 LAB
-  STAPHYLOCOCCUS EPIDERMIDIS: SIGNIFICANT CHANGE UP
ALBUMIN SERPL ELPH-MCNC: 2.8 G/DL — LOW (ref 3.5–5)
ALP SERPL-CCNC: 77 U/L — SIGNIFICANT CHANGE UP (ref 40–120)
ALT FLD-CCNC: 22 U/L DA — SIGNIFICANT CHANGE UP (ref 10–60)
ANION GAP SERPL CALC-SCNC: 6 MMOL/L — SIGNIFICANT CHANGE UP (ref 5–17)
ANISOCYTOSIS BLD QL: SLIGHT — SIGNIFICANT CHANGE UP
AST SERPL-CCNC: 20 U/L — SIGNIFICANT CHANGE UP (ref 10–40)
BASOPHILS # BLD AUTO: 0 K/UL — SIGNIFICANT CHANGE UP (ref 0–0.2)
BASOPHILS NFR BLD AUTO: 0 % — SIGNIFICANT CHANGE UP (ref 0–2)
BILIRUB SERPL-MCNC: 1 MG/DL — SIGNIFICANT CHANGE UP (ref 0.2–1.2)
BUN SERPL-MCNC: 26 MG/DL — HIGH (ref 7–18)
C DIFF GDH STL QL: NEGATIVE — SIGNIFICANT CHANGE UP
C DIFF GDH STL QL: SIGNIFICANT CHANGE UP
CALCIUM SERPL-MCNC: 8.1 MG/DL — LOW (ref 8.4–10.5)
CHLORIDE SERPL-SCNC: 110 MMOL/L — HIGH (ref 96–108)
CO2 SERPL-SCNC: 22 MMOL/L — SIGNIFICANT CHANGE UP (ref 22–31)
CREAT SERPL-MCNC: 1.21 MG/DL — SIGNIFICANT CHANGE UP (ref 0.5–1.3)
EGFR: 56 ML/MIN/1.73M2 — LOW
ELLIPTOCYTES BLD QL SMEAR: SLIGHT — SIGNIFICANT CHANGE UP
EOSINOPHIL # BLD AUTO: 0.07 K/UL — SIGNIFICANT CHANGE UP (ref 0–0.5)
EOSINOPHIL NFR BLD AUTO: 3.8 % — SIGNIFICANT CHANGE UP (ref 0–6)
GLUCOSE SERPL-MCNC: 96 MG/DL — SIGNIFICANT CHANGE UP (ref 70–99)
GRAM STN FLD: ABNORMAL
HCT VFR BLD CALC: 31.9 % — LOW (ref 39–50)
HGB BLD-MCNC: 10.7 G/DL — LOW (ref 13–17)
IMM GRANULOCYTES NFR BLD AUTO: 0.5 % — SIGNIFICANT CHANGE UP (ref 0–0.9)
LYMPHOCYTES # BLD AUTO: 0.4 K/UL — LOW (ref 1–3.3)
LYMPHOCYTES # BLD AUTO: 21.6 % — SIGNIFICANT CHANGE UP (ref 13–44)
MAGNESIUM SERPL-MCNC: 2 MG/DL — SIGNIFICANT CHANGE UP (ref 1.6–2.6)
MANUAL SMEAR VERIFICATION: SIGNIFICANT CHANGE UP
MCHC RBC-ENTMCNC: 32.1 PG — SIGNIFICANT CHANGE UP (ref 27–34)
MCHC RBC-ENTMCNC: 33.5 G/DL — SIGNIFICANT CHANGE UP (ref 32–36)
MCV RBC AUTO: 95.8 FL — SIGNIFICANT CHANGE UP (ref 80–100)
METHOD TYPE: SIGNIFICANT CHANGE UP
MONOCYTES # BLD AUTO: 0.28 K/UL — SIGNIFICANT CHANGE UP (ref 0–0.9)
MONOCYTES NFR BLD AUTO: 15.1 % — HIGH (ref 2–14)
MRSA PCR RESULT.: SIGNIFICANT CHANGE UP
NEUTROPHILS # BLD AUTO: 1.09 K/UL — LOW (ref 1.8–7.4)
NEUTROPHILS NFR BLD AUTO: 59 % — SIGNIFICANT CHANGE UP (ref 43–77)
NRBC BLD AUTO-RTO: 0 /100 WBCS — SIGNIFICANT CHANGE UP (ref 0–0)
OVALOCYTES BLD QL SMEAR: SLIGHT — SIGNIFICANT CHANGE UP
PHOSPHATE SERPL-MCNC: 2.6 MG/DL — SIGNIFICANT CHANGE UP (ref 2.5–4.5)
PLAT MORPH BLD: NORMAL — SIGNIFICANT CHANGE UP
PLATELET # BLD AUTO: 98 K/UL — LOW (ref 150–400)
PLATELET COUNT - ESTIMATE: ABNORMAL
POIKILOCYTOSIS BLD QL AUTO: SLIGHT — SIGNIFICANT CHANGE UP
POTASSIUM SERPL-MCNC: 4 MMOL/L — SIGNIFICANT CHANGE UP (ref 3.5–5.3)
POTASSIUM SERPL-SCNC: 4 MMOL/L — SIGNIFICANT CHANGE UP (ref 3.5–5.3)
PROT SERPL-MCNC: 5.7 G/DL — LOW (ref 6–8.3)
RBC # BLD: 3.33 M/UL — LOW (ref 4.2–5.8)
RBC # FLD: 15.9 % — HIGH (ref 10.3–14.5)
RBC BLD AUTO: ABNORMAL
S AUREUS DNA NOSE QL NAA+PROBE: SIGNIFICANT CHANGE UP
SODIUM SERPL-SCNC: 138 MMOL/L — SIGNIFICANT CHANGE UP (ref 135–145)
SPECIMEN SOURCE: SIGNIFICANT CHANGE UP
WBC # BLD: 1.85 K/UL — LOW (ref 3.8–10.5)
WBC # FLD AUTO: 1.85 K/UL — LOW (ref 3.8–10.5)

## 2025-02-14 RX ORDER — DIGOXIN 250 UG/1
125 TABLET ORAL
Refills: 0 | Status: DISCONTINUED | OUTPATIENT
Start: 2025-02-14 | End: 2025-02-19

## 2025-02-14 RX ORDER — CEFTRIAXONE 500 MG/1
1000 INJECTION, POWDER, FOR SOLUTION INTRAMUSCULAR; INTRAVENOUS EVERY 24 HOURS
Refills: 0 | Status: DISCONTINUED | OUTPATIENT
Start: 2025-02-14 | End: 2025-02-15

## 2025-02-14 RX ADMIN — Medication 25 MICROGRAM(S): at 06:40

## 2025-02-14 RX ADMIN — ATORVASTATIN CALCIUM 10 MILLIGRAM(S): 80 TABLET, FILM COATED ORAL at 21:28

## 2025-02-14 RX ADMIN — Medication 50 MILLILITER(S): at 07:22

## 2025-02-14 RX ADMIN — DIGOXIN 125 MICROGRAM(S): 250 TABLET ORAL at 11:14

## 2025-02-14 RX ADMIN — Medication 500 MILLILITER(S): at 06:40

## 2025-02-14 RX ADMIN — Medication 3 MILLIGRAM(S): at 21:28

## 2025-02-14 RX ADMIN — FINASTERIDE 5 MILLIGRAM(S): 1 TABLET, FILM COATED ORAL at 11:22

## 2025-02-14 RX ADMIN — DORZOLAMIDE 1 DROP(S): 20 SOLUTION/ DROPS OPHTHALMIC at 15:01

## 2025-02-14 RX ADMIN — TAMSULOSIN HYDROCHLORIDE 0.4 MILLIGRAM(S): 0.4 CAPSULE ORAL at 21:27

## 2025-02-14 RX ADMIN — DORZOLAMIDE 1 DROP(S): 20 SOLUTION/ DROPS OPHTHALMIC at 21:28

## 2025-02-14 RX ADMIN — RIVAROXABAN 15 MILLIGRAM(S): 10 TABLET, FILM COATED ORAL at 11:14

## 2025-02-14 RX ADMIN — DORZOLAMIDE 1 DROP(S): 20 SOLUTION/ DROPS OPHTHALMIC at 06:40

## 2025-02-14 RX ADMIN — Medication 1 TABLET(S): at 11:14

## 2025-02-14 NOTE — DISCHARGE NOTE PROVIDER - NSDCFUSCHEDAPPT_GEN_ALL_CORE_FT
[facility]  [phone]   Scheduled Appointment: 04/22/2025    [facility]  [address] [facility]  Scheduled Appointment: 05/05/2025     [facility]  [address]  Scheduled Appointment: 02/21/2025    [facility]  [address]   Scheduled Appointment: 02/25/2025    [facility]  [address] [facility]  Scheduled Appointment: 02/25/2025    [facility]  [address] [facility]  Scheduled Appointment: 03/10/2025

## 2025-02-14 NOTE — CONSULT NOTE ADULT - NEGATIVE HEMATOLOGY SYMPTOMS
Immediate Brief Procedure Note    Patient: Mark Anthony Urrutia    Pre-op Diagnosis: SIMEON     Post-op Diagnosis: Same    Procedure:   CT right renal biospy    Proceduralist: PRIYANKA Velázquez DO    Assistants: none    Anesthesia Staff: CRNA: Chloe Baig CRNA  Anesthesiologist: Shelton Lara MD    Anesthesia Type: MAC    Findings: path pend    Estimated Blood Loss: 3cc    Complications: none    Specimens Removed: 2, 18g cores  
no gum bleeding/no nose bleeding/no skin lumps

## 2025-02-14 NOTE — PROGRESS NOTE ADULT - SUBJECTIVE AND OBJECTIVE BOX
NP Note discussed with  Primary Attending    Patient is a 89y old  Male who presents with a chief complaint of Diarrhea, enterocolitis (14 Feb 2025 11:27)      INTERVAL HPI/OVERNIGHT EVENTS: no new complaints    MEDICATIONS  (STANDING):  atorvastatin 10 milliGRAM(s) Oral at bedtime  digoxin     Tablet 125 MICROGram(s) Oral <User Schedule>  dorzolamide 2% Ophthalmic Solution      dorzolamide 2% Ophthalmic Solution 1 Drop(s) Both EYES every 8 hours  finasteride 5 milliGRAM(s) Oral daily  levothyroxine 25 MICROGram(s) Oral daily  metoprolol succinate ER 12.5 milliGRAM(s) Oral daily  multivitamin/minerals 1 Tablet(s) Oral daily  rivaroxaban 15 milliGRAM(s) Oral daily  sodium chloride 0.9%. 250 milliLiter(s) (500 mL/Hr) IV Continuous <Continuous>  sodium chloride 0.9%. 1000 milliLiter(s) (50 mL/Hr) IV Continuous <Continuous>  tamsulosin 0.4 milliGRAM(s) Oral at bedtime    MEDICATIONS  (PRN):  acetaminophen     Tablet .. 650 milliGRAM(s) Oral every 6 hours PRN Temp greater or equal to 38C (100.4F), Mild Pain (1 - 3)  albuterol    90 MICROgram(s) HFA Inhaler 2 Puff(s) Inhalation every 6 hours PRN Bronchospasm  aluminum hydroxide/magnesium hydroxide/simethicone Suspension 30 milliLiter(s) Oral every 4 hours PRN Dyspepsia  melatonin 3 milliGRAM(s) Oral at bedtime PRN Insomnia  ondansetron Injectable 4 milliGRAM(s) IV Push every 8 hours PRN Nausea and/or Vomiting      __________________________________________________  REVIEW OF SYSTEMS:    CONSTITUTIONAL: No fever,   EYES: no acute visual disturbances  NECK: No pain or stiffness  RESPIRATORY: No cough; No shortness of breath  CARDIOVASCULAR: No chest pain, no palpitations  GASTROINTESTINAL: No pain. No nausea or vomiting; No diarrhea   NEUROLOGICAL: No headache or numbness, no tremors  MUSCULOSKELETAL: No joint pain, no muscle pain  GENITOURINARY: no dysuria, no frequency, no hesitancy  PSYCHIATRY: no depression , no anxiety  ALL OTHER  ROS negative        Vital Signs Last 24 Hrs  T(C): 36.5 (14 Feb 2025 05:17), Max: 37.5 (13 Feb 2025 21:10)  T(F): 97.7 (14 Feb 2025 05:17), Max: 99.5 (13 Feb 2025 21:10)  HR: 78 (14 Feb 2025 07:26) (70 - 114)  BP: 91/44 (14 Feb 2025 07:26) (81/54 - 124/60)  BP(mean): 65 (14 Feb 2025 07:26) (63 - 65)  RR: 18 (14 Feb 2025 05:17) (16 - 19)  SpO2: 97% (14 Feb 2025 05:17) (97% - 98%)    Parameters below as of 14 Feb 2025 05:17  Patient On (Oxygen Delivery Method): room air        ________________________________________________  PHYSICAL EXAM:  GENERAL: NAD  HEENT: Normocephalic;  conjunctivae and sclerae clear; moist mucous membranes;   NECK : supple  CHEST/LUNG: Clear to auscultation bilaterally with good air entry   HEART: S1 S2  regular; no murmurs, gallops or rubs  ABDOMEN: Soft, Nontender, Nondistended; Bowel sounds present  EXTREMITIES: no cyanosis; no edema; no calf tenderness  SKIN: warm and dry; no rash  NERVOUS SYSTEM:  Awake and alert; Oriented  to place, person and time ; no new deficits    _________________________________________________  LABS:                        10.7   1.85  )-----------( 98       ( 14 Feb 2025 05:43 )             31.9     02-14    138  |  110[H]  |  26[H]  ----------------------------<  96  4.0   |  22  |  1.21    Ca    8.1[L]      14 Feb 2025 05:43  Phos  2.6     02-14  Mg     2.0     02-14    TPro  5.7[L]  /  Alb  2.8[L]  /  TBili  1.0  /  DBili  x   /  AST  20  /  ALT  22  /  AlkPhos  77  02-14      Urinalysis Basic - ( 14 Feb 2025 05:43 )    Color: x / Appearance: x / SG: x / pH: x  Gluc: 96 mg/dL / Ketone: x  / Bili: x / Urobili: x   Blood: x / Protein: x / Nitrite: x   Leuk Esterase: x / RBC: x / WBC x   Sq Epi: x / Non Sq Epi: x / Bacteria: x      CAPILLARY BLOOD GLUCOSE            RADIOLOGY & ADDITIONAL TESTS:  < from: CT Abdomen and Pelvis w/ IV Cont (02.13.25 @ 16:57) >    ACC: [id number] EXAM:  CT ABDOMEN AND PELVIS IC   ORDERED BY: JOURDAN POP     PROCEDURE DATE:  02/13/2025          INTERPRETATION:  CLINICAL INFORMATION: Rule out colitis.    COMPARISON: None.    CONTRAST/COMPLICATIONS:  IV Contrast: Omnipaque 350  90cc administered   10 cc discarded  Oral Contrast: NONE  .    PROCEDURE:  CT of the Abdomen and Pelvis was performed.  Sagittal and coronal reformats were performed.    FINDINGS:  LOWER CHEST: Within normal limits.    LIVER: Within normal limits.  BILE DUCTS: Normal caliber.  GALLBLADDER: Within normal limits.  SPLEEN: Within normal limits.  PANCREAS: Within normal limits.  ADRENALS: Bilateral adrenal myelolipoma is, measuring up to 1.9 cm on the   left.  KIDNEYS/URETERS: No hydronephrosis. Hypodense renal foci are too small to   characterize.    BLADDER: Within normal limits.  REPRODUCTIVE ORGANS: Mildly enlarged prostate.    BOWEL: No bowel obstruction.. Small and large bowel are diffusely   fluid-filled. Several segments of small bowel and colon appear mildly   wall thickened.  PERITONEUM/RETROPERITONEUM: Within normal limits.  VESSELS: Atherosclerotic change.  LYMPH NODES: No lymphadenopathy.  ABDOMINAL WALL: Within normal limits.  BONES: Degenerative changes of the spine. Chronic appearing compression   fracture deformity of L5.    IMPRESSION:  Suspect enterocolitis.      --- End of Report ---             DANISH TIWARI DO; Attending Radiologist  This document has been electronically signed. Feb 13 2025  5:13PM    < end of copied text >    Imaging Personally Reviewed:  YES    Consultant(s) Notes Reviewed:   YES    Plan of care was discussed with patient and /or primary care giver; all questions and concerns were addressed and care was aligned with patient's wishes.

## 2025-02-14 NOTE — DISCHARGE NOTE PROVIDER - HOSPITAL COURSE
Mr. Enriquez is a 89y male from home w / a PMHx of lung cancer on Tagrisso, HLD, HTN, BPH, hypothyroidism, A-fib on Xarelto, right preauricular abscess presenting to the ED with one day of nausea, vomiting, diarrhea and fatigue.   Admitted to medicine for enterocolitis   C.diff NEG   GI PCR and Bcx show ++++++++++  Cardiology consulted pt with hx afib w hypotension rec's +++++++++++++  ID consulted with rec's ++++++++++++++    INCOMPLETE +++++++  Mr. Enriquez is a 89y male from home w / a PMHx of lung cancer on Tagrisso, HLD, HTN, BPH, hypothyroidism, A-fib on Xarelto, right preauricular abscess presenting to the ED with one day of nausea, vomiting, diarrhea and fatigue.   Admitted to medicine for enterocolitis, C.diff negative, found to have norovirus. ID and GI consulted. Blood culture positive staph epi, likely contaminant. Repeat culture NGTD. Pt off abx.   CT No evidence of an abscess in the right periauricular region.  Cardiology also consulted for hx afib w hypotension, lasix held.   Symptoms resolved and pt stable for discharge home.     Mr. Enriquez is a 89y male from home w / a PMHx of lung cancer on Tagrisso, HLD, HTN, BPH, hypothyroidism, A-fib on Xarelto, right preauricular abscess presenting to the ED with one day of nausea, vomiting, diarrhea and fatigue.   Admitted to medicine for enterocolitis, C.diff negative, found to have norovirus. ID and GI consulted. Blood culture positive staph epi, likely contaminant. Repeat culture NGTD. Pt off abx.   CT No evidence of an abscess in the right periauricular region.  Cardiology also consulted for hx afib w hypotension, lasix held. Pt takes midodrine per PCP, restarted on 10 mg daily. BP stable. PT recc home PT.  Symptoms resolved and pt stable for discharge home.

## 2025-02-14 NOTE — CONSULT NOTE ADULT - ASSESSMENT
89y male from home w / a PMHx of lung cancer on Tagrisso,HLD, HTN, BPH, hypothyroidism, Chronic A-fib on Xarelto,Chronic systolic HF,Hypotension,s/p  right preauricular abscess presenting to the ED with one day of nausea, vomiting, diarrhea and fatigue,enterocolitis,HONG.   1.Enterocolitis-stool cx for cdiff-.  2.Chronic afib-xarelto,lopressor,dig.  3.Chronisc sytolic HF-b blocker resume,low dose ace.Hold lasix.  4.HONG-resolved.  5.Hypothyroid-synthroid.  6.BPH-proscar.  7.PPI.

## 2025-02-14 NOTE — PROGRESS NOTE ADULT - ASSESSMENT
Mr. Enriquez is a 89y male from home w / a PMHx of lung cancer on Tagrisso,HLD, HTN, BPH, hypothyroidism, A-fib on Xarelto, right preauricular abscess presenting to the ED with one day of nausea, vomiting, diarrhea and fatigue. Admitted to medicine for watery diarrhea, enterocolitis on CT scan , on contact isolation for C diff r/o   Mr. Enriquez is a 89y male from home w / a PMHx of lung cancer on Tagrisso, HLD, HTN, BPH, hypothyroidism, A-fib on Xarelto, right preauricular abscess presenting to the ED with one day of nausea, vomiting, diarrhea and fatigue.   Admitted to medicine for enterocolitis   C.diff NEG   f/u GI PCR and Bcx   Cardiology consulted pt with hx afib w hypotension   ID following

## 2025-02-14 NOTE — PROGRESS NOTE ADULT - PROBLEM SELECTOR PLAN 3
h/o HTN   Monitor BP  c/w home meds w/ holding parameter h/o HTN   noted hypotension   Monitor BP  hold BP meds   Cardiology Dr Dominique consulted

## 2025-02-14 NOTE — PROGRESS NOTE ADULT - PROBLEM SELECTOR PLAN 7
Known hx of afib  Not in RVR  On digoxin, lopressor     -cont home meds   -cont xarelto Known hx of afib  Not in RVR  On digoxin 125mcg , lopressor , Xarelto 15mg     -cont home meds   -cont xarelto    Cards Dr Dominique Consulted

## 2025-02-14 NOTE — DISCHARGE NOTE PROVIDER - CARE PROVIDERS DIRECT ADDRESSES
[email or web address]

## 2025-02-14 NOTE — PROGRESS NOTE ADULT - PROBLEM SELECTOR PLAN 2
Cm contacted Suyapa of [facility] [phone] to inform of patients DC. Per Suyapa, info received agency will FU with patient post DC.   s/p recent hospitalizations for his preauricular abscess 12/14 and 12/28  that both resulted in drainage by OMFS at [facility], IV Unasyn transitioned to p.o. Augmentin, completed 8 days prior   reports continued pus from site     - IDx consulted -s/p recent hospitalizations for his preauricular abscess 12/14 and 12/28  that both resulted in drainage by OMFS at [facility], IV Unasyn transitioned to p.o. Augmentin, completed 8 days prior   reports continued pus from site   - Pt reports drainage from R Periauricular area this morning, on assessment no drainage, redness noted, No pain reported.    - ID following

## 2025-02-14 NOTE — CHART NOTE - NSCHARTNOTEFT_GEN_A_CORE
EVENT: BP 81/54 MAP 63 mmHg    BRIEF HPI: 89y male from home w / a PMH of lung cancer on Tagrisso,HLD, HTN, BPH, hypothyroidism, A-fib on Xarelto, right preauricular abscess presenting to the ED with one day of nausea, vomiting, diarrhea and fatigue. Admitted to medicine for watery diarrhea, enterocolitis on CT scan , on contact isolation for C diff r/o. Nurse reporting incontinence X 1 overnight. unable to collect stool specimen.    Vital Signs Last 24 Hrs  T(C): 36.5 (14 Feb 2025 05:17), Max: 37.5 (13 Feb 2025 21:10)  T(F): 97.7 (14 Feb 2025 05:17), Max: 99.5 (13 Feb 2025 21:10)  HR: 70 (14 Feb 2025 06:26) (70 - 114)  BP: 81/54 (14 Feb 2025 06:26) (81/54 - 124/60)  BP(mean): 63 (14 Feb 2025 06:26) (63 - 63)  RR: 18 (14 Feb 2025 05:17) (16 - 19)  SpO2: 97% (14 Feb 2025 05:17) (97% - 98%)    Parameters below as of 14 Feb 2025 05:17  Patient On (Oxygen Delivery Method): room air    PLAN  Sodium chloride 0.9%. 250 milliliter(s) (500 mL/Hr) IV Continuous   Cont Diet, Regular: DASH/ TLC {Sodium & Cholesterol Restricted} (02-13-25 @ 19:35) [Active] EVENT: BP 81/54 MAP 63 mmHg    BRIEF HPI: 89y male from home w / a PMH of lung cancer on Tagrisso, HDL, HTN, BPH, hypothyroidism, A-fib on Xarelto, right preauricular abscess presenting to the ED with one day of nausea, vomiting, diarrhea and fatigue. Admitted to medicine for watery diarrhea, enterocolitis on CT scan , on contact isolation for C diff r/o. Nurse reporting incontinence X 1 overnight. unable to collect stool specimen.    Vital Signs Last 24 Hrs  T(C): 36.5 (14 Feb 2025 05:17), Max: 37.5 (13 Feb 2025 21:10)  T(F): 97.7 (14 Feb 2025 05:17), Max: 99.5 (13 Feb 2025 21:10)  HR: 70 (14 Feb 2025 06:26) (70 - 114)  BP: 81/54 (14 Feb 2025 06:26) (81/54 - 124/60)  BP(mean): 63 (14 Feb 2025 06:26) (63 - 63)  RR: 18 (14 Feb 2025 05:17) (16 - 19)  SpO2: 97% (14 Feb 2025 05:17) (97% - 98%)    Parameters below as of 14 Feb 2025 05:17  Patient On (Oxygen Delivery Method): room air    PLAN  Sodium chloride 0.9%. 250 milliliter(s) (500 mL/Hr) IV Continuous   Sodium chloride 0.9%. 1000 milliliter(s) (50 mL/Hr) IV Continuous X 2 dys  Cont Diet, Regular: DASH/ TLC {Sodium & Cholesterol Restricted} (02-13-25 @ 19:35) [Active]    FOLLOW UP: BP trend

## 2025-02-14 NOTE — CONSULT NOTE ADULT - SUBJECTIVE AND OBJECTIVE BOX
HPI:   Mr. Enriquez is a 89y male from home w / a PMHx of lung cancer on Tagrisso,HLD, HTN, BPH, hypothyroidism, A-fib on Xarelto, right preauricular abscess presenting to the ED with one day of nausea, vomiting, diarrhea and fatigue.   As per chart review , Pt has two recent hospitalizations for his preauricular abscess 12/14 and 12/28  that both resulted in drainage by OMFS at [facility], IV Unasyn transitioned to p.o. Augmentin, completed 8 days prior to today.    Upon waking up this morning at 4am he had 7 episodes of brown watery diarrhea. He tried to eat breakfast and take his medications but threw up shortly after eating. This episode was associated with chills, feeling feverish and generalized weakness. Denies chest pain, SOB, urinary symptoms   In the ER     /69, RR 18, , afebrile, sats 97% RA   Labs s/o WBC 3 - neutrophilic shift , plt 119, BUN/ Cr 26/ 1.39, flu panel neg   CTAP ? entercolitis   BCx collected   GI PCR pending collection    Patient endorses an eighth episode of watery diarrhea in the ER (13 Feb 2025 19:36)    PAST MEDICAL & SURGICAL HISTORY:  Essential hypertension    Hyperlipidemia, unspecified hyperlipidemia type    BPH without obstruction/lower urinary tract symptoms    Glaucoma (increased eye pressure)  both eyes    Atrial fibrillation    H/O hemicolectomy  1973    Cataract extraction status of left eye  w/lens implant - 2014    History of lung biopsy  2016 - negative results    ALLERGIES: No Known Allergies    MEDS:  acetaminophen     Tablet .. 650 milliGRAM(s) Oral every 6 hours PRN  albuterol    90 MICROgram(s) HFA Inhaler 2 Puff(s) Inhalation every 6 hours PRN  aluminum hydroxide/magnesium hydroxide/simethicone Suspension 30 milliLiter(s) Oral every 4 hours PRN  atorvastatin 10 milliGRAM(s) Oral at bedtime  digoxin     Tablet 125 MICROGram(s) Oral <User Schedule>  dorzolamide 2% Ophthalmic Solution      dorzolamide 2% Ophthalmic Solution 1 Drop(s) Both EYES every 8 hours  finasteride 5 milliGRAM(s) Oral daily  levothyroxine 25 MICROGram(s) Oral daily  melatonin 3 milliGRAM(s) Oral at bedtime PRN  metoprolol succinate ER 12.5 milliGRAM(s) Oral daily  multivitamin/minerals 1 Tablet(s) Oral daily  ondansetron Injectable 4 milliGRAM(s) IV Push every 8 hours PRN  rivaroxaban 15 milliGRAM(s) Oral daily  sodium chloride 0.9%. 250 milliLiter(s) IV Continuous <Continuous>  sodium chloride 0.9%. 1000 milliLiter(s) IV Continuous <Continuous>  tamsulosin 0.4 milliGRAM(s) Oral at bedtime    SOCIAL HISTORY:  Smoker:      FAMILY HISTORY:  No pertinent family history in first degree relatives    REVIEW OF SYSTEMS:  [  ] Not able to elicit  General:	  Chest:	  GI:	  :  Skin:	  Musculoskeletal:	  Neuro:    VITALS:  Vital Signs Last 24 Hrs  T(C): 36.5 (14 Feb 2025 05:17), Max: 37.5 (13 Feb 2025 21:10)  T(F): 97.7 (14 Feb 2025 05:17), Max: 99.5 (13 Feb 2025 21:10)  HR: 78 (14 Feb 2025 07:26) (70 - 114)  BP: 91/44 (14 Feb 2025 07:26) (81/54 - 124/60)  BP(mean): 65 (14 Feb 2025 07:26) (63 - 65)  RR: 18 (14 Feb 2025 05:17) (16 - 19)  SpO2: 97% (14 Feb 2025 05:17) (97% - 98%)    Parameters below as of 14 Feb 2025 05:17  Patient On (Oxygen Delivery Method): room air    PHYSICAL EXAM:  General:  HEENT:  Neck:  Respiratory:  Cardiovascular:  Gastrointestinal:  :  Extremities:  Skin:  Ortho:  Neuro:    LABS/DIAGNOSTIC TESTS:  C. difficile GDH & toxins A/B by EIA (02.14.25 @ 09:32)   Clostridium difficile GDH Toxins A&B, EIA:   Negative  Clostridium difficile GDH Interpretation:   Negative for toxigenic C. Difficile. This specimen is negative for C.   Difficile glutamate dehydrogenase (GDH) antigen and negative for C.   Difficile Toxins A & B, by EIA. GDH is a highly sensitive screening   marker for C. Difficile that is produced in large amounts by all C.   Difficile strains, both toxigenic and nontoxigenic. This assay has not   been validated as a test of cure. Repeat testing during the same episode   of diarrhea is of limited value and is discouraged. The results of this   assay should always be interpreted in conjunction with patient's clinical   history.                        10.7   1.85  )-----------( 98       ( 14 Feb 2025 05:43 )             31.9     WBC Count: 1.85 K/uL (02-14 @ 05:43)  WBC Count: 3.58 K/uL (02-13 @ 14:30)    02-14    138  |  110[H]  |  26[H]  ----------------------------<  96  4.0   |  22  |  1.21    Ca    8.1[L]      14 Feb 2025 05:43  Phos  2.6     02-14  Mg     2.0     02-14    TPro  5.7[L]  /  Alb  2.8[L]  /  TBili  1.0  /  DBili  x   /  AST  20  /  ALT  22  /  AlkPhos  77  02-14    LIVER FUNCTIONS - ( 14 Feb 2025 05:43 )  Alb: 2.8 g/dL / Pro: 5.7 g/dL / ALK PHOS: 77 U/L / ALT: 22 U/L DA / AST: 20 U/L / GGT: x           Lactate, Blood: 1.2 mmol/L (02-13 @ 14:30)    CULTURES:   Drainage  12-28 @ 04:00   No growth  --  --    .Blood BLOOD  12-28 @ 00:40   No growth at 5 days  --  --    .Blood BLOOD  12-28 @ 00:30   No growth at 5 days  --  --    .Abscess  12-13 @ 08:27   Few Enterococcus faecalis  Few Prevotella disiens "Susceptibilities not performed"  --  Enterococcus faecalis    .Blood BLOOD  12-12 @ 14:50   No growth at 5 days  --  --      .Blood BLOOD  12-12 @ 14:35   No growth at 5 days  --  --          RADIOLOGY: HPI:   Mr. Enriquez is a 89y male from home w / a PMHx of lung cancer on Tagrisso,HLD, HTN, BPH, hypothyroidism, A-fib on Xarelto, right preauricular abscess presenting to the ED with one day of nausea, vomiting, diarrhea and fatigue.   As per chart review , Pt has two recent hospitalizations for his preauricular abscess 12/14 and 12/28  that both resulted in drainage by OMFS at [facility], IV Unasyn transitioned to p.o. Augmentin, completed 8 days prior to today.    Upon waking up this morning at 4am he had 7 episodes of brown watery diarrhea. He tried to eat breakfast and take his medications but threw up shortly after eating. This episode was associated with chills, feeling feverish and generalized weakness. Denies chest pain, SOB, urinary symptoms   In the ER     /69, RR 18, , afebrile, sats 97% RA   Labs s/o WBC 3 - neutrophilic shift , plt 119, BUN/ Cr 26/ 1.39, flu panel neg   CTAP ? entercolitis   BCx collected   GI PCR pending collection    Patient endorses an eighth episode of watery diarrhea in the ER (13 Feb 2025 19:36)    History as above, was called to see this patient from home for C. difficile. Patient is A&Ox3, reports he was bring treated for a preauricular abscess which he received IV antibiotics (Unasyn) then transitions to PO Augmentin and completed about a week of it. Upon waking early this morning he had multiple watery BM's accompanied by generalized weakness and chills. Presently he only had 1 diarrhea episode and he is negative for C. difficile. Currently not on any antibiotic.  CTA suspects enterocolitis.     PAST MEDICAL & SURGICAL HISTORY:  Essential hypertension    Hyperlipidemia, unspecified hyperlipidemia type    BPH without obstruction/lower urinary tract symptoms    Glaucoma (increased eye pressure)  both eyes    Atrial fibrillation    H/O hemicolectomy  1973    Cataract extraction status of left eye  w/lens implant - 2014    History of lung biopsy  2016 - negative results    ALLERGIES: No Known Allergies    MEDS:  acetaminophen     Tablet .. 650 milliGRAM(s) Oral every 6 hours PRN  albuterol    90 MICROgram(s) HFA Inhaler 2 Puff(s) Inhalation every 6 hours PRN  aluminum hydroxide/magnesium hydroxide/simethicone Suspension 30 milliLiter(s) Oral every 4 hours PRN  atorvastatin 10 milliGRAM(s) Oral at bedtime  digoxin     Tablet 125 MICROGram(s) Oral <User Schedule>  dorzolamide 2% Ophthalmic Solution      dorzolamide 2% Ophthalmic Solution 1 Drop(s) Both EYES every 8 hours  finasteride 5 milliGRAM(s) Oral daily  levothyroxine 25 MICROGram(s) Oral daily  melatonin 3 milliGRAM(s) Oral at bedtime PRN  metoprolol succinate ER 12.5 milliGRAM(s) Oral daily  multivitamin/minerals 1 Tablet(s) Oral daily  ondansetron Injectable 4 milliGRAM(s) IV Push every 8 hours PRN  rivaroxaban 15 milliGRAM(s) Oral daily  sodium chloride 0.9%. 250 milliLiter(s) IV Continuous <Continuous>  sodium chloride 0.9%. 1000 milliLiter(s) IV Continuous <Continuous>  tamsulosin 0.4 milliGRAM(s) Oral at bedtime    SOCIAL HISTORY:  Smoker:  Denies     FAMILY HISTORY:  No pertinent family history in first degree relatives    REVIEW OF SYSTEMS:  [  ] Not able to elicit  General: no fevers no malaise   Chest: no cough no sob no CP  GI: +nausea, +vomiting +diarrhea ;  no abdominal pain  : no urinary symptoms   Skin: no rashes no cyanosis  Musculoskeletal: no trauma no LBP  Neuro: no ha's no dizziness     VITALS:  Vital Signs Last 24 Hrs  T(C): 36.5 (14 Feb 2025 05:17), Max: 37.5 (13 Feb 2025 21:10)  T(F): 97.7 (14 Feb 2025 05:17), Max: 99.5 (13 Feb 2025 21:10)  HR: 78 (14 Feb 2025 07:26) (70 - 114)  BP: 91/44 (14 Feb 2025 07:26) (81/54 - 124/60)  BP(mean): 65 (14 Feb 2025 07:26) (63 - 65)  RR: 18 (14 Feb 2025 05:17) (16 - 19)  SpO2: 97% (14 Feb 2025 05:17) (97% - 98%)    Parameters below as of 14 Feb 2025 05:17  Patient On (Oxygen Delivery Method): room air    PHYSICAL EXAM:  General: NAD  HEENT: normocephalic with moist oral mucosa, right preauricular erythema with no drainage noted   Neck: supple no LN's no JVD  Respiratory: lungs clear no rales no rhonchi  Cardiovascular: S1 S2 reg no murmurs  Gastrointestinal: +BS with soft, nondistended abdomen; nontender  : voids with urinal   Extremities: no edema no cyanosis  Skin: no rashes  Ortho: no jt swelling  Neuro: AAO x 3      LABS/DIAGNOSTIC TESTS:  C. difficile GDH & toxins A/B by EIA (02.14.25 @ 09:32)   Clostridium difficile GDH Toxins A&B, EIA:   Negative  Clostridium difficile GDH Interpretation:   Negative for toxigenic C. Difficile. This specimen is negative for C.   Difficile glutamate dehydrogenase (GDH) antigen and negative for C.   Difficile Toxins A & B, by EIA. GDH is a highly sensitive screening   marker for C. Difficile that is produced in large amounts by all C.   Difficile strains, both toxigenic and nontoxigenic. This assay has not   been validated as a test of cure. Repeat testing during the same episode   of diarrhea is of limited value and is discouraged. The results of this   assay should always be interpreted in conjunction with patient's clinical   history.                        10.7   1.85  )-----------( 98       ( 14 Feb 2025 05:43 )             31.9     WBC Count: 1.85 K/uL (02-14 @ 05:43)  WBC Count: 3.58 K/uL (02-13 @ 14:30)    02-14    138  |  110[H]  |  26[H]  ----------------------------<  96  4.0   |  22  |  1.21    Ca    8.1[L]      14 Feb 2025 05:43  Phos  2.6     02-14  Mg     2.0     02-14    TPro  5.7[L]  /  Alb  2.8[L]  /  TBili  1.0  /  DBili  x   /  AST  20  /  ALT  22  /  AlkPhos  77  02-14    LIVER FUNCTIONS - ( 14 Feb 2025 05:43 )  Alb: 2.8 g/dL / Pro: 5.7 g/dL / ALK PHOS: 77 U/L / ALT: 22 U/L DA / AST: 20 U/L / GGT: x           Lactate, Blood: 1.2 mmol/L (02-13 @ 14:30)    CULTURES:   Drainage  12-28 @ 04:00   No growth  --  --    .Blood BLOOD  12-28 @ 00:40   No growth at 5 days  --  --    .Blood BLOOD  12-28 @ 00:30   No growth at 5 days  --  --    .Abscess  12-13 @ 08:27   Few Enterococcus faecalis  Few Prevotella disiens "Susceptibilities not performed"  --  Enterococcus faecalis    .Blood BLOOD  12-12 @ 14:50   No growth at 5 days  --  --      .Blood BLOOD  12-12 @ 14:35   No growth at 5 days  --  --    RADIOLOGY:  < from: CT Abdomen and Pelvis w/ IV Cont (02.13.25 @ 16:57) >  ACC: [id number] EXAM:  CT ABDOMEN AND PELVIS IC   ORDERED BY: JOURDAN POP     PROCEDURE DATE:  02/13/2025      INTERPRETATION:  CLINICAL INFORMATION: Rule out colitis.    COMPARISON: None.    CONTRAST/COMPLICATIONS:  IV Contrast: Omnipaque 350  90cc administered   10 cc discarded  Oral Contrast: NONE    PROCEDURE:  CT of the Abdomen and Pelvis was performed.  Sagittal and coronal reformats were performed.    FINDINGS:  LOWER CHEST: Within normal limits.    LIVER: Within normal limits.  BILE DUCTS: Normal caliber.  GALLBLADDER: Within normal limits.  SPLEEN: Within normal limits.  PANCREAS: Within normal limits.  ADRENALS: Bilateral adrenal myelolipoma is, measuring up to 1.9 cm on the   left.  KIDNEYS/URETERS: No hydronephrosis. Hypodense renal foci are too small to   characterize.    BLADDER: Within normal limits.  REPRODUCTIVE ORGANS: Mildly enlarged prostate.    BOWEL: No bowel obstruction.. Small and large bowel are diffusely   fluid-filled. Several segments of small bowel and colon appear mildly   wall thickened.  PERITONEUM/RETROPERITONEUM: Within normal limits.  VESSELS: Atherosclerotic change.  LYMPH NODES: No lymphadenopathy.  ABDOMINAL WALL: Within normal limits.  BONES: Degenerative changes of the spine. Chronic appearing compression   fracture deformity of L5.    IMPRESSION:  Suspect enterocolitis.    --- End of Report ---     DANISH TIWARI DO; Attending Radiologist  This document has been electronically signed. Feb 13 2025  5:13PM    < end of copied text >   HPI:   Mr. Enriquez is a 89y male from home w / a PMHx of lung cancer on Tagrisso,HLD, HTN, BPH, hypothyroidism, A-fib on Xarelto, right preauricular abscess presenting to the ED with one day of nausea, vomiting, diarrhea and fatigue.   As per chart review , Pt has two recent hospitalizations for his preauricular abscess 12/14 and 12/28  that both resulted in drainage by OMFS at [facility], IV Unasyn transitioned to p.o. Augmentin, completed 8 days prior to today.    Upon waking up this morning at 4am he had 7 episodes of brown watery diarrhea. He tried to eat breakfast and take his medications but threw up shortly after eating. This episode was associated with chills, feeling feverish and generalized weakness. Denies chest pain, SOB, urinary symptoms   In the ER     /69, RR 18, , afebrile, sats 97% RA   Labs s/o WBC 3 - neutrophilic shift , plt 119, BUN/ Cr 26/ 1.39, flu panel neg   CTAP ? entercolitis   BCx collected   GI PCR pending collection    Patient endorses an eighth episode of watery diarrhea in the ER (13 Feb 2025 19:36)    History as above, was called to see this patient from home for C. difficile. Patient is A&Ox3, reports he was being treated for a preauricular abscess which he received IV antibiotics (Unasyn) then transitions to PO Augmentin and completed about a week of it. Upon waking early this morning he had multiple watery BM's accompanied by generalized weakness and chills. Presently he only had 1 diarrhea episode and he is negative for C. difficile. Currently not on any antibiotics.  CTA suspects enterocolitis.     PAST MEDICAL & SURGICAL HISTORY:  Essential hypertension    Hyperlipidemia, unspecified hyperlipidemia type    BPH without obstruction/lower urinary tract symptoms    Glaucoma (increased eye pressure)  both eyes    Atrial fibrillation    H/O hemicolectomy  1973    Cataract extraction status of left eye  w/lens implant - 2014    History of lung biopsy  2016 - negative results    ALLERGIES: No Known Allergies    MEDS:  acetaminophen     Tablet .. 650 milliGRAM(s) Oral every 6 hours PRN  albuterol    90 MICROgram(s) HFA Inhaler 2 Puff(s) Inhalation every 6 hours PRN  aluminum hydroxide/magnesium hydroxide/simethicone Suspension 30 milliLiter(s) Oral every 4 hours PRN  atorvastatin 10 milliGRAM(s) Oral at bedtime  digoxin     Tablet 125 MICROGram(s) Oral <User Schedule>  dorzolamide 2% Ophthalmic Solution      dorzolamide 2% Ophthalmic Solution 1 Drop(s) Both EYES every 8 hours  finasteride 5 milliGRAM(s) Oral daily  levothyroxine 25 MICROGram(s) Oral daily  melatonin 3 milliGRAM(s) Oral at bedtime PRN  metoprolol succinate ER 12.5 milliGRAM(s) Oral daily  multivitamin/minerals 1 Tablet(s) Oral daily  ondansetron Injectable 4 milliGRAM(s) IV Push every 8 hours PRN  rivaroxaban 15 milliGRAM(s) Oral daily  sodium chloride 0.9%. 250 milliLiter(s) IV Continuous <Continuous>  sodium chloride 0.9%. 1000 milliLiter(s) IV Continuous <Continuous>  tamsulosin 0.4 milliGRAM(s) Oral at bedtime    SOCIAL HISTORY:  Smoker:  Denies     FAMILY HISTORY:  No pertinent family history in first degree relatives    REVIEW OF SYSTEMS:  [  ] Not able to elicit  General: no fevers , +chills, +weakness   Chest: no cough no sob no CP  GI: +nausea, +vomiting x1 +diarrhea ;  no abdominal pain  : no urinary symptoms   Skin: no rashes no cyanosis  Musculoskeletal: no trauma no LBP  Neuro: no ha's no dizziness     VITALS:  Vital Signs Last 24 Hrs  T(C): 36.5 (14 Feb 2025 05:17), Max: 37.5 (13 Feb 2025 21:10)  T(F): 97.7 (14 Feb 2025 05:17), Max: 99.5 (13 Feb 2025 21:10)  HR: 78 (14 Feb 2025 07:26) (70 - 114)  BP: 91/44 (14 Feb 2025 07:26) (81/54 - 124/60)  BP(mean): 65 (14 Feb 2025 07:26) (63 - 65)  RR: 18 (14 Feb 2025 05:17) (16 - 19)  SpO2: 97% (14 Feb 2025 05:17) (97% - 98%)    Parameters below as of 14 Feb 2025 05:17  Patient On (Oxygen Delivery Method): room air    PHYSICAL EXAM:  General: NAD  HEENT: normocephalic with moist oral mucosa, right preauricular mild erythema with no drainage noted   Neck: supple no LN's no JVD  Respiratory: lungs clear no rales no rhonchi  Cardiovascular: S1 S2 reg no murmurs  Gastrointestinal: +BS with soft, nondistended abdomen; nontender  : voids with urinal   Extremities: no edema no cyanosis  Skin: no rashes  Ortho: no jt swelling  Neuro: AAO x 3      LABS/DIAGNOSTIC TESTS:  C. difficile GDH & toxins A/B by EIA (02.14.25 @ 09:32)   Clostridium difficile GDH Toxins A&B, EIA:   Negative  Clostridium difficile GDH Interpretation:   Negative for toxigenic C. Difficile. This specimen is negative for C.   Difficile glutamate dehydrogenase (GDH) antigen and negative for C.   Difficile Toxins A & B, by EIA. GDH is a highly sensitive screening   marker for C. Difficile that is produced in large amounts by all C.   Difficile strains, both toxigenic and nontoxigenic. This assay has not   been validated as a test of cure. Repeat testing during the same episode   of diarrhea is of limited value and is discouraged. The results of this   assay should always be interpreted in conjunction with patient's clinical   history.                        10.7   1.85  )-----------( 98       ( 14 Feb 2025 05:43 )             31.9     WBC Count: 1.85 K/uL (02-14 @ 05:43)  WBC Count: 3.58 K/uL (02-13 @ 14:30)    02-14    138  |  110[H]  |  26[H]  ----------------------------<  96  4.0   |  22  |  1.21    Ca    8.1[L]      14 Feb 2025 05:43  Phos  2.6     02-14  Mg     2.0     02-14    TPro  5.7[L]  /  Alb  2.8[L]  /  TBili  1.0  /  DBili  x   /  AST  20  /  ALT  22  /  AlkPhos  77  02-14    LIVER FUNCTIONS - ( 14 Feb 2025 05:43 )  Alb: 2.8 g/dL / Pro: 5.7 g/dL / ALK PHOS: 77 U/L / ALT: 22 U/L DA / AST: 20 U/L / GGT: x           Lactate, Blood: 1.2 mmol/L (02-13 @ 14:30)    CULTURES:   Drainage  12-28 @ 04:00   No growth  --  --    .Blood BLOOD  12-28 @ 00:40   No growth at 5 days  --  --    .Blood BLOOD  12-28 @ 00:30   No growth at 5 days  --  --    .Abscess  12-13 @ 08:27   Few Enterococcus faecalis  Few Prevotella disiens "Susceptibilities not performed"  --  Enterococcus faecalis    .Blood BLOOD  12-12 @ 14:50   No growth at 5 days  --  --      .Blood BLOOD  12-12 @ 14:35   No growth at 5 days  --  --    RADIOLOGY:  < from: CT Abdomen and Pelvis w/ IV Cont (02.13.25 @ 16:57) >  ACC: [id number] EXAM:  CT ABDOMEN AND PELVIS IC   ORDERED BY: JOURDAN POP     PROCEDURE DATE:  02/13/2025      INTERPRETATION:  CLINICAL INFORMATION: Rule out colitis.    COMPARISON: None.    CONTRAST/COMPLICATIONS:  IV Contrast: Omnipaque 350  90cc administered   10 cc discarded  Oral Contrast: NONE    PROCEDURE:  CT of the Abdomen and Pelvis was performed.  Sagittal and coronal reformats were performed.    FINDINGS:  LOWER CHEST: Within normal limits.    LIVER: Within normal limits.  BILE DUCTS: Normal caliber.  GALLBLADDER: Within normal limits.  SPLEEN: Within normal limits.  PANCREAS: Within normal limits.  ADRENALS: Bilateral adrenal myelolipoma is, measuring up to 1.9 cm on the   left.  KIDNEYS/URETERS: No hydronephrosis. Hypodense renal foci are too small to   characterize.    BLADDER: Within normal limits.  REPRODUCTIVE ORGANS: Mildly enlarged prostate.    BOWEL: No bowel obstruction.. Small and large bowel are diffusely   fluid-filled. Several segments of small bowel and colon appear mildly   wall thickened.  PERITONEUM/RETROPERITONEUM: Within normal limits.  VESSELS: Atherosclerotic change.  LYMPH NODES: No lymphadenopathy.  ABDOMINAL WALL: Within normal limits.  BONES: Degenerative changes of the spine. Chronic appearing compression   fracture deformity of L5.    IMPRESSION:  Suspect enterocolitis.    --- End of Report ---     DANISH TIWARI DO; Attending Radiologist  This document has been electronically signed. Feb 13 2025  5:13PM    < end of copied text >

## 2025-02-14 NOTE — DISCHARGE NOTE PROVIDER - CARE PROVIDER_API CALL
Weston Garcia  Internal Medicine  [address], [address]  [place] [address]  Phone: [phone]  Fax: [phone]  Follow Up Time: 1 week

## 2025-02-14 NOTE — CONSULT NOTE ADULT - ENMT
Patient called and stated that he needed to r/s appt with Dr. Dickinson. Patient was to obtain CT prior to appt on 04/14/2022 however patient's insurance did not clear CT, so CT is now osman for 04/14/2022. PSR advised patient that Tiffany WITT would be notified and FU appt would be r/s once CT is complete.   details… nose/mouth/pharynx/neck

## 2025-02-14 NOTE — DISCHARGE NOTE PROVIDER - NSDCMRMEDTOKEN_GEN_ALL_CORE_FT
Albuterol (Eqv-ProAir HFA) 90 mcg/inh inhalation aerosol: 2 puff(s) inhaled every 6 hours  Azopt 1% ophthalmic suspension: 1 drop(s) to each affected eye 3 times a day  Centrum oral tablet: 1 tab(s) orally once a day  Combigan 0.2%-0.5% ophthalmic solution: 1 drop(s) to each affected eye every 12 hours  digoxin 125 mcg (0.125 mg) oral tablet: 1 tab(s) orally every other day  dorzolamide 22.3 mg-timolol 6.8 mg/mL eye drops:   finasteride 5 mg tablet: orally once a day  Lasix 20 mg oral tablet: 1 tab(s) orally once a day  levothyroxine 25 mcg tablet: 1 tab(s) orally once a day  Lumigan 0.01% ophthalmic solution: 1 drop(s) to each affected eye once a day (in the evening)  Rocklatan 0.02 %-0.005 % eye drops:   simvastatin 10 mg oral tablet: 1 tab(s) orally once a day (at bedtime)  Tagrisso 80 mg oral tablet: 80 milligram(s) orally once a day  tamsulosin 0.4 mg capsule: 1 tab(s) orally once a day (at bedtime)  Toprol-XL 25 mg oral tablet, extended release: 1 tab(s) orally once a day Please take 1/2 tablet daily  Xarelto 15 mg oral tablet: 1 tab(s) orally once a day (in the evening)  Zestril 2.5 mg oral tablet: 1 tab(s) orally once a day .   Albuterol (Eqv-ProAir HFA) 90 mcg/inh inhalation aerosol: 2 puff(s) inhaled every 6 hours  ascorbic acid 500 mg oral tablet: 1 tab(s) orally once a day  Azopt 1% ophthalmic suspension: 1 drop(s) to each affected eye 3 times a day  Centrum oral tablet: 1 tab(s) orally once a day  Combigan 0.2%-0.5% ophthalmic solution: 1 drop(s) to each affected eye every 12 hours  digoxin 125 mcg (0.125 mg) oral tablet: 1 tab(s) orally every other day  dorzolamide 22.3 mg-timolol 6.8 mg/mL eye drops:   finasteride 5 mg oral tablet: 1 tab(s) orally once a day  levothyroxine 25 mcg (0.025 mg) oral tablet: 1 tab(s) orally once a day  Lumigan 0.01% ophthalmic solution: 1 drop(s) to each affected eye once a day (in the evening)  pantoprazole 40 mg oral delayed release tablet: 1 tab(s) orally once a day (before a meal)  rivaroxaban 15 mg oral tablet: 1 tab(s) orally once a day  Rocklatan 0.02 %-0.005 % eye drops:   simvastatin 10 mg oral tablet: 1 tab(s) orally once a day (at bedtime)  Tagrisso 80 mg oral tablet: 80 milligram(s) orally once a day  tamsulosin 0.4 mg oral capsule: 1 cap(s) orally once a day (at bedtime)  Toprol-XL 25 mg oral tablet, extended release: 1 tab(s) orally once a day Please take 1/2 tablet daily  zinc sulfate 220 mg (as elemental zinc 50 mg) oral capsule: 1 cap(s) orally 2 times a day   Albuterol (Eqv-ProAir HFA) 90 mcg/inh inhalation aerosol: 2 puff(s) inhaled every 6 hours  ascorbic acid 500 mg oral tablet: 1 tab(s) orally once a day  Azopt 1% ophthalmic suspension: 1 drop(s) to each affected eye 3 times a day  Centrum oral tablet: 1 tab(s) orally once a day  Combigan 0.2%-0.5% ophthalmic solution: 1 drop(s) to each affected eye every 12 hours  digoxin 125 mcg (0.125 mg) oral tablet: 1 tab(s) orally every other day  dorzolamide 22.3 mg-timolol 6.8 mg/mL eye drops:   finasteride 5 mg oral tablet: 1 tab(s) orally once a day  levothyroxine 25 mcg (0.025 mg) oral tablet: 1 tab(s) orally once a day  Lumigan 0.01% ophthalmic solution: 1 drop(s) to each affected eye once a day (in the evening)  midodrine 10 mg oral tablet: 1 tab(s) orally once a day  pantoprazole 40 mg oral delayed release tablet: 1 tab(s) orally once a day (before a meal)  rivaroxaban 15 mg oral tablet: 1 tab(s) orally once a day  Rocklatan 0.02 %-0.005 % eye drops:   simvastatin 10 mg oral tablet: 1 tab(s) orally once a day (at bedtime)  Tagrisso 80 mg oral tablet: 80 milligram(s) orally once a day  tamsulosin 0.4 mg oral capsule: 1 cap(s) orally once a day (at bedtime)  Toprol-XL 25 mg oral tablet, extended release: 1 tab(s) orally once a day Please take 1/2 tablet daily  zinc sulfate 220 mg (as elemental zinc 50 mg) oral capsule: 1 cap(s) orally 2 times a day

## 2025-02-14 NOTE — PROGRESS NOTE ADULT - PROBLEM SELECTOR PLAN 5
Hx of BPH   c/w home mediations Hx of BPH   On Tamsulosin 0.4mg and Finasteride 5mg   c/w home mediations

## 2025-02-14 NOTE — CONSULT NOTE ADULT - NEGATIVE ENMT SYMPTOMS
no hearing difficulty/no ear pain/no tinnitus/no vertigo/no sinus symptoms/no nasal discharge/no nose bleeds/no dry mouth/no throat pain/no dysphagia

## 2025-02-14 NOTE — CONSULT NOTE ADULT - ASSESSMENT
1. Abdominal pain  2. Diarrhea  3. Enterocolitis  4. Anemia  5. No evidence of acute GI bleeding    Suggestions:    1. Check stool for culture, PCR and C. Diff  2. Monitor electrolytes  3. Protonix 40mg daily  4. Avoid NSAID  5. Antibiotics as per ID  6. Monitor H/H  7. Transfuse PRBC as needed  8. DVT prophylaxis

## 2025-02-14 NOTE — CONSULT NOTE ADULT - SUBJECTIVE AND OBJECTIVE BOX
Date of Service  02-14-25 @ 15:06    CHIEF COMPLAINT:Patient is a 89y old  Male who presents with a chief complaint of Diarrhea, enterocolitis.      HPI:   Mr. Enriquez is a 89y male from home w / a PMHx of lung cancer on Tagrisso,HLD, HTN, BPH, hypothyroidism, Chronic A-fib on Xarelto,Chronic systolic HF,Hypotension,s/p  right preauricular abscess presenting to the ED with one day of nausea, vomiting, diarrhea and fatigue.   As per chart review , Pt has two recent hospitalizations for his preauricular abscess 12/14 and 12/28  that both resulted in drainage by OMFS at [facility], IV Unasyn transitioned to p.o. Augmentin, completed 8 days prior to today.     Upon waking up this morning at 4am he had 7 episodes of brown watery diarrhea. He tried to eat breakfast and take his medications but threw up shortly after eating. This episode was associated with chills, feeling feverish and generalized weakness. Denies chest pain, SOB, urinary symptoms     In the ER     /69, RR 18, , afebrile, sats 97% RA   Labs s/o WBC 3 - neutrophilic shift , plt 119, BUN/ Cr 26/ 1.39, flu panel neg   CTAP ? entercolitis   BCx collected   GI PCR pending collection    Patient endorses an eighth episode of watery diarrhea in the ER    PAST MEDICAL & SURGICAL HISTORY:  Essential hypertension      Hyperlipidemia, unspecified hyperlipidemia type      BPH without obstruction/lower urinary tract symptoms      Glaucoma (increased eye pressure)  both eyes      Atrial fibrillation      H/O hemicolectomy  1973      Cataract extraction status of left eye  w/lens implant - 2014      History of lung biopsy  2016 - negative results          MEDICATIONS  (STANDING):  atorvastatin 10 milliGRAM(s) Oral at bedtime  digoxin     Tablet 125 MICROGram(s) Oral <User Schedule>  dorzolamide 2% Ophthalmic Solution      dorzolamide 2% Ophthalmic Solution 1 Drop(s) Both EYES every 8 hours  finasteride 5 milliGRAM(s) Oral daily  levothyroxine 25 MICROGram(s) Oral daily  metoprolol succinate ER 12.5 milliGRAM(s) Oral daily  multivitamin/minerals 1 Tablet(s) Oral daily  rivaroxaban 15 milliGRAM(s) Oral daily  sodium chloride 0.9%. 250 milliLiter(s) (500 mL/Hr) IV Continuous <Continuous>  sodium chloride 0.9%. 1000 milliLiter(s) (50 mL/Hr) IV Continuous <Continuous>  tamsulosin 0.4 milliGRAM(s) Oral at bedtime    MEDICATIONS  (PRN):  acetaminophen     Tablet .. 650 milliGRAM(s) Oral every 6 hours PRN Temp greater or equal to 38C (100.4F), Mild Pain (1 - 3)  albuterol    90 MICROgram(s) HFA Inhaler 2 Puff(s) Inhalation every 6 hours PRN Bronchospasm  aluminum hydroxide/magnesium hydroxide/simethicone Suspension 30 milliLiter(s) Oral every 4 hours PRN Dyspepsia  melatonin 3 milliGRAM(s) Oral at bedtime PRN Insomnia  ondansetron Injectable 4 milliGRAM(s) IV Push every 8 hours PRN Nausea and/or Vomiting      FAMILY HISTORY:  No hx of CAD        SOCIAL HISTORY:    [x ] Non-smoker    [ x] Alcohol-denies    Allergies    No Known Allergies    Intolerances    	    REVIEW OF SYSTEMS:  CONSTITUTIONAL: No fever, weight loss, or fatigue  EYES: No eye pain, visual disturbances, or discharge  ENT:  No difficulty hearing, tinnitus, vertigo; No sinus or throat pain  NECK: No pain or stiffness  RESPIRATORY: No cough, wheezing, chills or hemoptysis; No Shortness of Breath  CARDIOVASCULAR: No chest pain, palpitations, passing out, dizziness, or leg swelling  GASTROINTESTINAL: No abdominal or epigastric pain. No nausea, vomiting, or hematemesis; + diarrhea . No melena or hematochezia.  GENITOURINARY: No dysuria, frequency, hematuria, or incontinence  NEUROLOGICAL: No headaches, memory loss, loss of strength, numbness, or tremors  SKIN: No itching, burning, rashes, or lesions   LYMPH Nodes: No enlarged glands  ENDOCRINE: No heat or cold intolerance; No hair loss  MUSCULOSKELETAL: No joint pain or swelling; No muscle, back, or extremity pain  PSYCHIATRIC: No depression, anxiety, mood swings, or difficulty sleeping  HEME/LYMPH: No easy bruising, or bleeding gums  ALLERGY AND IMMUNOLOGIC: No hives or eczema	    PHYSICAL EXAM:  T(C): 36.5 (02-14-25 @ 12:44), Max: 37.5 (02-13-25 @ 21:10)  HR: 74 (02-14-25 @ 12:44) (70 - 114)  BP: 104/59 (02-14-25 @ 12:44) (81/54 - 124/60)  RR: 18 (02-14-25 @ 12:44) (18 - 19)  SpO2: 95% (02-14-25 @ 12:44) (95% - 97%)  Wt(kg): --  I&O's Summary      Appearance: Normal	  HEENT:   Normal oral mucosa, PERRL, EOMI	  Lymphatic: No lymphadenopathy  Cardiovascular: Normal S1 S2, No JVD, No murmurs, No edema  Respiratory: Dec BS at bases  Psychiatry: A & O x 3, Mood & affect appropriate  Gastrointestinal:  Soft, Non-tender, + BS	  Skin: No rashes, No ecchymoses, No cyanosis	  Neurologic: Non-focal  Extremities: Normal range of motion, No clubbing, cyanosis or edema  Vascular: Peripheral pulses palpable 2+ bilaterally    	    ECG:    Atrial fibrillation  Anteroseptal infarct , age undetermined      LABS:	 	                        10.7   1.85  )-----------( 98       ( 14 Feb 2025 05:43 )             31.9     02-14    138  |  110[H]  |  26[H]  ----------------------------<  96  4.0   |  22  |  1.21    Ca    8.1[L]      14 Feb 2025 05:43  Phos  2.6     02-14  Mg     2.0     02-14    TPro  5.7[L]  /  Alb  2.8[L]  /  TBili  1.0  /  DBili  x   /  AST  20  /  ALT  22  /  AlkPhos  77  02-14    < from: CT Abdomen and Pelvis w/ IV Cont (02.13.25 @ 16:57) >  ACC: [id number] EXAM:  CT ABDOMEN AND PELVIS IC   ORDERED BY: JOURDAN POP     PROCEDURE DATE:  02/13/2025          INTERPRETATION:  CLINICAL INFORMATION: Rule out colitis.    COMPARISON: None.    CONTRAST/COMPLICATIONS:  IV Contrast: Omnipaque 350  90cc administered   10 cc discarded  Oral Contrast: NONE  .    PROCEDURE:  CT of the Abdomen and Pelvis was performed.  Sagittal and coronal reformats were performed.    FINDINGS:  LOWER CHEST: Within normal limits.    LIVER: Within normal limits.  BILE DUCTS: Normal caliber.  GALLBLADDER: Within normal limits.  SPLEEN: Within normal limits.  PANCREAS: Within normal limits.  ADRENALS: Bilateral adrenal myelolipoma is, measuring up to 1.9 cm on the   left.  KIDNEYS/URETERS: No hydronephrosis. Hypodense renal foci are too small to   characterize.    BLADDER: Within normal limits.  REPRODUCTIVE ORGANS: Mildly enlarged prostate.    BOWEL: No bowel obstruction.. Small and large bowel are diffusely   fluid-filled. Several segments of small bowel and colon appear mildly   wall thickened.  PERITONEUM/RETROPERITONEUM: Within normal limits.  VESSELS: Atherosclerotic change.  LYMPH NODES: No lymphadenopathy.  ABDOMINAL WALL: Within normal limits.  BONES: Degenerative changes of the spine. Chronic appearing compression   fracture deformity of L5.    IMPRESSION:  Suspect enterocolitis.      --- End of Report ---             DANISH TIWARI DO; Attending Radiologist  This document has been electronically signed. Feb 13 2025  5:13PM    < end of copied text >  < from: TTE W or WO Ultrasound Enhancing Agent (11.13.24 @ 15:08) >  CONCLUSIONS:      1. Left atrium is mildly dilated.   2. Moderate to severe mitral regurgitation.   3. Aortic valve anatomy cannot be determined with reduced systolic excursion. Mild aortic stenosis.   4. The peak transaortic velocity is 1.28 m/s, peak transaortic gradient is 6.6 mmHg and mean transaortic gradient is 2.7 mmHg with an LVOT/aortic valve VTI ratio of 0.62. The effective orifice area is estimated at 1.95 cm² by the continuity equation.   5. Left ventricular systolic function is mildly decreased with an ejection fraction of 48 % by Naidu's method of disks.   6. The right ventricle is not well visualized.   7. Estimated pulmonary artery systolic pressure is 46 mmHg, consistent with mild pulmonary hypertension.    < end of copied text >      cdiff-.

## 2025-02-14 NOTE — CONSULT NOTE ADULT - SUBJECTIVE AND OBJECTIVE BOX
[  ] STAT REQUEST              [ X ] ROUTINE REQUEST    Patient is a 89 year old male with abdominal pain and diarrhea. GI consulted to evaluate.        HPI:  Patient is a 89 year old male home with past medical history significant for lung cancer on Tagrisso, Hyperlipidemia, Glaucoma, HTN, BPH, hypothyroidism, A-fib on Xarelto, right preauricular abscess presented to the emergency room with one day history of frequent watery, non bloody diarrhea associated with fever, chills, nausea, non bloody vomiting and intermittent, 5-6/10 intensity, non radiating diffuse crampy abdominal pain. Patient was admitted to the hospital twice recently and was treated for periauricular abscess with drainage and antibiotics. Patient denies hematemesis, hematochezia, melena, chest pain, SOB, cough, hematuria, dysuria, and recent traveling.         PAIN MANAGEMENT:  Pain Scale:                 5-6/10  Pain Location:  Intermittent crampy diffuse abdominal pain       PAST MEDICAL HISTORY    Hypertension    Hyperlipidemia     BPH      Glaucoma      Atrial fibrillation    Hypothyroidism        PAST SURGICAL HISTORY    Hemicolectomy    Cataract extraction status of left eye    Lung biopsy        Allergies    No Known Allergies    Intolerances  None         MEDICATIONS  (STANDING):  atorvastatin 10 milliGRAM(s) Oral at bedtime  digoxin     Tablet 125 MICROGram(s) Oral <User Schedule>  dorzolamide 2% Ophthalmic Solution      dorzolamide 2% Ophthalmic Solution 1 Drop(s) Both EYES every 8 hours  finasteride 5 milliGRAM(s) Oral daily  levothyroxine 25 MICROGram(s) Oral daily  metoprolol succinate ER 12.5 milliGRAM(s) Oral daily  multivitamin/minerals 1 Tablet(s) Oral daily  rivaroxaban 15 milliGRAM(s) Oral daily  sodium chloride 0.9%. 250 milliLiter(s) (500 mL/Hr) IV Continuous <Continuous>  sodium chloride 0.9%. 1000 milliLiter(s) (50 mL/Hr) IV Continuous <Continuous>  tamsulosin 0.4 milliGRAM(s) Oral at bedtime    MEDICATIONS  (PRN):  acetaminophen     Tablet .. 650 milliGRAM(s) Oral every 6 hours PRN Temp greater or equal to 38C (100.4F), Mild Pain (1 - 3)  albuterol    90 MICROgram(s) HFA Inhaler 2 Puff(s) Inhalation every 6 hours PRN Bronchospasm  aluminum hydroxide/magnesium hydroxide/simethicone Suspension 30 milliLiter(s) Oral every 4 hours PRN Dyspepsia  melatonin 3 milliGRAM(s) Oral at bedtime PRN Insomnia  ondansetron Injectable 4 milliGRAM(s) IV Push every 8 hours PRN Nausea and/or Vomiting      SOCIAL HISTORY  Advanced Directives:       [ X ] Full Code       [  ] DNR  Marital Status:         [  ] M      [ X ] S      [  ] D       [  ] W  Children:       [ X ] Yes      [  ] No  Occupation:        [  ] Employed       [ X ] Unemployed       [  ] Retired  Diet:       [ X ] Regular       [  ] PEG feeding          [  ] NG tube feeding  Drug Use:           [ X ] Patient denied          [  ] Yes  Alcohol:           [ X ] No             [  ] Yes (socially)         [  ] Yes (chronic)  Tobacco:           [  ] Yes           [ X ] No      FAMILY HISTORY  [ X ] Heart Disease            [ X ] Diabetes             [ X ] HTN             [  ] Colon Cancer             [  ] Stomach Cancer              [  ] Pancreatic Cancer      VITAL SIGNS  Vital Signs Last 24 Hrs  T(C): 36.5 (02-14-25 @ 12:44), Max: 37.5 (02-13-25 @ 21:10)  T(F): 97.7 (02-14-25 @ 12:44), Max: 99.5 (02-13-25 @ 21:10)  HR: 74 (02-14-25 @ 12:44) (70 - 114)  BP: 104/59 (02-14-25 @ 12:44) (81/54 - 124/60)  BP(mean): 65 (02-14-25 @ 07:26) (63 - 65)  RR: 18 (02-14-25 @ 12:44) (18 - 19)  SpO2: 95% (02-14-25 @ 12:44) (95% - 97%)   Daily Height in cm: 172.72 (13 Feb 2025 13:16)          CBC Full  -  ( 14 Feb 2025 05:43 )  WBC Count : 1.85 K/uL  RBC Count : 3.33 M/uL  Hemoglobin : 10.7 g/dL  Hematocrit : 31.9 %  Platelet Count - Automated : 98 K/uL  Mean Cell Volume : 95.8 fl  Mean Cell Hemoglobin : 32.1 pg  Mean Cell Hemoglobin Concentration : 33.5 g/dL  Auto Neutrophil # : x  Auto Lymphocyte # : x  Auto Monocyte # : x  Auto Eosinophil # : x  Auto Basophil # : x  Auto Neutrophil % : x  Auto Lymphocyte % : x  Auto Monocyte % : x  Auto Eosinophil % : x  Auto Basophil % : x      02-14    138  |  110[H]  |  26[H]  ----------------------------<  96  4.0   |  22  |  1.21    Ca    8.1[L]      14 Feb 2025 05:43  Phos  2.6     02-14  Mg     2.0     02-14    TPro  5.7[L]  /  Alb  2.8[L]  /  TBili  1.0  /  DBili  x   /  AST  20  /  ALT  22  /  AlkPhos  77  02-14    Lipase: 17 U/L (02-13 @ 14:30)     Urinalysis with Rflx Culture (10.04.24 @ 19:00)   Urine Appearance: Clear  Color: Yellow  Specific Gravity: 1.008  pH Urine: 6.0  Protein, Urine: Negative mg/dL  Glucose Qualitative, Urine: Negative mg/dL  Ketone - Urine: Negative mg/dL  Blood, Urine: Negative  Bilirubin: Negative  Urobilinogen: 1.0 mg/dL  Leukocyte Esterase Concentration: Small  Nitrite: Negative        ECG (11.12.24 @ 00:30)     Ventricular Rate 91 BPM    Atrial Rate 267 BPM    QRS Duration 70 ms    Q-T Interval 362 ms    QTC Calculation(Bazett) 445 ms    R Axis 29 degrees    T Axis 50 degrees    Diagnosis Line Atrial fibrillation  Anteroseptal infarct , age undetermined  Abnormal ECG         RADIOLOGY/IMAGING                ACC: [id number] EXAM:  CT ABDOMEN AND PELVIS IC   ORDERED BY: JOURDAN POP     PROCEDURE DATE:  02/13/2025          INTERPRETATION:  CLINICAL INFORMATION: Rule out colitis.    COMPARISON: None.    CONTRAST/COMPLICATIONS:  IV Contrast: Omnipaque 350  90cc administered   10 cc discarded  Oral Contrast: NONE  .    PROCEDURE:  CT of the Abdomen and Pelvis was performed.  Sagittal and coronal reformats were performed.    FINDINGS:  LOWER CHEST: Within normal limits.    LIVER: Within normal limits.  BILE DUCTS: Normal caliber.  GALLBLADDER: Within normal limits.  SPLEEN: Within normal limits.  PANCREAS: Within normal limits.  ADRENALS: Bilateral adrenal myelolipoma is, measuring up to 1.9 cm on the   left.  KIDNEYS/URETERS: No hydronephrosis. Hypodense renal foci are too small to   characterize.    BLADDER: Within normal limits.  REPRODUCTIVE ORGANS: Mildly enlarged prostate.    BOWEL: No bowel obstruction.. Small and large bowel are diffusely   fluid-filled. Several segments of small bowel and colon appear mildly   wall thickened.  PERITONEUM/RETROPERITONEUM: Within normal limits.  VESSELS: Atherosclerotic change.  LYMPH NODES: No lymphadenopathy.  ABDOMINAL WALL: Within normal limits.  BONES: Degenerative changes of the spine. Chronic appearing compression   fracture deformity of L5.    IMPRESSION:  Suspect enterocolitis.

## 2025-02-14 NOTE — DISCHARGE NOTE PROVIDER - DETAILS OF MALNUTRITION DIAGNOSIS/DIAGNOSES
This patient has been assessed with a concern for Malnutrition and was treated during this hospitalization for the following Nutrition diagnosis/diagnoses:     -  02/15/2025: Moderate protein-calorie malnutrition

## 2025-02-14 NOTE — PROGRESS NOTE ADULT - PROBLEM SELECTOR PLAN 1
Hx of watery diarrhea 8 episodes   /69, RR 18, , afebrile, sats 97% RA   Labs s/o WBC 3 - neutrophilic shift , plt 119, BUN/ Cr 26/ 1.39, flu panel neg   CTAP ? entercolitis   BCx collected   patient appears euvolemic  Hold lasix for now   f/u GI PCR  c diff r/o contact isolation  IDx consulted Dr. Miller Hx of watery diarrhea 8 episodes   CTAP + enterocolitis   BCx pending   lasix held for now  C.Diff NEG    f/u GI PCR  IDx consulted Dr. Miller

## 2025-02-14 NOTE — CONSULT NOTE ADULT - ASSESSMENT
C.Difficile Colitis    Plan:   ·	 C. difficile Colitis    Plan:   ·	will hold off off on antibiotics and monitor for now  C. difficile Colitis ruled  out  Enterocolitis - had another BM this afternoon, still watery.  Fever - had a temp of 102 just prior to coming to the hospital at home but none here    Plan:  will start Rocephin q1 gm iv q24hrs to cover Enterocolitis and given he had a fever at home.

## 2025-02-14 NOTE — PROGRESS NOTE ADULT - PROBLEM SELECTOR PLAN 4
- c/w atorvastatin conversion   - Dash Diet -on Simvastatin 10mg   - c/w atorvastatin conversion  - Dash Diet

## 2025-02-14 NOTE — DISCHARGE NOTE PROVIDER - NSDCCPCAREPLAN_GEN_ALL_CORE_FT
PRINCIPAL DISCHARGE DIAGNOSIS  Diagnosis: Enterocolitis  Assessment and Plan of Treatment: You presented with watery diarrhea 8 episodes your  catscan of your abdomine shows enterocolitis. while your C diff was negative your GI PCR shows +++++++++++++  Your blood cultures show +++++++++  Infectious disease evaluated you inpatient and +++++++++++      SECONDARY DISCHARGE DIAGNOSES  Diagnosis: HTN (hypertension)  Assessment and Plan of Treatment: You have a history of high blood pressure. High blood pressure is a condition that puts you at risk for heart attack, stroke and kidney disease. Please continue to take your medications as prescribed. You can also help control your blood pressure by maintaining a healthy weight, eating a diet low in fat and rich in fruits and vegetables, reduce the amount of salt in your diet. Also, reduce alcohol and try to include some form of physical activity daily for at least 30 mins. Follow up with your medical doctor to establish long term blood pressure treatment goals.  Notify your doctor if you have any of the following symptoms:   Dizziness, Lightheadedness, Blurry vision, Headache, Chest pain, Shortness of breath    Diagnosis: HLD (hyperlipidemia)  Assessment and Plan of Treatment: You have a history of hyperlipidemia, which is when you have too much cholesterol in your blood. High amounts of cholesterol in your blood can put you at higher risks for heart attck, strokes and other health problems. Follow up with PCP for treatment goals, continue medication as prescribed, have liver function testing every 3 months as anti lipid medications can cause liver irritation, eat low fat meals, avoid red meat, butter, fried foods and cheese. Get daily exercise.    Diagnosis: Atrial fibrillation  Assessment and Plan of Treatment: Atrial fibrillation is the most common heart rhythm problem & has the risk of stroke & heart attack  It helps if you control your blood pressure, not drink more than 1-2 alcohol drinks per day, cut down on caffeine, getting treatment for over active thyroid gland, & getting exercise  Call your doctor if you feel your heart racing or beating unusually, chest tightness or pain, lightheaded, faint, shortness of breath especially with exercise  It is important to take your heart medication as prescribed  You may be on anticoagulation which is very important to take as directed - you may need blood work to monitor drug levels    Diagnosis: Periauricular abscess, right  Assessment and Plan of Treatment: You had a recent hospitalizations for preauricular abscess 12/14 and 12/28  that both resulted in drainage by OMFS at [facility], IV Unasyn transitioned to p.o. Augmentin, completed 8 days prior. You    reported continued pus from site   Infectious disease doctor evaluated you inpatient with rec's +++++++++++     PRINCIPAL DISCHARGE DIAGNOSIS  Diagnosis: Enterocolitis  Assessment and Plan of Treatment: You presented with watery diarrhea. CT shows enterocolitis. C diff was negative but you have norovirus. Infectious disease and GI were consulted. Your blood cultures were positive with staph epi which was likely a contaminant. Your repeat cultures were negative. You do not need any antibiotics. Get plenty of rest and drink lots of fluid.      SECONDARY DISCHARGE DIAGNOSES  Diagnosis: Periauricular abscess, right  Assessment and Plan of Treatment: You had a recent hospitalizations for preauricular abscess 12/14 and 12/28  that both resulted in drainage by OMFS at [facility], IV Unasyn transitioned to p.o. Augmentin, completed 8 days prior. You    reported continued pus from site. Infectious disease doctor evaluated you. CT was also done which showed no abscess. You do not need to continue any antibiotics on discharge.    Diagnosis: HTN (hypertension)  Assessment and Plan of Treatment: You blood pressure was low, likely from all the diarrhea. You were seen by cardiology. Your lasix and lisinopril were held. Continue your metorpolol on discharge. Follow up with your PCP/cardiologist to see if you should restart your other antihypertensive medications.    Diagnosis: HLD (hyperlipidemia)  Assessment and Plan of Treatment: Continue your lipitor.    Diagnosis: Atrial fibrillation  Assessment and Plan of Treatment: Continue your metoprolol, digoxin, and xarelto.    Diagnosis: History of BPH  Assessment and Plan of Treatment: Continue your tamsulosin and finasteride.    Diagnosis: Hypothyroid  Assessment and Plan of Treatment: Continue your synthroid.     PRINCIPAL DISCHARGE DIAGNOSIS  Diagnosis: Enterocolitis  Assessment and Plan of Treatment: You presented with watery diarrhea. CT shows enterocolitis. C diff was negative but you have norovirus. Infectious disease and GI were consulted. Your blood cultures were positive with staph epi which was likely a contaminant. Your repeat cultures were negative. You do not need any antibiotics. Get plenty of rest and drink lots of fluid.      SECONDARY DISCHARGE DIAGNOSES  Diagnosis: Periauricular abscess, right  Assessment and Plan of Treatment: You had a recent hospitalizations for preauricular abscess 12/14 and 12/28  that both resulted in drainage by OMFS at [facility], IV Unasyn transitioned to p.o. Augmentin, completed 8 days prior. You    reported continued pus from site. Infectious disease doctor evaluated you. CT was also done which showed no abscess. You do not need to continue any antibiotics on discharge.    Diagnosis: HTN (hypertension)  Assessment and Plan of Treatment: You blood pressure was low, likely from all the diarrhea. You were seen by cardiology. Your lasix and lisinopril were held. You were started on midodrine and continued on your metorpolol. Follow up with your PCP/cardiologist to see if you should restart your other antihypertensive medications.    Diagnosis: HLD (hyperlipidemia)  Assessment and Plan of Treatment: Continue your lipitor.    Diagnosis: Atrial fibrillation  Assessment and Plan of Treatment: Continue your metoprolol, digoxin, and xarelto.    Diagnosis: Hypothyroid  Assessment and Plan of Treatment: Continue your synthroid.    Diagnosis: History of BPH  Assessment and Plan of Treatment: Continue your tamsulosin and finasteride.

## 2025-02-15 LAB
ALBUMIN SERPL ELPH-MCNC: 2.9 G/DL — LOW (ref 3.5–5)
ALP SERPL-CCNC: 93 U/L — SIGNIFICANT CHANGE UP (ref 40–120)
ALT FLD-CCNC: 24 U/L DA — SIGNIFICANT CHANGE UP (ref 10–60)
ANION GAP SERPL CALC-SCNC: 6 MMOL/L — SIGNIFICANT CHANGE UP (ref 5–17)
AST SERPL-CCNC: 28 U/L — SIGNIFICANT CHANGE UP (ref 10–40)
BILIRUB SERPL-MCNC: 0.5 MG/DL — SIGNIFICANT CHANGE UP (ref 0.2–1.2)
BUN SERPL-MCNC: 25 MG/DL — HIGH (ref 7–18)
CALCIUM SERPL-MCNC: 8.1 MG/DL — LOW (ref 8.4–10.5)
CHLORIDE SERPL-SCNC: 111 MMOL/L — HIGH (ref 96–108)
CO2 SERPL-SCNC: 22 MMOL/L — SIGNIFICANT CHANGE UP (ref 22–31)
CREAT SERPL-MCNC: 1.12 MG/DL — SIGNIFICANT CHANGE UP (ref 0.5–1.3)
CULTURE RESULTS: ABNORMAL
EGFR: 61 ML/MIN/1.73M2 — SIGNIFICANT CHANGE UP
GI PCR PANEL: DETECTED
GLUCOSE SERPL-MCNC: 104 MG/DL — HIGH (ref 70–99)
GRAM STN FLD: ABNORMAL
HCT VFR BLD CALC: 32.7 % — LOW (ref 39–50)
HGB BLD-MCNC: 10.8 G/DL — LOW (ref 13–17)
MCHC RBC-ENTMCNC: 31.6 PG — SIGNIFICANT CHANGE UP (ref 27–34)
MCHC RBC-ENTMCNC: 33 G/DL — SIGNIFICANT CHANGE UP (ref 32–36)
MCV RBC AUTO: 95.6 FL — SIGNIFICANT CHANGE UP (ref 80–100)
NOROVIRUS GI+II RNA STL QL NAA+NON-PROBE: DETECTED
NRBC BLD AUTO-RTO: 0 /100 WBCS — SIGNIFICANT CHANGE UP (ref 0–0)
ORGANISM # SPEC MICROSCOPIC CNT: ABNORMAL
ORGANISM # SPEC MICROSCOPIC CNT: ABNORMAL
PLATELET # BLD AUTO: 99 K/UL — LOW (ref 150–400)
POTASSIUM SERPL-MCNC: 3.7 MMOL/L — SIGNIFICANT CHANGE UP (ref 3.5–5.3)
POTASSIUM SERPL-SCNC: 3.7 MMOL/L — SIGNIFICANT CHANGE UP (ref 3.5–5.3)
PROT SERPL-MCNC: 5.9 G/DL — LOW (ref 6–8.3)
RBC # BLD: 3.42 M/UL — LOW (ref 4.2–5.8)
RBC # FLD: 15.9 % — HIGH (ref 10.3–14.5)
SODIUM SERPL-SCNC: 139 MMOL/L — SIGNIFICANT CHANGE UP (ref 135–145)
SPECIMEN SOURCE: SIGNIFICANT CHANGE UP
WBC # BLD: 3.78 K/UL — LOW (ref 3.8–10.5)
WBC # FLD AUTO: 3.78 K/UL — LOW (ref 3.8–10.5)

## 2025-02-15 RX ADMIN — Medication 25 MICROGRAM(S): at 05:09

## 2025-02-15 RX ADMIN — DORZOLAMIDE 1 DROP(S): 20 SOLUTION/ DROPS OPHTHALMIC at 21:11

## 2025-02-15 RX ADMIN — ATORVASTATIN CALCIUM 10 MILLIGRAM(S): 80 TABLET, FILM COATED ORAL at 21:11

## 2025-02-15 RX ADMIN — METOPROLOL SUCCINATE 12.5 MILLIGRAM(S): 50 TABLET, EXTENDED RELEASE ORAL at 05:32

## 2025-02-15 RX ADMIN — RIVAROXABAN 15 MILLIGRAM(S): 10 TABLET, FILM COATED ORAL at 12:21

## 2025-02-15 RX ADMIN — Medication 1 TABLET(S): at 12:21

## 2025-02-15 RX ADMIN — TAMSULOSIN HYDROCHLORIDE 0.4 MILLIGRAM(S): 0.4 CAPSULE ORAL at 21:11

## 2025-02-15 RX ADMIN — FINASTERIDE 5 MILLIGRAM(S): 1 TABLET, FILM COATED ORAL at 12:21

## 2025-02-15 RX ADMIN — Medication 60 MILLILITER(S): at 12:40

## 2025-02-15 RX ADMIN — DORZOLAMIDE 1 DROP(S): 20 SOLUTION/ DROPS OPHTHALMIC at 05:32

## 2025-02-15 RX ADMIN — CEFTRIAXONE 100 MILLIGRAM(S): 500 INJECTION, POWDER, FOR SOLUTION INTRAMUSCULAR; INTRAVENOUS at 00:18

## 2025-02-15 NOTE — PROGRESS NOTE ADULT - ASSESSMENT
89y male from home w / a PMHx of lung cancer on Tagrisso,HLD, HTN, BPH, hypothyroidism, Chronic A-fib on Xarelto,Chronic systolic HF,Hypotension,s/p  right preauricular abscess presenting to the ED with one day of nausea, vomiting, diarrhea and fatigue,enterocolitis,HONG,+ blood cx-? contaminant..   1.Enterocolitis-stool cx for cdiff-.  2.Chronic afib-xarelto,lopressor,dig.  3.Chronisc sytolic HF-b blocker resume,low dose ace.Hold lasix.  4.HONG-resolved.  5.Hypothyroid-synthroid.  6.BPH-proscar.  7.PPI.  8.Repeat blood cx.   89y male from home w / a PMHx of lung cancer on Tagrisso,HLD, HTN, BPH, hypothyroidism, Chronic A-fib on Xarelto,Chronic systolic HF,Hypotension,s/p  right preauricular abscess presenting to the ED with one day of nausea, vomiting, diarrhea and fatigue,enterocolitis,HONG,+ blood cx-? contaminant.  1.Enterocolitis-stool cx for cdiff-.  2.Chronic afib-xarelto,lopressor,dig.  3.Chronisc sytolic HF-b blocker ,low dose ace.Hold lasix.  4.HONG-resolved.  5.Hypothyroid-synthroid.  6.BPH-proscar.  7.PPI.  8.Repeat blood cx.

## 2025-02-15 NOTE — DIETITIAN INITIAL EVALUATION ADULT - PERTINENT LABORATORY DATA
02-15    139  |  111[H]  |  25[H]  ----------------------------<  104[H]  3.7   |  22  |  1.12    Ca    8.1[L]      15 Feb 2025 06:50  Phos  2.6     02-14  Mg     2.0     02-14    TPro  5.9[L]  /  Alb  2.9[L]  /  TBili  0.5  /  DBili  x   /  AST  28  /  ALT  24  /  AlkPhos  93  02-15

## 2025-02-15 NOTE — PROGRESS NOTE ADULT - SUBJECTIVE AND OBJECTIVE BOX
INTERVAL HPI/OVERNIGHT EVENTS:  Patient seen,no acute issues,afebrile  VITAL SIGNS:  T(F): 97.8 (02-15-25 @ 14:29)  HR: 88 (02-15-25 @ 14:29)  BP: 107/52 (02-15-25 @ 14:29)  RR: 20 (02-15-25 @ 14:29)  SpO2: 99% (02-15-25 @ 14:29)  Wt(kg): --    PHYSICAL EXAM:  awake  Constitutional:  Eyes:  ENMT:perrla  Neck:  Respiratory:clear  Cardiovascular:s1s2,m-none  Gastrointestinal:soft,bs pos  Extremities:  Vascular:  Neurological:no focal deficit  Musculoskeletal:    MEDICATIONS  (STANDING):  atorvastatin 10 milliGRAM(s) Oral at bedtime  digoxin     Tablet 125 MICROGram(s) Oral <User Schedule>  dorzolamide 2% Ophthalmic Solution      dorzolamide 2% Ophthalmic Solution 1 Drop(s) Both EYES every 8 hours  finasteride 5 milliGRAM(s) Oral daily  levothyroxine 25 MICROGram(s) Oral daily  metoprolol succinate ER 12.5 milliGRAM(s) Oral daily  multivitamin/minerals 1 Tablet(s) Oral daily  pantoprazole    Tablet 40 milliGRAM(s) Oral before breakfast  rivaroxaban 15 milliGRAM(s) Oral daily  sodium chloride 0.9%. 1000 milliLiter(s) (60 mL/Hr) IV Continuous <Continuous>  tamsulosin 0.4 milliGRAM(s) Oral at bedtime    MEDICATIONS  (PRN):  acetaminophen     Tablet .. 650 milliGRAM(s) Oral every 6 hours PRN Temp greater or equal to 38C (100.4F), Mild Pain (1 - 3)  albuterol    90 MICROgram(s) HFA Inhaler 2 Puff(s) Inhalation every 6 hours PRN Bronchospasm  aluminum hydroxide/magnesium hydroxide/simethicone Suspension 30 milliLiter(s) Oral every 4 hours PRN Dyspepsia  melatonin 3 milliGRAM(s) Oral at bedtime PRN Insomnia  ondansetron Injectable 4 milliGRAM(s) IV Push every 8 hours PRN Nausea and/or Vomiting      Allergies    No Known Allergies    Intolerances        LABS:                        10.8   3.78  )-----------( 99       ( 15 Feb 2025 06:50 )             32.7     02-15    139  |  111[H]  |  25[H]  ----------------------------<  104[H]  3.7   |  22  |  1.12    Ca    8.1[L]      15 Feb 2025 06:50  Phos  2.6     02-14  Mg     2.0     02-14    TPro  5.9[L]  /  Alb  2.9[L]  /  TBili  0.5  /  DBili  x   /  AST  28  /  ALT  24  /  AlkPhos  93  02-15      Urinalysis Basic - ( 15 Feb 2025 06:50 )    Color: x / Appearance: x / SG: x / pH: x  Gluc: 104 mg/dL / Ketone: x  / Bili: x / Urobili: x   Blood: x / Protein: x / Nitrite: x   Leuk Esterase: x / RBC: x / WBC x   Sq Epi: x / Non Sq Epi: x / Bacteria: x        RADIOLOGY & ADDITIONAL TESTS:      Assessment and Plan:   · Assessment	   Mr. Enriquez is a 89y male from home w / a PMHx of lung cancer on Tagrisso, HLD, HTN, BPH, hypothyroidism, A-fib on Xarelto, right preauricular abscess presenting to the ED with one day of nausea, vomiting, diarrhea and fatigue.   Admitted to medicine for enterocolitis   C.diff NEG   f/u GI PCR and Bcx   Cardiology consulted pt with hx afib w hypotension   ID following        Problem/Plan - 1:  ·  Problem: Enterocolitis-improved clinically  CTAP + enterocolitis   BCx pending   lasix held for now  C.Diff NEG    f/u GI PCR  IDx f/up appret     Problem/Plan - 2:  ·  Problem: Periauricular abscess, right.-improved clinically  ·  Plan: -s/p recent hospitalizations for his preauricular abscess 12/14 and 12/28  that both resulted in drainage by OMFS at [facility], IV Unasyn transitioned to p.o. Augmentin, completed 8 days prior   reports continued pus from site   - Pt reports drainage from R Periauricular area this morning, on assessment no drainage, redness noted, No pain reported.    - ID following.     Problem/Plan - 3:  ·  Problem: HTN (hypertension).   ·  Plan: h/o HTN   noted hypotension   Monitor BP  hold BP meds   Cardiology Dr Dominique consulted.     Problem/Plan - 4:  ·  Problem: HLD (hyperlipidemia).   ·  Plan: -on Simvastatin 10mg   - c/w atorvastatin conversion  - Dash Diet.     Problem/Plan - 5:  ·  Problem: History of BPH.   ·  Plan: Hx of BPH   On Tamsulosin 0.4mg and Finasteride 5mg   c/w home mediations.     Problem/Plan - 6:  ·  Problem: Hypothyroid.   ·  Plan: Hx of hypothyroid  on Levothyroxine 25 mcg at home   c/w home med.     Problem/Plan - 7:  ·  Problem: Atrial fibrillation.   ·  Plan: Known hx of afib  Not in RVR  On digoxin 125mcg , lopressor , Xarelto 15mg     -cont home meds   -cont xarelto    Cards Dr Dominique Consulted.     Problem/Plan - 8:  ·  Problem: Preventive measure.   ·  Plan: xarelto.     Problem/Plan - 9:  ·  Problem: Discharge planning issues.   ·  Plan: Pt from Home   f/u Bcx   f/u GI PCR, ID & cards rec's.    Attestation Statements:    Attestation Statements:  I have personally seen and examined the patient.  I fully participated in the care of this patient.  I have made amendments to the documentation where necessary, and agree with the history, physical exam, and plan as documented by the Resident.     d/w patient and staff plan of care.       Isotretinoin Pregnancy And Lactation Text: This medication is Pregnancy Category X and is considered extremely dangerous during pregnancy. It is unknown if it is excreted in breast milk.

## 2025-02-15 NOTE — PROGRESS NOTE ADULT - ASSESSMENT
1. Abdominal pain  2. Diarrhea  3. Enterocolitis  4. Anemia  5. No evidence of acute GI bleeding  6. Positive Norovirus and PCR    Suggestions:    1. Diet as tolerated  2. Monitor electrolytes  3. Protonix 40mg daily  4. Avoid NSAID  5. Monitor H/H  6. Transfuse PRBC as needed  7. Antibiotics as per ID  8. DVT prophylaxis

## 2025-02-15 NOTE — CHART NOTE - NSCHARTNOTEFT_GEN_A_CORE
Notified by RN of positive blood culture. Pt already on ceftriaxone 1000 mg daily. Dr Miller notified. Repeat blood cultures drawn. Pt will be started on vancomycin by ID.     Pt states he has had 10 BM since last night, all loose. GI PCR positive for norovirus. Started pt on NS 60 mL/hr for 12 hours.      Discussed with Dr Yi. Notified by RN of blood culture growing gram positive cocci in clusters. Pt on ceftriaxone 1000 mg daily. Dr Miller notified. Repeat blood cultures sent.   1 set blood culture growing staphylococcus epidermidis. No leukocytosis, pt afebrile vss. No need for antibiotics per ID. Followup repeat blood cultures.     Pt states he has had 10 BM since last night, all loose. GI PCR positive for norovirus. Started pt on NS 60 mL/hr for 12 hours. Ceftriaxone dc'd per ID.     Discussed with Dr Yi and Dr Miller.

## 2025-02-15 NOTE — DIETITIAN INITIAL EVALUATION ADULT - MALNUTRITION
Moderate malnutrition in the context of chronic illness evidenced by inadequate protein energy intake, fat and muscle depletion related to GI disorder, cancer, multiple medical co-morbidities with increased nutrient need for PI.

## 2025-02-15 NOTE — DIETITIAN INITIAL EVALUATION ADULT - OTHER INFO
Patient was admitted with noninfectious gastroenteritis, A fib, glaucoma, PSH, BPH, hyperlipdemia, HTN, enterocolitis, periauricular abscess, hypothyroidism, h/o lung biopsy, h/o hemicolectomy, cataract extraction, cancer.

## 2025-02-15 NOTE — DIETITIAN INITIAL EVALUATION ADULT - REASON
NP was treating patient at time of visit. Overall physical appearance indicates some fat and muscle loss in head and neck.

## 2025-02-15 NOTE — PROGRESS NOTE ADULT - ASSESSMENT
C. difficile Colitis ruled  out  Enterocolitis - still having watery stools   Fever - afebrile today     Plan:    ·	Continue Rocephin q1 gm iv q24hrs to cover Enterocolitis and given he had a fever at home. C. difficile Colitis ruled  out  Enterocolitis - still having watery stools   Fever - afebrile today   Norovirus     Plan:    ·	DC Rocephin  ·	Blood cultures grew out Staph Epi which is a contaminant   ·	repeat blood cultures testing

## 2025-02-15 NOTE — DIETITIAN INITIAL EVALUATION ADULT - PROBLEM SELECTOR PLAN 2
s/p recent hospitalizations for his preauricular abscess 12/14 and 12/28  that both resulted in drainage by OMFS at [facility], IV Unasyn transitioned to p.o. Augmentin, completed 8 days prior   reports continued pus from site     - IDx consulted

## 2025-02-15 NOTE — PROGRESS NOTE ADULT - SUBJECTIVE AND OBJECTIVE BOX
89y Male is under our care for     MEDS:  cefTRIAXone   IVPB 1000 milliGRAM(s) IV Intermittent every 24 hours    ALLERGIES: Allergies    No Known Allergies    Intolerances    REVIEW OF SYSTEMS:  [  ] Not able to elicit  General:	  Chest:	  GI:	  :  Skin:	  Musculoskeletal:	  Neuro:	    VITALS:  Vital Signs Last 24 Hrs  T(C): 36.4 (15 Feb 2025 05:42), Max: 36.5 (14 Feb 2025 12:44)  T(F): 97.6 (15 Feb 2025 05:42), Max: 97.7 (14 Feb 2025 12:44)  HR: 75 (15 Feb 2025 05:42) (74 - 81)  BP: 114/66 (15 Feb 2025 05:42) (104/59 - 118/64)  BP(mean): 75 (14 Feb 2025 21:11) (75 - 75)  RR: 18 (15 Feb 2025 05:42) (17 - 18)  SpO2: 96% (15 Feb 2025 05:42) (95% - 99%)    Parameters below as of 15 Feb 2025 05:42  Patient On (Oxygen Delivery Method): room air    PHYSICAL EXAM:  HEENT:  Neck:  Respiratory:  Cardiovascular:  Gastrointestinal:  :  Extremities:  Skin:  Ortho:  Neuro:    LABS/DIAGNOSTIC TESTS:                        10.8   3.78  )-----------( 99       ( 15 Feb 2025 06:50 )             32.7     WBC Count: 3.78 K/uL (02-15 @ 06:50)  WBC Count: 1.85 K/uL (02-14 @ 05:43)  WBC Count: 3.58 K/uL (02-13 @ 14:30)    02-15    139  |  111[H]  |  25[H]  ----------------------------<  104[H]  3.7   |  22  |  1.12    Ca    8.1[L]      15 Feb 2025 06:50  Phos  2.6     02-14  Mg     2.0     02-14    TPro  5.9[L]  /  Alb  2.9[L]  /  TBili  0.5  /  DBili  x   /  AST  28  /  ALT  24  /  AlkPhos  93  02-15    CULTURES:   .Blood BLOOD  02-13 @ 16:15   Growth in anaerobic bottle: Gram Positive Cocci in Clusters  --    Growth in anaerobic bottle: Gram Positive Cocci in Clusters    .Blood BLOOD  02-13 @ 16:00   Growth in aerobic bottle: Gram Positive Cocci in Clusters  Direct identification is available within approximately 3-5  hours either by Blood Panel Multiplexed PCR or Direct  MALDI-TOF. Details: [email or web address]  --  Blood Culture PCR    Drainage  12-28 @ 04:00   No growth  --  --    .Blood BLOOD  12-28 @ 00:40   No growth at 5 days  --  --    .Blood BLOOD  12-28 @ 00:30   No growth at 5 days  --  --    .Abscess  12-13 @ 08:27   Few Enterococcus faecalis  Few Prevotella disiens "Susceptibilities not performed"  --  Enterococcus faecalis    .Blood BLOOD  12-12 @ 14:50   No growth at 5 days  --  --    .Blood BLOOD  12-12 @ 14:35   No growth at 5 days  --  --    RADIOLOGY:  no new studies 89y Male lying in bed and in no acute distress, he is still having watery stools overnight and this morning. Denies nausea, vomiting or abdominal pain. No fever or chills.     MEDS:  cefTRIAXone   IVPB 1000 milliGRAM(s) IV Intermittent every 24 hours    ALLERGIES: Allergies    No Known Allergies    Intolerances    REVIEW OF SYSTEMS:  [  ] Not able to elicit  General: no fevers no malaise  Chest: no cough no sob  GI: no nausea or vomiting, +diarrhea   : no urinary sxs   Skin: no rashes  Musculoskeletal: no trauma no LBP  Neuro: no ha's no dizziness     VITALS:  Vital Signs Last 24 Hrs  T(C): 36.4 (15 Feb 2025 05:42), Max: 36.5 (14 Feb 2025 12:44)  T(F): 97.6 (15 Feb 2025 05:42), Max: 97.7 (14 Feb 2025 12:44)  HR: 75 (15 Feb 2025 05:42) (74 - 81)  BP: 114/66 (15 Feb 2025 05:42) (104/59 - 118/64)  BP(mean): 75 (14 Feb 2025 21:11) (75 - 75)  RR: 18 (15 Feb 2025 05:42) (17 - 18)  SpO2: 96% (15 Feb 2025 05:42) (95% - 99%)    Parameters below as of 15 Feb 2025 05:42  Patient On (Oxygen Delivery Method): room air    PHYSICAL EXAM:  General: NAD  HEENT: normocephalic with moist oral mucosa, right preauricular mild erythema with no drainage   Neck: supple no LN's no JVD  Respiratory: lungs clear no rales no rhonchi  Cardiovascular: S1 S2 reg no murmurs  Gastrointestinal: +BS with soft, nondistended abdomen; nontender  : voids with urinal   Extremities: no edema no cyanosis  Skin: no rashes  Ortho: no jt swelling  Neuro: AAO x 3    LABS/DIAGNOSTIC TESTS:                        10.8   3.78  )-----------( 99       ( 15 Feb 2025 06:50 )             32.7     WBC Count: 3.78 K/uL (02-15 @ 06:50)  WBC Count: 1.85 K/uL (02-14 @ 05:43)  WBC Count: 3.58 K/uL (02-13 @ 14:30)    02-15    139  |  111[H]  |  25[H]  ----------------------------<  104[H]  3.7   |  22  |  1.12    Ca    8.1[L]      15 Feb 2025 06:50  Phos  2.6     02-14  Mg     2.0     02-14    TPro  5.9[L]  /  Alb  2.9[L]  /  TBili  0.5  /  DBili  x   /  AST  28  /  ALT  24  /  AlkPhos  93  02-15    CULTURES:   .Blood BLOOD  02-13 @ 16:15   Growth in anaerobic bottle: Gram Positive Cocci in Clusters  --    Growth in anaerobic bottle: Gram Positive Cocci in Clusters    .Blood BLOOD  02-13 @ 16:00   Growth in aerobic bottle: Gram Positive Cocci in Clusters  Direct identification is available within approximately 3-5  hours either by Blood Panel Multiplexed PCR or Direct  MALDI-TOF. Details: [email or web address]  --  Blood Culture PCR    Drainage  12-28 @ 04:00   No growth  --  --    .Blood BLOOD  12-28 @ 00:40   No growth at 5 days  --  --    .Blood BLOOD  12-28 @ 00:30   No growth at 5 days  --  --    .Abscess  12-13 @ 08:27   Few Enterococcus faecalis  Few Prevotella disiens "Susceptibilities not performed"  --  Enterococcus faecalis    .Blood BLOOD  12-12 @ 14:50   No growth at 5 days  --  --    .Blood BLOOD  12-12 @ 14:35   No growth at 5 days  --  --    RADIOLOGY:  no new studies 89y Male lying in bed and in no acute distress, he is still having watery stools overnight and this morning. Denies nausea, vomiting or abdominal pain. No fever or chills. Also, he is positive for Norovirus and his blood cultures grew out Staph Epi which is a contaminant. Repeat blood cultures are testing.        MEDS:  cefTRIAXone   IVPB 1000 milliGRAM(s) IV Intermittent every 24 hours    ALLERGIES: Allergies    No Known Allergies    Intolerances    REVIEW OF SYSTEMS:  [  ] Not able to elicit  General: no fevers no malaise  Chest: no cough no sob  GI: no nausea or vomiting, +diarrhea   : no urinary sxs   Skin: no rashes  Musculoskeletal: no trauma no LBP  Neuro: no ha's no dizziness     VITALS:  Vital Signs Last 24 Hrs  T(C): 36.4 (15 Feb 2025 05:42), Max: 36.5 (14 Feb 2025 12:44)  T(F): 97.6 (15 Feb 2025 05:42), Max: 97.7 (14 Feb 2025 12:44)  HR: 75 (15 Feb 2025 05:42) (74 - 81)  BP: 114/66 (15 Feb 2025 05:42) (104/59 - 118/64)  BP(mean): 75 (14 Feb 2025 21:11) (75 - 75)  RR: 18 (15 Feb 2025 05:42) (17 - 18)  SpO2: 96% (15 Feb 2025 05:42) (95% - 99%)    Parameters below as of 15 Feb 2025 05:42  Patient On (Oxygen Delivery Method): room air    PHYSICAL EXAM:  General: NAD  HEENT: normocephalic with moist oral mucosa, right preauricular mild erythema with no drainage   Neck: supple no LN's no JVD  Respiratory: lungs clear no rales no rhonchi  Cardiovascular: S1 S2 reg no murmurs  Gastrointestinal: +BS with soft, nondistended abdomen; nontender  : voids with urinal   Extremities: no edema no cyanosis  Skin: no rashes  Ortho: no jt swelling  Neuro: AAO x 3    LABS/DIAGNOSTIC TESTS:                        10.8   3.78  )-----------( 99       ( 15 Feb 2025 06:50 )             32.7     WBC Count: 3.78 K/uL (02-15 @ 06:50)  WBC Count: 1.85 K/uL (02-14 @ 05:43)  WBC Count: 3.58 K/uL (02-13 @ 14:30)    02-15    139  |  111[H]  |  25[H]  ----------------------------<  104[H]  3.7   |  22  |  1.12    Ca    8.1[L]      15 Feb 2025 06:50  Phos  2.6     02-14  Mg     2.0     02-14    TPro  5.9[L]  /  Alb  2.9[L]  /  TBili  0.5  /  DBili  x   /  AST  28  /  ALT  24  /  AlkPhos  93  02-15    CULTURES:   .Blood BLOOD  02-13 @ 16:15   Growth in anaerobic bottle: Gram Positive Cocci in Clusters  --    Growth in anaerobic bottle: Gram Positive Cocci in Clusters    .Blood BLOOD  02-13 @ 16:00   Growth in aerobic bottle: Gram Positive Cocci in Clusters  Direct identification is available within approximately 3-5  hours either by Blood Panel Multiplexed PCR or Direct  MALDI-TOF. Details: [email or web address]  --  Blood Culture PCR    Drainage  12-28 @ 04:00   No growth  --  --    .Blood BLOOD  12-28 @ 00:40   No growth at 5 days  --  --    .Blood BLOOD  12-28 @ 00:30   No growth at 5 days  --  --    .Abscess  12-13 @ 08:27   Few Enterococcus faecalis  Few Prevotella disiens "Susceptibilities not performed"  --  Enterococcus faecalis    .Blood BLOOD  12-12 @ 14:50   No growth at 5 days  --  --    .Blood BLOOD  12-12 @ 14:35   No growth at 5 days  --  --    RADIOLOGY:  no new studies

## 2025-02-15 NOTE — DIETITIAN NUTRITION RISK NOTIFICATION - TREATMENT: THE FOLLOWING DIET HAS BEEN RECOMMENDED
Diet, DASH/TLC:   Sodium & Cholesterol Restricted  Supplement Feeding Modality:  Oral  Ensure Plus High Protein Cans or Servings Per Day:  1       Frequency:  Three Times a day (02-15-25 @ 10:48) [Pending Verification By Attending]  Diet, Regular:   DASH/TLC {Sodium & Cholesterol Restricted} (02-13-25 @ 19:35) [Active]

## 2025-02-15 NOTE — DIETITIAN INITIAL EVALUATION ADULT - PROBLEM SELECTOR PLAN 8
Problem: Patient Care Overview  Goal: Plan of Care Review  Outcome: Ongoing (interventions implemented as appropriate)  Plan of care reviewed with parents and patient, parents very appropriate and helpful in care. All questions answered and reassurance provided.  Pt currently on 3L nasal canula, tolerating well. ABG spaced to daily.  BP remains 90-110s, labetalol currently at 0.5mg/kg/hr, started on oral metoprolol.  Plan to start on coumadin.  Potassium PRN x1.  1 unit of PRBC given this AM.  Pt pain controlled with ATC tylenol, 2mg morphine given x1 for breakthrough pain.  Pt got up in chair and tolerated well for a few hours.  Alexander removed, lasix made Q8 due to pt fluid status.  Sternal dressing changed today, incision looks good with edges approximated. No other issues, will continue to closely monitor. See flowsheets for more details.         xarelto

## 2025-02-15 NOTE — PROGRESS NOTE ADULT - SUBJECTIVE AND OBJECTIVE BOX
Date of Service 02-15-25 @ 12:44    CHIEF COMPLAINT:Patient is a 89y old  Male who presents with a chief complaint of Noninfectious gastroenteritis.Pt appears comfortable.        	  REVIEW OF SYSTEMS:  CONSTITUTIONAL: No fever, weight loss, or fatigue  EYES: No eye pain, visual disturbances, or discharge  ENT:  No difficulty hearing, tinnitus, vertigo; No sinus or throat pain  NECK: No pain or stiffness  RESPIRATORY: No cough, wheezing, chills or hemoptysis; No Shortness of Breath  CARDIOVASCULAR: No chest pain, palpitations, passing out, dizziness, or leg swelling  GASTROINTESTINAL: No abdominal or epigastric pain. No nausea, vomiting, or hematemesis; No diarrhea or constipation. No melena or hematochezia.  GENITOURINARY: No dysuria, frequency, hematuria, or incontinence  NEUROLOGICAL: No headaches, memory loss, loss of strength, numbness, or tremors  SKIN: No itching, burning, rashes, or lesions   LYMPH Nodes: No enlarged glands  ENDOCRINE: No heat or cold intolerance; No hair loss  MUSCULOSKELETAL: No joint pain or swelling; No muscle, back, or extremity pain  PSYCHIATRIC: No depression, anxiety, mood swings, or difficulty sleeping  HEME/LYMPH: No easy bruising, or bleeding gums  ALLERGY AND IMMUNOLOGIC: No hives or eczema	      PHYSICAL EXAM:  T(C): 36.4 (02-15-25 @ 05:42), Max: 36.5 (02-14-25 @ 21:11)  HR: 75 (02-15-25 @ 05:42) (75 - 81)  BP: 114/66 (02-15-25 @ 05:42) (114/66 - 118/64)  RR: 18 (02-15-25 @ 05:42) (17 - 18)  SpO2: 96% (02-15-25 @ 05:42) (96% - 99%)  Wt(kg): --  I&O's Summary      Appearance: Normal	  HEENT:   Normal oral mucosa, PERRL, EOMI	  Lymphatic: No lymphadenopathy  Cardiovascular: Normal S1 S2, No JVD, No murmurs, No edema  Respiratory: Lungs clear to auscultation	  Psychiatry: A & O x 3, Mood & affect appropriate  Gastrointestinal:  Soft, Non-tender, + BS	  Skin: No rashes, No ecchymoses, No cyanosis	  Neurologic: Non-focal  Extremities: Normal range of motion, No clubbing, cyanosis or edema  Vascular: Peripheral pulses palpable 2+ bilaterally    MEDICATIONS  (STANDING):  atorvastatin 10 milliGRAM(s) Oral at bedtime  cefTRIAXone   IVPB 1000 milliGRAM(s) IV Intermittent every 24 hours  digoxin     Tablet 125 MICROGram(s) Oral <User Schedule>  dorzolamide 2% Ophthalmic Solution      dorzolamide 2% Ophthalmic Solution 1 Drop(s) Both EYES every 8 hours  finasteride 5 milliGRAM(s) Oral daily  levothyroxine 25 MICROGram(s) Oral daily  metoprolol succinate ER 12.5 milliGRAM(s) Oral daily  multivitamin/minerals 1 Tablet(s) Oral daily  pantoprazole    Tablet 40 milliGRAM(s) Oral before breakfast  rivaroxaban 15 milliGRAM(s) Oral daily  sodium chloride 0.9%. 1000 milliLiter(s) (60 mL/Hr) IV Continuous <Continuous>  tamsulosin 0.4 milliGRAM(s) Oral at bedtime      	  LABS:	 	                        10.8   3.78  )-----------( 99       ( 15 Feb 2025 06:50 )             32.7     02-15    139  |  111[H]  |  25[H]  ----------------------------<  104[H]  3.7   |  22  |  1.12    Ca    8.1[L]      15 Feb 2025 06:50  Phos  2.6     02-14  Mg     2.0     02-14    TPro  5.9[L]  /  Alb  2.9[L]  /  TBili  0.5  /  DBili  x   /  AST  28  /  ALT  24  /  AlkPhos  93  02-15    Culture - Blood (02.13.25 @ 16:15)   Gram Stain:   Growth in anaerobic bottle: Gram Positive Cocci in Clusters  Specimen Source: .Blood BLOOD  Culture Results:   Growth in anaerobic bottle: Gram Positive Cocci in ClustersCulture - Blood (02.13.25 @ 16:00)   - Staphylococcus epidermidis: Detec  Gram Stain:   Growth in aerobic bottle: Gram Positive Cocci in Clusters  Specimen Source: .Blood BLOOD  Organism: Blood Culture PCR  Culture Results:

## 2025-02-15 NOTE — PROGRESS NOTE ADULT - SUBJECTIVE AND OBJECTIVE BOX
[   ] ICU                                          [   ] CCU                                      [  X ] Medical Floor    Patient is a 89 year old male with abdominal pain and diarrhea. GI consulted to evaluate.         Patient is a 89 year old male home with past medical history significant for lung cancer on Tagrisso, Hyperlipidemia, Glaucoma, HTN, BPH, hypothyroidism, A-fib on Xarelto, right preauricular abscess presented to the emergency room with one day history of frequent watery, non bloody diarrhea associated with fever, chills, nausea, non bloody vomiting and intermittent, 5-6/10 intensity, non radiating diffuse crampy abdominal pain. Patient was admitted to the hospital twice recently and was treated for periauricular abscess with drainage and antibiotics. Patient denies hematemesis, hematochezia, melena, chest pain, SOB, cough, hematuria, dysuria, and recent traveling.      Patient appears comfortable. No new complaints reported, No abdominal pain, N/V, hematemesis, hematochezia, melena, fever, chills, chest pain, SOB, cough reported.           PAST MEDICAL HISTORY    Hypertension    Hyperlipidemia     BPH      Glaucoma      Atrial fibrillation    Hypothyroidism        PAST SURGICAL HISTORY    Hemicolectomy    Cataract extraction status of left eye    Lung biopsy        Allergies    No Known Allergies    Intolerances  None         MEDICATIONS  (STANDING):  atorvastatin 10 milliGRAM(s) Oral at bedtime  digoxin     Tablet 125 MICROGram(s) Oral <User Schedule>  dorzolamide 2% Ophthalmic Solution      dorzolamide 2% Ophthalmic Solution 1 Drop(s) Both EYES every 8 hours  finasteride 5 milliGRAM(s) Oral daily  levothyroxine 25 MICROGram(s) Oral daily  metoprolol succinate ER 12.5 milliGRAM(s) Oral daily  multivitamin/minerals 1 Tablet(s) Oral daily  rivaroxaban 15 milliGRAM(s) Oral daily  sodium chloride 0.9%. 250 milliLiter(s) (500 mL/Hr) IV Continuous <Continuous>  sodium chloride 0.9%. 1000 milliLiter(s) (50 mL/Hr) IV Continuous <Continuous>  tamsulosin 0.4 milliGRAM(s) Oral at bedtime    MEDICATIONS  (PRN):  acetaminophen     Tablet .. 650 milliGRAM(s) Oral every 6 hours PRN Temp greater or equal to 38C (100.4F), Mild Pain (1 - 3)  albuterol    90 MICROgram(s) HFA Inhaler 2 Puff(s) Inhalation every 6 hours PRN Bronchospasm  aluminum hydroxide/magnesium hydroxide/simethicone Suspension 30 milliLiter(s) Oral every 4 hours PRN Dyspepsia  melatonin 3 milliGRAM(s) Oral at bedtime PRN Insomnia  ondansetron Injectable 4 milliGRAM(s) IV Push every 8 hours PRN Nausea and/or Vomiting      SOCIAL HISTORY  Advanced Directives:       [ X ] Full Code       [  ] DNR  Marital Status:         [  ] M      [ X ] S      [  ] D       [  ] W  Children:       [ X ] Yes      [  ] No  Occupation:        [  ] Employed       [ X ] Unemployed       [  ] Retired  Diet:       [ X ] Regular       [  ] PEG feeding          [  ] NG tube feeding  Drug Use:           [ X ] Patient denied          [  ] Yes  Alcohol:           [ X ] No             [  ] Yes (socially)         [  ] Yes (chronic)  Tobacco:           [  ] Yes           [ X ] No      FAMILY HISTORY  [ X ] Heart Disease            [ X ] Diabetes             [ X ] HTN             [  ] Colon Cancer             [  ] Stomach Cancer              [  ] Pancreatic Cancer    VITALS   T(C): 36.4  T(F): 97.6  HR: 75  BP: 114/66   RR: 18    SpO2: 96%      MEDICATIONS  (STANDING):  atorvastatin 10 milliGRAM(s) Oral at bedtime  digoxin     Tablet 125 MICROGram(s) Oral <User Schedule>  dorzolamide 2% Ophthalmic Solution      dorzolamide 2% Ophthalmic Solution 1 Drop(s) Both EYES every 8 hours  finasteride 5 milliGRAM(s) Oral daily  levothyroxine 25 MICROGram(s) Oral daily  metoprolol succinate ER 12.5 milliGRAM(s) Oral daily  multivitamin/minerals 1 Tablet(s) Oral daily  pantoprazole    Tablet 40 milliGRAM(s) Oral before breakfast  rivaroxaban 15 milliGRAM(s) Oral daily  sodium chloride 0.9%. 1000 milliLiter(s) (60 mL/Hr) IV Continuous <Continuous>  tamsulosin 0.4 milliGRAM(s) Oral at bedtime    MEDICATIONS  (PRN):  acetaminophen     Tablet .. 650 milliGRAM(s) Oral every 6 hours PRN Temp greater or equal to 38C (100.4F), Mild Pain (1 - 3)  albuterol    90 MICROgram(s) HFA Inhaler 2 Puff(s) Inhalation every 6 hours PRN Bronchospasm  aluminum hydroxide/magnesium hydroxide/simethicone Suspension 30 milliLiter(s) Oral every 4 hours PRN Dyspepsia  melatonin 3 milliGRAM(s) Oral at bedtime PRN Insomnia  ondansetron Injectable 4 milliGRAM(s) IV Push every 8 hours PRN Nausea and/or Vomiting        Complete Blood Count in AM (02.15.25 @ 06:50)   Auto NRBC: 0 /100 WBCs  WBC Count: 3.78 K/uL  RBC Count: 3.42 M/uL  Hemoglobin: 10.8 g/dL  Hematocrit: 32.7 %  Mean Cell Volume: 95.6 fl  Mean Cell Hemoglobin: 31.6 pg  Mean Cell Hemoglobin Conc: 33.0 g/dL  Red Cell Distrib Width: 15.9 %  Platelet Count - Automated: 99: REV 2-14-25 K/uL

## 2025-02-15 NOTE — DIETITIAN INITIAL EVALUATION ADULT - PERTINENT MEDS FT
MEDICATIONS  (STANDING):  atorvastatin 10 milliGRAM(s) Oral at bedtime  cefTRIAXone   IVPB 1000 milliGRAM(s) IV Intermittent every 24 hours  digoxin     Tablet 125 MICROGram(s) Oral <User Schedule>  dorzolamide 2% Ophthalmic Solution      dorzolamide 2% Ophthalmic Solution 1 Drop(s) Both EYES every 8 hours  finasteride 5 milliGRAM(s) Oral daily  levothyroxine 25 MICROGram(s) Oral daily  metoprolol succinate ER 12.5 milliGRAM(s) Oral daily  multivitamin/minerals 1 Tablet(s) Oral daily  pantoprazole    Tablet 40 milliGRAM(s) Oral before breakfast  rivaroxaban 15 milliGRAM(s) Oral daily  tamsulosin 0.4 milliGRAM(s) Oral at bedtime    MEDICATIONS  (PRN):  acetaminophen     Tablet .. 650 milliGRAM(s) Oral every 6 hours PRN Temp greater or equal to 38C (100.4F), Mild Pain (1 - 3)  albuterol    90 MICROgram(s) HFA Inhaler 2 Puff(s) Inhalation every 6 hours PRN Bronchospasm  aluminum hydroxide/magnesium hydroxide/simethicone Suspension 30 milliLiter(s) Oral every 4 hours PRN Dyspepsia  melatonin 3 milliGRAM(s) Oral at bedtime PRN Insomnia  ondansetron Injectable 4 milliGRAM(s) IV Push every 8 hours PRN Nausea and/or Vomiting

## 2025-02-15 NOTE — DIETITIAN INITIAL EVALUATION ADULT - PROBLEM SELECTOR PLAN 1
Hx of watery diarrhea 8 episodes   /69, RR 18, , afebrile, sats 97% RA   Labs s/o WBC 3 - neutrophilic shift , plt 119, BUN/ Cr 26/ 1.39, flu panel neg   CTAP ? entercolitis   BCx collected   patient appears euvolemic  Hold lasix for now   f/u GI PCR  c diff r/o contact isolation  IDx consulted Dr. Miller

## 2025-02-16 LAB
-  CLINDAMYCIN: SIGNIFICANT CHANGE UP
-  ERYTHROMYCIN: SIGNIFICANT CHANGE UP
-  GENTAMICIN: SIGNIFICANT CHANGE UP
-  OXACILLIN: SIGNIFICANT CHANGE UP
-  PENICILLIN: SIGNIFICANT CHANGE UP
-  RIFAMPIN: SIGNIFICANT CHANGE UP
-  TETRACYCLINE: SIGNIFICANT CHANGE UP
-  TRIMETHOPRIM/SULFAMETHOXAZOLE: SIGNIFICANT CHANGE UP
-  VANCOMYCIN: SIGNIFICANT CHANGE UP
ALBUMIN SERPL ELPH-MCNC: 2.8 G/DL — LOW (ref 3.5–5)
ALP SERPL-CCNC: 98 U/L — SIGNIFICANT CHANGE UP (ref 40–120)
ALT FLD-CCNC: 26 U/L DA — SIGNIFICANT CHANGE UP (ref 10–60)
ANION GAP SERPL CALC-SCNC: 7 MMOL/L — SIGNIFICANT CHANGE UP (ref 5–17)
AST SERPL-CCNC: 30 U/L — SIGNIFICANT CHANGE UP (ref 10–40)
BILIRUB SERPL-MCNC: 0.5 MG/DL — SIGNIFICANT CHANGE UP (ref 0.2–1.2)
BUN SERPL-MCNC: 19 MG/DL — HIGH (ref 7–18)
CALCIUM SERPL-MCNC: 8.4 MG/DL — SIGNIFICANT CHANGE UP (ref 8.4–10.5)
CHLORIDE SERPL-SCNC: 114 MMOL/L — HIGH (ref 96–108)
CO2 SERPL-SCNC: 20 MMOL/L — LOW (ref 22–31)
CREAT SERPL-MCNC: 1.07 MG/DL — SIGNIFICANT CHANGE UP (ref 0.5–1.3)
CULTURE RESULTS: ABNORMAL
EGFR: 65 ML/MIN/1.73M2 — SIGNIFICANT CHANGE UP
GLUCOSE SERPL-MCNC: 106 MG/DL — HIGH (ref 70–99)
HCT VFR BLD CALC: 31.4 % — LOW (ref 39–50)
HGB BLD-MCNC: 10.6 G/DL — LOW (ref 13–17)
MCHC RBC-ENTMCNC: 31.9 PG — SIGNIFICANT CHANGE UP (ref 27–34)
MCHC RBC-ENTMCNC: 33.8 G/DL — SIGNIFICANT CHANGE UP (ref 32–36)
MCV RBC AUTO: 94.6 FL — SIGNIFICANT CHANGE UP (ref 80–100)
METHOD TYPE: SIGNIFICANT CHANGE UP
NRBC BLD AUTO-RTO: 0 /100 WBCS — SIGNIFICANT CHANGE UP (ref 0–0)
ORGANISM # SPEC MICROSCOPIC CNT: ABNORMAL
ORGANISM # SPEC MICROSCOPIC CNT: ABNORMAL
PLATELET # BLD AUTO: 97 K/UL — LOW (ref 150–400)
POTASSIUM SERPL-MCNC: 3.8 MMOL/L — SIGNIFICANT CHANGE UP (ref 3.5–5.3)
POTASSIUM SERPL-SCNC: 3.8 MMOL/L — SIGNIFICANT CHANGE UP (ref 3.5–5.3)
PROT SERPL-MCNC: 5.7 G/DL — LOW (ref 6–8.3)
RBC # BLD: 3.32 M/UL — LOW (ref 4.2–5.8)
RBC # FLD: 15.9 % — HIGH (ref 10.3–14.5)
SODIUM SERPL-SCNC: 141 MMOL/L — SIGNIFICANT CHANGE UP (ref 135–145)
SPECIMEN SOURCE: SIGNIFICANT CHANGE UP
WBC # BLD: 4.19 K/UL — SIGNIFICANT CHANGE UP (ref 3.8–10.5)
WBC # FLD AUTO: 4.19 K/UL — SIGNIFICANT CHANGE UP (ref 3.8–10.5)

## 2025-02-16 PROCEDURE — 70487 CT MAXILLOFACIAL W/DYE: CPT | Mod: 26

## 2025-02-16 RX ADMIN — Medication 1 TABLET(S): at 12:03

## 2025-02-16 RX ADMIN — METOPROLOL SUCCINATE 12.5 MILLIGRAM(S): 50 TABLET, EXTENDED RELEASE ORAL at 05:26

## 2025-02-16 RX ADMIN — FINASTERIDE 5 MILLIGRAM(S): 1 TABLET, FILM COATED ORAL at 12:03

## 2025-02-16 RX ADMIN — Medication 25 MICROGRAM(S): at 05:25

## 2025-02-16 RX ADMIN — RIVAROXABAN 15 MILLIGRAM(S): 10 TABLET, FILM COATED ORAL at 12:03

## 2025-02-16 RX ADMIN — DORZOLAMIDE 1 DROP(S): 20 SOLUTION/ DROPS OPHTHALMIC at 21:12

## 2025-02-16 RX ADMIN — DORZOLAMIDE 1 DROP(S): 20 SOLUTION/ DROPS OPHTHALMIC at 05:24

## 2025-02-16 RX ADMIN — ATORVASTATIN CALCIUM 10 MILLIGRAM(S): 80 TABLET, FILM COATED ORAL at 21:12

## 2025-02-16 RX ADMIN — Medication 40 MILLIGRAM(S): at 05:27

## 2025-02-16 RX ADMIN — TAMSULOSIN HYDROCHLORIDE 0.4 MILLIGRAM(S): 0.4 CAPSULE ORAL at 21:12

## 2025-02-16 RX ADMIN — DORZOLAMIDE 1 DROP(S): 20 SOLUTION/ DROPS OPHTHALMIC at 13:07

## 2025-02-16 NOTE — PROGRESS NOTE ADULT - SUBJECTIVE AND OBJECTIVE BOX
Date of Service 02-16-25 @ 12:56    CHIEF COMPLAINT:Patient is a 89y old  Male who presents with a chief complaint of Diarrhea, enterocolitis .pt reports drainage out of abcess by Rt ear.    	  REVIEW OF SYSTEMS:  CONSTITUTIONAL: No fever, weight loss, or fatigue  EYES: No eye pain, visual disturbances, or discharge  ENT:  No difficulty hearing, tinnitus, vertigo; No sinus or throat pain  NECK: No pain or stiffness  RESPIRATORY: No cough, wheezing, chills or hemoptysis; No Shortness of Breath  CARDIOVASCULAR: No chest pain, palpitations, passing out, dizziness, or leg swelling  GASTROINTESTINAL: No abdominal or epigastric pain. No nausea, vomiting, or hematemesis; No diarrhea or constipation. No melena or hematochezia.  GENITOURINARY: No dysuria, frequency, hematuria, or incontinence  NEUROLOGICAL: No headaches, memory loss, loss of strength, numbness, or tremors  SKIN: No itching, burning, rashes, or lesions   LYMPH Nodes: No enlarged glands  ENDOCRINE: No heat or cold intolerance; No hair loss  MUSCULOSKELETAL: No joint pain or swelling; No muscle, back, or extremity pain  PSYCHIATRIC: No depression, anxiety, mood swings, or difficulty sleeping  HEME/LYMPH: No easy bruising, or bleeding gums  ALLERGY AND IMMUNOLOGIC: No hives or eczema	    PHYSICAL EXAM:  T(C): 36.9 (02-16-25 @ 05:21), Max: 37.4 (02-15-25 @ 21:22)  HR: 79 (02-16-25 @ 05:21) (69 - 88)  BP: 108/64 (02-16-25 @ 05:21) (107/52 - 111/56)  RR: 18 (02-16-25 @ 05:21) (18 - 20)  SpO2: 94% (02-16-25 @ 05:21) (94% - 99%)  Wt(kg): --  I&O's Summary      Appearance: Normal	  HEENT:   Normal oral mucosa, PERRL, EOMI	  Lymphatic: No lymphadenopathy  Cardiovascular: Normal S1 S2, No JVD, No murmurs, No edema  Respiratory: Lungs clear to auscultation	  Psychiatry: A & O x 3, Mood & affect appropriate  Gastrointestinal:  Soft, Non-tender, + BS	  Skin: No rashes, No ecchymoses, No cyanosis	  Neurologic: Non-focal  Extremities: Normal range of motion, No clubbing, cyanosis or edema  Vascular: Peripheral pulses palpable 2+ bilaterally    MEDICATIONS  (STANDING):  atorvastatin 10 milliGRAM(s) Oral at bedtime  digoxin     Tablet 125 MICROGram(s) Oral <User Schedule>  dorzolamide 2% Ophthalmic Solution      dorzolamide 2% Ophthalmic Solution 1 Drop(s) Both EYES every 8 hours  finasteride 5 milliGRAM(s) Oral daily  levothyroxine 25 MICROGram(s) Oral daily  metoprolol succinate ER 12.5 milliGRAM(s) Oral daily  multivitamin/minerals 1 Tablet(s) Oral daily  pantoprazole    Tablet 40 milliGRAM(s) Oral before breakfast  rivaroxaban 15 milliGRAM(s) Oral daily  sodium chloride 0.9%. 1000 milliLiter(s) (60 mL/Hr) IV Continuous <Continuous>  tamsulosin 0.4 milliGRAM(s) Oral at bedtime      	  LABS:	 	                         10.6   4.19  )-----------( 97       ( 16 Feb 2025 06:53 )             31.4     02-16    141  |  114[H]  |  19[H]  ----------------------------<  106[H]  3.8   |  20[L]  |  1.07    Ca    8.4      16 Feb 2025 06:53    TPro  5.7[L]  /  Alb  2.8[L]  /  TBili  0.5  /  DBili  x   /  AST  30  /  ALT  26  /  AlkPhos  98  02-16    norovirus+

## 2025-02-16 NOTE — PROGRESS NOTE ADULT - ASSESSMENT
C. difficile Colitis ruled  out  Enterocolitis - still having watery stools   Fever - afebrile today   Norovirus     Plan:    ·	Not on any antibiotics now   ·	Blood cultures grew out Staph Epi which is a contaminant   ·	repeat blood cultures testing

## 2025-02-16 NOTE — PROGRESS NOTE ADULT - SUBJECTIVE AND OBJECTIVE BOX
[   ] ICU                                          [   ] CCU                                      [ X  ] Medical Floor    Patient is a 89 year old male with abdominal pain and diarrhea. GI consulted to evaluate.         Patient is a 89 year old male home with past medical history significant for lung cancer on Tagrisso, Hyperlipidemia, Glaucoma, HTN, BPH, hypothyroidism, A-fib on Xarelto, right preauricular abscess presented to the emergency room with one day history of frequent watery, non bloody diarrhea associated with fever, chills, nausea, non bloody vomiting and intermittent, 5-6/10 intensity, non radiating diffuse crampy abdominal pain. Patient was admitted to the hospital twice recently and was treated for periauricular abscess with drainage and antibiotics. Patient denies hematemesis, hematochezia, melena, chest pain, SOB, cough, hematuria, dysuria, and recent traveling.      Patient appears comfortable. No new complaints reported, No abdominal pain, N/V, hematemesis, hematochezia, melena, fever, chills, chest pain, SOB, cough reported.           PAST MEDICAL HISTORY    Hypertension    Hyperlipidemia     BPH      Glaucoma      Atrial fibrillation    Hypothyroidism        PAST SURGICAL HISTORY    Hemicolectomy    Cataract extraction status of left eye    Lung biopsy        Allergies    No Known Allergies    Intolerances  None          SOCIAL HISTORY  Advanced Directives:       [ X ] Full Code       [  ] DNR  Marital Status:         [  ] M      [ X ] S      [  ] D       [  ] W  Children:       [ X ] Yes      [  ] No  Occupation:        [  ] Employed       [ X ] Unemployed       [  ] Retired  Diet:       [ X ] Regular       [  ] PEG feeding          [  ] NG tube feeding  Drug Use:           [ X ] Patient denied          [  ] Yes  Alcohol:           [ X ] No             [  ] Yes (socially)         [  ] Yes (chronic)  Tobacco:           [  ] Yes           [ X ] No      FAMILY HISTORY  [ X ] Heart Disease            [ X ] Diabetes             [ X ] HTN             [  ] Colon Cancer             [  ] Stomach Cancer              [  ] Pancreatic Cancer        VITALS   Vital Signs Last 24 Hrs  T(C): 36.3 (02-16-25 @ 13:04), Max: 37.4 (02-15-25 @ 21:22)  T(F): 97.4 (02-16-25 @ 13:04), Max: 99.4 (02-15-25 @ 21:22)  HR: 58 (02-16-25 @ 13:04) (58 - 79)  BP: 122/68 (02-16-25 @ 13:04) (108/64 - 122/68)     RR: 16 (02-16-25 @ 13:04) (16 - 20)  SpO2: 95% (02-16-25 @ 13:04) (94% - 96%)        MEDICATIONS  (STANDING):  atorvastatin 10 milliGRAM(s) Oral at bedtime  digoxin     Tablet 125 MICROGram(s) Oral <User Schedule>  dorzolamide 2% Ophthalmic Solution      dorzolamide 2% Ophthalmic Solution 1 Drop(s) Both EYES every 8 hours  finasteride 5 milliGRAM(s) Oral daily  levothyroxine 25 MICROGram(s) Oral daily  metoprolol succinate ER 12.5 milliGRAM(s) Oral daily  multivitamin/minerals 1 Tablet(s) Oral daily  pantoprazole    Tablet 40 milliGRAM(s) Oral before breakfast  rivaroxaban 15 milliGRAM(s) Oral daily  sodium chloride 0.9%. 1000 milliLiter(s) (60 mL/Hr) IV Continuous <Continuous>  tamsulosin 0.4 milliGRAM(s) Oral at bedtime    MEDICATIONS  (PRN):  acetaminophen     Tablet .. 650 milliGRAM(s) Oral every 6 hours PRN Temp greater or equal to 38C (100.4F), Mild Pain (1 - 3)  albuterol    90 MICROgram(s) HFA Inhaler 2 Puff(s) Inhalation every 6 hours PRN Bronchospasm  aluminum hydroxide/magnesium hydroxide/simethicone Suspension 30 milliLiter(s) Oral every 4 hours PRN Dyspepsia  melatonin 3 milliGRAM(s) Oral at bedtime PRN Insomnia  ondansetron Injectable 4 milliGRAM(s) IV Push every 8 hours PRN Nausea and/or Vomiting                            10.6   4.19  )-----------( 97       ( 16 Feb 2025 06:53 )             31.4       02-16    141  |  114[H]  |  19[H]  ----------------------------<  106[H]  3.8   |  20[L]  |  1.07    Ca    8.4      16 Feb 2025 06:53    TPro  5.7[L]  /  Alb  2.8[L]  /  TBili  0.5  /  DBili  x   /  AST  30  /  ALT  26  /  AlkPhos  98  02-16

## 2025-02-16 NOTE — PROGRESS NOTE ADULT - SUBJECTIVE AND OBJECTIVE BOX
89y Male is under our care for     MEDS:    ALLERGIES: Allergies    No Known Allergies    Intolerances    REVIEW OF SYSTEMS:  [  ] Not able to elicit  General:	  Chest:	  GI:	  :  Skin:	  Musculoskeletal:	  Neuro:	    VITALS:  Vital Signs Last 24 Hrs  T(C): 36.9 (16 Feb 2025 05:21), Max: 37.4 (15 Feb 2025 21:22)  T(F): 98.4 (16 Feb 2025 05:21), Max: 99.4 (15 Feb 2025 21:22)  HR: 79 (16 Feb 2025 05:21) (69 - 88)  BP: 108/64 (16 Feb 2025 05:21) (107/52 - 111/56)  BP(mean): 65 (15 Feb 2025 14:29) (65 - 65)  RR: 18 (16 Feb 2025 05:21) (18 - 20)  SpO2: 94% (16 Feb 2025 05:21) (94% - 99%)    Parameters below as of 16 Feb 2025 05:21  Patient On (Oxygen Delivery Method): room air    PHYSICAL EXAM:  HEENT:  Neck:  Respiratory:  Cardiovascular:  Gastrointestinal:  :  Extremities:  Skin:  Ortho:  Neuro:    LABS/DIAGNOSTIC TESTS:                        10.6   4.19  )-----------( 97       ( 16 Feb 2025 06:53 )             31.4     WBC Count: 4.19 K/uL (02-16 @ 06:53)  WBC Count: 3.78 K/uL (02-15 @ 06:50)  WBC Count: 1.85 K/uL (02-14 @ 05:43)  WBC Count: 3.58 K/uL (02-13 @ 14:30)    02-16    141  |  114[H]  |  19[H]  ----------------------------<  106[H]  3.8   |  20[L]  |  1.07    Ca    8.4      16 Feb 2025 06:53    TPro  5.7[L]  /  Alb  2.8[L]  /  TBili  0.5  /  DBili  x   /  AST  30  /  ALT  26  /  AlkPhos  98  02-16    CULTURES:   .Blood BLOOD  02-13 @ 16:15   Growth in anaerobic bottle: Staphylococcus epidermidis  --    Growth in anaerobic bottle: Gram Positive Cocci in Clusters    .Blood BLOOD  02-13 @ 16:00   Growth in aerobic bottle: Staphylococcus epidermidis Isolation of  Coagulase negative Staphylococcus from single blood culture sets may  represent  contamination. Contact the Microbiology Department at [phone] if  susceptibility testing is needed.  clinically indicated.  Direct identification is available within approximately 3-5  hours either by Blood Panel Multiplexed PCR or Direct  MALDI-TOF. Details: [email or web address]  --  Blood Culture PCR    Drainage  12-28 @ 04:00   No growth  --  --    .Blood BLOOD  12-28 @ 00:40   No growth at 5 days  --  --    .Blood BLOOD  12-28 @ 00:30   No growth at 5 days  --  --    .Abscess  12-13 @ 08:27   Few Enterococcus faecalis  Few Prevotella disiens "Susceptibilities not performed"  --  Enterococcus faecalis    .Blood BLOOD  12-12 @ 14:50   No growth at 5 days  --  --    .Blood BLOOD  12-12 @ 14:35   No growth at 5 days  --  --    RADIOLOGY:  no new studies 89y Male lying in bed and in no acute distress, reports he is feeling better today and has not had any diarrhea since yesterday afternoon. Denies nausea vomiting or abdominal pain. No fevers or chills. Repeat blood cultures are still testing.     MEDS:    ALLERGIES: Allergies    No Known Allergies    Intolerances    REVIEW OF SYSTEMS:  [  ] Not able to elicit  General: no fevers no malaise  Chest: no cough no sob  GI: no nausea or vomiting, +diarrhea yesterday afternoon   : no urinary sxs   Skin: no rashes  Musculoskeletal: no trauma no LBP  Neuro: no ha's no dizziness     VITALS:  Vital Signs Last 24 Hrs  T(C): 36.9 (16 Feb 2025 05:21), Max: 37.4 (15 Feb 2025 21:22)  T(F): 98.4 (16 Feb 2025 05:21), Max: 99.4 (15 Feb 2025 21:22)  HR: 79 (16 Feb 2025 05:21) (69 - 88)  BP: 108/64 (16 Feb 2025 05:21) (107/52 - 111/56)  BP(mean): 65 (15 Feb 2025 14:29) (65 - 65)  RR: 18 (16 Feb 2025 05:21) (18 - 20)  SpO2: 94% (16 Feb 2025 05:21) (94% - 99%)    Parameters below as of 16 Feb 2025 05:21  Patient On (Oxygen Delivery Method): room air    PHYSICAL EXAM:  General: NAD  HEENT: normocephalic with moist oral mucosa, right preauricular mild erythema with no drainage   Neck: supple no LN's no JVD  Respiratory: lungs clear no rales no rhonchi  Cardiovascular: S1 S2 reg no murmurs  Gastrointestinal: +BS with soft, nondistended abdomen; nontender  : voids with urinal   Extremities: no edema no cyanosis  Skin: no rashes  Ortho: no jt swelling  Neuro: AAO x 3    LABS/DIAGNOSTIC TESTS:                        10.6   4.19  )-----------( 97       ( 16 Feb 2025 06:53 )             31.4     WBC Count: 4.19 K/uL (02-16 @ 06:53)  WBC Count: 3.78 K/uL (02-15 @ 06:50)  WBC Count: 1.85 K/uL (02-14 @ 05:43)  WBC Count: 3.58 K/uL (02-13 @ 14:30)    02-16    141  |  114[H]  |  19[H]  ----------------------------<  106[H]  3.8   |  20[L]  |  1.07    Ca    8.4      16 Feb 2025 06:53    TPro  5.7[L]  /  Alb  2.8[L]  /  TBili  0.5  /  DBili  x   /  AST  30  /  ALT  26  /  AlkPhos  98  02-16    CULTURES:   .Blood BLOOD  02-13 @ 16:15   Growth in anaerobic bottle: Staphylococcus epidermidis  --    Growth in anaerobic bottle: Gram Positive Cocci in Clusters    .Blood BLOOD  02-13 @ 16:00   Growth in aerobic bottle: Staphylococcus epidermidis Isolation of  Coagulase negative Staphylococcus from single blood culture sets may  represent  contamination. Contact the Microbiology Department at [phone] if  susceptibility testing is needed.  clinically indicated.  Direct identification is available within approximately 3-5  hours either by Blood Panel Multiplexed PCR or Direct  MALDI-TOF. Details: [email or web address]  --  Blood Culture PCR    Drainage  12-28 @ 04:00   No growth  --  --    .Blood BLOOD  12-28 @ 00:40   No growth at 5 days  --  --    .Blood BLOOD  12-28 @ 00:30   No growth at 5 days  --  --    .Abscess  12-13 @ 08:27   Few Enterococcus faecalis  Few Prevotella disiens "Susceptibilities not performed"  --  Enterococcus faecalis    .Blood BLOOD  12-12 @ 14:50   No growth at 5 days  --  --    .Blood BLOOD  12-12 @ 14:35   No growth at 5 days  --  --    RADIOLOGY:

## 2025-02-16 NOTE — PROGRESS NOTE ADULT - ASSESSMENT
89y male from home w / a PMHx of lung cancer on Tagrisso,HLD, HTN, BPH, hypothyroidism, Chronic A-fib on Xarelto,Chronic systolic HF,Hypotension,s/p  right preauricular abscess presenting to the ED with one day of nausea, vomiting, diarrhea and fatigue,enterocolitis,HONG,+ blood cx-? contaminant,Norovirus..  1.Enterocolitis-stool cx for cdiff-.  2.Chronic afib-xarelto,lopressor,dig.  3.Chronisc systolic HF-b blocker ,low dose ace.Hold lasix.  4.CT maxillofacial-drainage sie Rt ear abcess.  5.Hypothyroid-synthroid.  6.BPH-proscar.  7.PPI.  8.Repeat blood cx.

## 2025-02-16 NOTE — PROGRESS NOTE ADULT - SUBJECTIVE AND OBJECTIVE BOX
INTERVAL HPI/OVERNIGHT EVENTS:  Patient seen,no issues  VITAL SIGNS:  T(F): 97.4 (02-16-25 @ 13:04)  HR: 58 (02-16-25 @ 13:04)  BP: 122/68 (02-16-25 @ 13:04)  RR: 16 (02-16-25 @ 13:04)  SpO2: 95% (02-16-25 @ 13:04)  Wt(kg): --    PHYSICAL EXAM:    Constitutional:awake,alert  Eyes:perrla  ENMT:perrla  Neck:  Respiratory:clear  Cardiovascular:s1s2,m-none  Gastrointestinal:soft,bs pos  Extremities:  Vascular:  Neurological:no focal deficit  Musculoskeletal:    MEDICATIONS  (STANDING):  atorvastatin 10 milliGRAM(s) Oral at bedtime  digoxin     Tablet 125 MICROGram(s) Oral <User Schedule>  dorzolamide 2% Ophthalmic Solution      dorzolamide 2% Ophthalmic Solution 1 Drop(s) Both EYES every 8 hours  finasteride 5 milliGRAM(s) Oral daily  levothyroxine 25 MICROGram(s) Oral daily  metoprolol succinate ER 12.5 milliGRAM(s) Oral daily  multivitamin/minerals 1 Tablet(s) Oral daily  pantoprazole    Tablet 40 milliGRAM(s) Oral before breakfast  rivaroxaban 15 milliGRAM(s) Oral daily  sodium chloride 0.9%. 1000 milliLiter(s) (60 mL/Hr) IV Continuous <Continuous>  tamsulosin 0.4 milliGRAM(s) Oral at bedtime    MEDICATIONS  (PRN):  acetaminophen     Tablet .. 650 milliGRAM(s) Oral every 6 hours PRN Temp greater or equal to 38C (100.4F), Mild Pain (1 - 3)  albuterol    90 MICROgram(s) HFA Inhaler 2 Puff(s) Inhalation every 6 hours PRN Bronchospasm  aluminum hydroxide/magnesium hydroxide/simethicone Suspension 30 milliLiter(s) Oral every 4 hours PRN Dyspepsia  melatonin 3 milliGRAM(s) Oral at bedtime PRN Insomnia  ondansetron Injectable 4 milliGRAM(s) IV Push every 8 hours PRN Nausea and/or Vomiting      Allergies    No Known Allergies    Intolerances        LABS:                        10.6   4.19  )-----------( 97       ( 16 Feb 2025 06:53 )             31.4     02-16    141  |  114[H]  |  19[H]  ----------------------------<  106[H]  3.8   |  20[L]  |  1.07    Ca    8.4      16 Feb 2025 06:53    TPro  5.7[L]  /  Alb  2.8[L]  /  TBili  0.5  /  DBili  x   /  AST  30  /  ALT  26  /  AlkPhos  98  02-16      Urinalysis Basic - ( 16 Feb 2025 06:53 )    Color: x / Appearance: x / SG: x / pH: x  Gluc: 106 mg/dL / Ketone: x  / Bili: x / Urobili: x   Blood: x / Protein: x / Nitrite: x   Leuk Esterase: x / RBC: x / WBC x   Sq Epi: x / Non Sq Epi: x / Bacteria: x        RADIOLOGY & ADDITIONAL TESTS:      Assessment and Plan:   · Assessment	   Mr. Enriquez is a 89y male from home w / a PMHx of lung cancer on Tagrisso, HLD, HTN, BPH, hypothyroidism, A-fib on Xarelto, right preauricular abscess presenting to the ED with one day of nausea, vomiting, diarrhea and fatigue.   Admitted to medicine for enterocolitis   C.diff NEG   f/u GI PCR and Bcx   Cardiology consulted pt with hx afib w hypotension   ID following        Problem/Plan - 1:  ·  Problem: Enterocolitis-improved clinically  CTAP + enterocolitis   BCx pending   lasix held for now  C.Diff NEG    f/u GI PCR  IDx f/up appret     Problem/Plan - 2:  ·  Problem: Periauricular abscess, right.-improved clinically  ·  Plan: -s/p recent hospitalizations for his preauricular abscess 12/14 and 12/28  that both resulted in drainage by OM[facility] at [facility] IV Unasyn transitioned to p.o. Augmentin, completed 8 days prior   reports continued pus from site   - Pt reports drainage from R Periauricular area this morning, on assessment no drainage, redness noted, No pain reported.    - ID following.     Problem/Plan - 3:  ·  Problem: HTN (hypertension).   ·  Plan: h/o HTN   noted hypotension   Monitor BP  hold BP meds   Cardiology Dr Dominique consulted.     Problem/Plan - 4:  ·  Problem: HLD (hyperlipidemia).   ·  Plan: -on Simvastatin 10mg   - c/w atorvastatin conversion  - Dash Diet.     Problem/Plan - 5:  ·  Problem: History of BPH.   ·  Plan: Hx of BPH   On Tamsulosin 0.4mg and Finasteride 5mg   c/w home mediations.     Problem/Plan - 6:  ·  Problem: Hypothyroid.   ·  Plan: Hx of hypothyroid  on Levothyroxine 25 mcg at home   c/w home med.     Problem/Plan - 7:  ·  Problem: Atrial fibrillation.   ·  Plan: Known hx of afib  Not in RVR  On digoxin 125mcg , lopressor , Xarelto 15mg     -cont home meds   -cont xarelto    Cards Dr Dominique Consulted.     Problem/Plan - 8:  ·  Problem: Preventive measure.   ·  Plan: xarelto.     Problem/Plan - 9:  ·  Problem: Discharge planning issues.   ·  Plan: Pt from Home   f/u Bcx   f/u GI PCR, ID & cards rec's.

## 2025-02-17 LAB
ALBUMIN SERPL ELPH-MCNC: 2.7 G/DL — LOW (ref 3.5–5)
ALP SERPL-CCNC: 102 U/L — SIGNIFICANT CHANGE UP (ref 40–120)
ALT FLD-CCNC: 32 U/L DA — SIGNIFICANT CHANGE UP (ref 10–60)
ANION GAP SERPL CALC-SCNC: 8 MMOL/L — SIGNIFICANT CHANGE UP (ref 5–17)
AST SERPL-CCNC: 28 U/L — SIGNIFICANT CHANGE UP (ref 10–40)
BILIRUB SERPL-MCNC: 0.5 MG/DL — SIGNIFICANT CHANGE UP (ref 0.2–1.2)
BUN SERPL-MCNC: 19 MG/DL — HIGH (ref 7–18)
CALCIUM SERPL-MCNC: 8.4 MG/DL — SIGNIFICANT CHANGE UP (ref 8.4–10.5)
CHLORIDE SERPL-SCNC: 112 MMOL/L — HIGH (ref 96–108)
CO2 SERPL-SCNC: 22 MMOL/L — SIGNIFICANT CHANGE UP (ref 22–31)
CREAT SERPL-MCNC: 1.18 MG/DL — SIGNIFICANT CHANGE UP (ref 0.5–1.3)
EGFR: 58 ML/MIN/1.73M2 — LOW
GLUCOSE SERPL-MCNC: 110 MG/DL — HIGH (ref 70–99)
HCT VFR BLD CALC: 31.3 % — LOW (ref 39–50)
HGB BLD-MCNC: 10.7 G/DL — LOW (ref 13–17)
MCHC RBC-ENTMCNC: 32.2 PG — SIGNIFICANT CHANGE UP (ref 27–34)
MCHC RBC-ENTMCNC: 34.2 G/DL — SIGNIFICANT CHANGE UP (ref 32–36)
MCV RBC AUTO: 94.3 FL — SIGNIFICANT CHANGE UP (ref 80–100)
NRBC BLD AUTO-RTO: 0 /100 WBCS — SIGNIFICANT CHANGE UP (ref 0–0)
PLATELET # BLD AUTO: 100 K/UL — LOW (ref 150–400)
POTASSIUM SERPL-MCNC: 3.3 MMOL/L — LOW (ref 3.5–5.3)
POTASSIUM SERPL-SCNC: 3.3 MMOL/L — LOW (ref 3.5–5.3)
PROT SERPL-MCNC: 5.9 G/DL — LOW (ref 6–8.3)
RBC # BLD: 3.32 M/UL — LOW (ref 4.2–5.8)
RBC # FLD: 16 % — HIGH (ref 10.3–14.5)
SODIUM SERPL-SCNC: 142 MMOL/L — SIGNIFICANT CHANGE UP (ref 135–145)
WBC # BLD: 4.4 K/UL — SIGNIFICANT CHANGE UP (ref 3.8–10.5)
WBC # FLD AUTO: 4.4 K/UL — SIGNIFICANT CHANGE UP (ref 3.8–10.5)

## 2025-02-17 RX ADMIN — FINASTERIDE 5 MILLIGRAM(S): 1 TABLET, FILM COATED ORAL at 11:49

## 2025-02-17 RX ADMIN — RIVAROXABAN 15 MILLIGRAM(S): 10 TABLET, FILM COATED ORAL at 11:49

## 2025-02-17 RX ADMIN — ATORVASTATIN CALCIUM 10 MILLIGRAM(S): 80 TABLET, FILM COATED ORAL at 21:14

## 2025-02-17 RX ADMIN — Medication 25 MICROGRAM(S): at 05:23

## 2025-02-17 RX ADMIN — Medication 40 MILLIEQUIVALENT(S): at 11:49

## 2025-02-17 RX ADMIN — METOPROLOL SUCCINATE 12.5 MILLIGRAM(S): 50 TABLET, EXTENDED RELEASE ORAL at 05:26

## 2025-02-17 RX ADMIN — DORZOLAMIDE 1 DROP(S): 20 SOLUTION/ DROPS OPHTHALMIC at 21:14

## 2025-02-17 RX ADMIN — Medication 1 TABLET(S): at 11:48

## 2025-02-17 RX ADMIN — DORZOLAMIDE 1 DROP(S): 20 SOLUTION/ DROPS OPHTHALMIC at 05:24

## 2025-02-17 RX ADMIN — TAMSULOSIN HYDROCHLORIDE 0.4 MILLIGRAM(S): 0.4 CAPSULE ORAL at 21:14

## 2025-02-17 RX ADMIN — Medication 40 MILLIGRAM(S): at 05:28

## 2025-02-17 RX ADMIN — DORZOLAMIDE 1 DROP(S): 20 SOLUTION/ DROPS OPHTHALMIC at 13:59

## 2025-02-17 NOTE — PROGRESS NOTE ADULT - SUBJECTIVE AND OBJECTIVE BOX
[   ] ICU                                          [   ] CCU                                      [ X  ] Medical Floor    Patient is a 89 year old male with abdominal pain and diarrhea. GI consulted to evaluate.         Patient is a 89 year old male home with past medical history significant for lung cancer on Tagrisso, Hyperlipidemia, Glaucoma, HTN, BPH, hypothyroidism, A-fib on Xarelto, right preauricular abscess presented to the emergency room with one day history of frequent watery, non bloody diarrhea associated with fever, chills, nausea, non bloody vomiting and intermittent, 5-6/10 intensity, non radiating diffuse crampy abdominal pain. Patient was admitted to the hospital twice recently and was treated for periauricular abscess with drainage and antibiotics. Patient denies hematemesis, hematochezia, melena, chest pain, SOB, cough, hematuria, dysuria, and recent traveling.      Patient appears comfortable. No new complaints reported, No abdominal pain, N/V, hematemesis, hematochezia, melena, fever, chills, chest pain, SOB, cough reported.         PAST MEDICAL HISTORY    Hypertension    Hyperlipidemia     BPH      Glaucoma      Atrial fibrillation    Hypothyroidism        PAST SURGICAL HISTORY    Hemicolectomy    Cataract extraction status of left eye    Lung biopsy        Allergies    No Known Allergies    Intolerances  None          SOCIAL HISTORY  Advanced Directives:       [ X ] Full Code       [  ] DNR  Marital Status:         [  ] M      [ X ] S      [  ] D       [  ] W  Children:       [ X ] Yes      [  ] No  Occupation:        [  ] Employed       [ X ] Unemployed       [  ] Retired  Diet:       [ X ] Regular       [  ] PEG feeding          [  ] NG tube feeding  Drug Use:           [ X ] Patient denied          [  ] Yes  Alcohol:           [ X ] No             [  ] Yes (socially)         [  ] Yes (chronic)  Tobacco:           [  ] Yes           [ X ] No      FAMILY HISTORY  [ X ] Heart Disease            [ X ] Diabetes             [ X ] HTN             [  ] Colon Cancer             [  ] Stomach Cancer              [  ] Pancreatic Cancer    VITALS   T(C): 37.1  T(F): 98.8  HR: 82  BP: 117/59  RR: 18   SpO2: 98%     MEDICATIONS  (STANDING):  ascorbic acid 500 milliGRAM(s) Oral daily  atorvastatin 10 milliGRAM(s) Oral at bedtime  digoxin     Tablet 125 MICROGram(s) Oral <User Schedule>  dorzolamide 2% Ophthalmic Solution 1 Drop(s) Both EYES every 8 hours  dorzolamide 2% Ophthalmic Solution      finasteride 5 milliGRAM(s) Oral daily  levothyroxine 25 MICROGram(s) Oral daily  metoprolol succinate ER 12.5 milliGRAM(s) Oral daily  multivitamin 1 Tablet(s) Oral daily  multivitamin/minerals 1 Tablet(s) Oral daily  pantoprazole    Tablet 40 milliGRAM(s) Oral before breakfast  rivaroxaban 15 milliGRAM(s) Oral daily  tamsulosin 0.4 milliGRAM(s) Oral at bedtime  zinc sulfate 220 milliGRAM(s) Oral two times a day    MEDICATIONS  (PRN):  acetaminophen     Tablet .. 650 milliGRAM(s) Oral every 6 hours PRN Temp greater or equal to 38C (100.4F), Mild Pain (1 - 3)  albuterol    90 MICROgram(s) HFA Inhaler 2 Puff(s) Inhalation every 6 hours PRN Bronchospasm  aluminum hydroxide/magnesium hydroxide/simethicone Suspension 30 milliLiter(s) Oral every 4 hours PRN Dyspepsia  melatonin 3 milliGRAM(s) Oral at bedtime PRN Insomnia  ondansetron Injectable 4 milliGRAM(s) IV Push every 8 hours PRN Nausea and/or Vomiting                            10.7   4.40  )-----------( 100      ( 17 Feb 2025 07:11 )             31.3       02-17    142  |  112[H]  |  19[H]  ----------------------------<  110[H]  3.3[L]   |  22  |  1.18    Ca    8.4      17 Feb 2025 07:11    TPro  5.9[L]  /  Alb  2.7[L]  /  TBili  0.5  /  DBili  x   /  AST  28  /  ALT  32  /  AlkPhos  102  02-17

## 2025-02-17 NOTE — PROGRESS NOTE ADULT - SUBJECTIVE AND OBJECTIVE BOX
89y Male sitting up in the bed and eating breakfast, denies any diarrhea in the past 2 days. No nausea vomiting or abdominal pain. Remains afebrile and WBC count is normal. His repeat blood cultures are negative to date.     MEDS:    ALLERGIES: Allergies    No Known Allergies    Intolerances    REVIEW OF SYSTEMS:  [  ] Not able to elicit  General: no fevers no malaise  Chest: no cough no sob  GI: no nvd  : no urinary sxs   Skin: no rashes  Musculoskeletal: no trauma no LBP  Neuro: no ha's no dizziness     VITALS:  Vital Signs Last 24 Hrs  T(C): 36.4 (17 Feb 2025 05:08), Max: 37.3 (16 Feb 2025 20:43)  T(F): 97.6 (17 Feb 2025 05:08), Max: 99.1 (16 Feb 2025 20:43)  HR: 85 (17 Feb 2025 05:08) (58 - 85)  BP: 115/61 (17 Feb 2025 05:08) (115/61 - 122/68)  BP(mean): --  RR: 18 (17 Feb 2025 05:08) (16 - 18)  SpO2: 97% (17 Feb 2025 05:08) (95% - 97%)    Parameters below as of 17 Feb 2025 05:08  Patient On (Oxygen Delivery Method): room air    PHYSICAL EXAM:  General: NAD  HEENT: normocephalic with moist oral mucosa, right preauricular slight erythema with no drainage   Neck: supple no LN's no JVD  Respiratory: lungs clear no rales no rhonchi  Cardiovascular: S1 S2 reg no murmurs  Gastrointestinal: +BS with soft, nondistended abdomen; nontender  : voids with urinal   Extremities: no edema no cyanosis  Skin: no rashes  Ortho: no jt swelling  Neuro: AAO x 3    LABS/DIAGNOSTIC TESTS:                        10.7   4.40  )-----------( 100      ( 17 Feb 2025 07:11 )             31.3     WBC Count: 4.40 K/uL (02-17 @ 07:11)  WBC Count: 4.19 K/uL (02-16 @ 06:53)  WBC Count: 3.78 K/uL (02-15 @ 06:50)  WBC Count: 1.85 K/uL (02-14 @ 05:43)  WBC Count: 3.58 K/uL (02-13 @ 14:30)    02-17    142  |  112[H]  |  19[H]  ----------------------------<  110[H]  3.3[L]   |  22  |  1.18    Ca    8.4      17 Feb 2025 07:11    TPro  5.9[L]  /  Alb  2.7[L]  /  TBili  0.5  /  DBili  x   /  AST  28  /  ALT  32  /  AlkPhos  102  02-17    CULTURES:   .Blood BLOOD  02-15 @ 10:20   No growth at 24 hours  --  --    .Blood BLOOD  02-15 @ 10:05   No growth at 24 hours  --  --    .Blood BLOOD  02-13 @ 16:15   Growth in anaerobic bottle: Staphylococcus epidermidis  --  Staphylococcus epidermidis    .Blood BLOOD  02-13 @ 16:00   Growth in aerobic bottle: Staphylococcus epidermidis Isolation of  Coagulase negative Staphylococcus from single blood culture sets may  represent  contamination. Contact the Microbiology Department at [phone] if  susceptibility testing is needed.  clinically indicated.  Direct identification is available within approximately 3-5  hours either by Blood Panel Multiplexed PCR or Direct  MALDI-TOF. Details: [email or web address]  --  Blood Culture PCR    Drainage  12-28 @ 04:00   No growth  --  --    .Blood BLOOD  12-28 @ 00:40   No growth at 5 days  --  --    .Blood BLOOD  12-28 @ 00:30   No growth at 5 days  --  --    .Abscess  12-13 @ 08:27   Few Enterococcus faecalis  Few Prevotella disiens "Susceptibilities not performed"  --  Enterococcus faecalis    .Blood BLOOD  12-12 @ 14:50   No growth at 5 days  --  --    .Blood BLOOD  12-12 @ 14:35   No growth at 5 days  --  --    RADIOLOGY:  < from: CT Maxillofacial w/ IV Cont (02.16.25 @ 20:21) >  ACC: [id number] EXAM:  CT MAXILLOFACIAL  IC   ORDERED BY: HORACIO WOO     PROCEDURE DATE:  02/16/2025      INTERPRETATION:  CLINICAL INFORMATION: Right ear abscess.    COMPARISON: CT of the neck from 2/10/2025.    CONTRAST:  IV Contrast: Omnipaque 350  90 cc administered  10 cc discarded thin   axial series through the maxillofacial region were obtained with contrast   material.    Comparison is made with the prior CT  2/10/2025.    There is normal vascular enhancement. The paranasal sinuses are clear.   The nasal septum is deviated to the left. No masses are identified. The   mastoid air cells are well-aerated. The middle ear cavities are normal.   There is no bone destruction. There is no evidence of free auricular   abscess. Evaluation of the orbits demonstrates the globes, extraocular   muscles and optic nerves to be unremarkable. There is been previous   bilateral lens replacement surgery.    Impression: No evidence of an abscess in the right periauricular region.    --- End of Report ---    ANNA MORRISSEY MD; Attending Radiologist  This document has been electronically signed. Feb 17 2025  9:36AM    < end of copied text >

## 2025-02-17 NOTE — PROGRESS NOTE ADULT - SUBJECTIVE AND OBJECTIVE BOX
ATTENDING MD PROGRESS NOTE    89 year old male with a history of lung cancer, DM, HTN, HLD, A-fib,  lumbar radiculopathy was admitted due to persistent diarrhea up to 10+  times a day.  Patient is now feeling well.  Denies having diarrhea    Vital Signs Last 24 Hrs  T(C): 37.1 (17 Feb 2025 12:49), Max: 37.3 (16 Feb 2025 20:43)  T(F): 98.8 (17 Feb 2025 12:49), Max: 99.1 (16 Feb 2025 20:43)  HR: 82 (17 Feb 2025 12:49) (73 - 85)  BP: 117/59 (17 Feb 2025 12:49) (115/61 - 117/59)  BP(mean): --  RR: 18 (17 Feb 2025 12:49) (18 - 18)  SpO2: 98% (17 Feb 2025 12:49) (96% - 98%)    Parameters below as of 17 Feb 2025 12:49  Patient On (Oxygen Delivery Method): room air    T(C): 37.1 (02-17-25 @ 12:49), Max: 37.3 (02-16-25 @ 20:43)  HR: 82 (02-17-25 @ 12:49) (73 - 85)  BP: 117/59 (02-17-25 @ 12:49) (115/61 - 117/59)  RR: 18 (02-17-25 @ 12:49) (18 - 18)  SpO2: 98% (02-17-25 @ 12:49) (96% - 98%)    CONSTITUTIONAL: Well groomed, no apparent distress  EYES: PERRLA and symmetric, EOMI, No conjunctival or scleral injection, non-icteric  ENMT: Oral mucosa with moist membranes. Normal dentition; no pharyngeal injection or exudates             NECK: Supple, symmetric and without tracheal deviation   RESP: No respiratory distress, no use of accessory muscles; CTA b/l, no WRR  CV: RRR, +S1S2, no MRG; no JVD; no peripheral edema  GI: Soft, NT, ND, no rebound, no guarding; no palpable masses; no hepatosplenomegaly; no hernia palpated  LYMPH: No cervical LAD or tenderness; no axillary LAD or tenderness; no inguinal LAD or tenderness  MSK: Normal gait; No digital clubbing or cyanosis; examination of the (head/neck/spine/ribs/pelvis, RUE, LUE, RLE, LLE) without misalignment,            Normal ROM without pain, no spinal tenderness, normal muscle strength/tone  SKIN: No rashes or ulcers noted; no subcutaneous nodules or induration palpable  NEURO: CN II-XII intact; normal reflexes in upper and lower extremities, sensation intact in upper and lower extremities b/l to light touch   PSYCH: Appropriate insight/judgment; A+O x 3, mood and affect appropriate, recent/remote memory intact    CBC Full  -  ( 17 Feb 2025 07:11 )  WBC Count : 4.40 K/uL  RBC Count : 3.32 M/uL  Hemoglobin : 10.7 g/dL  Hematocrit : 31.3 %  Platelet Count - Automated : 100 K/uL  Mean Cell Volume : 94.3 fl  Mean Cell Hemoglobin : 32.2 pg  Mean Cell Hemoglobin Concentration : 34.2 g/dL  Auto Neutrophil # : x  Auto Lymphocyte # : x  Auto Monocyte # : x  Auto Eosinophil # : x  Auto Basophil # : x  Auto Neutrophil % : x  Auto Lymphocyte % : x  Auto Monocyte % : x  Auto Eosinophil % : x  Auto Basophil % : x      02-17    142  |  112[H]  |  19[H]  ----------------------------<  110[H]  3.3[L]   |  22  |  1.18    Ca    8.4      17 Feb 2025 07:11    TPro  5.9[L]  /  Alb  2.7[L]  /  TBili  0.5  /  DBili  x   /  AST  28  /  ALT  32  /  AlkPhos  102  02-17      MEDICATIONS  (STANDING):  atorvastatin 10 milliGRAM(s) Oral at bedtime  digoxin     Tablet 125 MICROGram(s) Oral <User Schedule>  dorzolamide 2% Ophthalmic Solution      dorzolamide 2% Ophthalmic Solution 1 Drop(s) Both EYES every 8 hours  finasteride 5 milliGRAM(s) Oral daily  levothyroxine 25 MICROGram(s) Oral daily  metoprolol succinate ER 12.5 milliGRAM(s) Oral daily  multivitamin/minerals 1 Tablet(s) Oral daily  pantoprazole    Tablet 40 milliGRAM(s) Oral before breakfast  rivaroxaban 15 milliGRAM(s) Oral daily  sodium chloride 0.9%. 1000 milliLiter(s) (60 mL/Hr) IV Continuous <Continuous>  tamsulosin 0.4 milliGRAM(s) Oral at bedtime

## 2025-02-17 NOTE — PROGRESS NOTE ADULT - ASSESSMENT
89 year old male with a history of  lung cancer, DM, HTN, HLD, A-fib,  lumbar radiculopathy was admitted with c/o  persistent diarrhea.   Patients symptoms resolved.  Patient is medically stable for discharge.  All goals of treatment were met.

## 2025-02-17 NOTE — PROGRESS NOTE ADULT - ASSESSMENT
89y male from home w / a PMHx of lung cancer on Tagrisso,HLD, HTN, BPH, hypothyroidism, Chronic A-fib on Xarelto,Chronic systolic HF,Hypotension,s/p  right preauricular abscess presenting to the ED with one day of nausea, vomiting, diarrhea and fatigue,enterocolitis,HONG,+ blood cx-? contaminant,Norovirus..  1.Enterocolitis-stool cx for cdiff-.  2.Chronic afib-xarelto,lopressor,dig.  3.Chronisc systolic HF-b blocker ,low dose ace.Hold lasix.  4.CT maxillofacial-no abcess.  5.Hypothyroid-synthroid.  6.BPH-proscar.  7.PPI.  8.Repeat blood cx-.  9.Replace k+.

## 2025-02-18 LAB
ALBUMIN SERPL ELPH-MCNC: 2.8 G/DL — LOW (ref 3.5–5)
ALP SERPL-CCNC: 101 U/L — SIGNIFICANT CHANGE UP (ref 40–120)
ALT FLD-CCNC: 36 U/L DA — SIGNIFICANT CHANGE UP (ref 10–60)
ANION GAP SERPL CALC-SCNC: 6 MMOL/L — SIGNIFICANT CHANGE UP (ref 5–17)
AST SERPL-CCNC: 31 U/L — SIGNIFICANT CHANGE UP (ref 10–40)
BILIRUB SERPL-MCNC: 0.8 MG/DL — SIGNIFICANT CHANGE UP (ref 0.2–1.2)
BUN SERPL-MCNC: 19 MG/DL — HIGH (ref 7–18)
CALCIUM SERPL-MCNC: 8.5 MG/DL — SIGNIFICANT CHANGE UP (ref 8.4–10.5)
CHLORIDE SERPL-SCNC: 112 MMOL/L — HIGH (ref 96–108)
CO2 SERPL-SCNC: 24 MMOL/L — SIGNIFICANT CHANGE UP (ref 22–31)
CREAT SERPL-MCNC: 1 MG/DL — SIGNIFICANT CHANGE UP (ref 0.5–1.3)
EGFR: 70 ML/MIN/1.73M2 — SIGNIFICANT CHANGE UP
GLUCOSE SERPL-MCNC: 111 MG/DL — HIGH (ref 70–99)
HCT VFR BLD CALC: 32.5 % — LOW (ref 39–50)
HGB BLD-MCNC: 11.2 G/DL — LOW (ref 13–17)
MAGNESIUM SERPL-MCNC: 1.9 MG/DL — SIGNIFICANT CHANGE UP (ref 1.6–2.6)
MCHC RBC-ENTMCNC: 32 PG — SIGNIFICANT CHANGE UP (ref 27–34)
MCHC RBC-ENTMCNC: 34.5 G/DL — SIGNIFICANT CHANGE UP (ref 32–36)
MCV RBC AUTO: 92.9 FL — SIGNIFICANT CHANGE UP (ref 80–100)
NRBC BLD AUTO-RTO: 0 /100 WBCS — SIGNIFICANT CHANGE UP (ref 0–0)
PHOSPHATE SERPL-MCNC: 3 MG/DL — SIGNIFICANT CHANGE UP (ref 2.5–4.5)
PLATELET # BLD AUTO: 118 K/UL — LOW (ref 150–400)
POTASSIUM SERPL-MCNC: 3.9 MMOL/L — SIGNIFICANT CHANGE UP (ref 3.5–5.3)
POTASSIUM SERPL-SCNC: 3.9 MMOL/L — SIGNIFICANT CHANGE UP (ref 3.5–5.3)
PROT SERPL-MCNC: 6.1 G/DL — SIGNIFICANT CHANGE UP (ref 6–8.3)
RBC # BLD: 3.5 M/UL — LOW (ref 4.2–5.8)
RBC # FLD: 15.9 % — HIGH (ref 10.3–14.5)
SODIUM SERPL-SCNC: 142 MMOL/L — SIGNIFICANT CHANGE UP (ref 135–145)
WBC # BLD: 4.22 K/UL — SIGNIFICANT CHANGE UP (ref 3.8–10.5)
WBC # FLD AUTO: 4.22 K/UL — SIGNIFICANT CHANGE UP (ref 3.8–10.5)

## 2025-02-18 RX ORDER — DEXTROMETHORPHAN HBR, GUAIFENESIN 200 MG/10ML
100 LIQUID ORAL EVERY 6 HOURS
Refills: 0 | Status: DISCONTINUED | OUTPATIENT
Start: 2025-02-18 | End: 2025-02-19

## 2025-02-18 RX ORDER — SALINE 7; 19 G/118ML; G/118ML
1 ENEMA RECTAL ONCE
Refills: 0 | Status: COMPLETED | OUTPATIENT
Start: 2025-02-18 | End: 2025-02-18

## 2025-02-18 RX ORDER — FINASTERIDE 1 MG/1
1 TABLET, FILM COATED ORAL
Qty: 0 | Refills: 0 | DISCHARGE
Start: 2025-02-18

## 2025-02-18 RX ORDER — LEVOTHYROXINE SODIUM 300 MCG
1 TABLET ORAL
Qty: 0 | Refills: 0 | DISCHARGE
Start: 2025-02-18

## 2025-02-18 RX ORDER — ZINC SULFATE 50(220)MG
1 CAPSULE ORAL
Qty: 14 | Refills: 0
Start: 2025-02-18 | End: 2025-02-24

## 2025-02-18 RX ORDER — ZINC SULFATE 50(220)MG
220 CAPSULE ORAL
Refills: 0 | Status: DISCONTINUED | OUTPATIENT
Start: 2025-02-18 | End: 2025-02-19

## 2025-02-18 RX ORDER — POLYETHYLENE GLYCOL 3350 17 G/17G
17 POWDER, FOR SOLUTION ORAL DAILY
Refills: 0 | Status: DISCONTINUED | OUTPATIENT
Start: 2025-02-18 | End: 2025-02-19

## 2025-02-18 RX ORDER — TAMSULOSIN HYDROCHLORIDE 0.4 MG/1
1 CAPSULE ORAL
Qty: 0 | Refills: 0 | DISCHARGE
Start: 2025-02-18

## 2025-02-18 RX ORDER — B1/B2/B3/B5/B6/B12/VIT C/FOLIC 500-0.5 MG
1 TABLET ORAL DAILY
Refills: 0 | Status: DISCONTINUED | OUTPATIENT
Start: 2025-02-18 | End: 2025-02-19

## 2025-02-18 RX ORDER — SENNA 187 MG
1 TABLET ORAL DAILY
Refills: 0 | Status: DISCONTINUED | OUTPATIENT
Start: 2025-02-18 | End: 2025-02-19

## 2025-02-18 RX ORDER — RIVAROXABAN 10 MG/1
1 TABLET, FILM COATED ORAL
Qty: 0 | Refills: 0 | DISCHARGE
Start: 2025-02-18

## 2025-02-18 RX ADMIN — DORZOLAMIDE 1 DROP(S): 20 SOLUTION/ DROPS OPHTHALMIC at 14:27

## 2025-02-18 RX ADMIN — Medication 1 TABLET(S): at 11:41

## 2025-02-18 RX ADMIN — Medication 25 MICROGRAM(S): at 05:34

## 2025-02-18 RX ADMIN — Medication 250 MILLILITER(S): at 17:00

## 2025-02-18 RX ADMIN — METOPROLOL SUCCINATE 12.5 MILLIGRAM(S): 50 TABLET, EXTENDED RELEASE ORAL at 05:34

## 2025-02-18 RX ADMIN — Medication 1 TABLET(S): at 14:27

## 2025-02-18 RX ADMIN — RIVAROXABAN 15 MILLIGRAM(S): 10 TABLET, FILM COATED ORAL at 11:41

## 2025-02-18 RX ADMIN — ATORVASTATIN CALCIUM 10 MILLIGRAM(S): 80 TABLET, FILM COATED ORAL at 21:40

## 2025-02-18 RX ADMIN — DORZOLAMIDE 1 DROP(S): 20 SOLUTION/ DROPS OPHTHALMIC at 21:40

## 2025-02-18 RX ADMIN — POLYETHYLENE GLYCOL 3350 17 GRAM(S): 17 POWDER, FOR SOLUTION ORAL at 11:42

## 2025-02-18 RX ADMIN — FINASTERIDE 5 MILLIGRAM(S): 1 TABLET, FILM COATED ORAL at 11:41

## 2025-02-18 RX ADMIN — TAMSULOSIN HYDROCHLORIDE 0.4 MILLIGRAM(S): 0.4 CAPSULE ORAL at 21:40

## 2025-02-18 RX ADMIN — Medication 220 MILLIGRAM(S): at 18:37

## 2025-02-18 RX ADMIN — DIGOXIN 125 MICROGRAM(S): 250 TABLET ORAL at 05:36

## 2025-02-18 RX ADMIN — Medication 500 MILLIGRAM(S): at 11:41

## 2025-02-18 RX ADMIN — Medication 40 MILLIGRAM(S): at 08:27

## 2025-02-18 RX ADMIN — DORZOLAMIDE 1 DROP(S): 20 SOLUTION/ DROPS OPHTHALMIC at 05:34

## 2025-02-18 RX ADMIN — DEXTROMETHORPHAN HBR, GUAIFENESIN 100 MILLIGRAM(S): 200 LIQUID ORAL at 11:41

## 2025-02-18 NOTE — PROGRESS NOTE ADULT - PROBLEM SELECTOR PLAN 3
h/o HTN   noted hypotension   asymptomatic   - s/p 250 mL NS bolus  - start maintenance fluids 50 ml/hr for 12 hours  - continue bp meds with hold parameters   - Cardiology Dr Dominique following

## 2025-02-18 NOTE — PROGRESS NOTE ADULT - PROBLEM SELECTOR PLAN 2
recent hospitalizations for his preauricular abscess 12/14 and 12/28  that both resulted in drainage by OMFS at [facility], IV Unasyn transitioned to p.o. Augmentin, completed 8 days PTA  reports continued pus from site on admission, no pus noted  denies pain.   CT maxillofacial no periauricular abscess  - ID following

## 2025-02-18 NOTE — PROGRESS NOTE ADULT - ASSESSMENT
89y male from home w / a PMHx of lung cancer on Tagrisso,HLD, HTN, BPH, hypothyroidism, Chronic A-fib on Xarelto,Chronic systolic HF,Hypotension,s/p  right preauricular abscess presenting to the ED with one day of nausea, vomiting, diarrhea and fatigue,enterocolitis,HONG,+ blood cx-? contaminant,Norovirus..  1.Enterocolitis-stool cx for cdiff-.  2.Chronic afib-xarelto,lopressor,dig.  3.Chronisc systolic HF-b blocker ,low dose ace.Hold lasix.  4.CT maxillofacial-no abcess.  5.Hypothyroid-synthroid.  6.BPH-proscar.  7.PPI.  8.Repeat blood cx-.  9.Constipation-fleet enemax1.

## 2025-02-18 NOTE — PROGRESS NOTE ADULT - PROBLEM SELECTOR PLAN 7
rate controlled, hypotensive  - continue digoxin, lopressor with hold parameters  - continue xarelto 15 mg daily   - cards Dr Dominique following

## 2025-02-18 NOTE — PROGRESS NOTE ADULT - SUBJECTIVE AND OBJECTIVE BOX
[   ] ICU                                          [   ] CCU                                      [ X  ] Medical Floor    Patient is a 89 year old male with abdominal pain and diarrhea. GI consulted to evaluate.         Patient is a 89 year old male home with past medical history significant for lung cancer on Tagrisso, Hyperlipidemia, Glaucoma, HTN, BPH, hypothyroidism, A-fib on Xarelto, right preauricular abscess presented to the emergency room with one day history of frequent watery, non bloody diarrhea associated with fever, chills, nausea, non bloody vomiting and intermittent, 5-6/10 intensity, non radiating diffuse crampy abdominal pain. Patient was admitted to the hospital twice recently and was treated for periauricular abscess with drainage and antibiotics. Patient denies hematemesis, hematochezia, melena, chest pain, SOB, cough, hematuria, dysuria, and recent traveling.      Patient appears comfortable. No new complaints reported, No abdominal pain, N/V, hematemesis, hematochezia, melena, fever, chills, chest pain, SOB, cough reported.         PAST MEDICAL HISTORY    Hypertension    Hyperlipidemia     BPH      Glaucoma      Atrial fibrillation    Hypothyroidism        PAST SURGICAL HISTORY    Hemicolectomy    Cataract extraction status of left eye    Lung biopsy        Allergies    No Known Allergies    Intolerances  None          SOCIAL HISTORY  Advanced Directives:       [ X ] Full Code       [  ] DNR  Marital Status:         [  ] M      [ X ] S      [  ] D       [  ] W  Children:       [ X ] Yes      [  ] No  Occupation:        [  ] Employed       [ X ] Unemployed       [  ] Retired  Diet:       [ X ] Regular       [  ] PEG feeding          [  ] NG tube feeding  Drug Use:           [ X ] Patient denied          [  ] Yes  Alcohol:           [ X ] No             [  ] Yes (socially)         [  ] Yes (chronic)  Tobacco:           [  ] Yes           [ X ] No      FAMILY HISTORY  [ X ] Heart Disease            [ X ] Diabetes             [ X ] HTN             [  ] Colon Cancer             [  ] Stomach Cancer              [  ] Pancreatic Cancer        VITALS   Vital Signs Last 24 Hrs  T(C): 36.8 (02-18-25 @ 05:09), Max: 37.1 (02-17-25 @ 12:49)  T(F): 98.2 (02-18-25 @ 05:09), Max: 98.8 (02-17-25 @ 12:49)  HR: 78 (02-18-25 @ 05:09) (78 - 88)  BP: 118/61 (02-18-25 @ 05:09) (117/59 - 140/66)   RR: 18 (02-18-25 @ 05:09) (17 - 18)  SpO2: 97% (02-18-25 @ 05:09) (97% - 98%)        MEDICATIONS  (STANDING):  ascorbic acid 500 milliGRAM(s) Oral daily  atorvastatin 10 milliGRAM(s) Oral at bedtime  digoxin     Tablet 125 MICROGram(s) Oral <User Schedule>  dorzolamide 2% Ophthalmic Solution      dorzolamide 2% Ophthalmic Solution 1 Drop(s) Both EYES every 8 hours  finasteride 5 milliGRAM(s) Oral daily  levothyroxine 25 MICROGram(s) Oral daily  metoprolol succinate ER 12.5 milliGRAM(s) Oral daily  multivitamin 1 Tablet(s) Oral daily  pantoprazole    Tablet 40 milliGRAM(s) Oral before breakfast  polyethylene glycol 3350 17 Gram(s) Oral daily  rivaroxaban 15 milliGRAM(s) Oral daily  saline laxative (FLEET) Rectal Enema 1 Enema Rectal once  senna 1 Tablet(s) Oral daily  tamsulosin 0.4 milliGRAM(s) Oral at bedtime  zinc sulfate 220 milliGRAM(s) Oral two times a day    MEDICATIONS  (PRN):  acetaminophen     Tablet .. 650 milliGRAM(s) Oral every 6 hours PRN Temp greater or equal to 38C (100.4F), Mild Pain (1 - 3)  albuterol    90 MICROgram(s) HFA Inhaler 2 Puff(s) Inhalation every 6 hours PRN Bronchospasm  aluminum hydroxide/magnesium hydroxide/simethicone Suspension 30 milliLiter(s) Oral every 4 hours PRN Dyspepsia  guaiFENesin Oral Liquid (Sugar-Free) 100 milliGRAM(s) Oral every 6 hours PRN Cough  melatonin 3 milliGRAM(s) Oral at bedtime PRN Insomnia  ondansetron Injectable 4 milliGRAM(s) IV Push every 8 hours PRN Nausea and/or Vomiting                            11.2   4.22  )-----------( 118      ( 18 Feb 2025 07:46 )             32.5       02-18    142  |  112[H]  |  19[H]  ----------------------------<  111[H]  3.9   |  24  |  1.00    Ca    8.5      18 Feb 2025 07:46  Phos  3.0     02-18  Mg     1.9     02-18    TPro  6.1  /  Alb  2.8[L]  /  TBili  0.8  /  DBili  x   /  AST  31  /  ALT  36  /  AlkPhos  101  02-18

## 2025-02-18 NOTE — PROGRESS NOTE ADULT - PROBLEM SELECTOR PLAN 1
presented with N/V/D and fatigue  CTAP enterocolitis   c. diff negative. norovirus +  blood culture staph epi, likely contaminated 02/13  repeat blood culture NGTD 02/15  No leukocytosis, afebrile, VSS  no more diarrhea, LBM 02/15  - ID Dr Miller following  - start bowel regimen

## 2025-02-18 NOTE — PHARMACOTHERAPY INTERVENTION NOTE - COMMENTS
[facility]  MRN: [id number]  Location: [address]  Culture Date/Time: 2025-02-13 16:15  Organism  Staphylococcus epidermidis (Prelim)    Organism  Staphylococcus epidermidis (Final)    Method Type  RENUKA  -  Vancomycin  S 1  -  Trimethoprim/Sulfamethoxazole  S <=0.5/9.5  -  Tetracycline  S <=4  -  Rifampin  S <=1 Should not be used as monotherapy  -  Penicillin  R 2  -  Oxacillin  R >2  -  Gentamicin  S <=4 Should not be used as monotherapy  -  Erythromycin  R >4  -  Clindamycin  R >4    Seen by ID: Wenceslao Miller, last note 2025-02-16 17:01    Antibiotics:    [employer] reviewed, no additional recommendation at this time.

## 2025-02-18 NOTE — PROGRESS NOTE ADULT - PROBLEM SELECTOR PLAN 9
- from home  - pending PT eval, will need homecare reinstated  - hypotensive, f/u BP
Pt from Home   f/u Bcx   f/u GI PCR, ID & cards rec's

## 2025-02-18 NOTE — PROGRESS NOTE ADULT - NUTRITIONAL ASSESSMENT
This patient has been assessed with a concern for Malnutrition and has been determined to have a diagnosis/diagnoses of Moderate protein-calorie malnutrition.    This patient is being managed with:   Diet DASH/TLC-  Sodium & Cholesterol Restricted  Supplement Feeding Modality:  Oral  Ensure Plus High Protein Cans or Servings Per Day:  1       Frequency:  Three Times a day  Entered: Feb 15 2025 10:48AM  

## 2025-02-18 NOTE — PROGRESS NOTE ADULT - SUBJECTIVE AND OBJECTIVE BOX
Date of Service 02-18-25 @ 11:50    CHIEF COMPLAINT:Patient is a 89y old  Male who presents with a chief complaint of Diarrhea, enterocolitis (17 Feb 2025 20:27)    	  REVIEW OF SYSTEMS:  CONSTITUTIONAL: No fever, weight loss, or fatigue  EYES: No eye pain, visual disturbances, or discharge  ENT:  No difficulty hearing, tinnitus, vertigo; No sinus or throat pain  NECK: No pain or stiffness  RESPIRATORY: No cough, wheezing, chills or hemoptysis; No Shortness of Breath  CARDIOVASCULAR: No chest pain, palpitations, passing out, dizziness, or leg swelling  GASTROINTESTINAL: No abdominal or epigastric pain. No nausea, vomiting, or hematemesis; No diarrhea or constipation. No melena or hematochezia.  GENITOURINARY: No dysuria, frequency, hematuria, or incontinence  NEUROLOGICAL: No headaches, memory loss, loss of strength, numbness, or tremors  SKIN: No itching, burning, rashes, or lesions   LYMPH Nodes: No enlarged glands  ENDOCRINE: No heat or cold intolerance; No hair loss  MUSCULOSKELETAL: No joint pain or swelling; No muscle, back, or extremity pain  PSYCHIATRIC: No depression, anxiety, mood swings, or difficulty sleeping  HEME/LYMPH: No easy bruising, or bleeding gums  ALLERGY AND IMMUNOLOGIC: No hives or eczema	    [ ] All others negative	  [ ] Unable to obtain    PHYSICAL EXAM:  T(C): 36.8 (02-18-25 @ 05:09), Max: 37.1 (02-17-25 @ 12:49)  HR: 78 (02-18-25 @ 05:09) (78 - 88)  BP: 118/61 (02-18-25 @ 05:09) (117/59 - 140/66)  RR: 18 (02-18-25 @ 05:09) (17 - 18)  SpO2: 97% (02-18-25 @ 05:09) (97% - 98%)  Wt(kg): --  I&O's Summary      Appearance: Normal	  HEENT:   Normal oral mucosa, PERRL, EOMI	  Lymphatic: No lymphadenopathy  Cardiovascular: Normal S1 S2, No JVD, No murmurs, No edema  Respiratory: Lungs clear to auscultation	  Psychiatry: A & O x 3, Mood & affect appropriate  Gastrointestinal:  Soft, Non-tender, + BS	  Skin: No rashes, No ecchymoses, No cyanosis	  Neurologic: Non-focal  Extremities: Normal range of motion, No clubbing, cyanosis or edema  Vascular: Peripheral pulses palpable 2+ bilaterally    MEDICATIONS  (STANDING):  ascorbic acid 500 milliGRAM(s) Oral daily  atorvastatin 10 milliGRAM(s) Oral at bedtime  digoxin     Tablet 125 MICROGram(s) Oral <User Schedule>  dorzolamide 2% Ophthalmic Solution      dorzolamide 2% Ophthalmic Solution 1 Drop(s) Both EYES every 8 hours  finasteride 5 milliGRAM(s) Oral daily  levothyroxine 25 MICROGram(s) Oral daily  metoprolol succinate ER 12.5 milliGRAM(s) Oral daily  multivitamin 1 Tablet(s) Oral daily  pantoprazole    Tablet 40 milliGRAM(s) Oral before breakfast  polyethylene glycol 3350 17 Gram(s) Oral daily  rivaroxaban 15 milliGRAM(s) Oral daily  senna 1 Tablet(s) Oral daily  tamsulosin 0.4 milliGRAM(s) Oral at bedtime  zinc sulfate 220 milliGRAM(s) Oral two times a day      TELEMETRY: 	    ECG:  	  RADIOLOGY:  OTHER: 	  	  LABS:	 	                          11.2   4.22  )-----------( 118      ( 18 Feb 2025 07:46 )             32.5     02-18    142  |  112[H]  |  19[H]  ----------------------------<  111[H]  3.9   |  24  |  1.00    Ca    8.5      18 Feb 2025 07:46  Phos  3.0     02-18  Mg     1.9     02-18    TPro  6.1  /  Alb  2.8[L]  /  TBili  0.8  /  DBili  x   /  AST  31  /  ALT  36  /  AlkPhos  101  02-18        	         Date of Service 02-18-25 @ 11:50    CHIEF COMPLAINT:Patient is a 89y old  Male who presents with a chief complaint of Diarrhea, enterocolitis .Pt reporting constipation.    	  REVIEW OF SYSTEMS:  CONSTITUTIONAL: No fever, weight loss, or fatigue  EYES: No eye pain, visual disturbances, or discharge  ENT:  No difficulty hearing, tinnitus, vertigo; No sinus or throat pain  NECK: No pain or stiffness  RESPIRATORY: No cough, wheezing, chills or hemoptysis; No Shortness of Breath  CARDIOVASCULAR: No chest pain, palpitations, passing out, dizziness, or leg swelling  GASTROINTESTINAL: No abdominal or epigastric pain. No nausea, vomiting, or hematemesis;  constipation. No melena or hematochezia.  GENITOURINARY: No dysuria, frequency, hematuria, or incontinence  NEUROLOGICAL: No headaches, memory loss, loss of strength, numbness, or tremors  SKIN: No itching, burning, rashes, or lesions   LYMPH Nodes: No enlarged glands  ENDOCRINE: No heat or cold intolerance; No hair loss  MUSCULOSKELETAL: No joint pain or swelling; No muscle, back, or extremity pain  PSYCHIATRIC: No depression, anxiety, mood swings, or difficulty sleeping  HEME/LYMPH: No easy bruising, or bleeding gums  ALLERGY AND IMMUNOLOGIC: No hives or eczema	        PHYSICAL EXAM:  T(C): 36.8 (02-18-25 @ 05:09), Max: 37.1 (02-17-25 @ 12:49)  HR: 78 (02-18-25 @ 05:09) (78 - 88)  BP: 118/61 (02-18-25 @ 05:09) (117/59 - 140/66)  RR: 18 (02-18-25 @ 05:09) (17 - 18)  SpO2: 97% (02-18-25 @ 05:09) (97% - 98%)  Wt(kg): --  I&O's Summary      Appearance: Normal	  HEENT:   Normal oral mucosa, PERRL, EOMI	  Lymphatic: No lymphadenopathy  Cardiovascular: Normal S1 S2, No JVD, No murmurs, No edema  Respiratory: Lungs clear to auscultation	  Psychiatry: A & O x 3, Mood & affect appropriate  Gastrointestinal:  Soft, Non-tender, + BS	  Skin: No rashes, No ecchymoses, No cyanosis	  Neurologic: Non-focal  Extremities: Normal range of motion, No clubbing, cyanosis or edema  Vascular: Peripheral pulses palpable 2+ bilaterally    MEDICATIONS  (STANDING):  ascorbic acid 500 milliGRAM(s) Oral daily  atorvastatin 10 milliGRAM(s) Oral at bedtime  digoxin     Tablet 125 MICROGram(s) Oral <User Schedule>  dorzolamide 2% Ophthalmic Solution      dorzolamide 2% Ophthalmic Solution 1 Drop(s) Both EYES every 8 hours  finasteride 5 milliGRAM(s) Oral daily  levothyroxine 25 MICROGram(s) Oral daily  metoprolol succinate ER 12.5 milliGRAM(s) Oral daily  multivitamin 1 Tablet(s) Oral daily  pantoprazole    Tablet 40 milliGRAM(s) Oral before breakfast  polyethylene glycol 3350 17 Gram(s) Oral daily  rivaroxaban 15 milliGRAM(s) Oral daily  senna 1 Tablet(s) Oral daily  tamsulosin 0.4 milliGRAM(s) Oral at bedtime  zinc sulfate 220 milliGRAM(s) Oral two times a day      TELEMETRY: 	    ECG:  	  RADIOLOGY:  OTHER: 	  	  LABS:	 	                          11.2   4.22  )-----------( 118      ( 18 Feb 2025 07:46 )             32.5     02-18    142  |  112[H]  |  19[H]  ----------------------------<  111[H]  3.9   |  24  |  1.00    Ca    8.5      18 Feb 2025 07:46  Phos  3.0     02-18  Mg     1.9     02-18    TPro  6.1  /  Alb  2.8[L]  /  TBili  0.8  /  DBili  x   /  AST  31  /  ALT  36  /  AlkPhos  101  02-18

## 2025-02-18 NOTE — PROGRESS NOTE ADULT - ASSESSMENT
89 year old male with a history of  lung cancer, DM, HTN, HLD, A-fib, Hypothyroididsm,  lumbar radiculopathy was admitted with c/o  persistent diarrhea.   Patients symptoms resolved.  Patient is medically stable for discharge.  All goals of treatment were met.  Awaiting for PT eval before discharge.

## 2025-02-18 NOTE — PROGRESS NOTE ADULT - ASSESSMENT
89 yo M, from home, with pmhx of lung cancer on Tagrisso, HLD, HTN, BPH, hypothyroidism, A-fib on Xarelto, and recent hospitalizations for right periauricular abscess presenting to the ED with one day of nausea, vomiting, diarrhea and fatigue. Admitted to medicine for enterocolitis. ID and GI consulted. Pt found to have norovirus, monitored off abx. CT negative for periauricular abscess. Ccb hypotension, cardiology consulted. Pending PT consult.

## 2025-02-18 NOTE — PROGRESS NOTE ADULT - SUBJECTIVE AND OBJECTIVE BOX
ATTENDING MD PROGRESS NOTE    89 year old male with a history of lung cancer, DM, HTN, HLD, A-fib, Hypothyroidism  lumbar radiculopathy was admitted due to persistent diarrhea up to 10+  times a day.  Patient is now feeling well.  Denies having diarrhea.  Awaiting PT eval.    Vital Signs Last 24 Hrs  T(C): 36.4 (18 Feb 2025 16:57), Max: 36.8 (18 Feb 2025 05:09)  T(F): 97.6 (18 Feb 2025 16:57), Max: 98.2 (18 Feb 2025 05:09)  HR: 67 (18 Feb 2025 16:57) (59 - 88)  BP: 106/58 (18 Feb 2025 16:57) (87/44 - 140/66)  BP(mean): --  RR: 17 (18 Feb 2025 16:57) (16 - 18)  SpO2: 96% (18 Feb 2025 16:57) (95% - 98%)    Parameters below as of 18 Feb 2025 16:57  Patient On (Oxygen Delivery Method): room air      T(C): 36.4 (02-18-25 @ 16:57), Max: 36.8 (02-18-25 @ 05:09)  HR: 67 (02-18-25 @ 16:57) (59 - 88)  BP: 106/58 (02-18-25 @ 16:57) (87/44 - 140/66)  RR: 17 (02-18-25 @ 16:57) (16 - 18)  SpO2: 96% (02-18-25 @ 16:57) (95% - 98%)    CONSTITUTIONAL: Well groomed, no apparent distress  EYES: PERRLA and symmetric, EOMI, No conjunctival or scleral injection, non-icteric  ENMT: Oral mucosa with moist membranes. Normal dentition; no pharyngeal injection or exudates             NECK: Supple, symmetric and without tracheal deviation   RESP: No respiratory distress, no use of accessory muscles; CTA b/l, no WRR  CV: RRR, +S1S2, no MRG; no JVD; no peripheral edema  GI: Soft, NT, ND, no rebound, no guarding; no palpable masses; no hepatosplenomegaly; no hernia palpated  LYMPH: No cervical LAD or tenderness; no axillary LAD or tenderness; no inguinal LAD or tenderness  MSK: Normal gait; No digital clubbing or cyanosis; examination of the (head/neck/spine/ribs/pelvis, RUE, LUE, RLE, LLE) without misalignment,            Normal ROM without pain, no spinal tenderness, normal muscle strength/tone  SKIN: No rashes or ulcers noted; no subcutaneous nodules or induration palpable  NEURO: CN II-XII intact; normal reflexes in upper and lower extremities, sensation intact in upper and lower extremities b/l to light touch   PSYCH: Appropriate insight/judgment; A+O x 3, mood and affect appropriate, recent/remote memory intact      CBC Full  -  ( 18 Feb 2025 07:46 )  WBC Count : 4.22 K/uL  RBC Count : 3.50 M/uL  Hemoglobin : 11.2 g/dL  Hematocrit : 32.5 %  Platelet Count - Automated : 118 K/uL  Mean Cell Volume : 92.9 fl  Mean Cell Hemoglobin : 32.0 pg  Mean Cell Hemoglobin Concentration : 34.5 g/dL  Auto Neutrophil # : x  Auto Lymphocyte # : x  Auto Monocyte # : x  Auto Eosinophil # : x  Auto Basophil # : x  Auto Neutrophil % : x  Auto Lymphocyte % : x  Auto Monocyte % : x  Auto Eosinophil % : x  Auto Basophil % : x      02-18    142  |  112[H]  |  19[H]  ----------------------------<  111[H]  3.9   |  24  |  1.00    Ca    8.5      18 Feb 2025 07:46  Phos  3.0     02-18  Mg     1.9     02-18    TPro  6.1  /  Alb  2.8[L]  /  TBili  0.8  /  DBili  x   /  AST  31  /  ALT  36  /  AlkPhos  101  02-18    MEDICATIONS  (STANDING):  ascorbic acid 500 milliGRAM(s) Oral daily  atorvastatin 10 milliGRAM(s) Oral at bedtime  digoxin     Tablet 125 MICROGram(s) Oral <User Schedule>  dorzolamide 2% Ophthalmic Solution      dorzolamide 2% Ophthalmic Solution 1 Drop(s) Both EYES every 8 hours  finasteride 5 milliGRAM(s) Oral daily  levothyroxine 25 MICROGram(s) Oral daily  metoprolol succinate ER 12.5 milliGRAM(s) Oral daily  multivitamin 1 Tablet(s) Oral daily  pantoprazole    Tablet 40 milliGRAM(s) Oral before breakfast  polyethylene glycol 3350 17 Gram(s) Oral daily  rivaroxaban 15 milliGRAM(s) Oral daily  senna 1 Tablet(s) Oral daily  sodium chloride 0.9%. 1000 milliLiter(s) (50 mL/Hr) IV Continuous <Continuous>  tamsulosin 0.4 milliGRAM(s) Oral at bedtime  zinc sulfate 220 milliGRAM(s) Oral two times a day

## 2025-02-18 NOTE — PROGRESS NOTE ADULT - SUBJECTIVE AND OBJECTIVE BOX
NP Note discussed with  Primary Attending    INTERVAL HPI/OVERNIGHT EVENTS: Pt resting in bed, no complaints of pain. states he has not had a BM for 3 days, last BM was diarrhea.     MEDICATIONS  (STANDING):  ascorbic acid 500 milliGRAM(s) Oral daily  atorvastatin 10 milliGRAM(s) Oral at bedtime  digoxin     Tablet 125 MICROGram(s) Oral <User Schedule>  dorzolamide 2% Ophthalmic Solution      dorzolamide 2% Ophthalmic Solution 1 Drop(s) Both EYES every 8 hours  finasteride 5 milliGRAM(s) Oral daily  levothyroxine 25 MICROGram(s) Oral daily  metoprolol succinate ER 12.5 milliGRAM(s) Oral daily  multivitamin 1 Tablet(s) Oral daily  pantoprazole    Tablet 40 milliGRAM(s) Oral before breakfast  polyethylene glycol 3350 17 Gram(s) Oral daily  rivaroxaban 15 milliGRAM(s) Oral daily  senna 1 Tablet(s) Oral daily  sodium chloride 0.9%. 1000 milliLiter(s) (50 mL/Hr) IV Continuous <Continuous>  tamsulosin 0.4 milliGRAM(s) Oral at bedtime  zinc sulfate 220 milliGRAM(s) Oral two times a day    MEDICATIONS  (PRN):  acetaminophen     Tablet .. 650 milliGRAM(s) Oral every 6 hours PRN Temp greater or equal to 38C (100.4F), Mild Pain (1 - 3)  albuterol    90 MICROgram(s) HFA Inhaler 2 Puff(s) Inhalation every 6 hours PRN Bronchospasm  aluminum hydroxide/magnesium hydroxide/simethicone Suspension 30 milliLiter(s) Oral every 4 hours PRN Dyspepsia  guaiFENesin Oral Liquid (Sugar-Free) 100 milliGRAM(s) Oral every 6 hours PRN Cough  melatonin 3 milliGRAM(s) Oral at bedtime PRN Insomnia  ondansetron Injectable 4 milliGRAM(s) IV Push every 8 hours PRN Nausea and/or Vomiting      __________________________________________________  REVIEW OF SYSTEMS:    CONSTITUTIONAL: No fever,   EYES: no acute visual disturbances  NECK: No pain or stiffness  RESPIRATORY: No cough; No shortness of breath  CARDIOVASCULAR: No chest pain, no palpitations  GASTROINTESTINAL: LBM 02/15. No pain. No nausea or vomiting; No diarrhea   NEUROLOGICAL: No headache or numbness, no tremors  MUSCULOSKELETAL: No joint pain, no muscle pain  GENITOURINARY: no dysuria, no frequency, no hesitancy  PSYCHIATRY: no depression , no anxiety  ALL OTHER  ROS negative        Vital Signs Last 24 Hrs  T(C): 36.4 (18 Feb 2025 16:57), Max: 36.8 (18 Feb 2025 05:09)  T(F): 97.6 (18 Feb 2025 16:57), Max: 98.2 (18 Feb 2025 05:09)  HR: 67 (18 Feb 2025 16:57) (59 - 88)  BP: 106/58 (18 Feb 2025 16:57) (87/44 - 140/66)  BP(mean): --  RR: 17 (18 Feb 2025 16:57) (16 - 18)  SpO2: 96% (18 Feb 2025 16:57) (95% - 98%)    Parameters below as of 18 Feb 2025 16:57  Patient On (Oxygen Delivery Method): room air        ________________________________________________  PHYSICAL EXAM:  GENERAL: NAD  HEENT: Normocephalic;  conjunctivae and sclerae clear; moist mucous membranes;   NECK : supple  CHEST/LUNG: Clear to auscultation bilaterally with good air entry   HEART: S1 S2  regular; no murmurs, gallops or rubs  ABDOMEN: Soft, Nontender, Nondistended; Bowel sounds present  EXTREMITIES: no cyanosis; no edema; no calf tenderness  SKIN: warm and dry; no rash  NERVOUS SYSTEM:  Awake and alert; Oriented  to place, person and time ; no new deficits    _________________________________________________  LABS:                        11.2   4.22  )-----------( 118      ( 18 Feb 2025 07:46 )             32.5     02-18    142  |  112[H]  |  19[H]  ----------------------------<  111[H]  3.9   |  24  |  1.00    Ca    8.5      18 Feb 2025 07:46  Phos  3.0     02-18  Mg     1.9     02-18    TPro  6.1  /  Alb  2.8[L]  /  TBili  0.8  /  DBili  x   /  AST  31  /  ALT  36  /  AlkPhos  101  02-18    Culture - Blood (02.15.25 @ 10:20)   Specimen Source: .Blood BLOOD  Culture Results: No growth at 72 Hours  Culture - Blood (02.15.25 @ 10:05)   Specimen Source: .Blood BLOOD  Culture Results: No growth at 72 Hours  Norovirus GI/GII: Detected    RADIOLOGY & ADDITIONAL TESTS:  < from: CT Maxillofacial w/ IV Cont (02.16.25 @ 20:21) >    ACC: [id number] EXAM:  CT MAXILLOFACIAL  IC   ORDERED BY: HORACIO WOO     PROCEDURE DATE:  02/16/2025          INTERPRETATION:  CLINICAL INFORMATION: Right ear abscess.    COMPARISON: CT of the neck from 2/10/2025.    CONTRAST:  IV Contrast: Omnipaque 350  90 cc administered  10 cc discarded thin   axial series through the maxillofacial region were obtained with contrast   material.    Comparison is made with the prior CT  2/10/2025.    There is normal vascular enhancement. The paranasal sinuses are clear.   The nasal septum is deviated to the left. No masses are identified. The   mastoid air cells are well-aerated. The middle ear cavities are normal.   There is no bone destruction. There is no evidence of free auricular   abscess. Evaluation of the orbits demonstrates the globes, extraocular   muscles and optic nerves to be unremarkable. There is been previous   bilateral lens replacement surgery.      Impression: No evidence of an abscess in the right periauricular region.        --- End of Report ---            ANNA MORRISSEY MD; Attending Radiologist  This document has been electronically signed. Feb 17 2025  9:36AM    < end of copied text >  < from: CT Abdomen and Pelvis w/ IV Cont (02.13.25 @ 16:57) >    ACC: [id number] EXAM:  CT ABDOMEN AND PELVIS IC   ORDERED BY: JOURDAN POP     PROCEDURE DATE:  02/13/2025          INTERPRETATION:  CLINICAL INFORMATION: Rule out colitis.    COMPARISON: None.    CONTRAST/COMPLICATIONS:  IV Contrast: Omnipaque 350  90cc administered   10 cc discarded  Oral Contrast: NONE  .    PROCEDURE:  CT of the Abdomen and Pelvis was performed.  Sagittal and coronal reformats were performed.    FINDINGS:  LOWER CHEST: Within normal limits.    LIVER: Within normal limits.  BILE DUCTS: Normal caliber.  GALLBLADDER: Within normal limits.  SPLEEN: Within normal limits.  PANCREAS: Within normal limits.  ADRENALS: Bilateral adrenal myelolipoma is, measuring up to 1.9 cm on the   left.  KIDNEYS/URETERS: No hydronephrosis. Hypodense renal foci are too small to   characterize.    BLADDER: Within normal limits.  REPRODUCTIVE ORGANS: Mildly enlarged prostate.    BOWEL: No bowel obstruction.. Small and large bowel are diffusely   fluid-filled. Several segments of small bowel and colon appear mildly   wall thickened.  PERITONEUM/RETROPERITONEUM: Within normal limits.  VESSELS: Atherosclerotic change.  LYMPH NODES: No lymphadenopathy.  ABDOMINAL WALL: Within normal limits.  BONES: Degenerative changes of the spine. Chronic appearing compression   fracture deformity of L5.    IMPRESSION:  Suspect enterocolitis.      --- End of Report ---             DANISH TIWARI DO; Attending Radiologist  This document has been electronically signed. Feb 13 2025  5:13PM    < end of copied text >    Imaging  Reviewed:  YES    Consultant(s) Notes Reviewed:   YES      Plan of care was discussed with patient and /or primary care giver; all questions and concerns were addressed

## 2025-02-19 ENCOUNTER — TRANSCRIPTION ENCOUNTER (OUTPATIENT)
Age: 89
End: 2025-02-19

## 2025-02-19 VITALS
OXYGEN SATURATION: 99 % | DIASTOLIC BLOOD PRESSURE: 55 MMHG | HEART RATE: 66 BPM | SYSTOLIC BLOOD PRESSURE: 109 MMHG | TEMPERATURE: 98 F | RESPIRATION RATE: 17 BRPM

## 2025-02-19 LAB
ALBUMIN SERPL ELPH-MCNC: 2.7 G/DL — LOW (ref 3.5–5)
ALP SERPL-CCNC: 97 U/L — SIGNIFICANT CHANGE UP (ref 40–120)
ALT FLD-CCNC: 40 U/L DA — SIGNIFICANT CHANGE UP (ref 10–60)
ANION GAP SERPL CALC-SCNC: 7 MMOL/L — SIGNIFICANT CHANGE UP (ref 5–17)
AST SERPL-CCNC: 31 U/L — SIGNIFICANT CHANGE UP (ref 10–40)
BILIRUB SERPL-MCNC: 0.6 MG/DL — SIGNIFICANT CHANGE UP (ref 0.2–1.2)
BUN SERPL-MCNC: 26 MG/DL — HIGH (ref 7–18)
CALCIUM SERPL-MCNC: 8.5 MG/DL — SIGNIFICANT CHANGE UP (ref 8.4–10.5)
CHLORIDE SERPL-SCNC: 113 MMOL/L — HIGH (ref 96–108)
CO2 SERPL-SCNC: 21 MMOL/L — LOW (ref 22–31)
CREAT SERPL-MCNC: 1.05 MG/DL — SIGNIFICANT CHANGE UP (ref 0.5–1.3)
EGFR: 66 ML/MIN/1.73M2 — SIGNIFICANT CHANGE UP
GLUCOSE SERPL-MCNC: 114 MG/DL — HIGH (ref 70–99)
HCT VFR BLD CALC: 31.3 % — LOW (ref 39–50)
HGB BLD-MCNC: 10.7 G/DL — LOW (ref 13–17)
MAGNESIUM SERPL-MCNC: 1.8 MG/DL — SIGNIFICANT CHANGE UP (ref 1.6–2.6)
MCHC RBC-ENTMCNC: 32.2 PG — SIGNIFICANT CHANGE UP (ref 27–34)
MCHC RBC-ENTMCNC: 34.2 G/DL — SIGNIFICANT CHANGE UP (ref 32–36)
MCV RBC AUTO: 94.3 FL — SIGNIFICANT CHANGE UP (ref 80–100)
NRBC BLD AUTO-RTO: 0 /100 WBCS — SIGNIFICANT CHANGE UP (ref 0–0)
PHOSPHATE SERPL-MCNC: 2.6 MG/DL — SIGNIFICANT CHANGE UP (ref 2.5–4.5)
PLATELET # BLD AUTO: 113 K/UL — LOW (ref 150–400)
POTASSIUM SERPL-MCNC: 4 MMOL/L — SIGNIFICANT CHANGE UP (ref 3.5–5.3)
POTASSIUM SERPL-SCNC: 4 MMOL/L — SIGNIFICANT CHANGE UP (ref 3.5–5.3)
PROT SERPL-MCNC: 5.6 G/DL — LOW (ref 6–8.3)
RBC # BLD: 3.32 M/UL — LOW (ref 4.2–5.8)
RBC # FLD: 15.7 % — HIGH (ref 10.3–14.5)
SODIUM SERPL-SCNC: 141 MMOL/L — SIGNIFICANT CHANGE UP (ref 135–145)
WBC # BLD: 4.96 K/UL — SIGNIFICANT CHANGE UP (ref 3.8–10.5)
WBC # FLD AUTO: 4.96 K/UL — SIGNIFICANT CHANGE UP (ref 3.8–10.5)

## 2025-02-19 PROCEDURE — 87150 DNA/RNA AMPLIFIED PROBE: CPT

## 2025-02-19 PROCEDURE — 80053 COMPREHEN METABOLIC PANEL: CPT

## 2025-02-19 PROCEDURE — 36415 COLL VENOUS BLD VENIPUNCTURE: CPT

## 2025-02-19 PROCEDURE — 83605 ASSAY OF LACTIC ACID: CPT

## 2025-02-19 PROCEDURE — 87637 SARSCOV2&INF A&B&RSV AMP PRB: CPT

## 2025-02-19 PROCEDURE — 96375 TX/PRO/DX INJ NEW DRUG ADDON: CPT

## 2025-02-19 PROCEDURE — 87449 NOS EACH ORGANISM AG IA: CPT

## 2025-02-19 PROCEDURE — 83735 ASSAY OF MAGNESIUM: CPT

## 2025-02-19 PROCEDURE — 87077 CULTURE AEROBIC IDENTIFY: CPT

## 2025-02-19 PROCEDURE — 87040 BLOOD CULTURE FOR BACTERIA: CPT

## 2025-02-19 PROCEDURE — 99285 EMERGENCY DEPT VISIT HI MDM: CPT | Mod: 25

## 2025-02-19 PROCEDURE — 85025 COMPLETE CBC W/AUTO DIFF WBC: CPT

## 2025-02-19 PROCEDURE — 87641 MR-STAPH DNA AMP PROBE: CPT

## 2025-02-19 PROCEDURE — 70487 CT MAXILLOFACIAL W/DYE: CPT | Mod: MC

## 2025-02-19 PROCEDURE — 85027 COMPLETE CBC AUTOMATED: CPT

## 2025-02-19 PROCEDURE — 87186 SC STD MICRODIL/AGAR DIL: CPT

## 2025-02-19 PROCEDURE — 83690 ASSAY OF LIPASE: CPT

## 2025-02-19 PROCEDURE — 84100 ASSAY OF PHOSPHORUS: CPT

## 2025-02-19 PROCEDURE — 87324 CLOSTRIDIUM AG IA: CPT

## 2025-02-19 PROCEDURE — 87507 IADNA-DNA/RNA PROBE TQ 12-25: CPT

## 2025-02-19 PROCEDURE — 87640 STAPH A DNA AMP PROBE: CPT

## 2025-02-19 PROCEDURE — 74177 CT ABD & PELVIS W/CONTRAST: CPT | Mod: MC

## 2025-02-19 PROCEDURE — 96361 HYDRATE IV INFUSION ADD-ON: CPT

## 2025-02-19 PROCEDURE — 96374 THER/PROPH/DIAG INJ IV PUSH: CPT

## 2025-02-19 RX ORDER — MIDODRINE HYDROCHLORIDE 5 MG/1
1 TABLET ORAL
Qty: 30 | Refills: 0
Start: 2025-02-19 | End: 2025-03-20

## 2025-02-19 RX ORDER — MIDODRINE HYDROCHLORIDE 5 MG/1
10 TABLET ORAL
Refills: 0 | Status: DISCONTINUED | OUTPATIENT
Start: 2025-02-19 | End: 2025-02-19

## 2025-02-19 RX ADMIN — Medication 40 MILLIGRAM(S): at 06:01

## 2025-02-19 RX ADMIN — Medication 25 MICROGRAM(S): at 06:01

## 2025-02-19 RX ADMIN — Medication 220 MILLIGRAM(S): at 06:01

## 2025-02-19 RX ADMIN — Medication 500 MILLIGRAM(S): at 12:26

## 2025-02-19 RX ADMIN — FINASTERIDE 5 MILLIGRAM(S): 1 TABLET, FILM COATED ORAL at 12:27

## 2025-02-19 RX ADMIN — METOPROLOL SUCCINATE 12.5 MILLIGRAM(S): 50 TABLET, EXTENDED RELEASE ORAL at 06:01

## 2025-02-19 RX ADMIN — DORZOLAMIDE 1 DROP(S): 20 SOLUTION/ DROPS OPHTHALMIC at 06:00

## 2025-02-19 RX ADMIN — RIVAROXABAN 15 MILLIGRAM(S): 10 TABLET, FILM COATED ORAL at 12:26

## 2025-02-19 RX ADMIN — Medication 1 TABLET(S): at 12:26

## 2025-02-19 NOTE — PROGRESS NOTE ADULT - NEGATIVE OPHTHALMOLOGIC SYMPTOMS
no diplopia/no photophobia/no lacrimation L/no lacrimation R/no blurred vision L/no blurred vision R/no discharge L/no discharge R/no pain L/no pain R/no scleral injection L/no scleral injection R

## 2025-02-19 NOTE — PROGRESS NOTE ADULT - SUBJECTIVE AND OBJECTIVE BOX
Date of Service 02-19-25 @ 12:51    CHIEF COMPLAINT:Patient is a 89y old  Male who presents with a chief complaint of Diarrhea, enterocolitis.Pt appears comfortable.    	  REVIEW OF SYSTEMS:  CONSTITUTIONAL: No fever, weight loss, or fatigue  EYES: No eye pain, visual disturbances, or discharge  ENT:  No difficulty hearing, tinnitus, vertigo; No sinus or throat pain  NECK: No pain or stiffness  RESPIRATORY: No cough, wheezing, chills or hemoptysis; No Shortness of Breath  CARDIOVASCULAR: No chest pain, palpitations, passing out, dizziness, or leg swelling  GASTROINTESTINAL: No abdominal or epigastric pain. No nausea, vomiting, or hematemesis; No diarrhea or constipation. No melena or hematochezia.  GENITOURINARY: No dysuria, frequency, hematuria, or incontinence  NEUROLOGICAL: No headaches, memory loss, loss of strength, numbness, or tremors  SKIN: No itching, burning, rashes, or lesions   LYMPH Nodes: No enlarged glands  ENDOCRINE: No heat or cold intolerance; No hair loss  MUSCULOSKELETAL: No joint pain or swelling; No muscle, back, or extremity pain  PSYCHIATRIC: No depression, anxiety, mood swings, or difficulty sleeping  HEME/LYMPH: No easy bruising, or bleeding gums  ALLERGY AND IMMUNOLOGIC: No hives or eczema	      PHYSICAL EXAM:  T(C): 36.7 (02-19-25 @ 05:19), Max: 36.7 (02-18-25 @ 20:52)  HR: 65 (02-19-25 @ 09:38) (59 - 70)  BP: 118/68 (02-19-25 @ 09:38) (87/44 - 118/68)  RR: 16 (02-19-25 @ 05:19) (16 - 17)  SpO2: 97% (02-19-25 @ 09:38) (95% - 98%)  Wt(kg): --  I&O's Summary      Appearance: Normal	  HEENT:   Normal oral mucosa, PERRL, EOMI	  Lymphatic: No lymphadenopathy  Cardiovascular: Normal S1 S2, No JVD, No murmurs, No edema  Respiratory: Lungs clear to auscultation	  Psychiatry: A & O x 3, Mood & affect appropriate  Gastrointestinal:  Soft, Non-tender, + BS	  Skin: No rashes, No ecchymoses, No cyanosis	  Neurologic: Non-focal  Extremities: Normal range of motion, No clubbing, cyanosis or edema  Vascular: Peripheral pulses palpable 2+ bilaterally    MEDICATIONS  (STANDING):  ascorbic acid 500 milliGRAM(s) Oral daily  atorvastatin 10 milliGRAM(s) Oral at bedtime  digoxin     Tablet 125 MICROGram(s) Oral <User Schedule>  dorzolamide 2% Ophthalmic Solution      dorzolamide 2% Ophthalmic Solution 1 Drop(s) Both EYES every 8 hours  finasteride 5 milliGRAM(s) Oral daily  levothyroxine 25 MICROGram(s) Oral daily  metoprolol succinate ER 12.5 milliGRAM(s) Oral daily  multivitamin 1 Tablet(s) Oral daily  pantoprazole    Tablet 40 milliGRAM(s) Oral before breakfast  polyethylene glycol 3350 17 Gram(s) Oral daily  rivaroxaban 15 milliGRAM(s) Oral daily  senna 1 Tablet(s) Oral daily  tamsulosin 0.4 milliGRAM(s) Oral at bedtime  zinc sulfate 220 milliGRAM(s) Oral two times a day    	  	  LABS:	 	                      10.7   4.96  )-----------( 113      ( 19 Feb 2025 06:05 )             31.3     02-19    141  |  113[H]  |  26[H]  ----------------------------<  114[H]  4.0   |  21[L]  |  1.05    Ca    8.5      19 Feb 2025 06:05  Phos  2.6     02-19  Mg     1.8     02-19    TPro  5.6[L]  /  Alb  2.7[L]  /  TBili  0.6  /  DBili  x   /  AST  31  /  ALT  40  /  AlkPhos  97  02-19

## 2025-02-19 NOTE — PROGRESS NOTE ADULT - RESPIRATORY
no wheezes/no rales/no rhonchi/no respiratory distress/airway patent/breath sounds equal

## 2025-02-19 NOTE — PROGRESS NOTE ADULT - NEGATIVE MUSCULOSKELETAL SYMPTOMS
no muscle cramps/no muscle weakness/no stiffness/no neck pain/no arm pain L/no arm pain R/no leg pain L/no leg pain R

## 2025-02-19 NOTE — PROGRESS NOTE ADULT - REASON FOR ADMISSION
Diarrhea, enterocolitis

## 2025-02-19 NOTE — PROGRESS NOTE ADULT - NEGATIVE CARDIOVASCULAR SYMPTOMS
no chest pain/no palpitations/no orthopnea/no peripheral edema

## 2025-02-19 NOTE — PROGRESS NOTE ADULT - ASSESSMENT
89 year old male with a history of  lung cancer, DM, HTN, HLD, A-fib, Hypothyroididsm,  lumbar radiculopathy was admitted with c/o  persistent diarrhea.   Patients symptoms resolved.  Patient is medically stable for discharge.  All goals of treatment were met.  Awaiting for PT eval before discharge.  Cases was discussed with NP.

## 2025-02-19 NOTE — PHYSICAL THERAPY INITIAL EVALUATION ADULT - GENERAL OBSERVATIONS, REHAB EVAL
Consult received ,EMR, radiology and labs reviewed. Patient received supine in bed, NAD, on contact precaution.  Patient agreed to EVALUATION from Physical Therapist.

## 2025-02-19 NOTE — DISCHARGE NOTE NURSING/CASE MANAGEMENT/SOCIAL WORK - FINANCIAL ASSISTANCE
[facility] provides services at a reduced cost to those who are determined to be eligible through [facility]’s financial assistance program. Information regarding [facility]’s financial assistance program can be found by going to [email or web address] or by calling [phone].

## 2025-02-19 NOTE — PROGRESS NOTE ADULT - NECK
supple/symmetric/no tracheal deviation

## 2025-02-19 NOTE — PROGRESS NOTE ADULT - GIT GEN HX ROS MEA POS PC
nausea/vomiting/diarrhea/abdominal pain

## 2025-02-19 NOTE — PROGRESS NOTE ADULT - TONSILS
no redness/no swelling
no redness/no swelling/no discharge
no redness/no swelling/no discharge
no redness/no swelling
no redness/no swelling/no discharge

## 2025-02-19 NOTE — PROGRESS NOTE ADULT - NEGATIVE SKIN SYMPTOMS
no rash/no itching/no dryness/no tumor/no brittle nails/no pitted nails/no hair loss

## 2025-02-19 NOTE — PROGRESS NOTE ADULT - GEN GEN HX ROS MEA POS PC
fever/chills/sweating/fatigue

## 2025-02-19 NOTE — DISCHARGE NOTE NURSING/CASE MANAGEMENT/SOCIAL WORK - NSDCPEFALRISK_GEN_ALL_CORE
For information on Fall & Injury Prevention, visit: [email or web address] OR  [email or web address] OR  https://www.cdc.gov/steadi/patient.html

## 2025-02-19 NOTE — DISCHARGE NOTE NURSING/CASE MANAGEMENT/SOCIAL WORK - PATIENT PORTAL LINK FT
You can access the FollowMyHealth Patient Portal offered by [facility] by registering at the following website: [email or web address]. By joining [facility]’s FollowMyHealth portal, you will also be able to view your health information using other applications (apps) compatible with our system.

## 2025-02-19 NOTE — PROGRESS NOTE ADULT - PROVIDER SPECIALTY LIST ADULT
Cardiology
Cardiology
Internal Medicine
Cardiology
Gastroenterology
Infectious Disease
Infectious Disease
Cardiology
Cardiology
Infectious Disease
Internal Medicine
Internal Medicine
Gastroenterology
Internal Medicine
Gastroenterology
Internal Medicine

## 2025-02-19 NOTE — PROGRESS NOTE ADULT - ASSESSMENT
89y male from home w / a PMHx of lung cancer on Tagrisso,HLD, HTN, BPH, hypothyroidism, Chronic A-fib on Xarelto,Chronic systolic HF,Hypotension,s/p  right preauricular abscess presenting to the ED with one day of nausea, vomiting, diarrhea and fatigue,enterocolitis,HONG,+ blood cx-? contaminant,Norovirus..  1.Enterocolitis-stool cx for cdiff-.  2.Chronic afib-xarelto,lopressor,dig.  3.Chronisc systolic HF-b blocker ,low dose ace.Hold lasix.  4.CT maxillofacial-no abcess.  5.Hypothyroid-synthroid.  6.BPH-proscar.  7.PPI.  8.Repeat blood cx-.

## 2025-02-19 NOTE — PROGRESS NOTE ADULT - NEGATIVE GASTROINTESTINAL SYMPTOMS
no melena/no hematochezia/no steatorrhea/no jaundice/no hiccoughs

## 2025-02-19 NOTE — PROGRESS NOTE ADULT - NEGATIVE GENERAL SYMPTOMS
no polyphagia/no polyuria/no polydipsia

## 2025-02-19 NOTE — PROGRESS NOTE ADULT - SUBJECTIVE AND OBJECTIVE BOX
ATTENDING MD PROGRESS NOTE    89 year old male with a history of lung cancer, DM, HTN, HLD, A-fib, Hypothyroidism  lumbar radiculopathy was admitted due to persistent diarrhea up to 10+  times a day.  Patient is now feeling well.  Denies having diarrhea.  Awaiting PT eval.  Patient is comfortable.  Patient had solid BM.    Vital Signs Last 24 Hrs  T(C): 36.7 (19 Feb 2025 05:19), Max: 36.7 (18 Feb 2025 20:52)  T(F): 98 (19 Feb 2025 05:19), Max: 98.1 (18 Feb 2025 20:52)  HR: 61 (19 Feb 2025 05:19) (59 - 70)  BP: 114/66 (19 Feb 2025 05:19) (87/44 - 114/66)  BP(mean): 64 (18 Feb 2025 20:52) (64 - 64)  RR: 16 (19 Feb 2025 05:19) (16 - 17)  SpO2: 97% (19 Feb 2025 05:19) (95% - 98%)    Parameters below as of 19 Feb 2025 05:19  Patient On (Oxygen Delivery Method): room air        T(C): 36.7 (02-19-25 @ 05:19), Max: 36.7 (02-18-25 @ 20:52)  HR: 61 (02-19-25 @ 05:19) (59 - 70)  BP: 114/66 (02-19-25 @ 05:19) (87/44 - 114/66)  RR: 16 (02-19-25 @ 05:19) (16 - 17)  SpO2: 97% (02-19-25 @ 05:19) (95% - 98%)    CONSTITUTIONAL: Well groomed, no apparent distress  EYES: PERRLA and symmetric, EOMI, No conjunctival or scleral injection, non-icteric  ENMT: Oral mucosa with moist membranes. Normal dentition; no pharyngeal injection or exudates             NECK: Supple, symmetric and without tracheal deviation   RESP: No respiratory distress, no use of accessory muscles; CTA b/l, no WRR  CV: RRR, +S1S2, no MRG; no JVD; no peripheral edema  GI: Soft, NT, ND, no rebound, no guarding; no palpable masses; no hepatosplenomegaly; no hernia palpated  LYMPH: No cervical LAD or tenderness; no axillary LAD or tenderness; no inguinal LAD or tenderness  MSK: Normal gait; No digital clubbing or cyanosis; examination of the (head/neck/spine/ribs/pelvis, RUE, LUE, RLE, LLE) without misalignment,            Normal ROM without pain, no spinal tenderness, normal muscle strength/tone  SKIN: No rashes or ulcers noted; no subcutaneous nodules or induration palpable  NEURO: CN II-XII intact; normal reflexes in upper and lower extremities, sensation intact in upper and lower extremities b/l to light touch   PSYCH: Appropriate insight/judgment; A+O x 3, mood and affect appropriate, recent/remote memory intact                 CBC Full  -  ( 19 Feb 2025 06:05 )  WBC Count : 4.96 K/uL  RBC Count : 3.32 M/uL  Hemoglobin : 10.7 g/dL  Hematocrit : 31.3 %  Platelet Count - Automated : 113 K/uL  Mean Cell Volume : 94.3 fl  Mean Cell Hemoglobin : 32.2 pg  Mean Cell Hemoglobin Concentration : 34.2 g/dL  Auto Neutrophil # : x  Auto Lymphocyte # : x  Auto Monocyte # : x  Auto Eosinophil # : x  Auto Basophil # : x  Auto Neutrophil % : x  Auto Lymphocyte % : x  Auto Monocyte % : x  Auto Eosinophil % : x  Auto Basophil % : x      02-19    141  |  113[H]  |  26[H]  ----------------------------<  114[H]  4.0   |  21[L]  |  1.05    Ca    8.5      19 Feb 2025 06:05  Phos  2.6     02-19  Mg     1.8     02-19    TPro  5.6[L]  /  Alb  2.7[L]  /  TBili  0.6  /  DBili  x   /  AST  31  /  ALT  40  /  AlkPhos  97  02-19      MEDICATIONS  (STANDING):  ascorbic acid 500 milliGRAM(s) Oral daily  atorvastatin 10 milliGRAM(s) Oral at bedtime  digoxin     Tablet 125 MICROGram(s) Oral <User Schedule>  dorzolamide 2% Ophthalmic Solution      dorzolamide 2% Ophthalmic Solution 1 Drop(s) Both EYES every 8 hours  finasteride 5 milliGRAM(s) Oral daily  levothyroxine 25 MICROGram(s) Oral daily  metoprolol succinate ER 12.5 milliGRAM(s) Oral daily  multivitamin 1 Tablet(s) Oral daily  pantoprazole    Tablet 40 milliGRAM(s) Oral before breakfast  polyethylene glycol 3350 17 Gram(s) Oral daily  rivaroxaban 15 milliGRAM(s) Oral daily  senna 1 Tablet(s) Oral daily  tamsulosin 0.4 milliGRAM(s) Oral at bedtime  zinc sulfate 220 milliGRAM(s) Oral two times a day

## 2025-02-19 NOTE — PROGRESS NOTE ADULT - ASSESSMENT
1. Abdominal pain improved  2. Diarrhea improved  3. Enterocolitis   4. Anemia  5. No evidence of acute GI bleeding  6. Positive Norovirus and PCR    Suggestions:    1. Diet as tolerated  2. Monitor electrolytes  3. Protonix 40mg daily  4. Avoid NSAID  5. Monitor H/H  6. Transfuse PRBC as needed  7. Antibiotics as per ID  8. DVT prophylaxis

## 2025-02-19 NOTE — PROGRESS NOTE ADULT - SUBJECTIVE AND OBJECTIVE BOX
[   ] ICU                                          [   ] CCU                                      [ X  ] Medical Floor    Patient is a 89 year old male with abdominal pain and diarrhea. GI consulted to evaluate.         Patient is a 89 year old male home with past medical history significant for lung cancer on Tagrisso, Hyperlipidemia, Glaucoma, HTN, BPH, hypothyroidism, A-fib on Xarelto, right preauricular abscess presented to the emergency room with one day history of frequent watery, non bloody diarrhea associated with fever, chills, nausea, non bloody vomiting and intermittent, 5-6/10 intensity, non radiating diffuse crampy abdominal pain. Patient was admitted to the hospital twice recently and was treated for periauricular abscess with drainage and antibiotics. Patient denies hematemesis, hematochezia, melena, chest pain, SOB, cough, hematuria, dysuria, and recent traveling.      Patient appears comfortable. No new complaints reported, No abdominal pain, N/V, hematemesis, hematochezia, melena, fever, chills, chest pain, SOB, cough reported.         PAST MEDICAL HISTORY    Hypertension    Hyperlipidemia     BPH      Glaucoma      Atrial fibrillation    Hypothyroidism        PAST SURGICAL HISTORY    Hemicolectomy    Cataract extraction status of left eye    Lung biopsy        Allergies    No Known Allergies    Intolerances  None          SOCIAL HISTORY  Advanced Directives:       [ X ] Full Code       [  ] DNR  Marital Status:         [  ] M      [ X ] S      [  ] D       [  ] W  Children:       [ X ] Yes      [  ] No  Occupation:        [  ] Employed       [ X ] Unemployed       [  ] Retired  Diet:       [ X ] Regular       [  ] PEG feeding          [  ] NG tube feeding  Drug Use:           [ X ] Patient denied          [  ] Yes  Alcohol:           [ X ] No             [  ] Yes (socially)         [  ] Yes (chronic)  Tobacco:           [  ] Yes           [ X ] No      FAMILY HISTORY  [ X ] Heart Disease            [ X ] Diabetes             [ X ] HTN             [  ] Colon Cancer             [  ] Stomach Cancer              [  ] Pancreatic Cancer        VITALS   Vital Signs Last 24 Hrs  T(C): 36.7 (02-19-25 @ 05:19), Max: 36.7 (02-18-25 @ 20:52)  T(F): 98 (02-19-25 @ 05:19), Max: 98.1 (02-18-25 @ 20:52)  HR: 61 (02-19-25 @ 05:19) (59 - 70)  BP: 114/66 (02-19-25 @ 05:19) (87/44 - 114/66)  BP(mean): 64 (02-18-25 @ 20:52) (64 - 64)  RR: 16 (02-19-25 @ 05:19) (16 - 17)  SpO2: 97% (02-19-25 @ 05:19) (95% - 98%)       MEDICATIONS  (STANDING):  ascorbic acid 500 milliGRAM(s) Oral daily  atorvastatin 10 milliGRAM(s) Oral at bedtime  digoxin     Tablet 125 MICROGram(s) Oral <User Schedule>  dorzolamide 2% Ophthalmic Solution      dorzolamide 2% Ophthalmic Solution 1 Drop(s) Both EYES every 8 hours  finasteride 5 milliGRAM(s) Oral daily  levothyroxine 25 MICROGram(s) Oral daily  metoprolol succinate ER 12.5 milliGRAM(s) Oral daily  multivitamin 1 Tablet(s) Oral daily  pantoprazole    Tablet 40 milliGRAM(s) Oral before breakfast  polyethylene glycol 3350 17 Gram(s) Oral daily  rivaroxaban 15 milliGRAM(s) Oral daily  senna 1 Tablet(s) Oral daily  tamsulosin 0.4 milliGRAM(s) Oral at bedtime  zinc sulfate 220 milliGRAM(s) Oral two times a day    MEDICATIONS  (PRN):  acetaminophen     Tablet .. 650 milliGRAM(s) Oral every 6 hours PRN Temp greater or equal to 38C (100.4F), Mild Pain (1 - 3)  albuterol    90 MICROgram(s) HFA Inhaler 2 Puff(s) Inhalation every 6 hours PRN Bronchospasm  aluminum hydroxide/magnesium hydroxide/simethicone Suspension 30 milliLiter(s) Oral every 4 hours PRN Dyspepsia  guaiFENesin Oral Liquid (Sugar-Free) 100 milliGRAM(s) Oral every 6 hours PRN Cough  melatonin 3 milliGRAM(s) Oral at bedtime PRN Insomnia  ondansetron Injectable 4 milliGRAM(s) IV Push every 8 hours PRN Nausea and/or Vomiting                            10.7   4.96  )-----------( 113      ( 19 Feb 2025 06:05 )             31.3       02-19    141  |  113[H]  |  26[H]  ----------------------------<  114[H]  4.0   |  21[L]  |  1.05    Ca    8.5      19 Feb 2025 06:05  Phos  2.6     02-19  Mg     1.8     02-19    TPro  5.6[L]  /  Alb  2.7[L]  /  TBili  0.6  /  DBili  x   /  AST  31  /  ALT  40  /  AlkPhos  97  02-19

## 2025-02-20 ENCOUNTER — NON-APPOINTMENT (OUTPATIENT)
Age: 89
End: 2025-02-20

## 2025-02-21 ENCOUNTER — OUTPATIENT (OUTPATIENT)
Dept: OUTPATIENT SERVICES | Facility: HOSPITAL | Age: 89
LOS: 1 days | Discharge: HOME | End: 2025-02-21

## 2025-02-21 ENCOUNTER — NON-APPOINTMENT (OUTPATIENT)
Age: 89
End: 2025-02-21

## 2025-02-21 DIAGNOSIS — Z98.890 OTHER SPECIFIED POSTPROCEDURAL STATES: Chronic | ICD-10-CM

## 2025-02-21 DIAGNOSIS — Z98.42 CATARACT EXTRACTION STATUS, LEFT EYE: Chronic | ICD-10-CM

## 2025-02-24 DIAGNOSIS — C34.00 MALIGNANT NEOPLASM OF UNSPECIFIED MAIN BRONCHUS: ICD-10-CM

## 2025-02-25 ENCOUNTER — APPOINTMENT (OUTPATIENT)
Dept: OTOLARYNGOLOGY | Facility: HOSPITAL | Age: 89
End: 2025-02-25

## 2025-03-10 ENCOUNTER — APPOINTMENT (OUTPATIENT)
Dept: OTOLARYNGOLOGY | Facility: CLINIC | Age: 89
End: 2025-03-10

## 2025-03-11 ENCOUNTER — APPOINTMENT (OUTPATIENT)
Dept: NUCLEAR MEDICINE | Facility: CLINIC | Age: 89
End: 2025-03-11
Payer: MEDICARE

## 2025-03-11 ENCOUNTER — APPOINTMENT (OUTPATIENT)
Dept: HOME HEALTH SERVICES | Facility: HOME HEALTH | Age: 89
End: 2025-03-11
Payer: MEDICARE

## 2025-03-11 VITALS
RESPIRATION RATE: 16 BRPM | SYSTOLIC BLOOD PRESSURE: 110 MMHG | OXYGEN SATURATION: 98 % | HEART RATE: 74 BPM | DIASTOLIC BLOOD PRESSURE: 60 MMHG

## 2025-03-11 DIAGNOSIS — N13.8 BENIGN PROSTATIC HYPERPLASIA WITH LOWER URINARY TRACT SYMPMS: ICD-10-CM

## 2025-03-11 DIAGNOSIS — C34.90 MALIGNANT NEOPLASM OF UNSPECIFIED PART OF UNSPECIFIED BRONCHUS OR LUNG: ICD-10-CM

## 2025-03-11 DIAGNOSIS — Z86.79 PERSONAL HISTORY OF OTHER DISEASES OF THE CIRCULATORY SYSTEM: ICD-10-CM

## 2025-03-11 DIAGNOSIS — H61.23 IMPACTED CERUMEN, BILATERAL: ICD-10-CM

## 2025-03-11 DIAGNOSIS — N40.1 BENIGN PROSTATIC HYPERPLASIA WITH LOWER URINARY TRACT SYMPMS: ICD-10-CM

## 2025-03-11 DIAGNOSIS — I95.9 HYPOTENSION, UNSPECIFIED: ICD-10-CM

## 2025-03-11 PROCEDURE — 78815 PET IMAGE W/CT SKULL-THIGH: CPT | Mod: PI

## 2025-03-11 PROCEDURE — 99349 HOME/RES VST EST MOD MDM 40: CPT

## 2025-03-11 PROCEDURE — A9552: CPT

## 2025-03-11 RX ORDER — ERLOTINIB HYDROCHLORIDE 100 MG/1
100 TABLET, FILM COATED ORAL DAILY
Refills: 0 | Status: ACTIVE | COMMUNITY
Start: 2025-03-11

## 2025-03-11 RX ORDER — ZINC SULFATE 50(220)MG
220 (50 ZN) CAPSULE ORAL TWICE DAILY
Refills: 0 | Status: ACTIVE | COMMUNITY
Start: 2025-03-11

## 2025-03-11 RX ORDER — PANTOPRAZOLE 40 MG/1
40 TABLET, DELAYED RELEASE ORAL
Qty: 30 | Refills: 10 | Status: ACTIVE | COMMUNITY
Start: 2025-03-11

## 2025-03-16 ENCOUNTER — NON-APPOINTMENT (OUTPATIENT)
Age: 89
End: 2025-03-16

## 2025-03-16 ENCOUNTER — INPATIENT (INPATIENT)
Facility: HOSPITAL | Age: 89
LOS: 11 days | Discharge: NURSING, REHAB OR LONG-TERM CARE | DRG: 556 | End: 2025-03-28
Attending: INTERNAL MEDICINE | Admitting: INTERNAL MEDICINE
Payer: MEDICARE

## 2025-03-16 VITALS
DIASTOLIC BLOOD PRESSURE: 60 MMHG | RESPIRATION RATE: 17 BRPM | SYSTOLIC BLOOD PRESSURE: 98 MMHG | HEIGHT: 68 IN | HEART RATE: 69 BPM | OXYGEN SATURATION: 97 % | TEMPERATURE: 97 F | WEIGHT: 151.9 LBS

## 2025-03-16 DIAGNOSIS — Z91.81 HISTORY OF FALLING: ICD-10-CM

## 2025-03-16 DIAGNOSIS — E78.5 HYPERLIPIDEMIA, UNSPECIFIED: ICD-10-CM

## 2025-03-16 DIAGNOSIS — N40.0 BENIGN PROSTATIC HYPERPLASIA WITHOUT LOWER URINARY TRACT SYMPTOMS: ICD-10-CM

## 2025-03-16 DIAGNOSIS — K21.9 GASTRO-ESOPHAGEAL REFLUX DISEASE WITHOUT ESOPHAGITIS: ICD-10-CM

## 2025-03-16 DIAGNOSIS — R11.10 VOMITING, UNSPECIFIED: ICD-10-CM

## 2025-03-16 DIAGNOSIS — Z98.42 CATARACT EXTRACTION STATUS, LEFT EYE: Chronic | ICD-10-CM

## 2025-03-16 DIAGNOSIS — Z98.890 OTHER SPECIFIED POSTPROCEDURAL STATES: Chronic | ICD-10-CM

## 2025-03-16 DIAGNOSIS — E03.9 HYPOTHYROIDISM, UNSPECIFIED: ICD-10-CM

## 2025-03-16 DIAGNOSIS — Z29.9 ENCOUNTER FOR PROPHYLACTIC MEASURES, UNSPECIFIED: ICD-10-CM

## 2025-03-16 DIAGNOSIS — G93.6 CEREBRAL EDEMA: ICD-10-CM

## 2025-03-16 DIAGNOSIS — Z86.79 PERSONAL HISTORY OF OTHER DISEASES OF THE CIRCULATORY SYSTEM: ICD-10-CM

## 2025-03-16 DIAGNOSIS — R26.2 DIFFICULTY IN WALKING, NOT ELSEWHERE CLASSIFIED: ICD-10-CM

## 2025-03-16 DIAGNOSIS — C34.90 MALIGNANT NEOPLASM OF UNSPECIFIED PART OF UNSPECIFIED BRONCHUS OR LUNG: ICD-10-CM

## 2025-03-16 DIAGNOSIS — R53.1 WEAKNESS: ICD-10-CM

## 2025-03-16 DIAGNOSIS — I10 ESSENTIAL (PRIMARY) HYPERTENSION: ICD-10-CM

## 2025-03-16 DIAGNOSIS — I48.91 UNSPECIFIED ATRIAL FIBRILLATION: ICD-10-CM

## 2025-03-16 LAB
ALBUMIN SERPL ELPH-MCNC: 3.2 G/DL — LOW (ref 3.5–5)
ALP SERPL-CCNC: 105 U/L — SIGNIFICANT CHANGE UP (ref 40–120)
ALT FLD-CCNC: 46 U/L DA — SIGNIFICANT CHANGE UP (ref 10–60)
ANION GAP SERPL CALC-SCNC: 4 MMOL/L — LOW (ref 5–17)
APTT BLD: 43 SEC — HIGH (ref 24.5–35.6)
AST SERPL-CCNC: 33 U/L — SIGNIFICANT CHANGE UP (ref 10–40)
BASOPHILS # BLD AUTO: 0.03 K/UL — SIGNIFICANT CHANGE UP (ref 0–0.2)
BASOPHILS NFR BLD AUTO: 0.6 % — SIGNIFICANT CHANGE UP (ref 0–2)
BILIRUB SERPL-MCNC: 1.2 MG/DL — SIGNIFICANT CHANGE UP (ref 0.2–1.2)
BUN SERPL-MCNC: 23 MG/DL — HIGH (ref 7–18)
CALCIUM SERPL-MCNC: 9.2 MG/DL — SIGNIFICANT CHANGE UP (ref 8.4–10.5)
CHLORIDE SERPL-SCNC: 110 MMOL/L — HIGH (ref 96–108)
CK SERPL-CCNC: 102 U/L — SIGNIFICANT CHANGE UP (ref 35–232)
CO2 SERPL-SCNC: 26 MMOL/L — SIGNIFICANT CHANGE UP (ref 22–31)
CREAT SERPL-MCNC: 1.23 MG/DL — SIGNIFICANT CHANGE UP (ref 0.5–1.3)
DIGOXIN SERPL-MCNC: 0.8 NG/ML — SIGNIFICANT CHANGE UP (ref 0.8–2)
EGFR: 55 ML/MIN/1.73M2 — LOW
EGFR: 55 ML/MIN/1.73M2 — LOW
EOSINOPHIL # BLD AUTO: 0.3 K/UL — SIGNIFICANT CHANGE UP (ref 0–0.5)
EOSINOPHIL NFR BLD AUTO: 6 % — SIGNIFICANT CHANGE UP (ref 0–6)
ERYTHROCYTE [SEDIMENTATION RATE] IN BLOOD: 11 MM/HR — SIGNIFICANT CHANGE UP (ref 0–20)
FLUAV AG NPH QL: SIGNIFICANT CHANGE UP
FLUBV AG NPH QL: SIGNIFICANT CHANGE UP
GLUCOSE SERPL-MCNC: 99 MG/DL — SIGNIFICANT CHANGE UP (ref 70–99)
HCT VFR BLD CALC: 33.2 % — LOW (ref 39–50)
HGB BLD-MCNC: 10.9 G/DL — LOW (ref 13–17)
IMM GRANULOCYTES NFR BLD AUTO: 0.2 % — SIGNIFICANT CHANGE UP (ref 0–0.9)
INR BLD: 2.73 RATIO — HIGH (ref 0.85–1.16)
LYMPHOCYTES # BLD AUTO: 0.77 K/UL — LOW (ref 1–3.3)
LYMPHOCYTES # BLD AUTO: 15.4 % — SIGNIFICANT CHANGE UP (ref 13–44)
MAGNESIUM SERPL-MCNC: 2 MG/DL — SIGNIFICANT CHANGE UP (ref 1.6–2.6)
MCHC RBC-ENTMCNC: 32.1 PG — SIGNIFICANT CHANGE UP (ref 27–34)
MCHC RBC-ENTMCNC: 32.8 G/DL — SIGNIFICANT CHANGE UP (ref 32–36)
MCV RBC AUTO: 97.6 FL — SIGNIFICANT CHANGE UP (ref 80–100)
MONOCYTES # BLD AUTO: 0.5 K/UL — SIGNIFICANT CHANGE UP (ref 0–0.9)
MONOCYTES NFR BLD AUTO: 10 % — SIGNIFICANT CHANGE UP (ref 2–14)
NEUTROPHILS # BLD AUTO: 3.39 K/UL — SIGNIFICANT CHANGE UP (ref 1.8–7.4)
NEUTROPHILS NFR BLD AUTO: 67.8 % — SIGNIFICANT CHANGE UP (ref 43–77)
NRBC BLD AUTO-RTO: 0 /100 WBCS — SIGNIFICANT CHANGE UP (ref 0–0)
PLATELET # BLD AUTO: 118 K/UL — LOW (ref 150–400)
POTASSIUM SERPL-MCNC: 4.2 MMOL/L — SIGNIFICANT CHANGE UP (ref 3.5–5.3)
POTASSIUM SERPL-SCNC: 4.2 MMOL/L — SIGNIFICANT CHANGE UP (ref 3.5–5.3)
PROT SERPL-MCNC: 6.4 G/DL — SIGNIFICANT CHANGE UP (ref 6–8.3)
PROTHROM AB SERPL-ACNC: 31.6 SEC — HIGH (ref 9.9–13.4)
RBC # BLD: 3.4 M/UL — LOW (ref 4.2–5.8)
RBC # FLD: 17.2 % — HIGH (ref 10.3–14.5)
RSV RNA NPH QL NAA+NON-PROBE: SIGNIFICANT CHANGE UP
SARS-COV-2 RNA SPEC QL NAA+PROBE: SIGNIFICANT CHANGE UP
SODIUM SERPL-SCNC: 140 MMOL/L — SIGNIFICANT CHANGE UP (ref 135–145)
WBC # BLD: 5 K/UL — SIGNIFICANT CHANGE UP (ref 3.8–10.5)
WBC # FLD AUTO: 5 K/UL — SIGNIFICANT CHANGE UP (ref 3.8–10.5)

## 2025-03-16 PROCEDURE — 93010 ELECTROCARDIOGRAM REPORT: CPT

## 2025-03-16 PROCEDURE — 70450 CT HEAD/BRAIN W/O DYE: CPT | Mod: 26

## 2025-03-16 PROCEDURE — 72192 CT PELVIS W/O DYE: CPT | Mod: 26

## 2025-03-16 PROCEDURE — 99222 1ST HOSP IP/OBS MODERATE 55: CPT

## 2025-03-16 PROCEDURE — 71045 X-RAY EXAM CHEST 1 VIEW: CPT | Mod: 26

## 2025-03-16 PROCEDURE — 73502 X-RAY EXAM HIP UNI 2-3 VIEWS: CPT | Mod: 26,LT

## 2025-03-16 PROCEDURE — 99291 CRITICAL CARE FIRST HOUR: CPT | Mod: GC

## 2025-03-16 PROCEDURE — 99285 EMERGENCY DEPT VISIT HI MDM: CPT

## 2025-03-16 PROCEDURE — 70450 CT HEAD/BRAIN W/O DYE: CPT | Mod: 26,77

## 2025-03-16 RX ORDER — FINASTERIDE 1 MG/1
5 TABLET, FILM COATED ORAL DAILY
Refills: 0 | Status: DISCONTINUED | OUTPATIENT
Start: 2025-03-16 | End: 2025-03-28

## 2025-03-16 RX ORDER — ONDANSETRON HCL/PF 4 MG/2 ML
4 VIAL (ML) INJECTION ONCE
Refills: 0 | Status: COMPLETED | OUTPATIENT
Start: 2025-03-16 | End: 2025-03-16

## 2025-03-16 RX ORDER — RIVAROXABAN 10 MG/1
15 TABLET, FILM COATED ORAL DAILY
Refills: 0 | Status: DISCONTINUED | OUTPATIENT
Start: 2025-03-16 | End: 2025-03-16

## 2025-03-16 RX ORDER — MELATONIN 5 MG
3 TABLET ORAL AT BEDTIME
Refills: 0 | Status: DISCONTINUED | OUTPATIENT
Start: 2025-03-16 | End: 2025-03-28

## 2025-03-16 RX ORDER — LEVOTHYROXINE SODIUM 300 MCG
25 TABLET ORAL DAILY
Refills: 0 | Status: DISCONTINUED | OUTPATIENT
Start: 2025-03-16 | End: 2025-03-28

## 2025-03-16 RX ORDER — MIDODRINE HYDROCHLORIDE 5 MG/1
10 TABLET ORAL
Refills: 0 | Status: DISCONTINUED | OUTPATIENT
Start: 2025-03-16 | End: 2025-03-17

## 2025-03-16 RX ORDER — ACETAMINOPHEN 500 MG/5ML
650 LIQUID (ML) ORAL EVERY 6 HOURS
Refills: 0 | Status: DISCONTINUED | OUTPATIENT
Start: 2025-03-16 | End: 2025-03-28

## 2025-03-16 RX ORDER — TAMSULOSIN HYDROCHLORIDE 0.4 MG/1
0.4 CAPSULE ORAL AT BEDTIME
Refills: 0 | Status: DISCONTINUED | OUTPATIENT
Start: 2025-03-16 | End: 2025-03-28

## 2025-03-16 RX ORDER — DIGOXIN 250 UG/1
125 TABLET ORAL DAILY
Refills: 0 | Status: DISCONTINUED | OUTPATIENT
Start: 2025-03-16 | End: 2025-03-18

## 2025-03-16 RX ORDER — METOCLOPRAMIDE HCL 10 MG
10 TABLET ORAL ONCE
Refills: 0 | Status: COMPLETED | OUTPATIENT
Start: 2025-03-16 | End: 2025-03-16

## 2025-03-16 RX ORDER — ATORVASTATIN CALCIUM 80 MG/1
40 TABLET, FILM COATED ORAL AT BEDTIME
Refills: 0 | Status: DISCONTINUED | OUTPATIENT
Start: 2025-03-16 | End: 2025-03-18

## 2025-03-16 RX ORDER — DEXAMETHASONE 0.5 MG/1
10 TABLET ORAL ONCE
Refills: 0 | Status: COMPLETED | OUTPATIENT
Start: 2025-03-16 | End: 2025-03-16

## 2025-03-16 RX ORDER — METOPROLOL SUCCINATE 50 MG/1
12.5 TABLET, EXTENDED RELEASE ORAL DAILY
Refills: 0 | Status: DISCONTINUED | OUTPATIENT
Start: 2025-03-16 | End: 2025-03-18

## 2025-03-16 RX ORDER — ALBUTEROL SULFATE 2.5 MG/3ML
2 VIAL, NEBULIZER (ML) INHALATION EVERY 6 HOURS
Refills: 0 | Status: DISCONTINUED | OUTPATIENT
Start: 2025-03-16 | End: 2025-03-28

## 2025-03-16 RX ORDER — SODIUM CHLORIDE 9 G/1000ML
1000 INJECTION, SOLUTION INTRAVENOUS
Refills: 0 | Status: DISCONTINUED | OUTPATIENT
Start: 2025-03-16 | End: 2025-03-18

## 2025-03-16 RX ORDER — DEXAMETHASONE 0.5 MG/1
10 TABLET ORAL ONCE
Refills: 0 | Status: DISCONTINUED | OUTPATIENT
Start: 2025-03-16 | End: 2025-03-16

## 2025-03-16 RX ADMIN — TAMSULOSIN HYDROCHLORIDE 0.4 MILLIGRAM(S): 0.4 CAPSULE ORAL at 23:31

## 2025-03-16 RX ADMIN — Medication 4 MILLIGRAM(S): at 14:25

## 2025-03-16 RX ADMIN — Medication 4 MILLIGRAM(S): at 12:43

## 2025-03-16 RX ADMIN — Medication 104 MILLIGRAM(S): at 23:31

## 2025-03-16 RX ADMIN — ATORVASTATIN CALCIUM 40 MILLIGRAM(S): 80 TABLET, FILM COATED ORAL at 23:32

## 2025-03-16 NOTE — H&P ADULT - PROBLEM SELECTOR PLAN 7
Continue home med PPI  Pt requesting clear liquid diet  Clear liquid diet, advance as tolerated Continue home med levothyroxine  f/u TSH given ambulatory dysfunction / weakness

## 2025-03-16 NOTE — H&P ADULT - PROBLEM SELECTOR PLAN 8
Continue home med midodrine  f/u orthostatic vitals given ambulatory dysfunction Continue home med midodrine  f/u orthostatic vitals given ambulatory dysfunction  LR at 60cc/hr Continue home med PPI  Pt requesting clear liquid diet  Clear liquid diet, advance as tolerated

## 2025-03-16 NOTE — H&P ADULT - PROBLEM SELECTOR PROBLEM 3
Left L4-L5 TFESI CPT Code 15550, Dx M51.36    Status: NO active coverage     Message routed to [facility], request denied under tracking # [id number]. Called [facility], spoke to [place] who states patient does not have active coverage. Please verify and update patients coverage. Notified JEFFRY Melgar for review and follow up/. Atrial fibrillation Ambulatory dysfunction Vomiting

## 2025-03-16 NOTE — H&P ADULT - PROBLEM SELECTOR PLAN 6
Continue home med levothyroxine  f/u TSH given ambulatory dysfunction / weakness Continue home meds finasteride and tamsulosin

## 2025-03-16 NOTE — H&P ADULT - PROBLEM SELECTOR PLAN 1
Presents after fall on LLE 1 week ago with increasing ambulatory dysfunction; uses walker at baseline  CT pelvis and XR L hip unremarkable  f/u CTH  f/u orthostatics  f/u UA  f/u TSH  f/u RVP  f/u PT Presents after fall on LLE 1 week ago with increasing ambulatory dysfunction; uses walker at baseline  CT pelvis and XR L hip unremarkable  f/u CTH  f/u orthostatics  LR at 60cc/hr  Pain contol with tylenol, can advance as needed  f/u UA  f/u TSH  f/u RVP  f/u PT Suspect brain metastasis  - CTH: new edema in multiple lobes suspicious for metastasis, local mass effect. Hyperdensity in R frontal lobe, metastasis versus puncutate hemorrhage  - CXR: no acute changes. Revisualized Right sided round suprahilar lesion and widened right mediastinum.  - Neuro checks Q1H  - CTH in 1 hour and 6H  - Hold home med Tagrisso  - f/u MRI w/wo IV contrast  - Neurology and heme/onc consult in AM  - No indication for steroids at this time per NSGY Suspect brain metastasis of lung cancer  - CTH: new edema in multiple lobes suspicious for metastasis, local mass effect. Hyperdensity in R frontal lobe, metastasis versus puncutate hemorrhage  - CXR: no acute changes. Revisualized Right sided round suprahilar lesion and widened right mediastinum.  - NSGY consulted  - ICU 24H monitoring  - Q1H neuro checks  - Repeat CTH in 1 hour and 6 hours  - No steroids recommended at this time  - f/u MRI w/wo IV contrast  - Neurology and heme/onc consult in AM  - Hold home med Tagrisso  - Hold any AC Suspect brain metastasis of lung cancer  - CTH: new edema in multiple lobes suspicious for metastasis, local mass effect. Hyperdensity in R frontal lobe, metastasis versus puncutate hemorrhage  - CXR: no acute changes. Revisualized Right sided round suprahilar lesion and widened right mediastinum.  - NSGY consulted  - ICU 24H monitoring  - Q1H neuro checks  - Repeat CTH in 1 hour and 6 hours  - Goal Systolic BP <150mmhg.  - No steroids recommended at this time  - f/u MRI w/wo IV contrast  - Neurology and heme/onc consult in AM  - Hold home med Tagrisso  - Hold any AC

## 2025-03-16 NOTE — CONSULT NOTE ADULT - ATTENDING COMMENTS
89y male (from home, uses walker at baseline) with PMHx of lung cancer previously on Tagrisso, HLD, HTN, BPH, hypothyroidism, A-fib on Xarelto, presents for ambulatory dysfunction since a fall 1 week ago. Pt lost balance and fell onto his left side onto floor 1 week ago, with current pain of left lower extremity. ICU called to evaluate for q1h neurochecks. Patient seen at bedside, no complaints. Awake and alert, oriented to person place time. No focal neurologic deficits noted.     CTH demonstrates Multiple new sites of edema in the left frontal, parietal lobes and bilateral temporal lobe suspicious for intracranial metastases. Largest area of edema is in the left frontal parietal lobes. There is local mass effect without herniation or hydrocephalus.   Punctate focus of hyperdensity in the right temporal lobe in the area of edema. This may represent underlying intracranial metastasis versus punctate hemorrhage    Neurosurgery consulted who recommend ICU admission for 24hrs for closer monitoring.    Assessment:  - Possible punctate intracranial hemorrhage  - Metastatic lung ca  - Afib on xarelto  - HTN  - BPH    Plan:  - admit to ICU  - q1h neurochecks 89y male (from home, uses walker at baseline) with PMHx of lung cancer previously on Tagrisso, HLD, HTN, BPH, hypothyroidism, A-fib on Xarelto, presents for ambulatory dysfunction since a fall 1 week ago. Pt lost balance and fell onto his left side onto floor 1 week ago, with current pain of left lower extremity. ICU called to evaluate for q1h neurochecks. Patient seen at bedside, no complaints. Awake and alert, oriented to person place time. No focal neurologic deficits noted.     CTH demonstrates Multiple new sites of edema in the left frontal, parietal lobes and bilateral temporal lobe suspicious for intracranial metastases. Largest area of edema is in the left frontal parietal lobes. There is local mass effect without herniation or hydrocephalus.   Punctate focus of hyperdensity in the right temporal lobe in the area of edema. This may represent underlying intracranial metastasis versus punctate hemorrhage    Neurosurgery consulted who recommend ICU admission for 24hrs for closer monitoring.    Assessment:  - Possible punctate intracranial hemorrhage  - Metastatic lung ca  - Afib on xarelto  - HTN  - BPH    Plan:  - admit to ICU  - q1h neurochecks  - f/u neurosx recs  - hold anticoagulation  - f/u repeat CTH  - will consider 4fpcc if any concerns for developing intracranial hemorrhage  - diet as tolerated   - continue home meds as above  - PT eval   - mechanical DVT ppx  - stress ulcer ppx

## 2025-03-16 NOTE — CHART NOTE - NSCHARTNOTEFT_GEN_A_CORE
Spoke with transfer center for non-urgent consult with neurosurgery team. Spoke with Dr. Pringle regarding the findings on CTH done at 17:27 which were concerning for edema with mass effect and likely new brain metastasis.     Dr. Pringel recommended ICU eval and monitoring for 24 hours with q1 hour neurochecks. She also recommended a repeat CTH to ensure no worsening of the edema at the 6hr tico and 24hr tico from the first CTH. She also recommended an MRI w/wo IV contrast to be done.     Dr. Pringle states she will speak with her neurosurgical team but anticipates there to be no need for transfer to [facility] at this time. No current indication for steroids. Goal Systolic BP <150mmhg. Hold all AC.     Teams chat initiated and if any progression of symptoms will notify [facility] neurosurgical team.

## 2025-03-16 NOTE — H&P ADULT - PROBLEM SELECTOR PLAN 3
Continue home med rivaroxaban  Continue home med toprol 12.5 QD Suspect 2/2 brain metastasis  Presents after fall on LLE 1 week ago with increasing ambulatory dysfunction; uses walker at baseline  - CT pelvis and XR L hip unremarkable  - CTH: new edema in multiple lobes suspicious for metastasis, local mass effect. Hyperdensity in R frontal lobe, metastasis versus puncutate hemorrhage.  - f/u orthostatics  - LR at 60cc/hr  - Pain contol with tylenol, can advance as needed  - f/u UA  - f/u TSH  - f/u RVP  - f/u PT Suspect 2/2 brain metastasis  In ED, one episode of NBNB spit-up  Pt requesting clear liquid diet  - Clear liquid diet, advance as tolerated  - PRN zofran  - LR at 60cc/hr  - Replete electrolytes PRN  - Continue home med PPI  - F/u orthostatics

## 2025-03-16 NOTE — H&P ADULT - NSHPREVIEWOFSYSTEMS_GEN_ALL_CORE
CONSTITUTIONAL: (+) loss of balance. No fever, weight loss, or fatigue  EYES: No eye pain, visual disturbances, or discharge  ENT:  No difficulty hearing, tinnitus, vertigo; No sinus or throat pain  NECK: No pain or stiffness  RESPIRATORY: No cough, wheezing, chills or hemoptysis; No Shortness of Breath  CARDIOVASCULAR: No chest pain, palpitations, passing out, dizziness, or leg swelling  GASTROINTESTINAL: (+) Stomach burning, spit-up. No nausea, vomiting, or hematemesis; No diarrhea or constipation. No melena or hematochezia.  GENITOURINARY: No dysuria, frequency, hematuria, or incontinence  NEUROLOGICAL: No headaches, memory loss, loss of strength, numbness, or tremors  SKIN: No itching, burning, rashes, or lesions   LYMPH Nodes: No enlarged glands  ENDOCRINE: No heat or cold intolerance; No hair loss  MUSCULOSKELETAL: (+) LLE pain. No joint swelling; No muscle, back pain.  PSYCHIATRIC: No depression, anxiety, mood swings, or difficulty sleeping  HEME/LYMPH: No easy bruising, or bleeding gums  ALLERGY AND IMMUNOLOGIC: No hives or eczema

## 2025-03-16 NOTE — H&P ADULT - PROBLEM SELECTOR PLAN 4
Hold home med Tagris  CXR: no acute changes. Revisualized Right sided round suprahilar lesion and widened right mediastinum. Continue home med rivaroxaban  Continue home med toprol 12.5 QD Suspect 2/2 brain metastasis  Presents after fall on LLE 1 week ago with increasing ambulatory dysfunction; uses walker at baseline  - CT pelvis and XR L hip unremarkable  - CTH: new edema in multiple lobes suspicious for metastasis, local mass effect. Hyperdensity in R frontal lobe, metastasis versus puncutate hemorrhage.  - f/u orthostatics  - LR at 60cc/hr  - Pain contol with tylenol, can advance as needed  - f/u UA  - f/u TSH  - f/u RVP  - f/u PT

## 2025-03-16 NOTE — ED ADULT NURSE NOTE - OBJECTIVE STATEMENT
Patient c/o non-radiating lower back pain x week s/p fall at home 1 week ago. Pt denies any head injury, LOC, chest pain, dizziness, blurry vision or nausea. Pt reports taking blood thinners but unsure which one.

## 2025-03-16 NOTE — H&P ADULT - NS ATTEND AMEND GEN_ALL_CORE FT
89 year old male with a history og lung cancer, DM, A-fin on Xarelto, HTN, HLD, Lumbar radiculopathy,  BPH, presented to ED with c/o inability to walk following a fall one week ago.  No acute fracture was found but patient was found to have new changes on the CT of brain that   is suspicious for new brain metastases.  Obtain MRI of the brain.  Neuro consult.  Oncology consult.

## 2025-03-16 NOTE — ED PROVIDER NOTE - PHYSICAL EXAMINATION
left buttock/hip-tenderness to palpation, no deformity, hematoma, shortening of his leg or abnormally rotated, sensory, pulses intact

## 2025-03-16 NOTE — H&P ADULT - HISTORY OF PRESENT ILLNESS
89y male (from home, uses walker at baseline) with PMHx of lung cancer on Tagrisso, HLD, HTN, BPH, hypothyroidism, A-fib on Xarelto, right preauricular abscess, presents for ambulatory dysfunction since a fall 1 week ago. Pt lost balance and fell onto left side onto floor 1 week ago, with current pain of left lower extremity. Has had increasing imbalance up till now when now when unable to ambulate. Also had 1 episode of NBNB spit-up after eating in ED, not able to tolerate food due to feeling of stomach burning. Per pt's friend, has HHA 4 hours/day for 3 days a week.  Pt recently admitted here at [facility] for norovirus and hypotensive, was discharged on midodrine and home PT. Denies fever, chills, SOB, cough, wheezing, chest pain, palpitations, diarrhea, polyuria, pain with urination. 89y male (from home, uses walker at baseline) with PMHx of lung cancer on Tagrisso, HLD, HTN, BPH, hypothyroidism, A-fib on Xarelto, right preauricular abscess, presents for ambulatory dysfunction since a fall 1 week ago. Pt lost balance and fell onto left side onto floor 1 week ago, with current pain of left lower extremity. Has had increasing imbalance up till now when now when unable to ambulate. Also had 1 episode of NBNB spit-up after eating in ED, not able to tolerate food due to feeling of stomach burning. Per pt's friend, has HHA 4 hours/day for 3 days a week.  Pt recently admitted here at [facility] for norovirus and hypotensive, was discharged on midodrine and home PT. Denies fever, chills, SOB, cough, wheezing, chest pain, palpitations, diarrhea, polyuria, or pain with urination. 89y male (from home, uses walker at baseline) with PMHx of lung cancer on Tagrisso, HLD, HTN, BPH, hypothyroidism, A-fib on Xarelto, right preauricular abscess, presents for ambulatory dysfunction since a fall 1 week ago. Pt lost balance and fell onto his left side onto floor 1 week ago, with current pain of left lower extremity. Has had increasing imbalance up till now when unable to ambulate. Also had 1 episode of NBNB spit-up after eating in ED, not able to tolerate food due to feeling of stomach burning. Per pt's friend, has HHA 4 hours/day for 3 days a week.  Pt recently admitted here at [facility] for norovirus and hypotensive, was discharged on midodrine and home PT. Denies fever, chills, SOB, cough, wheezing, chest pain, palpitations, diarrhea, polyuria, or pain with urination.

## 2025-03-16 NOTE — H&P ADULT - PROBLEM SELECTOR PLAN 5
Continue home meds finasteride and tamsulosin Hold home med rivaroxaban for concern of brain hemorrhage  Continue home med toprol 12.5 QD

## 2025-03-16 NOTE — ED PROVIDER NOTE - CARE PLAN
Principal Discharge DX:	Inability to ambulate due to hip   1 Principal Discharge DX:	Inability to ambulate due to hip  Secondary Diagnosis:	Hip pain, left

## 2025-03-16 NOTE — H&P ADULT - ASSESSMENT
89y male (from home, uses walker at baseline) with PMHx of lung cancer on Tagrisso, HLD, HTN, BPH, hypothyroidism, A-fib on Xarelto, right preauricular abscess, is admitted for ambulatory dysfunction and generalized weakness after fall 1 week ago. Also with stomach burning and 1 episode of spit-up. 89y male (from home, uses walker at baseline) with PMHx of lung cancer on Tagrisso, HLD, HTN, BPH, hypothyroidism, A-fib on Xarelto, right preauricular abscess, presents for ambulatory dysfunction and generalized weakness after fall 1 week ago. In ED, CT head concerning for brain metastasis and edema. Will be admitted to ICU for close neurological observation.

## 2025-03-16 NOTE — ED PROVIDER NOTE - OBJECTIVE STATEMENT
89-year-old male with history of A-fib on Xarelto, HTN, HLD 89-year-old male who lives alone with history of A-fib on Xarelto, HLD, BPH, hypothyroidism, glaucoma, lung CA on biologic, hypotension on midodrine.  Patient brought in by ambulance claims he lost balance and fell while changing his sweater on Monday night.  Patient fell backward toward his left side onto wooden floor.  His niece who was there at the time helped him up.  Patient complaining of left buttock/hip pain.  He was able to ambulate until Wednesday.  His mobility has been deteriorating that he is unable to ambulate even with a walker today.  Patient denies numbness, head injury, N/B, dizziness.  He took Tylenol with no relief.  As per patient's friend, currently he has HHA 4 hours/day for 3 days a week.  Pain scale 9/10.  He also recently was found to be hypotensive and was placed on midodrine

## 2025-03-16 NOTE — H&P ADULT - PROBLEM SELECTOR PLAN 9
Patient tolerated procedure well. Patient tolerated procedure well. Patient tolerated procedure well. Patient tolerated procedure well. Patient tolerated procedure well. Patient tolerated procedure well. Patient tolerated procedure well. Continue home med statin On rivaroxaban for a-fib Continue home med midodrine  f/u orthostatic vitals given ambulatory dysfunction  LR at 60cc/hr

## 2025-03-16 NOTE — ED ADULT NURSE NOTE - NSFALLHARMRISKINTERV_ED_ALL_ED

## 2025-03-16 NOTE — H&P ADULT - NSHPPHYSICALEXAM_GEN_ALL_CORE
T(C): 36.3 (03-16-25 @ 11:46), Max: 36.3 (03-16-25 @ 11:46)  HR: 69 (03-16-25 @ 11:46) (69 - 69)  BP: 98/60 (03-16-25 @ 11:46) (98/60 - 98/60)  RR: 17 (03-16-25 @ 11:46) (17 - 17)  SpO2: 97% (03-16-25 @ 11:46) (97% - 97%)    GENERAL: NAD  HEAD:  Atraumatic, Normocephalic  EYES:  conjunctiva and sclera clear  NECK: Supple  CHEST/LUNG: Clear to auscultation [place]  HEART: Regular rate and rhythm; No murmurs, rubs, or gallops  ABDOMEN: Soft, Nontender, Bowel sounds present, no masses on palpation  NERVOUS SYSTEM:  Alert and oriented x 3. Moving all extremities. Nonfocal.  EXTREMITIES:  No BLE edema  SKIN: warm, dry

## 2025-03-16 NOTE — H&P ADULT - PROBLEM SELECTOR PLAN 2
In ED, one episode of NBNB spit-up  Euvolemic on exam  Pt requesting clear liquid diet  - Clear liquid diet, advance as tolerated  - Replete electrolytes PRN  - Continue home med PPI  - F/u orthostatics In ED, one episode of NBNB spit-up  Pt requesting clear liquid diet  - Clear liquid diet, advance as tolerated  - PRN zofran  - LR at 60cc/hr  - Replete electrolytes PRN  - Continue home med PPI  - F/u orthostatics Suspect 2/2 brain metastasis  In ED, one episode of NBNB spit-up  Pt requesting clear liquid diet  - Clear liquid diet, advance as tolerated  - PRN zofran  - LR at 60cc/hr  - Replete electrolytes PRN  - Continue home med PPI  - F/u orthostatics As above

## 2025-03-16 NOTE — CHART NOTE - NSCHARTNOTEFT_GEN_A_CORE
Pt states at bedside that he would like his friend Luly Kohli to be updated regarding any changes in his care as the primary point of contact as she lives close by. He states it is okay to update his son, but Ms. Luly Kohli is his HCP and should be updated first.     Her phone number is [phone]

## 2025-03-16 NOTE — H&P ADULT - CONVERSATION DETAILS
Discussed code status. Pt wishes to remain full code. Pt is A&Ox4. Pt fully understands his medical course and code status. Called pt's contacts, Luly and Earl, however both went to voicemail.

## 2025-03-16 NOTE — ED PROVIDER NOTE - PROGRESS NOTE DETAILS
Explained labs and CT result to patient and he send from [place]   patient is unable to get up and ambulate, will admit patient   Case discussed with attending Dr. [place] discussed with attending Dr. Garcia

## 2025-03-16 NOTE — ED PROVIDER NOTE - CLINICAL SUMMARY MEDICAL DECISION MAKING FREE TEXT BOX
89-year-old male with history of A-fib on Xarelto, hypotension on midodrine, lung CA on biologic, lost balance and fell on Monday.  Patient now complaining of left hip/ buttock pain with inability to ambulate.  Will get labs, x-ray to rule out fracture, possible CT.  Since patient lives alone, will consider admission, PT evaluation and possible rehab placement

## 2025-03-17 ENCOUNTER — NON-APPOINTMENT (OUTPATIENT)
Age: 89
End: 2025-03-17

## 2025-03-17 LAB
ALBUMIN SERPL ELPH-MCNC: 3.3 G/DL — LOW (ref 3.5–5)
ALP SERPL-CCNC: 108 U/L — SIGNIFICANT CHANGE UP (ref 40–120)
ALT FLD-CCNC: 47 U/L DA — SIGNIFICANT CHANGE UP (ref 10–60)
ANION GAP SERPL CALC-SCNC: 6 MMOL/L — SIGNIFICANT CHANGE UP (ref 5–17)
AST SERPL-CCNC: 30 U/L — SIGNIFICANT CHANGE UP (ref 10–40)
BASOPHILS # BLD AUTO: 0.02 K/UL — SIGNIFICANT CHANGE UP (ref 0–0.2)
BASOPHILS NFR BLD AUTO: 0.4 % — SIGNIFICANT CHANGE UP (ref 0–2)
BILIRUB SERPL-MCNC: 1.4 MG/DL — HIGH (ref 0.2–1.2)
BUN SERPL-MCNC: 20 MG/DL — HIGH (ref 7–18)
CALCIUM SERPL-MCNC: 9.4 MG/DL — SIGNIFICANT CHANGE UP (ref 8.4–10.5)
CHLORIDE SERPL-SCNC: 108 MMOL/L — SIGNIFICANT CHANGE UP (ref 96–108)
CO2 SERPL-SCNC: 26 MMOL/L — SIGNIFICANT CHANGE UP (ref 22–31)
CREAT SERPL-MCNC: 1.13 MG/DL — SIGNIFICANT CHANGE UP (ref 0.5–1.3)
EGFR: 61 ML/MIN/1.73M2 — SIGNIFICANT CHANGE UP
EGFR: 61 ML/MIN/1.73M2 — SIGNIFICANT CHANGE UP
EOSINOPHIL # BLD AUTO: 0.03 K/UL — SIGNIFICANT CHANGE UP (ref 0–0.5)
EOSINOPHIL NFR BLD AUTO: 0.6 % — SIGNIFICANT CHANGE UP (ref 0–6)
GLUCOSE BLDC GLUCOMTR-MCNC: 117 MG/DL — HIGH (ref 70–99)
GLUCOSE BLDC GLUCOMTR-MCNC: 157 MG/DL — HIGH (ref 70–99)
GLUCOSE BLDC GLUCOMTR-MCNC: 163 MG/DL — HIGH (ref 70–99)
GLUCOSE BLDC GLUCOMTR-MCNC: 184 MG/DL — HIGH (ref 70–99)
GLUCOSE SERPL-MCNC: 121 MG/DL — HIGH (ref 70–99)
HCT VFR BLD CALC: 36.1 % — LOW (ref 39–50)
HGB BLD-MCNC: 11.8 G/DL — LOW (ref 13–17)
IMM GRANULOCYTES NFR BLD AUTO: 0.9 % — SIGNIFICANT CHANGE UP (ref 0–0.9)
LYMPHOCYTES # BLD AUTO: 0.53 K/UL — LOW (ref 1–3.3)
LYMPHOCYTES # BLD AUTO: 9.9 % — LOW (ref 13–44)
MAGNESIUM SERPL-MCNC: 2.1 MG/DL — SIGNIFICANT CHANGE UP (ref 1.6–2.6)
MCHC RBC-ENTMCNC: 31.9 PG — SIGNIFICANT CHANGE UP (ref 27–34)
MCHC RBC-ENTMCNC: 32.7 G/DL — SIGNIFICANT CHANGE UP (ref 32–36)
MCV RBC AUTO: 97.6 FL — SIGNIFICANT CHANGE UP (ref 80–100)
MONOCYTES # BLD AUTO: 0.08 K/UL — SIGNIFICANT CHANGE UP (ref 0–0.9)
MONOCYTES NFR BLD AUTO: 1.5 % — LOW (ref 2–14)
MRSA PCR RESULT.: SIGNIFICANT CHANGE UP
NEUTROPHILS # BLD AUTO: 4.62 K/UL — SIGNIFICANT CHANGE UP (ref 1.8–7.4)
NEUTROPHILS NFR BLD AUTO: 86.7 % — HIGH (ref 43–77)
NRBC BLD AUTO-RTO: 0 /100 WBCS — SIGNIFICANT CHANGE UP (ref 0–0)
PHOSPHATE SERPL-MCNC: 2.6 MG/DL — SIGNIFICANT CHANGE UP (ref 2.5–4.5)
PLATELET # BLD AUTO: 109 K/UL — LOW (ref 150–400)
POTASSIUM SERPL-MCNC: 4.6 MMOL/L — SIGNIFICANT CHANGE UP (ref 3.5–5.3)
POTASSIUM SERPL-SCNC: 4.6 MMOL/L — SIGNIFICANT CHANGE UP (ref 3.5–5.3)
PROT SERPL-MCNC: 6.7 G/DL — SIGNIFICANT CHANGE UP (ref 6–8.3)
RBC # BLD: 3.7 M/UL — LOW (ref 4.2–5.8)
RBC # FLD: 17 % — HIGH (ref 10.3–14.5)
S AUREUS DNA NOSE QL NAA+PROBE: SIGNIFICANT CHANGE UP
SODIUM SERPL-SCNC: 140 MMOL/L — SIGNIFICANT CHANGE UP (ref 135–145)
WBC # BLD: 5.33 K/UL — SIGNIFICANT CHANGE UP (ref 3.8–10.5)
WBC # FLD AUTO: 5.33 K/UL — SIGNIFICANT CHANGE UP (ref 3.8–10.5)

## 2025-03-17 PROCEDURE — 72156 MRI NECK SPINE W/O & W/DYE: CPT | Mod: 26

## 2025-03-17 PROCEDURE — 72157 MRI CHEST SPINE W/O & W/DYE: CPT | Mod: 26

## 2025-03-17 PROCEDURE — 70553 MRI BRAIN STEM W/O & W/DYE: CPT | Mod: 26

## 2025-03-17 PROCEDURE — 99222 1ST HOSP IP/OBS MODERATE 55: CPT

## 2025-03-17 RX ORDER — INSULIN LISPRO 100 U/ML
INJECTION, SOLUTION INTRAVENOUS; SUBCUTANEOUS AT BEDTIME
Refills: 0 | Status: DISCONTINUED | OUTPATIENT
Start: 2025-03-17 | End: 2025-03-28

## 2025-03-17 RX ORDER — MIDODRINE HYDROCHLORIDE 5 MG/1
5 TABLET ORAL THREE TIMES A DAY
Refills: 0 | Status: DISCONTINUED | OUTPATIENT
Start: 2025-03-17 | End: 2025-03-28

## 2025-03-17 RX ORDER — DEXAMETHASONE 0.5 MG/1
4 TABLET ORAL EVERY 6 HOURS
Refills: 0 | Status: DISCONTINUED | OUTPATIENT
Start: 2025-03-17 | End: 2025-03-17

## 2025-03-17 RX ORDER — INSULIN LISPRO 100 U/ML
INJECTION, SOLUTION INTRAVENOUS; SUBCUTANEOUS
Refills: 0 | Status: DISCONTINUED | OUTPATIENT
Start: 2025-03-17 | End: 2025-03-28

## 2025-03-17 RX ORDER — DEXAMETHASONE 0.5 MG/1
4 TABLET ORAL EVERY 6 HOURS
Refills: 0 | Status: DISCONTINUED | OUTPATIENT
Start: 2025-03-17 | End: 2025-03-26

## 2025-03-17 RX ADMIN — Medication 1 APPLICATION(S): at 05:02

## 2025-03-17 RX ADMIN — DEXAMETHASONE 4 MILLIGRAM(S): 0.5 TABLET ORAL at 23:48

## 2025-03-17 RX ADMIN — Medication 650 MILLIGRAM(S): at 07:30

## 2025-03-17 RX ADMIN — DEXAMETHASONE 4 MILLIGRAM(S): 0.5 TABLET ORAL at 14:36

## 2025-03-17 RX ADMIN — METOPROLOL SUCCINATE 12.5 MILLIGRAM(S): 50 TABLET, EXTENDED RELEASE ORAL at 05:01

## 2025-03-17 RX ADMIN — SODIUM CHLORIDE 60 MILLILITER(S): 9 INJECTION, SOLUTION INTRAVENOUS at 00:15

## 2025-03-17 RX ADMIN — DEXAMETHASONE 4 MILLIGRAM(S): 0.5 TABLET ORAL at 18:39

## 2025-03-17 RX ADMIN — FINASTERIDE 5 MILLIGRAM(S): 1 TABLET, FILM COATED ORAL at 11:43

## 2025-03-17 RX ADMIN — MIDODRINE HYDROCHLORIDE 10 MILLIGRAM(S): 5 TABLET ORAL at 05:01

## 2025-03-17 RX ADMIN — TAMSULOSIN HYDROCHLORIDE 0.4 MILLIGRAM(S): 0.4 CAPSULE ORAL at 22:23

## 2025-03-17 RX ADMIN — DIGOXIN 125 MICROGRAM(S): 250 TABLET ORAL at 05:01

## 2025-03-17 RX ADMIN — DEXAMETHASONE 102 MILLIGRAM(S): 0.5 TABLET ORAL at 00:16

## 2025-03-17 RX ADMIN — Medication 650 MILLIGRAM(S): at 06:48

## 2025-03-17 RX ADMIN — DEXAMETHASONE 4 MILLIGRAM(S): 0.5 TABLET ORAL at 08:39

## 2025-03-17 RX ADMIN — Medication 25 MICROGRAM(S): at 05:01

## 2025-03-17 RX ADMIN — MIDODRINE HYDROCHLORIDE 5 MILLIGRAM(S): 5 TABLET ORAL at 17:25

## 2025-03-17 RX ADMIN — Medication 40 MILLIGRAM(S): at 05:01

## 2025-03-17 RX ADMIN — ATORVASTATIN CALCIUM 40 MILLIGRAM(S): 80 TABLET, FILM COATED ORAL at 22:23

## 2025-03-17 RX ADMIN — INSULIN LISPRO 1: 100 INJECTION, SOLUTION INTRAVENOUS; SUBCUTANEOUS at 17:26

## 2025-03-17 RX ADMIN — INSULIN LISPRO 1: 100 INJECTION, SOLUTION INTRAVENOUS; SUBCUTANEOUS at 11:42

## 2025-03-17 NOTE — PROGRESS NOTE ADULT - ASSESSMENT
89y male (from home, uses walker at baseline) with PMHx of lung cancer previously on Tagrisso, HLD, HTN, BPH, hypothyroidism, A-fib on Xarelto, presents for ambulatory dysfunction since a fall 1 week ago (no head trauma). Adm to medicine for ambulatory dysfunction. CTH shows Multiple new sites of edema in the left frontal, parietal lobes and bilateral temporal lobe suspicious for intracranial metastases. Largest area of edema is in the left frontal parietal lobes. There is local mass effect without herniation or hydrocephalus. Punctate focus of hyperdensity in the right temporal lobe (series 2 image 20) in the area of edema. This may represent underlying intracranial metastasis versus punctate hemorrhage and continued follow-up is recommended. Neurosurgery consulted who recommend ICU admission for 24hrs for closer monitoring.    Plan:  #Punctate hyperdensity - intracranial mets vs hemorrhage  #Lung cancer, previously on Tagrisso  #Afib on Xarelto  #Ambulatory dysfunction  #H/o HTN, HLD, BPH, Hypothyroidism    Neuro:  #Punctate Hyperdensity  CTH: Multiple new sites (since Oct 24') of edema in the left frontal, parietal lobes and bilateral temporal lobe suspicious for intracranial metastases. Largest area of edema is in the left frontal parietal lobes. There is local mass effect without herniation or hydrocephalus. Punctate focus of hyperdensity in the right temporal lobe (series 2 image 20) in the area of edema. This may represent underlying intracranial metastasis versus punctate hemorrhage and continued follow-up is recommended.  - No current indications for steroids per NeuroSx  - Repeat CTH 6hrs and 24hrs after first CTH  - C/w Q1h neurochecks, can de-escalate to q2hr after 6hr CTH is back  - Keep systolic BP < 150mmhg  - reach out to [facility] neuroSx if condition deteriorates or new findings on CTH  Neuro Dr. Vieira Consulted  [facility] at [facility] following - Dr. Pringle, recs appreciated    #Ambulatory dysfunction s/p fall  No head trauma or LOC from recent fall (3/11/25)  Pt complaining of trouble walking and pain in the left buttocks/hip  CT pelvis negative for acute frxr  possibly has loss of balance from brain mets and edema vs orthostatic hypotension  - F/u Orthostatics  - F/u PT eval  - C/w Tylenol for pain control    Cardiovascular:  #Afib on Xarelto  Pt has a history of Afib, takes Xarelto and metoprolol succinate at home  - C/w metoprolol   - Hold Xarelto in setting of brain mets vs hemorrhage  - restart AC per Neuro recs    #HTN  Pt has a history of HTN, takes metoprolol 12.5mg qD at home  - C/w Metoprolol    Pulmonary:   #Lung CA  - hold home meds    Infectious Diseases:  #No active issues    Gastrointestinal:  #Nausea  - C/w Zofran PRN    Renal:  #No active issues    Heme/onc:   #Lung CA (Diagnosed 2 years ago per pt)  Pt was taking Tegrisso for over a year, was recently switched last week for a new med due to worsening spread of CA on recent imaging  CTH shows possible brain mets  - Hold home Tegrisso  - Heme/Onc consult in AM  - rest as above  Dr. Vieira Neurology Consulted  Neurosurgery at [facility] Following    Endo:   #Hypothyroidism  Pt has a history of hypothyroidism, takes Levothyroxine at home  - C/w Levothyroxine     #BPH  Pt has a history of BPH, takes tamsulosin 0.4mg and finasteride 5mg at home   - C/w Tamsulosin and Finasteride     Skin/ catheter:   #Peripheral lines    Prophylaxis:   #SCDs , no chemical DVT ppx in setting of brain mets/hemorrhage    Goals of Care:   FULL CODE per patient who is AOX4 baseline    Dispo: Transfer to ICU   89y male (from home, uses walker at baseline) with PMHx of lung cancer previously on Tagrisso, HLD, HTN, BPH, hypothyroidism, A-fib on Xarelto, presents for ambulatory dysfunction since a fall 1 week ago (no head trauma). Adm to medicine for ambulatory dysfunction. CTH shows Multiple new sites of edema in the left frontal, parietal lobes and bilateral temporal lobe suspicious for intracranial metastases. Largest area of edema is in the left frontal parietal lobes. There is local mass effect without herniation or hydrocephalus. Punctate focus of hyperdensity in the right temporal lobe (series 2 image 20) in the area of edema. This may represent underlying intracranial metastasis versus punctate hemorrhage and continued follow-up is recommended. Neurosurgery consulted who recommend ICU admission for 24hrs for closer monitoring.    Plan:  #Punctate hyperdensity - intracranial mets vs hemorrhage  #Lung cancer, previously on Tagrisso  #Afib on Xarelto  #Ambulatory dysfunction  #H/o HTN, HLD, BPH, Hypothyroidism    ==========Neuro:=========  #Punctate Hyperdensity  CTH: Multiple new sites (since Oct 24') of edema in the left frontal, parietal lobes and bilateral temporal lobe suspicious for intracranial metastases. Largest area of edema is in the left frontal parietal lobes. There is local mass effect without herniation or hydrocephalus. Punctate focus of hyperdensity in the right temporal lobe (series 2 image 20) in the area of edema. This may represent underlying intracranial metastasis versus punctate hemorrhage a  s/p Decadron 10mg (3/16)  Repeat CTH (6hrs): no changes   - f/u Repeat CTH 24hrs   - c/w Q2h Neurochecks,   - c/w Decadron 4mg q6hrs   - Keep systolic BP < 150mmhg  - reach out to [facility] NeuroSx if condition deteriorates or new findings on CTH  - Neuro Dr. Gillis following   - NeuroSx at [facility] following - Dr. Pringle, recs appreciated  - f/u MRI Head, Cervical & Throracic w/ & w/o    #Ambulatory dysfunction s/p fall  No head trauma or LOC from recent fall (3/11/25)  Pt complaining of trouble walking and pain in the left buttocks/hip  CT pelvis negative for acute fxr  possibly has loss of balance from brain mets and edema vs orthostatic hypotension  - F/u Orthostatics  - F/u PT eval  - C/w Tylenol for pain control    ==========Cardiovascular:==========  #Afib on Xarelto  Pt has a history of Afib, takes Xarelto and metoprolol succinate, Digoxin at home  - c/w metoprolol, digoxin  - Hold Xarelto in setting of brain mets vs hemorrhage      #HTN  Pt has a history of HTN, takes metoprolol 12.5mg qD at home  - c/w Metoprolol succi 12.5mg QD    ==========Pulmonary:==========   #Lung CA  on Erlotinib 100mg QD  - hold home meds    ==========Infectious Diseases:=========  #No active issues    ==========Gastrointestinal:==========  #Nausea  - C/w Zofran PRN    ==========Renal:==========  #No active issues    ==========Heme/onc:==========   #Lung CA (Diagnosed 2 years ago per pt)  Pt was taking Tagrisso for over a year, was recently switched to Erlotinib 100mg QD last week  due to worsening spread of CA on recent imaging  CTH shows possible brain mets  - Hold home Erlotinib   - Heme/Onc consult in AM  - Dr. Vieira Neurology Consulted  - Neurosurgery at [facility] Following    ==========Endo:===========   #Hypothyroidism  Pt has a history of hypothyroidism, takes Levothyroxine at home  - C/w Levothyroxine     =-==========#BPH============  Pt has a history of BPH, takes tamsulosin 0.4mg and finasteride 5mg at home   - C/w Tamsulosin and Finasteride     ============Skin/ catheter:=============   #Peripheral lines    =============Prophylaxis:=============   #SCDs , no chemical DVT ppx in setting of brain mets/hemorrhage    Goals of Care:   FULL CODE per patient who is AOX4 baseline    Dispo: Transfer to ICU   89y male (from home, uses walker at baseline) with PMHx of lung cancer previously on Tagrisso, HLD, HTN, BPH, hypothyroidism, A-fib on Xarelto, presents for ambulatory dysfunction since a fall 1 week ago (no head trauma). Adm to medicine for ambulatory dysfunction. CTH shows Multiple new sites of edema in the left frontal, parietal lobes and bilateral temporal lobe suspicious for intracranial metastases. Largest area of edema is in the left frontal parietal lobes. There is local mass effect without herniation or hydrocephalus. Punctate focus of hyperdensity in the right temporal lobe (series 2 image 20) in the area of edema. This may represent underlying intracranial metastasis versus punctate hemorrhage and continued follow-up is recommended. Neurosurgery consulted who recommend ICU admission for 24hrs for closer monitoring.    Plan:  #Punctate hyperdensity - intracranial mets vs hemorrhage  #Lung cancer, previously on Tagrisso  #Afib on Xarelto  #Ambulatory dysfunction  #H/o HTN, HLD, BPH, Hypothyroidism    ==========Neuro:=========  #Punctate Hyperdensity  CTH: Multiple new sites (since Oct 24') of edema in the left frontal, parietal lobes and bilateral temporal lobe suspicious for intracranial metastases. Largest area of edema is in the left frontal parietal lobes. There is local mass effect without herniation or hydrocephalus. Punctate focus of hyperdensity in the right temporal lobe (series 2 image 20) in the area of edema. This may represent underlying intracranial metastasis versus punctate hemorrhage a  s/p Decadron 10mg (3/16)  Repeat CTH (6hrs): no changes   - f/u Repeat CTH 24hrs   - c/w Q2h Neurochecks,   - c/w Decadron 4mg q6hrs   - Keep systolic BP < 150mmhg  - reach out to [facility] NeuroSx if condition deteriorates or new findings on CTH  - Neuro Dr. Gillis following   - NeuroSx at [facility] following - Dr. Pringle, recs appreciated  - f/u MRI Head, Cervical & Throracic w/ & w/o    #Ambulatory dysfunction s/p fall  No head trauma or LOC from recent fall (3/11/25)  Pt complaining of trouble walking and pain in the left buttocks/hip  CT pelvis negative for acute fxr  possibly has loss of balance from brain mets and edema vs orthostatic hypotension  - F/u Orthostatics  - F/u PT eval  - C/w Tylenol for pain control    ==========Cardiovascular:==========  #Afib on Xarelto  Pt has a history of Afib, takes Xarelto and metoprolol succinate, Digoxin at home  - c/w metoprolol, digoxin  - Hold Xarelto in setting of brain mets vs hemorrhage      #HTN  Pt has a history of HTN, takes metoprolol 12.5mg qD at home  - c/w Metoprolol succi 12.5mg QD    ==========Pulmonary:==========   #Lung CA  on Erlotinib 100mg QD  - hold home meds    ==========Infectious Diseases:=========  #No active issues    ==========Gastrointestinal:==========  #Nausea  - C/w Zofran PRN    ==========Renal:==========  #No active issues    ==========Heme/onc:==========   #Lung CA (Diagnosed 2 years ago per pt)  Pt was taking Tagrisso for over a year, was recently switched to Erlotinib 100mg QD last week  due to worsening spread of CA on recent imaging  Follows oupt Heme/Onc Doris Martinez  CTH shows possible brain mets  - Hold home Erlotinib   - Dr. Vieira Neurology Consulted  - Neurosurgery at [facility] Following  - [facility] following     ==========Endo:===========   #Hypothyroidism  Pt has a history of hypothyroidism, takes Levothyroxine at home  - C/w Levothyroxine     =-==========#BPH============  Pt has a history of BPH, takes tamsulosin 0.4mg and finasteride 5mg at home   - C/w Tamsulosin and Finasteride     ============Skin/ catheter:=============   #Peripheral lines    =============Prophylaxis:=============   #SCDs , no chemical DVT ppx in setting of brain mets/hemorrhage    Goals of Care:   FULL CODE per patient who is AOX4 baseline    Dispo: Transfer to ICU

## 2025-03-17 NOTE — PROGRESS NOTE ADULT - ASSESSMENT
Assessment and Plan:     Assessment: 89y male (from home, uses walker at baseline) with PMHx of lung cancer on Tagrisso, HLD, HTN, BPH, hypothyroidism, A-fib on Xarelto, right preauricular abscess, presents for ambulatory dysfunction and generalized weakness after fall 1 week ago. In ED, CT head concerning for brain metastasis and edema. Pt admitted to ICU for close neuro monitoring and further w/u. Neuro consulted for further reccs.     Impression:  ambulatory dysfunction in the setting of multiple cerebral metastases in CTH, pending MRI Brain.       RECOMMENDATIONS:    - Maintain in ICU with at least Q2H VS & Neurochecks for at least 24 hours  - Obtain MRI Brain w/wo Gadolinium to evaluate for intracranial metastases.  - Would also recommend MRI C/spine and T/spine W/WO contrast to eval for any structural abnormalities given asymmetric hyperreflexia.   - S/P  Dexamethasone 10mg IV x 1 on 3/16,  then maintain Dexamethasone 4mg IV Q6H given edema associated with cerebral metastases  - Continue GI prophylaxis and add Insulin per sliding scale while patient is receiving steroids  - Hematology/Oncology consult for further recommendations  - DVT ppx: SCDs  - F/U NSGY reccs.   - PT/OT eval when appropriate.   - Further care per primary team.     D/W Dr. Gillis         Assessment and Plan:     Assessment: 89y male (from home, uses walker at baseline) with PMHx of lung cancer on Tagrisso, HLD, HTN, BPH, hypothyroidism, A-fib on Xarelto, right preauricular abscess, presents for ambulatory dysfunction and generalized weakness after fall 1 week ago. In ED, CT head concerning for brain metastasis and edema. Pt admitted to ICU for close neuro monitoring and further w/u. Neuro consulted for further reccs.     Impression:  ambulatory dysfunction in the setting of multiple cerebral metastases in CTH, pending MRI Brain.       RECOMMENDATIONS:    - Maintain in ICU with at least Q2H VS & Neurochecks for at least 24 hours and then per unit protocol.   - Obtain MRI Brain w/wo Gadolinium to evaluate for intracranial metastases: F/U Read.   - Would also recommend MRI C/spine and T/spine W/WO contrast to eval for any structural abnormalities given asymmetric hyperreflexia.   - S/P  Dexamethasone 10mg IV x 1 on 3/16,  then maintain Dexamethasone 4mg IV Q6H given edema associated with cerebral metastases  - Continue GI prophylaxis and add Insulin per sliding scale while patient is receiving steroids  - Hematology/Oncology consult for further recommendations  - DVT ppx: SCDs  - F/U NSGY reccs.   - PT/OT eval when appropriate.   - Further care per primary team.     D/W Dr. Gillis

## 2025-03-17 NOTE — PHYSICAL THERAPY INITIAL EVALUATION ADULT - DIAGNOSIS, PT EVAL
admitting dx: suspected metastasis; PT diagnosis: Pt with deficits impacting functional mobility, ADLs, and QOL.

## 2025-03-17 NOTE — CHART NOTE - NSCHARTNOTEFT_GEN_A_CORE
A Neurology Consult note was started late yesterday but never completed.  Therefore this note is being entered aand will be billed for as a Neurology Consult Note.  It incorporated by reference the note entered with the same time and date as this ine as a Neurology Progress Note by EMILIA Joseph.     Pt BIBEMS yesterday.  History from Admission H&P    <Start of quote(s) from H&P>  "Reason for Admission: suspect brain metastasis  History of Present Illness:   89y male (from home, uses walker at baseline) with PMHx of lung cancer on Tagrisso, HLD, HTN, BPH, hypothyroidism, A-fib on Xarelto, right preauricular abscess, presents for ambulatory dysfunction since a fall 1 week ago. Pt lost balance and fell onto his left side onto floor 1 week ago, with current pain of left lower extremity. Has had increasing imbalance up till now when unable to ambulate. Also had 1 episode of NBNB spit-up after eating in ED, not able to tolerate food due to feeling of stomach burning. Per pt's friend, has HHA 4 hours/day for 3 days a week.  Pt recently admitted here at [facility] for norovirus and hypotensive, was discharged on midodrine and home PT. Denies fever, chills, SOB, cough, wheezing, chest pain, palpitations, diarrhea, polyuria, or pain with urination.       Review of Systems:  Review of Systems: CONSTITUTIONAL: (+) loss of balance. No fever, weight loss, or fatigue  EYES: No eye pain, visual disturbances, or discharge  ENT:  No difficulty hearing, tinnitus, vertigo; No sinus or throat pain  NECK: No pain or stiffness  RESPIRATORY: No cough, wheezing, chills or hemoptysis; No Shortness of Breath  CARDIOVASCULAR: No chest pain, palpitations, passing out, dizziness, or leg swelling  GASTROINTESTINAL: (+) Stomach burning, spit-up. No nausea, vomiting, or hematemesis; No diarrhea or constipation. No melena or hematochezia.  GENITOURINARY: No dysuria, frequency, hematuria, or incontinence  NEUROLOGICAL: No headaches, memory loss, loss of strength, numbness, or tremors  SKIN: No itching, burning, rashes, or lesions   LYMPH Nodes: No enlarged glands  ENDOCRINE: No heat or cold intolerance; No hair loss  MUSCULOSKELETAL: (+) LLE pain. No joint swelling; No muscle, back pain.  PSYCHIATRIC: No depression, anxiety, mood swings, or difficulty sleeping  HEME/LYMPH: No easy bruising, or bleeding gums  ALLERGY AND IMMUNOLOGIC: No hives or eczema  Other Review of Systems: All other review of systems negative, except as noted in HPI   . . .     Home Medications:   * Patient Currently Takes Medications as of 16-Mar-2025 16:38 documented in Structured Notes  · 	midodrine 10 mg oral tablet: Last Dose Taken:  , 1 tab(s) orally once a day  · 	pantoprazole 40 mg oral delayed release tablet: Last Dose Taken:  , 1 tab(s) orally once a day (before a meal)  · 	zinc sulfate 220 mg (as elemental zinc 50 mg) oral capsule: Last Dose Taken:  , 1 cap(s) orally 2 times a day  · 	ascorbic acid 500 mg oral tablet: Last Dose Taken:  , 1 tab(s) orally once a day  · 	levothyroxine 25 mcg (0.025 mg) oral tablet: Last Dose Taken:  , 1 tab(s) orally once a day  · 	rivaroxaban 15 mg oral tablet: Last Dose Taken:  , 1 tab(s) orally once a day  · 	tamsulosin 0.4 mg oral capsule: Last Dose Taken:  , 1 cap(s) orally once a day (at bedtime)  · 	finasteride 5 mg oral tablet: Last Dose Taken:  , 1 tab(s) orally once a day  · 	Toprol-XL 25 mg oral tablet, extended release: Last Dose Taken:  , 1 tab(s) orally once a day Please take 1/2 tablet daily  · 	digoxin 125 mcg (0.125 mg) oral tablet: Last Dose Taken:  , 1 tab(s) orally every other day  · 	Albuterol (Eqv-ProAir HFA) 90 mcg/inh inhalation aerosol: Last Dose Taken:  , 2 puff(s) inhaled every 6 hours  · 	simvastatin 10 mg oral tablet: Last Dose Taken:  , 1 tab(s) orally once a day (at bedtime)  · 	Centrum oral tablet: Last Dose Taken:  , 1 tab(s) orally once a day  · 	Azopt 1% ophthalmic suspension: Last Dose Taken:  , 1 drop(s) to each affected eye 3 times a day  · 	dorzolamide 22.3 mg-timolol 6.8 mg/mL eye drops: Last Dose Taken:    · 	Lumigan 0.01% ophthalmic solution: Last Dose Taken:  , 1 drop(s) to each affected eye once a day (in the evening)  · 	Combigan 0.2%-0.5% ophthalmic solution: Last Dose Taken:  , 1 drop(s) to each affected eye every 12 hours  · 	Tagrisso 80 mg oral tablet: Last Dose Taken:  , 80 milligram(s) orally once a day  · 	Rocklatan 0.02 %-0.005 % eye drops: Last Dose Taken:     . . .     PAST MEDICAL HISTORY:  Atrial fibrillation   BPH without obstruction/lower urinary tract symptoms   Essential hypertension   Glaucoma (increased eye pressure) both eyes  Hyperlipidemia, unspecified hyperlipidemia type.     PAST SURGICAL HISTORY:  Cataract extraction status of left eye w/lens implant - 2014  H/O hemicolectomy 1973  History of lung biopsy 2016 - negative results.  . . .      Problem/Plan - 1:  ·  Problem: Brain edema.   ·  Plan: Suspect brain metastasis of lung cancer  - CTH: new edema in multiple lobes suspicious for metastasis, local mass effect. Hyperdensity in R frontal lobe, metastasis versus puncutate hemorrhage  - CXR: no acute changes. Revisualized Right sided round suprahilar lesion and widened right mediastinum.  . . .      Problem/Plan - 5:  ·  Problem: Atrial fibrillation.   ·  Plan: Hold home med rivaroxaban for concern of brain hemorrhage"  <End of quote(s) from H&P>        Results of head CT:     See the above-referenced note by EMILIA Joseph.    EXAMINATION     See the above-referenced note of the same time as date as this noteby EMILIA Joseph.  I note in particular, or in addition or instead as follows:      OS: confrontation visual field grossly intact (all four quadrants; no extinction on DSS); reactive pupil 3-->2mm      OD: does not detect motion in the either temporal quadrant; pupil 3.5mm NR      Loss of temporal quadrants OD could be due to glaucoma, vitreous lesion, retinal tear, inter kulwinder; cannot readily be explained by a vascular lesion (except perhaps TWO retinal infarcts, involving the superior and inferior medial retinal branch arteries)              ASSESSMENT and RECOMMENDATIONS:     See the above-referenced note of the same time as date as this note, by EMILIA Joseph.

## 2025-03-17 NOTE — PROGRESS NOTE ADULT - ATTENDING COMMENTS
IMP: This is a 89 yr old man  (from home, uses walker at baseline) with f lung cancer previously on Tagrisso, HLD, HTN, BPH, hypothyroidism, A-fib on Xarelto, presents for ambulatory dysfunction since a fall 1 week ago (no head trauma). Adm to medicine for ambulatory dysfunction. CTH shows Multiple new sites of edema in the left frontal, parietal lobes and bilateral temporal lobe suspicious for intracranial metastases. Largest area of edema is in the left frontal parietal lobes. There is local mass effect without herniation or hydrocephalus. Punctate focus of hyperdensity in the right temporal lobe (series 2 image 20) in the area of edema. This may represent underlying intracranial metastasis versus punctate hemorrhage and continued follow-up is recommended. Neurosurgery consulted who recommend ICU admission for 24hrs for closer monitoring.       ASSESSMENT     - Intracranial bleed   - Brain mets with edema   - Lung ca   - HTN HLD   - CAD Afib   - Ambulatory dysfunction  - HTN, HLD, BPH, Hypothyroidism      Plan   - No sedation   - Monitor neuro status Q1h  - Neuro eval noted : MRI MRA   - CT head repeated , no change   - Decadron   - Hemodynamic monitoring   - Diet   - No antibx   - No anticoag   - Fall precaution

## 2025-03-17 NOTE — PATIENT PROFILE ADULT - HISTORY OF COVID-19 VACCINATION
PAT Pre-Op History & Physical    Patient: Silvia Mukherjee                  MRN: [id number]          SSN: [id number]  YOB: 1944          Age: 71 y.o. Sex: male                Subjective:   Patient is a 71 y.o.  male who presents with history of chronic left hip pain that has been a problem for \"years\" per patient report. Rates his hip pain 9/10 and describes it as constant aching but can be sharp with movement. Has failed steroid joint injections, NSAIDs, Tramadol, and narcotic pain medications. The patient was evaluated in the surgeon's office and it was determined that the most appropriate plan of care is to proceed with surgical intervention. Patient's PCP Dimitrios Deluna MD        Past Medical History:   Diagnosis Date    Arthritis     knees and hips;  Right Shoulder    Chronic pain     hip, knes, right shoulder    Diabetes ([facility] [address] 2007    High cholesterol     Hypertension     Ill-defined condition     elevated cholesterol    Ill-defined condition     gilbert\"s syndrome    Ill-defined condition     essential tremors    Ill-defined condition 07/19/2017    overweight  BMI= 29.4      Past Surgical History:   Procedure Laterality Date    HX ORTHOPAEDIC Left     repair fractured arm age 11    HX TONSILLECTOMY      childhood    SHOULDER SURG PROC UNLISTED Left 2010    arthroplasty      Prior to Admission medications    Medication Sig Start Date End Date Taking? Authorizing Provider   propranolol (INDERAL) 20 mg tablet Take 20 mg by mouth daily as needed. Takes Every Morning Before Breakfast.   Yes Historical Provider   lisinopril (PRINIVIL, ZESTRIL) 40 mg tablet Take 40 mg by mouth daily (after breakfast). Yes Historical Provider   traMADol (ULTRAM) 50 mg tablet Take 50 mg by mouth every six (6) hours as needed for Pain. May TAKE 1-2 Tabs as needed.    Indications: Pain   Yes Historical Provider   HYDROcodone-acetaminophen (NORCO) 7.5-325 mg per tablet Take 1 Tab by mouth every eight (8) hours as needed for Pain. Indications: Pain   Yes Historical Provider   potassium chloride (KLOR-CON M10) 10 mEq tablet Take 10 mEq by mouth daily (after breakfast). Yes Historical Provider   triamterene-hydroCHLOROthiazide (MAXZIDE) 37.5-25 mg per tablet Take 1 Tab by mouth Daily (before breakfast). Yes Historical Provider   fenofibrate (LOFIBRA) 160 mg tablet Take 160 mg by mouth daily (after breakfast). Yes Historical Provider   amLODIPine (NORVASC) 10 mg tablet Take 10 mg by mouth daily (after breakfast). Yes Historical Provider   EZETIMIBE (ZETIA PO) Take 10 mg by mouth Daily (before breakfast). Yes Historical Provider   metFORMIN (GLUCOPHAGE) 500 mg tablet Take 1,000 mg by mouth two (2) times daily (with meals). Yes Historical Provider   diclofenac EC (VOLTAREN) 75 mg EC tablet Take  by mouth two (2) times a day. Historical Provider     Current Outpatient Prescriptions   Medication Sig    propranolol (INDERAL) 20 mg tablet Take 20 mg by mouth daily as needed. Takes Every Morning Before Breakfast.    lisinopril (PRINIVIL, ZESTRIL) 40 mg tablet Take 40 mg by mouth daily (after breakfast).  traMADol (ULTRAM) 50 mg tablet Take 50 mg by mouth every six (6) hours as needed for Pain. May TAKE 1-2 Tabs as needed. Indications: Pain    HYDROcodone-acetaminophen (NORCO) 7.5-325 mg per tablet Take 1 Tab by mouth every eight (8) hours as needed for Pain. Indications: Pain    potassium chloride (KLOR-CON M10) 10 mEq tablet Take 10 mEq by mouth daily (after breakfast).  triamterene-hydroCHLOROthiazide (MAXZIDE) 37.5-25 mg per tablet Take 1 Tab by mouth Daily (before breakfast).  fenofibrate (LOFIBRA) 160 mg tablet Take 160 mg by mouth daily (after breakfast).  amLODIPine (NORVASC) 10 mg tablet Take 10 mg by mouth daily (after breakfast).  EZETIMIBE (ZETIA PO) Take 10 mg by mouth Daily (before breakfast).     metFORMIN (GLUCOPHAGE) 500 mg tablet Take 1,000 mg by mouth two (2) times daily (with meals).  diclofenac EC (VOLTAREN) 75 mg EC tablet Take  by mouth two (2) times a day. No current facility-administered medications for this encounter. Allergies   Allergen Reactions    Penicillins Hives     Ancef graded challenge done 17, tolerated well, no reaction noted    Statins-Hmg-Coa Reductase Inhibitors Other (comments)     Elevated liver and bilirubin enzgmes      Social History   Substance Use Topics    Smoking status: Former Smoker     Packs/day: 0.50     Years: 5.00     Quit date: 4/10/1969    Smokeless tobacco: Never Used    Alcohol use 1.8 oz/week     1 Glasses of wine, 1 Cans of beer, 1 Shots of liquor per week      Comment: Occassionally Drinks 3 days a week ; One drink. Not all three in one night. History   Drug Use No     Family History   Problem Relation Age of Onset    Cancer Mother      cervical    Hypertension Mother     Diabetes Father     Kidney Disease Father     Hypertension Brother     Stroke Brother     Lupus Sister     Alcohol abuse Sister          Review of Systems    Patient denies difficulty swallowing, mouth sores, or loose teeth. Patient denies any recent dental procedures or any planned prior to surgery. Patient denies chest pain, tightness, pain radiating down left arm, palpitations. Denies dizziness, visual disturbances, or lightheadedness. Patient denies shortness of breath, wheezing, cough, fever, or chills. Patient denies diarrhea or abdominal pain. States he has esplosive loose stools at times. Patient denies urinary problems including dysuria, hesitancy, urgency, or incontinence. Denies skin breakdown, rashes, insect bites or open area. Objective:   Patient Vitals for the past 24 hrs:   Temp Pulse Resp BP SpO2   17 1322 97.1 °F (36.2 °C) (!) 56 18 142/63 95 %     Temp (24hrs), Av.1 °F (36.2 °C), Min:97.1 °F (36.2 °C), Max:97.1 °F (36.2 °C)    Body mass index is 29.45 kg/(m^2).   Wt Readings from Last 1 Encounters:   07/19/17 85.3 kg (188 lb 0.8 oz)        Physical Exam:     General: Pleasant,  cooperative, no apparent distress, appears stated age. Uses cane to ambulate. Eyes: Conjunctivae/corneas clear. EOMs intact. Nose: Nares normal.   Mouth/Throat: Lips, mucosa, and tongue normal. Teeth and gums normal.   Neck: Supple, symmetrical, trachea midline. Back: Symmetric   Lungs: Clear to auscultation bilaterally. Heart: Regular rate and rhythm, S1, S2 normal. No murmur, click, rub or gallop. Abdomen: Soft, non-tender. Bowel sounds normal. No distention. Musculoskeletal:  Gait is antalgic. Extremities:  + tremors BUE. Otherwise extremities normal, atraumatic, no cyanosis or edema. Calves                                 supple, non tender to palpation. Pulses: 2+ and symmetric bilateral upper extremities. Cap. refill <2 seconds   Skin: Skin color, texture, turgor normal.  No rashes or lesions. Neurologic: CN II-XII grossly intact. Alert and oriented x3. Labs:   Recent Results (from the past 72 hour(s))   C REACTIVE PROTEIN, QT    Collection Time: 07/19/17  2:31 PM   Result Value Ref Range    C-Reactive protein <0.29 <0.60 mg/dL   CBC WITH AUTOMATED DIFF    Collection Time: 07/19/17  2:31 PM   Result Value Ref Range    WBC 9.1 4.1 - 11.1 K/uL    RBC 4.38 4.10 - 5.70 M/uL    HGB 12.8 12.1 - 17.0 g/dL    HCT 39.2 36.6 - 50.3 %    MCV 89.5 80.0 - 99.0 FL    MCH 29.2 26.0 - 34.0 PG    MCHC 32.7 30.0 - 36.5 g/dL    RDW 12.5 11.5 - 14.5 %    PLATELET 523 244 - 423 K/uL    NEUTROPHILS 79 (H) 32 - 75 %    LYMPHOCYTES 13 12 - 49 %    MONOCYTES 6 5 - 13 %    EOSINOPHILS 1 0 - 7 %    BASOPHILS 1 0 - 1 %    ABS. NEUTROPHILS 7.2 1.8 - 8.0 K/UL    ABS. LYMPHOCYTES 1.2 0.8 - 3.5 K/UL    ABS. MONOCYTES 0.5 0.0 - 1.0 K/UL    ABS. EOSINOPHILS 0.1 0.0 - 0.4 K/UL    ABS.  BASOPHILS 0.1 0.0 - 0.1 K/UL   METABOLIC PANEL, COMPREHENSIVE    Collection Time: 07/19/17  2:31 PM   Result Value Ref Range    Sodium 140 136 - 145 mmol/L    Potassium 4.7 3.5 - 5.1 mmol/L    Chloride 102 97 - 108 mmol/L    CO2 31 21 - 32 mmol/L    Anion gap 7 5 - 15 mmol/L    Glucose 115 (H) 65 - 100 mg/dL    BUN 44 (H) 6 - 20 MG/DL    Creatinine 1.47 (H) 0.70 - 1.30 MG/DL    BUN/Creatinine ratio 30 (H) 12 - 20      GFR est AA 57 (L) >60 ml/min/1.73m2    GFR est non-AA 47 (L) >60 ml/min/1.73m2    Calcium 9.4 8.5 - 10.1 MG/DL    Bilirubin, total 0.5 0.2 - 1.0 MG/DL    ALT (SGPT) 21 12 - 78 U/L    AST (SGOT) 16 15 - 37 U/L    Alk. phosphatase 41 (L) 45 - 117 U/L    Protein, total 7.2 6.4 - 8.2 g/dL    Albumin 4.2 3.5 - 5.0 g/dL    Globulin 3.0 2.0 - 4.0 g/dL    A-G Ratio 1.4 1.1 - 2.2     HEMOGLOBIN A1C WITH EAG    Collection Time: 07/19/17  2:31 PM   Result Value Ref Range    Hemoglobin A1c 5.6 4.2 - 6.3 %    Est. average glucose 114 mg/dL   CULTURE, MRSA    Collection Time: 07/19/17  2:31 PM   Result Value Ref Range    Special Requests: NO SPECIAL REQUESTS      Culture result: MRSA NOT PRESENT      Culture result:            Screening of patient nares for MRSA is for surveillance purposes and, if positive, to facilitate isolation considerations in high risk settings. It is not intended for automatic decolonization interventions per se as regimens are not sufficiently effective to warrant routine use.    PROTHROMBIN TIME + INR    Collection Time: 07/19/17  2:31 PM   Result Value Ref Range    INR 1.1 0.9 - 1.1      Prothrombin time 10.7 9.0 - 11.1 sec   PTT    Collection Time: 07/19/17  2:31 PM   Result Value Ref Range    aPTT 29.2 22.1 - 32.5 sec    aPTT, therapeutic range     58.0 - 77.0 SECS   URINALYSIS W/ REFLEX CULTURE    Collection Time: 07/19/17  2:31 PM   Result Value Ref Range    Color YELLOW/STRAW      Appearance CLOUDY (A) CLEAR      Specific gravity 1.018 1.003 - 1.030      pH (UA) 5.0 5.0 - 8.0      Protein NEGATIVE  NEG mg/dL    Glucose NEGATIVE  NEG mg/dL    Ketone NEGATIVE  NEG mg/dL    Bilirubin NEGATIVE  NEG      Blood NEGATIVE NEG      Urobilinogen 0.2 0.2 - 1.0 EU/dL    Nitrites NEGATIVE  NEG      Leukocyte Esterase NEGATIVE  NEG      WBC 0-4 0 - 4 /hpf    RBC 0-5 0 - 5 /hpf    Epithelial cells FEW FEW /lpf    Bacteria NEGATIVE  NEG /hpf    UA:UC IF INDICATED CULTURE NOT INDICATED BY UA RESULT CNI      Amorphous Crystals FEW (A) NEG      Granular cast 2-5 (A) NEG /lpf   TYPE & SCREEN    Collection Time: 07/19/17  2:31 PM   Result Value Ref Range    Crossmatch Expiration 07/29/2017     ABO/Rh(D) A POSITIVE     Antibody screen NEG    TRANSFERRIN    Collection Time: 07/19/17  2:31 PM   Result Value Ref Range    Transferrin 276 200 - 370 mg/dL   EKG, 12 LEAD, INITIAL    Collection Time: 07/19/17  3:26 PM   Result Value Ref Range    Ventricular Rate 53 BPM    Atrial Rate 53 BPM    P-R Interval 158 ms    QRS Duration 90 ms    Q-T Interval 434 ms    QTC Calculation (Bezet) 407 ms    Calculated P Axis 59 degrees    Calculated R Axis -28 degrees    Calculated T Axis 20 degrees    Diagnosis       Sinus bradycardia  Poor Anterior Forces  Abnormal ECG  When compared with ECG of 4/10/17  No significant change  Confirmed by Maximino Duverney MD., Shakil ([id number]) on 7/19/2017 6:54:54 PM         Assessment:     Left hip arthritis. Plan:     Scheduled for left total hip arthroplasty. Labs and EKG done per surgeon's orders. Lab results and EKG reviewed- unremarkable except creatinine= 1.47/ GFR= 47. Renal insufficiency POA- advise that NSAIDs be avoided pre and post operatively. Attended joint class 7/19/2017.     Mariam Camp NP Vaccine status unknown

## 2025-03-17 NOTE — PATIENT PROFILE ADULT - FALL HARM RISK - RISK INTERVENTIONS
Assistance OOB with selected safe patient handling equipment/Assistance with ambulation/Communicate Fall Risk and Risk Factors to all staff, patient, and family/Reinforce activity limits and safety measures with patient and family/Review medications for side effects contributing to fall risk/Sit up slowly, dangle for a short time, stand at bedside before walking/Toileting schedule using arm’s reach rule for commode and bathroom/Visual Cue: Yellow wristband/Bed in lowest position, wheels locked, appropriate side rails in place/Call bell, personal items and telephone in reach/Instruct patient to call for assistance before getting out of bed or chair/Non-slip footwear when patient is out of bed/[place] to call system/Physically safe environment - no spills, clutter or unnecessary equipment/Purposeful Proactive Rounding/Room/bathroom lighting operational, light cord in reach

## 2025-03-17 NOTE — PROGRESS NOTE ADULT - SUBJECTIVE AND OBJECTIVE BOX
Patient is a 89y old  Male who presents with a chief complaint of suspect brain metastasis (16 Mar 2025 23:03)      INTERVAL HPI/OVERNIGHT EVENTS: ***    REVIEW OF SYSTEMS:  CONSTITUTIONAL: No fever, chills  RESPIRATORY: No cough, wheezing, shortness of breath  CARDIOVASCULAR: No chest pain, palpitations,   GASTROINTESTINAL: No abdominal pain, nausea, vomiting, diarrhea or constipation, bloody stool  GENITOURINARY: No dysuria,  hematuria, urinary frequency  NEUROLOGICAL: No headaches, numbness, or tremors  EXTREMITES: No leg swelling  SKIN: No itching, burning, rashes, or lesions     ICU Vital Signs Last 24 Hrs  T(C): 36.3 (16 Mar 2025 23:30), Max: 36.5 (16 Mar 2025 19:53)  T(F): 97.3 (16 Mar 2025 23:30), Max: 97.7 (16 Mar 2025 19:53)  HR: 77 (17 Mar 2025 06:00) (65 - 88)  BP: 124/67 (17 Mar 2025 06:00) (98/60 - 159/74)  BP(mean): 82 (17 Mar 2025 06:00) (81 - 100)  ABP: --  ABP(mean): --  RR: 14 (17 Mar 2025 06:00) (12 - 18)  SpO2: 98% (17 Mar 2025 06:00) (89% - 99%)    O2 Parameters below as of 17 Mar 2025 06:00  Patient On (Oxygen Delivery Method): nasal cannula  O2 Flow (L/min): 2        I&O's Summary    16 Mar 2025 07:01  -  17 Mar 2025 07:00  --------------------------------------------------------  IN: 430 mL / OUT: 780 mL / NET: -350 mL              PRESSORS: [ ] YES [ ] NO    Antimicrobial:    Cardiovascular:  digoxin     Tablet 125 MICROGram(s) Oral daily  metoprolol succinate ER 12.5 milliGRAM(s) Oral daily  midodrine. 10 milliGRAM(s) Oral <User Schedule>    Pulmonary:  albuterol    90 MICROgram(s) HFA Inhaler 2 Puff(s) Inhalation every 6 hours PRN    Hematalogic:    Other:  acetaminophen     Tablet .. 650 milliGRAM(s) Oral every 6 hours PRN  atorvastatin 40 milliGRAM(s) Oral at bedtime  chlorhexidine 2% Cloths 1 Application(s) Topical <User Schedule>  dexAMETHasone  IVPB 4 milliGRAM(s) IV Intermittent every 6 hours  finasteride 5 milliGRAM(s) Oral daily  lactated ringers. 1000 milliLiter(s) IV Continuous <Continuous>  levothyroxine 25 MICROGram(s) Oral daily  melatonin 3 milliGRAM(s) Oral at bedtime PRN  pantoprazole    Tablet 40 milliGRAM(s) Oral before breakfast  tamsulosin 0.4 milliGRAM(s) Oral at bedtime    acetaminophen     Tablet .. 650 milliGRAM(s) Oral every 6 hours PRN  albuterol    90 MICROgram(s) HFA Inhaler 2 Puff(s) Inhalation every 6 hours PRN  atorvastatin 40 milliGRAM(s) Oral at bedtime  chlorhexidine 2% Cloths 1 Application(s) Topical <User Schedule>  dexAMETHasone  IVPB 4 milliGRAM(s) IV Intermittent every 6 hours  digoxin     Tablet 125 MICROGram(s) Oral daily  finasteride 5 milliGRAM(s) Oral daily  lactated ringers. 1000 milliLiter(s) IV Continuous <Continuous>  levothyroxine 25 MICROGram(s) Oral daily  melatonin 3 milliGRAM(s) Oral at bedtime PRN  metoprolol succinate ER 12.5 milliGRAM(s) Oral daily  midodrine. 10 milliGRAM(s) Oral <User Schedule>  pantoprazole    Tablet 40 milliGRAM(s) Oral before breakfast  tamsulosin 0.4 milliGRAM(s) Oral at bedtime    Drug Dosing Weight  Height (cm): 170.2 (17 Mar 2025 00:00)  Weight (kg): 67.3 (17 Mar 2025 00:00)  BMI (kg/m2): 23.2 (17 Mar 2025 00:00)  BSA (m2): 1.78 (17 Mar 2025 00:00)    JEAN: [ ] YES [ ] NO    DATE INSERTED:    CENTRAL LINE: [ ] YES [ ] NO  LOCATION:   DATE INSERTED:    A-LINE:  [ ] YES [ ] NO  LOCATION:   DATE INSERTED:      [facility] -reviewed admission note, no change since admission  PAST MEDICAL & SURGICAL HISTORY:  Essential hypertension      Hyperlipidemia, unspecified hyperlipidemia type      BPH without obstruction/lower urinary tract symptoms      Glaucoma (increased eye pressure)  both eyes      Atrial fibrillation      H/O hemicolectomy  1973      Cataract extraction status of left eye  w/lens implant - 2014      History of lung biopsy  2016 - negative results              PHYSICAL EXAM:  GENERAL: NAD,   HEAD:  Atraumatic, Normocephalic  EYES: EOMI, PERRLA, conjunctiva and sclera clear  ENMT: Moist mucous membranes, No lesions  NECK: Supple, normal appearance,   NERVOUS SYSTEM:  Alert & Oriented X3, No Asterixis  CHEST/LUNG: No chest deformity; No rales, rhonchi, wheezing   HEART: Regular rate and rhythm; No murmurs,  ABDOMEN: Soft, Nontender, Nondistended; Bowel sounds present  EXTREMITIES:  2+ Peripheral Pulses, No clubbing, cyanosis, or edema  SKIN: No rashes or lesions;      LABS:  CBC Full  -  ( 17 Mar 2025 03:25 )  WBC Count : 5.33 K/uL  RBC Count : 3.70 M/uL  Hemoglobin : 11.8 g/dL  Hematocrit : 36.1 %  Platelet Count - Automated : 109 K/uL  Mean Cell Volume : 97.6 fl  Mean Cell Hemoglobin : 31.9 pg  Mean Cell Hemoglobin Concentration : 32.7 g/dL  Auto Neutrophil # : x  Auto Lymphocyte # : x  Auto Monocyte # : x  Auto Eosinophil # : x  Auto Basophil # : x  Auto Neutrophil % : x  Auto Lymphocyte % : x  Auto Monocyte % : x  Auto Eosinophil % : x  Auto Basophil % : x    03-17    140  |  108  |  20[H]  ----------------------------<  121[H]  4.6   |  26  |  1.13    Ca    9.4      17 Mar 2025 03:25  Phos  2.6     03-17  Mg     2.1     03-17    TPro  6.7  /  Alb  3.3[L]  /  TBili  1.4[H]  /  DBili  x   /  AST  30  /  ALT  47  /  AlkPhos  108  03-17    PT/INR - ( 16 Mar 2025 12:30 )   PT: 31.6 sec;   INR: 2.73 ratio         PTT - ( 16 Mar 2025 12:30 )  PTT:43.0 sec  Urinalysis Basic - ( 17 Mar 2025 03:25 )    Color: x / Appearance: x / SG: x / pH: x  Gluc: 121 mg/dL / Ketone: x  / Bili: x / Urobili: x   Blood: x / Protein: x / Nitrite: x   Leuk Esterase: x / RBC: x / WBC x   Sq Epi: x / Non Sq Epi: x / Bacteria: x          RADIOLOGY & ADDITIONAL STUDIES REVIEWED:  ***    [ ]GOALS OF CARE DISCUSSION WITH PATIENT/FAMILY/PROXY:    CRITICAL CARE TIME SPENT: 35 minutes Patient is a 89y old  Male who presents with a chief complaint of suspect brain metastasis (16 Mar 2025 23:03)      INTERVAL HPI/OVERNIGHT EVENTS: No overnight events. He was examined in the morning at bedside and he is in no acute distress. Otherwise, he denies any symptoms     REVIEW OF SYSTEMS:  CONSTITUTIONAL: No fever, chills  RESPIRATORY: No cough, wheezing, shortness of breath  CARDIOVASCULAR: No chest pain, palpitations,   GASTROINTESTINAL: No abdominal pain, nausea, vomiting, diarrhea or constipation, bloody stool  GENITOURINARY: No dysuria,  hematuria, urinary frequency  NEUROLOGICAL: No headaches, numbness, or tremors  EXTREMITES: No leg swelling  SKIN: No itching, burning, rashes, or lesions     ICU Vital Signs Last 24 Hrs  T(C): 36.3 (16 Mar 2025 23:30), Max: 36.5 (16 Mar 2025 19:53)  T(F): 97.3 (16 Mar 2025 23:30), Max: 97.7 (16 Mar 2025 19:53)  HR: 77 (17 Mar 2025 06:00) (65 - 88)  BP: 124/67 (17 Mar 2025 06:00) (98/60 - 159/74)  BP(mean): 82 (17 Mar 2025 06:00) (81 - 100)  ABP: --  ABP(mean): --  RR: 14 (17 Mar 2025 06:00) (12 - 18)  SpO2: 98% (17 Mar 2025 06:00) (89% - 99%)    O2 Parameters below as of 17 Mar 2025 06:00  Patient On (Oxygen Delivery Method): nasal cannula  O2 Flow (L/min): 2        I&O's Summary    16 Mar 2025 07:01  -  17 Mar 2025 07:00  --------------------------------------------------------  IN: 430 mL / OUT: 780 mL / NET: -350 mL              PRESSORS: [ ] YES [x ] NO    Antimicrobial:    Cardiovascular:  digoxin     Tablet 125 MICROGram(s) Oral daily  metoprolol succinate ER 12.5 milliGRAM(s) Oral daily  midodrine. 10 milliGRAM(s) Oral <User Schedule>    Pulmonary:  albuterol    90 MICROgram(s) HFA Inhaler 2 Puff(s) Inhalation every 6 hours PRN    Hematalogic:    Other:  acetaminophen     Tablet .. 650 milliGRAM(s) Oral every 6 hours PRN  atorvastatin 40 milliGRAM(s) Oral at bedtime  chlorhexidine 2% Cloths 1 Application(s) Topical <User Schedule>  dexAMETHasone  IVPB 4 milliGRAM(s) IV Intermittent every 6 hours  finasteride 5 milliGRAM(s) Oral daily  lactated ringers. 1000 milliLiter(s) IV Continuous <Continuous>  levothyroxine 25 MICROGram(s) Oral daily  melatonin 3 milliGRAM(s) Oral at bedtime PRN  pantoprazole    Tablet 40 milliGRAM(s) Oral before breakfast  tamsulosin 0.4 milliGRAM(s) Oral at bedtime    acetaminophen     Tablet .. 650 milliGRAM(s) Oral every 6 hours PRN  albuterol    90 MICROgram(s) HFA Inhaler 2 Puff(s) Inhalation every 6 hours PRN  atorvastatin 40 milliGRAM(s) Oral at bedtime  chlorhexidine 2% Cloths 1 Application(s) Topical <User Schedule>  dexAMETHasone  IVPB 4 milliGRAM(s) IV Intermittent every 6 hours  digoxin     Tablet 125 MICROGram(s) Oral daily  finasteride 5 milliGRAM(s) Oral daily  lactated ringers. 1000 milliLiter(s) IV Continuous <Continuous>  levothyroxine 25 MICROGram(s) Oral daily  melatonin 3 milliGRAM(s) Oral at bedtime PRN  metoprolol succinate ER 12.5 milliGRAM(s) Oral daily  midodrine. 10 milliGRAM(s) Oral <User Schedule>  pantoprazole    Tablet 40 milliGRAM(s) Oral before breakfast  tamsulosin 0.4 milliGRAM(s) Oral at bedtime    Drug Dosing Weight  Height (cm): 170.2 (17 Mar 2025 00:00)  Weight (kg): 67.3 (17 Mar 2025 00:00)  BMI (kg/m2): 23.2 (17 Mar 2025 00:00)  BSA (m2): 1.78 (17 Mar 2025 00:00)    JEAN: [ ] YES [ ] NO    DATE INSERTED:    CENTRAL LINE: [ ] YES [ ] NO  LOCATION:   DATE INSERTED:    A-LINE:  [ ] YES [ ] NO  LOCATION:   DATE INSERTED:      [facility] -reviewed admission note, no change since admission  PAST MEDICAL & SURGICAL HISTORY:  Essential hypertension      Hyperlipidemia, unspecified hyperlipidemia type      BPH without obstruction/lower urinary tract symptoms      Glaucoma (increased eye pressure)  both eyes      Atrial fibrillation      H/O hemicolectomy  1973      Cataract extraction status of left eye  w/lens implant - 2014      History of lung biopsy  2016 - negative results              PHYSICAL EXAM:  GENERAL: NAD,   HEAD:  Atraumatic, Normocephalic  EYES: EOMI, PERRLA, conjunctiva and sclera clear  ENMT: Moist mucous membranes, No lesions  NECK: Supple, normal appearance,   NERVOUS SYSTEM:  Alert & Oriented X3, No Asterixis  CHEST/LUNG: No chest deformity; No rales, rhonchi, wheezing   HEART: Regular rate and rhythm; No murmurs,  ABDOMEN: Soft, Nontender, Nondistended; Bowel sounds present  EXTREMITIES:  2+ Peripheral Pulses, No clubbing, cyanosis, or edema  SKIN: No rashes or lesions;      LABS:  CBC Full  -  ( 17 Mar 2025 03:25 )  WBC Count : 5.33 K/uL  RBC Count : 3.70 M/uL  Hemoglobin : 11.8 g/dL  Hematocrit : 36.1 %  Platelet Count - Automated : 109 K/uL  Mean Cell Volume : 97.6 fl  Mean Cell Hemoglobin : 31.9 pg  Mean Cell Hemoglobin Concentration : 32.7 g/dL  Auto Neutrophil # : x  Auto Lymphocyte # : x  Auto Monocyte # : x  Auto Eosinophil # : x  Auto Basophil # : x  Auto Neutrophil % : x  Auto Lymphocyte % : x  Auto Monocyte % : x  Auto Eosinophil % : x  Auto Basophil % : x    03-17    140  |  108  |  20[H]  ----------------------------<  121[H]  4.6   |  26  |  1.13    Ca    9.4      17 Mar 2025 03:25  Phos  2.6     03-17  Mg     2.1     03-17    TPro  6.7  /  Alb  3.3[L]  /  TBili  1.4[H]  /  DBili  x   /  AST  30  /  ALT  47  /  AlkPhos  108  03-17    PT/INR - ( 16 Mar 2025 12:30 )   PT: 31.6 sec;   INR: 2.73 ratio         PTT - ( 16 Mar 2025 12:30 )  PTT:43.0 sec  Urinalysis Basic - ( 17 Mar 2025 03:25 )    Color: x / Appearance: x / SG: x / pH: x  Gluc: 121 mg/dL / Ketone: x  / Bili: x / Urobili: x   Blood: x / Protein: x / Nitrite: x   Leuk Esterase: x / RBC: x / WBC x   Sq Epi: x / Non Sq Epi: x / Bacteria: x          RADIOLOGY & ADDITIONAL STUDIES REVIEWED:  ***    [ ]GOALS OF CARE DISCUSSION WITH PATIENT/FAMILY/PROXY:    CRITICAL CARE TIME SPENT: 35 minutes Patient is a 89y old  Male who presents with a chief complaint of suspect brain metastasis (16 Mar 2025 23:03)      INTERVAL HPI/OVERNIGHT EVENTS: No overnight events. He was examined in the morning at bedside and he is in no acute distress. Otherwise, he denies any symptoms     REVIEW OF SYSTEMS:  CONSTITUTIONAL: No fever, chills  RESPIRATORY: No cough, wheezing, shortness of breath  CARDIOVASCULAR: No chest pain, palpitations,   GASTROINTESTINAL: No abdominal pain, nausea, vomiting, diarrhea or constipation, bloody stool  GENITOURINARY: No dysuria,  hematuria, urinary frequency  NEUROLOGICAL: No headaches, numbness, or tremors  EXTREMITES: No leg swelling  SKIN: No itching, burning, rashes, or lesions     ICU Vital Signs Last 24 Hrs  T(C): 36.3 (16 Mar 2025 23:30), Max: 36.5 (16 Mar 2025 19:53)  T(F): 97.3 (16 Mar 2025 23:30), Max: 97.7 (16 Mar 2025 19:53)  HR: 77 (17 Mar 2025 06:00) (65 - 88)  BP: 124/67 (17 Mar 2025 06:00) (98/60 - 159/74)  BP(mean): 82 (17 Mar 2025 06:00) (81 - 100)  ABP: --  ABP(mean): --  RR: 14 (17 Mar 2025 06:00) (12 - 18)  SpO2: 98% (17 Mar 2025 06:00) (89% - 99%)    O2 Parameters below as of 17 Mar 2025 06:00  Patient On (Oxygen Delivery Method): nasal cannula  O2 Flow (L/min): 2        I&O's Summary    16 Mar 2025 07:01  -  17 Mar 2025 07:00  --------------------------------------------------------  IN: 430 mL / OUT: 780 mL / NET: -350 mL              PRESSORS: [ ] YES [x ] NO    Antimicrobial:    Cardiovascular:  digoxin     Tablet 125 MICROGram(s) Oral daily  metoprolol succinate ER 12.5 milliGRAM(s) Oral daily  midodrine. 10 milliGRAM(s) Oral <User Schedule>    Pulmonary:  albuterol    90 MICROgram(s) HFA Inhaler 2 Puff(s) Inhalation every 6 hours PRN    Hematalogic:    Other:  acetaminophen     Tablet .. 650 milliGRAM(s) Oral every 6 hours PRN  atorvastatin 40 milliGRAM(s) Oral at bedtime  chlorhexidine 2% Cloths 1 Application(s) Topical <User Schedule>  dexAMETHasone  IVPB 4 milliGRAM(s) IV Intermittent every 6 hours  finasteride 5 milliGRAM(s) Oral daily  lactated ringers. 1000 milliLiter(s) IV Continuous <Continuous>  levothyroxine 25 MICROGram(s) Oral daily  melatonin 3 milliGRAM(s) Oral at bedtime PRN  pantoprazole    Tablet 40 milliGRAM(s) Oral before breakfast  tamsulosin 0.4 milliGRAM(s) Oral at bedtime    acetaminophen     Tablet .. 650 milliGRAM(s) Oral every 6 hours PRN  albuterol    90 MICROgram(s) HFA Inhaler 2 Puff(s) Inhalation every 6 hours PRN  atorvastatin 40 milliGRAM(s) Oral at bedtime  chlorhexidine 2% Cloths 1 Application(s) Topical <User Schedule>  dexAMETHasone  IVPB 4 milliGRAM(s) IV Intermittent every 6 hours  digoxin     Tablet 125 MICROGram(s) Oral daily  finasteride 5 milliGRAM(s) Oral daily  lactated ringers. 1000 milliLiter(s) IV Continuous <Continuous>  levothyroxine 25 MICROGram(s) Oral daily  melatonin 3 milliGRAM(s) Oral at bedtime PRN  metoprolol succinate ER 12.5 milliGRAM(s) Oral daily  midodrine. 10 milliGRAM(s) Oral <User Schedule>  pantoprazole    Tablet 40 milliGRAM(s) Oral before breakfast  tamsulosin 0.4 milliGRAM(s) Oral at bedtime    Drug Dosing Weight  Height (cm): 170.2 (17 Mar 2025 00:00)  Weight (kg): 67.3 (17 Mar 2025 00:00)  BMI (kg/m2): 23.2 (17 Mar 2025 00:00)  BSA (m2): 1.78 (17 Mar 2025 00:00)    JEAN: [ ] YES [x ] NO    DATE INSERTED:    CENTRAL LINE: [x ] YES [ ] NO  LOCATION:   DATE INSERTED:    A-LINE:  [ ] YES [x ] NO  LOCATION:   DATE INSERTED:      [facility] -reviewed admission note, no change since admission  PAST MEDICAL & SURGICAL HISTORY:  Essential hypertension      Hyperlipidemia, unspecified hyperlipidemia type      BPH without obstruction/lower urinary tract symptoms      Glaucoma (increased eye pressure)  both eyes      Atrial fibrillation      H/O hemicolectomy  1973      Cataract extraction status of left eye  w/lens implant - 2014      History of lung biopsy  2016 - negative results              PHYSICAL EXAM:  GENERAL: NAD,   HEAD:  Atraumatic, Normocephalic  EYES: Right pupil is fixed and not reactive to light, Left  eye is reactive to light and accomodation, conjunctiva and sclera clear  ENMT: Moist mucous membranes, No lesions  NECK: Supple, normal appearance,   NERVOUS SYSTEM:  Alert & Oriented X3, No Asterixis, 5/5 strength in BUE & BLE, sensation intact, no speech deficit, no facial deficit.  CHEST/LUNG: No chest deformity; No rales, rhonchi, wheezing   HEART: Regular rate and rhythm; No murmurs,  ABDOMEN: Soft, Nontender, Nondistended; Bowel sounds present  EXTREMITIES:  2+ Peripheral Pulses, No clubbing, cyanosis, or edema  SKIN: No rashes or lesions;      LABS:  CBC Full  -  ( 17 Mar 2025 03:25 )  WBC Count : 5.33 K/uL  RBC Count : 3.70 M/uL  Hemoglobin : 11.8 g/dL  Hematocrit : 36.1 %  Platelet Count - Automated : 109 K/uL  Mean Cell Volume : 97.6 fl  Mean Cell Hemoglobin : 31.9 pg  Mean Cell Hemoglobin Concentration : 32.7 g/dL  Auto Neutrophil # : x  Auto Lymphocyte # : x  Auto Monocyte # : x  Auto Eosinophil # : x  Auto Basophil # : x  Auto Neutrophil % : x  Auto Lymphocyte % : x  Auto Monocyte % : x  Auto Eosinophil % : x  Auto Basophil % : x    03-17    140  |  108  |  20[H]  ----------------------------<  121[H]  4.6   |  26  |  1.13    Ca    9.4      17 Mar 2025 03:25  Phos  2.6     03-17  Mg     2.1     03-17    TPro  6.7  /  Alb  3.3[L]  /  TBili  1.4[H]  /  DBili  x   /  AST  30  /  ALT  47  /  AlkPhos  108  03-17    PT/INR - ( 16 Mar 2025 12:30 )   PT: 31.6 sec;   INR: 2.73 ratio         PTT - ( 16 Mar 2025 12:30 )  PTT:43.0 sec  Urinalysis Basic - ( 17 Mar 2025 03:25 )    Color: x / Appearance: x / SG: x / pH: x  Gluc: 121 mg/dL / Ketone: x  / Bili: x / Urobili: x   Blood: x / Protein: x / Nitrite: x   Leuk Esterase: x / RBC: x / WBC x   Sq Epi: x / Non Sq Epi: x / Bacteria: x          RADIOLOGY & ADDITIONAL STUDIES REVIEWED:  ***    [ ]GOALS OF CARE DISCUSSION WITH PATIENT/FAMILY/PROXY:    CRITICAL CARE TIME SPENT: 35 minutes

## 2025-03-17 NOTE — PHYSICAL THERAPY INITIAL EVALUATION ADULT - PERTINENT HX OF CURRENT PROBLEM, REHAB EVAL
Pt presents for ambulatory dysfunction since a fall 1 week ago. Pt lost balance and fell onto his left side onto floor 1 week ago, with current pain of left lower extremity. Has had increasing imbalance up till now when unable to ambulate.

## 2025-03-17 NOTE — PHYSICAL THERAPY INITIAL EVALUATION ADULT - MODALITIES TREATMENT COMMENTS
Pt able to complete bed mobility with CGA, with some assistance needed to sequence events from supine to sit at EOB. Pt able to sit to stand with Barb, able to stand for ~2 mins. Pt able to ambulate using the RW for ~200 ft.

## 2025-03-17 NOTE — PHYSICAL THERAPY INITIAL EVALUATION ADULT - PATIENT/FAMILY/SIGNIFICANT OTHER GOALS STATEMENT, PT EVAL
Pt friend at bedside states that after his fall, he was able to walk the day after but then was not able to walk two days post.

## 2025-03-17 NOTE — PHYSICAL THERAPY INITIAL EVALUATION ADULT - GENERAL OBSERVATIONS, REHAB EVAL
Pt received in semi-fowlers with cardiac monitoring intact with no s/s of distress. Pt has no c/o pain at rest. Pt has pressure relief padding placed on the LLE, near the buttocks, which was C/D/I. Pt received in semi-fowlers with cardiac monitoring intact with no s/s of distress, AxOx3. Pt has no c/o pain at rest. Pt has pressure relief padding placed on the LLE, near the buttocks, which was C/D/I.

## 2025-03-17 NOTE — PROGRESS NOTE ADULT - ASSESSMENT
89 year old male with a history of lung cancer, A-fib, DM, HTN,   HLD, Hypothyroidism, Lumbar radiculopathy was admitted due to inability to walk  following a fall one week PTA.  Patient was found to have brain metastases.  Patient was transferred to ICU for close monitoring.  Neurology consult, Neurosurgery consult, Heme-onc. consult.  MRI of brain.  D/c Midodrin (BP is elevated).

## 2025-03-17 NOTE — PROGRESS NOTE ADULT - SUBJECTIVE AND OBJECTIVE BOX
This is 89 year old male with a history of lung cancer, A-fib, DM, HTN, Hypothyroidism,  HLD, Lumbar radiculopathy who was admitted after he developed mobility disfunction following a fall   one week ago. Patient denied head injury.  Musculo-skeletal imagings failed to show any acute fractures.  CT of brain showed picture compatible with brain metastases.  Patient was transferred to ICU for observation.  Neurology, Neurosurgery and heme-onc. services were summoned.  Patient is alert, oriented.    Vital Signs Last 24 Hrs  T(C): 36.6 (17 Mar 2025 17:00), Max: 36.6 (17 Mar 2025 17:00)  T(F): 97.8 (17 Mar 2025 17:00), Max: 97.8 (17 Mar 2025 17:00)  HR: 101 (17 Mar 2025 17:00) (64 - 101)  BP: 156/75 (17 Mar 2025 17:00) (97/55 - 159/74)  BP(mean): 92 (17 Mar 2025 17:00) (68 - 103)  RR: 20 (17 Mar 2025 17:00) (11 - 27)  SpO2: 99% (17 Mar 2025 17:00) (89% - 99%)    Parameters below as of 17 Mar 2025 07:00  Patient On (Oxygen Delivery Method): nasal cannula  O2 Flow (L/min): 2    T(C): 36.6 (03-17-25 @ 17:00), Max: 36.6 (03-17-25 @ 17:00)  HR: 101 (03-17-25 @ 17:00) (64 - 101)  BP: 156/75 (03-17-25 @ 17:00) (97/55 - 159/74)  RR: 20 (03-17-25 @ 17:00) (11 - 27)  SpO2: 99% (03-17-25 @ 17:00) (89% - 99%)    CONSTITUTIONAL: Well groomed, no apparent distress  EYES: PERRLA and symmetric, EOMI, No conjunctival or scleral injection, non-icteric  ENMT: Oral mucosa with moist membranes. Normal dentition; no pharyngeal injection or exudates             NECK: Supple, symmetric and without tracheal deviation   RESP: No respiratory distress, no use of accessory muscles; CTA b/l, no WRR  CV: RRR, +S1S2, no MRG; no JVD; no peripheral edema  GI: Soft, NT, ND, no rebound, no guarding; no palpable masses; no hepatosplenomegaly; no hernia palpated  LYMPH: No cervical LAD or tenderness; no axillary LAD or tenderness; no inguinal LAD or tenderness  MSK: Normal gait; No digital clubbing or cyanosis; examination of the (head/neck/spine/ribs/pelvis, RUE, LUE, RLE, LLE) without misalignment,            Normal ROM without pain, no spinal tenderness, normal muscle strength/tone  SKIN: No rashes or ulcers noted; no subcutaneous nodules or induration palpable  NEURO: CN II-XII intact; normal reflexes in upper and lower extremities, sensation intact in upper and lower extremities b/l to light touch   PSYCH: Appropriate insight/judgment; A+O x 3, mood and affect appropriate, recent/remote memory intact      CBC Full  -  ( 17 Mar 2025 03:25 )  WBC Count : 5.33 K/uL  RBC Count : 3.70 M/uL  Hemoglobin : 11.8 g/dL  Hematocrit : 36.1 %  Platelet Count - Automated : 109 K/uL  Mean Cell Volume : 97.6 fl  Mean Cell Hemoglobin : 31.9 pg  Mean Cell Hemoglobin Concentration : 32.7 g/dL  Auto Neutrophil # : x  Auto Lymphocyte # : x  Auto Monocyte # : x  Auto Eosinophil # : x  Auto Basophil # : x  Auto Neutrophil % : x  Auto Lymphocyte % : x  Auto Monocyte % : x  Auto Eosinophil % : x  Auto Basophil % : x      03-17    140  |  108  |  20[H]  ----------------------------<  121[H]  4.6   |  26  |  1.13    Ca    9.4      17 Mar 2025 03:25  Phos  2.6     03-17  Mg     2.1     03-17    TPro  6.7  /  Alb  3.3[L]  /  TBili  1.4[H]  /  DBili  x   /  AST  30  /  ALT  47  /  AlkPhos  108  03-17      MEDICATIONS  (STANDING):  atorvastatin 40 milliGRAM(s) Oral at bedtime  chlorhexidine 2% Cloths 1 Application(s) Topical <User Schedule>  dexAMETHasone  Injectable 4 milliGRAM(s) IV Push every 6 hours  digoxin     Tablet 125 MICROGram(s) Oral daily  finasteride 5 milliGRAM(s) Oral daily  insulin lispro (ADMELOG) corrective regimen sliding scale   SubCutaneous three times a day before meals  insulin lispro (ADMELOG) corrective regimen sliding scale   SubCutaneous at bedtime  lactated ringers. 1000 milliLiter(s) (60 mL/Hr) IV Continuous <Continuous>  levothyroxine 25 MICROGram(s) Oral daily  metoprolol succinate ER 12.5 milliGRAM(s) Oral daily  midodrine. 5 milliGRAM(s) Oral three times a day  pantoprazole    Tablet 40 milliGRAM(s) Oral before breakfast  tamsulosin 0.4 milliGRAM(s) Oral at bedtime

## 2025-03-17 NOTE — CONSULT NOTE ADULT - ASSESSMENT
Assessment: 89y male (from home, uses walker at baseline) with PMHx of lung cancer previously on Tagrisso, HLD, HTN, BPH, hypothyroidism, A-fib on Xarelto, presents for ambulatory dysfunction since a fall 1 week ago (no head trauma). Adm to medicine for ambulatory dysfunction. CTH shows Multiple new sites of edema in the left frontal, parietal lobes and bilateral temporal lobe suspicious for intracranial metastases. Largest area of edema is in the left frontal parietal lobes. There is local mass effect without herniation or hydrocephalus. Punctate focus of hyperdensity in the right temporal lobe (series 2 image 20) in the area of edema. This may represent underlying intracranial metastasis versus punctate hemorrhage and continued follow-up is recommended. Neurosurgery consulted who recommend ICU admission for 24hrs for closer monitoring.    Plan:  #Punctate hyperdensity - intracranial mets vs hemorrhage  #Lung cancer, previously on Tagrisso  #Afib on Xarelto  #Ambulatory dysfunction  #H/o HTN, HLD, BPH, Hypothyroidism    Neuro:  #Punctate Hyperdensity  CTH: Multiple new sites (since Oct 24') of edema in the left frontal, parietal lobes and bilateral temporal lobe suspicious for intracranial metastases. Largest area of edema is in the left frontal parietal lobes. There is local mass effect without herniation or hydrocephalus. Punctate focus of hyperdensity in the right temporal lobe (series 2 image 20) in the area of edema. This may represent underlying intracranial metastasis versus punctate hemorrhage and continued follow-up is recommended.  - No current indications for steroids per NeuroSx  - Repeat CTH 6hrs and 24hrs after first CTH  - C/w Q1h neurochecks, can de-escalate to q2hr after 6hr CTH is back  - Keep systolic BP < 150mmhg  - reach out to [facility] neuroSx if condition deteriorates or new findings on CTH  Neuro Dr. Vieira Consulted  NueroSx at [facility] following - Dr. Pringle, recs appreciated    #Ambulatory dysfunction s/p fall  No head trauma or LOC from recent fall (3/11/25)  Pt complaining of trouble walking and pain in the left buttocks/hip  CT pelvis negative for acute frxr  possibly has loss of balance from brain mets and edema vs orthostatic hypotension  - F/u Orthostatics  - F/u PT eval  - C/w Tylenol for pain control    Cardiovascular:  #Afib on Xarelto  Pt has a history of Afib, takes Xarelto and metoprolol succinate at home  - C/w metoprolol   - Hold Xarelto in setting of brain mets vs hemorrhage  - restart AC per Neuro recs    #HTN  Pt has a history of HTN, takes metoprolol 12.5mg qD at home  - C/w Metoprolol    Pulmonary:   #Lung CA  - hold home meds    Infectious Diseases:  #No active issues    Gastrointestinal:  #Nausea  - C/w Zofran PRN    Renal:  #No active issues    Heme/onc:   #Lung CA (Diagnosed 2 years ago per pt)  Pt was taking Tegrisso for over a year, was recently switched last week for a new med due to worsening spread of CA on recent imaging  CTH shows possible brain mets  - Hold home Tegrisso  - Heme/Onc consult in AM  - rest as above  Dr. Vieira Neurology Consulted  Neurosurgery at [facility] Following    Endo:   #Hypothyroidism  Pt has a history of hypothyroidism, takes Levothyroxine at home  - C/w Levothyroxine     #BPH  Pt has a history of BPH, takes tamsulosin 0.4mg and finasteride 5mg at home   - C/w Tamsulosin and Finasteride     Skin/ catheter:   #Peripheral lines    Prophylaxis:   #SCDs , no chemical DVT ppx in setting of brain mets/hemorrhage    Goals of Care:   FULL CODE per patient who is AOX4 baseline    Dispo: Transfer to ICU
89y male (from home, uses walker at baseline) with PMHx of lung cancer on Tarceva (Elotinib) after progressing on Afatinib with progression  and changed to Tagrisso but with recent progression 2/2025. , HLD, HTN, BPH, hypothyroidism, A-fib on Xarelto, right preauricular abscess, presents for ambulatory dysfunction since a fall 1 week ago. Pt lost balance and fell onto his left side onto floor 1 week ago, with current pain of left lower extremity. Has had increasing imbalance up till now when unable to ambulate. Also had 1 episode of NBNB spit-up after eating in ED, not able to tolerate food due to feeling of stomach burning. Per pt's friend, has HHA 4 hours/day for 3 days a week.  Pt recently admitted here at [facility] for norovirus and hypotensive, was discharged on midodrine and home PT. Denies fever, chills, SOB, cough, wheezing, chest pain, palpitations, diarrhea, polyuria, or pain with urination. (16 Mar 2025 16:43) Oncology called to evaluate most recent CT head compared to last CT head 10/2024 Since prior CT head 10/4/2024:    1.  Multiple new sites of edema in the left frontal, parietal lobes and   bilateral temporal lobe suspicious for intracranial metastases. Largest   area of edema is in the left frontal parietal lobes. There is local mass   effect without herniation or hydrocephalus. This can be further evaluated   with dedicated MRI brain with and without IV contrast as clinically   warranted.    Problem # New Brain edema ? new brain lesion in pt with Lung Ca (since 2016 now metastatic since 2024  -agree with MRI brain w/ w/o contrast   -ICU monitoring with neuro checks q hour   -continue Erlotinib - pt will ask aide to bring meds from home.  -continue Dexamethasone 4 mg q6h  -Palliative  care evaluation.    Thank you for the consult. For questions please call [phone]    Leo ESQUIVEL    Thank you

## 2025-03-17 NOTE — PATIENT PROFILE ADULT - LIVING ENVIRONMENT
Vaccine Information Statement(s) was given today. This has been reviewed, questions answered, and verbal consent given by Patient for injection(s) and administration of Influenza (Inactivated).    Patient tolerated without incident. See immunization grid for documentation.    
no

## 2025-03-17 NOTE — PHYSICAL THERAPY INITIAL EVALUATION ADULT - PATIENT PROFILE REVIEW, REHAB EVAL
EMR, labs, radiology and imaging reports reviewed EMR, labs, radiology and imaging reports reviewed/yes

## 2025-03-17 NOTE — PROGRESS NOTE ADULT - SUBJECTIVE AND OBJECTIVE BOX
NEUROLOGY FOLLOW-UP CONSULT NOTE    RFC: Ambulatory dysfunction ISO cerebral Mets    Interval history: No acute neurologic events overnight. Pt alert, awake, interactive. No distress. Feels better with his L hip pain since fall. Pt has unequal pupils with R pupil non reactive to light. Per pt, has B/L cataract Sx (may be 15-20yrs ago). Per chart review and D/W primary RN, pt has asymmetric and non reacting R pupil since admission to ICU last night.     Meds:  MEDICATIONS  (STANDING):  atorvastatin 40 milliGRAM(s) Oral at bedtime  chlorhexidine 2% Cloths 1 Application(s) Topical <User Schedule>  dexAMETHasone  Injectable 4 milliGRAM(s) IV Push every 6 hours  digoxin     Tablet 125 MICROGram(s) Oral daily  finasteride 5 milliGRAM(s) Oral daily  insulin lispro (ADMELOG) corrective regimen sliding scale   SubCutaneous three times a day before meals  insulin lispro (ADMELOG) corrective regimen sliding scale   SubCutaneous at bedtime  lactated ringers. 1000 milliLiter(s) (60 mL/Hr) IV Continuous <Continuous>  levothyroxine 25 MICROGram(s) Oral daily  metoprolol succinate ER 12.5 milliGRAM(s) Oral daily  midodrine. 10 milliGRAM(s) Oral <User Schedule>  pantoprazole    Tablet 40 milliGRAM(s) Oral before breakfast  tamsulosin 0.4 milliGRAM(s) Oral at bedtime    MEDICATIONS  (PRN):  acetaminophen     Tablet .. 650 milliGRAM(s) Oral every 6 hours PRN Temp greater or equal to 38C (100.4F), Mild Pain (1 - 3)  albuterol    90 MICROgram(s) HFA Inhaler 2 Puff(s) Inhalation every 6 hours PRN Bronchospasm  melatonin 3 milliGRAM(s) Oral at bedtime PRN Insomnia      PMHx/PSHx/FHx/SHx:  Difficulty in walking, not elsewhere classified  Benign prostatic hyperplasia with lower urinary tract sympms  Chronic kidney disease, stage 3a  Cutaneous abscess of face-L02.01  Hypothyroidism, unspecified-E03.9  Impacted cerumen, bilateral-H61.23  Malignant neoplasm of unspecified part of unspecified bronchus or lung-C34.90  Other abnormal auditory perceptions, bilateral-H93.293  Other diseases of salivary glands-K11.8  Preauricular sinus and cyst-Q18.1  Sensorineural hearing loss, bilateral-H90.3  Thrombocytopenia, unspecified-D69.6  Essential hypertension  Hyperlipidemia, unspecified hyperlipidemia type  Glaucoma (increased eye pressure)  Atrial fibrillation  Inability to ambulate due to hip  Ambulatory dysfunction  History of fall  Generalized weakness  History of hypotension  GERD (gastroesophageal reflux disease)  Vomiting  Lung cancer  Brain edema  H/O hemicolectomy  Cataract extraction status of left eye  History of lung biopsy  A)LEFT HIP PAIN        Allergies:  No Known Allergies      ROS: All systems negative except as documented in Interval history    O:  T(C): 36.1 (25 @ 08:00), Max: 36.5 (25 @ 19:53)  HR: 69 (25 @ 11:00) (65 - 88)  BP: 134/57 (25 @ 11:00) (117/69 - 159/74)  RR: 11 (25 @ 11:00) (11 - 27)  SpO2: 96% (25 @ 11:00) (89% - 99%)    Focused neurologic exam:  MS Melchor GIPSON x3 (able to state his name, age, ), place as [facility], time as 2025, day as Monday and oriented to situation, speech fluent, rep/naming and function intact, follows simple and crossed commands, attn/conc/recent and remote memory/fund of knowledge WNL  CN - L pupil 2-->1, R pupil 3mm fixed, irregular and no reaction to light(direct and consensual) noted ,  EOMI, VF: intact VFF in L eye, R nate visual field defect when tested eyes individually , face sens/str  WNL b/l, hearing very [place], tongue midline.   Motor - Normal bulk/tone, 3+/5 in all muscle groups in UE and  LE.   Sens - LT intact all  DTR's - 1+ Biceps, BR and patellar, hard to assess 2/2 pt positioning or does not relax, asymmetric B/L pectoralis (L>R) and L chery subtle+ and R toe mute, L downgoing, B/L LE moderately increased tonus  Coord - FtN intact b/l  Gait and station - Deferred during my encounter, but pt ambulated with PT just prior to my encounter and has unsteady gait.     NIHSS:  mRS:     Pertinent labs/studies:                        11.8   5.33  )-----------( 109      ( 17 Mar 2025 03:25 )             36.1         140  |  108  |  20[H]  ----------------------------<  121[H]  4.6   |  26  |  1.13    Ca    9.4      17 Mar 2025 03:25  Phos  2.6       Mg     2.1         TPro  6.7  /  Alb  3.3[L]  /  TBili  1.4[H]  /  DBili  x   /  AST  30  /  ALT  47  /  AlkPhos  108      RADIOLOGY AND OTHER ADDITIONAL STUDIES:   CT Head No Cont (25 @ 17:27)   IMPRESSION:  Since prior CT head 10/4/2024:    1.  Multiple new sites of edema in the left frontal, parietal lobes and   bilateral temporal lobe suspicious for intracranial metastases. Largest   area of edema is in the left frontal parietal lobes. There is local mass   effect without herniation or hydrocephalus. This can be further evaluated   with dedicated MRI brain with and without IV contrast as clinically   warranted.    2.  Punctate focus of hyperdensity in the right temporal lobe (series 2   image 20) in the area of edema. This may represent underlying   intracranial metastasis versus punctate hemorrhage and continued   follow-up is recommended.       NEUROLOGY FOLLOW-UP CONSULT NOTE    RFC: Ambulatory dysfunction ISO cerebral Mets    Interval history: No acute neurologic events overnight. Pt alert, awake, interactive. No distress. Feels better with his L hip pain since fall. Pt has unequal pupils with R pupil non reactive to light. Per pt, has B/L cataract Sx (may be 15-20yrs ago). Per chart review and D/W primary RN, pt has asymmetric and non reacting R pupil since admission to ICU last night.     Meds:  MEDICATIONS  (STANDING):  atorvastatin 40 milliGRAM(s) Oral at bedtime  chlorhexidine 2% Cloths 1 Application(s) Topical <User Schedule>  dexAMETHasone  Injectable 4 milliGRAM(s) IV Push every 6 hours  digoxin     Tablet 125 MICROGram(s) Oral daily  finasteride 5 milliGRAM(s) Oral daily  insulin lispro (ADMELOG) corrective regimen sliding scale   SubCutaneous three times a day before meals  insulin lispro (ADMELOG) corrective regimen sliding scale   SubCutaneous at bedtime  lactated ringers. 1000 milliLiter(s) (60 mL/Hr) IV Continuous <Continuous>  levothyroxine 25 MICROGram(s) Oral daily  metoprolol succinate ER 12.5 milliGRAM(s) Oral daily  midodrine. 10 milliGRAM(s) Oral <User Schedule>  pantoprazole    Tablet 40 milliGRAM(s) Oral before breakfast  tamsulosin 0.4 milliGRAM(s) Oral at bedtime    MEDICATIONS  (PRN):  acetaminophen     Tablet .. 650 milliGRAM(s) Oral every 6 hours PRN Temp greater or equal to 38C (100.4F), Mild Pain (1 - 3)  albuterol    90 MICROgram(s) HFA Inhaler 2 Puff(s) Inhalation every 6 hours PRN Bronchospasm  melatonin 3 milliGRAM(s) Oral at bedtime PRN Insomnia      PMHx/PSHx/FHx/SHx:  Difficulty in walking, not elsewhere classified  Benign prostatic hyperplasia with lower urinary tract sympms  Chronic kidney disease, stage 3a  Cutaneous abscess of face-L02.01  Hypothyroidism, unspecified-E03.9  Impacted cerumen, bilateral-H61.23  Malignant neoplasm of unspecified part of unspecified bronchus or lung-C34.90  Other abnormal auditory perceptions, bilateral-H93.293  Other diseases of salivary glands-K11.8  Preauricular sinus and cyst-Q18.1  Sensorineural hearing loss, bilateral-H90.3  Thrombocytopenia, unspecified-D69.6  Essential hypertension  Hyperlipidemia, unspecified hyperlipidemia type  Glaucoma (increased eye pressure)  Atrial fibrillation  Inability to ambulate due to hip  Ambulatory dysfunction  History of fall  Generalized weakness  History of hypotension  GERD (gastroesophageal reflux disease)  Vomiting  Lung cancer  Brain edema  H/O hemicolectomy  Cataract extraction status of left eye  History of lung biopsy  A)LEFT HIP PAIN        Allergies:  No Known Allergies      ROS: All systems negative except as documented in Interval history    O:  T(C): 36.1 (25 @ 08:00), Max: 36.5 (25 @ 19:53)  HR: 69 (25 @ 11:00) (65 - 88)  BP: 134/57 (25 @ 11:00) (117/69 - 159/74)  RR: 11 (25 @ 11:00) (11 - 27)  SpO2: 96% (25 @ 11:00) (89% - 99%)    Focused neurologic exam:  MS Melchor GIPSON x3 (able to state his name, age, ), place as [facility], time as 2025, day as Monday and oriented to situation, speech fluent, rep/naming and function intact, follows simple and crossed commands, attn/conc/recent and remote memory/fund of knowledge WNL  CN - L pupil 2-->1, R pupil 3mm fixed, irregular and no reaction to light(direct and consensual) noted ,  EOMI, VF: intact VFF in L eye, R nate visual field defect when tested eyes individually , face sens/str  WNL b/l, hearing very [place], tongue midline.   Motor - Normal bulk/tone in UE, increased tone B/L LE, 3+/5 in all muscle groups in UE and  LE.   Sens - LT intact all  DTR's - 1+ Biceps, BR and patellar, hard to assess 2/2 pt positioning or does not relax, asymmetric B/L pectoralis (L>R) and L chery subtle+ and R toe mute, L downgoing, B/L LE moderately increased tonus.  Coord - FtN intact b/l  Gait and station - Deferred during my encounter, but pt ambulated with PT just prior to my encounter and has unsteady gait.     NIHSS: not a stroke pt.       Pertinent labs/studies:                        11.8   5.33  )-----------( 109      ( 17 Mar 2025 03:25 )             36.1         140  |  108  |  20[H]  ----------------------------<  121[H]  4.6   |  26  |  1.13    Ca    9.4      17 Mar 2025 03:25  Phos  2.6       Mg     2.1         TPro  6.7  /  Alb  3.3[L]  /  TBili  1.4[H]  /  DBili  x   /  AST  30  /  ALT  47  /  AlkPhos  108      RADIOLOGY AND OTHER ADDITIONAL STUDIES:   CT Head No Cont (25 @ 17:27)   IMPRESSION:  Since prior CT head 10/4/2024:    1.  Multiple new sites of edema in the left frontal, parietal lobes and   bilateral temporal lobe suspicious for intracranial metastases. Largest   area of edema is in the left frontal parietal lobes. There is local mass   effect without herniation or hydrocephalus. This can be further evaluated   with dedicated MRI brain with and without IV contrast as clinically   warranted.    2.  Punctate focus of hyperdensity in the right temporal lobe (series 2   image 20) in the area of edema. This may represent underlying   intracranial metastasis versus punctate hemorrhage and continued   follow-up is recommended.       NEUROLOGY FOLLOW-UP CONSULT NOTE    RFC: Ambulatory dysfunction ISO cerebral Mets    Interval history: No acute neurologic events overnight. Pt alert, awake, interactive. No distress. Feels better with his L hip pain since fall. Pt has unequal pupils with R pupil non reactive to light. Per pt, has B/L cataract Sx (may be 15-20yrs ago). Per chart review and D/W primary RN, pt has asymmetric and non reacting R pupil since admission to ICU last night.     Meds:  MEDICATIONS  (STANDING):  atorvastatin 40 milliGRAM(s) Oral at bedtime  chlorhexidine 2% Cloths 1 Application(s) Topical <User Schedule>  dexAMETHasone  Injectable 4 milliGRAM(s) IV Push every 6 hours  digoxin     Tablet 125 MICROGram(s) Oral daily  finasteride 5 milliGRAM(s) Oral daily  insulin lispro (ADMELOG) corrective regimen sliding scale   SubCutaneous three times a day before meals  insulin lispro (ADMELOG) corrective regimen sliding scale   SubCutaneous at bedtime  lactated ringers. 1000 milliLiter(s) (60 mL/Hr) IV Continuous <Continuous>  levothyroxine 25 MICROGram(s) Oral daily  metoprolol succinate ER 12.5 milliGRAM(s) Oral daily  midodrine. 10 milliGRAM(s) Oral <User Schedule>  pantoprazole    Tablet 40 milliGRAM(s) Oral before breakfast  tamsulosin 0.4 milliGRAM(s) Oral at bedtime    MEDICATIONS  (PRN):  acetaminophen     Tablet .. 650 milliGRAM(s) Oral every 6 hours PRN Temp greater or equal to 38C (100.4F), Mild Pain (1 - 3)  albuterol    90 MICROgram(s) HFA Inhaler 2 Puff(s) Inhalation every 6 hours PRN Bronchospasm  melatonin 3 milliGRAM(s) Oral at bedtime PRN Insomnia      PMHx/PSHx/FHx/SHx:  Difficulty in walking, not elsewhere classified  Benign prostatic hyperplasia with lower urinary tract sympms  Chronic kidney disease, stage 3a  Cutaneous abscess of face-L02.01  Hypothyroidism, unspecified-E03.9  Impacted cerumen, bilateral-H61.23  Malignant neoplasm of unspecified part of unspecified bronchus or lung-C34.90  Other abnormal auditory perceptions, bilateral-H93.293  Other diseases of salivary glands-K11.8  Preauricular sinus and cyst-Q18.1  Sensorineural hearing loss, bilateral-H90.3  Thrombocytopenia, unspecified-D69.6  Essential hypertension  Hyperlipidemia, unspecified hyperlipidemia type  Glaucoma (increased eye pressure)  Atrial fibrillation  Inability to ambulate due to hip  Ambulatory dysfunction  History of fall  Generalized weakness  History of hypotension  GERD (gastroesophageal reflux disease)  Vomiting  Lung cancer  Brain edema  H/O hemicolectomy  Cataract extraction status of left eye  History of lung biopsy  A)LEFT HIP PAIN        Allergies:  No Known Allergies      ROS: All systems negative except as documented in Interval history    O:  T(C): 36.1 (25 @ 08:00), Max: 36.5 (25 @ 19:53)  HR: 69 (25 @ 11:00) (65 - 88)  BP: 134/57 (25 @ 11:00) (117/69 - 159/74)  RR: 11 (25 @ 11:00) (11 - 27)  SpO2: 96% (25 @ 11:00) (89% - 99%)    Focused neurologic exam:  MS Melchor GIPSON x3 (able to state his name, age, ), place as [facility], time as 2025, day as Monday and oriented to situation, speech fluent, rep/naming and function intact, follows simple and crossed commands, attn/conc/recent and remote memory/fund of knowledge WNL  CN - L pupil Rxn to light+, 2-->1, R pupil 3mm fixed, irregular and no reaction to light(direct and consensual) noted ,  EOMI, VF: intact VFF in L eye, R nate visual field defect when tested eyes individually , face sens/str  WNL b/l, hearing very [employer], tongue midline.   Motor - Normal bulk/tone in UE, increased tone B/L LE, 3+/5 in all muscle groups in UE and  LE.   Sens - LT intact all  DTR's - 1+ Biceps, BR and patellar, hard to assess 2/2 pt positioning or does not relax, asymmetric B/L pectoralis (L>R) and L chery subtle+ and R toe mute, L downgoing, B/L LE moderately increased tonus.  Coord - FtN intact b/l  Gait and station - Deferred during my encounter, but pt ambulated with PT just prior to my encounter and has unsteady gait.     NIHSS: not a stroke pt.       Pertinent labs/studies:                        11.8   5.33  )-----------( 109      ( 17 Mar 2025 03:25 )             36.1     -17    140  |  108  |  20[H]  ----------------------------<  121[H]  4.6   |  26  |  1.13    Ca    9.4      17 Mar 2025 03:25  Phos  2.6       Mg     2.1         TPro  6.7  /  Alb  3.3[L]  /  TBili  1.4[H]  /  DBili  x   /  AST  30  /  ALT  47  /  AlkPhos  108      RADIOLOGY AND OTHER ADDITIONAL STUDIES:   CT Head No Cont (25 @ 17:27)   IMPRESSION:  Since prior CT head 10/4/2024:    1.  Multiple new sites of edema in the left frontal, parietal lobes and   bilateral temporal lobe suspicious for intracranial metastases. Largest   area of edema is in the left frontal parietal lobes. There is local mass   effect without herniation or hydrocephalus. This can be further evaluated   with dedicated MRI brain with and without IV contrast as clinically   warranted.    2.  Punctate focus of hyperdensity in the right temporal lobe (series 2   image 20) in the area of edema. This may represent underlying   intracranial metastasis versus punctate hemorrhage and continued   follow-up is recommended.

## 2025-03-18 LAB
ALBUMIN SERPL ELPH-MCNC: 2.8 G/DL — LOW (ref 3.5–5)
ALP SERPL-CCNC: 95 U/L — SIGNIFICANT CHANGE UP (ref 40–120)
ALT FLD-CCNC: 38 U/L DA — SIGNIFICANT CHANGE UP (ref 10–60)
ANION GAP SERPL CALC-SCNC: 7 MMOL/L — SIGNIFICANT CHANGE UP (ref 5–17)
AST SERPL-CCNC: 23 U/L — SIGNIFICANT CHANGE UP (ref 10–40)
BASOPHILS # BLD AUTO: 0.01 K/UL — SIGNIFICANT CHANGE UP (ref 0–0.2)
BASOPHILS NFR BLD AUTO: 0.1 % — SIGNIFICANT CHANGE UP (ref 0–2)
BILIRUB SERPL-MCNC: 1 MG/DL — SIGNIFICANT CHANGE UP (ref 0.2–1.2)
BUN SERPL-MCNC: 22 MG/DL — HIGH (ref 7–18)
CALCIUM SERPL-MCNC: 9.1 MG/DL — SIGNIFICANT CHANGE UP (ref 8.4–10.5)
CHLORIDE SERPL-SCNC: 110 MMOL/L — HIGH (ref 96–108)
CO2 SERPL-SCNC: 21 MMOL/L — LOW (ref 22–31)
CREAT SERPL-MCNC: 1.23 MG/DL — SIGNIFICANT CHANGE UP (ref 0.5–1.3)
EGFR: 55 ML/MIN/1.73M2 — LOW
EGFR: 55 ML/MIN/1.73M2 — LOW
EOSINOPHIL # BLD AUTO: 0 K/UL — SIGNIFICANT CHANGE UP (ref 0–0.5)
EOSINOPHIL NFR BLD AUTO: 0 % — SIGNIFICANT CHANGE UP (ref 0–6)
GLUCOSE BLDC GLUCOMTR-MCNC: 137 MG/DL — HIGH (ref 70–99)
GLUCOSE BLDC GLUCOMTR-MCNC: 140 MG/DL — HIGH (ref 70–99)
GLUCOSE BLDC GLUCOMTR-MCNC: 149 MG/DL — HIGH (ref 70–99)
GLUCOSE BLDC GLUCOMTR-MCNC: 152 MG/DL — HIGH (ref 70–99)
GLUCOSE SERPL-MCNC: 164 MG/DL — HIGH (ref 70–99)
HCT VFR BLD CALC: 35.1 % — LOW (ref 39–50)
HGB BLD-MCNC: 11.9 G/DL — LOW (ref 13–17)
IMM GRANULOCYTES NFR BLD AUTO: 0.3 % — SIGNIFICANT CHANGE UP (ref 0–0.9)
LYMPHOCYTES # BLD AUTO: 0.82 K/UL — LOW (ref 1–3.3)
LYMPHOCYTES # BLD AUTO: 9.3 % — LOW (ref 13–44)
MAGNESIUM SERPL-MCNC: 1.9 MG/DL — SIGNIFICANT CHANGE UP (ref 1.6–2.6)
MCHC RBC-ENTMCNC: 32.7 PG — SIGNIFICANT CHANGE UP (ref 27–34)
MCHC RBC-ENTMCNC: 33.9 G/DL — SIGNIFICANT CHANGE UP (ref 32–36)
MCV RBC AUTO: 96.4 FL — SIGNIFICANT CHANGE UP (ref 80–100)
MONOCYTES # BLD AUTO: 0.2 K/UL — SIGNIFICANT CHANGE UP (ref 0–0.9)
MONOCYTES NFR BLD AUTO: 2.3 % — SIGNIFICANT CHANGE UP (ref 2–14)
NEUTROPHILS # BLD AUTO: 7.78 K/UL — HIGH (ref 1.8–7.4)
NEUTROPHILS NFR BLD AUTO: 88 % — HIGH (ref 43–77)
NRBC BLD AUTO-RTO: 0 /100 WBCS — SIGNIFICANT CHANGE UP (ref 0–0)
PHOSPHATE SERPL-MCNC: 3.3 MG/DL — SIGNIFICANT CHANGE UP (ref 2.5–4.5)
PLATELET # BLD AUTO: 141 K/UL — LOW (ref 150–400)
POTASSIUM SERPL-MCNC: 4.6 MMOL/L — SIGNIFICANT CHANGE UP (ref 3.5–5.3)
POTASSIUM SERPL-SCNC: 4.6 MMOL/L — SIGNIFICANT CHANGE UP (ref 3.5–5.3)
PROT SERPL-MCNC: 6.1 G/DL — SIGNIFICANT CHANGE UP (ref 6–8.3)
RBC # BLD: 3.64 M/UL — LOW (ref 4.2–5.8)
RBC # FLD: 16.5 % — HIGH (ref 10.3–14.5)
SODIUM SERPL-SCNC: 138 MMOL/L — SIGNIFICANT CHANGE UP (ref 135–145)
WBC # BLD: 8.84 K/UL — SIGNIFICANT CHANGE UP (ref 3.8–10.5)
WBC # FLD AUTO: 8.84 K/UL — SIGNIFICANT CHANGE UP (ref 3.8–10.5)

## 2025-03-18 RX ORDER — DIGOXIN 250 UG/1
62.5 TABLET ORAL DAILY
Refills: 0 | Status: DISCONTINUED | OUTPATIENT
Start: 2025-03-19 | End: 2025-03-19

## 2025-03-18 RX ORDER — ERLOTINIB 150 MG/1
100 TABLET, FILM COATED ORAL DAILY
Refills: 0 | Status: DISCONTINUED | OUTPATIENT
Start: 2025-03-18 | End: 2025-03-25

## 2025-03-18 RX ORDER — BRIMONIDINE TARTRATE 1.5 MG/ML
1 SOLUTION/ DROPS OPHTHALMIC ONCE
Refills: 0 | Status: DISCONTINUED | OUTPATIENT
Start: 2025-03-18 | End: 2025-03-18

## 2025-03-18 RX ORDER — TIMOLOL MALEATE 6.8 MG/ML
1 SOLUTION OPHTHALMIC ONCE
Refills: 0 | Status: DISCONTINUED | OUTPATIENT
Start: 2025-03-18 | End: 2025-03-18

## 2025-03-18 RX ORDER — TIMOLOL MALEATE 6.8 MG/ML
1 SOLUTION OPHTHALMIC EVERY 12 HOURS
Refills: 0 | Status: DISCONTINUED | OUTPATIENT
Start: 2025-03-18 | End: 2025-03-28

## 2025-03-18 RX ORDER — BRIMONIDINE TARTRATE 1.5 MG/ML
1 SOLUTION/ DROPS OPHTHALMIC EVERY 12 HOURS
Refills: 0 | Status: DISCONTINUED | OUTPATIENT
Start: 2025-03-18 | End: 2025-03-28

## 2025-03-18 RX ORDER — LEVETIRACETAM 10 MG/ML
500 INJECTION, SOLUTION INTRAVENOUS
Refills: 0 | Status: DISCONTINUED | OUTPATIENT
Start: 2025-03-18 | End: 2025-03-19

## 2025-03-18 RX ADMIN — Medication 40 MILLIGRAM(S): at 06:11

## 2025-03-18 RX ADMIN — DEXAMETHASONE 4 MILLIGRAM(S): 0.5 TABLET ORAL at 18:22

## 2025-03-18 RX ADMIN — METOPROLOL SUCCINATE 12.5 MILLIGRAM(S): 50 TABLET, EXTENDED RELEASE ORAL at 06:10

## 2025-03-18 RX ADMIN — ERLOTINIB 100 MILLIGRAM(S): 150 TABLET, FILM COATED ORAL at 17:34

## 2025-03-18 RX ADMIN — TAMSULOSIN HYDROCHLORIDE 0.4 MILLIGRAM(S): 0.4 CAPSULE ORAL at 22:04

## 2025-03-18 RX ADMIN — MIDODRINE HYDROCHLORIDE 5 MILLIGRAM(S): 5 TABLET ORAL at 18:22

## 2025-03-18 RX ADMIN — FINASTERIDE 5 MILLIGRAM(S): 1 TABLET, FILM COATED ORAL at 12:23

## 2025-03-18 RX ADMIN — Medication 25 MICROGRAM(S): at 06:11

## 2025-03-18 RX ADMIN — DEXAMETHASONE 4 MILLIGRAM(S): 0.5 TABLET ORAL at 06:11

## 2025-03-18 RX ADMIN — DEXAMETHASONE 4 MILLIGRAM(S): 0.5 TABLET ORAL at 12:24

## 2025-03-18 RX ADMIN — Medication 1 APPLICATION(S): at 06:15

## 2025-03-18 RX ADMIN — MIDODRINE HYDROCHLORIDE 5 MILLIGRAM(S): 5 TABLET ORAL at 12:23

## 2025-03-18 RX ADMIN — BRIMONIDINE TARTRATE 1 DROP(S): 1.5 SOLUTION/ DROPS OPHTHALMIC at 12:24

## 2025-03-18 RX ADMIN — BRIMONIDINE TARTRATE 1 DROP(S): 1.5 SOLUTION/ DROPS OPHTHALMIC at 18:22

## 2025-03-18 RX ADMIN — DIGOXIN 125 MICROGRAM(S): 250 TABLET ORAL at 06:11

## 2025-03-18 RX ADMIN — TIMOLOL MALEATE 1 DROP(S): 6.8 SOLUTION OPHTHALMIC at 18:22

## 2025-03-18 RX ADMIN — TIMOLOL MALEATE 1 DROP(S): 6.8 SOLUTION OPHTHALMIC at 12:24

## 2025-03-18 NOTE — PROGRESS NOTE ADULT - SUBJECTIVE AND OBJECTIVE BOX
Patient is a 89y old  Male who presents with a chief complaint of suspect brain metastasis (17 Mar 2025 19:07)      INTERVAL HPI/OVERNIGHT EVENTS: ***    REVIEW OF SYSTEMS:  CONSTITUTIONAL: No fever, chills  RESPIRATORY: No cough, wheezing, shortness of breath  CARDIOVASCULAR: No chest pain, palpitations,   GASTROINTESTINAL: No abdominal pain, nausea, vomiting, diarrhea or constipation, bloody stool  GENITOURINARY: No dysuria,  hematuria, urinary frequency  NEUROLOGICAL: No headaches, numbness, or tremors  EXTREMITES: No leg swelling  SKIN: No itching, burning, rashes, or lesions     ICU Vital Signs Last 24 Hrs  T(C): 36.3 (18 Mar 2025 04:00), Max: 36.6 (17 Mar 2025 17:00)  T(F): 97.3 (18 Mar 2025 04:00), Max: 97.8 (17 Mar 2025 17:00)  HR: 74 (18 Mar 2025 07:00) (64 - 108)  BP: 124/74 (18 Mar 2025 07:00) (97/55 - 158/96)  BP(mean): 88 (18 Mar 2025 07:00) (66 - 109)  ABP: --  ABP(mean): --  RR: 15 (18 Mar 2025 07:00) (7 - 27)  SpO2: 95% (18 Mar 2025 07:00) (92% - 100%)    O2 Parameters below as of 18 Mar 2025 07:00  Patient On (Oxygen Delivery Method): room air          I&O's Summary    17 Mar 2025 07:01  -  18 Mar 2025 07:00  --------------------------------------------------------  IN: 780 mL / OUT: 1400 mL / NET: -620 mL              PRESSORS: [ ] YES [ ] NO    Antimicrobial:    Cardiovascular:  digoxin     Tablet 125 MICROGram(s) Oral daily  metoprolol succinate ER 12.5 milliGRAM(s) Oral daily  midodrine. 5 milliGRAM(s) Oral three times a day    Pulmonary:  albuterol    90 MICROgram(s) HFA Inhaler 2 Puff(s) Inhalation every 6 hours PRN    Hematalogic:    Other:  acetaminophen     Tablet .. 650 milliGRAM(s) Oral every 6 hours PRN  atorvastatin 40 milliGRAM(s) Oral at bedtime  brimonidine 0.2% Ophthalmic Solution 1 Drop(s) Both EYES once  chlorhexidine 2% Cloths 1 Application(s) Topical <User Schedule>  dexAMETHasone  Injectable 4 milliGRAM(s) IV Push every 6 hours  finasteride 5 milliGRAM(s) Oral daily  insulin lispro (ADMELOG) corrective regimen sliding scale   SubCutaneous three times a day before meals  insulin lispro (ADMELOG) corrective regimen sliding scale   SubCutaneous at bedtime  lactated ringers. 1000 milliLiter(s) IV Continuous <Continuous>  levothyroxine 25 MICROGram(s) Oral daily  melatonin 3 milliGRAM(s) Oral at bedtime PRN  pantoprazole    Tablet 40 milliGRAM(s) Oral before breakfast  tamsulosin 0.4 milliGRAM(s) Oral at bedtime  timolol 0.5% Solution 1 Drop(s) Both EYES once    acetaminophen     Tablet .. 650 milliGRAM(s) Oral every 6 hours PRN  albuterol    90 MICROgram(s) HFA Inhaler 2 Puff(s) Inhalation every 6 hours PRN  atorvastatin 40 milliGRAM(s) Oral at bedtime  brimonidine 0.2% Ophthalmic Solution 1 Drop(s) Both EYES once  chlorhexidine 2% Cloths 1 Application(s) Topical <User Schedule>  dexAMETHasone  Injectable 4 milliGRAM(s) IV Push every 6 hours  digoxin     Tablet 125 MICROGram(s) Oral daily  finasteride 5 milliGRAM(s) Oral daily  insulin lispro (ADMELOG) corrective regimen sliding scale   SubCutaneous three times a day before meals  insulin lispro (ADMELOG) corrective regimen sliding scale   SubCutaneous at bedtime  lactated ringers. 1000 milliLiter(s) IV Continuous <Continuous>  levothyroxine 25 MICROGram(s) Oral daily  melatonin 3 milliGRAM(s) Oral at bedtime PRN  metoprolol succinate ER 12.5 milliGRAM(s) Oral daily  midodrine. 5 milliGRAM(s) Oral three times a day  pantoprazole    Tablet 40 milliGRAM(s) Oral before breakfast  tamsulosin 0.4 milliGRAM(s) Oral at bedtime  timolol 0.5% Solution 1 Drop(s) Both EYES once    Drug Dosing Weight  Height (cm): 170.2 (17 Mar 2025 00:00)  Weight (kg): 67.3 (17 Mar 2025 00:00)  BMI (kg/m2): 23.2 (17 Mar 2025 00:00)  BSA (m2): 1.78 (17 Mar 2025 00:00)    JEAN: [ ] YES [ ] NO    DATE INSERTED:    CENTRAL LINE: [ ] YES [ ] NO  LOCATION:   DATE INSERTED:    A-LINE:  [ ] YES [ ] NO  LOCATION:   DATE INSERTED:      PMH -reviewed admission note, no change since admission  PAST MEDICAL & SURGICAL HISTORY:  Essential hypertension      Hyperlipidemia, unspecified hyperlipidemia type      BPH without obstruction/lower urinary tract symptoms      Glaucoma (increased eye pressure)  both eyes      Atrial fibrillation      H/O hemicolectomy  1973      Cataract extraction status of left eye  w/lens implant - 2014      History of lung biopsy  2016 - negative results              PHYSICAL EXAM:  GENERAL: NAD,   HEAD:  Atraumatic, Normocephalic  EYES: EOMI, PERRLA, conjunctiva and sclera clear  ENMT: Moist mucous membranes, No lesions  NECK: Supple, normal appearance,   NERVOUS SYSTEM:  Alert & Oriented X3, No Asterixis  CHEST/LUNG: No chest deformity; No rales, rhonchi, wheezing   HEART: Regular rate and rhythm; No murmurs,  ABDOMEN: Soft, Nontender, Nondistended; Bowel sounds present  EXTREMITIES:  2+ Peripheral Pulses, No clubbing, cyanosis, or edema  SKIN: No rashes or lesions;      LABS:  CBC Full  -  ( 18 Mar 2025 04:54 )  WBC Count : 8.84 K/uL  RBC Count : 3.64 M/uL  Hemoglobin : 11.9 g/dL  Hematocrit : 35.1 %  Platelet Count - Automated : 141 K/uL  Mean Cell Volume : 96.4 fl  Mean Cell Hemoglobin : 32.7 pg  Mean Cell Hemoglobin Concentration : 33.9 g/dL  Auto Neutrophil # : x  Auto Lymphocyte # : x  Auto Monocyte # : x  Auto Eosinophil # : x  Auto Basophil # : x  Auto Neutrophil % : x  Auto Lymphocyte % : x  Auto Monocyte % : x  Auto Eosinophil % : x  Auto Basophil % : x    03-18    138  |  110[H]  |  22[H]  ----------------------------<  164[H]  4.6   |  21[L]  |  1.23    Ca    9.1      18 Mar 2025 04:54  Phos  3.3     03-18  Mg     1.9     03-18    TPro  6.1  /  Alb  2.8[L]  /  TBili  1.0  /  DBili  x   /  AST  23  /  ALT  38  /  AlkPhos  95  03-18    PT/INR - ( 16 Mar 2025 12:30 )   PT: 31.6 sec;   INR: 2.73 ratio         PTT - ( 16 Mar 2025 12:30 )  PTT:43.0 sec  Urinalysis Basic - ( 18 Mar 2025 04:54 )    Color: x / Appearance: x / SG: x / pH: x  Gluc: 164 mg/dL / Ketone: x  / Bili: x / Urobili: x   Blood: x / Protein: x / Nitrite: x   Leuk Esterase: x / RBC: x / WBC x   Sq Epi: x / Non Sq Epi: x / Bacteria: x          RADIOLOGY & ADDITIONAL STUDIES REVIEWED:  ***    [ ]GOALS OF CARE DISCUSSION WITH PATIENT/FAMILY/PROXY:    CRITICAL CARE TIME SPENT: 35 minutes Patient is a 89y old  Male who presents with a chief complaint of suspect brain metastasis (17 Mar 2025 19:07)      INTERVAL HPI/OVERNIGHT EVENTS: No overnight events. He was examined in the morning at bedside and he is in no acute distress. Otherwise, he denies any symptoms     REVIEW OF SYSTEMS:  CONSTITUTIONAL: No fever, chills  RESPIRATORY: No cough, wheezing, shortness of breath  CARDIOVASCULAR: No chest pain, palpitations,   GASTROINTESTINAL: No abdominal pain, nausea, vomiting, diarrhea or constipation, bloody stool  GENITOURINARY: No dysuria,  hematuria, urinary frequency  NEUROLOGICAL: No headaches, numbness, or tremors  EXTREMITES: No leg swelling  SKIN: No itching, burning, rashes, or lesions       ICU Vital Signs Last 24 Hrs  T(C): 36.3 (18 Mar 2025 04:00), Max: 36.6 (17 Mar 2025 17:00)  T(F): 97.3 (18 Mar 2025 04:00), Max: 97.8 (17 Mar 2025 17:00)  HR: 74 (18 Mar 2025 07:00) (64 - 108)  BP: 124/74 (18 Mar 2025 07:00) (97/55 - 158/96)  BP(mean): 88 (18 Mar 2025 07:00) (66 - 109)  ABP: --  ABP(mean): --  RR: 15 (18 Mar 2025 07:00) (7 - 27)  SpO2: 95% (18 Mar 2025 07:00) (92% - 100%)    O2 Parameters below as of 18 Mar 2025 07:00  Patient On (Oxygen Delivery Method): room air          I&O's Summary    17 Mar 2025 07:01  -  18 Mar 2025 07:00  --------------------------------------------------------  IN: 780 mL / OUT: 1400 mL / NET: -620 mL              PRESSORS: [ ] YES [x ] NO    Antimicrobial:    Cardiovascular:  digoxin     Tablet 125 MICROGram(s) Oral daily  metoprolol succinate ER 12.5 milliGRAM(s) Oral daily  midodrine. 5 milliGRAM(s) Oral three times a day    Pulmonary:  albuterol    90 MICROgram(s) HFA Inhaler 2 Puff(s) Inhalation every 6 hours PRN    Hematalogic:    Other:  acetaminophen     Tablet .. 650 milliGRAM(s) Oral every 6 hours PRN  atorvastatin 40 milliGRAM(s) Oral at bedtime  brimonidine 0.2% Ophthalmic Solution 1 Drop(s) Both EYES once  chlorhexidine 2% Cloths 1 Application(s) Topical <User Schedule>  dexAMETHasone  Injectable 4 milliGRAM(s) IV Push every 6 hours  finasteride 5 milliGRAM(s) Oral daily  insulin lispro (ADMELOG) corrective regimen sliding scale   SubCutaneous three times a day before meals  insulin lispro (ADMELOG) corrective regimen sliding scale   SubCutaneous at bedtime  lactated ringers. 1000 milliLiter(s) IV Continuous <Continuous>  levothyroxine 25 MICROGram(s) Oral daily  melatonin 3 milliGRAM(s) Oral at bedtime PRN  pantoprazole    Tablet 40 milliGRAM(s) Oral before breakfast  tamsulosin 0.4 milliGRAM(s) Oral at bedtime  timolol 0.5% Solution 1 Drop(s) Both EYES once    acetaminophen     Tablet .. 650 milliGRAM(s) Oral every 6 hours PRN  albuterol    90 MICROgram(s) HFA Inhaler 2 Puff(s) Inhalation every 6 hours PRN  atorvastatin 40 milliGRAM(s) Oral at bedtime  brimonidine 0.2% Ophthalmic Solution 1 Drop(s) Both EYES once  chlorhexidine 2% Cloths 1 Application(s) Topical <User Schedule>  dexAMETHasone  Injectable 4 milliGRAM(s) IV Push every 6 hours  digoxin     Tablet 125 MICROGram(s) Oral daily  finasteride 5 milliGRAM(s) Oral daily  insulin lispro (ADMELOG) corrective regimen sliding scale   SubCutaneous three times a day before meals  insulin lispro (ADMELOG) corrective regimen sliding scale   SubCutaneous at bedtime  lactated ringers. 1000 milliLiter(s) IV Continuous <Continuous>  levothyroxine 25 MICROGram(s) Oral daily  melatonin 3 milliGRAM(s) Oral at bedtime PRN  metoprolol succinate ER 12.5 milliGRAM(s) Oral daily  midodrine. 5 milliGRAM(s) Oral three times a day  pantoprazole    Tablet 40 milliGRAM(s) Oral before breakfast  tamsulosin 0.4 milliGRAM(s) Oral at bedtime  timolol 0.5% Solution 1 Drop(s) Both EYES once    Drug Dosing Weight  Height (cm): 170.2 (17 Mar 2025 00:00)  Weight (kg): 67.3 (17 Mar 2025 00:00)  BMI (kg/m2): 23.2 (17 Mar 2025 00:00)  BSA (m2): 1.78 (17 Mar 2025 00:00)    JEAN: [ ] YES [ x] NO    DATE INSERTED:    CENTRAL LINE: [ ] YES [x ] NO  LOCATION:   DATE INSERTED:    A-LINE:  [ ] YES [x ] NO  LOCATION:   DATE INSERTED:      PMH -reviewed admission note, no change since admission  PAST MEDICAL & SURGICAL HISTORY:  Essential hypertension      Hyperlipidemia, unspecified hyperlipidemia type      BPH without obstruction/lower urinary tract symptoms      Glaucoma (increased eye pressure)  both eyes      Atrial fibrillation      H/O hemicolectomy  1973      Cataract extraction status of left eye  w/lens implant - 2014      History of lung biopsy  2016 - negative results            PHYSICAL EXAM:  GENERAL: NAD,   HEAD:  Atraumatic, Normocephalic  EYES: Right pupil is fixed and not reactive to light, Left  eye is reactive to light and accomodation, conjunctiva and sclera clear  ENMT: Moist mucous membranes, No lesions  NECK: Supple, normal appearance,   NERVOUS SYSTEM:  Alert & Oriented X3, No Asterixis, 5/5 strength in BUE & BLE, sensation intact, no speech deficit, no facial deficit.  CHEST/LUNG: No chest deformity; No rales, rhonchi, wheezing   HEART: Regular rate and rhythm; No murmurs,  ABDOMEN: Soft, Nontender, Nondistended; Bowel sounds present  EXTREMITIES:  2+ Peripheral Pulses, No clubbing, cyanosis, or edema  SKIN: No rashes or lesions;        LABS:  CBC Full  -  ( 18 Mar 2025 04:54 )  WBC Count : 8.84 K/uL  RBC Count : 3.64 M/uL  Hemoglobin : 11.9 g/dL  Hematocrit : 35.1 %  Platelet Count - Automated : 141 K/uL  Mean Cell Volume : 96.4 fl  Mean Cell Hemoglobin : 32.7 pg  Mean Cell Hemoglobin Concentration : 33.9 g/dL  Auto Neutrophil # : x  Auto Lymphocyte # : x  Auto Monocyte # : x  Auto Eosinophil # : x  Auto Basophil # : x  Auto Neutrophil % : x  Auto Lymphocyte % : x  Auto Monocyte % : x  Auto Eosinophil % : x  Auto Basophil % : x    03-18    138  |  110[H]  |  22[H]  ----------------------------<  164[H]  4.6   |  21[L]  |  1.23    Ca    9.1      18 Mar 2025 04:54  Phos  3.3     03-18  Mg     1.9     03-18    TPro  6.1  /  Alb  2.8[L]  /  TBili  1.0  /  DBili  x   /  AST  23  /  ALT  38  /  AlkPhos  95  03-18    PT/INR - ( 16 Mar 2025 12:30 )   PT: 31.6 sec;   INR: 2.73 ratio         PTT - ( 16 Mar 2025 12:30 )  PTT:43.0 sec  Urinalysis Basic - ( 18 Mar 2025 04:54 )    Color: x / Appearance: x / SG: x / pH: x  Gluc: 164 mg/dL / Ketone: x  / Bili: x / Urobili: x   Blood: x / Protein: x / Nitrite: x   Leuk Esterase: x / RBC: x / WBC x   Sq Epi: x / Non Sq Epi: x / Bacteria: x          RADIOLOGY & ADDITIONAL STUDIES REVIEWED:  ***    [ ]GOALS OF CARE DISCUSSION WITH PATIENT/FAMILY/PROXY:    CRITICAL CARE TIME SPENT: 35 minutes

## 2025-03-18 NOTE — DIETITIAN INITIAL EVALUATION ADULT - PROBLEM SELECTOR PLAN 1
Suspect brain metastasis of lung cancer  - CTH: new edema in multiple lobes suspicious for metastasis, local mass effect. Hyperdensity in R frontal lobe, metastasis versus puncutate hemorrhage  - CXR: no acute changes. Revisualized Right sided round suprahilar lesion and widened right mediastinum.  - NSGY consulted  - ICU 24H monitoring  - Q1H neuro checks  - Repeat CTH in 1 hour and 6 hours  - Goal Systolic BP <150mmhg.  - No steroids recommended at this time  - f/u MRI w/wo IV contrast  - Neurology and heme/onc consult in AM  - Hold home med Tagrisso  - Hold any AC

## 2025-03-18 NOTE — DIETITIAN INITIAL EVALUATION ADULT - PROBLEM SELECTOR PLAN 4
Suspect 2/2 brain metastasis  Presents after fall on LLE 1 week ago with increasing ambulatory dysfunction; uses walker at baseline  - CT pelvis and XR L hip unremarkable  - CTH: new edema in multiple lobes suspicious for metastasis, local mass effect. Hyperdensity in R frontal lobe, metastasis versus puncutate hemorrhage.  - f/u orthostatics  - LR at 60cc/hr  - Pain contol with tylenol, can advance as needed  - f/u UA  - f/u TSH  - f/u RVP  - f/u PT

## 2025-03-18 NOTE — PROGRESS NOTE ADULT - ASSESSMENT
89 year old male with a history of lung cancer, A-fib, DM, HTN,   HLD, Hypothyroidism, Lumbar radiculopathy was admitted due to inability to walk  following a fall one week PTA.  Patient was found to have brain metastases.  Patient was transferred to ICU for close monitoring.  Neurology consult, Neurosurgery consult, Heme-onc. consult.  MRI of brain - metastases  MRI C-spine- no mets  Oncology consult is appreciated.  Continue Dexamethasone.  Palliative care consult.

## 2025-03-18 NOTE — DIETITIAN INITIAL EVALUATION ADULT - NSFNSGIIOFT_GEN_A_CORE
DHI=084 lb    UEN=743 lb   Wt data in EMR: 154 lb 12/26/24; 149 lb 2/1/25 ;150 lb 2/17/25; 148 lb 3/17/25; 155.6 lb 3/18/25   a bit fluctuated, may partly due to scale/fluid variance

## 2025-03-18 NOTE — PROGRESS NOTE ADULT - ASSESSMENT
89y male (from home, uses walker at baseline) with PMHx of lung cancer previously on Tagrisso, HLD, HTN, BPH, hypothyroidism, A-fib on Xarelto, presents for ambulatory dysfunction since a fall 1 week ago (no head trauma). Adm to medicine for ambulatory dysfunction. CTH shows Multiple new sites of edema in the left frontal, parietal lobes and bilateral temporal lobe suspicious for intracranial metastases. Largest area of edema is in the left frontal parietal lobes. There is local mass effect without herniation or hydrocephalus. Punctate focus of hyperdensity in the right temporal lobe (series 2 image 20) in the area of edema. This may represent underlying intracranial metastasis versus punctate hemorrhage and continued follow-up is recommended. Neurosurgery consulted who recommend ICU admission for 24hrs for closer monitoring.    Plan:  #Punctate hyperdensity - intracranial mets vs hemorrhage  #Lung cancer, previously on Tagrisso  #Afib on Xarelto  #Ambulatory dysfunction  #H/o HTN, HLD, BPH, Hypothyroidism    ==========Neuro:=========  #Punctate Hyperdensity  CTH: Multiple new sites (since Oct 24') of edema in the left frontal, parietal lobes and bilateral temporal lobe suspicious for intracranial metastases. Largest area of edema is in the left frontal parietal lobes. There is local mass effect without herniation or hydrocephalus. Punctate focus of hyperdensity in the right temporal lobe (series 2 image 20) in the area of edema. This may represent underlying intracranial metastasis versus punctate hemorrhage a  s/p Decadron 10mg (3/16)  Repeat CTH (6hrs): no changes   - f/u Repeat CTH 24hrs   - c/w Q2h Neurochecks,   - c/w Decadron 4mg q6hrs   - Keep systolic BP < 150mmhg  - reach out to [facility] if condition deteriorates or new findings on CTH  - Neuro Dr. Gillis following   - NeuroSx at [facility] following - Dr. Pringle, recs appreciated  - f/u MRI Head, Cervical & Throracic w/ & w/o    #Ambulatory dysfunction s/p fall  No head trauma or LOC from recent fall (3/11/25)  Pt complaining of trouble walking and pain in the left buttocks/hip  CT pelvis negative for acute fxr  possibly has loss of balance from brain mets and edema vs orthostatic hypotension  - F/u Orthostatics  - F/u PT eval  - C/w Tylenol for pain control    ==========Cardiovascular:==========  #Afib on Xarelto  Pt has a history of Afib, takes Xarelto and metoprolol succinate, Digoxin at home  - c/w metoprolol, digoxin  - Hold Xarelto in setting of brain mets vs hemorrhage      #HTN  Pt has a history of HTN, takes metoprolol 12.5mg qD at home  - c/w Metoprolol succi 12.5mg QD    ==========Pulmonary:==========   #Lung CA  on Erlotinib 100mg QD  - hold home meds    ==========Infectious Diseases:=========  #No active issues    ==========Gastrointestinal:==========  #Nausea  - C/w Zofran PRN    ==========Renal:==========  #No active issues    ==========Heme/onc:==========   #Lung CA (Diagnosed 2 years ago per pt)  Pt was taking Tagrisso for over a year, was recently switched to Erlotinib 100mg QD last week  due to worsening spread of CA on recent imaging  Follows oupt Heme/Onc Doris Martinez  CTH shows possible brain mets  - Hold home Erlotinib   - Dr. Vieira Neurology Consulted  - Neurosurgery at [facility] Following  - [facility] following     ==========Endo:===========   #Hypothyroidism  Pt has a history of hypothyroidism, takes Levothyroxine at home  - C/w Levothyroxine     =-==========#BPH============  Pt has a history of BPH, takes tamsulosin 0.4mg and finasteride 5mg at home   - C/w Tamsulosin and Finasteride     ============Skin/ catheter:=============   #Peripheral lines    =============Prophylaxis:=============   #SCDs , no chemical DVT ppx in setting of brain mets/hemorrhage    Goals of Care:   FULL CODE per patient who is AOX4 baseline    Dispo: Transfer to ICU   89y male (from home, uses walker at baseline) with PMHx of lung cancer previously on Tagrisso, HLD, HTN, BPH, hypothyroidism, A-fib on Xarelto, presents for ambulatory dysfunction since a fall 1 week ago (no head trauma). Adm to medicine for ambulatory dysfunction. CTH shows Multiple new sites of edema in the left frontal, parietal lobes and bilateral temporal lobe suspicious for intracranial metastases. Largest area of edema is in the left frontal parietal lobes. There is local mass effect without herniation or hydrocephalus. Punctate focus of hyperdensity in the right temporal lobe (series 2 image 20) in the area of edema. This may represent underlying intracranial metastasis versus punctate hemorrhage and continued follow-up is recommended. Neurosurgery consulted who recommend ICU admission for 24hrs for closer monitoring.    Plan:  #Punctate hyperdensity - intracranial mets vs hemorrhage  #Lung cancer, previously on Tagrisso  #Afib on Xarelto  #Ambulatory dysfunction  #H/o HTN, HLD, BPH, Hypothyroidism    ==========Neuro:=========  #Punctate Hyperdensity  CTH: Multiple new sites (since Oct 24') of edema in the left frontal, parietal lobes and bilateral temporal lobe suspicious for intracranial metastases. Largest area of edema is in the left frontal parietal lobes. There is local mass effect without herniation or hydrocephalus. Punctate focus of hyperdensity in the right temporal lobe (series 2 image 20) in the area of edema. This may represent underlying intracranial metastasis versus punctate hemorrhage a  s/p Decadron 10mg (3/16)  Repeat CTH (6hrs): no changes   MRI Head: Multiple bilateral supratentorial and infratentorial metastatic lesions with moderate to severe associated vasogenic edema.  MRI Cervical: Multilevel cord impingement   MRI Thoracic: Negative   - c/w Q2h Neurochecks,   - c/w Decadron 4mg q6hrs (Taper for 2 weeks as per Heme/onc)  - Keep systolic BP < 150mmhg  - Neuro Dr. Gillis following   - NeuroSx at [facility] following - Dr. Pringle, recs appreciated  - Heme/Onc Dr. Drummond following     #Ambulatory dysfunction s/p fall  No head trauma or LOC from recent fall (3/11/25)  Pt complaining of trouble walking and pain in the left buttocks/hip  CT pelvis negative for acute fxr  possibly has loss of balance from brain mets and edema vs orthostatic hypotension  - PT -> Home PT  - C/w Tylenol for pain control    ==========Cardiovascular:==========  #Afib on Xarelto  Pt has a history of Afib, takes Xarelto and metoprolol succinate, Digoxin at home  - c/w metoprolol, digoxin  - Hold Xarelto in setting of brain mets vs hemorrhage    #HTN  Pt has a history of HTN, takes metoprolol 12.5mg qD at home  - c/w Metoprolol succi 12.5mg QD    #Hypotension  takes Midodrine 5mg TID  - c/w home med with parameter    ==========Pulmonary:==========   #Lung CA  on Erlotinib 100mg QD  - hold home meds    ==========Infectious Diseases:=========  #No active issues    ==========Gastrointestinal:==========  # No active  issues    ==========Renal:==========  #No active issues    ==========Heme/onc:==========   #Lung CA (Diagnosed 2 years ago per pt)  Pt takes  Erlotinib 100mg QD  Follows oupt Heme/Onc Doris Martinez  CTH shows possible brain mets  MRI Head: Multiple bilateral supratentorial and infratentorial metastatic lesions with moderate to severe associated vasogenic edema.  MRI Cervical: Multilevel cord impingement   MRI Thoracic: Negative   - c/w Erlotinib 100mg QD  - Heme/Onc Dr. Drummond following     ==========Endo:===========   #Hypothyroidism  Pt has a history of hypothyroidism, takes Levothyroxine at home  - C/w Levothyroxine     =-==========#BPH============  Pt has a history of BPH, takes tamsulosin 0.4mg and finasteride 5mg at home   - C/w Tamsulosin and Finasteride     ============Skin/ catheter:=============   #Peripheral lines    =============Prophylaxis:=============   #SCDs , no chemical DVT ppx in setting of brain mets/hemorrhage    ===========Goals of Care:===========   FULL CODE per patient who is AOX4 baseline    Dispo: Transfer to ICU

## 2025-03-18 NOTE — PROGRESS NOTE ADULT - SUBJECTIVE AND OBJECTIVE BOX
This is 89 year old male with a history of lung cancer, A-fib, DM, HTN, Hypothyroidism,  HLD, Lumbar radiculopathy who was admitted after he developed mobility disfunction following a fall   one week ago. Patient denied head injury.  Musculo-skeletal imagings failed to show any acute fractures.  CT of brain showed picture compatible with brain metastases.  Patient was transferred to ICU for observation.  Neurology, Neurosurgery and heme-onc. services were summoned.  Patient is alert, oriented.  MRI of brain- brain metastases.  Oncology consult is appreciated.    Vital Signs Last 24 Hrs  T(C): 35.8 (18 Mar 2025 15:30), Max: 36.3 (18 Mar 2025 04:00)  T(F): 96.5 (18 Mar 2025 15:30), Max: 97.3 (18 Mar 2025 04:00)  HR: 53 (18 Mar 2025 16:00) (53 - 108)  BP: 121/55 (18 Mar 2025 16:00) (94/56 - 158/96)  BP(mean): 76 (18 Mar 2025 16:00) (67 - 109)  RR: 21 (18 Mar 2025 16:00) (7 - 25)  SpO2: 98% (18 Mar 2025 16:00) (93% - 100%)    Parameters below as of 18 Mar 2025 16:00  Patient On (Oxygen Delivery Method): room air      T(C): 35.8 (03-18-25 @ 15:30), Max: 36.3 (03-18-25 @ 04:00)  HR: 53 (03-18-25 @ 16:00) (53 - 108)  BP: 121/55 (03-18-25 @ 16:00) (94/56 - 158/96)  RR: 21 (03-18-25 @ 16:00) (7 - 25)  SpO2: 98% (03-18-25 @ 16:00) (93% - 100%)    CONSTITUTIONAL: Well groomed, no apparent distress  EYES: PERRLA and symmetric, EOMI, No conjunctival or scleral injection, non-icteric  ENMT: Oral mucosa with moist membranes. Normal dentition; no pharyngeal injection or exudates             NECK: Supple, symmetric and without tracheal deviation   RESP: No respiratory distress, no use of accessory muscles; CTA b/l, no WRR  CV: RRR, +S1S2, no MRG; no JVD; no peripheral edema  GI: Soft, NT, ND, no rebound, no guarding; no palpable masses; no hepatosplenomegaly; no hernia palpated  LYMPH: No cervical LAD or tenderness; no axillary LAD or tenderness; no inguinal LAD or tenderness  MSK: Normal gait; No digital clubbing or cyanosis; examination of the (head/neck/spine/ribs/pelvis, RUE, LUE, RLE, LLE) without misalignment,            Normal ROM without pain, no spinal tenderness, normal muscle strength/tone  SKIN: No rashes or ulcers noted; no subcutaneous nodules or induration palpable  NEURO: CN II-XII intact; normal reflexes in upper and lower extremities, sensation intact in upper and lower extremities b/l to light touch   PSYCH: Appropriate insight/judgment; A+O x 3, mood and affect appropriate, recent/remote memory intact      CBC Full  -  ( 18 Mar 2025 04:54 )  WBC Count : 8.84 K/uL  RBC Count : 3.64 M/uL  Hemoglobin : 11.9 g/dL  Hematocrit : 35.1 %  Platelet Count - Automated : 141 K/uL  Mean Cell Volume : 96.4 fl  Mean Cell Hemoglobin : 32.7 pg  Mean Cell Hemoglobin Concentration : 33.9 g/dL  Auto Neutrophil # : x  Auto Lymphocyte # : x  Auto Monocyte # : x  Auto Eosinophil # : x  Auto Basophil # : x  Auto Neutrophil % : x  Auto Lymphocyte % : x  Auto Monocyte % : x  Auto Eosinophil % : x  Auto Basophil % : x      03-18    138  |  110[H]  |  22[H]  ----------------------------<  164[H]  4.6   |  21[L]  |  1.23    Ca    9.1      18 Mar 2025 04:54  Phos  3.3     03-18  Mg     1.9     03-18    TPro  6.1  /  Alb  2.8[L]  /  TBili  1.0  /  DBili  x   /  AST  23  /  ALT  38  /  AlkPhos  95  03-18      MEDICATIONS  (STANDING):  brimonidine 0.2% Ophthalmic Solution 1 Drop(s) Both EYES every 12 hours  chlorhexidine 2% Cloths 1 Application(s) Topical <User Schedule>  dexAMETHasone  Injectable 4 milliGRAM(s) IV Push every 6 hours  erlotinib 100 milliGRAM(s) Oral daily  finasteride 5 milliGRAM(s) Oral daily  insulin lispro (ADMELOG) corrective regimen sliding scale   SubCutaneous three times a day before meals  insulin lispro (ADMELOG) corrective regimen sliding scale   SubCutaneous at bedtime  levETIRAcetam 500 milliGRAM(s) Oral two times a day  levothyroxine 25 MICROGram(s) Oral daily  midodrine. 5 milliGRAM(s) Oral three times a day  pantoprazole    Tablet 40 milliGRAM(s) Oral before breakfast  tamsulosin 0.4 milliGRAM(s) Oral at bedtime  timolol 0.5% Solution 1 Drop(s) Both EYES every 12 hours      Brain MRI- metastases  MRI c-spine- no mets

## 2025-03-18 NOTE — DIETITIAN INITIAL EVALUATION ADULT - PROBLEM SELECTOR PLAN 3
Suspect 2/2 brain metastasis  In ED, one episode of NBNB spit-up  Pt requesting clear liquid diet  - Clear liquid diet, advance as tolerated  - PRN zofran  - LR at 60cc/hr  - Replete electrolytes PRN  - Continue home med PPI  - F/u orthostatics

## 2025-03-18 NOTE — DIETITIAN NUTRITION RISK NOTIFICATION - ADDITIONAL COMMENTS/DIETITIAN RECOMMENDATIONS
Malnutrition Diagnosis Parameters: unintended wt loss <5% x 3wks, mild-moderate muscle and fat loss, recent Dx of Moderate Malnutrition, decubitis

## 2025-03-18 NOTE — DIETITIAN INITIAL EVALUATION ADULT - OTHER INFO
Pt lives home with family PTA, recent admission with Moderate Malnutrition per RD Assessment 2/15/25; in ICU, alert, oriented, observed breakfast--100% intake today; Unknown food allergies per Chart  Pt lives home with family PTA, recent admission with Moderate Malnutrition per RD Assessment 2/15/25; in ICU, alert, oriented, well-communicated; Reported appetite good, but unintended wt loss from 8 lb x 3wks due to diarrhea, now resolving, usual wt=160 lb (wt data in EMR see below); denied GI distress, chewing or swallowing problem at present; Unknown food allergy, food choices obtained and forwarded to Dietary; Tolerating soft/bite food, % intake observed; Willing to try oral nutritional supplement between meals

## 2025-03-18 NOTE — PROGRESS NOTE ADULT - ATTENDING COMMENTS
IMP: This is a 89 yr old man  (from home, uses walker at baseline) with f lung cancer previously on Tagrisso, HLD, HTN, BPH, hypothyroidism, A-fib on Xarelto, presents for ambulatory dysfunction since a fall 1 week ago (no head trauma). Adm to medicine for ambulatory dysfunction. CTH shows Multiple new sites of edema in the left frontal, parietal lobes and bilateral temporal lobe suspicious for intracranial metastases. Largest area of edema is in the left frontal parietal lobes. There is local mass effect without herniation or hydrocephalus. Punctate focus of hyperdensity in the right temporal lobe (series 2 image 20) in the area of edema. This may represent underlying intracranial metastasis versus punctate hemorrhage and continued follow-up is recommended. Neurosurgery consulted who recommend ICU admission for 24hrs for closer monitoring.       ASSESSMENT     - Intracranial bleed   - Brain mets with edema   - Lung ca   - HTN HLD   - CAD Afib   - Ambulatory dysfunction  - HTN, HLD, BPH, Hypothyroidism      Plan   - No sedation   - Monitor neuro status Q2h  - [facility] MRI  multiple metastatic lesion with edema   - CT head repeated , no change   - Decadron   - Onco eval : continue Erlotinib - pt will ask aide to bring meds from home.  - Hemodynamic monitoring   - Diet   - No antibx   - No anticoag   - Fall precaution .

## 2025-03-18 NOTE — DIETITIAN NUTRITION RISK NOTIFICATION - TREATMENT: THE FOLLOWING DIET HAS BEEN RECOMMENDED
Diet, Soft and Bite Sized:   Supplement Feeding Modality:  Oral  Ensure Plus High Protein Cans or Servings Per Day:  1       Frequency:  Two Times a day (03-18-25 @ 11:26) [Active]

## 2025-03-18 NOTE — DIETITIAN INITIAL EVALUATION ADULT - MALNUTRITION
Moderate Malnutrition in context of chronic illness as evidenced by unintended wt loss <5% x 3wks, mild-moderate muscle and fat loss, recent Dx of Moderate Malnutrition, decubitis

## 2025-03-18 NOTE — PROGRESS NOTE ADULT - NSPROGADDITIONALINFOA_GEN_ALL_CORE
< from: MR Head w/wo IV Cont (03.17.25 @ 18:10) >      IMPRESSION:    MRI BRAIN: Multiple bilateral supratentorial and infratentorial   metastatic lesions with moderate to severe associated vasogenic edema.    MRI CERVICAL SPINE: No evidence of osseous metastasis. Multilevel cord   impingement. No cord signal abnormality. No abnormal enhancement.      < end of copied text >

## 2025-03-18 NOTE — DIETITIAN INITIAL EVALUATION ADULT - PERTINENT LABORATORY DATA
03-18    138  |  110[H]  |  22[H]  ----------------------------<  164[H]  4.6   |  21[L]  |  1.23    Ca    9.1      18 Mar 2025 04:54  Phos  3.3     03-18  Mg     1.9     03-18    TPro  6.1  /  Alb  2.8[L]  /  TBili  1.0  /  DBili  x   /  AST  23  /  ALT  38  /  AlkPhos  95  03-18  POCT Blood Glucose.: 140 mg/dL (03-18-25 @ 07:44)

## 2025-03-18 NOTE — PROGRESS NOTE ADULT - SUBJECTIVE AND OBJECTIVE BOX
Patient is a 89y old  Male who presents with a chief complaint of Difficulty in walking, not elsewhere classified     (18 Mar 2025 08:24)       Pt is seen and examined  pt is awake and lying in bed awake, verbal, no c/o headache  pt seems comfortable and denies any complaints at this time          ROS:  Negative except for:    MEDICATIONS  (STANDING):  atorvastatin 40 milliGRAM(s) Oral at bedtime  brimonidine 0.2% Ophthalmic Solution 1 Drop(s) Both EYES once  chlorhexidine 2% Cloths 1 Application(s) Topical <User Schedule>  dexAMETHasone  Injectable 4 milliGRAM(s) IV Push every 6 hours  finasteride 5 milliGRAM(s) Oral daily  insulin lispro (ADMELOG) corrective regimen sliding scale   SubCutaneous three times a day before meals  insulin lispro (ADMELOG) corrective regimen sliding scale   SubCutaneous at bedtime  lactated ringers. 1000 milliLiter(s) (60 mL/Hr) IV Continuous <Continuous>  levothyroxine 25 MICROGram(s) Oral daily  metoprolol succinate ER 12.5 milliGRAM(s) Oral daily  midodrine. 5 milliGRAM(s) Oral three times a day  pantoprazole    Tablet 40 milliGRAM(s) Oral before breakfast  tamsulosin 0.4 milliGRAM(s) Oral at bedtime  timolol 0.5% Solution 1 Drop(s) Both EYES once    MEDICATIONS  (PRN):  acetaminophen     Tablet .. 650 milliGRAM(s) Oral every 6 hours PRN Temp greater or equal to 38C (100.4F), Mild Pain (1 - 3)  albuterol    90 MICROgram(s) HFA Inhaler 2 Puff(s) Inhalation every 6 hours PRN Bronchospasm  melatonin 3 milliGRAM(s) Oral at bedtime PRN Insomnia      Allergies    No Known Allergies    Intolerances        Vital Signs Last 24 Hrs  T(C): 36.2 (18 Mar 2025 08:00), Max: 36.6 (17 Mar 2025 17:00)  T(F): 97.2 (18 Mar 2025 08:00), Max: 97.8 (17 Mar 2025 17:00)  HR: 65 (18 Mar 2025 10:00) (64 - 108)  BP: 116/60 (18 Mar 2025 10:00) (97/55 - 158/96)  BP(mean): 78 (18 Mar 2025 10:00) (66 - 109)  RR: 18 (18 Mar 2025 10:00) (7 - 25)  SpO2: 97% (18 Mar 2025 10:00) (92% - 100%)    Parameters below as of 18 Mar 2025 09:00  Patient On (Oxygen Delivery Method): room air        PHYSICAL EXAM  General: adult in NAD  HEENT: clear oropharynx, anicteric sclera, pink conjunctiva  Neck: supple  CV: normal S1/S2 with no murmur rubs or gallops  Lungs: positive air movement b/l ant lungs,clear to auscultation, no wheezes, no rales  Abdomen: soft non-tender non-distended, no hepatosplenomegaly  Ext: no clubbing cyanosis or edema  Skin: no rashes and no petechiae  Neuro: alert and oriented X 4, no focal deficits  LABS:                          11.9   8.84  )-----------( 141      ( 18 Mar 2025 04:54 )             35.1         Mean Cell Volume : 96.4 fl  Mean Cell Hemoglobin : 32.7 pg  Mean Cell Hemoglobin Concentration : 33.9 g/dL  Auto Neutrophil # : x  Auto Lymphocyte # : x  Auto Monocyte # : x  Auto Eosinophil # : x  Auto Basophil # : x  Auto Neutrophil % : x  Auto Lymphocyte % : x  Auto Monocyte % : x  Auto Eosinophil % : x  Auto Basophil % : x    Serial CBC's  03-18 @ 04:54  Hct-35.1 / Hgb-11.9 / Plat-141 / RBC-3.64 / WBC-8.84          Serial CBC's  03-17 @ 03:25  Hct-36.1 / Hgb-11.8 / Plat-109 / RBC-3.70 / WBC-5.33          Serial CBC's  03-16 @ 12:30  Hct-33.2 / Hgb-10.9 / Plat-118 / RBC-3.40 / WBC-5.00            03-18    138  |  110[H]  |  22[H]  ----------------------------<  164[H]  4.6   |  21[L]  |  1.23    Ca    9.1      18 Mar 2025 04:54  Phos  3.3     03-18  Mg     1.9     03-18    TPro  6.1  /  Alb  2.8[L]  /  TBili  1.0  /  DBili  x   /  AST  23  /  ALT  38  /  AlkPhos  95  03-18      PT/INR - ( 16 Mar 2025 12:30 )   PT: 31.6 sec;   INR: 2.73 ratio         PTT - ( 16 Mar 2025 12:30 )  PTT:43.0 sec    WBC Count: 8.84 K/uL (03-18-25 @ 04:54)  Hemoglobin: 11.9 g/dL (03-18-25 @ 04:54)  Hematocrit: 35.1 % (03-18-25 @ 04:54)  Platelet Count - Automated: 141 K/uL (03-18-25 @ 04:54)  WBC Count: 5.33 K/uL (03-17-25 @ 03:25)  Hemoglobin: 11.8 g/dL (03-17-25 @ 03:25)  Hematocrit: 36.1 % (03-17-25 @ 03:25)  Platelet Count - Automated: 109 K/uL (03-17-25 @ 03:25)  WBC Count: 5.00 K/uL (03-16-25 @ 12:30)  Hemoglobin: 10.9 g/dL (03-16-25 @ 12:30)  Hematocrit: 33.2 % (03-16-25 @ 12:30)  Platelet Count - Automated: 118 K/uL (03-16-25 @ 12:30)                BLOOD SMEAR INTERPRETATION:       RADIOLOGY & ADDITIONAL STUDIES:

## 2025-03-18 NOTE — DIETITIAN INITIAL EVALUATION ADULT - FACTORS AFF FOOD INTAKE
Advanced age with acute on chronic comorbidities/Latter day/ethnic/cultural/personal food preferences

## 2025-03-18 NOTE — PROGRESS NOTE ADULT - ASSESSMENT
89y male (from home, uses walker at baseline) with PMHx of lung cancer on Tarceva (Elotinib) after progressing on Afatinib with progression  and changed to Tagrisso but with recent progression 2/2025. , HLD, HTN, BPH, hypothyroidism, A-fib on Xarelto, right preauricular abscess, presents for ambulatory dysfunction since a fall 1 week ago. Pt lost balance and fell onto his left side onto floor 1 week ago, with current pain of left lower extremity. Has had increasing imbalance up till now when unable to ambulate. Also had 1 episode of NBNB spit-up after eating in ED, not able to tolerate food due to feeling of stomach burning. Per pt's friend, has HHA 4 hours/day for 3 days a week.  Pt recently admitted here at [facility] for norovirus and hypotensive, was discharged on midodrine and home PT. Denies fever, chills, SOB, cough, wheezing, chest pain, palpitations, diarrhea, polyuria, or pain with urination. (16 Mar 2025 16:43) Oncology called to evaluate most recent CT head compared to last CT head 10/2024 Since prior CT head 10/4/2024:    1.  Multiple new sites of edema in the left frontal, parietal lobes and   bilateral temporal lobe suspicious for intracranial metastases. Largest   area of edema is in the left frontal parietal lobes. There is local mass   effect without herniation or hydrocephalus. This can be further evaluated   with dedicated MRI brain with and without IV contrast as clinically   warranted.    Problem # New Brain edema ? new brain lesion in pt with Lung Ca (since 2016 now metastatic since 2024  -MRI brain w/ w/o contrast   Multiple bilateral supratentorial and infratentorial   metastatic lesions with moderate to severe associated vasogenic edema.  No indication for WBRT due to lack of symptoms and Tarceva does penetrate the BBB - pt brought med from home  -ICU monitoring with neuro checks q hour   -continue Dexamethasone 4 mg q6h  -Palliative  care evaluation.    Thank you for the consult. For questions please call [phone]    Leo ESQUIVEL    Thank you

## 2025-03-18 NOTE — DIETITIAN INITIAL EVALUATION ADULT - PERTINENT MEDS FT
MEDICATIONS  (STANDING):  atorvastatin 40 milliGRAM(s) Oral at bedtime  brimonidine 0.2% Ophthalmic Solution 1 Drop(s) Both EYES once  chlorhexidine 2% Cloths 1 Application(s) Topical <User Schedule>  dexAMETHasone  Injectable 4 milliGRAM(s) IV Push every 6 hours  digoxin     Tablet 125 MICROGram(s) Oral daily  finasteride 5 milliGRAM(s) Oral daily  insulin lispro (ADMELOG) corrective regimen sliding scale   SubCutaneous three times a day before meals  insulin lispro (ADMELOG) corrective regimen sliding scale   SubCutaneous at bedtime  lactated ringers. 1000 milliLiter(s) (60 mL/Hr) IV Continuous <Continuous>  levothyroxine 25 MICROGram(s) Oral daily  metoprolol succinate ER 12.5 milliGRAM(s) Oral daily  midodrine. 5 milliGRAM(s) Oral three times a day  pantoprazole    Tablet 40 milliGRAM(s) Oral before breakfast  tamsulosin 0.4 milliGRAM(s) Oral at bedtime  timolol 0.5% Solution 1 Drop(s) Both EYES once    MEDICATIONS  (PRN):  acetaminophen     Tablet .. 650 milliGRAM(s) Oral every 6 hours PRN Temp greater or equal to 38C (100.4F), Mild Pain (1 - 3)  albuterol    90 MICROgram(s) HFA Inhaler 2 Puff(s) Inhalation every 6 hours PRN Bronchospasm  melatonin 3 milliGRAM(s) Oral at bedtime PRN Insomnia

## 2025-03-18 NOTE — DIETITIAN INITIAL EVALUATION ADULT - ETIOLOGY
increased nutrient losses for altered GI function of diarrhea; increased nutrient for wound healing

## 2025-03-19 DIAGNOSIS — G93.40 ENCEPHALOPATHY, UNSPECIFIED: ICD-10-CM

## 2025-03-19 DIAGNOSIS — Z51.5 ENCOUNTER FOR PALLIATIVE CARE: ICD-10-CM

## 2025-03-19 DIAGNOSIS — R53.81 OTHER MALAISE: ICD-10-CM

## 2025-03-19 LAB
ALBUMIN SERPL ELPH-MCNC: 3 G/DL — LOW (ref 3.5–5)
ALP SERPL-CCNC: 99 U/L — SIGNIFICANT CHANGE UP (ref 40–120)
ALT FLD-CCNC: 63 U/L DA — HIGH (ref 10–60)
ANION GAP SERPL CALC-SCNC: 7 MMOL/L — SIGNIFICANT CHANGE UP (ref 5–17)
AST SERPL-CCNC: 51 U/L — HIGH (ref 10–40)
BASOPHILS # BLD AUTO: 0.01 K/UL — SIGNIFICANT CHANGE UP (ref 0–0.2)
BASOPHILS NFR BLD AUTO: 0.1 % — SIGNIFICANT CHANGE UP (ref 0–2)
BILIRUB SERPL-MCNC: 1.1 MG/DL — SIGNIFICANT CHANGE UP (ref 0.2–1.2)
BUN SERPL-MCNC: 38 MG/DL — HIGH (ref 7–18)
CALCIUM SERPL-MCNC: 9 MG/DL — SIGNIFICANT CHANGE UP (ref 8.4–10.5)
CHLORIDE SERPL-SCNC: 105 MMOL/L — SIGNIFICANT CHANGE UP (ref 96–108)
CO2 SERPL-SCNC: 26 MMOL/L — SIGNIFICANT CHANGE UP (ref 22–31)
CREAT SERPL-MCNC: 1.21 MG/DL — SIGNIFICANT CHANGE UP (ref 0.5–1.3)
EGFR: 56 ML/MIN/1.73M2 — LOW
EGFR: 56 ML/MIN/1.73M2 — LOW
EOSINOPHIL # BLD AUTO: 0.02 K/UL — SIGNIFICANT CHANGE UP (ref 0–0.5)
EOSINOPHIL NFR BLD AUTO: 0.2 % — SIGNIFICANT CHANGE UP (ref 0–6)
GLUCOSE BLDC GLUCOMTR-MCNC: 138 MG/DL — HIGH (ref 70–99)
GLUCOSE BLDC GLUCOMTR-MCNC: 154 MG/DL — HIGH (ref 70–99)
GLUCOSE BLDC GLUCOMTR-MCNC: 159 MG/DL — HIGH (ref 70–99)
GLUCOSE BLDC GLUCOMTR-MCNC: 165 MG/DL — HIGH (ref 70–99)
GLUCOSE BLDC GLUCOMTR-MCNC: 166 MG/DL — HIGH (ref 70–99)
GLUCOSE SERPL-MCNC: 161 MG/DL — HIGH (ref 70–99)
HCT VFR BLD CALC: 35.8 % — LOW (ref 39–50)
HGB BLD-MCNC: 12.2 G/DL — LOW (ref 13–17)
IMM GRANULOCYTES NFR BLD AUTO: 0.9 % — SIGNIFICANT CHANGE UP (ref 0–0.9)
LYMPHOCYTES # BLD AUTO: 0.71 K/UL — LOW (ref 1–3.3)
LYMPHOCYTES # BLD AUTO: 7.8 % — LOW (ref 13–44)
MAGNESIUM SERPL-MCNC: 2 MG/DL — SIGNIFICANT CHANGE UP (ref 1.6–2.6)
MCHC RBC-ENTMCNC: 33 PG — SIGNIFICANT CHANGE UP (ref 27–34)
MCHC RBC-ENTMCNC: 34.1 G/DL — SIGNIFICANT CHANGE UP (ref 32–36)
MCV RBC AUTO: 96.8 FL — SIGNIFICANT CHANGE UP (ref 80–100)
MONOCYTES # BLD AUTO: 0.24 K/UL — SIGNIFICANT CHANGE UP (ref 0–0.9)
MONOCYTES NFR BLD AUTO: 2.6 % — SIGNIFICANT CHANGE UP (ref 2–14)
NEUTROPHILS # BLD AUTO: 8.1 K/UL — HIGH (ref 1.8–7.4)
NEUTROPHILS NFR BLD AUTO: 88.4 % — HIGH (ref 43–77)
NRBC BLD AUTO-RTO: 0 /100 WBCS — SIGNIFICANT CHANGE UP (ref 0–0)
PHOSPHATE SERPL-MCNC: 3.2 MG/DL — SIGNIFICANT CHANGE UP (ref 2.5–4.5)
PLATELET # BLD AUTO: 131 K/UL — LOW (ref 150–400)
POTASSIUM SERPL-MCNC: 4.8 MMOL/L — SIGNIFICANT CHANGE UP (ref 3.5–5.3)
POTASSIUM SERPL-SCNC: 4.8 MMOL/L — SIGNIFICANT CHANGE UP (ref 3.5–5.3)
PROT SERPL-MCNC: 6 G/DL — SIGNIFICANT CHANGE UP (ref 6–8.3)
RBC # BLD: 3.7 M/UL — LOW (ref 4.2–5.8)
RBC # FLD: 17 % — HIGH (ref 10.3–14.5)
SODIUM SERPL-SCNC: 138 MMOL/L — SIGNIFICANT CHANGE UP (ref 135–145)
WBC # BLD: 9.16 K/UL — SIGNIFICANT CHANGE UP (ref 3.8–10.5)
WBC # FLD AUTO: 9.16 K/UL — SIGNIFICANT CHANGE UP (ref 3.8–10.5)

## 2025-03-19 PROCEDURE — 99223 1ST HOSP IP/OBS HIGH 75: CPT

## 2025-03-19 RX ORDER — POLYETHYLENE GLYCOL 3350 17 G/17G
17 POWDER, FOR SOLUTION ORAL DAILY
Refills: 0 | Status: DISCONTINUED | OUTPATIENT
Start: 2025-03-19 | End: 2025-03-28

## 2025-03-19 RX ORDER — SENNA 187 MG
1 TABLET ORAL DAILY
Refills: 0 | Status: DISCONTINUED | OUTPATIENT
Start: 2025-03-19 | End: 2025-03-28

## 2025-03-19 RX ORDER — MAGNESIUM, ALUMINUM HYDROXIDE 200-200 MG
30 TABLET,CHEWABLE ORAL EVERY 4 HOURS
Refills: 0 | Status: DISCONTINUED | OUTPATIENT
Start: 2025-03-19 | End: 2025-03-28

## 2025-03-19 RX ADMIN — Medication 40 MILLIGRAM(S): at 05:10

## 2025-03-19 RX ADMIN — Medication 30 MILLILITER(S): at 22:27

## 2025-03-19 RX ADMIN — POLYETHYLENE GLYCOL 3350 17 GRAM(S): 17 POWDER, FOR SOLUTION ORAL at 11:20

## 2025-03-19 RX ADMIN — DIGOXIN 62.5 MICROGRAM(S): 250 TABLET ORAL at 05:12

## 2025-03-19 RX ADMIN — TIMOLOL MALEATE 1 DROP(S): 6.8 SOLUTION OPHTHALMIC at 05:13

## 2025-03-19 RX ADMIN — MIDODRINE HYDROCHLORIDE 5 MILLIGRAM(S): 5 TABLET ORAL at 11:19

## 2025-03-19 RX ADMIN — INSULIN LISPRO 1: 100 INJECTION, SOLUTION INTRAVENOUS; SUBCUTANEOUS at 11:25

## 2025-03-19 RX ADMIN — DEXAMETHASONE 4 MILLIGRAM(S): 0.5 TABLET ORAL at 01:56

## 2025-03-19 RX ADMIN — DEXAMETHASONE 4 MILLIGRAM(S): 0.5 TABLET ORAL at 17:08

## 2025-03-19 RX ADMIN — INSULIN LISPRO 0: 100 INJECTION, SOLUTION INTRAVENOUS; SUBCUTANEOUS at 22:29

## 2025-03-19 RX ADMIN — DEXAMETHASONE 4 MILLIGRAM(S): 0.5 TABLET ORAL at 11:19

## 2025-03-19 RX ADMIN — BRIMONIDINE TARTRATE 1 DROP(S): 1.5 SOLUTION/ DROPS OPHTHALMIC at 05:12

## 2025-03-19 RX ADMIN — TIMOLOL MALEATE 1 DROP(S): 6.8 SOLUTION OPHTHALMIC at 17:08

## 2025-03-19 RX ADMIN — DEXAMETHASONE 4 MILLIGRAM(S): 0.5 TABLET ORAL at 05:10

## 2025-03-19 RX ADMIN — FINASTERIDE 5 MILLIGRAM(S): 1 TABLET, FILM COATED ORAL at 11:19

## 2025-03-19 RX ADMIN — Medication 25 MICROGRAM(S): at 05:11

## 2025-03-19 RX ADMIN — Medication 1 APPLICATION(S): at 05:12

## 2025-03-19 RX ADMIN — ERLOTINIB 100 MILLIGRAM(S): 150 TABLET, FILM COATED ORAL at 11:27

## 2025-03-19 RX ADMIN — BRIMONIDINE TARTRATE 1 DROP(S): 1.5 SOLUTION/ DROPS OPHTHALMIC at 17:08

## 2025-03-19 RX ADMIN — TAMSULOSIN HYDROCHLORIDE 0.4 MILLIGRAM(S): 0.4 CAPSULE ORAL at 22:27

## 2025-03-19 RX ADMIN — INSULIN LISPRO 1: 100 INJECTION, SOLUTION INTRAVENOUS; SUBCUTANEOUS at 17:01

## 2025-03-19 RX ADMIN — Medication 1 TABLET(S): at 11:18

## 2025-03-19 RX ADMIN — MIDODRINE HYDROCHLORIDE 5 MILLIGRAM(S): 5 TABLET ORAL at 17:07

## 2025-03-19 RX ADMIN — LEVETIRACETAM 500 MILLIGRAM(S): 10 INJECTION, SOLUTION INTRAVENOUS at 05:12

## 2025-03-19 NOTE — CONSULT NOTE ADULT - CONSULT REASON
Lung Cancer Stage IV
Cerebral masses
Edema in brain, possible punctate hemmorrhage
Discuss complex medical decision making related to goals of care

## 2025-03-19 NOTE — CONSULT NOTE ADULT - PROBLEM/RECOMMENDATION-1
Gastritis: Care Instructions  Your Care Instructions    Gastritis is a sore and upset stomach. It happens when something irritates the stomach lining. Many things can cause it. These include an infection such as the flu or something you ate or drank. Medicines or a sore on the lining of the stomach (ulcer) also can cause it. Your belly may bloat and ache. You may belch, vomit, and feel sick to your stomach. You should be able to relieve the problem by taking medicine. And it may help to change your diet. If gastritis lasts, your doctor may prescribe medicine. Follow-up care is a key part of your treatment and safety. Be sure to make and go to all appointments, and call your doctor if you are having problems. It's also a good idea to know your test results and keep a list of the medicines you take. How can you care for yourself at home? · If your doctor prescribed antibiotics, take them as directed. Do not stop taking them just because you feel better. You need to take the full course of antibiotics. · Be safe with medicines. If your doctor prescribed medicine to decrease stomach acid, take it as directed. Call your doctor if you think you are having a problem with your medicine. · Do not take any other medicine, including over-the-counter pain relievers, without talking to your doctor first.  · If your doctor recommends over-the-counter medicine to reduce stomach acid, such as Pepcid AC, Prilosec, Tagamet HB, or Zantac 75, follow the directions on the label. · Drink plenty of fluids (enough so that your urine is light yellow or clear like water) to prevent dehydration. Choose water and other caffeine-free clear liquids. If you have kidney, heart, or liver disease and have to limit fluids, talk with your doctor before you increase the amount of fluids you drink. · Limit how much alcohol you drink. · Avoid coffee, tea, cola drinks, chocolate, and other foods with caffeine.  They increase stomach acid.  When should you call for help? Call 911 anytime you think you may need emergency care. For example, call if:    · You vomit blood or what looks like coffee grounds.     · You pass maroon or very bloody stools.    Call your doctor now or seek immediate medical care if:    · You start breathing fast and have not produced urine in the last 8 hours.     · You cannot keep fluids down.    Watch closely for changes in your health, and be sure to contact your doctor if:    · You do not get better as expected. Where can you learn more? Go to http://stu-valentina.info/. Enter [id number] in the search box to learn more about \"Gastritis: Care Instructions. \"  Current as of: March 27, 2018  Content Version: 11.9  © 6318-6966 [employer]. Care instructions adapted under license by [employer] (which disclaims liability or warranty for this information). If you have questions about a medical condition or this instruction, always ask your healthcare professional. [address] any warranty or liability for your use of this information. DISPLAY PLAN FREE TEXT

## 2025-03-19 NOTE — PROGRESS NOTE ADULT - SUBJECTIVE AND OBJECTIVE BOX
This is 89 year old male with a history of lung cancer, A-fib, DM, HTN, Hypothyroidism,  HLD, Lumbar radiculopathy who was admitted after he developed mobility disfunction following a fall   one week ago. Patient denied head injury.  Musculo-skeletal imagings failed to show any acute fractures.  CT of brain showed picture compatible with brain metastases.  Patient was transferred to ICU for observation.  Neurology, Neurosurgery and heme-onc. services were summoned.  Patient is alert, oriented.  MRI of brain- brain metastases.  Oncology consult is appreciated.  Patient remains stable.    Vital Signs Last 24 Hrs  T(C): 36.1 (19 Mar 2025 15:27), Max: 36.1 (19 Mar 2025 15:27)  T(F): 97 (19 Mar 2025 15:27), Max: 97 (19 Mar 2025 15:27)  HR: 71 (19 Mar 2025 14:00) (34 - 76)  BP: 101/68 (19 Mar 2025 14:00) (76/51 - 149/80)  BP(mean): 78 (19 Mar 2025 14:00) (53 - 102)  RR: 24 (19 Mar 2025 14:00) (14 - 24)  SpO2: 97% (19 Mar 2025 14:00) (95% - 100%)    Parameters below as of 19 Mar 2025 12:00  Patient On (Oxygen Delivery Method): room air      T(C): 36.1 (03-19-25 @ 15:27), Max: 36.1 (03-19-25 @ 15:27)  HR: 71 (03-19-25 @ 14:00) (34 - 76)  BP: 101/68 (03-19-25 @ 14:00) (76/51 - 149/80)  RR: 24 (03-19-25 @ 14:00) (14 - 24)  SpO2: 97% (03-19-25 @ 14:00) (95% - 100%)    CONSTITUTIONAL: Well groomed, no apparent distress  EYES: PERRLA and symmetric, EOMI, No conjunctival or scleral injection, non-icteric  ENMT: Oral mucosa with moist membranes. Normal dentition; no pharyngeal injection or exudates             NECK: Supple, symmetric and without tracheal deviation   RESP: No respiratory distress, no use of accessory muscles; CTA b/l, no WRR  CV: RRR, +S1S2, no MRG; no JVD; no peripheral edema  GI: Soft, NT, ND, no rebound, no guarding; no palpable masses; no hepatosplenomegaly; no hernia palpated  LYMPH: No cervical LAD or tenderness; no axillary LAD or tenderness; no inguinal LAD or tenderness  MSK: Normal gait; No digital clubbing or cyanosis; examination of the (head/neck/spine/ribs/pelvis, RUE, LUE, RLE, LLE) without misalignment,            Normal ROM without pain, no spinal tenderness, normal muscle strength/tone  SKIN: No rashes or ulcers noted; no subcutaneous nodules or induration palpable  NEURO: CN II-XII intact; normal reflexes in upper and lower extremities, sensation intact in upper and lower extremities b/l to light touch   PSYCH: Appropriate insight/judgment; A+O x 3, mood and affect appropriate, recent/remote memory intact    CBC Full  -  ( 19 Mar 2025 03:30 )  WBC Count : 9.16 K/uL  RBC Count : 3.70 M/uL  Hemoglobin : 12.2 g/dL  Hematocrit : 35.8 %  Platelet Count - Automated : 131 K/uL  Mean Cell Volume : 96.8 fl  Mean Cell Hemoglobin : 33.0 pg  Mean Cell Hemoglobin Concentration : 34.1 g/dL  Auto Neutrophil # : x  Auto Lymphocyte # : x  Auto Monocyte # : x  Auto Eosinophil # : x  Auto Basophil # : x  Auto Neutrophil % : x  Auto Lymphocyte % : x  Auto Monocyte % : x  Auto Eosinophil % : x  Auto Basophil % : x      03-19    138  |  105  |  38[H]  ----------------------------<  161[H]  4.8   |  26  |  1.21    Ca    9.0      19 Mar 2025 03:30  Phos  3.2     03-19  Mg     2.0     03-19    TPro  6.0  /  Alb  3.0[L]  /  TBili  1.1  /  DBili  x   /  AST  51[H]  /  ALT  63[H]  /  AlkPhos  99  03-19      MEDICATIONS  (STANDING):  brimonidine 0.2% Ophthalmic Solution 1 Drop(s) Both EYES every 12 hours  dexAMETHasone  Injectable 4 milliGRAM(s) IV Push every 6 hours  erlotinib 100 milliGRAM(s) Oral daily  finasteride 5 milliGRAM(s) Oral daily  insulin lispro (ADMELOG) corrective regimen sliding scale   SubCutaneous three times a day before meals  insulin lispro (ADMELOG) corrective regimen sliding scale   SubCutaneous at bedtime  levothyroxine 25 MICROGram(s) Oral daily  midodrine. 5 milliGRAM(s) Oral three times a day  pantoprazole    Tablet 40 milliGRAM(s) Oral before breakfast  polyethylene glycol 3350 17 Gram(s) Oral daily  senna 1 Tablet(s) Oral daily  tamsulosin 0.4 milliGRAM(s) Oral at bedtime  timolol 0.5% Solution 1 Drop(s) Both EYES every 12 hours

## 2025-03-19 NOTE — PROGRESS NOTE ADULT - ASSESSMENT
89 year old male with a history of lung cancer, A-fib, DM, HTN,   HLD, Hypothyroidism, Lumbar radiculopathy was admitted due to inability to walk  following a fall one week PTA.  Patient was found to have brain metastases.  Patient was transferred to ICU for close monitoring.  Neurology consult, Neurosurgery consult, Heme-onc. consult.  MRI of brain - metastases  MRI C-spine- no mets  Oncology consult is appreciated.  Continue Dexamethasone.  Palliative care consult.  Patient can be downgraded to the regular floor.  Case was discussed with house staff.

## 2025-03-19 NOTE — CONSULT NOTE ADULT - PROBLEM SELECTOR RECOMMENDATION 2
Likely 2/2 to Brain mets w edema.  Pt Aox3 tangential in hi speech and lacks insight as to his clinical Condition.    c/w Dexamethasone    MRI BRAIN: Multiple bilateral supratentorial and infratentorial   metastatic lesions with moderate to severe associated vasogenic edema.

## 2025-03-19 NOTE — CONSULT NOTE ADULT - TIME BILLING
This includes chart review, patient assessment, discussion and collaboration with interdisciplinary team members,excluding ACP.

## 2025-03-19 NOTE — PROGRESS NOTE ADULT - SUBJECTIVE AND OBJECTIVE BOX
Patient is a 89y old  Male who presents with a chief complaint of suspect brain metastasis (18 Mar 2025 10:20)      INTERVAL HPI/OVERNIGHT EVENTS: ***    REVIEW OF SYSTEMS:  CONSTITUTIONAL: No fever, chills  RESPIRATORY: No cough, wheezing, shortness of breath  CARDIOVASCULAR: No chest pain, palpitations,   GASTROINTESTINAL: No abdominal pain, nausea, vomiting, diarrhea or constipation, bloody stool  GENITOURINARY: No dysuria,  hematuria, urinary frequency  NEUROLOGICAL: No headaches, numbness, or tremors  EXTREMITES: No leg swelling  SKIN: No itching, burning, rashes, or lesions     ICU Vital Signs Last 24 Hrs  T(C): 35.7 (19 Mar 2025 07:00), Max: 36.2 (18 Mar 2025 08:00)  T(F): 96.2 (19 Mar 2025 07:00), Max: 97.2 (18 Mar 2025 08:00)  HR: 39 (19 Mar 2025 07:00) (34 - 77)  BP: 114/49 (19 Mar 2025 07:00) (94/56 - 149/80)  BP(mean): 70 (19 Mar 2025 07:00) (66 - 102)  ABP: --  ABP(mean): --  RR: 18 (19 Mar 2025 07:00) (13 - 22)  SpO2: 98% (19 Mar 2025 07:00) (95% - 100%)    O2 Parameters below as of 18 Mar 2025 16:00  Patient On (Oxygen Delivery Method): room air          I&O's Summary    18 Mar 2025 07:01  -  19 Mar 2025 07:00  --------------------------------------------------------  IN: 600 mL / OUT: 1310 mL / NET: -710 mL              PRESSORS: [ ] YES [ ] NO  WHICH:     JEAN: [ ] YES [ ] NO    DATE INSERTED:  CENTRAL LINE: [ ] YES [ ] NO  LOCATION:   DATE INSERTED:  A-LINE:  [ ] YES [ ] NO  LOCATION:   DATE INSERTED:    Antimicrobial:    Cardiovascular:  digoxin     Tablet 62.5 MICROGram(s) Oral daily  midodrine. 5 milliGRAM(s) Oral three times a day    Pulmonary:  albuterol    90 MICROgram(s) HFA Inhaler 2 Puff(s) Inhalation every 6 hours PRN    Hematalogic:    Other:  acetaminophen     Tablet .. 650 milliGRAM(s) Oral every 6 hours PRN  brimonidine 0.2% Ophthalmic Solution 1 Drop(s) Both EYES every 12 hours  chlorhexidine 2% Cloths 1 Application(s) Topical <User Schedule>  dexAMETHasone  Injectable 4 milliGRAM(s) IV Push every 6 hours  erlotinib 100 milliGRAM(s) Oral daily  finasteride 5 milliGRAM(s) Oral daily  insulin lispro (ADMELOG) corrective regimen sliding scale   SubCutaneous three times a day before meals  insulin lispro (ADMELOG) corrective regimen sliding scale   SubCutaneous at bedtime  levETIRAcetam 500 milliGRAM(s) Oral two times a day  levothyroxine 25 MICROGram(s) Oral daily  melatonin 3 milliGRAM(s) Oral at bedtime PRN  pantoprazole    Tablet 40 milliGRAM(s) Oral before breakfast  tamsulosin 0.4 milliGRAM(s) Oral at bedtime  timolol 0.5% Solution 1 Drop(s) Both EYES every 12 hours    acetaminophen     Tablet .. 650 milliGRAM(s) Oral every 6 hours PRN  albuterol    90 MICROgram(s) HFA Inhaler 2 Puff(s) Inhalation every 6 hours PRN  brimonidine 0.2% Ophthalmic Solution 1 Drop(s) Both EYES every 12 hours  chlorhexidine 2% Cloths 1 Application(s) Topical <User Schedule>  dexAMETHasone  Injectable 4 milliGRAM(s) IV Push every 6 hours  digoxin     Tablet 62.5 MICROGram(s) Oral daily  erlotinib 100 milliGRAM(s) Oral daily  finasteride 5 milliGRAM(s) Oral daily  insulin lispro (ADMELOG) corrective regimen sliding scale   SubCutaneous three times a day before meals  insulin lispro (ADMELOG) corrective regimen sliding scale   SubCutaneous at bedtime  levETIRAcetam 500 milliGRAM(s) Oral two times a day  levothyroxine 25 MICROGram(s) Oral daily  melatonin 3 milliGRAM(s) Oral at bedtime PRN  midodrine. 5 milliGRAM(s) Oral three times a day  pantoprazole    Tablet 40 milliGRAM(s) Oral before breakfast  tamsulosin 0.4 milliGRAM(s) Oral at bedtime  timolol 0.5% Solution 1 Drop(s) Both EYES every 12 hours    Drug Dosing Weight  Height (cm): 170.2 (17 Mar 2025 00:00)  Weight (kg): 67.3 (17 Mar 2025 00:00)  BMI (kg/m2): 23.2 (17 Mar 2025 00:00)  BSA (m2): 1.78 (17 Mar 2025 00:00)        PMH -reviewed admission note, no change since admission  PAST MEDICAL & SURGICAL HISTORY:  Essential hypertension      Hyperlipidemia, unspecified hyperlipidemia type      BPH without obstruction/lower urinary tract symptoms      Glaucoma (increased eye pressure)  both eyes      Atrial fibrillation      H/O hemicolectomy  1973      Cataract extraction status of left eye  w/lens implant - 2014      History of lung biopsy  2016 - negative results              PHYSICAL EXAM:  GENERAL: NAD,   HEAD:  Atraumatic, Normocephalic  EYES: EOMI, PERRLA, conjunctiva and sclera clear  ENMT: Moist mucous membranes, No lesions  NECK: Supple, normal appearance,   NERVOUS SYSTEM:  Alert & Oriented X3, No Asterixis  CHEST/LUNG: No chest deformity; No rales, rhonchi, wheezing   HEART: Regular rate and rhythm; No murmurs,  ABDOMEN: Soft, Nontender, Nondistended; Bowel sounds present  EXTREMITIES:  2+ Peripheral Pulses, No clubbing, cyanosis, or edema  SKIN: No rashes or lesions;      LABS:  CBC Full  -  ( 19 Mar 2025 03:30 )  WBC Count : 9.16 K/uL  RBC Count : 3.70 M/uL  Hemoglobin : 12.2 g/dL  Hematocrit : 35.8 %  Platelet Count - Automated : 131 K/uL  Mean Cell Volume : 96.8 fl  Mean Cell Hemoglobin : 33.0 pg  Mean Cell Hemoglobin Concentration : 34.1 g/dL  Auto Neutrophil # : x  Auto Lymphocyte # : x  Auto Monocyte # : x  Auto Eosinophil # : x  Auto Basophil # : x  Auto Neutrophil % : x  Auto Lymphocyte % : x  Auto Monocyte % : x  Auto Eosinophil % : x  Auto Basophil % : x    03-19    138  |  105  |  38[H]  ----------------------------<  161[H]  4.8   |  26  |  1.21    Ca    9.0      19 Mar 2025 03:30  Phos  3.2     03-19  Mg     2.0     03-19    TPro  6.0  /  Alb  3.0[L]  /  TBili  1.1  /  DBili  x   /  AST  51[H]  /  ALT  63[H]  /  AlkPhos  99  03-19      Urinalysis Basic - ( 19 Mar 2025 03:30 )    Color: x / Appearance: x / SG: x / pH: x  Gluc: 161 mg/dL / Ketone: x  / Bili: x / Urobili: x   Blood: x / Protein: x / Nitrite: x   Leuk Esterase: x / RBC: x / WBC x   Sq Epi: x / Non Sq Epi: x / Bacteria: x          RADIOLOGY & ADDITIONAL STUDIES REVIEWED:  ***    [ ]GOALS OF CARE DISCUSSION WITH PATIENT/FAMILY/PROXY:    CRITICAL CARE TIME SPENT: 35 minutes Patient is a 89y old  Male who presents with a chief complaint of suspect brain metastasis (18 Mar 2025 10:20)    INTERVAL HPI/OVERNIGHT EVENTS: No overnight events. He was examined in the morning at bedside and he is in no acute distress. Otherwise, he denies any symptoms     REVIEW OF SYSTEMS:  CONSTITUTIONAL: No fever, chills  RESPIRATORY: No cough, wheezing, shortness of breath  CARDIOVASCULAR: No chest pain, palpitations,   GASTROINTESTINAL: No abdominal pain, nausea, vomiting, diarrhea or constipation, bloody stool  GENITOURINARY: No dysuria,  hematuria, urinary frequency  NEUROLOGICAL: No headaches, numbness, or tremors  EXTREMITES: No leg swelling  SKIN: No itching, burning, rashes, or lesions      ICU Vital Signs Last 24 Hrs  T(C): 35.7 (19 Mar 2025 07:00), Max: 36.2 (18 Mar 2025 08:00)  T(F): 96.2 (19 Mar 2025 07:00), Max: 97.2 (18 Mar 2025 08:00)  HR: 39 (19 Mar 2025 07:00) (34 - 77)  BP: 114/49 (19 Mar 2025 07:00) (94/56 - 149/80)  BP(mean): 70 (19 Mar 2025 07:00) (66 - 102)  ABP: --  ABP(mean): --  RR: 18 (19 Mar 2025 07:00) (13 - 22)  SpO2: 98% (19 Mar 2025 07:00) (95% - 100%)    O2 Parameters below as of 18 Mar 2025 16:00  Patient On (Oxygen Delivery Method): room air          I&O's Summary    18 Mar 2025 07:01  -  19 Mar 2025 07:00  --------------------------------------------------------  IN: 600 mL / OUT: 1310 mL / NET: -710 mL              PRESSORS: [ ] YES [ x] NO  WHICH:     JEAN: [ ] YES [x ] NO    DATE INSERTED:  CENTRAL LINE: [ ] YES [x ] NO  LOCATION:   DATE INSERTED:  A-LINE:  [ ] YES [x ] NO  LOCATION:   DATE INSERTED:    Antimicrobial:    Cardiovascular:  digoxin     Tablet 62.5 MICROGram(s) Oral daily  midodrine. 5 milliGRAM(s) Oral three times a day    Pulmonary:  albuterol    90 MICROgram(s) HFA Inhaler 2 Puff(s) Inhalation every 6 hours PRN    Hematalogic:    Other:  acetaminophen     Tablet .. 650 milliGRAM(s) Oral every 6 hours PRN  brimonidine 0.2% Ophthalmic Solution 1 Drop(s) Both EYES every 12 hours  chlorhexidine 2% Cloths 1 Application(s) Topical <User Schedule>  dexAMETHasone  Injectable 4 milliGRAM(s) IV Push every 6 hours  erlotinib 100 milliGRAM(s) Oral daily  finasteride 5 milliGRAM(s) Oral daily  insulin lispro (ADMELOG) corrective regimen sliding scale   SubCutaneous three times a day before meals  insulin lispro (ADMELOG) corrective regimen sliding scale   SubCutaneous at bedtime  levETIRAcetam 500 milliGRAM(s) Oral two times a day  levothyroxine 25 MICROGram(s) Oral daily  melatonin 3 milliGRAM(s) Oral at bedtime PRN  pantoprazole    Tablet 40 milliGRAM(s) Oral before breakfast  tamsulosin 0.4 milliGRAM(s) Oral at bedtime  timolol 0.5% Solution 1 Drop(s) Both EYES every 12 hours    acetaminophen     Tablet .. 650 milliGRAM(s) Oral every 6 hours PRN  albuterol    90 MICROgram(s) HFA Inhaler 2 Puff(s) Inhalation every 6 hours PRN  brimonidine 0.2% Ophthalmic Solution 1 Drop(s) Both EYES every 12 hours  chlorhexidine 2% Cloths 1 Application(s) Topical <User Schedule>  dexAMETHasone  Injectable 4 milliGRAM(s) IV Push every 6 hours  digoxin     Tablet 62.5 MICROGram(s) Oral daily  erlotinib 100 milliGRAM(s) Oral daily  finasteride 5 milliGRAM(s) Oral daily  insulin lispro (ADMELOG) corrective regimen sliding scale   SubCutaneous three times a day before meals  insulin lispro (ADMELOG) corrective regimen sliding scale   SubCutaneous at bedtime  levETIRAcetam 500 milliGRAM(s) Oral two times a day  levothyroxine 25 MICROGram(s) Oral daily  melatonin 3 milliGRAM(s) Oral at bedtime PRN  midodrine. 5 milliGRAM(s) Oral three times a day  pantoprazole    Tablet 40 milliGRAM(s) Oral before breakfast  tamsulosin 0.4 milliGRAM(s) Oral at bedtime  timolol 0.5% Solution 1 Drop(s) Both EYES every 12 hours    Drug Dosing Weight  Height (cm): 170.2 (17 Mar 2025 00:00)  Weight (kg): 67.3 (17 Mar 2025 00:00)  BMI (kg/m2): 23.2 (17 Mar 2025 00:00)  BSA (m2): 1.78 (17 Mar 2025 00:00)        PMH -reviewed admission note, no change since admission  PAST MEDICAL & SURGICAL HISTORY:  Essential hypertension      Hyperlipidemia, unspecified hyperlipidemia type      BPH without obstruction/lower urinary tract symptoms      Glaucoma (increased eye pressure)  both eyes      Atrial fibrillation      H/O hemicolectomy  1973      Cataract extraction status of left eye  w/lens implant - 2014      History of lung biopsy  2016 - negative results              PHYSICAL EXAM:  GENERAL: NAD,   HEAD:  Atraumatic, Normocephalic  EYES: Right pupil is fixed and not reactive to light, Left  eye is reactive to light and accomodation, conjunctiva and sclera clear  ENMT: Moist mucous membranes, No lesions  NECK: Supple, normal appearance,   NERVOUS SYSTEM:  Alert & Oriented X3, No Asterixis, 5/5 strength in BUE & BLE, sensation intact, no speech deficit, no facial deficit.  CHEST/LUNG: No chest deformity; No rales, rhonchi, wheezing   HEART: Regular rate and rhythm; No murmurs,  ABDOMEN: Soft, Nontender, Nondistended; Bowel sounds present  EXTREMITIES:  2+ Peripheral Pulses, No clubbing, cyanosis, or edema  SKIN: No rashes or lesions;          LABS:  CBC Full  -  ( 19 Mar 2025 03:30 )  WBC Count : 9.16 K/uL  RBC Count : 3.70 M/uL  Hemoglobin : 12.2 g/dL  Hematocrit : 35.8 %  Platelet Count - Automated : 131 K/uL  Mean Cell Volume : 96.8 fl  Mean Cell Hemoglobin : 33.0 pg  Mean Cell Hemoglobin Concentration : 34.1 g/dL  Auto Neutrophil # : x  Auto Lymphocyte # : x  Auto Monocyte # : x  Auto Eosinophil # : x  Auto Basophil # : x  Auto Neutrophil % : x  Auto Lymphocyte % : x  Auto Monocyte % : x  Auto Eosinophil % : x  Auto Basophil % : x    03-19    138  |  105  |  38[H]  ----------------------------<  161[H]  4.8   |  26  |  1.21    Ca    9.0      19 Mar 2025 03:30  Phos  3.2     03-19  Mg     2.0     03-19    TPro  6.0  /  Alb  3.0[L]  /  TBili  1.1  /  DBili  x   /  AST  51[H]  /  ALT  63[H]  /  AlkPhos  99  03-19      Urinalysis Basic - ( 19 Mar 2025 03:30 )    Color: x / Appearance: x / SG: x / pH: x  Gluc: 161 mg/dL / Ketone: x  / Bili: x / Urobili: x   Blood: x / Protein: x / Nitrite: x   Leuk Esterase: x / RBC: x / WBC x   Sq Epi: x / Non Sq Epi: x / Bacteria: x          RADIOLOGY & ADDITIONAL STUDIES REVIEWED:  ***    [ ]GOALS OF CARE DISCUSSION WITH PATIENT/FAMILY/PROXY:    CRITICAL CARE TIME SPENT: 35 minutes

## 2025-03-19 NOTE — CONSULT NOTE ADULT - PROBLEM SELECTOR RECOMMENDATION 4
Palliative Care consulted for complex decision making in the setting of advanced illness.  Pt's HCP and family agreed for LTC with hospice.  SW referral made  Please see discussion noted above in GOC conversation

## 2025-03-19 NOTE — CONSULT NOTE ADULT - SUBJECTIVE AND OBJECTIVE BOX
[facility] Geriatric and Palliative Consult Service:  Heydi Antonia DO: cell ([phone])  Wilbur Lane MD: cell ([phone])  Jose Francisco Melendez NP: cell ([phone])   Jessica Fleischer-Black MD: cell ([phone])  Nagi Maki LMSW: cell ([phone])       Can contact any Palliative Team member via [employer] Teams for consults and questions      HPI:  89y male (from home, uses walker at baseline) with PMHx of lung cancer on Tagrisso, HLD, HTN, BPH, hypothyroidism, A-fib on Xarelto, right preauricular abscess, presents for ambulatory dysfunction since a fall 1 week ago. Pt lost balance and fell onto his left side onto floor 1 week ago, with current pain of left lower extremity. Has had increasing imbalance up till now when unable to ambulate. Also had 1 episode of NBNB spit-up after eating in ED, not able to tolerate food due to feeling of stomach burning. Per pt's friend, has HHA 4 hours/day for 3 days a week.  Pt recently admitted here at [facility] for norovirus and hypotensive, was discharged on midodrine and home PT. Denies fever, chills, SOB, cough, wheezing, chest pain, palpitations, diarrhea, polyuria, or pain with urination. (16 Mar 2025 16:43)    Interval hx: Pt seen and examined sitting up in chair.  Pt AOx3, appears comfortable. Pt is tangential in his speech;  lacks insight as to his clinical condition, unable to make decisions.   HHA/HCP Luly Kohli at the bedside.     PAST MEDICAL & SURGICAL HISTORY:  Essential hypertension      Hyperlipidemia, unspecified hyperlipidemia type      BPH without obstruction/lower urinary tract symptoms      Glaucoma (increased eye pressure)  both eyes      Atrial fibrillation      H/O hemicolectomy  1973      Cataract extraction status of left eye  w/lens implant - 2014      History of lung biopsy  2016 - negative results          SOCIAL HISTORY:    Admitted from:  home alone has HHA 4 hours 3 days a week  Pt is , has 2 children Lul Alonso and Candis who lives pout of [facility].  Pt HCP are Luly Kohli and Fifi Flores  [ none ] Substance abuse, [ none ] Tobacco hx, [ none ] Alcohol hx, [ none ] Home Opioid Hx    Zoroastrian: Sikh         Luly Seun  (HCP)     Phone# [phone]  Earl Flores   (HCP)     Phone# [phone]    Lul Enriquez Jr  (son)    Phone# [phone]    FAMILY HISTORY:   unable to obtain from patient due to poor mentation, family unable to give information, see H&P for history  Baseline ADLs (prior to admission): required extensive assist with ADLs    Allergies    No Known Allergies    Intolerances      Present Symptoms: Mild, Moderate, Severe  Pain: denies             Location -                               Aggravating factors -             Quality -             Radiation -             Timing-             Severity (0-10 scale):             Minimal acceptable level (0-10 scale):  Fatigue: denies  Nausea: denies  Lack of Appetite: dennies  SOB: denies  Depression: unassessed  Anxiety: unassessed  Review of Systems: [All others negative     CPOT:    https://[email or web address]/[email or web address]/Critical-Care-Pain-Observation-Tool-(CPOT) 0  PAIN AD Score:   [email or web address]/[email or web address].pdf (press ctrl +  left click to view)      MEDICATIONS  (STANDING):  brimonidine 0.2% Ophthalmic Solution 1 Drop(s) Both EYES every 12 hours  chlorhexidine 2% Cloths 1 Application(s) Topical <User Schedule>  dexAMETHasone  Injectable 4 milliGRAM(s) IV Push every 6 hours  erlotinib 100 milliGRAM(s) Oral daily  finasteride 5 milliGRAM(s) Oral daily  insulin lispro (ADMELOG) corrective regimen sliding scale   SubCutaneous three times a day before meals  insulin lispro (ADMELOG) corrective regimen sliding scale   SubCutaneous at bedtime  levothyroxine 25 MICROGram(s) Oral daily  midodrine. 5 milliGRAM(s) Oral three times a day  pantoprazole    Tablet 40 milliGRAM(s) Oral before breakfast  polyethylene glycol 3350 17 Gram(s) Oral daily  senna 1 Tablet(s) Oral daily  tamsulosin 0.4 milliGRAM(s) Oral at bedtime  timolol 0.5% Solution 1 Drop(s) Both EYES every 12 hours    MEDICATIONS  (PRN):  acetaminophen     Tablet .. 650 milliGRAM(s) Oral every 6 hours PRN Temp greater or equal to 38C (100.4F), Mild Pain (1 - 3)  albuterol    90 MICROgram(s) HFA Inhaler 2 Puff(s) Inhalation every 6 hours PRN Bronchospasm  melatonin 3 milliGRAM(s) Oral at bedtime PRN Insomnia      PHYSICAL EXAM:  Vital Signs Last 24 Hrs  T(C): 35.7 (19 Mar 2025 07:00), Max: 35.9 (18 Mar 2025 19:00)  T(F): 96.2 (19 Mar 2025 07:00), Max: 96.6 (18 Mar 2025 19:00)  HR: 70 (19 Mar 2025 13:00) (34 - 76)  BP: 83/51 (19 Mar 2025 13:00) (76/51 - 149/80)  BP(mean): 62 (19 Mar 2025 13:00) (53 - 102)  RR: 21 (19 Mar 2025 13:00) (13 - 24)  SpO2: 98% (19 Mar 2025 13:00) (95% - 100%)    Parameters below as of 19 Mar 2025 12:00  Patient On (Oxygen Delivery Method): room air        General: Frail cachetic elderly man AOX3.  NAD     Palliative Performance Scale/Karnofsky Score: 30%  http://npcrc.org/files/news/palliative_performance_scale_ppsv2.pdf    HEENT: no abnormal lesion, moist mm, neck supple  Lungs: unlabored on RA  CV: RRR, S1S2  GI: soft non distended non tender                   last BM: non documented  : urinating   Musculoskeletal: weakness x4, ambulatory with assistance, no edema  Skin: no abnormal skin lesions, poor skin turgor  Neuro: no deficits, ++ cognitive impairment   Oral intake ability:  full capability    LABS:                        12.2   9.16  )-----------( 131      ( 19 Mar 2025 03:30 )             35.8     03-19    138  |  105  |  38[H]  ----------------------------<  161[H]  4.8   |  26  |  1.21    Ca    9.0      19 Mar 2025 03:30  Phos  3.2     03-19  Mg     2.0     03-19    TPro  6.0  /  Alb  3.0[L]  /  TBili  1.1  /  DBili  x   /  AST  51[H]  /  ALT  63[H]  /  AlkPhos  99  03-19    Urinalysis Basic - ( 19 Mar 2025 03:30 )    Color: x / Appearance: x / SG: x / pH: x  Gluc: 161 mg/dL / Ketone: x  / Bili: x / Urobili: x   Blood: x / Protein: x / Nitrite: x   Leuk Esterase: x / RBC: x / WBC x   Sq Epi: x / Non Sq Epi: x / Bacteria: x    < from: CT Head No Cont (03.16.25 @ 23:44) >    ACC: [id number] EXAM:  CT BRAIN   ORDERED BY: BILL SANFORD     PROCEDURE DATE:  03/16/2025          INTERPRETATION:  CLINICAL INFORMATION: interval eval brain edema and   possible hemorrhage , lung cancer    COMPARISON: 03/16/2025 at 5:26 PM.    CONTRAST:  IV Contrast: NONE  .    FINDINGS:    The brain demonstrates unchanged vasogenic edema within the LEFT frontal   and parietal lobes with punctate hemorrhage or calcification seen within   a anterior LEFT frontal gyrus. Mild vasogenic edema in the LEFT temporal   and RIGHT occipital lobes with associated punctate hemorrhage or   calcification. No acute cerebral cortical infarct is seen. No mass effect   is found in the brain.    The ventricles, sulci and basal cisterns appear unremarkable.    The orbits are unremarkable.  The paranasal sinuses are clear.  The nasal   cavity appears intact.  The nasopharynx is symmetric.  The central skull   base, petrous temporal bones and calvarium remain intact.      IMPRESSION:   Unchanged vasogenic edema within the LEFT frontal and   parietal lobes with punctate calcifications seen within a anterior LEFT   frontal gyrus.  Mild vasogenic edema in the LEFT temporal and RIGHT   occipital lobes with associated punctate hemorrhage or calcification.   MRI with gadolinium recommended for complete evaluation.    < end of copied text >  < from: MR Head w/wo IV Cont (03.17.25 @ 18:10) >    ACC: [id number] EXAM:  MR SPINE CERVICAL WAW IC   ORDERED BY: MONICA ZENDEJAS     ACC: [id number] EXAM:  MR BRAIN WAW IC   ORDERED BY: ED WINN     PROCEDURE DATE:  03/17/2025          INTERPRETATION:  CLINICAL INDICATIONS: suspect metastasisper CT head    COMPARISON: Head CT dated 3/16/2025. Head CT dated 3/11/2025    TECHNIQUE: MRI brain: Multiplanar, multisequence MR imaging of the brain   are obtained with and without the administration of 7.5 cc intravenous   Gadavist contrast. 0 ccof contrast was discarded.    FINDINGS:  Multiple supratentorial and infratentorial bilateral ring-enhancing   lesions. Severe vasogenic edema throughout the left frontal lobe, left   parietal lobe. Mild vasogenic edema in the right caudate head. Moderate   vasogenic edema in the anterior left temporal lobe and posterior right   temporal occipital lobe. Mild vasogenic edema in the right cerebellum.   Largest ring-enhancing lesion left frontal lobe measures 5.5 mm. Largest   lesion in the left parietal lobe measures 5.4 mm. Right caudate head   lesion measures 7.1 mm. Left temporal lobe evaluation measures 1.3 cm.   Right temporal occipital lobe lesion measures 1.4 cm. Right cerebellar   lesion measures 5.7 mm. Additional bilateral smaller metastatic lesions.   There is no abnormal restricted diffusion to suggest acute infarction.    Normal T2 flow-voids are seen within  the intracranial vasculature. The   lateral ventricles and cortical sulci are age-appropriate in size and   configuration. There is no extra-axial fluid collection. There is no   susceptibility artifact to suggest hemorrhage. Midline structures are   normal.  The visualized paranasal sinuses, mastoid air cells and orbits   are unremarkable.      ==============    CLINICAL HISTORY: suspect metastasis per CT head    COMPARISON: Broad-based ridging causing mild bilateral foraminal   narrowing. No spinal canal stenosis.    TECHNIQUE: Cervical spine MRI: Multiplanar, multisequence MR images of   the cervical spine are obtained with and without the administration of   7.5 cc intravenous Gadavist contrast. 0 cc of contrast was discarded.    FINDINGS:  Unremarkable heterogeneous T1 marrow signal. No cord signal abnormality.   No abnormal enhancement to suggest osseous metastasis. Severe disc   desiccation at C2/C3 C5/C6 levels. Moderate disc desiccation at C7/T1 and   T1/T2 levels. No bone marrow edema. There is no evidence for acute   fracture. A normal lordosis is noted. Craniocervical junction is normal.   The cervicovertebral body heights and remaining intervertebral disc   spaces are preserved. There is no prevertebral soft tissue abnormality.      Evaluation of the individual levels:  C2/C3 level: Broad-based ridging causing mild bilateral foraminal   narrowing. No spinal canal stenosis.  C3/C4 level: Broad-based disc disc complex causing severe right-sided   foraminal narrowing. Mild left-sided foraminal narrowing. Mild cord   impingement. Mild spinal canal stenosis. No cord signal abnormality.  C4/C5 level: Broad-based ridging causing mild to moderate cord   compression. Severe bilateral foraminal narrowing. Mild to moderate   spinal canal stenosis.  C5/C6 level: Broad-based ridging causing severe right-sided foraminal   narrowing. Moderate left-sided foraminal narrowing. Mild flattening of   the cord. No spinal canal stenosis.  C6/C7 level: Broad-based diffuse complex causing moderate bilateral   foraminal narrowing. No spinal canal stenosis.  C7/T1 level:  No spinal canal stenosis or foraminal narrowing.      ==============      IMPRESSION:    MRI BRAIN: Multiple bilateral supratentorial and infratentorial   metastatic lesions with moderate to severe associated vasogenic edema.      < end of copied text >      RADIOLOGY & ADDITIONAL STUDIES: reviewed  ADVANCED DIRECTIVES: MOLST: DNR/DNI/no PEG/comfort measures/DNH    
NEUROLOGY CONSULT NOTE    NAME:  DONTA ORANTES      ASSESSMENT:  89M with ambulatory dysfunction in the setting of multiple cerebral metastases      RECOMMENDATIONS:    - Maintain in ICU with at least Q2H VS & Neurochecks for at least 24 hours    - MRI Brain Stereotactic w/wo Gadolinium approved to evaluate for intracranial metastases    - Dexamethasone 10mg IV x 1 now, then maintain Dexamethasone 4mg IV Q6H given edema associated with cerebral metastases    - Continue GI prophylaxis and add Insulin per sliding scale while patient is receiving steroids    - Hematology/Oncology consult for further recommendations    - DVT ppx: SCDs          NOTE TO BE COMPLETED - PLEASE REFER TO ABOVE ONLY AND IGNORE INFORMATION BELOW    *******************************      CHIEF COMPLAINT:  Patient is a 89y old  Male who presents with a chief complaint of ambulatory dysfunction (16 Mar 2025 18:57)      HPI:  89y male (from home, uses walker at baseline) with PMHx of lung cancer on Tagrisso, HLD, HTN, BPH, hypothyroidism, A-fib on Xarelto, right preauricular abscess, presents for ambulatory dysfunction since a fall 1 week ago. Pt lost balance and fell onto his left side onto floor 1 week ago, with current pain of left lower extremity. Has had increasing imbalance up till now when unable to ambulate. Also had 1 episode of NBNB spit-up after eating in ED, not able to tolerate food due to feeling of stomach burning. Per pt's friend, has HHA 4 hours/day for 3 days a week.  Pt recently admitted [facility] at [facility] for norovirus and hypotensive, was discharged on midodrine and home PT. Denies fever, chills, SOB, cough, wheezing, chest pain, palpitations, diarrhea, polyuria, or pain with urination. (16 Mar 2025 16:43)      NEURO HPI:      PAST MEDICAL & SURGICAL HISTORY:  Essential hypertension      Hyperlipidemia, unspecified hyperlipidemia type      BPH without obstruction/lower urinary tract symptoms      Glaucoma (increased eye pressure)  both eyes      Atrial fibrillation      H/O hemicolectomy  1973      Cataract extraction status of left eye  w/lens implant - 2014      History of lung biopsy  2016 - negative results          MEDICATIONS:  acetaminophen     Tablet .. 650 milliGRAM(s) Oral every 6 hours PRN  albuterol    90 MICROgram(s) HFA Inhaler 2 Puff(s) Inhalation every 6 hours PRN  atorvastatin 40 milliGRAM(s) Oral at bedtime  chlorhexidine 2% Cloths 1 Application(s) Topical <User Schedule>  digoxin     Tablet 125 MICROGram(s) Oral daily  finasteride 5 milliGRAM(s) Oral daily  lactated ringers. 1000 milliLiter(s) IV Continuous <Continuous>  levothyroxine 25 MICROGram(s) Oral daily  melatonin 3 milliGRAM(s) Oral at bedtime PRN  metoprolol succinate ER 12.5 milliGRAM(s) Oral daily  midodrine. 10 milliGRAM(s) Oral <User Schedule>  pantoprazole    Tablet 40 milliGRAM(s) Oral before breakfast  tamsulosin 0.4 milliGRAM(s) Oral at bedtime      ALLERGIES:  No Known Allergies      FAMILY HISTORY:        SOCIAL HISTORY:  Denies alcohol, tobacco, or illicit drug use      REVIEW OF SYSTEMS:  GENERAL: No fever, weight changes, fatigue  EYES: No eye pain or discharge  EAR/NOSE/MOUTH/THROAT: No sinus or throat pain; No difficulty hearing  NECK: No pain or stiffness  RESPIRATORY: No cough, wheezing, chills, or hemoptysis  CARDIOVASCULAR: No chest pain, palpitations, shortness of breath, or dyspnea on exertion  GASTROINTESTINAL: No abdominal pain, nausea, vomiting, hematemesis, diarrhea, or constipation  GENITOURINARY: No dysuria, frequency, hematuria, or incontinence  SKIN: No rashes or lesions  ENDOCRINE: No heat or cold intolerance  HEMATOLOGIC: No easy bruising or bleeding  PSYCHIATRIC: No depression, anxiety, or mood swings  MUSCULOSKELETAL: No joint pain or swelling  NEUROLOGICAL: As per HPI          OBJECTIVE:    Vital Signs Last 24 Hrs  T(C): 36.3 (16 Mar 2025 23:30), Max: 36.5 (16 Mar 2025 19:53)  T(F): 97.3 (16 Mar 2025 23:30), Max: 97.7 (16 Mar 2025 19:53)  HR: 77 (17 Mar 2025 02:00) (67 - 87)  BP: 153/78 (17 Mar 2025 02:00) (98/60 - 159/74)  BP(mean): 99 (17 Mar 2025 02:00) (86 - 100)  RR: 15 (17 Mar 2025 02:00) (12 - 17)  SpO2: 97% (17 Mar 2025 02:00) (96% - 98%)    Parameters below as of 17 Mar 2025 01:00  Patient On (Oxygen Delivery Method): room air        General Examination:  General: No acute distress  HEENT: Atraumatic, Normocephalic  Respiratory: CTA B/l.  No crackles, rhonchi, or wheezes.  Cardiovascular: RRR.  Normal S1 & S2.  Normal b/l radial and pedal pulses.    Neurological Examination:  General / Mental Status: AAO x 3.  No aphasia or dysarthria.  Naming and repetition intact.  Cranial Nerves: VFF x 4.  PERRL.  EOMI x 2, No nystagmus or diplopia.  B/l V1-V3 equal and intact to light touch and pinprick.  Symmetric facial movement and palate elevation.  B/l hearing equal to finger rub.  5/5 strength with b/l sternocleidomastoid and trapezius.  Midline tongue protrusion, with no atrophy or fasciculations.  Motor: Normal bulk & tone in all four extremities.  5/5 strength throughout all four extremities.  No downward drift, rigidity, spasticity, or tremors in any of the four extremities.  Sensory: Intact to light touch and pinprick in all four extremities.  Negative Romberg.  Reflex: 2+ and symmetric at b/l biceps, triceps, brachioradialis, patellae, and ankles.  Downgoing toes b/l.  Coordination: No dysmetria with b/l finger-to-nose and heel raise tests.  Symmetric rapid alternating movements b/l.  Gait: Normal, narrow-based gait.  No difficulty with tiptoe, heel, and tandem gaits.        LABORATORY VALUES:                        10.9   5.00  )-----------( 118      ( 16 Mar 2025 12:30 )             33.2       03-16    140  |  110[H]  |  23[H]  ----------------------------<  99  4.2   |  26  |  1.23    Ca    9.2      16 Mar 2025 12:30  Mg     2.0     03-16    TPro  6.4  /  Alb  3.2[L]  /  TBili  1.2  /  DBili  x   /  AST  33  /  ALT  46  /  AlkPhos  105  03-16          NEUROIMAGING:          Please contact the Neurology consult service with any neurological questions.    Ilya Vieira MD   of Neurology  [facility] [employer] [facility] at [facility]
Patient is a 89y old  Male who presents with a chief complaint of suspect brain metastasis (17 Mar 2025 18:12)      HPI:  89y male (from home, uses walker at baseline) with PMHx of lung cancer on Tarceva (Elotinib) after progressing on Afatinib with progression  and changed to Tagrisso but with recent progression 2/2025. , HLD, HTN, BPH, hypothyroidism, A-fib on Xarelto, right preauricular abscess, presents for ambulatory dysfunction since a fall 1 week ago. Pt lost balance and fell onto his left side onto floor 1 week ago, with current pain of left lower extremity. Has had increasing imbalance up till now when unable to ambulate. Also had 1 episode of NBNB spit-up after eating in ED, not able to tolerate food due to feeling of stomach burning. Per pt's friend, has HHA 4 hours/day for 3 days a week.  Pt recently admitted here at [facility] for norovirus and hypotensive, was discharged on midodrine and home PT. Denies fever, chills, SOB, cough, wheezing, chest pain, palpitations, diarrhea, polyuria, or pain with urination. (16 Mar 2025 16:43) Oncology called to evaluate most recent CT head compared to last CT head 10/2024 Since prior CT head 10/4/2024:    1.  Multiple new sites of edema in the left frontal, parietal lobes and   bilateral temporal lobe suspicious for intracranial metastases. Largest   area of edema is in the left frontal parietal lobes. There is local mass   effect without herniation or hydrocephalus. This can be further evaluated   with dedicated MRI brain with and without IV contrast as clinically   warranted.           ROS:  Negative except for:    PAST MEDICAL & SURGICAL HISTORY:  Essential hypertension      Hyperlipidemia, unspecified hyperlipidemia type      BPH without obstruction/lower urinary tract symptoms      Glaucoma (increased eye pressure)  both eyes      Atrial fibrillation      H/O hemicolectomy  1973      Cataract extraction status of left eye  w/lens implant - 2014      History of lung biopsy  2016 - negative results          SOCIAL HISTORY:    FAMILY HISTORY:      MEDICATIONS  (STANDING):  atorvastatin 40 milliGRAM(s) Oral at bedtime  chlorhexidine 2% Cloths 1 Application(s) Topical <User Schedule>  dexAMETHasone  Injectable 4 milliGRAM(s) IV Push every 6 hours  digoxin     Tablet 125 MICROGram(s) Oral daily  finasteride 5 milliGRAM(s) Oral daily  insulin lispro (ADMELOG) corrective regimen sliding scale   SubCutaneous three times a day before meals  insulin lispro (ADMELOG) corrective regimen sliding scale   SubCutaneous at bedtime  lactated ringers. 1000 milliLiter(s) (60 mL/Hr) IV Continuous <Continuous>  levothyroxine 25 MICROGram(s) Oral daily  metoprolol succinate ER 12.5 milliGRAM(s) Oral daily  midodrine. 5 milliGRAM(s) Oral three times a day  pantoprazole    Tablet 40 milliGRAM(s) Oral before breakfast  tamsulosin 0.4 milliGRAM(s) Oral at bedtime    MEDICATIONS  (PRN):  acetaminophen     Tablet .. 650 milliGRAM(s) Oral every 6 hours PRN Temp greater or equal to 38C (100.4F), Mild Pain (1 - 3)  albuterol    90 MICROgram(s) HFA Inhaler 2 Puff(s) Inhalation every 6 hours PRN Bronchospasm  melatonin 3 milliGRAM(s) Oral at bedtime PRN Insomnia      Allergies    No Known Allergies    Intolerances        Vital Signs Last 24 Hrs  T(C): 36.6 (17 Mar 2025 17:00), Max: 36.6 (17 Mar 2025 17:00)  T(F): 97.8 (17 Mar 2025 17:00), Max: 97.8 (17 Mar 2025 17:00)  HR: 81 (17 Mar 2025 18:00) (64 - 101)  BP: 101/53 (17 Mar 2025 18:00) (97/55 - 159/74)  BP(mean): 66 (17 Mar 2025 18:00) (66 - 103)  RR: 20 (17 Mar 2025 18:00) (11 - 27)  SpO2: 96% (17 Mar 2025 18:00) (89% - 99%)    Parameters below as of 17 Mar 2025 18:00  Patient On (Oxygen Delivery Method): room air        PHYSICAL EXAM  General: adult in NAD  HEENT: clear oropharynx, anicteric sclera, pink conjunctiva  Neck: supple  CV: normal S1/S2 with no murmur rubs or gallops  Lungs: positive air movement b/l ant lungs,clear to auscultation, no wheezes, no rales  Abdomen: soft non-tender non-distended, no hepatosplenomegaly  Ext: no clubbing cyanosis or edema  Skin: no rashes and no petechiae  Neuro: alert and oriented X 4, no focal deficits      LABS:                          11.8   5.33  )-----------( 109      ( 17 Mar 2025 03:25 )             36.1         Mean Cell Volume : 97.6 fl  Mean Cell Hemoglobin : 31.9 pg  Mean Cell Hemoglobin Concentration : 32.7 g/dL  Auto Neutrophil # : x  Auto Lymphocyte # : x  Auto Monocyte # : x  Auto Eosinophil # : x  Auto Basophil # : x  Auto Neutrophil % : x  Auto Lymphocyte % : x  Auto Monocyte % : x  Auto Eosinophil % : x  Auto Basophil % : x      Serial CBC's  03-17 @ 03:25  Hct-36.1 / Hgb-11.8 / Plat-109 / RBC-3.70 / WBC-5.33  Serial CBC's  03-16 @ 12:30  Hct-33.2 / Hgb-10.9 / Plat-118 / RBC-3.40 / WBC-5.00      03-17    140  |  108  |  20[H]  ----------------------------<  121[H]  4.6   |  26  |  1.13    Ca    9.4      17 Mar 2025 03:25  Phos  2.6     03-17  Mg     2.1     03-17    TPro  6.7  /  Alb  3.3[L]  /  TBili  1.4[H]  /  DBili  x   /  AST  30  /  ALT  47  /  AlkPhos  108  03-17      PT/INR - ( 16 Mar 2025 12:30 )   PT: 31.6 sec;   INR: 2.73 ratio         PTT - ( 16 Mar 2025 12:30 )  PTT:43.0 sec                BLOOD SMEAR INTERPRETATION:       RADIOLOGY & ADDITIONAL STUDIES:    
Patient is a 89y old  Male who presents with a chief complaint of ambulatory dysfunction (16 Mar 2025 16:43)      HPI:  89y male (from home, uses walker at baseline) with PMHx of lung cancer previously on Tagrisso, HLD, HTN, BPH, hypothyroidism, A-fib on Xarelto, presents for ambulatory dysfunction since a fall 1 week ago. Pt lost balance and fell onto his left side onto floor 1 week ago, with current pain of left lower extremity. Has had increasing imbalance up till now when unable to ambulate. Also had 1 episode of NBNB spit-up after eating in ED, not able to tolerate food due to feeling of stomach burning. Per pt's friend, has HHA 4 hours/day for 3 days a week.  Pt recently admitted here at [facility] for norovirus and hypotensive, was discharged on midodrine and home PT. Denies fever, chills, SOB, cough, wheezing, chest pain, palpitations, diarrhea, polyuria, or pain with urination. (16 Mar 2025 16:43)    Interval HPI: Pt seen at bedside with no further complaints. Pt states he recently changed his Tagrisso to a new medication last week that he can not remember. His medication was changed due to concern that his cancer was spreading in the lungs. Pt states he was to start radiation therapy in 2 weeks. Pt reports having nausea at home. Pt also endorses pain in his left hip where he fell last tuesday (3/11/25). Pt denies any CP, SOB, fever, chills, numbness or tingling.     CTH demonstrates Multiple new sites of edema in the left frontal, parietal lobes and bilateral temporal lobe suspicious for intracranial metastases. Largest area of edema is in the left frontal parietal lobes. There is local mass effect without herniation or hydrocephalus.   Punctate focus of hyperdensity in the right temporal lobe (series 2 image 20) in the area of edema. This may represent underlying intracranial metastasis versus punctate hemorrhage and continued follow-up is recommended.    Neurosurgery consulted who recommend ICU admission for 24hrs for closer monitoring.      PAST MEDICAL & SURGICAL HISTORY:  Essential hypertension      Hyperlipidemia, unspecified hyperlipidemia type      BPH without obstruction/lower urinary tract symptoms      Glaucoma (increased eye pressure)  both eyes      Atrial fibrillation      H/O hemicolectomy  1973      Cataract extraction status of left eye  w/lens implant - 2014      History of lung biopsy  2016 - negative results          SOCIAL HX:   Smoking                         ETOH                            Other    FAMILY HISTORY:  :  No known cardiovacular family hisotry     ROS:  See HPI     Allergies    No Known Allergies    Intolerances          PHYSICAL EXAM    ICU Vital Signs Last 24 Hrs  T(C): 36.3 (16 Mar 2025 11:46), Max: 36.3 (16 Mar 2025 11:46)  T(F): 97.3 (16 Mar 2025 11:46), Max: 97.3 (16 Mar 2025 11:46)  HR: 69 (16 Mar 2025 11:46) (69 - 69)  BP: 98/60 (16 Mar 2025 11:46) (98/60 - 98/60)  BP(mean): --  ABP: --  ABP(mean): --  RR: 17 (16 Mar 2025 11:46) (17 - 17)  SpO2: 97% (16 Mar 2025 11:46) (97% - 97%)    O2 Parameters below as of 16 Mar 2025 11:46  Patient On (Oxygen Delivery Method): room air            General: Not in distress, resting in bed  HEENT:  [place]              Lymphatic system: No LN  Lungs:  CTA b/l  Cardiovascular: RRR, no murmurs  Gastrointestinal: Soft, Positive BS  Musculoskeletal: No clubbing.  Moves all extremities.    Skin: Warm.  Intact. chronic skin changes .  Neurological: No motor or sensory deficit         LABS:                          10.9   5.00  )-----------( 118      ( 16 Mar 2025 12:30 )             33.2                                               03-16    140  |  110[H]  |  23[H]  ----------------------------<  99  4.2   |  26  |  1.23    Ca    9.2      16 Mar 2025 12:30  Mg     2.0     03-16    TPro  6.4  /  Alb  3.2[L]  /  TBili  1.2  /  DBili  x   /  AST  33  /  ALT  46  /  AlkPhos  105  03-16      PT/INR - ( 16 Mar 2025 12:30 )   PT: 31.6 sec;   INR: 2.73 ratio         PTT - ( 16 Mar 2025 12:30 )  PTT:43.0 sec                                       Urinalysis Basic - ( 16 Mar 2025 12:30 )    Color: x / Appearance: x / SG: x / pH: x  Gluc: 99 mg/dL / Ketone: x  / Bili: x / Urobili: x   Blood: x / Protein: x / Nitrite: x   Leuk Esterase: x / RBC: x / WBC x   Sq Epi: x / Non Sq Epi: x / Bacteria: x                                                  LIVER FUNCTIONS - ( 16 Mar 2025 12:30 )  Alb: 3.2 g/dL / Pro: 6.4 g/dL / ALK PHOS: 105 U/L / ALT: 46 U/L DA / AST: 33 U/L / GGT: x                                                                                                                                       CXR:    ECHO:    MEDICATIONS  (STANDING):  atorvastatin 40 milliGRAM(s) Oral at bedtime  digoxin     Tablet 125 MICROGram(s) Oral daily  finasteride 5 milliGRAM(s) Oral daily  lactated ringers. 1000 milliLiter(s) (60 mL/Hr) IV Continuous <Continuous>  levothyroxine 25 MICROGram(s) Oral daily  metoclopramide  IVPB 10 milliGRAM(s) IV Intermittent Once  metoprolol succinate ER 12.5 milliGRAM(s) Oral daily  midodrine. 10 milliGRAM(s) Oral <User Schedule>  pantoprazole    Tablet 40 milliGRAM(s) Oral before breakfast  tamsulosin 0.4 milliGRAM(s) Oral at bedtime    MEDICATIONS  (PRN):  acetaminophen     Tablet .. 650 milliGRAM(s) Oral every 6 hours PRN Temp greater or equal to 38C (100.4F), Mild Pain (1 - 3)  albuterol    90 MICROgram(s) HFA Inhaler 2 Puff(s) Inhalation every 6 hours PRN Bronchospasm  melatonin 3 milliGRAM(s) Oral at bedtime PRN Insomnia

## 2025-03-19 NOTE — CONSULT NOTE ADULT - PROBLEM SELECTOR RECOMMENDATION 9
Hx of lung cancer on Tarceva (Elotinib) after progressing on Afatinib with progression  and changed to Tagrisso but with recent progression 2/2025.   Follows Oncologist Dr. Martinez at [facility]  ECOG 4    Pt is appropriate for hospice.  Family and HCP agreed for LTC with hospice.      MRI brain w/ w/o contrast   Multiple bilateral supratentorial and infratentorial   metastatic lesions with moderate to severe associated vasogenic edema.  No indication for WBRT due to lack of symptoms and Tarceva does penetrate the BBB Hx of lung cancer on Tarceva (Elotinib) after progressing on Afatinib with progression  and changed to Tagrisso but with recent progression 2/2025.   Follows Oncologist Dr. Martinez at [facility]  Given pt's poor prognosis XRT or continued Tarceva unlikely to improve pt  survival or quality of life in a meaningful way.  Pt with prognosis likely days to weeks.   ECOG 4    Pt is appropriate for hospice.  Family and HCP agreed for LTC with hospice.      MRI brain w/ w/o contrast   Multiple bilateral supratentorial and infratentorial   metastatic lesions with moderate to severe associated vasogenic edema.  No indication for WBRT due to lack of symptoms and Tarceva does penetrate the BBB

## 2025-03-19 NOTE — PROGRESS NOTE ADULT - ASSESSMENT
89y male (from home, uses walker at baseline) with PMHx of lung cancer previously on Tagrisso, HLD, HTN, BPH, hypothyroidism, A-fib on Xarelto, presents for ambulatory dysfunction since a fall 1 week ago (no head trauma). Adm to medicine for ambulatory dysfunction. CTH shows Multiple new sites of edema in the left frontal, parietal lobes and bilateral temporal lobe suspicious for intracranial metastases. Largest area of edema is in the left frontal parietal lobes. There is local mass effect without herniation or hydrocephalus. Punctate focus of hyperdensity in the right temporal lobe (series 2 image 20) in the area of edema. This may represent underlying intracranial metastasis versus punctate hemorrhage and continued follow-up is recommended. Neurosurgery consulted who recommend ICU admission for 24hrs for closer monitoring.    Plan:  #Punctate hyperdensity - intracranial mets vs hemorrhage  #Lung cancer, previously on Tagrisso  #Afib on Xarelto  #Ambulatory dysfunction  #H/o HTN, HLD, BPH, Hypothyroidism    ==========Neuro:=========  #Punctate Hyperdensity  CTH: Multiple new sites (since Oct 24') of edema in the left frontal, parietal lobes and bilateral temporal lobe suspicious for intracranial metastases. Largest area of edema is in the left frontal parietal lobes. There is local mass effect without herniation or hydrocephalus. Punctate focus of hyperdensity in the right temporal lobe (series 2 image 20) in the area of edema. This may represent underlying intracranial metastasis versus punctate hemorrhage a  s/p Decadron 10mg (3/16)  Repeat CTH (6hrs): no changes   MRI Head: Multiple bilateral supratentorial and infratentorial metastatic lesions with moderate to severe associated vasogenic edema.  MRI Cervical: Multilevel cord impingement   MRI Thoracic: Negative   - c/w Q2h Neurochecks,   - c/w Decadron 4mg q6hrs (Taper for 2 weeks as per Heme/onc)  - Keep systolic BP < 150mmhg  - Neuro Dr. Gillis following   - NeuroSx at [facility] following - Dr. Pringle, recs appreciated  - Heme/Onc Dr. Drummond following     #Ambulatory dysfunction s/p fall  No head trauma or LOC from recent fall (3/11/25)  Pt complaining of trouble walking and pain in the left buttocks/hip  CT pelvis negative for acute fxr  possibly has loss of balance from brain mets and edema vs orthostatic hypotension  - PT -> Home PT  - C/w Tylenol for pain control    ==========Cardiovascular:==========  #Afib on Xarelto  Pt has a history of Afib, takes Xarelto and metoprolol succinate, Digoxin at home  - c/w metoprolol, digoxin  - Hold Xarelto in setting of brain mets vs hemorrhage    #HTN  Pt has a history of HTN, takes metoprolol 12.5mg qD at home  - c/w Metoprolol succi 12.5mg QD    #Hypotension  takes Midodrine 5mg TID  - c/w home med with parameter    ==========Pulmonary:==========   #Lung CA  on Erlotinib 100mg QD  - hold home meds    ==========Infectious Diseases:=========  #No active issues    ==========Gastrointestinal:==========  # No active  issues    ==========Renal:==========  #No active issues    ==========Heme/onc:==========   #Lung CA (Diagnosed 2 years ago per pt)  Pt takes  Erlotinib 100mg QD  Follows oupt Heme/Onc Doris Martinez  CTH shows possible brain mets  MRI Head: Multiple bilateral supratentorial and infratentorial metastatic lesions with moderate to severe associated vasogenic edema.  MRI Cervical: Multilevel cord impingement   MRI Thoracic: Negative   - c/w Erlotinib 100mg QD  - Heme/Onc Dr. Drummond following     ==========Endo:===========   #Hypothyroidism  Pt has a history of hypothyroidism, takes Levothyroxine at home  - C/w Levothyroxine     =-==========#BPH============  Pt has a history of BPH, takes tamsulosin 0.4mg and finasteride 5mg at home   - C/w Tamsulosin and Finasteride     ============Skin/ catheter:=============   #Peripheral lines    =============Prophylaxis:=============   #SCDs , no chemical DVT ppx in setting of brain mets/hemorrhage    ===========Goals of Care:===========   FULL CODE per patient who is AOX4 baseline    Dispo: Transfer to ICU     89y male (from home, uses walker at baseline) with PMHx of lung cancer previously on Tagrisso, HLD, HTN, BPH, hypothyroidism, A-fib on Xarelto, presents for ambulatory dysfunction since a fall 1 week ago (no head trauma). Adm to medicine for ambulatory dysfunction. CTH shows Multiple new sites of edema in the left frontal, parietal lobes and bilateral temporal lobe suspicious for intracranial metastases. Largest area of edema is in the left frontal parietal lobes. There is local mass effect without herniation or hydrocephalus. Punctate focus of hyperdensity in the right temporal lobe (series 2 image 20) in the area of edema. This may represent underlying intracranial metastasis versus punctate hemorrhage and continued follow-up is recommended. Neurosurgery consulted who recommend ICU admission for 24hrs for closer monitoring.    Plan:  #Punctate hyperdensity - intracranial mets vs hemorrhage  #Lung cancer, previously on Tagrisso  #Afib on Xarelto  #Ambulatory dysfunction  #H/o HTN, HLD, BPH, Hypothyroidism    ==========Neuro:=========  - AAOX 4 (person, place, time situation), waxes and wane     #Punctate Hyperdensity  CTH: Multiple new sites (since Oct 24') of edema in the left frontal, parietal lobes and bilateral temporal lobe suspicious for intracranial metastases. Largest area of edema is in the left frontal parietal lobes. There is local mass effect without herniation or hydrocephalus. Punctate focus of hyperdensity in the right temporal lobe (series 2 image 20) in the area of edema. This may represent underlying intracranial metastasis versus punctate hemorrhage a  Repeat CTH (6hrs): no changes   MRI Head: Multiple bilateral supratentorial and infratentorial metastatic lesions with moderate to severe associated vasogenic edema.  MRI Cervical: Multilevel cord impingement   MRI Thoracic: Negative   - Neurocheck Q4hrs   - c/w Decadron 4mg q6hrs (Taper for 2 weeks as per Heme/onc)  - Keep systolic BP < 150mmhg  - Neuro Dr. Gillis following   - [facility]Sx at [facility] following - Dr. Pringle, recs appreciated  - Heme/Onc Dr. Drummond following     #Ambulatory dysfunction s/p fall  No head trauma or LOC from recent fall (3/11/25)  Pt complaining of trouble walking and pain in the left buttocks/hip  CT pelvis negative for acute fxr  possibly has loss of balance from brain mets and edema vs orthostatic hypotension  - PT -> Home PT  - C/w Tylenol for pain control    ==========Cardiovascular:==========  #Afib on Xarelto  Pt has a history of Afib, takes Xarelto and metoprolol succinate, Digoxin at home  - Hold Xarelto in setting of brain mets vs hemorrhage  - Hold metoprolol suc, Digoxin in the setting of bradycardia    #HTN  Pt has a history of HTN, takes metoprolol 12.5mg qD at home  - Hold home med     #Hypotension  takes Midodrine 5mg TID  - c/w home med with parameter    ==========Pulmonary:==========   #Lung CA  on Erlotinib 100mg QD  - c/w home med     ==========Infectious Diseases:=========  #No active issues    ==========Gastrointestinal:==========  # No active  issues    ==========Renal:==========  #No active issues    ==========Heme/onc:==========   #Lung CA (Diagnosed 2 years ago per pt)  Pt takes  Erlotinib 100mg QD  Follows oupt Heme/Onc Doris Martinez  CTH shows possible brain mets  MRI Head: Multiple bilateral supratentorial and infratentorial metastatic lesions with moderate to severe associated vasogenic edema.  MRI Cervical: Multilevel cord impingement   MRI Thoracic: Negative   - c/w Erlotinib 100mg QD  - Heme/Onc Dr. Drummond following     ==========Endo:===========   #Hypothyroidism  Pt has a history of hypothyroidism, takes Levothyroxine at home  - C/w Levothyroxine     =-==========#BPH============  Pt has a history of BPH, takes tamsulosin 0.4mg and finasteride 5mg at home   - C/w Tamsulosin and Finasteride     ============Skin/ catheter:=============   #Peripheral lines    =============Prophylaxis:=============   #SCDs , no chemical DVT ppx in setting of brain mets/hemorrhage    ===========Goals of Care:===========   COMFORT CARE: No escalation or deescalation of care    Dispo: Transfer to ICU

## 2025-03-19 NOTE — PROGRESS NOTE ADULT - SUBJECTIVE AND OBJECTIVE BOX
Patient previously unknown to me. Mr. 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 He had an MRI performed with numerous brain lesions noted with mod-severe vasogenic edema, currently on dexamethasone 4mg q 6hrs.     SUBJECTIVE / OVERNIGHT EVENTS:  Patient seen at bedside, reports not sleeping well last night so a bit tired. Reports that he still feels somewhat unstable on his feet.   ADDITIONAL REVIEW OF SYSTEMS:    MEDICATIONS  (STANDING):  brimonidine 0.2% Ophthalmic Solution 1 Drop(s) Both EYES every 12 hours  dexAMETHasone  Injectable 4 milliGRAM(s) IV Push every 6 hours  erlotinib 100 milliGRAM(s) Oral daily  finasteride 5 milliGRAM(s) Oral daily  insulin lispro (ADMELOG) corrective regimen sliding scale   SubCutaneous three times a day before meals  insulin lispro (ADMELOG) corrective regimen sliding scale   SubCutaneous at bedtime  levothyroxine 25 MICROGram(s) Oral daily  midodrine. 5 milliGRAM(s) Oral three times a day  pantoprazole    Tablet 40 milliGRAM(s) Oral before breakfast  polyethylene glycol 3350 17 Gram(s) Oral daily  senna 1 Tablet(s) Oral daily  tamsulosin 0.4 milliGRAM(s) Oral at bedtime  timolol 0.5% Solution 1 Drop(s) Both EYES every 12 hours    MEDICATIONS  (PRN):  acetaminophen     Tablet .. 650 milliGRAM(s) Oral every 6 hours PRN Temp greater or equal to 38C (100.4F), Mild Pain (1 - 3)  albuterol    90 MICROgram(s) HFA Inhaler 2 Puff(s) Inhalation every 6 hours PRN Bronchospasm  melatonin 3 milliGRAM(s) Oral at bedtime PRN Insomnia    CAPILLARY BLOOD GLUCOSE      POCT Blood Glucose.: 138 mg/dL (19 Mar 2025 06:32)    I&O's Summary    18 Mar 2025 07:01  -  19 Mar 2025 07:00  --------------------------------------------------------  IN: 600 mL / OUT: 1310 mL / NET: -710 mL        PHYSICAL EXAM:  Vital Signs Last 24 Hrs  T(C): 36.1 (19 Mar 2025 15:27), Max: 36.1 (19 Mar 2025 15:27)  T(F): 97 (19 Mar 2025 15:27), Max: 97 (19 Mar 2025 15:27)  HR: 57 (19 Mar 2025 18:00) (34 - 76)  BP: 123/56 (19 Mar 2025 18:00) (76/51 - 149/80)  BP(mean): 77 (19 Mar 2025 18:00) (53 - 102)  RR: 23 (19 Mar 2025 18:00) (14 - 24)  SpO2: 97% (19 Mar 2025 18:00) (95% - 100%)    Parameters below as of 19 Mar 2025 12:00  Patient On (Oxygen Delivery Method): room air      CONSTITUTIONAL: Elderly, alert and oriented. NAD   ENMT: Moist oral mucosa, no pharyngeal injection or exudates; normal dentition  RESPIRATORY: Normal respiratory effort; lungs are clear to auscultation bilaterally  CARDIOVASCULAR: Regular rate and rhythm, normal S1 and S2, no murmur/  ABDOMEN: Nontender to palpation, normoactive bowel sounds, no rebound/guarding; No hepatosplenomegaly  PSYCH: A+O to person, place, and time; affect appropriate  NEUROLOGY: CN 2-12 are intact and symmetric; no gross sensory deficits   SKIN: No rashes; no palpable lesions    LABS:                        12.2   9.16  )-----------( 131      ( 19 Mar 2025 03:30 )             35.8     03-19    138  |  105  |  38[H]  ----------------------------<  161[H]  4.8   |  26  |  1.21    Ca    9.0      19 Mar 2025 03:30  Phos  3.2     03-19  Mg     2.0     03-19    TPro  6.0  /  Alb  3.0[L]  /  TBili  1.1  /  DBili  x   /  AST  51[H]  /  ALT  63[H]  /  AlkPhos  99  03-19          Urinalysis Basic - ( 19 Mar 2025 03:30 )    Color: x / Appearance: x / SG: x / pH: x  Gluc: 161 mg/dL / Ketone: x  / Bili: x / Urobili: x   Blood: x / Protein: x / Nitrite: x   Leuk Esterase: x / RBC: x / WBC x   Sq Epi: x / Non Sq Epi: x / Bacteria: x            RADIOLOGY & ADDITIONAL TESTS:  Imaging from Last 24 Hours:  < from: MR Head w/wo IV Cont (03.17.25 @ 18:10) >  FINDINGS:  Multiple supratentorial and infratentorial bilateral ring-enhancing   lesions. Severe vasogenic edema throughout the left frontal lobe, left   parietal lobe. Mild vasogenic edema in the right caudate head. Moderate   vasogenic edema in the anterior left temporal lobe and posterior right   temporal occipital lobe. Mild vasogenic edema in the right cerebellum.   Largest ring-enhancing lesion left frontal lobe measures 5.5 mm. Largest   lesion in the left parietal lobe measures 5.4 mm. Right caudate head   lesion measures 7.1 mm. Left temporal lobe evaluation measures 1.3 cm.   Right temporal occipital lobe lesion measures 1.4 cm. Right cerebellar   lesion measures 5.7 mm. Additional bilateral smaller metastatic lesions.   There is no abnormal restricted diffusion to suggest acute infarction.    Normal T2 flow-voids are seen within  the intracranial vasculature. The   lateral ventricles and cortical sulci are age-appropriate in size and   configuration. There is no extra-axial fluid collection. There is no   susceptibility artifact to suggest hemorrhage. Midline structures are   normal.  The visualized paranasal sinuses, mastoid air cells and orbits   are unremarkable.      ==============    CLINICAL HISTORY: suspect metastasis per CT head    COMPARISON: Broad-based ridging causing mild bilateral foraminal   narrowing. No spinal canal stenosis.    TECHNIQUE: Cervical spine MRI: Multiplanar, multisequence MR images of   the cervical spine are obtained with and without the administration of   7.5 cc intravenous Gadavist contrast. 0 cc of contrast was discarded.    FINDINGS:  Unremarkable heterogeneous T1 marrow signal. No cord signal abnormality.   No abnormal enhancement to suggest osseous metastasis. Severe disc   desiccation at C2/C3 C5/C6 levels. Moderate disc desiccation at C7/T1 and   T1/T2 levels. No bone marrow edema. There is no evidence for acute   fracture. A normal lordosis is noted. Craniocervical junction is normal.   The cervicovertebral body heights and remaining intervertebral disc   spaces are preserved. There is no prevertebral soft tissue abnormality.      Evaluation of the individual levels:  C2/C3 level: Broad-based ridging causing mild bilateral foraminal   narrowing. No spinal canal stenosis.  C3/C4 level: Broad-based disc disc complex causing severe right-sided   foraminal narrowing. Mild left-sided foraminal narrowing. Mild cord   impingement. Mild spinal canal stenosis. No cord signal abnormality.  C4/C5 level: Broad-based ridging causing mild to moderate cord   compression. Severe bilateral foraminal narrowing. Mild to moderate   spinal canal stenosis.  C5/C6 level: Broad-based ridging causing severe right-sided foraminal   narrowing. Moderate left-sided foraminal narrowing. Mild flattening of   the cord. No spinal canal stenosis.  C6/C7 level: Broad-based diffuse complex causing moderate bilateral   foraminal narrowing. No spinal canal stenosis.  C7/T1 level:  No spinal canal stenosis or foraminal narrowing.      ==============      IMPRESSION:    MRI BRAIN: Multiple bilateral supratentorial and infratentorial   metastatic lesions with moderate to severe associated vasogenic edema.    MRI CERVICAL SPINE: No evidence of osseous metastasis. Multilevel cord   impingement. No cord signal abnormality. No abnormal enhancement.    < end of copied text >  < from: MR Thoracic Spine w/wo IV Cont (03.17.25 @ 18:09) >  FINDINGS:   No prior similar studies are available for review.    Thoracic vertebral alignment is preserved.  Thoracic vertebral body   heights are maintained.  Marrow signal intensity within thoracic   vertebral bodies and posterior elements is unremarkable with the   exception of scattered hemangiomas the largest in T12..  There is no   abnormal vertebral or paraspinal enhancement.  No osseous expansion,   epidural disease orparaspinal abnormality is found.    Thoracic intervertebral discs maintain intact disc heights and signal   intensity.  No central canal or foraminal compromise is recognized.    The thoracic cord maintains intact morphology.  Thoracic cord signal   intensity is intact.  No abnormal enhancement occurs within the canal.    Small BILATERAL pleural effusions are noted.      IMPRESSION:  Unremarkable MR of the thoracic spine.    < end of copied text >  < from: CT Pelvis Bony Only No Cont (03.16.25 @ 15:42) >  FINDINGS:    No acute fracture or dislocation. Mild bilateral hip arthrosis. Chronic   compression deformity of L5 with discogenic degenerative disease at   L5/S1. Mild degenerative changes of the sacroiliac joints and pubic   symphysis. There is chondrocalcinosis of the pubic symphysis. No fluid   collection or hematoma. There are vascular calcifications. Imaged pelvic   viscera is grossly unremarkable.    IMPRESSION:    No acute fracture.    < end of copied text >    Electrocardiogram/QTc Interval:    COORDINATION OF CARE:  Care Discussed with Consultants/Other Providers:

## 2025-03-19 NOTE — CHART NOTE - NSCHARTNOTEFT_GEN_A_CORE
89 yo male (from home, uses walker at baseline) with PMHx of lung cancer on Erlotinib, HLD, HTN, BPH, hypothyroidism, A-fib on Xarelto, presents for ambulatory dysfunction since a fall 1 week ago (no head trauma). In the ED, Labs showed Hgb 10.9 ( baseline 12).  CT Head showed multiple new sites of edema in the left frontal, parietal lobes and bilateral temporal lobe suspicious for intracranial metastases. Largest area of edema is in the left frontal parietal lobes. Local mass effect without herniation or hydrocephalus. Punctate focus of hyperdensity in the right temporal lobe in the area of edema. This may represent intracranial metastasis versus punctate hemorrhage, In the ED, he was given Dexamethasone 10mg. Admitted to medicine for ambulatory dysfunction. Neurosurgery and Neurologist consulted and recommended ICU admission for 24hrs for closer monitoring.    In the ICU,  he remained  hemodynamically stable, saturating 100% on ambient air.  Started on Dexamethasone 4mg Q6hrs. Labs remained unremarkable. Neurological examination showed right pupil fixed, not reactive to light and accomodation. Repeat CT Head showed no changes, MRI Head showed  bilateral supratentorial and infratentorial metastatic lesions with moderate to severe associated vasogenic edema. MRI Cervical showed  multilevel cord impingement. MRI Thoracic was  negative. Neurologist Dr. Gillis and Heme/Onc Dr. Drummond are following his care. Physical therapist evaluated and recommended home physical therapy.     Patient is stable for downgrade to floor under care of Dr. Garcia  for further management , covering resident NP Antonia was informed.      Things to follow  - Taper the Dexamethasone 4mg for 2 weeks as per Heme/Onc rec   - Follow-up Neurologist rec  - Follow up with Heme/onc rec 89 yo male (from home, uses walker at baseline) with PMHx of lung cancer on Erlotinib, HLD, HTN, BPH, hypothyroidism, A-fib on Xarelto, presents for ambulatory dysfunction since a fall 1 week ago (no head trauma). In the ED, Labs showed Hgb 10.9 ( baseline 12).  CT Head showed multiple new sites of edema in the left frontal, parietal lobes and bilateral temporal lobe suspicious for intracranial metastases. Largest area of edema is in the left frontal parietal lobes. Local mass effect without herniation or hydrocephalus. Punctate focus of hyperdensity in the right temporal lobe in the area of edema. This may represent intracranial metastasis versus punctate hemorrhage, In the ED, he was given Dexamethasone 10mg. Admitted to medicine for ambulatory dysfunction. Neurosurgery and Neurologist consulted and recommended ICU admission for 24hrs for closer monitoring.    In the ICU,  he remained  hemodynamically stable, saturating 100% on ambient air.  Started on Dexamethasone 4mg Q6hrs. Labs remained unremarkable. Neurological examination showed right pupil fixed, not reactive to light and accomodation. Repeat CT Head showed no changes, MRI Head showed  bilateral supratentorial and infratentorial metastatic lesions with moderate to severe associated vasogenic edema. MRI Cervical showed  multilevel cord impingement. MRI Thoracic was  negative. Neurologist Dr. Gillis and Heme/Onc Dr. Drummond are following his care. Physical therapist evaluated and recommended home physical therapy.     Patient is stable for downgrade to floor under care of Dr. Garcia  for further management , covering resident NP Lizett Knight was informed.      Things to follow  - Taper the Dexamethasone 4mg for 2 weeks as per Heme/Onc rec   - Follow-up Neurologist rec  - Follow up with Heme/onc rec 89 yo male (from home, uses walker at baseline) with PMHx of lung cancer on Erlotinib, HLD, HTN, BPH, hypothyroidism, A-fib on Xarelto, presents for ambulatory dysfunction since a fall 1 week ago (no head trauma). In the ED, Labs showed Hgb 10.9 ( baseline 12).  CT Head showed multiple new sites of edema in the left frontal, parietal lobes and bilateral temporal lobe suspicious for intracranial metastases. Largest area of edema is in the left frontal parietal lobes. Local mass effect without herniation or hydrocephalus. Punctate focus of hyperdensity in the right temporal lobe in the area of edema. This may represent intracranial metastasis versus punctate hemorrhage, In the ED, he was given Dexamethasone 10mg. Admitted to medicine for ambulatory dysfunction. Neurosurgery and Neurologist consulted and recommended ICU admission for 24hrs for closer monitoring.    In the ICU,  he remained  hemodynamically stable, saturating 100% on ambient air.  Started on Dexamethasone 4mg Q6hrs. Labs remained unremarkable. Neurological examination showed right pupil fixed, not reactive to light and accomodation. Repeat CT Head showed no changes, MRI Head showed  bilateral supratentorial and infratentorial metastatic lesions with moderate to severe associated vasogenic edema. MRI Cervical showed  multilevel cord impingement. MRI Thoracic was  negative. Neurologist Dr. Gillis and Heme/Onc Dr. Drummond are following his care. Physical therapist evaluated and recommended home physical therapy. Palliative NP Al following with the management of patient. Patient is DNR/DNI COMFORT MEASURE ( No lab draws, no escalation or deescalation of care).    Patient is stable for downgrade to floor under care of Dr. Garcia  for further management , covering resident NP Lizett Knight was informed.      Things to follow  - Taper the Dexamethasone 4mg for 2 weeks as per Heme/Onc rec   - Follow-up Neurologist rec  - Follow up with Heme/onc rec  - Follow up with palliative rec 89 yo male (from home, uses walker at baseline) with PMHx of lung cancer on Erlotinib, HLD, HTN, BPH, hypothyroidism, A-fib on Xarelto, presents for ambulatory dysfunction since a fall 1 week ago (no head trauma). In the ED, Labs showed Hgb 10.9 ( baseline 12).  CT Head showed multiple new sites of edema in the left frontal, parietal lobes and bilateral temporal lobe suspicious for intracranial metastases. Largest area of edema is in the left frontal parietal lobes. Local mass effect without herniation or hydrocephalus. Punctate focus of hyperdensity in the right temporal lobe in the area of edema. This may represent intracranial metastasis versus punctate hemorrhage, In the ED, he was given Dexamethasone 10mg. Admitted to medicine for ambulatory dysfunction. Neurosurgery and Neurologist consulted and recommended ICU admission for 24hrs for closer monitoring.    In the ICU,  he remained  hemodynamically stable, saturating 100% on ambient air.  Started on Dexamethasone 4mg Q6hrs. Labs remained unremarkable. Neurological examination showed right pupil fixed, not reactive to light and accomodation. Repeat CT Head showed no changes, MRI Head showed  bilateral supratentorial and infratentorial metastatic lesions with moderate to severe associated vasogenic edema. MRI Cervical showed  multilevel cord impingement. MRI Thoracic was  negative. Neurologist Dr. Gillis and Heme/Onc Dr. Drummond are following his care. Physical therapist evaluated and recommended home physical therapy. Palliative NP Al following with the management of patient. Patient is DNR/DNI COMFORT MEASURE (no lab draws, no escalation or deescalation of care).    Patient is stable for downgrade to floor under care of Dr. Garcia  for further management , covering resident NP Lizett Knight was informed.      Things to follow  - Taper the Dexamethasone 4mg for 2 weeks as per Heme/Onc rec   - Follow-up Neurologist rec  - Follow up with Heme/onc rec  - Follow up with palliative rec

## 2025-03-19 NOTE — CONSULT NOTE ADULT - CONVERSATION DETAILS
Met with the pt, his HCP Luly and ICU Dr. Strong,  introduced service and  Palliative medicines  team's role in assisting w complex decision making, communication and support.  Discussed his oncology history, current clinical condition and goals of care.  Discussed  the role of HCP.  Pt identified his HHA Luly Kohli as his primary HCP and his friend Earl his secondary HCP.  Pt mentation was noted to be waxing and waning, tangential in his speech.  It was clear pt lacks insight as to his clinical condition.  Pt's son Lul Alonso was added via phone to particip;ate in conversation because he lives in [place].    Discussed pt remains at high risk for further complications/decline given advanced malignancy with new brain mets.  Discussed risks/benefits of LST such as CPR/ intubation, artificial nutrition/PEG in the context of advanced malignancy, w debility.   ALEIDA drafted; DNR/DNI/no feeding tube/trial of IVF/DNH. No labs.  HCP and pt's son Lul are in agreement for LTC with hospice. SW referral made. Met with the pt, his HCP Luly and ICU Dr. Strong,  introduced service and  Palliative medicines  team's role in assisting w complex decision making, communication and support.  Discussed his oncology history, current clinical condition and goals of care.  Discussed  the role of HCP.  Pt identified his HHA Luly Kohli as his primary HCP and his friend Earl his secondary HCP.  Pt mentation was noted to be waxing and waning, tangential in his speech.  It was clear pt lacks insight as to his clinical condition.  Pt's son Lul Alonso was added via phone to participate in conversation because he lives in [place].    Discussed pt remains at high risk for further complications/decline given advanced malignancy with new brain mets. Given pt's poor prognosis XRT or continued Tarceva unlikely to improve pt  survival or quality of life in a meaningful way. Prognosis likely days to weeks.  Pt is appropriate for hospice.     Family and HCP was made aware  that pt meets Medicare criteria and is eligible to receive hospice services. Explained hospice as a Medicare benefit designed to assist families with an additional layer of care with the goal of providing best symptom management and QOL at home for the duration of ones life. Reviewed hospice philosophy and services. HCP and pt's son expressed pt lives alone and there would be no one available to support him at home.    Discussed risks/benefits of LST such as CPR/ intubation, artificial nutrition/PEG in the context of advanced malignancy, w debility.   ALEIDA drafted; DNR/DNI/no feeding tube/trial of IVF/DNH. No labs.  HCP and pt's son Lul are in agreement for LTC with hospice. SW referral made.  All questions answered.  Support provided.   d/w Hem/onc

## 2025-03-19 NOTE — CONSULT NOTE ADULT - PROBLEM SELECTOR RECOMMENDATION 3
Hx falls. Increased weakness.  Requires extensive assist with ADLs  Supportive care  OOB to chair as tolerated  Freq positioning    Comfort only

## 2025-03-19 NOTE — PROGRESS NOTE ADULT - ATTENDING COMMENTS
89 yr old man  (from home, uses walker at baseline) with lung cancer previously on Tagrisso, HLD, HTN, BPH, hypothyroidism, A-fib on Xarelto, presents for ambulatory dysfunction since a fall 1 week ago (no head trauma). Adm to medicine for ambulatory dysfunction. CTH shows Multiple new sites of edema in the left frontal, parietal lobes and bilateral temporal lobe suspicious for intracranial metastases. Largest area of edema is in the left frontal parietal lobes. There is local mass effect without herniation or hydrocephalus. Punctate focus of hyperdensity in the right temporal lobe (series 2 image 20) in the area of edema. This may represent underlying intracranial metastasis versus punctate hemorrhage and continued follow-up is recommended. Neurosurgery consulted who recommend ICU admission for 24hrs for closer monitoring.    ASSESSMENT   - Brain mets with edema   - Metastatic Lung ca   - HTN HLD   - CAD Afib   - Ambulatory dysfunction  - HTN, HLD, BPH, Hypothyroidism      Plan   - No sedation   - Monitor neuro status   - Bart MRI  multiple metastatic lesion with edema   - CT head repeated , no change   - Decadron   - Onco eval : continue Erlotinib - pt will ask aide to bring meds from home.  - Hemodynamic monitoring   - Diet   - No antibx   - No anticoag   - Fall precaution  - Palliative care consult  - Transfer out of ICU 89 yr old man  (from home, uses walker at baseline) with lung cancer previously on Tagrisso, HLD, HTN, BPH, hypothyroidism, A-fib on Xarelto, presents for ambulatory dysfunction since a fall 1 week ago (no head trauma). Adm to medicine for ambulatory dysfunction. CTH shows Multiple new sites of edema in the left frontal, parietal lobes and bilateral temporal lobe suspicious for intracranial metastases. Largest area of edema is in the left frontal parietal lobes. There is local mass effect without herniation or hydrocephalus. Punctate focus of hyperdensity in the right temporal lobe (series 2 image 20) in the area of edema. This may represent underlying intracranial metastasis versus punctate hemorrhage and continued follow-up is recommended. Neurosurgery consulted who recommend ICU admission for 24hrs for closer monitoring.    ASSESSMENT   - Brain mets with edema   - Metastatic Lung ca   - HTN HLD   - CAD Afib   - HTN, HLD, BPH, Hypothyroidism      Plan   - No sedation   - Monitor neuro status   - Bart MRI  multiple metastatic lesion with edema   - CT head repeated , no change   - Decadron   - Onco eval : continue Erlotinib - pt will ask aide to bring meds from home.  - Hemodynamic monitoring   - Diet   - No antibx   - No anticoag   - Fall precaution  - Palliative care consult  - Transfer out of ICU

## 2025-03-19 NOTE — PROGRESS NOTE ADULT - ASSESSMENT
Patient previously unknown to me. Mr. 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 He had an MRI performed with numerous brain lesions noted with mod-severe vasogenic edema, currently on dexamethasone 4mg q 6hrs.     #metastatic NSCLC EGFR mutated on 3L erlotinib  #brain mets     - we had reviewed his case at our daily meeting today, suspect that efficacy of erlotinib along to treat brain mets would take on the order of weeks to months if it has efficacy   - we had initially recommended consideration for SRS vs WBRT. However, after our discussion with palliative care team late in the afternoon, we will revisit the discussion with pt's primary oncologist tomorrow. As per palliative care's discussion with family and additionally with the designated HCP, they had agreed that Long term care with Hospice would be most suitable.    - would recommend continuing dexamethasone 4mg every 6hours at this time and would continue with PPI  - we will follow along    Please feel free to reach out with any questions or concerns

## 2025-03-20 ENCOUNTER — TRANSCRIPTION ENCOUNTER (OUTPATIENT)
Age: 89
End: 2025-03-20

## 2025-03-20 DIAGNOSIS — C34.90 MALIGNANT NEOPLASM OF UNSPECIFIED PART OF UNSPECIFIED BRONCHUS OR LUNG: ICD-10-CM

## 2025-03-20 DIAGNOSIS — Z75.8 OTHER PROBLEMS RELATED TO MEDICAL FACILITIES AND OTHER HEALTH CARE: ICD-10-CM

## 2025-03-20 DIAGNOSIS — D63.8 ANEMIA IN OTHER CHRONIC DISEASES CLASSIFIED ELSEWHERE: ICD-10-CM

## 2025-03-20 DIAGNOSIS — C79.31 SECONDARY MALIGNANT NEOPLASM OF BRAIN: ICD-10-CM

## 2025-03-20 DIAGNOSIS — G93.9 DISORDER OF BRAIN, UNSPECIFIED: ICD-10-CM

## 2025-03-20 DIAGNOSIS — I95.9 HYPOTENSION, UNSPECIFIED: ICD-10-CM

## 2025-03-20 LAB
GLUCOSE BLDC GLUCOMTR-MCNC: 129 MG/DL — HIGH (ref 70–99)
GLUCOSE BLDC GLUCOMTR-MCNC: 161 MG/DL — HIGH (ref 70–99)
GLUCOSE BLDC GLUCOMTR-MCNC: 191 MG/DL — HIGH (ref 70–99)
GLUCOSE BLDC GLUCOMTR-MCNC: 195 MG/DL — HIGH (ref 70–99)

## 2025-03-20 RX ADMIN — ERLOTINIB 100 MILLIGRAM(S): 150 TABLET, FILM COATED ORAL at 12:34

## 2025-03-20 RX ADMIN — TIMOLOL MALEATE 1 DROP(S): 6.8 SOLUTION OPHTHALMIC at 06:34

## 2025-03-20 RX ADMIN — Medication 1 TABLET(S): at 12:26

## 2025-03-20 RX ADMIN — TAMSULOSIN HYDROCHLORIDE 0.4 MILLIGRAM(S): 0.4 CAPSULE ORAL at 22:42

## 2025-03-20 RX ADMIN — DEXAMETHASONE 4 MILLIGRAM(S): 0.5 TABLET ORAL at 18:04

## 2025-03-20 RX ADMIN — INSULIN LISPRO 1: 100 INJECTION, SOLUTION INTRAVENOUS; SUBCUTANEOUS at 12:22

## 2025-03-20 RX ADMIN — MIDODRINE HYDROCHLORIDE 5 MILLIGRAM(S): 5 TABLET ORAL at 12:23

## 2025-03-20 RX ADMIN — BRIMONIDINE TARTRATE 1 DROP(S): 1.5 SOLUTION/ DROPS OPHTHALMIC at 06:34

## 2025-03-20 RX ADMIN — BRIMONIDINE TARTRATE 1 DROP(S): 1.5 SOLUTION/ DROPS OPHTHALMIC at 18:01

## 2025-03-20 RX ADMIN — FINASTERIDE 5 MILLIGRAM(S): 1 TABLET, FILM COATED ORAL at 12:25

## 2025-03-20 RX ADMIN — POLYETHYLENE GLYCOL 3350 17 GRAM(S): 17 POWDER, FOR SOLUTION ORAL at 12:25

## 2025-03-20 RX ADMIN — TIMOLOL MALEATE 1 DROP(S): 6.8 SOLUTION OPHTHALMIC at 18:01

## 2025-03-20 RX ADMIN — DEXAMETHASONE 4 MILLIGRAM(S): 0.5 TABLET ORAL at 12:26

## 2025-03-20 RX ADMIN — Medication 40 MILLIGRAM(S): at 06:32

## 2025-03-20 RX ADMIN — Medication 25 MICROGRAM(S): at 05:18

## 2025-03-20 RX ADMIN — DEXAMETHASONE 4 MILLIGRAM(S): 0.5 TABLET ORAL at 00:12

## 2025-03-20 RX ADMIN — MIDODRINE HYDROCHLORIDE 5 MILLIGRAM(S): 5 TABLET ORAL at 18:02

## 2025-03-20 RX ADMIN — DEXAMETHASONE 4 MILLIGRAM(S): 0.5 TABLET ORAL at 05:18

## 2025-03-20 NOTE — PROGRESS NOTE ADULT - PROBLEM SELECTOR PLAN 2
-Lung CA (Diagnosed 2 years ago per pt) and pt takes  Erlotinib 100mg QD  -Follows oupt Heme/Onc Doris Martinez  -CT and MR result above  -c/w Erlotinib 100mg QD and Decadron per heme/onc  -Heme/Onc Dr. Drummond following   -Palliative following, comfort care, hospice appropriate.

## 2025-03-20 NOTE — PROGRESS NOTE ADULT - ASSESSMENT
89 yo male (from home, uses walker at baseline) with PMHx of lung cancer on Erlotinib, HLD, HTN, BPH, hypothyroidism, A-fib on Xarelto, presents for ambulatory dysfunction since a fall 1 week ago (no head trauma). In the ED, Labs showed Hgb 10.9 ( baseline 12).  CT Head showed multiple new sites of edema in the left frontal, parietal lobes and bilateral temporal lobe suspicious for intracranial metastases. Largest area of edema is in the left frontal parietal lobes. Local mass effect without herniation or hydrocephalus. Punctate focus of hyperdensity in the right temporal lobe in the area of edema. This may represent intracranial metastasis versus punctate hemorrhage, In the ED, he was given Dexamethasone 10mg. Admitted to medicine for ambulatory dysfunction. Neurosurgery and Neurologist consulted and recommended ICU admission for 24hrs for closer monitoring.    In the ICU,  he remained  hemodynamically stable, saturating 100% on ambient air.  Started on Dexamethasone 4mg Q6hrs. Labs remained unremarkable. Neurological examination showed right pupil fixed, not reactive to light and accomodation. Repeat CT Head showed no changes, MRI Head showed  bilateral supratentorial and infratentorial metastatic lesions with moderate to severe associated vasogenic edema. MRI Cervical showed  multilevel cord impingement. MRI Thoracic was  negative. Neurologist Dr. Gillis and Heme/Onc Dr. Drummond are following his care. Physical therapist evaluated and recommended home physical therapy. Palliative NP Al following with the management of patient. Patient is DNR/DNI COMFORT MEASURE (no lab draws, no escalation or deescalation of care).    HemOnc recommending continuing Dexamethasone 4mg q6h and PPI, as patient for LTC with hospice.   89 yo male (from home, uses walker at baseline) with PMHx of lung cancer on Erlotinib, HLD, HTN, BPH, hypothyroidism, A-fib on Xarelto, presents for ambulatory dysfunction since a fall 1 week ago (no head trauma). In the ED, Labs showed Hgb 10.9 ( baseline 12).  CT Head showed multiple new sites of edema in the left frontal, parietal lobes and bilateral temporal lobe suspicious for intracranial metastases. Largest area of edema is in the left frontal parietal lobes. Local mass effect without herniation or hydrocephalus. Punctate focus of hyperdensity in the right temporal lobe in the area of edema. This may represent intracranial metastasis versus punctate hemorrhage.  In the ED, he was given Dexamethasone 10mg.     Patient is admitted to medicine for ambulatory dysfunction and brain edema on CT. Neurosurgery and Neurologist consulted and recommended ICU admission for 24hrs for closer monitoring.    In the ICU,  he remained  hemodynamically stable, saturating 100% on ambient air.  Started on Dexamethasone 4mg Q6hrs. Labs remained unremarkable. Neurological examination showed right pupil fixed, not reactive to light and accomodation. Repeat CT Head showed no changes, MRI Head showed  bilateral supratentorial and infratentorial metastatic lesions with moderate to severe associated vasogenic edema. MRI Cervical showed  multilevel cord impingement. MRI Thoracic was  negative. Neurologist Dr. Gillis and Heme/Onc Dr. Drummond are following his care.   Physical therapist evaluated and recommended SB (3/20). Palliative NP Al following with the management of patient.   Patient is DNR/DNI COMFORT MEASURE (no lab draws, no escalation or deescalation of care).    HemOnc recommending continuing Dexamethasone 4mg q6h and PPI, as patient for LTC with hospice.

## 2025-03-20 NOTE — PROGRESS NOTE ADULT - SUBJECTIVE AND OBJECTIVE BOX
This is 89 year old male with a history of lung cancer, A-fib, DM, HTN, Hypothyroidism,  HLD, Lumbar radiculopathy who was admitted after he developed mobility disfunction following a fall   one week ago. Patient denied head injury.  Musculo-skeletal imagings failed to show any acute fractures.  CT of brain showed picture compatible with brain metastases.  Patient was transferred to ICU for observation.  Neurology, Neurosurgery and heme-onc. services were summoned.  Patient is alert, oriented.  MRI of brain- brain metastases.  Oncology consult is appreciated.  Patient remains stable.  No new events over night.  Heme-onc and palliative care inputs are appreciated.    Vital Signs Last 24 Hrs  T(C): 36.7 (20 Mar 2025 12:41), Max: 36.7 (20 Mar 2025 12:41)  T(F): 98 (20 Mar 2025 12:41), Max: 98 (20 Mar 2025 12:41)  HR: 57 (20 Mar 2025 12:41) (54 - 66)  BP: 100/55 (20 Mar 2025 12:41) (91/47 - 141/74)  BP(mean): 74 (20 Mar 2025 11:00) (62 - 76)  RR: 19 (20 Mar 2025 12:41) (19 - 20)  SpO2: 98% (20 Mar 2025 12:41) (95% - 99%)    Parameters below as of 20 Mar 2025 12:41  Patient On (Oxygen Delivery Method): room air      T(C): 36.7 (03-20-25 @ 12:41), Max: 36.7 (03-20-25 @ 12:41)  HR: 57 (03-20-25 @ 12:41) (54 - 66)  BP: 100/55 (03-20-25 @ 12:41) (91/47 - 141/74)  RR: 19 (03-20-25 @ 12:41) (19 - 20)  SpO2: 98% (03-20-25 @ 12:41) (95% - 99%)    CONSTITUTIONAL: Well groomed, no apparent distress  EYES: PERRLA and symmetric, EOMI, No conjunctival or scleral injection, non-icteric  ENMT: Oral mucosa with moist membranes. Normal dentition; no pharyngeal injection or exudates             NECK: Supple, symmetric and without tracheal deviation   RESP: No respiratory distress, no use of accessory muscles; CTA b/l, no WRR  CV: RRR, +S1S2, no MRG; no JVD; no peripheral edema  GI: Soft, NT, ND, no rebound, no guarding; no palpable masses; no hepatosplenomegaly; no hernia palpated  LYMPH: No cervical LAD or tenderness; no axillary LAD or tenderness; no inguinal LAD or tenderness  MSK: Normal gait; No digital clubbing or cyanosis; examination of the (head/neck/spine/ribs/pelvis, RUE, LUE, RLE, LLE) without misalignment,            Normal ROM without pain, no spinal tenderness, normal muscle strength/tone  SKIN: No rashes or ulcers noted; no subcutaneous nodules or induration palpable  NEURO: CN II-XII intact; normal reflexes in upper and lower extremities, sensation intact in upper and lower extremities b/l to light touch   PSYCH: Appropriate insight/judgment; A+O x 3, mood and affect appropriate, recent/remote memory intact      CBC Full  -  ( 19 Mar 2025 03:30 )  WBC Count : 9.16 K/uL  RBC Count : 3.70 M/uL  Hemoglobin : 12.2 g/dL  Hematocrit : 35.8 %  Platelet Count - Automated : 131 K/uL  Mean Cell Volume : 96.8 fl  Mean Cell Hemoglobin : 33.0 pg  Mean Cell Hemoglobin Concentration : 34.1 g/dL  Auto Neutrophil # : x  Auto Lymphocyte # : x  Auto Monocyte # : x  Auto Eosinophil # : x  Auto Basophil # : x  Auto Neutrophil % : x  Auto Lymphocyte % : x  Auto Monocyte % : x  Auto Eosinophil % : x  Auto Basophil % : x      03-19    138  |  105  |  38[H]  ----------------------------<  161[H]  4.8   |  26  |  1.21    Ca    9.0      19 Mar 2025 03:30  Phos  3.2     03-19  Mg     2.0     03-19    TPro  6.0  /  Alb  3.0[L]  /  TBili  1.1  /  DBili  x   /  AST  51[H]  /  ALT  63[H]  /  AlkPhos  99  03-19      MEDICATIONS  (STANDING):  brimonidine 0.2% Ophthalmic Solution 1 Drop(s) Both EYES every 12 hours  dexAMETHasone  Injectable 4 milliGRAM(s) IV Push every 6 hours  erlotinib 100 milliGRAM(s) Oral daily  finasteride 5 milliGRAM(s) Oral daily  insulin lispro (ADMELOG) corrective regimen sliding scale   SubCutaneous three times a day before meals  insulin lispro (ADMELOG) corrective regimen sliding scale   SubCutaneous at bedtime  levothyroxine 25 MICROGram(s) Oral daily  midodrine. 5 milliGRAM(s) Oral three times a day  pantoprazole    Tablet 40 milliGRAM(s) Oral before breakfast  polyethylene glycol 3350 17 Gram(s) Oral daily  senna 1 Tablet(s) Oral daily  tamsulosin 0.4 milliGRAM(s) Oral at bedtime  timolol 0.5% Solution 1 Drop(s) Both EYES every 12 hours

## 2025-03-20 NOTE — DISCHARGE NOTE PROVIDER - PROVIDER TOKENS
PROVIDER:[TOKEN:[[id number]]] PROVIDER:[TOKEN:[[id number]],FOLLOWUP:[Routine]],FREE:[LAST:[facility MD at Long term [facility]],PHONE:[(   )    -],FAX:[(   )    -],FOLLOWUP:[1-3 days]]

## 2025-03-20 NOTE — PROGRESS NOTE ADULT - ASSESSMENT
89 year old male with a history of lung cancer, A-fib, DM, HTN,   HLD, Hypothyroidism, Lumbar radiculopathy was admitted due to inability to walk  following a fall one week PTA.  Patient was found to have brain metastases.  Patient was transferred to ICU for close monitoring.  Neurology consult, Neurosurgery consult, Heme-onc. consult.  MRI of brain - metastases  MRI C-spine- no mets  Oncology consult is appreciated.  Continue Dexamethasone.  Palliative care consult.  Patient was downgraded to the regular floor.  Oncology and Palliative consults are appreciated.  Continue Dexamethsone.  Will transfer to long-term care with hospice.  Case was discussed with house staff.

## 2025-03-20 NOTE — PROGRESS NOTE ADULT - ASSESSMENT
Patient previously unknown to me. Mr. 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 He had an MRI performed with numerous brain lesions noted with mod-severe vasogenic edema, currently on dexamethasone 4mg q 6hrs.     #metastatic NSCLC EGFR mutated on 3L erlotinib  #brain mets     - we had discussed his case at our daily meeting, appreciate palliative care input on the matter. Agree with long term care with hospice.   - would recommend continuing dexamethasone 4mg every 6hours with PPI. Suspect that without steroids, vasogenic edema will likely recur and cause more neurological issues. If patient is hospice appropriate, would continue indefinitely.   - we will sign off at this time.    Please feel free to reach out with any questions or concerns

## 2025-03-20 NOTE — PROGRESS NOTE ADULT - PROBLEM SELECTOR PLAN 4
-Pt has a history of Afib, takes Xarelto and metoprolol succinate 12.5mg qd, Digoxin at home  - Hold Xarelto in setting of brain mets vs hemorrhage  - Hold metoprolol succinate, Digoxin in the setting of bradycardia  - comfort care only

## 2025-03-20 NOTE — DISCHARGE NOTE PROVIDER - HOSPITAL COURSE
89 yo male (from home, uses walker at baseline) with PMHx of lung cancer on Erlotinib, HLD, HTN, BPH, hypothyroidism, A-fib on Xarelto, presents for ambulatory dysfunction since a fall 1 week ago (no head trauma). In the ED, Labs showed Hgb 10.9 ( baseline 12).  CT Head showed multiple new sites of edema in the left frontal, parietal lobes and bilateral temporal lobe suspicious for intracranial metastases. Largest area of edema is in the left frontal parietal lobes. Local mass effect without herniation or hydrocephalus. Punctate focus of hyperdensity in the right temporal lobe in the area of edema. This may represent intracranial metastasis versus punctate hemorrhage, In the ED, he was given Dexamethasone 10mg. Admitted to medicine for ambulatory dysfunction. Neurosurgery and Neurologist consulted and recommended ICU admission for 24hrs for closer monitoring.    In the ICU,  he remained  hemodynamically stable, saturating 100% on ambient air.  Started on Dexamethasone 4mg Q6hrs. Labs remained unremarkable. Neurological examination showed right pupil fixed, not reactive to light and accomodation. Repeat CT Head showed no changes, MRI Head showed  bilateral supratentorial and infratentorial metastatic lesions with moderate to severe associated vasogenic edema. MRI Cervical showed  multilevel cord impingement. MRI Thoracic was  negative. Neurologist Dr. Gillis and Heme/Onc Dr. Drummond are following his care. Physical therapist evaluated and recommended home physical therapy. Palliative NP Al following with the management of patient. Patient is DNR/DNI COMFORT MEASURE (no lab draws, no escalation or deescalation of care).    HemOnc recommending continuing Dexamethasone 4mg q6h and PPI, as patient for LTC with hospice.  Patient seen and examined at bedside, discussed with medical attending. Patient medically cleared for discharge to [id number].           89 yo male (from home, uses walker at baseline) with PMHx of lung cancer on Erlotinib, HLD, HTN, BPH, hypothyroidism, A-fib on Xarelto, presents for ambulatory dysfunction since a fall 1 week ago (no head trauma). In the ED, Labs showed Hgb 10.9 ( baseline 12).  CT Head showed multiple new sites of edema in the left frontal, parietal lobes and bilateral temporal lobe suspicious for intracranial metastases. Largest area of edema is in the left frontal parietal lobes. Local mass effect without herniation or hydrocephalus. Punctate focus of hyperdensity in the right temporal lobe in the area of edema. This may represent intracranial metastasis versus punctate hemorrhage, In the ED, he was given Dexamethasone 10mg.   Patient is admitted to medicine for ambulatory dysfunction. Neurosurgery and Neurologist consulted and recommended ICU admission for 24hrs for closer monitoring.    In the ICU,  he remained  hemodynamically stable, saturating 100% on ambient air.  Started on Dexamethasone 4mg Q6hrs. Labs remained unremarkable. Neurological examination showed right pupil fixed, not reactive to light and accomodation. Repeat CT Head showed no changes, MRI Head showed  bilateral supratentorial and infratentorial metastatic lesions with moderate to severe associated vasogenic edema. MRI Cervical showed  multilevel cord impingement. MRI Thoracic was  negative. Neurologist and Heme/Onc are following as well as palliative. Pt is eligible for hospice.  PT recs home PT, unable to get 24hr home service.  Palliative following for GOC. Hospice appropriate.   Family/HCP spoke with hospice social worker. They decided LTC without hospice plan, with the plan to enroll in hospice if the patient deteriorates.        incomplete 3/27   89 yo male (from home, uses walker at baseline) with PMHx of lung cancer on Erlotinib, HLD, HTN, BPH, hypothyroidism, A-fib on Xarelto, presents for ambulatory dysfunction since a fall 1 week ago (no head trauma). In the ED, Labs showed Hgb 10.9 ( baseline 12).  CT Head showed multiple new sites of edema in the left frontal, parietal lobes and bilateral temporal lobe suspicious for intracranial metastases. Largest area of edema is in the left frontal parietal lobes. Local mass effect without herniation or hydrocephalus. Punctate focus of hyperdensity in the right temporal lobe in the area of edema. This may represent intracranial metastasis versus punctate hemorrhage, In the ED, he was given Dexamethasone 10mg.   Patient is admitted to medicine for ambulatory dysfunction. Neurosurgery and Neurologist consulted and recommended ICU admission for 24hrs for closer monitoring.    In the ICU,  he remained  hemodynamically stable, saturating 100% on ambient air.  Started on Dexamethasone 4mg Q6hrs. Labs remained unremarkable. Neurological examination showed right pupil fixed, not reactive to light and accomodation. Repeat CT Head showed no changes, MRI Head showed  bilateral supratentorial and infratentorial metastatic lesions with moderate to severe associated vasogenic edema. MRI Cervical showed  multilevel cord impingement. MRI Thoracic was  negative.     Heme/Onc reccs: would recommend continuing dexamethasone 4mg every 6hours with PPI. Suspect that without steroids, vasogenic edema will likely recur and cause more neurological issues. If patient is hospice appropriate, would continue indefinitely. HCP in agreement with heme/onc plans.     Palliative reccs: pt is eligible for hospice.  PT recs home PT, unable to get 24hr home service.    Family/HCP spoke with hospice social worker. They decided LTC without hospice plan, with the plan to enroll in hospice if the patient deteriorates.    Per [facility], pt. accepted to [facility], [facility] within [facility].    Case discussed with attending, pt. medically stable with d/c to LT[facility], located [facility].

## 2025-03-20 NOTE — PROGRESS NOTE ADULT - PROBLEM SELECTOR PLAN 8
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   -Hgb 10.9 on admission, now 12.2 (3/19)  -Comfort care only  -no further labs.

## 2025-03-20 NOTE — PROGRESS NOTE ADULT - SUBJECTIVE AND OBJECTIVE BOX
NP Note discussed with  Primary Attending    INTERVAL HPI/OVERNIGHT EVENTS: no new complaints    MEDICATIONS  (STANDING):  brimonidine 0.2% Ophthalmic Solution 1 Drop(s) Both EYES every 12 hours  dexAMETHasone  Injectable 4 milliGRAM(s) IV Push every 6 hours  erlotinib 100 milliGRAM(s) Oral daily  finasteride 5 milliGRAM(s) Oral daily  insulin lispro (ADMELOG) corrective regimen sliding scale   SubCutaneous three times a day before meals  insulin lispro (ADMELOG) corrective regimen sliding scale   SubCutaneous at bedtime  levothyroxine 25 MICROGram(s) Oral daily  midodrine. 5 milliGRAM(s) Oral three times a day  pantoprazole    Tablet 40 milliGRAM(s) Oral before breakfast  polyethylene glycol 3350 17 Gram(s) Oral daily  senna 1 Tablet(s) Oral daily  tamsulosin 0.4 milliGRAM(s) Oral at bedtime  timolol 0.5% Solution 1 Drop(s) Both EYES every 12 hours    MEDICATIONS  (PRN):  acetaminophen     Tablet .. 650 milliGRAM(s) Oral every 6 hours PRN Temp greater or equal to 38C (100.4F), Mild Pain (1 - 3)  albuterol    90 MICROgram(s) HFA Inhaler 2 Puff(s) Inhalation every 6 hours PRN Bronchospasm  aluminum hydroxide/magnesium hydroxide/simethicone Suspension 30 milliLiter(s) Oral every 4 hours PRN Dyspepsia  melatonin 3 milliGRAM(s) Oral at bedtime PRN Insomnia      __________________________________________________  REVIEW OF SYSTEMS:    CONSTITUTIONAL: No fever,   EYES: no acute visual disturbances  NECK: No pain or stiffness  RESPIRATORY: No cough; No shortness of breath  CARDIOVASCULAR: No chest pain, no palpitations  GASTROINTESTINAL: No pain. No nausea or vomiting; No diarrhea   NEUROLOGICAL: No headache or numbness, no tremors  MUSCULOSKELETAL: No joint pain, no muscle pain  GENITOURINARY: no dysuria, no frequency, no hesitancy  PSYCHIATRY: no depression , no anxiety  ALL OTHER  ROS negative        Vital Signs Last 24 Hrs  T(C): 36.7 (20 Mar 2025 12:41), Max: 36.7 (20 Mar 2025 12:41)  T(F): 98 (20 Mar 2025 12:41), Max: 98 (20 Mar 2025 12:41)  HR: 57 (20 Mar 2025 12:41) (54 - 70)  BP: 100/55 (20 Mar 2025 12:41) (91/47 - 141/74)  BP(mean): 74 (20 Mar 2025 11:00) (62 - 93)  RR: 19 (20 Mar 2025 12:41) (15 - 23)  SpO2: 98% (20 Mar 2025 12:41) (95% - 99%)    Parameters below as of 20 Mar 2025 12:41  Patient On (Oxygen Delivery Method): room air        ________________________________________________  PHYSICAL EXAM:  GENERAL: NAD  HEENT: Normocephalic;  conjunctivae and sclerae clear; moist mucous membranes;   NECK : supple  CHEST/LUNG: Clear to auscultation bilaterally with good air entry   HEART: S1 S2  regular; no murmurs, gallops or rubs  ABDOMEN: Soft, Nontender, Nondistended; Bowel sounds present  EXTREMITIES: no cyanosis; no edema; no calf tenderness  SKIN: warm and dry; no rash  NERVOUS SYSTEM:  Awake and alert; Oriented  to place, person and time ; no new deficits    _________________________________________________  LABS:                        12.2   9.16  )-----------( 131      ( 19 Mar 2025 03:30 )             35.8     03-19    138  |  105  |  38[H]  ----------------------------<  161[H]  4.8   |  26  |  1.21    Ca    9.0      19 Mar 2025 03:30  Phos  3.2     03-19  Mg     2.0     03-19    TPro  6.0  /  Alb  3.0[L]  /  TBili  1.1  /  DBili  x   /  AST  51[H]  /  ALT  63[H]  /  AlkPhos  99  03-19      Urinalysis Basic - ( 19 Mar 2025 03:30 )    Color: x / Appearance: x / SG: x / pH: x  Gluc: 161 mg/dL / Ketone: x  / Bili: x / Urobili: x   Blood: x / Protein: x / Nitrite: x   Leuk Esterase: x / RBC: x / WBC x   Sq Epi: x / Non Sq Epi: x / Bacteria: x      CAPILLARY BLOOD GLUCOSE  POCT Blood Glucose.: 161 mg/dL (20 Mar 2025 11:38)  POCT Blood Glucose.: 129 mg/dL (20 Mar 2025 08:11)  POCT Blood Glucose.: 166 mg/dL (19 Mar 2025 21:54)  POCT Blood Glucose.: 165 mg/dL (19 Mar 2025 16:37)    RADIOLOGY & ADDITIONAL TESTS:    Imaging  Reviewed:  YES  < from: CT Head No Cont (03.16.25 @ 23:44) >    ACC: [id number] EXAM:  CT BRAIN   ORDERED BY: BILL SANFORD     PROCEDURE DATE:  03/16/2025          INTERPRETATION:  CLINICAL INFORMATION: interval eval brain edema and   possible hemorrhage , lung cancer    COMPARISON: 03/16/2025 at 5:26 PM.    CONTRAST:  IV Contrast: NONE  .    FINDINGS:    The brain demonstrates unchanged vasogenic edema within the LEFT frontal   and parietal lobes with punctate hemorrhage or calcification seen within   a anterior LEFT frontal gyrus. Mild vasogenic edema in the LEFT temporal   and RIGHT occipital lobes with associated punctate hemorrhage or   calcification. No acute cerebral cortical infarct is seen. No mass effect   is found in the brain.    The ventricles, sulci and basal cisterns appear unremarkable.    The orbits are unremarkable.  The paranasal sinuses are clear.  The nasal   cavity appears intact.  The nasopharynx is symmetric.  The central skull   base, petrous temporal bones and calvarium remain intact.      IMPRESSION:   Unchanged vasogenic edema within the LEFT frontal and   parietal lobes with punctate calcifications seen within a anterior LEFT   frontal gyrus.  Mild vasogenic edema in the LEFT temporal and RIGHT   occipital lobes with associated punctate hemorrhage or calcification.   MRI with gadolinium recommended for complete evaluation.    --- End of Report ---            STEPHANIE REED MD; Attending Radiologist  This document has been electronically signed. Mar 17 2025  6:49AM    < end of copied text >  < from: MR Head w/wo IV Cont (03.17.25 @ 18:10) >    ACC: [id number] EXAM:  MR SPINE CERVICAL WAW IC   ORDERED BY: MONICA ZENDEJAS     ACC: [id number] EXAM:  MR BRAIN WAW IC   ORDERED BY: ED WINN     PROCEDURE DATE:  03/17/2025          INTERPRETATION:  CLINICAL INDICATIONS: suspect metastasisper CT head    COMPARISON: Head CT dated 3/16/2025. Head CT dated 3/11/2025    TECHNIQUE: MRI brain: Multiplanar, multisequence MR imaging of the brain   are obtained with and without the administration of 7.5 cc intravenous   Gadavist contrast. 0 ccof contrast was discarded.    FINDINGS:  Multiple supratentorial and infratentorial bilateral ring-enhancing   lesions. Severe vasogenic edema throughout the left frontal lobe, left   parietal lobe. Mild vasogenic edema in the right caudate head. Moderate   vasogenic edema in the anterior left temporal lobe and posterior right   temporal occipital lobe. Mild vasogenic edema in the right cerebellum.   Largest ring-enhancing lesion left frontal lobe measures 5.5 mm. Largest   lesion in the left parietal lobe measures 5.4 mm. Right caudate head   lesion measures 7.1 mm. Left temporal lobe evaluation measures 1.3 cm.   Right temporal occipital lobe lesion measures 1.4 cm. Right cerebellar   lesion measures 5.7 mm. Additional bilateral smaller metastatic lesions.   There is no abnormal restricted diffusion to suggest acute infarction.    Normal T2 flow-voids are seen within  the intracranial vasculature. The   lateral ventricles and cortical sulci are age-appropriate in size and   configuration. There is no extra-axial fluid collection. There is no   susceptibility artifact to suggest hemorrhage. Midline structures are   normal.  The visualized paranasal sinuses, mastoid air cells and orbits   are unremarkable.      ==============    CLINICAL HISTORY: suspect metastasis per CT head    COMPARISON: Broad-based ridging causing mild bilateral foraminal   narrowing. No spinal canal stenosis.    TECHNIQUE: Cervical spine MRI: Multiplanar, multisequence MR images of   the cervical spine are obtained with and without the administration of   7.5 cc intravenous Gadavist contrast. 0 cc of contrast was discarded.    FINDINGS:  Unremarkable heterogeneous T1 marrow signal. No cord signal abnormality.   No abnormal enhancement to suggest osseous metastasis. Severe disc   desiccation at C2/C3 C5/C6 levels. Moderate disc desiccation at C7/T1 and   T1/T2 levels. No bone marrow edema. There is no evidence for acute   fracture. A normal lordosis is noted. Craniocervical junction is normal.   The cervicovertebral body heights and remaining intervertebral disc   spaces are preserved. There is no prevertebral soft tissue abnormality.      Evaluation of the individual levels:  C2/C3 level: Broad-based ridging causing mild bilateral foraminal   narrowing. No spinal canal stenosis.  C3/C4 level: Broad-based disc disc complex causing severe right-sided   foraminal narrowing. Mild left-sided foraminal narrowing. Mild cord   impingement. Mild spinal canal stenosis. No cord signal abnormality.  C4/C5 level: Broad-based ridging causing mild to moderate cord   compression. Severe bilateral foraminal narrowing. Mild to moderate   spinal canal stenosis.  C5/C6 level: Broad-based ridging causing severe right-sided foraminal   narrowing. Moderate left-sided foraminal narrowing. Mild flattening of   the cord. No spinal canal stenosis.  C6/C7 level: Broad-based diffuse complex causing moderate bilateral   foraminal narrowing. No spinal canal stenosis.  C7/T1 level:  No spinal canal stenosis or foraminal narrowing.  ==============  IMPRESSION:    MRI BRAIN: Multiple bilateral supratentorial and infratentorial   metastatic lesions with moderate to severe associated vasogenic edema.    MRI CERVICAL SPINE: No evidence of osseous metastasis. Multilevel cord   impingement. No cord signal abnormality. No abnormal enhancement.    --- End of Report ---  JIMENEZ ROLLINS MD; Attending Radiologist  This document has been electronically signed. Mar 17 2025  8:26PM    < end of copied text >  < from: MR Thoracic Spine w/wo IV Cont (03.17.25 @ 18:09) >    ACC: [id number] EXAM:  MR SPINE THORACIC WAW IC   ORDERED BY: MONICA ZENDEJAS     PROCEDURE DATE:  03/17/2025          INTERPRETATION:  MR thoracic with and without gadolinium    CLINICAL INDICATION:  Structural abnormalities  Thoracic spinal stenosis.    TECHNIQUE:   Sagittal and axial T1-weighted images, sagittal STIR images,    and sagittal and axial T2-weighted images of the thoracic spine were   obtained.   Following 7.5 cc of Gadavist administration intravenously/ 0   cc discarded , sagittal and axial T1-weighted fat-saturated images were   obtained.    FINDINGS:   No prior similar studies are available for review.    Thoracic vertebral alignment is preserved.  Thoracic vertebral body   heights are maintained.  Marrow signal intensity within thoracic   vertebral bodies and posterior elements is unremarkable with the   exception of scattered hemangiomas the largest in T12..  There is no   abnormal vertebral or paraspinal enhancement.  No osseous expansion,   epidural disease orparaspinal abnormality is found.    Thoracic intervertebral discs maintain intact disc heights and signal   intensity.  No central canal or foraminal compromise is recognized.    The thoracic cord maintains intact morphology.  Thoracic cord signal   intensity is intact.  No abnormal enhancement occurs within the canal.    Small BILATERAL pleural effusions are noted.      IMPRESSION:  Unremarkable MR of the thoracic spine.    --- End of Report ---            STEPHANIE REED MD; Attending Radiologist  This document has been electronically signed. Mar 18 2025  7:59AM    < end of copied text >    Consultant(s) Notes Reviewed:   YES      Plan of care was discussed with patient and /or primary care giver; all questions and concerns were addressed  NP Note discussed with  Primary Attending    INTERVAL HPI/OVERNIGHT EVENTS: no overnight acute events, Pt is alert and awake, not able to concentrate on his clinical condition. Able to answer to only Y/N simple questions.     MEDICATIONS  (STANDING):  brimonidine 0.2% Ophthalmic Solution 1 Drop(s) Both EYES every 12 hours  dexAMETHasone  Injectable 4 milliGRAM(s) IV Push every 6 hours  erlotinib 100 milliGRAM(s) Oral daily  finasteride 5 milliGRAM(s) Oral daily  insulin lispro (ADMELOG) corrective regimen sliding scale   SubCutaneous three times a day before meals  insulin lispro (ADMELOG) corrective regimen sliding scale   SubCutaneous at bedtime  levothyroxine 25 MICROGram(s) Oral daily  midodrine. 5 milliGRAM(s) Oral three times a day  pantoprazole    Tablet 40 milliGRAM(s) Oral before breakfast  polyethylene glycol 3350 17 Gram(s) Oral daily  senna 1 Tablet(s) Oral daily  tamsulosin 0.4 milliGRAM(s) Oral at bedtime  timolol 0.5% Solution 1 Drop(s) Both EYES every 12 hours    MEDICATIONS  (PRN):  acetaminophen     Tablet .. 650 milliGRAM(s) Oral every 6 hours PRN Temp greater or equal to 38C (100.4F), Mild Pain (1 - 3)  albuterol    90 MICROgram(s) HFA Inhaler 2 Puff(s) Inhalation every 6 hours PRN Bronchospasm  aluminum hydroxide/magnesium hydroxide/simethicone Suspension 30 milliLiter(s) Oral every 4 hours PRN Dyspepsia  melatonin 3 milliGRAM(s) Oral at bedtime PRN Insomnia      __________________________________________________  REVIEW OF SYSTEMS:    CONSTITUTIONAL: No fever,   EYES: no acute visual disturbances  NECK: No pain or stiffness  RESPIRATORY: No cough; No shortness of breath  CARDIOVASCULAR: No chest pain, no palpitations  GASTROINTESTINAL: No pain. No nausea or vomiting; No diarrhea   NEUROLOGICAL: No headache or numbness, no tremors  MUSCULOSKELETAL: No joint pain, no muscle pain  GENITOURINARY: no dysuria, no frequency, no hesitancy  PSYCHIATRY: no depression , no anxiety  ALL OTHER  ROS negative        Vital Signs Last 24 Hrs  T(C): 36.7 (20 Mar 2025 12:41), Max: 36.7 (20 Mar 2025 12:41)  T(F): 98 (20 Mar 2025 12:41), Max: 98 (20 Mar 2025 12:41)  HR: 57 (20 Mar 2025 12:41) (54 - 70)  BP: 100/55 (20 Mar 2025 12:41) (91/47 - 141/74)  BP(mean): 74 (20 Mar 2025 11:00) (62 - 93)  RR: 19 (20 Mar 2025 12:41) (15 - 23)  SpO2: 98% (20 Mar 2025 12:41) (95% - 99%)    Parameters below as of 20 Mar 2025 12:41  Patient On (Oxygen Delivery Method): room air        ________________________________________________  PHYSICAL EXAM:  GENERAL: NAD  HEENT: Normocephalic;  conjunctivae and sclerae clear; moist mucous membranes;   NECK : supple  CHEST/LUNG: Clear to auscultation bilaterally with fair air entry   HEART: S1 S2  regular; no murmurs, gallops or rubs  ABDOMEN: Soft, Nontender, Nondistended; Bowel sounds present  EXTREMITIES: no cyanosis; no edema; no calf tenderness  SKIN: warm and dry; no rash  NERVOUS SYSTEM:  Awake and alert; Oriented  to self and place ; no new deficits    _________________________________________________  LABS:                        12.2   9.16  )-----------( 131      ( 19 Mar 2025 03:30 )             35.8     03-19    138  |  105  |  38[H]  ----------------------------<  161[H]  4.8   |  26  |  1.21    Ca    9.0      19 Mar 2025 03:30  Phos  3.2     03-19  Mg     2.0     03-19    TPro  6.0  /  Alb  3.0[L]  /  TBili  1.1  /  DBili  x   /  AST  51[H]  /  ALT  63[H]  /  AlkPhos  99  03-19      Urinalysis Basic - ( 19 Mar 2025 03:30 )    Color: x / Appearance: x / SG: x / pH: x  Gluc: 161 mg/dL / Ketone: x  / Bili: x / Urobili: x   Blood: x / Protein: x / Nitrite: x   Leuk Esterase: x / RBC: x / WBC x   Sq Epi: x / Non Sq Epi: x / Bacteria: x      CAPILLARY BLOOD GLUCOSE  POCT Blood Glucose.: 161 mg/dL (20 Mar 2025 11:38)  POCT Blood Glucose.: 129 mg/dL (20 Mar 2025 08:11)  POCT Blood Glucose.: 166 mg/dL (19 Mar 2025 21:54)  POCT Blood Glucose.: 165 mg/dL (19 Mar 2025 16:37)    RADIOLOGY & ADDITIONAL TESTS:    Imaging  Reviewed:  YES  < from: CT Head No Cont (03.16.25 @ 23:44) >    ACC: [id number] EXAM:  CT BRAIN   ORDERED BY: BILL SANFORD     PROCEDURE DATE:  03/16/2025          INTERPRETATION:  CLINICAL INFORMATION: interval eval brain edema and   possible hemorrhage , lung cancer    COMPARISON: 03/16/2025 at 5:26 PM.    CONTRAST:  IV Contrast: NONE  .    FINDINGS:    The brain demonstrates unchanged vasogenic edema within the LEFT frontal   and parietal lobes with punctate hemorrhage or calcification seen within   a anterior LEFT frontal gyrus. Mild vasogenic edema in the LEFT temporal   and RIGHT occipital lobes with associated punctate hemorrhage or   calcification. No acute cerebral cortical infarct is seen. No mass effect   is found in the brain.    The ventricles, sulci and basal cisterns appear unremarkable.    The orbits are unremarkable.  The paranasal sinuses are clear.  The nasal   cavity appears intact.  The nasopharynx is symmetric.  The central skull   base, petrous temporal bones and calvarium remain intact.      IMPRESSION:   Unchanged vasogenic edema within the LEFT frontal and   parietal lobes with punctate calcifications seen within a anterior LEFT   frontal gyrus.  Mild vasogenic edema in the LEFT temporal and RIGHT   occipital lobes with associated punctate hemorrhage or calcification.   MRI with gadolinium recommended for complete evaluation.    --- End of Report ---            STEPHANIE REED MD; Attending Radiologist  This document has been electronically signed. Mar 17 2025  6:49AM    < end of copied text >  < from: MR Head w/wo IV Cont (03.17.25 @ 18:10) >    ACC: [id number] EXAM:  MR SPINE CERVICAL WAW IC   ORDERED BY: MONICA ZENDEJAS     ACC: [id number] EXAM:  MR BRAIN WAW IC   ORDERED BY: ED WINN     PROCEDURE DATE:  03/17/2025          INTERPRETATION:  CLINICAL INDICATIONS: suspect metastasisper CT head    COMPARISON: Head CT dated 3/16/2025. Head CT dated 3/11/2025    TECHNIQUE: MRI brain: Multiplanar, multisequence MR imaging of the brain   are obtained with and without the administration of 7.5 cc intravenous   Gadavist contrast. 0 ccof contrast was discarded.    FINDINGS:  Multiple supratentorial and infratentorial bilateral ring-enhancing   lesions. Severe vasogenic edema throughout the left frontal lobe, left   parietal lobe. Mild vasogenic edema in the right caudate head. Moderate   vasogenic edema in the anterior left temporal lobe and posterior right   temporal occipital lobe. Mild vasogenic edema in the right cerebellum.   Largest ring-enhancing lesion left frontal lobe measures 5.5 mm. Largest   lesion in the left parietal lobe measures 5.4 mm. Right caudate head   lesion measures 7.1 mm. Left temporal lobe evaluation measures 1.3 cm.   Right temporal occipital lobe lesion measures 1.4 cm. Right cerebellar   lesion measures 5.7 mm. Additional bilateral smaller metastatic lesions.   There is no abnormal restricted diffusion to suggest acute infarction.    Normal T2 flow-voids are seen within  the intracranial vasculature. The   lateral ventricles and cortical sulci are age-appropriate in size and   configuration. There is no extra-axial fluid collection. There is no   susceptibility artifact to suggest hemorrhage. Midline structures are   normal.  The visualized paranasal sinuses, mastoid air cells and orbits   are unremarkable.      ==============    CLINICAL HISTORY: suspect metastasis per CT head    COMPARISON: Broad-based ridging causing mild bilateral foraminal   narrowing. No spinal canal stenosis.    TECHNIQUE: Cervical spine MRI: Multiplanar, multisequence MR images of   the cervical spine are obtained with and without the administration of   7.5 cc intravenous Gadavist contrast. 0 cc of contrast was discarded.    FINDINGS:  Unremarkable heterogeneous T1 marrow signal. No cord signal abnormality.   No abnormal enhancement to suggest osseous metastasis. Severe disc   desiccation at C2/C3 C5/C6 levels. Moderate disc desiccation at C7/T1 and   T1/T2 levels. No bone marrow edema. There is no evidence for acute   fracture. A normal lordosis is noted. Craniocervical junction is normal.   The cervicovertebral body heights and remaining intervertebral disc   spaces are preserved. There is no prevertebral soft tissue abnormality.      Evaluation of the individual levels:  C2/C3 level: Broad-based ridging causing mild bilateral foraminal   narrowing. No spinal canal stenosis.  C3/C4 level: Broad-based disc disc complex causing severe right-sided   foraminal narrowing. Mild left-sided foraminal narrowing. Mild cord   impingement. Mild spinal canal stenosis. No cord signal abnormality.  C4/C5 level: Broad-based ridging causing mild to moderate cord   compression. Severe bilateral foraminal narrowing. Mild to moderate   spinal canal stenosis.  C5/C6 level: Broad-based ridging causing severe right-sided foraminal   narrowing. Moderate left-sided foraminal narrowing. Mild flattening of   the cord. No spinal canal stenosis.  C6/C7 level: Broad-based diffuse complex causing moderate bilateral   foraminal narrowing. No spinal canal stenosis.  C7/T1 level:  No spinal canal stenosis or foraminal narrowing.  ==============  IMPRESSION:    MRI BRAIN: Multiple bilateral supratentorial and infratentorial   metastatic lesions with moderate to severe associated vasogenic edema.    MRI CERVICAL SPINE: No evidence of osseous metastasis. Multilevel cord   impingement. No cord signal abnormality. No abnormal enhancement.    --- End of Report ---  JIMENEZ ROLLINS MD; Attending Radiologist  This document has been electronically signed. Mar 17 2025  8:26PM    < end of copied text >  < from: MR Thoracic Spine w/wo IV Cont (03.17.25 @ 18:09) >    ACC: [id number] EXAM:  MR SPINE THORACIC WAW IC   ORDERED BY: MONICA ZENDEJAS     PROCEDURE DATE:  03/17/2025          INTERPRETATION:  MR thoracic with and without gadolinium    CLINICAL INDICATION:  Structural abnormalities  Thoracic spinal stenosis.    TECHNIQUE:   Sagittal and axial T1-weighted images, sagittal STIR images,    and sagittal and axial T2-weighted images of the thoracic spine were   obtained.   Following 7.5 cc of Gadavist administration intravenously/ 0   cc discarded , sagittal and axial T1-weighted fat-saturated images were   obtained.    FINDINGS:   No prior similar studies are available for review.    Thoracic vertebral alignment is preserved.  Thoracic vertebral body   heights are maintained.  Marrow signal intensity within thoracic   vertebral bodies and posterior elements is unremarkable with the   exception of scattered hemangiomas the largest in T12..  There is no   abnormal vertebral or paraspinal enhancement.  No osseous expansion,   epidural disease orparaspinal abnormality is found.    Thoracic intervertebral discs maintain intact disc heights and signal   intensity.  No central canal or foraminal compromise is recognized.    The thoracic cord maintains intact morphology.  Thoracic cord signal   intensity is intact.  No abnormal enhancement occurs within the canal.    Small BILATERAL pleural effusions are noted.      IMPRESSION:  Unremarkable MR of the thoracic spine.    --- End of Report ---            STEPHANIE REED MD; Attending Radiologist  This document has been electronically signed. Mar 18 2025  7:59AM    < end of copied text >    Consultant(s) Notes Reviewed:   YES      Plan of care was discussed with patient and /or primary care giver; all questions and concerns were addressed

## 2025-03-20 NOTE — DISCHARGE NOTE PROVIDER - CARE PROVIDERS DIRECT ADDRESSES
[email or web address]

## 2025-03-20 NOTE — PROGRESS NOTE ADULT - PROBLEM SELECTOR PLAN 1
-MRI BRAIN: Multiple bilateral supratentorial and infratentorial metastatic lesions with moderate to severe associated vasogenic edema.  -MRI CERVICAL SPINE: No evidence of osseous metastasis. Multilevel cord impingement. No cord signal abnormality. No abnormal enhancement.  -MR Thoracic Spine w/wo IV Cont: Unremarkable MR of the thoracic spine. ###Brain mets with edema  -CTH: Multiple new sites (since Oct 24') of edema in the left frontal, parietal lobes and bilateral temporal lobe suspicious for intracranial metastases. Largest area of edema is in the left frontal parietal lobes. There is local mass effect without herniation or hydrocephalus. Punctate focus of hyper-density in the right temporal lobe (series 2 image 20) in the area of edema. This may represent underlying intracranial metastasis versus punctate hemorrhage -Repeat CTH (6hrs): no changes   -MRI BRAIN: Multiple bilateral supratentorial and infratentorial metastatic lesions with moderate to severe associated vasogenic edema.  -MRI CERVICAL SPINE: No evidence of osseous metastasis. Multilevel cord impingement. No cord signal abnormality. No abnormal enhancement.  -MR Thoracic Spine w/wo IV Cont: Unremarkable MR of the thoracic spine.  -s/p ICU monitoring x 24hr.   -Started Decadron per heme/oncology    -Keep systolic BP < 150mmhg, on midodrine   -NeuroSx at [facility] following - Dr. Pringle, recs appreciated  -Neurology (dr Gillis) consulted  -Heme/Onc Dr. Drummond following  -c/w Decadron 4mg q6hrs (Taper for 2 weeks as per Heme/onc) with PPI   -Palliative following for GOC, Hospice appropriate, Comfort care/MEWS exempt no labs, further imaging  -DC plan LTC with hospice

## 2025-03-20 NOTE — PROGRESS NOTE ADULT - PROBLEM SELECTOR PLAN 10
-From home, HHA 4h/day  -Comfort care/MEWS exempt  -f/u heme/onc for steroid taper and Erlotinib  -DC plan LTC with hospice, son Lul Alonso. will be coming from [place] over the weekend or Monday.   -SW following for placement.

## 2025-03-20 NOTE — PROGRESS NOTE ADULT - PROBLEM SELECTOR PLAN 3
##Ambulatory dysfunction s/p fall  -No head trauma or LOC from recent fall (3/11/25)  -Pt complaining of trouble walking and pain in the left buttocks/hip  -CT pelvis negative for acute fracture  -likely due to loss of balance from brain mets and edema vs orthostatic hypotension  -PT recs SB, however plan for LTC with hospice  -C/w Tylenol for pain control

## 2025-03-20 NOTE — DISCHARGE NOTE PROVIDER - CARE PROVIDER_API CALL
Weston Garcia  Internal Medicine  [address], [address]  [place] [address]  Phone: [phone]  Fax: [phone]  Follow Up Time:    Weston Garcia  Internal Medicine  [address], [address]  [place] [address]  Phone: [phone]  Fax: [phone]  Follow Up Time: Routine    facility MD at [facility],   Phone: (   )    -  Fax: (   )    -  Follow Up Time: 1-3 days

## 2025-03-20 NOTE — DISCHARGE NOTE PROVIDER - NSDCCPCAREPLAN_GEN_ALL_CORE_FT
PRINCIPAL DISCHARGE DIAGNOSIS  Diagnosis: Brain lesion  Assessment and Plan of Treatment: You were followed by neurologist and heme/oncology for brain lesion/edema in the setting of metastatic lung cancer met to brain  -CTH: Multiple new sites (since Oct 24') of edema in the left frontal, parietal lobes and bilateral temporal lobe suspicious for intracranial metastases. Largest area of edema is in the left frontal parietal lobes. There is local mass effect without herniation or hydrocephalus. Punctate focus of hyper-density in the right temporal lobe (series 2 image 20) in the area of edema. This may represent underlying intracranial metastasis versus punctate hemorrhage -Repeat CTH (6hrs): no changes   -MRI BRAIN: Multiple bilateral supratentorial and infratentorial metastatic lesions with moderate to severe associated vasogenic edema.  -MRI CERVICAL SPINE: No evidence of osseous metastasis. Multilevel cord impingement. No cord signal abnormality. No abnormal enhancement.  -MR Thoracic Spine w/wo IV Cont: Unremarkable MR of the thoracic spine.  You were monitored on ICU x 24 hours. started steroid therapy.   continue Dexamethasone 4mg every 6 hrs with pantoprazole.   You were followed by palliative team, hospice appropriate.   continue follow up care with faclity MD at nursing home.      SECONDARY DISCHARGE DIAGNOSES  Diagnosis: Ambulatory dysfunction  Assessment and Plan of Treatment:    Problem/Plan - 4:  ·  Problem: Ambulatory dysfunction.   ·  Plan: -No head trauma or LOC from recent fall (3/11/25)  -Pt complaining of trouble walking and pain in the left buttocks/hip  -CT pelvis negative for acute fracture  -likely due to loss of balance from brain mets and edema vs orthostatic hypotension  -C/w Tylenol for pain control  -family /HCP wishes LTC w/o hospice with plan hospice near future if pt deteriorates.      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 continue follow up care at nursing facility, heme/oncology upon discharge.    Diagnosis: Atrial fibrillation  Assessment and Plan of Treatment: You take Xarelto and metoprolol succinate 12.5mg qd, Digoxin at home  - Held Xarelto in setting of brain mets vs hemorrhage  - Hed metoprolol succinate, Digoxin in the setting of bradycardia.  Continue medications as prescribed. XXXXXXXX  Atrial fibrillation is the most common heart rhythm problem.  The condition puts you at risk for has stroke and heart attack  It helps if you control your blood pressure, not drink more than 1-2 alcohol drinks per day, cut down on caffeine, getting treatment for over active thyroid gland, and get regular exercise  Call your doctor if you feel your heart racing or beating unusually, chest tightness or pain, lightheaded, faint, shortness of breath especially with exercise      Diagnosis: Hypotension  Assessment and Plan of Treatment: continue home medicaton Midodrine 5mg TID for history of hypotension. Monitor blood pressure at nursing facility.   Low salt diet  Activity as tolerated.  continue follow up care at nursing facility.   Notify your doctor if you have any of the following symptoms:   Dizziness, Lightheadedness, Blurry vision, Headache, Chest pain, Shortness of breath         Diagnosis: Anemia of chronic disease  Assessment and Plan of Treatment: likely with metastatic lung Ca to brain.   -Hgb 10.9 on admission, improved 12.8 and Thrombocytopenia Plt 88K.   Heme/oncology consulted. continue follow up care at nursing facility to monitor blood count.    Diagnosis: Transaminitis  Assessment and Plan of Treatment: You were monitored for elevated liver enzymes. possibly in the setting of Erlotinib, held. You reported no abdominal pain. You tolerated diet well.   Liver enzymes are trended down. Abdominal ultrasound unremarkable. continue follow up care at nursing facility to monitor liver enzymes.        Diagnosis: BPH (benign prostatic hyperplasia)  Assessment and Plan of Treatment: continue home meds tamsulosin 0.4mg and finasteride 5mg   continue follow up care with medical MD at [facility] facility.    Diagnosis: Hypothyroidism  Assessment and Plan of Treatment: continue medication Levothyroxine  as prescribed and follow up with medical MD at [facility] for monitoring thyroid hormone.    Diagnosis: GERD (gastroesophageal reflux disease)  Assessment and Plan of Treatment: continue medication as prescribed with steroid therapy.   continue follow up care with medical MD at nursing facility.   Change the factors that you can control.  Avoid foods and drinks that make your symptoms worse, such as: Caffeine or alcoholic drinks. Chocolate. spicy foods. Citrus fruits tomato-based foods, Fried and fatty foods.  Avoid lying down for the 3 hours after eating .Eat small, frequent meals  Do not wear anything tight around your waist that causes pressure on your stomach. Raise the head of your bed 6 to 8 inches with wood blocks to help you sleep. Do not take aspirin, ibuprofen, or other nonsteroidal anti-inflammatory drugs (NSAIDs).You have pain in your arms, neck, jaw, teeth, or back. Your pain increases or changes in intensity or duration. You develop nausea, vomiting, or sweating (diaphoresis).You develop shortness of breath, or you faint.Your vomit is green, yellow, black, or looks like coffee grounds or blood.Your stool is red, bloody, or black.These symptoms could be signs of other problems, such as heart disease, gastric bleeding, or esophageal bleeding.      Diagnosis: Encephalopathy, unspecified  Assessment and Plan of Treatment: You were initially monitored for change in mental status now returned at baseline. No further intervention is needed.    Diagnosis: Palliative care encounter  Assessment and Plan of Treatment: You were followed by palliative team and home oncology for goals of care discussion in the setting of metastatic lung cancer mets to brain, now complicated by brain lesion/edema.   MOLST drafted; DNR/DNI/no feeding tube/trial of IVF/DNH. No labs.   Hospice appropriate.   HCP and pt's son Lul are in agreement for LTC without hospice.   SW referral made for placement.        PRINCIPAL DISCHARGE DIAGNOSIS  Diagnosis: Brain lesion  Assessment and Plan of Treatment: You were followed by neurologist and heme/oncology for brain lesion/edema in the setting of metastatic lung cancer met to brain  -CTH: Multiple new sites (since Oct 24') of edema in the left frontal, parietal lobes and bilateral temporal lobe suspicious for intracranial metastases. Largest area of edema is in the left frontal parietal lobes. There is local mass effect without herniation or hydrocephalus. Punctate focus of hyper-density in the right temporal lobe (series 2 image 20) in the area of edema. This may represent underlying intracranial metastasis versus punctate hemorrhage -Repeat CTH (6hrs): no changes   -MRI BRAIN: Multiple bilateral supratentorial and infratentorial metastatic lesions with moderate to severe associated vasogenic edema.  -MRI CERVICAL SPINE: No evidence of osseous metastasis. Multilevel cord impingement. No cord signal abnormality. No abnormal enhancement.  -MR Thoracic Spine w/wo IV Cont: Unremarkable MR of the thoracic spine.  You were monitored on ICU x 24 hours. started steroid therapy.   continue Dexamethasone 4mg every 6 hrs with pantoprazole.   You were followed by palliative team, hospice appropriate.   continue follow up care with faclity MD at nursing home.      SECONDARY DISCHARGE DIAGNOSES  Diagnosis: Encephalopathy, unspecified  Assessment and Plan of Treatment: You were initially monitored for change in mental status now returned at baseline. No further intervention is needed.    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 continue follow up care at nursing facility, heme/oncology upon discharge.    Diagnosis: Ambulatory dysfunction  Assessment and Plan of Treatment:    Problem/Plan - 4:  ·  Problem: Ambulatory dysfunction.   ·  Plan: -No head trauma or LOC from recent fall (3/11/25)  -Pt complaining of trouble walking and pain in the left buttocks/hip  -CT pelvis negative for acute fracture  -likely due to loss of balance from brain mets and edema vs orthostatic hypotension  -C/w Tylenol for pain control  -family /HCP wishes LTC w/o hospice with plan hospice near future if pt deteriorates.      Diagnosis: Atrial fibrillation  Assessment and Plan of Treatment: You take Xarelto and metoprolol succinate 12.5mg qd, Digoxin at home  - Hold Xarelto in setting of brain mets vs hemorrhage  - Hold metoprolol succinate, Digoxin in the setting of bradycardia.  Atrial fibrillation is the most common heart rhythm problem.  The condition puts you at risk for has stroke and heart attack  It helps if you control your blood pressure, not drink more than 1-2 alcohol drinks per day, cut down on caffeine, getting treatment for over active thyroid gland, and get regular exercise  Call your doctor if you feel your heart racing or beating unusually, chest tightness or pain, lightheaded, faint, shortness of breath especially with exercise    Diagnosis: Hypotension  Assessment and Plan of Treatment: continue home medicaton Midodrine 5mg TID, hold if your Sbp is greater than 120. Monitor blood pressure at nursing facility.   Low salt diet  Activity as tolerated.  continue follow up care at nursing facility.   Notify your doctor if you have any of the following symptoms:   Dizziness, Lightheadedness, Blurry vision, Headache, Chest pain, Shortness of breath    Diagnosis: BPH (benign prostatic hyperplasia)  Assessment and Plan of Treatment: continue home meds tamsulosin 0.4mg and finasteride 5mg   continue follow up care with medical MD at [facility] facility.  You have an enlarged prostate gland which gets bigger as men get older - it is a very common problem and has nothing to do with prostate cancer  Call your doctor if you are urinating more frequently, have trouble starting to urinate, have weak stream, urine leaking or dribbling, and feeling as though bladder is not empty after urination  Your doctor will monitor your prostate with a rectal exam as well as urine or blood testing  You can help yourself by reducing the amount of fluid you drink before going to bed, limiting the amount of alcohol & caffeine you drink   Avoid cold & allergy medication that contain decongestants or antihistamines which make BPH symptoms worse  You can also "double void" by waiting a moment after urinating & trying again  Take your medication as prescribed - one medication helps to relax the muscle around the urethra and the other medication you may take prevents the prostate from growing more or even shrinking the prostate      Diagnosis: Hypothyroidism  Assessment and Plan of Treatment: continue medication Levothyroxine  as prescribed and follow up with medical MD at [facility] for monitoring thyroid hormone.  you do not make enough thyroid hormone  signs & symptoms of low levels of thyroid hormone - tired, getting cold easily, coarse or thin hair, constipation, shortness of breath, swelling, irregular periods  your doctor will do thyroid hormone blood tests at least once a year to monitor if medication dose is adequate  take your thyroid medicine as directed by your doctor & on empty stomach    Diagnosis: Anemia of chronic disease  Assessment and Plan of Treatment: likely with metastatic lung Ca to brain.   -Hgb 10.9 on admission, improved 12.8 and Thrombocytopenia Plt 88K.   Heme/oncology consulted. continue follow up care at nursing facility to monitor blood count.  You were found to have a low blood count on admission. Anemia is a low number of red blood cells. Some causes of anemia include: A gastrointestinal bleed, Liver or kidney disease, cancer, Alcohol abuse and Lack of foods that contain iron, folic acid, or vitamin B12. Symptoms to report, bleeding, palpitations, fatigue, pale skin, cold skin, dizziness. Take medications as ordered by PCP.  Follow- up with your PCP.      Diagnosis: GERD (gastroesophageal reflux disease)  Assessment and Plan of Treatment: continue medication as prescribed with steroid therapy.   continue follow up care with medical MD at nursing facility.   Change the factors that you can control.  Avoid foods and drinks that make your symptoms worse, such as: Caffeine or alcoholic drinks. Chocolate. spicy foods. Citrus fruits tomato-based foods, Fried and fatty foods.  Avoid lying down for the 3 hours after eating .Eat small, frequent meals  Do not wear anything tight around your waist that causes pressure on your stomach. Raise the head of your bed 6 to 8 inches with wood blocks to help you sleep. Do not take aspirin, ibuprofen, or other nonsteroidal anti-inflammatory drugs (NSAIDs).You have pain in your arms, neck, jaw, teeth, or back. Your pain increases or changes in intensity or duration. You develop nausea, vomiting, or sweating (diaphoresis).You develop shortness of breath, or you faint.Your vomit is green, yellow, black, or looks like coffee grounds or blood.Your stool is red, bloody, or black.These symptoms could be signs of other problems, such as heart disease, gastric bleeding, or esophageal bleeding.      Diagnosis: Transaminitis  Assessment and Plan of Treatment: You were monitored for elevated liver enzymes. possibly in the setting of Erlotinib, held. You reported no abdominal pain. You tolerated diet well.   Liver enzymes are trended down. Abdominal ultrasound unremarkable. continue follow up care at nursing facility to monitor liver enzymes.        Diagnosis: Palliative care encounter  Assessment and Plan of Treatment: You were followed by palliative team and home oncology for goals of care discussion in the setting of metastatic lung cancer mets to brain, now complicated by brain lesion/edema.   ALEIDA drafted; DNR/DNI/no feeding tube/trial of IVF/DNH. No labs.   Hospice appropriate.   HCP and pt's son Lul are in agreement for LTC without hospice.   SW referral made for placement.

## 2025-03-20 NOTE — DISCHARGE NOTE PROVIDER - NSDCMRMEDTOKEN_GEN_ALL_CORE_FT
Albuterol (Eqv-ProAir HFA) 90 mcg/inh inhalation aerosol: 2 puff(s) inhaled every 6 hours  ascorbic acid 500 mg oral tablet: 1 tab(s) orally once a day  Azopt 1% ophthalmic suspension: 1 drop(s) to each affected eye 3 times a day  Centrum oral tablet: 1 tab(s) orally once a day  Combigan 0.2%-0.5% ophthalmic solution: 1 drop(s) to each affected eye every 12 hours  digoxin 125 mcg (0.125 mg) oral tablet: 1 tab(s) orally every other day  dorzolamide 22.3 mg-timolol 6.8 mg/mL eye drops:   erlotinib 100 mg tablet: 1 tab(s) orally once a day  finasteride 5 mg oral tablet: 1 tab(s) orally once a day  levothyroxine 25 mcg (0.025 mg) oral tablet: 1 tab(s) orally once a day  Lumigan 0.01% ophthalmic solution: 1 drop(s) to each affected eye once a day (in the evening)  midodrine 5 mg tablet: 1 tab(s) orally 3 times a day  pantoprazole 40 mg oral delayed release tablet: 1 tab(s) orally once a day (before a meal)  rivaroxaban 15 mg oral tablet: 1 tab(s) orally once a day  Rocklatan 0.02 %-0.005 % eye drops:   simvastatin 10 mg oral tablet: 1 tab(s) orally once a day (at bedtime)  tamsulosin 0.4 mg oral capsule: 1 cap(s) orally once a day (at bedtime)  Toprol-XL 25 mg oral tablet, extended release: 1 tab(s) orally once a day Please take 1/2 tablet daily  zinc sulfate 220 mg (as elemental zinc 50 mg) oral capsule: 1 cap(s) orally 2 times a day   acetaminophen 325 mg oral tablet: 2 tab(s) orally every 6 hours As needed Temp greater or equal to 38C (100.4F), Mild Pain (1 - 3)  albuterol 90 mcg/inh inhalation aerosol: 2 puff(s) inhaled every 6 hours As needed Bronchospasm  Azopt 1% ophthalmic suspension: 1 drop(s) to each affected eye 3 times a day  Centrum oral tablet: 1 tab(s) orally once a day  Combigan 0.2%-0.5% ophthalmic solution: 1 drop(s) to each affected eye every 12 hours  dexAMETHasone 4 mg oral tablet: 1 tab(s) orally every 6 hours  dorzolamide 22.3 mg-timolol 6.8 mg/mL eye drops:   finasteride 5 mg oral tablet: 1 tab(s) orally once a day  levothyroxine 25 mcg (0.025 mg) oral tablet: 1 tab(s) orally once a day  Lumigan 0.01% ophthalmic solution: 1 drop(s) to each affected eye once a day (in the evening)  midodrine 5 mg oral tablet: 1 tab(s) orally 3 times a day hold if SBP is &gt; 120  pantoprazole 40 mg oral delayed release tablet: 1 tab(s) orally once a day (before a meal)  polyethylene glycol 3350 oral powder for reconstitution: 17 gram(s) orally once a day  Rocklatan 0.02 %-0.005 % eye drops:   senna leaf extract oral tablet: 1 tab(s) orally once a day  tamsulosin 0.4 mg oral capsule: 1 cap(s) orally once a day (at bedtime)

## 2025-03-20 NOTE — PROGRESS NOTE ADULT - SUBJECTIVE AND OBJECTIVE BOX
Patient previously unknown to me. Mr. 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 He had an MRI performed with numerous brain lesions noted with mod-severe vasogenic edema, currently on dexamethasone 4mg q 6hrs.     SUBJECTIVE / OVERNIGHT EVENTS:  Patient transferred out of the ICU. Seen at bedside, resting.     ADDITIONAL REVIEW OF SYSTEMS:    MEDICATIONS  (STANDING):  brimonidine 0.2% Ophthalmic Solution 1 Drop(s) Both EYES every 12 hours  dexAMETHasone  Injectable 4 milliGRAM(s) IV Push every 6 hours  erlotinib 100 milliGRAM(s) Oral daily  finasteride 5 milliGRAM(s) Oral daily  insulin lispro (ADMELOG) corrective regimen sliding scale   SubCutaneous three times a day before meals  insulin lispro (ADMELOG) corrective regimen sliding scale   SubCutaneous at bedtime  levothyroxine 25 MICROGram(s) Oral daily  midodrine. 5 milliGRAM(s) Oral three times a day  pantoprazole    Tablet 40 milliGRAM(s) Oral before breakfast  polyethylene glycol 3350 17 Gram(s) Oral daily  senna 1 Tablet(s) Oral daily  tamsulosin 0.4 milliGRAM(s) Oral at bedtime  timolol 0.5% Solution 1 Drop(s) Both EYES every 12 hours    MEDICATIONS  (PRN):  acetaminophen     Tablet .. 650 milliGRAM(s) Oral every 6 hours PRN Temp greater or equal to 38C (100.4F), Mild Pain (1 - 3)  albuterol    90 MICROgram(s) HFA Inhaler 2 Puff(s) Inhalation every 6 hours PRN Bronchospasm  melatonin 3 milliGRAM(s) Oral at bedtime PRN Insomnia    CAPILLARY BLOOD GLUCOSE      POCT Blood Glucose.: 138 mg/dL (19 Mar 2025 06:32)    I&O's Summary    18 Mar 2025 07:01  -  19 Mar 2025 07:00  --------------------------------------------------------  IN: 600 mL / OUT: 1310 mL / NET: -710 mL        PHYSICAL EXAM:  Vital Signs Last 24 Hrs  T(C): 36.1 (19 Mar 2025 15:27), Max: 36.1 (19 Mar 2025 15:27)  T(F): 97 (19 Mar 2025 15:27), Max: 97 (19 Mar 2025 15:27)  HR: 57 (19 Mar 2025 18:00) (34 - 76)  BP: 123/56 (19 Mar 2025 18:00) (76/51 - 149/80)  BP(mean): 77 (19 Mar 2025 18:00) (53 - 102)  RR: 23 (19 Mar 2025 18:00) (14 - 24)  SpO2: 97% (19 Mar 2025 18:00) (95% - 100%)    Parameters below as of 19 Mar 2025 12:00  Patient On (Oxygen Delivery Method): room air      CONSTITUTIONAL: Elderly, alert and oriented. NAD   ENMT: Moist oral mucosa, no pharyngeal injection or exudates; normal dentition  RESPIRATORY: Normal respiratory effort; lungs are clear to auscultation bilaterally  CARDIOVASCULAR: Regular rate and rhythm, normal S1 and S2, no murmur/  ABDOMEN: Nontender to palpation, normoactive bowel sounds, no rebound/guarding; No hepatosplenomegaly  PSYCH: A+O to person, place, and time; affect appropriate  NEUROLOGY: CN 2-12 are intact and symmetric; no gross sensory deficits   SKIN: No rashes; no palpable lesions    LABS:                        12.2   9.16  )-----------( 131      ( 19 Mar 2025 03:30 )             35.8     03-19    138  |  105  |  38[H]  ----------------------------<  161[H]  4.8   |  26  |  1.21    Ca    9.0      19 Mar 2025 03:30  Phos  3.2     03-19  Mg     2.0     03-19    TPro  6.0  /  Alb  3.0[L]  /  TBili  1.1  /  DBili  x   /  AST  51[H]  /  ALT  63[H]  /  AlkPhos  99  03-19          Urinalysis Basic - ( 19 Mar 2025 03:30 )    Color: x / Appearance: x / SG: x / pH: x  Gluc: 161 mg/dL / Ketone: x  / Bili: x / Urobili: x   Blood: x / Protein: x / Nitrite: x   Leuk Esterase: x / RBC: x / WBC x   Sq Epi: x / Non Sq Epi: x / Bacteria: x            RADIOLOGY & ADDITIONAL TESTS:  Imaging from Last 24 Hours:  < from: MR Head w/wo IV Cont (03.17.25 @ 18:10) >  FINDINGS:  Multiple supratentorial and infratentorial bilateral ring-enhancing   lesions. Severe vasogenic edema throughout the left frontal lobe, left   parietal lobe. Mild vasogenic edema in the right caudate head. Moderate   vasogenic edema in the anterior left temporal lobe and posterior right   temporal occipital lobe. Mild vasogenic edema in the right cerebellum.   Largest ring-enhancing lesion left frontal lobe measures 5.5 mm. Largest   lesion in the left parietal lobe measures 5.4 mm. Right caudate head   lesion measures 7.1 mm. Left temporal lobe evaluation measures 1.3 cm.   Right temporal occipital lobe lesion measures 1.4 cm. Right cerebellar   lesion measures 5.7 mm. Additional bilateral smaller metastatic lesions.   There is no abnormal restricted diffusion to suggest acute infarction.    Normal T2 flow-voids are seen within  the intracranial vasculature. The   lateral ventricles and cortical sulci are age-appropriate in size and   configuration. There is no extra-axial fluid collection. There is no   susceptibility artifact to suggest hemorrhage. Midline structures are   normal.  The visualized paranasal sinuses, mastoid air cells and orbits   are unremarkable.      ==============    CLINICAL HISTORY: suspect metastasis per CT head    COMPARISON: Broad-based ridging causing mild bilateral foraminal   narrowing. No spinal canal stenosis.    TECHNIQUE: Cervical spine MRI: Multiplanar, multisequence MR images of   the cervical spine are obtained with and without the administration of   7.5 cc intravenous Gadavist contrast. 0 cc of contrast was discarded.    FINDINGS:  Unremarkable heterogeneous T1 marrow signal. No cord signal abnormality.   No abnormal enhancement to suggest osseous metastasis. Severe disc   desiccation at C2/C3 C5/C6 levels. Moderate disc desiccation at C7/T1 and   T1/T2 levels. No bone marrow edema. There is no evidence for acute   fracture. A normal lordosis is noted. Craniocervical junction is normal.   The cervicovertebral body heights and remaining intervertebral disc   spaces are preserved. There is no prevertebral soft tissue abnormality.      Evaluation of the individual levels:  C2/C3 level: Broad-based ridging causing mild bilateral foraminal   narrowing. No spinal canal stenosis.  C3/C4 level: Broad-based disc disc complex causing severe right-sided   foraminal narrowing. Mild left-sided foraminal narrowing. Mild cord   impingement. Mild spinal canal stenosis. No cord signal abnormality.  C4/C5 level: Broad-based ridging causing mild to moderate cord   compression. Severe bilateral foraminal narrowing. Mild to moderate   spinal canal stenosis.  C5/C6 level: Broad-based ridging causing severe right-sided foraminal   narrowing. Moderate left-sided foraminal narrowing. Mild flattening of   the cord. No spinal canal stenosis.  C6/C7 level: Broad-based diffuse complex causing moderate bilateral   foraminal narrowing. No spinal canal stenosis.  C7/T1 level:  No spinal canal stenosis or foraminal narrowing.      ==============      IMPRESSION:    MRI BRAIN: Multiple bilateral supratentorial and infratentorial   metastatic lesions with moderate to severe associated vasogenic edema.    MRI CERVICAL SPINE: No evidence of osseous metastasis. Multilevel cord   impingement. No cord signal abnormality. No abnormal enhancement.    < end of copied text >  < from: MR Thoracic Spine w/wo IV Cont (03.17.25 @ 18:09) >  FINDINGS:   No prior similar studies are available for review.    Thoracic vertebral alignment is preserved.  Thoracic vertebral body   heights are maintained.  Marrow signal intensity within thoracic   vertebral bodies and posterior elements is unremarkable with the   exception of scattered hemangiomas the largest in T12..  There is no   abnormal vertebral or paraspinal enhancement.  No osseous expansion,   epidural disease orparaspinal abnormality is found.    Thoracic intervertebral discs maintain intact disc heights and signal   intensity.  No central canal or foraminal compromise is recognized.    The thoracic cord maintains intact morphology.  Thoracic cord signal   intensity is intact.  No abnormal enhancement occurs within the canal.    Small BILATERAL pleural effusions are noted.      IMPRESSION:  Unremarkable MR of the thoracic spine.    < end of copied text >  < from: CT Pelvis Bony Only No Cont (03.16.25 @ 15:42) >  FINDINGS:    No acute fracture or dislocation. Mild bilateral hip arthrosis. Chronic   compression deformity of L5 with discogenic degenerative disease at   L5/S1. Mild degenerative changes of the sacroiliac joints and pubic   symphysis. There is chondrocalcinosis of the pubic symphysis. No fluid   collection or hematoma. There are vascular calcifications. Imaged pelvic   viscera is grossly unremarkable.    IMPRESSION:    No acute fracture.    < end of copied text >    Electrocardiogram/QTc Interval:    COORDINATION OF CARE:  Care Discussed with Consultants/Other Providers:

## 2025-03-21 LAB
GLUCOSE BLDC GLUCOMTR-MCNC: 110 MG/DL — HIGH (ref 70–99)
GLUCOSE BLDC GLUCOMTR-MCNC: 130 MG/DL — HIGH (ref 70–99)
GLUCOSE BLDC GLUCOMTR-MCNC: 167 MG/DL — HIGH (ref 70–99)
GLUCOSE BLDC GLUCOMTR-MCNC: 171 MG/DL — HIGH (ref 70–99)

## 2025-03-21 RX ADMIN — MIDODRINE HYDROCHLORIDE 5 MILLIGRAM(S): 5 TABLET ORAL at 11:42

## 2025-03-21 RX ADMIN — TIMOLOL MALEATE 1 DROP(S): 6.8 SOLUTION OPHTHALMIC at 05:30

## 2025-03-21 RX ADMIN — TAMSULOSIN HYDROCHLORIDE 0.4 MILLIGRAM(S): 0.4 CAPSULE ORAL at 21:15

## 2025-03-21 RX ADMIN — INSULIN LISPRO 1: 100 INJECTION, SOLUTION INTRAVENOUS; SUBCUTANEOUS at 11:42

## 2025-03-21 RX ADMIN — DEXAMETHASONE 4 MILLIGRAM(S): 0.5 TABLET ORAL at 17:30

## 2025-03-21 RX ADMIN — POLYETHYLENE GLYCOL 3350 17 GRAM(S): 17 POWDER, FOR SOLUTION ORAL at 11:45

## 2025-03-21 RX ADMIN — MIDODRINE HYDROCHLORIDE 5 MILLIGRAM(S): 5 TABLET ORAL at 17:28

## 2025-03-21 RX ADMIN — DEXAMETHASONE 4 MILLIGRAM(S): 0.5 TABLET ORAL at 00:07

## 2025-03-21 RX ADMIN — Medication 40 MILLIGRAM(S): at 06:01

## 2025-03-21 RX ADMIN — BRIMONIDINE TARTRATE 1 DROP(S): 1.5 SOLUTION/ DROPS OPHTHALMIC at 05:29

## 2025-03-21 RX ADMIN — DEXAMETHASONE 4 MILLIGRAM(S): 0.5 TABLET ORAL at 11:45

## 2025-03-21 RX ADMIN — Medication 1 TABLET(S): at 11:42

## 2025-03-21 RX ADMIN — Medication 25 MICROGRAM(S): at 05:29

## 2025-03-21 RX ADMIN — BRIMONIDINE TARTRATE 1 DROP(S): 1.5 SOLUTION/ DROPS OPHTHALMIC at 17:28

## 2025-03-21 RX ADMIN — DEXAMETHASONE 4 MILLIGRAM(S): 0.5 TABLET ORAL at 05:29

## 2025-03-21 RX ADMIN — TIMOLOL MALEATE 1 DROP(S): 6.8 SOLUTION OPHTHALMIC at 17:28

## 2025-03-21 RX ADMIN — FINASTERIDE 5 MILLIGRAM(S): 1 TABLET, FILM COATED ORAL at 11:45

## 2025-03-21 RX ADMIN — ERLOTINIB 100 MILLIGRAM(S): 150 TABLET, FILM COATED ORAL at 11:46

## 2025-03-21 NOTE — PROGRESS NOTE ADULT - PROBLEM SELECTOR PLAN 3
##Ambulatory dysfunction s/p fall  -No head trauma or LOC from recent fall (3/11/25)  -Pt complaining of trouble walking and pain in the left buttocks/hip  -CT pelvis negative for acute fracture  -likely due to loss of balance from brain mets and edema vs orthostatic hypotension  -PT recs SB,   -C/w Tylenol for pain control    -DC plan: Pt's HCP now declines LTC with hospice. Prefers to take pt home with private aid ##Ambulatory dysfunction s/p fall  -No head trauma or LOC from recent fall (3/11/25)  -Pt complaining of trouble walking and pain in the left buttocks/hip  -CT pelvis negative for acute fracture  -likely due to loss of balance from brain mets and edema vs orthostatic hypotension  -PT recs SB,   -C/w Tylenol for pain control    -DC plan:  Home hospice. SW following ##Ambulatory dysfunction s/p fall  -No head trauma or LOC from recent fall (3/11/25)  -Pt complaining of trouble walking and pain in the left buttocks/hip  -CT pelvis negative for acute fracture  -likely due to loss of balance from brain mets and edema vs orthostatic hypotension  -PT recs SB,   -C/w Tylenol for pain control    -DC plan: HCPs is currently deciding between pt returning home  with 24/7 HHA vs LTC placement.

## 2025-03-21 NOTE — PROGRESS NOTE ADULT - PROBLEM SELECTOR PLAN 10
-From home, HHA 4h/day  -Comfort care/MEWS exempt  -f/u heme/onc for steroid taper and Erlotinib    -DC plan: Pt's HCP now declines LTC with hospice. Prefers to take pt home with private aid  -[facility] following -From home, HHA 4h/day  -Comfort care/MEWS exempt  -f/u heme/onc for steroid taper and Erlotinib    -DC plan: -DC plan:  Home hospice. SW following  -SW following -From home, HHA 4h/day  -Comfort care/MEWS exempt  -f/u heme/onc for steroid taper and Erlotinib    -DC plan: HCPs is currently deciding between pt returning home  with 24/7 HHA vs LTC placement.

## 2025-03-21 NOTE — PROGRESS NOTE ADULT - PROBLEM SELECTOR PLAN 1
###Brain mets with edema  -CTH: Multiple new sites (since Oct 24') of edema in the left frontal, parietal lobes and bilateral temporal lobe suspicious for intracranial metastases. Largest area of edema is in the left frontal parietal lobes. There is local mass effect without herniation or hydrocephalus. Punctate focus of hyper-density in the right temporal lobe (series 2 image 20) in the area of edema. This may represent underlying intracranial metastasis versus punctate hemorrhage -Repeat CTH (6hrs): no changes   -MRI BRAIN: Multiple bilateral supratentorial and infratentorial metastatic lesions with moderate to severe associated vasogenic edema.  -MRI CERVICAL SPINE: No evidence of osseous metastasis. Multilevel cord impingement. No cord signal abnormality. No abnormal enhancement.  -MR Thoracic Spine w/wo IV Cont: Unremarkable MR of the thoracic spine.  -s/p ICU monitoring x 24hr.   -Started Decadron per heme/oncology    -Keep systolic BP < 150mmhg, on midodrine   -NeuroSx at [facility] following - Dr. Pringle, recs appreciated  -Neurology (dr Gillis) consulted  -Heme/Onc Dr. Drummond following  -c/w Decadron 4mg q6hrs (Taper for 2 weeks as per Heme/onc) with PPI   -Palliative following for GOC, Hospice appropriate, Comfort care/MEWS exempt no labs, further imaging  -DC plan: Pt's HCP now declines LTC with hospice. Prefers to take pt home with private aid ###Brain mets with edema  -CTH: Multiple new sites (since Oct 24') of edema in the left frontal, parietal lobes and bilateral temporal lobe suspicious for intracranial metastases. Largest area of edema is in the left frontal parietal lobes. There is local mass effect without herniation or hydrocephalus. Punctate focus of hyper-density in the right temporal lobe (series 2 image 20) in the area of edema. This may represent underlying intracranial metastasis versus punctate hemorrhage -Repeat CTH (6hrs): no changes   -MRI BRAIN: Multiple bilateral supratentorial and infratentorial metastatic lesions with moderate to severe associated vasogenic edema.  -MRI CERVICAL SPINE: No evidence of osseous metastasis. Multilevel cord impingement. No cord signal abnormality. No abnormal enhancement.  -MR Thoracic Spine w/wo IV Cont: Unremarkable MR of the thoracic spine.  -s/p ICU monitoring x 24hr.   -Started Decadron per heme/oncology    -Keep systolic BP < 150mmhg, on midodrine   -NeuroSx at [facility] following - Dr. Pringle, recs appreciated  -Neurology (dr Gillis) consulted  -Heme/Onc Dr. Drummond following  -c/w Decadron 4mg q6hrs (Taper for 2 weeks as per Heme/onc) with PPI   -Palliative following for GOC, Hospice appropriate, Comfort care/MEWS exempt no labs, further imaging  -DC plan: Family now wants Home Hospice ###Brain mets with edema  -CTH: Multiple new sites (since Oct 24') of edema in the left frontal, parietal lobes and bilateral temporal lobe suspicious for intracranial metastases. Largest area of edema is in the left frontal parietal lobes. There is local mass effect without herniation or hydrocephalus. Punctate focus of hyper-density in the right temporal lobe (series 2 image 20) in the area of edema. This may represent underlying intracranial metastasis versus punctate hemorrhage -Repeat CTH (6hrs): no changes   -MRI BRAIN: Multiple bilateral supratentorial and infratentorial metastatic lesions with moderate to severe associated vasogenic edema.  -MRI CERVICAL SPINE: No evidence of osseous metastasis. Multilevel cord impingement. No cord signal abnormality. No abnormal enhancement.  -MR Thoracic Spine w/wo IV Cont: Unremarkable MR of the thoracic spine.  -s/p ICU monitoring x 24hr.   -Started Decadron per heme/oncology    -Keep systolic BP < 150mmhg, on midodrine   -NeuroSx at [facility] following - Dr. Pringle, recs appreciated  -Neurology (dr Gillis) consulted  -Heme/Onc Dr. Drummond following  -c/w Decadron 4mg q6hrs (Taper for 2 weeks as per Heme/onc) with PPI   -Palliative following for GOC, Hospice appropriate, Comfort care/MEWS exempt no labs, further imaging  -DC plan: HCPs is currently deciding between pt returning home  with 24/7 HHA vs LTC placement.

## 2025-03-21 NOTE — PROGRESS NOTE ADULT - PROBLEM SELECTOR PLAN 7
-Pt has a history of hypothyroidism, takes Levothyroxine at home  -C/w Levothyroxine    -DC plan: Pt's HCP now declines LTC with hospice. Prefers to take pt home with private aid -Pt has a history of hypothyroidism, takes Levothyroxine at home  -C/w Levothyroxine    -DC plan:  Home hospice. SW following -Pt has a history of hypothyroidism, takes Levothyroxine at home  -C/w Levothyroxine    -DC plan: HCPs is currently deciding between pt returning home  with 24/7 HHA vs LTC placement.

## 2025-03-21 NOTE — PROGRESS NOTE ADULT - PROBLEM SELECTOR PLAN 9
-DVT ppx : SCD, hold AC in setting of brain mets vs hemorrhage  -GI ppx: PPI while on Steroid -DVT ppx : SCD, hold AC in setting of brain mets vs hemorrhage  -GI ppx: PPI while on Steroid    -DC plan:  Home hospice. SW following -DVT ppx : SCD, hold AC in setting of brain mets vs hemorrhage  -GI ppx: PPI while on Steroid    -DC plan: HCPs is currently deciding between pt returning home  with 24/7 HHA vs LTC placement.

## 2025-03-21 NOTE — PROGRESS NOTE ADULT - PROBLEM SELECTOR PLAN 2
-Lung CA (Diagnosed 2 years ago per pt) and pt takes  Erlotinib 100mg QD  -Follows oupt Heme/Onc Doris Martinez  -CT and MR result above  -c/w Erlotinib 100mg QD and Decadron per heme/onc  -Heme/Onc Dr. Drummond following   -Palliative following, comfort care, hospice appropriate.    -DC plan: Pt's HCP now declines LTC with hospice. Prefers to take pt home with private aid -Lung CA (Diagnosed 2 years ago per pt) and pt takes  Erlotinib 100mg QD  -Follows oupt Heme/Onc Doris Martinez  -CT and MR result above  -c/w Erlotinib 100mg QD and Decadron per heme/onc  -Heme/Onc Dr. Drummond following   -Palliative following, comfort care, hospice appropriate.    -DC plan: Home hospice. SW following -Lung CA (Diagnosed 2 years ago per pt) and pt takes  Erlotinib 100mg QD  -Follows oupt Heme/Onc Doris Martinez  -CT and MR result above  -c/w Erlotinib 100mg QD and Decadron per heme/onc  -Heme/Onc Dr. Drummond following   -Palliative following, comfort care, hospice appropriate.    -DC plan: HCPs is currently deciding between pt returning home  with 24/7 HHA vs LTC placement.

## 2025-03-21 NOTE — CHART NOTE - NSCHARTNOTEFT_GEN_A_CORE
Informed by SW that family and HCP want to d/c pt home without hospice and continue to follow up with oncology.   Patient remains hospice-appropriate if he stops cancer-directed treatment.   Palliative care will sign off. Please reconsult if needed.

## 2025-03-21 NOTE — PROGRESS NOTE ADULT - ASSESSMENT
89 year old male with a history of lung cancer, A-fib, DM, HTN,   HLD, Hypothyroidism, Lumbar radiculopathy was admitted due to inability to walk  following a fall one week PTA.  Patient was found to have brain metastases.  Patient was transferred to ICU for close monitoring.  Neurology consult, Neurosurgery consult, Heme-onc. consult.  MRI of brain - metastases  MRI C-spine- no mets  Oncology consult is appreciated.  Continue Dexamethasone.  Palliative care consult.  Patient was downgraded to the regular floor.  Oncology and Palliative consults are appreciated.  Continue Dexamethsone.  Now patient's proxy wants patient to be discharged home to follow up with his oncologist.   Home aid will be provided privately.  Case was discussed with house staff.

## 2025-03-21 NOTE — PROGRESS NOTE ADULT - PROBLEM SELECTOR PLAN 4
-Pt has a history of Afib, takes Xarelto and metoprolol succinate 12.5mg qd, Digoxin at home  - Hold Xarelto in setting of brain mets vs hemorrhage  - Hold metoprolol succinate, Digoxin in the setting of bradycardia  - comfort care only    -DC plan: Pt's HCP now declines LTC with hospice. Prefers to take pt home with private aid -Pt has a history of Afib, takes Xarelto and metoprolol succinate 12.5mg qd, Digoxin at home  - Hold Xarelto in setting of brain mets vs hemorrhage  - Hold metoprolol succinate, Digoxin in the setting of bradycardia  - comfort care only    -DC plan:  Home hospice. SW following -Pt has a history of Afib, takes Xarelto and metoprolol succinate 12.5mg qd, Digoxin at home  - Hold Xarelto in setting of brain mets vs hemorrhage  - Hold metoprolol succinate, Digoxin in the setting of bradycardia  - comfort care only    -DC plan: HCPs is currently deciding between pt returning home  with 24/7 HHA vs LTC placement.

## 2025-03-21 NOTE — PROGRESS NOTE ADULT - PROBLEM SELECTOR PLAN 6
-Pt has a history of BPH, takes tamsulosin 0.4mg and finasteride 5mg at home   -C/w Tamsulosin and Finasteride    -DC plan: Pt's HCP now declines LTC with hospice. Prefers to take pt home with private aid -Pt has a history of BPH, takes tamsulosin 0.4mg and finasteride 5mg at home   -C/w Tamsulosin and Finasteride    -DC plan:  Home hospice. SW following -Pt has a history of BPH, takes tamsulosin 0.4mg and finasteride 5mg at home   -C/w Tamsulosin and Finasteride    -DC plan: HCPs is currently deciding between pt returning home  with 24/7 HHA vs LTC placement.

## 2025-03-21 NOTE — PROGRESS NOTE ADULT - SUBJECTIVE AND OBJECTIVE BOX
This is 89 year old male with a history of lung cancer, A-fib, DM, HTN, Hypothyroidism,  HLD, Lumbar radiculopathy who was admitted after he developed mobility disfunction following a fall   one week ago. Patient denied head injury.  Musculo-skeletal imagings failed to show any acute fractures.  CT of brain showed picture compatible with brain metastases.  Patient was transferred to ICU for observation.  Neurology, Neurosurgery and heme-onc. services were summoned.  Patient is alert, oriented.  MRI of brain- brain metastases.  No new events over night.  Heme-onc and palliative care inputs are appreciated.  Patient remans stable, alert, oriented.      Vital Signs Last 24 Hrs  T(C): 36.4 (21 Mar 2025 13:12), Max: 36.4 (21 Mar 2025 05:08)  T(F): 97.5 (21 Mar 2025 13:12), Max: 97.5 (21 Mar 2025 05:08)  HR: 76 (21 Mar 2025 13:12) (54 - 76)  BP: 114/55 (21 Mar 2025 13:12) (114/55 - 126/63)  BP(mean): 67 (20 Mar 2025 20:47) (67 - 67)  RR: 20 (21 Mar 2025 13:12) (18 - 20)  SpO2: 99% (21 Mar 2025 13:12) (99% - 99%)    Parameters below as of 21 Mar 2025 13:12  Patient On (Oxygen Delivery Method): room air      T(C): 36.4 (03-21-25 @ 13:12), Max: 36.4 (03-21-25 @ 05:08)  HR: 76 (03-21-25 @ 13:12) (54 - 76)  BP: 114/55 (03-21-25 @ 13:12) (114/55 - 126/63)  RR: 20 (03-21-25 @ 13:12) (18 - 20)  SpO2: 99% (03-21-25 @ 13:12) (99% - 99%)    CONSTITUTIONAL: Well groomed, no apparent distress  EYES: PERRLA and symmetric, EOMI, No conjunctival or scleral injection, non-icteric  ENMT: Oral mucosa with moist membranes. Normal dentition; no pharyngeal injection or exudates             NECK: Supple, symmetric and without tracheal deviation   RESP: No respiratory distress, no use of accessory muscles; CTA b/l, no WRR  CV: RRR, +S1S2, no MRG; no JVD; no peripheral edema  GI: Soft, NT, ND, no rebound, no guarding; no palpable masses; no hepatosplenomegaly; no hernia palpated  LYMPH: No cervical LAD or tenderness; no axillary LAD or tenderness; no inguinal LAD or tenderness  MSK: Normal gait; No digital clubbing or cyanosis; examination of the (head/neck/spine/ribs/pelvis, RUE, LUE, RLE, LLE) without misalignment,            Normal ROM without pain, no spinal tenderness, normal muscle strength/tone  SKIN: No rashes or ulcers noted; no subcutaneous nodules or induration palpable  NEURO: CN II-XII intact; normal reflexes in upper and lower extremities, sensation intact in upper and lower extremities b/l to light touch   PSYCH: Appropriate insight/judgment; A+O x 3, mood and affect appropriate, recent/remote memory intact    MEDICATIONS  (STANDING):  brimonidine 0.2% Ophthalmic Solution 1 Drop(s) Both EYES every 12 hours  dexAMETHasone  Injectable 4 milliGRAM(s) IV Push every 6 hours  erlotinib 100 milliGRAM(s) Oral daily  finasteride 5 milliGRAM(s) Oral daily  insulin lispro (ADMELOG) corrective regimen sliding scale   SubCutaneous three times a day before meals  insulin lispro (ADMELOG) corrective regimen sliding scale   SubCutaneous at bedtime  levothyroxine 25 MICROGram(s) Oral daily  midodrine. 5 milliGRAM(s) Oral three times a day  pantoprazole    Tablet 40 milliGRAM(s) Oral before breakfast  polyethylene glycol 3350 17 Gram(s) Oral daily  senna 1 Tablet(s) Oral daily  tamsulosin 0.4 milliGRAM(s) Oral at bedtime  timolol 0.5% Solution 1 Drop(s) Both EYES every 12 hours    MEDICATIONS  (PRN):  acetaminophen     Tablet .. 650 milliGRAM(s) Oral every 6 hours PRN Temp greater or equal to 38C (100.4F), Mild Pain (1 - 3)  albuterol    90 MICROgram(s) HFA Inhaler 2 Puff(s) Inhalation every 6 hours PRN Bronchospasm  aluminum hydroxide/magnesium hydroxide/simethicone Suspension 30 milliLiter(s) Oral every 4 hours PRN Dyspepsia  melatonin 3 milliGRAM(s) Oral at bedtime PRN Insomnia

## 2025-03-21 NOTE — CHART NOTE - NSCHARTNOTEFT_GEN_A_CORE
Assessment:   89yMalePatient is a 89y old  Male who presents with a chief complaint of suspect brain metastasis (21 Mar 2025 12:44). Pt DG from ICU to 4 N on 3/19.  Pt Visited. Observed Post Lunch meal. Pt ate almost everything. D/W HCP at bedside.  Per HCP Pt with good appetite. Current diet tolerating.  Pt is on Ensure Plus HP BID. D/W RN No Diarrhea Reported.       Factors impacting intake: [ ] none [ ] nausea  [ ] vomiting [ ] diarrhea [ ] constipation  [ ]chewing problems [ ] swallowing issues  [ ] other:     Diet Prescription: Soft and Bite Size. Ensure Plus HP BID  Intake: ~90 % of meal.     Current Weight: Weight (kg): 67.3 (03-17 @ 00:00)  % Weight Change    Pertinent Medications: MEDICATIONS  (STANDING):  brimonidine 0.2% Ophthalmic Solution 1 Drop(s) Both EYES every 12 hours  dexAMETHasone  Injectable 4 milliGRAM(s) IV Push every 6 hours  erlotinib 100 milliGRAM(s) Oral daily  finasteride 5 milliGRAM(s) Oral daily  insulin lispro (ADMELOG) corrective regimen sliding scale   SubCutaneous three times a day before meals  insulin lispro (ADMELOG) corrective regimen sliding scale   SubCutaneous at bedtime  levothyroxine 25 MICROGram(s) Oral daily  midodrine. 5 milliGRAM(s) Oral three times a day  pantoprazole    Tablet 40 milliGRAM(s) Oral before breakfast  polyethylene glycol 3350 17 Gram(s) Oral daily  senna 1 Tablet(s) Oral daily  tamsulosin 0.4 milliGRAM(s) Oral at bedtime  timolol 0.5% Solution 1 Drop(s) Both EYES every 12 hours    MEDICATIONS  (PRN):  acetaminophen     Tablet .. 650 milliGRAM(s) Oral every 6 hours PRN Temp greater or equal to 38C (100.4F), Mild Pain (1 - 3)  albuterol    90 MICROgram(s) HFA Inhaler 2 Puff(s) Inhalation every 6 hours PRN Bronchospasm  aluminum hydroxide/magnesium hydroxide/simethicone Suspension 30 milliLiter(s) Oral every 4 hours PRN Dyspepsia  melatonin 3 milliGRAM(s) Oral at bedtime PRN Insomnia    Pertinent Labs: 03-19 Na138 mmol/L Glu 161 mg/dL[H] K+ 4.8 mmol/L Cr  1.21 mg/dL BUN 38 mg/dL[H] 03-19 Phos 3.2 mg/dL 03-19 Alb 3.0 g/dL[L]     CAPILLARY BLOOD GLUCOSE      POCT Blood Glucose.: 171 mg/dL (21 Mar 2025 11:38)  POCT Blood Glucose.: 130 mg/dL (21 Mar 2025 07:58)  POCT Blood Glucose.: 191 mg/dL (20 Mar 2025 21:58)  POCT Blood Glucose.: 195 mg/dL (20 Mar 2025 16:56)      Estimated Needs:   [ ] no change since previous assessment  [ ] recalculated:     Previous Nutrition Diagnosis:   [ ] Inadequate Energy Intake [ ]Inadequate Oral Intake [ ] Excessive Energy Intake   [ ] Underweight [ ] Increased Nutrient Needs [ ] Overweight/Obesity   [ ] Altered GI Function [ ] Unintended Weight Loss [ ] Food & Nutrition Related Knowledge Deficit [x ] Malnutrition     Nutrition Diagnosis is [ ] ongoing  [ ] resolved [ ] not applicable         Interventions:   Recommend  [ ] Change Diet To:  [x ] Nutrition Supplement Continue with Ensure Plus HP BID.   [ ] Nutrition Support

## 2025-03-21 NOTE — PROGRESS NOTE ADULT - ASSESSMENT
89 yo male (from home, uses walker at baseline) with PMHx of lung cancer on Erlotinib, HLD, HTN, BPH, hypothyroidism, A-fib on Xarelto, presents for ambulatory dysfunction since a fall 1 week ago (no head trauma). In the ED, Labs showed Hgb 10.9 ( baseline 12).  CT Head showed multiple new sites of edema in the left frontal, parietal lobes and bilateral temporal lobe suspicious for intracranial metastases. Largest area of edema is in the left frontal parietal lobes. Local mass effect without herniation or hydrocephalus. Punctate focus of hyperdensity in the right temporal lobe in the area of edema. This may represent intracranial metastasis versus punctate hemorrhage.  In the ED, he was given Dexamethasone 10mg.     Patient is admitted to medicine for ambulatory dysfunction and brain edema on CT. Neurosurgery and Neurologist consulted and recommended ICU admission for 24hrs for closer monitoring.    In the ICU,  he remained  hemodynamically stable, saturating 100% on ambient air.  Started on Dexamethasone 4mg Q6hrs. Labs remained unremarkable. Neurological examination showed right pupil fixed, not reactive to light and accomodation. Repeat CT Head showed no changes, MRI Head showed  bilateral supratentorial and infratentorial metastatic lesions with moderate to severe associated vasogenic edema. MRI Cervical showed  multilevel cord impingement. MRI Thoracic was  negative. Neurologist Dr. Gillis and Heme/Onc Dr. Drummond are following his care.   Physical therapist evaluated and recommended SB (3/20). Palliative NP Al following with the management of patient.   Patient is DNR/DNI COMFORT MEASURE (no lab draws, no escalation or deescalation of care).    HemOnc recommending continuing Dexamethasone 4mg q6h and PPI,  Patient was initially for LTC with hospice, however pt's HCP spoke with SW today 3/21, rescinded hospice care &  requesting to take patient home with private pay A.    89 yo male (from home, uses walker at baseline) with PMHx of lung cancer on Erlotinib, HLD, HTN, BPH, hypothyroidism, A-fib on Xarelto, presents for ambulatory dysfunction since a fall 1 week ago (no head trauma). In the ED, Labs showed Hgb 10.9 ( baseline 12).  CT Head showed multiple new sites of edema in the left frontal, parietal lobes and bilateral temporal lobe suspicious for intracranial metastases. Largest area of edema is in the left frontal parietal lobes. Local mass effect without herniation or hydrocephalus. Punctate focus of hyperdensity in the right temporal lobe in the area of edema. This may represent intracranial metastasis versus punctate hemorrhage.  In the ED, he was given Dexamethasone 10mg.     Patient is admitted to medicine for ambulatory dysfunction and brain edema on CT. Neurosurgery and Neurologist consulted and recommended ICU admission for 24hrs for closer monitoring.    In the ICU,  he remained  hemodynamically stable, saturating 100% on ambient air.  Started on Dexamethasone 4mg Q6hrs. Labs remained unremarkable. Neurological examination showed right pupil fixed, not reactive to light and accomodation. Repeat CT Head showed no changes, MRI Head showed  bilateral supratentorial and infratentorial metastatic lesions with moderate to severe associated vasogenic edema. MRI Cervical showed  multilevel cord impingement. MRI Thoracic was  negative. Neurologist Dr. Gillis and Heme/Onc Dr. Drummond are following his care.   Physical therapist evaluated and recommended SB (3/20). Palliative NP Al following with the management of patient.   Patient is DNR/DNI COMFORT MEASURE (no lab draws, no escalation or deescalation of care).    HemOnc recommending continuing Dexamethasone 4mg q6h and PPI,  Patient was initially for LTC with hospice, however pt's HCP spoke with SW today 3/21, they are now interested in exploring home hospice 89 yo male (from home, uses walker at baseline) with PMHx of lung cancer on Erlotinib, HLD, HTN, BPH, hypothyroidism, A-fib on Xarelto, presents for ambulatory dysfunction since a fall 1 week ago (no head trauma). In the ED, Labs showed Hgb 10.9 ( baseline 12).  CT Head showed multiple new sites of edema in the left frontal, parietal lobes and bilateral temporal lobe suspicious for intracranial metastases. Largest area of edema is in the left frontal parietal lobes. Local mass effect without herniation or hydrocephalus. Punctate focus of hyperdensity in the right temporal lobe in the area of edema. This may represent intracranial metastasis versus punctate hemorrhage.  In the ED, he was given Dexamethasone 10mg.     Patient is admitted to medicine for ambulatory dysfunction and brain edema on CT. Neurosurgery and Neurologist consulted and recommended ICU admission for 24hrs for closer monitoring.    In the ICU,  he remained  hemodynamically stable, saturating 100% on ambient air.  Started on Dexamethasone 4mg Q6hrs. Labs remained unremarkable. Neurological examination showed right pupil fixed, not reactive to light and accomodation. Repeat CT Head showed no changes, MRI Head showed  bilateral supratentorial and infratentorial metastatic lesions with moderate to severe associated vasogenic edema. MRI Cervical showed  multilevel cord impingement. MRI Thoracic was  negative. Neurologist Dr. Gillis and Heme/Onc Dr. Drummond are following his care.   Physical therapist evaluated and recommended SB (3/20). Palliative NP Al following with the management of patient.   Patient is DNR/DNI COMFORT MEASURE (no lab draws, no escalation or deescalation of care).    HemOnc recommending continuing Dexamethasone 4mg q6h and PPI,  Patient was initially for LTC with hospiceplacement, however pt's HCP spoke with SW today 3/21. HCPs is currently deciding between pt returning home  with 24/7 HHA vs LTC placement.

## 2025-03-21 NOTE — PROGRESS NOTE ADULT - SUBJECTIVE AND OBJECTIVE BOX
NP Note discussed with  primary attending    Patient is a 89y old  Male who presents with a chief complaint of suspect brain metastasis (20 Mar 2025 19:19)      INTERVAL HPI/OVERNIGHT EVENTS: no new complaints    MEDICATIONS  (STANDING):  brimonidine 0.2% Ophthalmic Solution 1 Drop(s) Both EYES every 12 hours  dexAMETHasone  Injectable 4 milliGRAM(s) IV Push every 6 hours  erlotinib 100 milliGRAM(s) Oral daily  finasteride 5 milliGRAM(s) Oral daily  insulin lispro (ADMELOG) corrective regimen sliding scale   SubCutaneous three times a day before meals  insulin lispro (ADMELOG) corrective regimen sliding scale   SubCutaneous at bedtime  levothyroxine 25 MICROGram(s) Oral daily  midodrine. 5 milliGRAM(s) Oral three times a day  pantoprazole    Tablet 40 milliGRAM(s) Oral before breakfast  polyethylene glycol 3350 17 Gram(s) Oral daily  senna 1 Tablet(s) Oral daily  tamsulosin 0.4 milliGRAM(s) Oral at bedtime  timolol 0.5% Solution 1 Drop(s) Both EYES every 12 hours    MEDICATIONS  (PRN):  acetaminophen     Tablet .. 650 milliGRAM(s) Oral every 6 hours PRN Temp greater or equal to 38C (100.4F), Mild Pain (1 - 3)  albuterol    90 MICROgram(s) HFA Inhaler 2 Puff(s) Inhalation every 6 hours PRN Bronchospasm  aluminum hydroxide/magnesium hydroxide/simethicone Suspension 30 milliLiter(s) Oral every 4 hours PRN Dyspepsia  melatonin 3 milliGRAM(s) Oral at bedtime PRN Insomnia      __________________________________________________  REVIEW OF SYSTEMS:    CONSTITUTIONAL: No fever,   EYES: no acute visual disturbances  NECK: No pain or stiffness  RESPIRATORY: No cough; No shortness of breath  CARDIOVASCULAR: No chest pain, no palpitations  GASTROINTESTINAL: No pain. No nausea or vomiting; No diarrhea   NEUROLOGICAL: No headache or numbness, no tremors  MUSCULOSKELETAL: No joint pain, no muscle pain  GENITOURINARY: no dysuria, no frequency, no hesitancy  PSYCHIATRY: no depression , no anxiety  ALL OTHER  ROS negative        Vital Signs Last 24 Hrs  T(C): 36.4 (21 Mar 2025 05:08), Max: 36.4 (21 Mar 2025 05:08)  T(F): 97.5 (21 Mar 2025 05:08), Max: 97.5 (21 Mar 2025 05:08)  HR: 54 (21 Mar 2025 05:08) (54 - 70)  BP: 126/63 (21 Mar 2025 05:08) (124/55 - 126/63)  BP(mean): 67 (20 Mar 2025 20:47) (67 - 67)  RR: 18 (21 Mar 2025 05:08) (18 - 18)  SpO2: 99% (21 Mar 2025 05:08) (99% - 99%)    Parameters below as of 21 Mar 2025 05:08  Patient On (Oxygen Delivery Method): room air        ________________________________________________  PHYSICAL EXAM:  GENERAL: NAD  HEENT: Normocephalic;  conjunctivae and sclerae clear; moist mucous membranes;   NECK : supple  CHEST/LUNG: Clear to ausculitation bilaterally with good air entry   HEART: S1 S2  regular; no murmurs, gallops or rubs  ABDOMEN: Soft, Nontender, Nondistended; Bowel sounds present  EXTREMITIES: no cyanosis; no edema; no calf tenderness  SKIN: warm and dry; no rash  NERVOUS SYSTEM:  Awake and alert; Oriented  to place, person and time ; no new deficits    _________________________________________________  LABS:              CAPILLARY BLOOD GLUCOSE      POCT Blood Glucose.: 171 mg/dL (21 Mar 2025 11:38)  POCT Blood Glucose.: 130 mg/dL (21 Mar 2025 07:58)  POCT Blood Glucose.: 191 mg/dL (20 Mar 2025 21:58)  POCT Blood Glucose.: 195 mg/dL (20 Mar 2025 16:56)        RADIOLOGY & ADDITIONAL TESTS:    Imaging Personally Reviewed:  YES    Consultant(s) Notes Reviewed:   YES  Care Discussed with Consultants :     Plan of care was discussed with patient and /or primary care giver; all questions and concerns were addressed and care was aligned with patient's wishes.

## 2025-03-21 NOTE — PROGRESS NOTE ADULT - PROBLEM SELECTOR PLAN 5
-hx hypotension and pt takes Midodrine 5mg TID at home  -c/w home med with parameter    -DC plan: Pt's HCP now declines LTC with hospice. Prefers to take pt home with private aid -hx hypotension and pt takes Midodrine 5mg TID at home  -c/w home med with parameter    -DC plan:  Home hospice. SW following -hx hypotension and pt takes Midodrine 5mg TID at home  -c/w home med with parameter    -DC plan: HCPs is currently deciding between pt returning home  with 24/7 HHA vs LTC placement.

## 2025-03-21 NOTE — PROGRESS NOTE ADULT - PROBLEM SELECTOR PLAN 8
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   -Hgb 10.9 on admission, now 12.2 (3/19)  -Comfort care only  -no further labs. 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   -Hgb 10.9 on admission, now 12.2 (3/19)  -Comfort care only  -no further labs.    -DC plan:  Home hospice. 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   -Hgb 10.9 on admission, now 12.2 (3/19)  -Comfort care only  -no further labs.    -DC plan: HCPs is currently deciding between pt returning home  with 24/7 HHA vs LTC placement.

## 2025-03-22 LAB
GLUCOSE BLDC GLUCOMTR-MCNC: 135 MG/DL — HIGH (ref 70–99)
GLUCOSE BLDC GLUCOMTR-MCNC: 138 MG/DL — HIGH (ref 70–99)
GLUCOSE BLDC GLUCOMTR-MCNC: 151 MG/DL — HIGH (ref 70–99)
GLUCOSE BLDC GLUCOMTR-MCNC: 204 MG/DL — HIGH (ref 70–99)

## 2025-03-22 RX ADMIN — TAMSULOSIN HYDROCHLORIDE 0.4 MILLIGRAM(S): 0.4 CAPSULE ORAL at 20:39

## 2025-03-22 RX ADMIN — ERLOTINIB 100 MILLIGRAM(S): 150 TABLET, FILM COATED ORAL at 12:03

## 2025-03-22 RX ADMIN — TIMOLOL MALEATE 1 DROP(S): 6.8 SOLUTION OPHTHALMIC at 05:35

## 2025-03-22 RX ADMIN — Medication 40 MILLIGRAM(S): at 05:37

## 2025-03-22 RX ADMIN — MIDODRINE HYDROCHLORIDE 5 MILLIGRAM(S): 5 TABLET ORAL at 11:57

## 2025-03-22 RX ADMIN — DEXAMETHASONE 4 MILLIGRAM(S): 0.5 TABLET ORAL at 05:36

## 2025-03-22 RX ADMIN — DEXAMETHASONE 4 MILLIGRAM(S): 0.5 TABLET ORAL at 17:14

## 2025-03-22 RX ADMIN — Medication 25 MICROGRAM(S): at 05:14

## 2025-03-22 RX ADMIN — BRIMONIDINE TARTRATE 1 DROP(S): 1.5 SOLUTION/ DROPS OPHTHALMIC at 17:46

## 2025-03-22 RX ADMIN — TIMOLOL MALEATE 1 DROP(S): 6.8 SOLUTION OPHTHALMIC at 17:47

## 2025-03-22 RX ADMIN — INSULIN LISPRO 2: 100 INJECTION, SOLUTION INTRAVENOUS; SUBCUTANEOUS at 12:01

## 2025-03-22 RX ADMIN — INSULIN LISPRO 1: 100 INJECTION, SOLUTION INTRAVENOUS; SUBCUTANEOUS at 17:14

## 2025-03-22 RX ADMIN — DEXAMETHASONE 4 MILLIGRAM(S): 0.5 TABLET ORAL at 00:08

## 2025-03-22 RX ADMIN — DEXAMETHASONE 4 MILLIGRAM(S): 0.5 TABLET ORAL at 11:55

## 2025-03-22 RX ADMIN — FINASTERIDE 5 MILLIGRAM(S): 1 TABLET, FILM COATED ORAL at 11:55

## 2025-03-22 RX ADMIN — BRIMONIDINE TARTRATE 1 DROP(S): 1.5 SOLUTION/ DROPS OPHTHALMIC at 05:35

## 2025-03-22 NOTE — PROGRESS NOTE ADULT - SUBJECTIVE AND OBJECTIVE BOX
This is 89 year old male with a history of lung cancer, A-fib, DM, HTN, Hypothyroidism,  HLD, Lumbar radiculopathy who was admitted after he developed mobility disfunction following a fall   one week ago. Patient denied head injury.  Musculo-skeletal imagings failed to show any acute fractures.  CT of brain showed picture compatible with brain metastases.  Patient was transferred to ICU for observation.  Neurology, Neurosurgery and heme-onc. services were summoned.  Patient is alert, oriented.  MRI of brain- brain metastases.  No new events over night.  Heme-onc and palliative care inputs are appreciated.  Patient remans stable, alert, oriented.  Patient wants to be treated.    Vital Signs Last 24 Hrs  T(C): 36.5 (22 Mar 2025 05:17), Max: 36.5 (21 Mar 2025 20:38)  T(F): 97.7 (22 Mar 2025 05:17), Max: 97.7 (21 Mar 2025 20:38)  HR: 68 (22 Mar 2025 05:17) (68 - 76)  BP: 152/70 (22 Mar 2025 05:17) (114/55 - 152/70)  BP(mean): 97 (22 Mar 2025 05:17) (97 - 97)  RR: 18 (22 Mar 2025 05:17) (18 - 20)  SpO2: 98% (22 Mar 2025 05:17) (98% - 99%)    Parameters below as of 22 Mar 2025 05:17  Patient On (Oxygen Delivery Method): room air      T(C): 36.5 (03-22-25 @ 05:17), Max: 36.5 (03-21-25 @ 20:38)  HR: 68 (03-22-25 @ 05:17) (68 - 76)  BP: 152/70 (03-22-25 @ 05:17) (114/55 - 152/70)  RR: 18 (03-22-25 @ 05:17) (18 - 20)  SpO2: 98% (03-22-25 @ 05:17) (98% - 99%)    CONSTITUTIONAL: Well groomed, no apparent distress  EYES: PERRLA and symmetric, EOMI, No conjunctival or scleral injection, non-icteric  ENMT: Oral mucosa with moist membranes. Normal dentition; no pharyngeal injection or exudates             NECK: Supple, symmetric and without tracheal deviation   RESP: No respiratory distress, no use of accessory muscles; CTA b/l, no WRR  CV: RRR, +S1S2, no MRG; no JVD; no peripheral edema  GI: Soft, NT, ND, no rebound, no guarding; no palpable masses; no hepatosplenomegaly; no hernia palpated  LYMPH: No cervical LAD or tenderness; no axillary LAD or tenderness; no inguinal LAD or tenderness  MSK: Normal gait; No digital clubbing or cyanosis; examination of the (head/neck/spine/ribs/pelvis, RUE, LUE, RLE, LLE) without misalignment,            Normal ROM without pain, no spinal tenderness, normal muscle strength/tone  SKIN: No rashes or ulcers noted; no subcutaneous nodules or induration palpable  NEURO: CN II-XII intact; normal reflexes in upper and lower extremities, sensation intact in upper and lower extremities b/l to light touch   PSYCH: Appropriate insight/judgment; A+O x 3, mood and affect appropriate, recent/remote memory intact      MEDICATIONS  (STANDING):  brimonidine 0.2% Ophthalmic Solution 1 Drop(s) Both EYES every 12 hours  dexAMETHasone  Injectable 4 milliGRAM(s) IV Push every 6 hours  erlotinib 100 milliGRAM(s) Oral daily  finasteride 5 milliGRAM(s) Oral daily  insulin lispro (ADMELOG) corrective regimen sliding scale   SubCutaneous three times a day before meals  insulin lispro (ADMELOG) corrective regimen sliding scale   SubCutaneous at bedtime  levothyroxine 25 MICROGram(s) Oral daily  midodrine. 5 milliGRAM(s) Oral three times a day  pantoprazole    Tablet 40 milliGRAM(s) Oral before breakfast  polyethylene glycol 3350 17 Gram(s) Oral daily  senna 1 Tablet(s) Oral daily  tamsulosin 0.4 milliGRAM(s) Oral at bedtime  timolol 0.5% Solution 1 Drop(s) Both EYES every 12 hours

## 2025-03-22 NOTE — CHART NOTE - NSCHARTNOTEFT_GEN_A_CORE
EVENT:  discussed with attending Dr. Garcia goal of care      HPI:  89y male (from home, uses walker at baseline) with PMHx of lung cancer on Tagrisso, HLD, HTN, BPH, hypothyroidism, A-fib on Xarelto, right preauricular abscess, presents for ambulatory dysfunction since a fall 1 week ago. Pt lost balance and fell onto his left side onto floor 1 week ago, with current pain of left lower extremity. Has had increasing imbalance up till now when unable to ambulate. Also had 1 episode of NBNB spit-up after eating in ED, not able to tolerate food due to feeling of stomach burning. Per pt's friend, has HHA 4 hours/day for 3 days a week.  Pt recently admitted here at [facility] for norovirus and hypotensive, was discharged on midodrine and home PT. Denies fever, chills, SOB, cough, wheezing, chest pain, palpitations, diarrhea, polyuria, or pain with urination. (16 Mar 2025 16:43)        OBJECTIVE:  Vital Signs Last 24 Hrs  T(C): 36.5 (22 Mar 2025 05:17), Max: 36.5 (21 Mar 2025 20:38)  T(F): 97.7 (22 Mar 2025 05:17), Max: 97.7 (21 Mar 2025 20:38)  HR: 68 (22 Mar 2025 05:17) (68 - 76)  BP: 152/70 (22 Mar 2025 05:17) (114/55 - 152/70)  BP(mean): 97 (22 Mar 2025 05:17) (97 - 97)  RR: 18 (22 Mar 2025 05:17) (18 - 20)  SpO2: 98% (22 Mar 2025 05:17) (98% - 99%)    Parameters below as of 22 Mar 2025 05:17  Patient On (Oxygen Delivery Method): room air        LABS:                  PLAN:   1. per Dr. Garcia family wants pt. to be treated. Family declines hospice.  2. D/c comfort measures & mews exempt orders per attending

## 2025-03-23 LAB
GLUCOSE BLDC GLUCOMTR-MCNC: 116 MG/DL — HIGH (ref 70–99)
GLUCOSE BLDC GLUCOMTR-MCNC: 133 MG/DL — HIGH (ref 70–99)
GLUCOSE BLDC GLUCOMTR-MCNC: 173 MG/DL — HIGH (ref 70–99)
GLUCOSE BLDC GLUCOMTR-MCNC: 190 MG/DL — HIGH (ref 70–99)

## 2025-03-23 RX ADMIN — TIMOLOL MALEATE 1 DROP(S): 6.8 SOLUTION OPHTHALMIC at 17:26

## 2025-03-23 RX ADMIN — DEXAMETHASONE 4 MILLIGRAM(S): 0.5 TABLET ORAL at 23:04

## 2025-03-23 RX ADMIN — MIDODRINE HYDROCHLORIDE 5 MILLIGRAM(S): 5 TABLET ORAL at 11:47

## 2025-03-23 RX ADMIN — BRIMONIDINE TARTRATE 1 DROP(S): 1.5 SOLUTION/ DROPS OPHTHALMIC at 05:37

## 2025-03-23 RX ADMIN — BRIMONIDINE TARTRATE 1 DROP(S): 1.5 SOLUTION/ DROPS OPHTHALMIC at 17:16

## 2025-03-23 RX ADMIN — Medication 3 MILLIGRAM(S): at 21:57

## 2025-03-23 RX ADMIN — DEXAMETHASONE 4 MILLIGRAM(S): 0.5 TABLET ORAL at 05:37

## 2025-03-23 RX ADMIN — FINASTERIDE 5 MILLIGRAM(S): 1 TABLET, FILM COATED ORAL at 11:47

## 2025-03-23 RX ADMIN — DEXAMETHASONE 4 MILLIGRAM(S): 0.5 TABLET ORAL at 11:47

## 2025-03-23 RX ADMIN — INSULIN LISPRO 1: 100 INJECTION, SOLUTION INTRAVENOUS; SUBCUTANEOUS at 11:43

## 2025-03-23 RX ADMIN — Medication 25 MICROGRAM(S): at 05:36

## 2025-03-23 RX ADMIN — MIDODRINE HYDROCHLORIDE 5 MILLIGRAM(S): 5 TABLET ORAL at 17:00

## 2025-03-23 RX ADMIN — POLYETHYLENE GLYCOL 3350 17 GRAM(S): 17 POWDER, FOR SOLUTION ORAL at 11:48

## 2025-03-23 RX ADMIN — Medication 1 TABLET(S): at 11:49

## 2025-03-23 RX ADMIN — ERLOTINIB 100 MILLIGRAM(S): 150 TABLET, FILM COATED ORAL at 11:52

## 2025-03-23 RX ADMIN — TIMOLOL MALEATE 1 DROP(S): 6.8 SOLUTION OPHTHALMIC at 05:37

## 2025-03-23 RX ADMIN — DEXAMETHASONE 4 MILLIGRAM(S): 0.5 TABLET ORAL at 17:16

## 2025-03-23 RX ADMIN — TAMSULOSIN HYDROCHLORIDE 0.4 MILLIGRAM(S): 0.4 CAPSULE ORAL at 21:57

## 2025-03-23 RX ADMIN — INSULIN LISPRO 1: 100 INJECTION, SOLUTION INTRAVENOUS; SUBCUTANEOUS at 16:59

## 2025-03-23 NOTE — PROGRESS NOTE ADULT - SUBJECTIVE AND OBJECTIVE BOX
This is 89 year old male with a history of lung cancer, A-fib, DM, HTN, Hypothyroidism,  HLD, Lumbar radiculopathy who was admitted after he developed mobility disfunction following a fall   one week ago. Patient denied head injury.  Musculo-skeletal imagings failed to show any acute fractures.  CT of brain showed picture compatible with brain metastases.  Patient was transferred to ICU for observation.  Neurology, Neurosurgery and heme-onc. services were summoned.  Patient is alert, oriented.  MRI of brain- brain metastases.  No new events over night.  Heme-onc and palliative care inputs are appreciated.  Patient is alert, in NAD      Vital Signs Last 24 Hrs  T(C): 36.6 (23 Mar 2025 05:26), Max: 36.6 (22 Mar 2025 14:00)  T(F): 97.9 (23 Mar 2025 05:26), Max: 97.9 (22 Mar 2025 20:45)  HR: 69 (23 Mar 2025 11:44) (69 - 80)  BP: 97/56 (23 Mar 2025 11:44) (97/56 - 132/74)  BP(mean): 90 (23 Mar 2025 05:26) (86 - 90)  RR: 18 (23 Mar 2025 05:26) (18 - 20)  SpO2: 97% (23 Mar 2025 05:26) (97% - 97%)    Parameters below as of 23 Mar 2025 05:26  Patient On (Oxygen Delivery Method): room air        T(C): 36.6 (03-23-25 @ 05:26), Max: 36.6 (03-22-25 @ 14:00)  HR: 69 (03-23-25 @ 11:44) (69 - 80)  BP: 97/56 (03-23-25 @ 11:44) (97/56 - 132/74)  RR: 18 (03-23-25 @ 05:26) (18 - 20)  SpO2: 97% (03-23-25 @ 05:26) (97% - 97%)    CONSTITUTIONAL: Well groomed, no apparent distress  EYES: PERRLA and symmetric, EOMI, No conjunctival or scleral injection, non-icteric  ENMT: Oral mucosa with moist membranes. Normal dentition; no pharyngeal injection or exudates             NECK: Supple, symmetric and without tracheal deviation   RESP: No respiratory distress, no use of accessory muscles; CTA b/l, no WRR  CV: RRR, +S1S2, no MRG; no JVD; no peripheral edema  GI: Soft, NT, ND, no rebound, no guarding; no palpable masses; no hepatosplenomegaly; no hernia palpated  LYMPH: No cervical LAD or tenderness; no axillary LAD or tenderness; no inguinal LAD or tenderness  MSK: Normal gait; No digital clubbing or cyanosis; examination of the (head/neck/spine/ribs/pelvis, RUE, LUE, RLE, LLE) without misalignment,            Normal ROM without pain, no spinal tenderness, normal muscle strength/tone  SKIN: No rashes or ulcers noted; no subcutaneous nodules or induration palpable  NEURO: CN II-XII intact; normal reflexes in upper and lower extremities, sensation intact in upper and lower extremities b/l to light touch   PSYCH: Appropriate insight/judgment; A+O x 3, mood and affect appropriate, recent/remote memory intact    MEDICATIONS  (STANDING):  brimonidine 0.2% Ophthalmic Solution 1 Drop(s) Both EYES every 12 hours  dexAMETHasone  Injectable 4 milliGRAM(s) IV Push every 6 hours  erlotinib 100 milliGRAM(s) Oral daily  finasteride 5 milliGRAM(s) Oral daily  insulin lispro (ADMELOG) corrective regimen sliding scale   SubCutaneous three times a day before meals  insulin lispro (ADMELOG) corrective regimen sliding scale   SubCutaneous at bedtime  levothyroxine 25 MICROGram(s) Oral daily  midodrine. 5 milliGRAM(s) Oral three times a day  pantoprazole    Tablet 40 milliGRAM(s) Oral before breakfast  polyethylene glycol 3350 17 Gram(s) Oral daily  senna 1 Tablet(s) Oral daily  tamsulosin 0.4 milliGRAM(s) Oral at bedtime  timolol 0.5% Solution 1 Drop(s) Both EYES every 12 hours

## 2025-03-24 DIAGNOSIS — R74.01 ELEVATION OF LEVELS OF LIVER TRANSAMINASE LEVELS: ICD-10-CM

## 2025-03-24 LAB
ALBUMIN SERPL ELPH-MCNC: 2.8 G/DL — LOW (ref 3.5–5)
ALP SERPL-CCNC: 146 U/L — HIGH (ref 40–120)
ALT FLD-CCNC: 490 U/L DA — HIGH (ref 10–60)
ANION GAP SERPL CALC-SCNC: 7 MMOL/L — SIGNIFICANT CHANGE UP (ref 5–17)
AST SERPL-CCNC: 103 U/L — HIGH (ref 10–40)
BILIRUB SERPL-MCNC: 1.5 MG/DL — HIGH (ref 0.2–1.2)
BUN SERPL-MCNC: 54 MG/DL — HIGH (ref 7–18)
CALCIUM SERPL-MCNC: 8.4 MG/DL — SIGNIFICANT CHANGE UP (ref 8.4–10.5)
CHLORIDE SERPL-SCNC: 109 MMOL/L — HIGH (ref 96–108)
CO2 SERPL-SCNC: 24 MMOL/L — SIGNIFICANT CHANGE UP (ref 22–31)
CREAT SERPL-MCNC: 1.2 MG/DL — SIGNIFICANT CHANGE UP (ref 0.5–1.3)
EGFR: 56 ML/MIN/1.73M2 — LOW
EGFR: 56 ML/MIN/1.73M2 — LOW
GLUCOSE BLDC GLUCOMTR-MCNC: 134 MG/DL — HIGH (ref 70–99)
GLUCOSE BLDC GLUCOMTR-MCNC: 153 MG/DL — HIGH (ref 70–99)
GLUCOSE BLDC GLUCOMTR-MCNC: 167 MG/DL — HIGH (ref 70–99)
GLUCOSE BLDC GLUCOMTR-MCNC: 210 MG/DL — HIGH (ref 70–99)
GLUCOSE SERPL-MCNC: 146 MG/DL — HIGH (ref 70–99)
HCT VFR BLD CALC: 38.9 % — LOW (ref 39–50)
HGB BLD-MCNC: 13.9 G/DL — SIGNIFICANT CHANGE UP (ref 13–17)
MAGNESIUM SERPL-MCNC: 2.5 MG/DL — SIGNIFICANT CHANGE UP (ref 1.6–2.6)
MCHC RBC-ENTMCNC: 33.6 PG — SIGNIFICANT CHANGE UP (ref 27–34)
MCHC RBC-ENTMCNC: 35.7 G/DL — SIGNIFICANT CHANGE UP (ref 32–36)
MCV RBC AUTO: 94 FL — SIGNIFICANT CHANGE UP (ref 80–100)
NRBC BLD AUTO-RTO: 0 /100 WBCS — SIGNIFICANT CHANGE UP (ref 0–0)
PHOSPHATE SERPL-MCNC: 3.6 MG/DL — SIGNIFICANT CHANGE UP (ref 2.5–4.5)
PLATELET # BLD AUTO: 103 K/UL — LOW (ref 150–400)
POTASSIUM SERPL-MCNC: 4.8 MMOL/L — SIGNIFICANT CHANGE UP (ref 3.5–5.3)
POTASSIUM SERPL-SCNC: 4.8 MMOL/L — SIGNIFICANT CHANGE UP (ref 3.5–5.3)
PROT SERPL-MCNC: 5.4 G/DL — LOW (ref 6–8.3)
RBC # BLD: 4.14 M/UL — LOW (ref 4.2–5.8)
RBC # FLD: 16.6 % — HIGH (ref 10.3–14.5)
SODIUM SERPL-SCNC: 140 MMOL/L — SIGNIFICANT CHANGE UP (ref 135–145)
WBC # BLD: 8.7 K/UL — SIGNIFICANT CHANGE UP (ref 3.8–10.5)
WBC # FLD AUTO: 8.7 K/UL — SIGNIFICANT CHANGE UP (ref 3.8–10.5)

## 2025-03-24 PROCEDURE — 76705 ECHO EXAM OF ABDOMEN: CPT | Mod: 26

## 2025-03-24 RX ADMIN — DEXAMETHASONE 4 MILLIGRAM(S): 0.5 TABLET ORAL at 12:49

## 2025-03-24 RX ADMIN — INSULIN LISPRO 1: 100 INJECTION, SOLUTION INTRAVENOUS; SUBCUTANEOUS at 12:26

## 2025-03-24 RX ADMIN — FINASTERIDE 5 MILLIGRAM(S): 1 TABLET, FILM COATED ORAL at 12:51

## 2025-03-24 RX ADMIN — TIMOLOL MALEATE 1 DROP(S): 6.8 SOLUTION OPHTHALMIC at 17:08

## 2025-03-24 RX ADMIN — Medication 25 MICROGRAM(S): at 06:22

## 2025-03-24 RX ADMIN — TAMSULOSIN HYDROCHLORIDE 0.4 MILLIGRAM(S): 0.4 CAPSULE ORAL at 21:12

## 2025-03-24 RX ADMIN — ERLOTINIB 100 MILLIGRAM(S): 150 TABLET, FILM COATED ORAL at 13:35

## 2025-03-24 RX ADMIN — TIMOLOL MALEATE 1 DROP(S): 6.8 SOLUTION OPHTHALMIC at 06:23

## 2025-03-24 RX ADMIN — Medication 40 MILLIGRAM(S): at 06:22

## 2025-03-24 RX ADMIN — BRIMONIDINE TARTRATE 1 DROP(S): 1.5 SOLUTION/ DROPS OPHTHALMIC at 06:23

## 2025-03-24 RX ADMIN — BRIMONIDINE TARTRATE 1 DROP(S): 1.5 SOLUTION/ DROPS OPHTHALMIC at 17:08

## 2025-03-24 RX ADMIN — MIDODRINE HYDROCHLORIDE 5 MILLIGRAM(S): 5 TABLET ORAL at 12:52

## 2025-03-24 RX ADMIN — DEXAMETHASONE 4 MILLIGRAM(S): 0.5 TABLET ORAL at 06:23

## 2025-03-24 RX ADMIN — Medication 1 TABLET(S): at 12:52

## 2025-03-24 RX ADMIN — INSULIN LISPRO 2: 100 INJECTION, SOLUTION INTRAVENOUS; SUBCUTANEOUS at 17:07

## 2025-03-24 RX ADMIN — DEXAMETHASONE 4 MILLIGRAM(S): 0.5 TABLET ORAL at 18:03

## 2025-03-24 NOTE — PROGRESS NOTE ADULT - PROBLEM SELECTOR PLAN 2
-Lung CA (Diagnosed 2 years ago per pt) and pt takes  Erlotinib 100mg QD  -Follows oupt Heme/Onc Doris Martinez  -CT and MR result above  -c/w Erlotinib 100mg QD and Decadron per heme/onc  -Heme/Onc Dr. Drummond following

## 2025-03-24 NOTE — PROGRESS NOTE ADULT - ASSESSMENT
89 year old male with a history of lung cancer, A-fib, DM, HTN,   HLD, Hypothyroidism, Lumbar radiculopathy was admitted due to inability to walk  following a fall one week PTA.  Patient was found to have brain metastases.  Patient was transferred to ICU for close monitoring.  Neurology consult, Neurosurgery consult, Heme-onc. consult.  MRI of brain - metastases  MRI C-spine- no mets  Oncology consult is appreciated.  Continue Dexamethasone.  Palliative care consult.  Patient was downgraded to the regular floor.  Oncology and Palliative consults are appreciated.  Continue Dexamethsone.  Patient' LFT are up,  Abdominal ultrasound was done, no liver pathology was found, gallstones without cholecystitis.  BUN is trending up, most likely pre-renal.  Increase fluids.  Now patient's proxy wants patient to be discharged home to follow up with his oncologist.   I spoke to patient's son, he is trying to make arrangements for his dad to be discharged home.  Home aid will be provided privately.  Case was discussed with house staff.

## 2025-03-24 NOTE — PROGRESS NOTE ADULT - PROBLEM SELECTOR PLAN 8
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   -Hgb 10.9 on admission  -stable  monitor CBC -Pt has a history of hypothyroidism, takes Levothyroxine at home  -C/w Levothyroxine

## 2025-03-24 NOTE — PROGRESS NOTE ADULT - PROBLEM SELECTOR PLAN 10
-From home, HHA 4h/day  -f/u heme/onc for steroid taper and Erlotinib    -DC plan: HCPs is currently deciding between pt returning home  with 24/7 HHA vs LTC placement. -From home, HHA 4h/day  f/u heme/onc regarding steroid taper    -DC plan:   family does not want Hospice  HCPs is currently deciding between pt returning home  with 24/7 HHA vs LTC placement. -DVT ppx : SCD, hold AC in setting of brain mets vs hemorrhage  -GI ppx: PPI while on Steroid

## 2025-03-24 NOTE — PROGRESS NOTE ADULT - PROBLEM SELECTOR PLAN 7
-Pt has a history of hypothyroidism, takes Levothyroxine at home  -C/w Levothyroxine -Pt has a history of BPH, takes tamsulosin 0.4mg and finasteride 5mg at home   -C/w Tamsulosin and Finasteride

## 2025-03-24 NOTE — PROGRESS NOTE ADULT - PROBLEM SELECTOR PLAN 5
-hx hypotension and pt takes Midodrine 5mg TID at home  -c/w home med with parameter -Pt has a history of Afib, takes Xarelto and metoprolol succinate 12.5mg qd, Digoxin at home  - Hold Xarelto in setting of brain mets vs hemorrhage  - Hold metoprolol succinate, Digoxin in the setting of bradycardia

## 2025-03-24 NOTE — PROGRESS NOTE ADULT - ASSESSMENT
89 yo male (from home, uses walker at baseline) with PMHx of lung cancer on Erlotinib, HLD, HTN, BPH, hypothyroidism, A-fib on Xarelto, presents for ambulatory dysfunction since a fall 1 week ago (no head trauma).  CT Head showed multiple new sites of edema in the left frontal, parietal lobes and bilateral temporal lobe suspicious for intracranial metastases. Local mass effect without herniation or hydrocephalus. Admitted to ICU per Neurosurgery and Neurologist reccs for closer monitoring, started on Dexamethasone, remained hemodynamically stable and downgraded to medicine. Palliative consulted for GOC, made DNR/DNI. HemOnc recommending continuing Dexamethasone 4mg q6h and PPI,  Patient was initially for LTC with hospice placement, however pt's HCP spoke with SW. Does not want HOSPICe and HCPs are currently deciding between pt returning homewith 24/7 HHA vs LTC placement.

## 2025-03-24 NOTE — PROGRESS NOTE ADULT - PROBLEM SELECTOR PLAN 6
-Pt has a history of BPH, takes tamsulosin 0.4mg and finasteride 5mg at home   -C/w Tamsulosin and Finasteride -hx hypotension and pt takes Midodrine 5mg TID at home  -c/w home med with parameter

## 2025-03-24 NOTE — PROGRESS NOTE ADULT - SUBJECTIVE AND OBJECTIVE BOX
NP Note discussed with  Primary Attending    Patient is a 89y old  Male who presents with a chief complaint of suspect brain metastasis (23 Mar 2025 12:21)      INTERVAL HPI/OVERNIGHT EVENTS: no acute events overnight    MEDICATIONS  (STANDING):  brimonidine 0.2% Ophthalmic Solution 1 Drop(s) Both EYES every 12 hours  dexAMETHasone  Injectable 4 milliGRAM(s) IV Push every 6 hours  erlotinib 100 milliGRAM(s) Oral daily  finasteride 5 milliGRAM(s) Oral daily  insulin lispro (ADMELOG) corrective regimen sliding scale   SubCutaneous three times a day before meals  insulin lispro (ADMELOG) corrective regimen sliding scale   SubCutaneous at bedtime  levothyroxine 25 MICROGram(s) Oral daily  midodrine. 5 milliGRAM(s) Oral three times a day  pantoprazole    Tablet 40 milliGRAM(s) Oral before breakfast  polyethylene glycol 3350 17 Gram(s) Oral daily  senna 1 Tablet(s) Oral daily  tamsulosin 0.4 milliGRAM(s) Oral at bedtime  timolol 0.5% Solution 1 Drop(s) Both EYES every 12 hours    MEDICATIONS  (PRN):  acetaminophen     Tablet .. 650 milliGRAM(s) Oral every 6 hours PRN Temp greater or equal to 38C (100.4F), Mild Pain (1 - 3)  albuterol    90 MICROgram(s) HFA Inhaler 2 Puff(s) Inhalation every 6 hours PRN Bronchospasm  aluminum hydroxide/magnesium hydroxide/simethicone Suspension 30 milliLiter(s) Oral every 4 hours PRN Dyspepsia  melatonin 3 milliGRAM(s) Oral at bedtime PRN Insomnia      __________________________________________________  REVIEW OF SYSTEMS:    CONSTITUTIONAL: No fever,   EYES: no acute visual disturbances  NECK: No pain or stiffness  RESPIRATORY: No cough; No shortness of breath  CARDIOVASCULAR: No chest pain, no palpitations  GASTROINTESTINAL: No pain. No nausea or vomiting; No diarrhea   NEUROLOGICAL: No headache or numbness, no tremors  MUSCULOSKELETAL: No joint pain, no muscle pain  GENITOURINARY: no dysuria, no frequency, no hesitancy  PSYCHIATRY: no depression , no anxiety  ALL OTHER  ROS negative        Vital Signs Last 24 Hrs  T(C): 36.3 (24 Mar 2025 05:02), Max: 36.5 (23 Mar 2025 20:53)  T(F): 97.4 (24 Mar 2025 05:02), Max: 97.7 (23 Mar 2025 20:53)  HR: 60 (24 Mar 2025 05:02) (60 - 78)  BP: 147/74 (24 Mar 2025 05:02) (97/56 - 147/74)  BP(mean): 88 (24 Mar 2025 05:02) (75 - 88)  RR: 17 (24 Mar 2025 05:02) (17 - 18)  SpO2: 98% (24 Mar 2025 05:02) (97% - 99%)    Parameters below as of 24 Mar 2025 05:02  Patient On (Oxygen Delivery Method): room air        ________________________________________________  PHYSICAL EXAM:  GENERAL: NAD  HEENT: Normocephalic  NECK : supple  CHEST/LUNG: Clear to auscultation bilaterally  HEART: S1 S2  regular  ABDOMEN: Soft, Nontender, Nondistended; Bowel sounds present  EXTREMITIES: no edema  SKIN: warm and dry; no rash  NERVOUS SYSTEM:  Awake and alert; Oriented  to place, person and time    _________________________________________________  LABS:                        13.9   8.70  )-----------( 103      ( 24 Mar 2025 05:30 )             38.9     03-24    140  |  109[H]  |  54[H]  ----------------------------<  146[H]  4.8   |  24  |  1.20    Ca    8.4      24 Mar 2025 05:30  Phos  3.6     03-24  Mg     2.5     03-24    TPro  5.4[L]  /  Alb  2.8[L]  /  TBili  1.5[H]  /  DBili  x   /  AST  103[H]  /  ALT  490[H]  /  AlkPhos  146[H]  03-24      Urinalysis Basic - ( 24 Mar 2025 05:30 )    Color: x / Appearance: x / SG: x / pH: x  Gluc: 146 mg/dL / Ketone: x  / Bili: x / Urobili: x   Blood: x / Protein: x / Nitrite: x   Leuk Esterase: x / RBC: x / WBC x   Sq Epi: x / Non Sq Epi: x / Bacteria: x      CAPILLARY BLOOD GLUCOSE      POCT Blood Glucose.: 134 mg/dL (24 Mar 2025 07:51)  POCT Blood Glucose.: 133 mg/dL (23 Mar 2025 21:28)  POCT Blood Glucose.: 173 mg/dL (23 Mar 2025 16:32)  POCT Blood Glucose.: 190 mg/dL (23 Mar 2025 11:34)        RADIOLOGY & ADDITIONAL TESTS:  < from: MR Head w/wo IV Cont (03.17.25 @ 18:10) >  INDINGS:  Unremarkable heterogeneous T1 marrow signal. No cord signal abnormality.   No abnormal enhancement to suggest osseous metastasis. Severe disc   desiccation at C2/C3 C5/C6 levels. Moderate disc desiccation at C7/T1 and   T1/T2 levels. No bone marrow edema. There is no evidence for acute   fracture. A normal lordosis is noted. Craniocervical junction is normal.   The cervicovertebral body heights and remaining intervertebral disc   spaces are preserved. There is no prevertebral soft tissue abnormality.      Evaluation of the individual levels:  C2/C3 level: Broad-based ridging causing mild bilateral foraminal   narrowing. No spinal canal stenosis.  C3/C4 level: Broad-based disc disc complex causing severe right-sided   foraminal narrowing. Mild left-sided foraminal narrowing. Mild cord   impingement. Mild spinal canal stenosis. No cord signal abnormality.  C4/C5 level: Broad-based ridging causing mild to moderate cord   compression. Severe bilateral foraminal narrowing. Mild to moderate   spinal canal stenosis.  C5/C6 level: Broad-based ridging causing severe right-sided foraminal   narrowing. Moderate left-sided foraminal narrowing. Mild flattening of   the cord. No spinal canal stenosis.  C6/C7 level: Broad-based diffuse complex causing moderate bilateral   foraminal narrowing. No spinal canal stenosis.    < end of copied text >      Imaging Personally Reviewed:  YES    Consultant(s) Notes Reviewed:   YES      Plan of care was discussed with patient and /or primary care giver; all questions and concerns were addressed and care was aligned with patient's wishes.

## 2025-03-24 NOTE — PROGRESS NOTE ADULT - PROBLEM SELECTOR PLAN 1
-CTH: Multiple new sites (since Oct 24') of edema in the left frontal, parietal lobes and bilateral temporal lobe suspicious for intracranial metastases. Largest area of edema is in the left frontal parietal lobes. There is local mass effect without herniation or hydrocephalus. Punctate focus of hyper-density in the right temporal lobe (series 2 image 20) in the area of edema. This may represent underlying intracranial metastasis versus punctate hemorrhage -Repeat CTH (6hrs): no changes   -MRI BRAIN: Multiple bilateral supratentorial and infratentorial metastatic lesions with moderate to severe associated vasogenic edema.  -MRI CERVICAL SPINE: No evidence of osseous metastasis. Multilevel cord impingement. No cord signal abnormality. No abnormal enhancement.  -MR Thoracic Spine w/wo IV Cont: Unremarkable MR of the thoracic spine.  -s/p ICU monitoring x 24hr.   -Started Decadron per heme/oncology    -Keep systolic BP < 150mmhg, on midodrine   -Ne[facility]Sx at [facility] following - Dr. Pringle, recs appreciated  -Neurology (dr Gillis) consulted  -Heme/Onc Dr. Drummond following  -c/w Decadron 4mg q6hrs (Taper for 2 weeks as per Heme/onc) with PPI   - family does NOT want Hospice

## 2025-03-24 NOTE — PROGRESS NOTE ADULT - PROBLEM SELECTOR PLAN 3
-No head trauma or LOC from recent fall (3/11/25)  -Pt complaining of trouble walking and pain in the left buttocks/hip  -CT pelvis negative for acute fracture  -likely due to loss of balance from brain mets and edema vs orthostatic hypotension  -PT recs SB,   -C/w Tylenol for pain control elevated LFTs  no abd pain. tolerating diet well  f/u RUQ US  monitor CMP

## 2025-03-24 NOTE — PROGRESS NOTE ADULT - SUBJECTIVE AND OBJECTIVE BOX
This is 89 year old male with a history of lung cancer, A-fib, DM, HTN, Hypothyroidism,  HLD, Lumbar radiculopathy who was admitted after he developed mobility disfunction following a fall   one week ago. Patient denied head injury.  Musculo-skeletal imagings failed to show any acute fractures.  CT of brain showed picture compatible with brain metastases.  Patient was transferred to ICU for observation.  Neurology, Neurosurgery and heme-onc. services were summoned.  Patient is alert, oriented.  MRI of brain- brain metastases.  No new events over night.  Heme-onc and palliative care inputs are appreciated.  Patient is alert, in NAD, wants to go home.    Vital Signs Last 24 Hrs  T(C): 36.4 (24 Mar 2025 17:37), Max: 36.7 (24 Mar 2025 13:20)  T(F): 97.6 (24 Mar 2025 17:37), Max: 98.1 (24 Mar 2025 13:20)  HR: 94 (24 Mar 2025 17:37) (60 - 94)  BP: 125/73 (24 Mar 2025 17:37) (118/74 - 147/74)  BP(mean): 88 (24 Mar 2025 05:02) (87 - 88)  RR: 18 (24 Mar 2025 17:37) (17 - 18)  SpO2: 99% (24 Mar 2025 17:37) (96% - 99%)    Parameters below as of 24 Mar 2025 17:37  Patient On (Oxygen Delivery Method): room air      T(C): 36.4 (03-24-25 @ 17:37), Max: 36.7 (03-24-25 @ 13:20)  HR: 94 (03-24-25 @ 17:37) (60 - 94)  BP: 125/73 (03-24-25 @ 17:37) (118/74 - 147/74)  RR: 18 (03-24-25 @ 17:37) (17 - 18)  SpO2: 99% (03-24-25 @ 17:37) (96% - 99%)    CONSTITUTIONAL: Well groomed, no apparent distress  EYES: PERRLA and symmetric, EOMI, No conjunctival or scleral injection, non-icteric  ENMT: Oral mucosa with moist membranes. Normal dentition; no pharyngeal injection or exudates             NECK: Supple, symmetric and without tracheal deviation   RESP: No respiratory distress, no use of accessory muscles; CTA b/l, no WRR  CV: RRR, +S1S2, no MRG; no JVD; no peripheral edema  GI: Soft, NT, ND, no rebound, no guarding; no palpable masses; no hepatosplenomegaly; no hernia palpated  LYMPH: No cervical LAD or tenderness; no axillary LAD or tenderness; no inguinal LAD or tenderness  MSK: Normal gait; No digital clubbing or cyanosis; examination of the (head/neck/spine/ribs/pelvis, RUE, LUE, RLE, LLE) without misalignment,            Normal ROM without pain, no spinal tenderness, normal muscle strength/tone  SKIN: No rashes or ulcers noted; no subcutaneous nodules or induration palpable  NEURO: CN II-XII intact; normal reflexes in upper and lower extremities, sensation intact in upper and lower extremities b/l to light touch   PSYCH: Appropriate insight/judgment; A+O x 3, mood and affect appropriate, recent/remote memory intact               CBC Full  -  ( 24 Mar 2025 05:30 )  WBC Count : 8.70 K/uL  RBC Count : 4.14 M/uL  Hemoglobin : 13.9 g/dL  Hematocrit : 38.9 %  Platelet Count - Automated : 103 K/uL  Mean Cell Volume : 94.0 fl  Mean Cell Hemoglobin : 33.6 pg  Mean Cell Hemoglobin Concentration : 35.7 g/dL  Auto Neutrophil # : x  Auto Lymphocyte # : x  Auto Monocyte # : x  Auto Eosinophil # : x  Auto Basophil # : x  Auto Neutrophil % : x  Auto Lymphocyte % : x  Auto Monocyte % : x  Auto Eosinophil % : x  Auto Basophil % : x      03-24    140  |  109[H]  |  54[H]  ----------------------------<  146[H]  4.8   |  24  |  1.20    Ca    8.4      24 Mar 2025 05:30  Phos  3.6     03-24  Mg     2.5     03-24    TPro  5.4[L]  /  Alb  2.8[L]  /  TBili  1.5[H]  /  DBili  x   /  AST  103[H]  /  ALT  490[H]  /  AlkPhos  146[H]  03-24      MEDICATIONS  (STANDING):  brimonidine 0.2% Ophthalmic Solution 1 Drop(s) Both EYES every 12 hours  dexAMETHasone  Injectable 4 milliGRAM(s) IV Push every 6 hours  erlotinib 100 milliGRAM(s) Oral daily  finasteride 5 milliGRAM(s) Oral daily  insulin lispro (ADMELOG) corrective regimen sliding scale   SubCutaneous three times a day before meals  insulin lispro (ADMELOG) corrective regimen sliding scale   SubCutaneous at bedtime  levothyroxine 25 MICROGram(s) Oral daily  midodrine. 5 milliGRAM(s) Oral three times a day  pantoprazole    Tablet 40 milliGRAM(s) Oral before breakfast  polyethylene glycol 3350 17 Gram(s) Oral daily  senna 1 Tablet(s) Oral daily  tamsulosin 0.4 milliGRAM(s) Oral at bedtime  timolol 0.5% Solution 1 Drop(s) Both EYES every 12 hours

## 2025-03-24 NOTE — PROGRESS NOTE ADULT - PROBLEM SELECTOR PLAN 4
-Pt has a history of Afib, takes Xarelto and metoprolol succinate 12.5mg qd, Digoxin at home  - Hold Xarelto in setting of brain mets vs hemorrhage  - Hold metoprolol succinate, Digoxin in the setting of bradycardia -No head trauma or LOC from recent fall (3/11/25)  -Pt complaining of trouble walking and pain in the left buttocks/hip  -CT pelvis negative for acute fracture  -likely due to loss of balance from brain mets and edema vs orthostatic hypotension  -PT recs SB,   -C/w Tylenol for pain control

## 2025-03-24 NOTE — PROGRESS NOTE ADULT - PROBLEM SELECTOR PLAN 9
-DVT ppx : SCD, hold AC in setting of brain mets vs hemorrhage  -GI ppx: PPI while on Steroid -w/ metastatic lung Ca to brain.   -Hgb 10.9 on admission  -stable  monitor CBC

## 2025-03-25 LAB
ALBUMIN SERPL ELPH-MCNC: 2.7 G/DL — LOW (ref 3.5–5)
ALP SERPL-CCNC: 144 U/L — HIGH (ref 40–120)
ALT FLD-CCNC: 483 U/L DA — HIGH (ref 10–60)
ANION GAP SERPL CALC-SCNC: 7 MMOL/L — SIGNIFICANT CHANGE UP (ref 5–17)
AST SERPL-CCNC: 135 U/L — HIGH (ref 10–40)
BILIRUB SERPL-MCNC: 1.8 MG/DL — HIGH (ref 0.2–1.2)
BUN SERPL-MCNC: 57 MG/DL — HIGH (ref 7–18)
CALCIUM SERPL-MCNC: 8.2 MG/DL — LOW (ref 8.4–10.5)
CHLORIDE SERPL-SCNC: 111 MMOL/L — HIGH (ref 96–108)
CO2 SERPL-SCNC: 23 MMOL/L — SIGNIFICANT CHANGE UP (ref 22–31)
CREAT SERPL-MCNC: 1.23 MG/DL — SIGNIFICANT CHANGE UP (ref 0.5–1.3)
EGFR: 55 ML/MIN/1.73M2 — LOW
EGFR: 55 ML/MIN/1.73M2 — LOW
GLUCOSE BLDC GLUCOMTR-MCNC: 127 MG/DL — HIGH (ref 70–99)
GLUCOSE BLDC GLUCOMTR-MCNC: 141 MG/DL — HIGH (ref 70–99)
GLUCOSE BLDC GLUCOMTR-MCNC: 153 MG/DL — HIGH (ref 70–99)
GLUCOSE BLDC GLUCOMTR-MCNC: 185 MG/DL — HIGH (ref 70–99)
GLUCOSE SERPL-MCNC: 157 MG/DL — HIGH (ref 70–99)
HCT VFR BLD CALC: 38.6 % — LOW (ref 39–50)
HGB BLD-MCNC: 13.5 G/DL — SIGNIFICANT CHANGE UP (ref 13–17)
MCHC RBC-ENTMCNC: 32.5 PG — SIGNIFICANT CHANGE UP (ref 27–34)
MCHC RBC-ENTMCNC: 35 G/DL — SIGNIFICANT CHANGE UP (ref 32–36)
MCV RBC AUTO: 92.8 FL — SIGNIFICANT CHANGE UP (ref 80–100)
NRBC BLD AUTO-RTO: 0 /100 WBCS — SIGNIFICANT CHANGE UP (ref 0–0)
PLATELET # BLD AUTO: 97 K/UL — LOW (ref 150–400)
POTASSIUM SERPL-MCNC: 4.7 MMOL/L — SIGNIFICANT CHANGE UP (ref 3.5–5.3)
POTASSIUM SERPL-SCNC: 4.7 MMOL/L — SIGNIFICANT CHANGE UP (ref 3.5–5.3)
PROT SERPL-MCNC: 5 G/DL — LOW (ref 6–8.3)
RBC # BLD: 4.16 M/UL — LOW (ref 4.2–5.8)
RBC # FLD: 16.9 % — HIGH (ref 10.3–14.5)
SODIUM SERPL-SCNC: 141 MMOL/L — SIGNIFICANT CHANGE UP (ref 135–145)
WBC # BLD: 8.19 K/UL — SIGNIFICANT CHANGE UP (ref 3.8–10.5)
WBC # FLD AUTO: 8.19 K/UL — SIGNIFICANT CHANGE UP (ref 3.8–10.5)

## 2025-03-25 RX ADMIN — MIDODRINE HYDROCHLORIDE 5 MILLIGRAM(S): 5 TABLET ORAL at 12:17

## 2025-03-25 RX ADMIN — POLYETHYLENE GLYCOL 3350 17 GRAM(S): 17 POWDER, FOR SOLUTION ORAL at 12:17

## 2025-03-25 RX ADMIN — Medication 25 MICROGRAM(S): at 05:06

## 2025-03-25 RX ADMIN — INSULIN LISPRO 1: 100 INJECTION, SOLUTION INTRAVENOUS; SUBCUTANEOUS at 17:21

## 2025-03-25 RX ADMIN — DEXAMETHASONE 4 MILLIGRAM(S): 0.5 TABLET ORAL at 05:12

## 2025-03-25 RX ADMIN — DEXAMETHASONE 4 MILLIGRAM(S): 0.5 TABLET ORAL at 12:16

## 2025-03-25 RX ADMIN — MIDODRINE HYDROCHLORIDE 5 MILLIGRAM(S): 5 TABLET ORAL at 17:21

## 2025-03-25 RX ADMIN — Medication 1 TABLET(S): at 12:16

## 2025-03-25 RX ADMIN — TIMOLOL MALEATE 1 DROP(S): 6.8 SOLUTION OPHTHALMIC at 17:24

## 2025-03-25 RX ADMIN — TAMSULOSIN HYDROCHLORIDE 0.4 MILLIGRAM(S): 0.4 CAPSULE ORAL at 21:26

## 2025-03-25 RX ADMIN — TIMOLOL MALEATE 1 DROP(S): 6.8 SOLUTION OPHTHALMIC at 05:15

## 2025-03-25 RX ADMIN — DEXAMETHASONE 4 MILLIGRAM(S): 0.5 TABLET ORAL at 17:20

## 2025-03-25 RX ADMIN — Medication 40 MILLIGRAM(S): at 06:32

## 2025-03-25 RX ADMIN — INSULIN LISPRO 1: 100 INJECTION, SOLUTION INTRAVENOUS; SUBCUTANEOUS at 12:19

## 2025-03-25 RX ADMIN — FINASTERIDE 5 MILLIGRAM(S): 1 TABLET, FILM COATED ORAL at 12:17

## 2025-03-25 RX ADMIN — BRIMONIDINE TARTRATE 1 DROP(S): 1.5 SOLUTION/ DROPS OPHTHALMIC at 05:15

## 2025-03-25 RX ADMIN — DEXAMETHASONE 4 MILLIGRAM(S): 0.5 TABLET ORAL at 00:16

## 2025-03-25 RX ADMIN — BRIMONIDINE TARTRATE 1 DROP(S): 1.5 SOLUTION/ DROPS OPHTHALMIC at 17:25

## 2025-03-25 NOTE — CHART NOTE - NSCHARTNOTEFT_GEN_A_CORE
Assessment:   89yMalePatient is a 89y old  Male who presents with a chief complaint of suspect brain metastasis (25 Mar 2025 11:52). Pt visited. Pt OOb to chair. Friend  at bedside.  Food choices updated. D/W F & N dept. Po tolerated. Pt  w Good appetite. Pt is On Ensure Plus HP BID. Palliative on case.       Factors impacting intake: [ ]x none [ ] nausea  [ ] vomiting [ ] diarrhea [ ] constipation  [ ]chewing problems [ ] swallowing issues  [ ] other:     Diet Prescription: Soft and Bite Size, Ensure plus HP BID  Intake: ~75- 100% of meal.       Pertinent Medications: MEDICATIONS  (STANDING):  brimonidine 0.2% Ophthalmic Solution 1 Drop(s) Both EYES every 12 hours  dexAMETHasone  Injectable 4 milliGRAM(s) IV Push every 6 hours  erlotinib 100 milliGRAM(s) Oral daily  finasteride 5 milliGRAM(s) Oral daily  insulin lispro (ADMELOG) corrective regimen sliding scale   SubCutaneous three times a day before meals  insulin lispro (ADMELOG) corrective regimen sliding scale   SubCutaneous at bedtime  levothyroxine 25 MICROGram(s) Oral daily  midodrine. 5 milliGRAM(s) Oral three times a day  pantoprazole    Tablet 40 milliGRAM(s) Oral before breakfast  polyethylene glycol 3350 17 Gram(s) Oral daily  senna 1 Tablet(s) Oral daily  tamsulosin 0.4 milliGRAM(s) Oral at bedtime  timolol 0.5% Solution 1 Drop(s) Both EYES every 12 hours    MEDICATIONS  (PRN):  acetaminophen     Tablet .. 650 milliGRAM(s) Oral every 6 hours PRN Temp greater or equal to 38C (100.4F), Mild Pain (1 - 3)  albuterol    90 MICROgram(s) HFA Inhaler 2 Puff(s) Inhalation every 6 hours PRN Bronchospasm  aluminum hydroxide/magnesium hydroxide/simethicone Suspension 30 milliLiter(s) Oral every 4 hours PRN Dyspepsia  melatonin 3 milliGRAM(s) Oral at bedtime PRN Insomnia    Pertinent Labs: 03-25 Na141 mmol/L Glu 157 mg/dL[H] K+ 4.7 mmol/L Cr  1.23 mg/dL BUN 57 mg/dL[H] 03-24 Phos 3.6 mg/dL 03-25 Alb 2.7 g/dL[L]     CAPILLARY BLOOD GLUCOSE      POCT Blood Glucose.: 185 mg/dL (25 Mar 2025 11:33)  POCT Blood Glucose.: 127 mg/dL (25 Mar 2025 08:10)  POCT Blood Glucose.: 153 mg/dL (24 Mar 2025 21:30)  POCT Blood Glucose.: 210 mg/dL (24 Mar 2025 16:38)    Skin: Stage 1 @ sacrum, and B/L Heel    Estimated Needs:   [ ] no change since previous assessment  [ ] recalculated:     Previous Nutrition Diagnosis:   [ ] Inadequate Energy Intake [ ]Inadequate Oral Intake [ ] Excessive Energy Intake   [ ] Underweight [ ] Increased Nutrient Needs [ ] Overweight/Obesity   [ ] Altered GI Function [ ] Unintended Weight Loss [ ] Food & Nutrition Related Knowledge Deficit [x ] Malnutrition MOD    Nutrition Diagnosis is [X ] ongoing  [ ] resolved [ ] not applicable            Interventions:   Recommend  [ ] Change Diet To:  [X ] Nutrition Supplement Continue with  Ensure plus Hp BID.   [ ] Nutrition Support  [ ] Other:     Monitoring and Evaluation:   [ ] PO intake [ x ] Tolerance to diet prescription [ x ] weights [ x ] labs[ x ] follow up per protocol  [ ] other:

## 2025-03-25 NOTE — PROGRESS NOTE ADULT - SUBJECTIVE AND OBJECTIVE BOX
his is 89 year old male with a history of lung cancer, A-fib, DM, HTN, Hypothyroidism,  HLD, Lumbar radiculopathy who was admitted after he developed mobility disfunction following a fall   one week ago. Patient denied head injury.  Musculo-skeletal imagings failed to show any acute fractures.  CT of brain showed picture compatible with brain metastases.  Patient was transferred to ICU for observation.  Neurology, Neurosurgery and heme-onc. services were summoned.  MRI of brain- brain metastases.  Heme-onc and palliative care inputs are appreciated.  Patient is alert, in NAD.  Lanette were no new events over night .  Patient's family are trying to make  a decision in reference to patient's  disposition, long term facility with hospice vs. medical care at home.      Vital Signs Last 24 Hrs  T(C): 36.8 (25 Mar 2025 13:47), Max: 36.8 (25 Mar 2025 13:47)  T(F): 98.2 (25 Mar 2025 13:47), Max: 98.2 (25 Mar 2025 13:47)  HR: 87 (25 Mar 2025 13:47) (73 - 89)  BP: 119/68 (25 Mar 2025 13:47) (119/68 - 149/81)  BP(mean): 97 (25 Mar 2025 05:37) (97 - 97)  RR: 18 (25 Mar 2025 13:47) (17 - 18)  SpO2: 99% (25 Mar 2025 13:47) (98% - 99%)    Parameters below as of 25 Mar 2025 13:47  Patient On (Oxygen Delivery Method): room air        T(C): 36.8 (03-25-25 @ 13:47), Max: 36.8 (03-25-25 @ 13:47)  HR: 87 (03-25-25 @ 13:47) (73 - 89)  BP: 119/68 (03-25-25 @ 13:47) (119/68 - 149/81)  RR: 18 (03-25-25 @ 13:47) (17 - 18)  SpO2: 99% (03-25-25 @ 13:47) (98% - 99%)      CONSTITUTIONAL: Well groomed, no apparent distress  EYES: PERRLA and symmetric, EOMI, No conjunctival or scleral injection, non-icteric  ENMT: Oral mucosa with moist membranes. Normal dentition; no pharyngeal injection or exudates             NECK: Supple, symmetric and without tracheal deviation   RESP: No respiratory distress, no use of accessory muscles; CTA b/l, no WRR  CV: RRR, +S1S2, no MRG; no JVD; no peripheral edema  GI: Soft, NT, ND, no rebound, no guarding; no palpable masses; no hepatosplenomegaly; no hernia palpated  LYMPH: No cervical LAD or tenderness; no axillary LAD or tenderness; no inguinal LAD or tenderness  MSK: Normal gait; No digital clubbing or cyanosis; examination of the (head/neck/spine/ribs/pelvis, RUE, LUE, RLE, LLE) without misalignment,            Normal ROM without pain, no spinal tenderness, normal muscle strength/tone  SKIN: No rashes or ulcers noted; no subcutaneous nodules or induration palpable  NEURO: CN II-XII intact; normal reflexes in upper and lower extremities, sensation intact in upper and lower extremities b/l to light touch   PSYCH: Appropriate insight/judgment; A+O x 3, mood and affect appropriate, recent/remote memory intact      CBC Full  -  ( 25 Mar 2025 06:55 )  WBC Count : 8.19 K/uL  RBC Count : 4.16 M/uL  Hemoglobin : 13.5 g/dL  Hematocrit : 38.6 %  Platelet Count - Automated : 97 K/uL  Mean Cell Volume : 92.8 fl  Mean Cell Hemoglobin : 32.5 pg  Mean Cell Hemoglobin Concentration : 35.0 g/dL  Auto Neutrophil # : x  Auto Lymphocyte # : x  Auto Monocyte # : x  Auto Eosinophil # : x  Auto Basophil # : x  Auto Neutrophil % : x  Auto Lymphocyte % : x  Auto Monocyte % : x  Auto Eosinophil % : x  Auto Basophil % : x    03-25    141  |  111[H]  |  57[H]  ----------------------------<  157[H]  4.7   |  23  |  1.23    Ca    8.2[L]      25 Mar 2025 06:55  Phos  3.6     03-24  Mg     2.5     03-24    TPro  5.0[L]  /  Alb  2.7[L]  /  TBili  1.8[H]  /  DBili  x   /  AST  135[H]  /  ALT  483[H]  /  AlkPhos  144[H]  03-25      MEDICATIONS  (STANDING):  brimonidine 0.2% Ophthalmic Solution 1 Drop(s) Both EYES every 12 hours  dexAMETHasone  Injectable 4 milliGRAM(s) IV Push every 6 hours  erlotinib 100 milliGRAM(s) Oral daily  finasteride 5 milliGRAM(s) Oral daily  insulin lispro (ADMELOG) corrective regimen sliding scale   SubCutaneous three times a day before meals  insulin lispro (ADMELOG) corrective regimen sliding scale   SubCutaneous at bedtime  levothyroxine 25 MICROGram(s) Oral daily  midodrine. 5 milliGRAM(s) Oral three times a day  pantoprazole    Tablet 40 milliGRAM(s) Oral before breakfast  polyethylene glycol 3350 17 Gram(s) Oral daily  senna 1 Tablet(s) Oral daily  tamsulosin 0.4 milliGRAM(s) Oral at bedtime  timolol 0.5% Solution 1 Drop(s) Both EYES every 12 hours

## 2025-03-25 NOTE — PROGRESS NOTE ADULT - PROBLEM SELECTOR PLAN 5
-Pt has a history of Afib, takes Xarelto and metoprolol succinate 12.5mg qd, Digoxin at home  - Hold Xarelto in setting of brain mets vs hemorrhage  - Hold metoprolol succinate, Digoxin in the setting of bradycardia

## 2025-03-25 NOTE — PROGRESS NOTE ADULT - PROBLEM SELECTOR PLAN 2
[facility]    Patient:  Vernell Logan  :  1978   Age:  39 year old   Today's Office Visit:  2018    Epilepsy Data:  Patient History  Primary Epileptologist/Provider: Ashli Wilkinson M.D.  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 Last 5 minutes.    # of Type 1 Seizure since last visit: 2  Freq. Type 1 / Month: 0.86  Seizure Type 2: Partial seizures with secondary generalization  -  with complex partial seizures evolving to generalized seizures  Description of Sz Type 2: Stares off -> GTC  # of Type 2 Seizure since last visit: 0  Freq. Type 2 / Month: 0         EPILEPSY HISTORY: Onset of seizures was 16 years of age. Based on electrographic data 2012, the patient had frequent nonconvulsive seizures arising from the right and left temporal lobe, bitemporal lobe interictal discharges. Her MRI is normal. Her PET scan was remarkable for decreased metabolic activity in the left temporal lobe. Prior  she rarely had seizure. From 3557-4288 she had several seizure per week. She had antiepileptic drug.     INTERVAL HISTORY:  Came alone.  She is 23 weeks (twin pregnancy) pregnant. She has two complex partial seizure since last visit, no generalized tonic-clonic convulsion. Prior to this she had one partial seizure on 2017 (difficulty getting words out, feels  "confused, but, she is aware if someone is talking to her, she has difficulty with comprehension, she states \"it sounds like they are repeating a word over and over\"). Prior to this her last seizure was 7/2016. No generalized tonic-clonic convulsion, last generalized tonic-clonic convulsion 2014.     We have increase antiepileptic drug to maintain levels during pregnancy. We spent whole visit talking about pregnancy and antiepileptic drug. Currently, on antiepileptic drug there is no double vision, no mood changes, no nausea, no vomiting, no abdominal pain, no rashes. No recent ER visits and no hospitalizations since last visit. We spent much of time talking about antiepileptic drug changes, antiepileptic drug levels, precautions post partum, and breast feeding.       Prior to Admission medications    Medication Sig Start Date End Date Taking? Authorizing Provider   levETIRAcetam (KEPPRA) 500 MG tablet TAKE TWO AND ONE-HALF TABLETS BY MOUTH TWICE DAILY  Patient taking differently: 500 mg TAKE TWO AND ONE-HALF TABLETS BY MOUTH TWICE DAILY 4/13/16  Yes Ashli Wilkinson MD   lamoTRIgine (LAMICTAL) 100 MG tablet Take 2 tab po at 8 am and 3 tab po at 1 pm and 2 tab po 8 pm  Patient taking differently: 100 mg Take 2 tab po at 8 am and 3 tab po at 1 pm and 2 tab po 8 pm 4/13/16  Yes Ashli Wilkinson MD   carBAMazepine (CARBATROL) 300 MG 12 hr capsule TAKE 2 CAPSULES (600 MG) BY MOUTH 2 TIMES DAILY  Patient taking differently: 300 mg TAKE 2 CAPSULES (600 MG) BY MOUTH 2 TIMES DAILY 4/13/16  Yes Ashli Wilkinson MD   Acetaminophen (TYLENOL PO) Take by mouth as needed for mild pain or fever   Yes Reported, Patient   VITAMIN C 1000 MG OR TABS 1 TABLET DAILY AT DINNER   Yes Reported, Patient   IBUPROFEN as needed   Yes Reported, Patient       MEDICATIONS:                Medication Name   Tablet Size        8 AM  (morning)  2pm  8PM (Night)   Notes   Generic  Carbamazepine XR (Carbatrol) 300 mg   2 tablet    2 tablet      Generic  " levetiracetam 500 mg   3 tablet    2.5 tablet      Generic  lamotrigine 100 mg   4 tablet  4 tablet   3  tablet   increased since pregnant     Prepregnancy antiepileptic drug doses:   Carbamazepine 600-600  Levetiracetam 2296-0622  Lamotrigine 200-200-200        Component      Latest Ref Rng & Units 7/26/2013 11/24/2014 1/21/2015 4/22/2015   Carbamazepine Total      4.0 - 12.0 mg/L       Carbamazepine Total Level       5.2  7.0 8.4   10, 11 Epoxide Level       1.4  1.9 2.3   Free Carbamazepine Level Ug/Ml       1.4  1.5 1.4   Free Epoxide Level       0.8  0.9 0.9   Levetiracetam Level       39.5 31.1     Lamotrigine Level      2.5 - 15.0 ug/mL       Keppra (Levetiracetam) Level      12 - 46 ug/mL         Component      Latest Ref Rng & Units 6/26/2015 4/13/2016 4/26/2017 12/18/2017   Carbamazepine Total      4.0 - 12.0 mg/L       Carbamazepine Total Level       8.9      10, 11 Epoxide Level       3.0      Free Carbamazepine Level Ug/Ml       1.8      Free Epoxide Level       1.2      Levetiracetam Level        22.9 22.0    Lamotrigine Level      2.5 - 15.0 ug/mL 8.7 10.0 5.3 5.5   Keppra (Levetiracetam) Level      12 - 46 ug/mL    27       Target pregnancy levels:   Carbamazepine target level above 9  Lamotrigine target level 8-10  Levetiracetam target level above 20    Pregnancy Notes/Plan:      1. Expected date of delivery: 5/16/2017  2. OB/GYN contact information: Dr. Koffi Molina   3. Medications  a. Target AED level during pregnancy: Carbamazepine target level above 9 Lamotrigine target level 8-10, Levetiracetam target level above 20  b. Use of folic acid daily: yes  c. Use of prenatal vitamins:  yes  d. Postpartum AED reduction plan: to be determined in third trimester  4. EPIC:   a. Epilepsy provider entered standing antiepileptic levels every 2-4 weeks in EPIC: yes   b. Epilepsy provider notify Iraida Mccartney to track patient: yes    5. Seizure rescue plan during pregnancy:   a. Diazepam (Valium): 5  "mg intensol for any generalized tonic clonic seizure or for more than 2 complex partial seizures within a 4 hour period, repeat once after 10 minutes.     6. Seizure rescue plan during delivery.   a. Lorazepam (Ativan): 2 mg lorazepam intravenously for any generalized tonic clonic seizure or for more than 2 complex partial seizures within a 4 hour period once, and repeat as needed per your OB/GYN assessment.  OR Diazepam (Valium): 5 mg intravenously for any generalized tonic clonic seizure or for more than 2 complex partial seizures within a 4 hour period once, and repeat as needed per your OB/GYN assessment  b. Midazolam (if no IV access): 5 mg intramuscularly for any generalized tonic clonic seizure or for 2 complex partial seizures within a 4 hour period once, and repeat as needed per your OB/GYN assessment.   c. If patient has seizure due to preeclampsia or eclampsia magnesium sulfate is indicated per OB/GYN assessment.   7. . Does patient have untreated mood disorder? Yes, but, depression is stable at this time. Encouraged patient to establish care with mental health care provider.       REVIEW OF SYSTEMS:  Intermittent dizziness.  diplopia improved.  B/l hand tremor improved.  Anxiety.     SOCIAL HISTORY: She is not working. She is engaged.  She smokes 1/2 pack of cigarettes per day. She is living alone. Pregnant. Stressors in last five years:  Ricardo passed away 11/2015. Unplanned pregnancy.      PHYSICAL EXAMINATION:  /56 (BP Location: Right arm, Patient Position: Chair, Cuff Size: Adult Regular)  Pulse 90  Resp 24  Ht 5' 4\" (162.6 cm)  Wt 137 lb 9.6 oz (62.4 kg)  LMP  (LMP Unknown)  BMI 23.62 kg/m2  Pregnant. Alert, orientated, speech is fluent, face is symmetric, extra-ocular movement in tact, no focal deficits noted.Gait is stable.    ASSESSMENT:   1.  Localization-related epilepsy, uncontrolled (rare seizures), etiology unclear (MRI is nonlesional).  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  Last complex partial seizure 11/2017 or 12/2017. Her last generalized tonic-clonic convulsion was 2014.  She is pregnant with twins. We have increased antiepileptic drug to maintain target levels. After pregnancy, if needed, antiepileptic drug that may be considered in future: banzel, onfi, fycompa, felbamate. I would avoid additional Na+ blocking agents.      2.  Women care issues. She is pregnant with twins. Rare complex partial seizure in pregnancy, no generalized tonic-clonic convulsion. Note epilepsy pregnancy plan of care above. We have discussed  increased teratogenicity risks on multiple AEDs and on high doses.       3.  Anxiety and depression.  Stable. She does not want to see a mental health provider at this time. Eating disorder is stable, she is eating three meals per day. I did ask her to get established with mental health care provider, she is a higher risk for post partum depression, lacks family support.         PLAN:   1. Increase antiepileptic drugs.     2. Target pregnancy levels:   Carbamazepine target level above 9  Lamotrigine target level 8-10  Levetiracetam target level above 20               Medication Name   Tablet Size      Week 1   8 AM  (morning)  2pm  8PM (Night)   Notes   Generic  Carbamazepine XR (Carbatrol) 300 mg   2 tablet    2 tablet      Generic  levetiracetam 500 mg   3 tablet    3 tablet   increased since pregnant   Generic  lamotrigine 100 mg   4 tablet  4 tablet   3  tablet   increased since pregnant                Medication Name   Tablet Size      Week 2  8 AM  (morning)  2pm  8PM (Night)   Notes   Generic  Carbamazepine XR (Carbatrol) 300 mg   2 tablet    2 tablet      Generic  levetiracetam 500 mg   3 tablet    3 tablet   increased since pregnant   Generic  lamotrigine 100 mg   4.5 tablet  4 tablet   3  tablet   increased since pregnant         [facility]    4. Follow up  1-2 months    5.  Placed orders for levetiracetam, lamotrigine, carbamazepine monthly draw       PEMA THORNE MD       I spent 35 minutes with the patient and family. During this time counseling and coordination of care exceeded 50% of the visit time. I addressed all questions and concerns the family raised in regards to the patients care.              -Lung CA (Diagnosed 2 years ago per pt) and pt takes  Erlotinib 100mg QD  -Follows oupt Heme/Onc Doris Martinez  -CT and MR result above  -will stop Erlotinib  -c/w dexamethasone  -Heme/Onc Dr. Drummond following

## 2025-03-25 NOTE — PROGRESS NOTE ADULT - SUBJECTIVE AND OBJECTIVE BOX
Medicine Progress Note    Patient is a 89y old  Male who presents with a chief complaint of suspect brain metastasis (24 Mar 2025 20:38)      SUBJECTIVE / OVERNIGHT EVENTS:    ADDITIONAL REVIEW OF SYSTEMS:    MEDICATIONS  (STANDING):  brimonidine 0.2% Ophthalmic Solution 1 Drop(s) Both EYES every 12 hours  dexAMETHasone  Injectable 4 milliGRAM(s) IV Push every 6 hours  erlotinib 100 milliGRAM(s) Oral daily  finasteride 5 milliGRAM(s) Oral daily  insulin lispro (ADMELOG) corrective regimen sliding scale   SubCutaneous three times a day before meals  insulin lispro (ADMELOG) corrective regimen sliding scale   SubCutaneous at bedtime  levothyroxine 25 MICROGram(s) Oral daily  midodrine. 5 milliGRAM(s) Oral three times a day  pantoprazole    Tablet 40 milliGRAM(s) Oral before breakfast  polyethylene glycol 3350 17 Gram(s) Oral daily  senna 1 Tablet(s) Oral daily  tamsulosin 0.4 milliGRAM(s) Oral at bedtime  timolol 0.5% Solution 1 Drop(s) Both EYES every 12 hours    MEDICATIONS  (PRN):  acetaminophen     Tablet .. 650 milliGRAM(s) Oral every 6 hours PRN Temp greater or equal to 38C (100.4F), Mild Pain (1 - 3)  albuterol    90 MICROgram(s) HFA Inhaler 2 Puff(s) Inhalation every 6 hours PRN Bronchospasm  aluminum hydroxide/magnesium hydroxide/simethicone Suspension 30 milliLiter(s) Oral every 4 hours PRN Dyspepsia  melatonin 3 milliGRAM(s) Oral at bedtime PRN Insomnia    CAPILLARY BLOOD GLUCOSE      POCT Blood Glucose.: 185 mg/dL (25 Mar 2025 11:33)  POCT Blood Glucose.: 127 mg/dL (25 Mar 2025 08:10)  POCT Blood Glucose.: 153 mg/dL (24 Mar 2025 21:30)  POCT Blood Glucose.: 210 mg/dL (24 Mar 2025 16:38)  POCT Blood Glucose.: 167 mg/dL (24 Mar 2025 12:21)    I&O's Summary      PHYSICAL EXAM:  Vital Signs Last 24 Hrs  T(C): 36.5 (25 Mar 2025 05:37), Max: 36.7 (24 Mar 2025 13:20)  T(F): 97.7 (25 Mar 2025 05:37), Max: 98.1 (24 Mar 2025 13:20)  HR: 73 (25 Mar 2025 05:37) (73 - 94)  BP: 142/75 (25 Mar 2025 05:37) (118/74 - 149/81)  BP(mean): 97 (25 Mar 2025 05:37) (97 - 97)  RR: 18 (25 Mar 2025 05:37) (17 - 18)  SpO2: 98% (25 Mar 2025 05:37) (96% - 99%)    Parameters below as of 25 Mar 2025 05:37  Patient On (Oxygen Delivery Method): room air    Patient not yet seen    CONSTITUTIONAL: NAD, well-developed, well-groomed  ENMT: Moist oral mucosa, no pharyngeal injection or exudates; normal dentition  RESPIRATORY: Normal respiratory effort; lungs are clear to auscultation bilaterally  CARDIOVASCULAR: Regular rate and rhythm, normal S1 and S2, no murmur/rub/gallop; No lower extremity edema; Peripheral pulses are 2+ bilaterally  ABDOMEN: Nontender to palpation, normoactive bowel sounds, no rebound/guarding; No hepatosplenomegaly  PSYCH: A+O to person, place, and time; affect appropriate  NEUROLOGY: CN 2-12 are intact and symmetric; no gross sensory deficits   SKIN: No rashes; no palpable lesions    LABS:                        13.5   8.19  )-----------( 97       ( 25 Mar 2025 06:55 )             38.6     03-25    141  |  111[H]  |  57[H]  ----------------------------<  157[H]  4.7   |  23  |  1.23    Ca    8.2[L]      25 Mar 2025 06:55  Phos  3.6     03-24  Mg     2.5     03-24    TPro  5.0[L]  /  Alb  2.7[L]  /  TBili  1.8[H]  /  DBili  x   /  AST  135[H]  /  ALT  483[H]  /  AlkPhos  144[H]  03-25          Urinalysis Basic - ( 25 Mar 2025 06:55 )    Color: x / Appearance: x / SG: x / pH: x  Gluc: 157 mg/dL / Ketone: x  / Bili: x / Urobili: x   Blood: x / Protein: x / Nitrite: x   Leuk Esterase: x / RBC: x / WBC x   Sq Epi: x / Non Sq Epi: x / Bacteria: x            RADIOLOGY & ADDITIONAL TESTS:  Imaging from Last 24 Hours:    Electrocardiogram/QTc Interval:    COORDINATION OF CARE:  Care Discussed with Consultants/Other Providers:   Medicine Progress Note    Patient is a 89y old  Male who presents with a chief complaint of suspect brain metastasis (24 Mar 2025 20:38)    SUBJECTIVE / OVERNIGHT EVENTS:  Patient seen at bedside, appears well, not endorsing any complaints.   ADDITIONAL REVIEW OF SYSTEMS:    MEDICATIONS  (STANDING):  brimonidine 0.2% Ophthalmic Solution 1 Drop(s) Both EYES every 12 hours  dexAMETHasone  Injectable 4 milliGRAM(s) IV Push every 6 hours  erlotinib 100 milliGRAM(s) Oral daily  finasteride 5 milliGRAM(s) Oral daily  insulin lispro (ADMELOG) corrective regimen sliding scale   SubCutaneous three times a day before meals  insulin lispro (ADMELOG) corrective regimen sliding scale   SubCutaneous at bedtime  levothyroxine 25 MICROGram(s) Oral daily  midodrine. 5 milliGRAM(s) Oral three times a day  pantoprazole    Tablet 40 milliGRAM(s) Oral before breakfast  polyethylene glycol 3350 17 Gram(s) Oral daily  senna 1 Tablet(s) Oral daily  tamsulosin 0.4 milliGRAM(s) Oral at bedtime  timolol 0.5% Solution 1 Drop(s) Both EYES every 12 hours    MEDICATIONS  (PRN):  acetaminophen     Tablet .. 650 milliGRAM(s) Oral every 6 hours PRN Temp greater or equal to 38C (100.4F), Mild Pain (1 - 3)  albuterol    90 MICROgram(s) HFA Inhaler 2 Puff(s) Inhalation every 6 hours PRN Bronchospasm  aluminum hydroxide/magnesium hydroxide/simethicone Suspension 30 milliLiter(s) Oral every 4 hours PRN Dyspepsia  melatonin 3 milliGRAM(s) Oral at bedtime PRN Insomnia    CAPILLARY BLOOD GLUCOSE      POCT Blood Glucose.: 185 mg/dL (25 Mar 2025 11:33)  POCT Blood Glucose.: 127 mg/dL (25 Mar 2025 08:10)  POCT Blood Glucose.: 153 mg/dL (24 Mar 2025 21:30)  POCT Blood Glucose.: 210 mg/dL (24 Mar 2025 16:38)  POCT Blood Glucose.: 167 mg/dL (24 Mar 2025 12:21)    I&O's Summary      PHYSICAL EXAM:  Vital Signs Last 24 Hrs  T(C): 36.5 (25 Mar 2025 05:37), Max: 36.7 (24 Mar 2025 13:20)  T(F): 97.7 (25 Mar 2025 05:37), Max: 98.1 (24 Mar 2025 13:20)  HR: 73 (25 Mar 2025 05:37) (73 - 94)  BP: 142/75 (25 Mar 2025 05:37) (118/74 - 149/81)  BP(mean): 97 (25 Mar 2025 05:37) (97 - 97)  RR: 18 (25 Mar 2025 05:37) (17 - 18)  SpO2: 98% (25 Mar 2025 05:37) (96% - 99%)    Parameters below as of 25 Mar 2025 05:37  Patient On (Oxygen Delivery Method): room air    General: elderly male, NAD  Cardiac: RRR, normal s1 and s2, no murmur  Lungs: CTAB, no w/r/r  Abdomen: soft, nondistended. Bowel sounds present   Extremities: no edema     LABS:                        13.5   8.19  )-----------( 97       ( 25 Mar 2025 06:55 )             38.6     03-25    141  |  111[H]  |  57[H]  ----------------------------<  157[H]  4.7   |  23  |  1.23    Ca    8.2[L]      25 Mar 2025 06:55  Phos  3.6     03-24  Mg     2.5     03-24    TPro  5.0[L]  /  Alb  2.7[L]  /  TBili  1.8[H]  /  DBili  x   /  AST  135[H]  /  ALT  483[H]  /  AlkPhos  144[H]  03-25          Urinalysis Basic - ( 25 Mar 2025 06:55 )    Color: x / Appearance: x / SG: x / pH: x  Gluc: 157 mg/dL / Ketone: x  / Bili: x / Urobili: x   Blood: x / Protein: x / Nitrite: x   Leuk Esterase: x / RBC: x / WBC x   Sq Epi: x / Non Sq Epi: x / Bacteria: x            RADIOLOGY & ADDITIONAL TESTS:  Imaging from Last 24 Hours:    Electrocardiogram/QTc Interval:    COORDINATION OF CARE:  Care Discussed with Consultants/Other Providers:

## 2025-03-25 NOTE — PROGRESS NOTE ADULT - ASSESSMENT
89 yo male (from home, uses walker at baseline) with PMHx of lung cancer on Erlotinib, HLD, HTN, BPH, hypothyroidism, A-fib on Xarelto, presents for ambulatory dysfunction since a fall 1 week ago (no head trauma).  CT Head showed multiple new sites of edema in the left frontal, parietal lobes and bilateral temporal lobe suspicious for intracranial metastases. Local mass effect without herniation or hydrocephalus. Admitted to ICU per Neurosurgery and Neurologist reccs for closer monitoring, started on Dexamethasone, remained hemodynamically stable and downgraded to medicine. Palliative consulted for GOC, made DNR/DNI. Family including HCP have decided on Hospice, home vs. LTC.

## 2025-03-25 NOTE — PROGRESS NOTE ADULT - PROBLEM SELECTOR PLAN 1
-CTH: Multiple new sites (since Oct 24') of edema in the left frontal, parietal lobes and bilateral temporal lobe suspicious for intracranial metastases. Largest area of edema is in the left frontal parietal lobes. There is local mass effect without herniation or hydrocephalus. Punctate focus of hyper-density in the right temporal lobe (series 2 image 20) in the area of edema. This may represent underlying intracranial metastasis versus punctate hemorrhage -Repeat CTH (6hrs): no changes   -MRI BRAIN: Multiple bilateral supratentorial and infratentorial metastatic lesions with moderate to severe associated vasogenic edema.  -MRI CERVICAL SPINE: No evidence of osseous metastasis. Multilevel cord impingement. No cord signal abnormality. No abnormal enhancement.  -MR Thoracic Spine w/wo IV Cont: Unremarkable MR of the thoracic spine.  -s/p ICU monitoring x 24hr.   -Started Decadron per heme/oncology    -Keep systolic BP < 150mmhg, on midodrine   -NeuroSx at [facility] following - Dr. Pringle, recs appreciated  -Neurology (dr Gillis) consulted  -Heme/Onc Dr. Drummond following  -c/w Decadron 4mg q6hrs and PPI  per heme/onc would continue dexamethasone on d/c family understand risk of long term steroid per discussion with heme/onc

## 2025-03-25 NOTE — PROGRESS NOTE ADULT - PROBLEM SELECTOR PLAN 4
-No head trauma or LOC from recent fall (3/11/25)  -Pt complaining of trouble walking and pain in the left buttocks/hip  -CT pelvis negative for acute fracture  -likely due to loss of balance from brain mets and edema vs orthostatic hypotension  -C/w Tylenol for pain control  family planning on hospice

## 2025-03-25 NOTE — PROGRESS NOTE ADULT - PROBLEM SELECTOR PLAN 7
-Pt has a history of BPH, takes tamsulosin 0.4mg and finasteride 5mg at home   -C/w Tamsulosin and Finasteride

## 2025-03-25 NOTE — PROGRESS NOTE ADULT - SUBJECTIVE AND OBJECTIVE BOX
NP Note discussed with  Primary Attending    Patient is a 89y old  Male who presents with a chief complaint of suspect brain metastasis (25 Mar 2025 11:52)      INTERVAL HPI/OVERNIGHT EVENTS: no acute events overnight    MEDICATIONS  (STANDING):  brimonidine 0.2% Ophthalmic Solution 1 Drop(s) Both EYES every 12 hours  dexAMETHasone  Injectable 4 milliGRAM(s) IV Push every 6 hours  erlotinib 100 milliGRAM(s) Oral daily  finasteride 5 milliGRAM(s) Oral daily  insulin lispro (ADMELOG) corrective regimen sliding scale   SubCutaneous three times a day before meals  insulin lispro (ADMELOG) corrective regimen sliding scale   SubCutaneous at bedtime  levothyroxine 25 MICROGram(s) Oral daily  midodrine. 5 milliGRAM(s) Oral three times a day  pantoprazole    Tablet 40 milliGRAM(s) Oral before breakfast  polyethylene glycol 3350 17 Gram(s) Oral daily  senna 1 Tablet(s) Oral daily  tamsulosin 0.4 milliGRAM(s) Oral at bedtime  timolol 0.5% Solution 1 Drop(s) Both EYES every 12 hours    MEDICATIONS  (PRN):  acetaminophen     Tablet .. 650 milliGRAM(s) Oral every 6 hours PRN Temp greater or equal to 38C (100.4F), Mild Pain (1 - 3)  albuterol    90 MICROgram(s) HFA Inhaler 2 Puff(s) Inhalation every 6 hours PRN Bronchospasm  aluminum hydroxide/magnesium hydroxide/simethicone Suspension 30 milliLiter(s) Oral every 4 hours PRN Dyspepsia  melatonin 3 milliGRAM(s) Oral at bedtime PRN Insomnia      __________________________________________________  REVIEW OF SYSTEMS:    CONSTITUTIONAL: No fever,   EYES: no acute visual disturbances  NECK: No pain or stiffness  RESPIRATORY: No cough; No shortness of breath  CARDIOVASCULAR: No chest pain, no palpitations  GASTROINTESTINAL: No pain. No nausea or vomiting; No diarrhea   NEUROLOGICAL: No headache or numbness, no tremors  MUSCULOSKELETAL: No joint pain, no muscle pain  GENITOURINARY: no dysuria, no frequency, no hesitancy  PSYCHIATRY: no depression , no anxiety  ALL OTHER  ROS negative        Vital Signs Last 24 Hrs  T(C): 36.8 (25 Mar 2025 13:47), Max: 36.8 (25 Mar 2025 13:47)  T(F): 98.2 (25 Mar 2025 13:47), Max: 98.2 (25 Mar 2025 13:47)  HR: 87 (25 Mar 2025 13:47) (73 - 89)  BP: 119/68 (25 Mar 2025 13:47) (119/68 - 149/81)  BP(mean): 97 (25 Mar 2025 05:37) (97 - 97)  RR: 18 (25 Mar 2025 13:47) (17 - 18)  SpO2: 99% (25 Mar 2025 13:47) (98% - 99%)    Parameters below as of 25 Mar 2025 13:47  Patient On (Oxygen Delivery Method): room air        ________________________________________________  PHYSICAL EXAM:  GENERAL: NAD  HEENT: Normocephalic  NECK : supple  CHEST/LUNG: Clear to auscultation bilaterally  HEART: S1 S2  regular  ABDOMEN: Soft, Nontender, Nondistended; Bowel sounds present  EXTREMITIES: no edema  SKIN: warm and dry; no rash  NERVOUS SYSTEM:  Awake and alert; Oriented  to place, person and time    _________________________________________________  LABS:                        13.5   8.19  )-----------( 97       ( 25 Mar 2025 06:55 )             38.6     03-25    141  |  111[H]  |  57[H]  ----------------------------<  157[H]  4.7   |  23  |  1.23    Ca    8.2[L]      25 Mar 2025 06:55  Phos  3.6     03-24  Mg     2.5     03-24    TPro  5.0[L]  /  Alb  2.7[L]  /  TBili  1.8[H]  /  DBili  x   /  AST  135[H]  /  ALT  483[H]  /  AlkPhos  144[H]  03-25      Urinalysis Basic - ( 25 Mar 2025 06:55 )    Color: x / Appearance: x / SG: x / pH: x  Gluc: 157 mg/dL / Ketone: x  / Bili: x / Urobili: x   Blood: x / Protein: x / Nitrite: x   Leuk Esterase: x / RBC: x / WBC x   Sq Epi: x / Non Sq Epi: x / Bacteria: x      CAPILLARY BLOOD GLUCOSE      POCT Blood Glucose.: 153 mg/dL (25 Mar 2025 17:03)  POCT Blood Glucose.: 185 mg/dL (25 Mar 2025 11:33)  POCT Blood Glucose.: 127 mg/dL (25 Mar 2025 08:10)  POCT Blood Glucose.: 153 mg/dL (24 Mar 2025 21:30)        RADIOLOGY & ADDITIONAL TESTS:  < from: MR Head w/wo IV Cont (03.17.25 @ 18:10) >    IMPRESSION:    MRI BRAIN: Multiple bilateral supratentorial and infratentorial   metastatic lesions with moderate to severe associated vasogenic edema.    MRI CERVICAL SPINE: No evidence of osseous metastasis. Multilevel cord   impingement. No cord signal abnormality. No abnormal enhancement.    --- End of Report ---    < end of copied text >      Imaging Personally Reviewed:  YES    Consultant(s) Notes Reviewed:   YES      Plan of care was discussed with patient and /or primary care giver; all questions and concerns were addressed and care was aligned with patient's wishes.

## 2025-03-25 NOTE — PROGRESS NOTE ADULT - ASSESSMENT
Patient previously unknown to me. Mr. 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 He had an MRI performed with numerous brain lesions noted with mod-severe vasogenic edema, currently on dexamethasone 4mg q 6hrs.     #metastatic NSCLC EGFR mutated on 3L erlotinib  #brain mets     - we had discussed his case at our daily meeting, appreciate palliative care input on the matter. Agree with long term care with hospice.   - would recommend continuing dexamethasone 4mg every 6hours with PPI. Suspect that without steroids, vasogenic edema will likely recur and cause more neurological issues. If patient is hospice appropriate, would continue indefinitely.     3/25/25: Our service was reconsulted as family was not sure if they would like to pursue hospice or not. I had contact Lul Haas Jr who is his son. We had a lengthy discussion about current disease and where he is in the treatment paradigm. I had explained that the erlotinib is 3rd line in his treatment paradigm. I had explained that with advanced cancer, in general with every successive treatment, the response rate often declines and whatever therapeutic effect is achieved tends to be shorter lived in comparison with prior therapies. We had gone over the imaging of his head which revealed several brain lesions with mod-severe vasogenic edema. I  had explained that while erlotinib has some efficacy in the brain, in general it would be expected to work on the matter of several weeks to months, if it was effective. Explained in light of multiple falls reported at home, if they were to pursue further therapy, our recommendation would be to meet with radiation oncology and assess whether WBRT or SRS is feasible. Explained that while we are not part of the radiation oncology dept, in our experience recovery from WBRT can take some time cognitively, which would be concerning in someone over the age of 90. Explained that while SRS could be considered, there are several lesions present, which make it less ideal. We had explained that while he is currently on steroids for the surrounding edema, should we taper the steroids the concern would be that the edema would recur without treatment. I had explained that in general with advanced cancer, the cancer continues to degenerate and several population of clones may be present rendering each particular clone either sensitive or resistant.     In response to this, Mr. Zaragoza asked what we would recommend. I had explained that in light of any therapy being offered as palliative in nature, advanced age, advanced burden of disease and multiple lines already attempted, it would not be unreasonable to shift focus to a comfort/symptom based approach. However, we defer the decision to the HCP and Mr. Zaragoza.  In light of our conversation, Mr. Zaragoza is open to meeting with palliative care again and would like to discuss hospice along with placement options. We will formally meet Mr. Zaragoza later today        Recommendations are incomplete, patient not yet seen   Please feel free to reach out with any questions or concerns   Patient previously unknown to me. Mr. 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 He had an MRI performed with numerous brain lesions noted with mod-severe vasogenic edema, currently on dexamethasone 4mg q 6hrs.     #metastatic NSCLC EGFR mutated on 3L erlotinib  #brain mets     - we had discussed his case at our daily meeting, appreciate palliative care input on the matter. Agree with long term care with hospice.   - would recommend continuing dexamethasone 4mg every 6hours with PPI. Suspect that without steroids, vasogenic edema will likely recur and cause more neurological issues. If patient is hospice appropriate, would continue indefinitely.     3/25/25: Our service was reconsulted as family was not sure if they would like to pursue hospice or not. I had contact Lul Haas Jr who is his son. We had a lengthy discussion about current disease and where he is in the treatment paradigm. I had explained that the erlotinib is 3rd line in his treatment paradigm. I had explained that with advanced cancer, in general with every successive treatment, the response rate often declines and whatever therapeutic effect is achieved tends to be shorter lived in comparison with prior therapies. We had gone over the imaging of his head which revealed several brain lesions with mod-severe vasogenic edema. I  had explained that while erlotinib has some efficacy in the brain, in general it would be expected to work on the matter of several weeks to months, if it was effective. Explained in light of multiple falls reported at home, if they were to pursue further therapy, our recommendation would be to meet with radiation oncology and assess whether WBRT or SRS is feasible. Explained that while we are not part of the radiation oncology dept, in our experience recovery from WBRT can take some time cognitively, which would be concerning in someone over the age of 90. Explained that while SRS could be considered, there are several lesions present, which make it less ideal. We had explained that while he is currently on steroids for the surrounding edema, should we taper the steroids the concern would be that the edema would recur without treatment. I had explained that in general with advanced cancer, the cancer continues to degenerate and several population of clones may be present rendering each particular clone either sensitive or resistant.     In response to this, Mr. Enriquez asked what we would recommend. I had explained that in light of any therapy being offered as palliative in nature, advanced age, advanced burden of disease and multiple lines already attempted, it would not be unreasonable to shift focus to a comfort/symptom based approach. However, we defer the decision to the HCP and Mr. Enriquez.  In light of our conversation, Mr. Enriquez is open to meeting with palliative care again and would like to discuss hospice along with placement options.    We met with uLly Mccarty at bedside and with Earl over the phone in the afternoon. I had reiterated the above with both Luly and Earl. I had explained that in light of advanced age, and third line therapy with brain metastasis and moderate to severe vasogenic edema, I have my reservations and concerns about tolerability of treatment going forward. All decision makers were in agreement with the plan, highlighting that they would like steroids to continue to give him as much time from a cognitive standpoint given that the swelling around the brain lesions would recur without them. We had explained that this is a reasonable request, however mentioned that no treatment comes without side effects and explained that hyperglycemia, opportunistic infections and stomach issues among other things can present themselves with long term steroid use. They are willing to meet with the palliative care team to discuss hospice want to discuss disposition plan whether 24 hours home care would be feasible vs a facility. We had mentioned that we will defer this part of the management plan to our colleagues, which they were in agreement with.     We will sign off at this time. Dr. Martinez will start service tomorrow. Please feel free to reach out with any questions or concerns.

## 2025-03-25 NOTE — PROGRESS NOTE ADULT - PROBLEM SELECTOR PLAN 3
elevated LFTs  possible in the setting of erlotinib?  no abd pain. tolerating diet well  RUQ US negative  LFTs stable

## 2025-03-25 NOTE — PROGRESS NOTE ADULT - ASSESSMENT
Chief Complaint/Care Team   FOLLOW UP POST SURGERY    Shelly Cash MD Stephens, Cassandra, MD    History of Present Illness     Diagnosis: Colon Adenocarcinoma S/p Left colectomy on 5/5/23, stage after resection [id number], stage III    Current Treatment: FOLFOX IV [id number] x 6 months    Previous Treatment: c-scope on 4/2023 biopsy revealed colon adenocarcinoma    Lilian Pelaez is a 48 y.o. female who presents to [facility] HEMATOLOGY & ONCOLOGY for discussion of newly diagnosed colon adenocarcinoma seen on biopsy of colon mass in the descending colon during colonoscopy performed in April 2023.     Staging scans revealed on 4/11/2023:  CT abdomen/pelvis without any metastatic disease in abdomen/pelvis  CT chest no evidence of metastatic disease in chest.    Patient underwent robotic resection of descending colon and splenic flexure by Dr. Rolando Liu on 5/5/2023.    Patient is a former smoker.   Patient reports family history of several relatives with other forms of cancer cancer on her mother side of the family, but denies any known history of colon cancer in her family.    Patient reports noticing some blood mixed in with stool prior to cancer diagnosis.      Interval history: Patient to follow-up and discuss chemotherapy for colon cancer after undergoing colon resection performed by Dr. Rolando Liu.  Patient reports slowly recovering from surgery.  She has some drainage from the surgical incision  for what appears to be a seroma that is saturating bandage on her abdomen.  She is pending follow-up with her surgeon later this week regarding this.  No other reported blood loss.  She is here with her .    Review of Systems   Constitutional: Positive for fatigue.   Gastrointestinal: Positive for abdominal pain.   Psychiatric/Behavioral: The patient is nervous/anxious.    All other systems reviewed and are negative.       Oncology/Hematology History   Malignant neoplasm of descending  "colon   5/6/2023 Initial Diagnosis    Malignant neoplasm of descending colon     5/31/2023 Cancer Staged    Staging form: Colon And Rectum, AJCC 8th Edition  - Pathologic: Stage IIIB (pT3, pN1b, cM0) - Signed by Brian Marroquin MD on 5/31/2023         Objective     Vitals:    05/31/23 1351   BP: 123/79   Pulse: 91   Resp: 18   Temp: 97.5 °F (36.4 °C)   TempSrc: Temporal   SpO2: 98%   Weight: 135 kg (297 lb 13.5 oz)   Height: 170.2 cm (67.01\")   PainSc: Comment: NO VALUE     ECOG score: 0         PHQ-9 Total Score:         Physical Exam  Exam conducted with a chaperone present.   Constitutional:       General: She is not in acute distress.     Appearance: Normal appearance.   HENT:      Head: Normocephalic and atraumatic.   Eyes:      Extraocular Movements: Extraocular movements intact.      Conjunctiva/sclera: Conjunctivae normal.   Cardiovascular:      Pulses: Normal pulses.      Heart sounds: Normal heart sounds.   Pulmonary:      Effort: Pulmonary effort is normal.      Breath sounds: Normal breath sounds. No rhonchi or rales.   Abdominal:      General: Bowel sounds are normal.      Palpations: Abdomen is soft.      Comments: Bandage covering surgical incision with some minor bleeding from surgical incision, other surgical incisions are well-healing and without any drainage.   Musculoskeletal:      Cervical back: Normal range of motion and neck supple.   Skin:     General: Skin is warm and dry.   Neurological:      Mental Status: She is oriented to person, place, and time.           Past Medical History     Past Medical History:   Diagnosis Date   • Anesthesia     B/P bottomed out   • Anxiety    • Asthma 1996    seasonal   • Bilateral shoulder pain    • Colon cancer 04/17/2023   • DDD (degenerative disc disease), cervical    • DDD (degenerative disc disease), lumbar    • Depression    • Diverticulitis of colon 2005    Scope was done in [facility]   • GERD (gastroesophageal reflux disease)    • Hypertension    • " 89 year old male with a history of lung cancer, A-fib, DM, HTN,   HLD, Hypothyroidism, Lumbar radiculopathy was admitted due to inability to walk  following a fall one week PTA.  Patient was found to have brain metastases.  Patient was transferred to ICU for close monitoring.  Neurology consult, Neurosurgery consult, Heme-onc. consult.  MRI of brain - metastases  MRI C-spine- no mets  Oncology consult is appreciated.  Continue Dexamethasone.  Palliative care consult.  Patient was downgraded to the regular floor.  Oncology and Palliative consults are appreciated.  Continue Dexamethsone.  Patient' LFT are up,  Abdominal ultrasound was done, no liver pathology was found, gallstones without cholecystitis.  BUN is trending up, most likely pre-renal.  Increase fluids.  Patient's proxy wanted patient to be discharged home with private home-care  to follow up with his oncologist.   I spoke to patient's son who was  trying to make arrangements for his dad to be discharged home.  Now patient's family is trying to make a decision in reference to patient's treatment at home vs. in long-term  facility with hospice.  Oncologist Dr. West's note is appreciated.  Patient's family will meet with palliative care service to discuss disposition.  Case was discussed with house staff.   Kidney stones    • Melanoma     removed   • Prolapse of female bladder    • SOBOE (shortness of breath on exertion)      Current Outpatient Medications on File Prior to Visit   Medication Sig Dispense Refill   • allopurinol (ZYLOPRIM) 100 MG tablet Take 1 tablet by mouth Daily.     • ALPRAZolam (XANAX) 0.5 MG tablet Take 1 tablet by mouth 3 (Three) Times a Day As Needed.     • amLODIPine (NORVASC) 10 MG tablet Take 1 tablet by mouth Every Night.     • docusate sodium 100 MG capsule Take 1 capsule by mouth Daily.     • furosemide (LASIX) 20 MG tablet Take 1 tablet by mouth Daily.     • HYDROcodone-acetaminophen (NORCO) 7.5-325 MG per tablet Take 1 tablet by mouth Every 8 (Eight) Hours As Needed.     • hyoscyamine (ANASPAZ,LEVSIN) 0.125 MG tablet Take 1 tablet by mouth 4 (Four) Times a Day.     • ketorolac (TORADOL) 10 MG tablet ketorolac 10 mg tablet   TAKE ONE TABLET BY MOUTH EVERY 6 HOURS     • lamoTRIgine (LaMICtal) 25 MG tablet Take 1 tablet by mouth 2 (Two) Times a Day. Only takes at night     • losartan (COZAAR) 100 MG tablet Take 1 tablet by mouth Every Night.     • medroxyPROGESTERone (DEPO-PROVERA) 150 MG/ML injection 1 mL.     • metoclopramide (REGLAN) 10 MG tablet Take 1 tablet by mouth 3 (Three) Times a Day.     • metoprolol succinate XL (TOPROL-XL) 25 MG 24 hr tablet Take 1 tablet by mouth Daily.     • Myrbetriq 25 MG tablet sustained-release 24 hour 24 hr tablet Take 1 tablet by mouth Every Evening.     • pantoprazole (PROTONIX) 40 MG EC tablet Take 1 tablet by mouth 2 (Two) Times a Day.     • rOPINIRole (REQUIP) 1 MG tablet Take 1 tablet by mouth Every Night.     • sertraline (ZOLOFT) 100 MG tablet sertraline 100 mg tablet   Take 1 tablet every day by oral route for 30 days.     • sucralfate (CARAFATE) 1 g tablet Take 1 tablet by mouth 2 (Two) Times a Day.     • vitamin D3 125 MCG (5000 UT) capsule capsule Take 2,000 Units by mouth Daily.       No current facility-administered medications on file prior  to visit.      Allergies   Allergen Reactions   • Hydromorphone Hives, Itching and GI Intolerance   • Morphine Hives, Itching and Nausea And Vomiting   • Oxybutynin Swelling and Angioedema          • Oxycodone-Acetaminophen Itching and Nausea And Vomiting     Can not take any higher than 7.5   • Penicillins Rash   • Promethazine Nausea And Vomiting   • Tylenol With Codeine #3 [Acetaminophen-Codeine] Itching and Nausea And Vomiting     Past Surgical History:   Procedure Laterality Date   • BREAST SURGERY  2016    Removed 8lbs of tissue, 2016   • CHOLECYSTECTOMY     • COLON RESECTION Left 5/5/2023    Procedure: RESECTION OF DESCENDING COLON AND SPLENIC FLEXURE TAKEDOWN WITH DAVINCI ROBOT;  Surgeon: Rolando Liu MD;  Location: [facility] OR [facility];  Service: Robotics - DaVinci;  Laterality: Left;   • COLONOSCOPY N/A 04/04/2023    Procedure: COLONOSCOPY WITH POLYPECTOMY/SNARE/BIOPSIES/TATTOOING DESCENDING COLON MASS;  Surgeon: Deniz Dowd MD;  Location: [facility] ENDOSCOPY;  Service: Gastroenterology;  Laterality: N/A;  INCOMPLETE COLONOSCOPY  POOR BOWEL PREP  COLON POLYP  COLON MASS  DIVERTICULOSIS   • COLONOSCOPY N/A 04/12/2023    Procedure: COLONOSCOPY with cold snare;  Surgeon: Deniz Dowd MD;  Location: [facility] ENDOSCOPY;  Service: Gastroenterology;  Laterality: N/A;  colon polyps, diverticulosis, colon mass, hemorrhoids     • CYSTOSCOPY W/ URETERAL STENT PLACEMENT Left 5/5/2023    Procedure: Cystoscopy, left ureteral injection,;  Surgeon: Manjula Randolph MD;  Location: [facility] OR [facility];  Service: Urology;  Laterality: Left;   • FRACTURE SURGERY  2001    Left knee   • KNEE SURGERY Left     x4   • LAPAROSCOPIC TUBAL LIGATION  1995   • LAPAROSCOPIC TUBAL LIGATION      Reversed   • REDUCTION MAMMAPLASTY  2009    8lbs of tissue was removed   • TONSILLECTOMY     • UPPER GASTROINTESTINAL ENDOSCOPY       Social History     Socioeconomic History   • Marital status: Other   Tobacco Use   • Smoking status: Never      Passive exposure: Never   • Smokeless tobacco: Never   Vaping Use   • Vaping Use: Never used   Substance and Sexual Activity   • Alcohol use: Never   • Drug use: Never   • Sexual activity: Not Currently     Partners: Male     Birth control/protection: Abstinence, Depo-provera     Family History   Problem Relation Age of Onset   • Colon cancer Neg Hx        Results     Result Review   The following data was reviewed by: Brian Marroquin MD on 04/13/2023:  Lab Results   Component Value Date    HGB 12.3 05/07/2023    HCT 35.7 05/07/2023    MCV 85.6 05/07/2023     05/07/2023    WBC 10.18 05/07/2023    NEUTROABS 8.71 (H) 05/06/2023    LYMPHSABS 1.26 05/06/2023    MONOSABS 1.12 (H) 05/06/2023    EOSABS 0.00 05/06/2023    BASOSABS 0.02 05/06/2023     Lab Results   Component Value Date    GLUCOSE 97 05/08/2023    BUN 7 05/08/2023    CREATININE 0.61 05/08/2023     05/08/2023    K 3.6 05/08/2023     (H) 05/08/2023    CO2 25.2 05/08/2023    CALCIUM 8.9 05/08/2023    PROTEINTOT 7.8 04/26/2023    ALBUMIN 4.4 04/26/2023    BILITOT 0.5 04/26/2023    ALKPHOS 92 04/26/2023    AST 17 04/26/2023    ALT 19 04/26/2023     Lab Results   Component Value Date    MG 1.7 05/08/2023    PHOS 3.3 05/08/2023       Case Report   Surgical Pathology Report                         Case: [id number]                                   Authorizing Provider:  Deniz Dowd MD    Collected:           04/12/2023 09:41 AM           Ordering Location:     [facility] Received:            04/12/2023 11:41 AM                                  SUITES                                                                        Pathologist:           Niesha Torres DO                                                        Specimens:   1) - Large Intestine, Cecum, cecal polyp cold snare                                                  2) - Large Intestine, Sigmoid Colon, sigmoid colon polyp cold snare                         Clinical Information    Mass of colon   Final Diagnosis   1. Cecum polyp, biopsy:                            - Tubular adenoma        2. Sigmoid colon polyp, biopsy:                            - Tubular adenoma    Electronically signed by Niesha Torres DO on 4/13/2023 at 0840     Surgical Pathology Report                         Case: [id number]                                   Authorizing Provider:  Deniz Dowd MD    Collected:           04/04/2023 11:15 AM           Ordering Location:     [facility] Received:            04/04/2023 11:59 AM                                  SUITES                                                                        Pathologist:           Jennifer Mike MD                                                      Specimens:   1) - Large Intestine, Transverse Colon, TRANSVERSE COLON POLYP                                       2) - Large Intestine, Left / Descending Colon, DESCENDING COLON BIOPSIES                   Clinical Information    Constipation, unspecified constipation type   Final Diagnosis   1. Transverse colon polyp, biopsy:               - Fragments of tubular adenoma        2.  Descending colon, biopsy:               -Adenocarcinoma, moderately to poorly differentiated     Comment: Per policy, reflex testing has been ordered, and the results will be separately reported.     REMARKS: The above positive (malignant) diagnosis was called to April in Dr. Dowd's office at 09:09 EDT on 4/5/2023 by s.          Electronically signed by Jennifer Mike MD on 4/5/2023 at 0935         Surgical Pathology Report                         Case: [id number]                                   Authorizing Provider:  Rolando Liu MD        Collected:           05/05/2023 03:06 PM           Ordering Location:     [facility]  Received:            05/08/2023 06:48 AM                                  OR                                                                             Pathologist:           Jt Florence MD                                                             Specimen:    Large Intestine, Left / Descending Colon, left colon                                       Clinical Information    Malignant neoplasm of descending colon   Final Diagnosis   Left colon, resection:               - Adenocarcinoma               - Three of thirty-three lymph nodes positive for carcinoma (3/33)               - See synoptic checklist   Electronically signed by Jt Florence MD on 5/11/2023 at 1508   Synoptic Checklist   COLON AND RECTUM: Resection, Including Transanal Disk Excision of Rectal Neoplasms  8th Edition - Protocol posted: 6/22/2022  COLON AND RECTUM: RESECTION - All Specimens  SPECIMEN   Procedure  Descending colon resection    TUMOR   Tumor Site  Descending colon    Histologic Type  Adenocarcinoma    Histologic Grade  G3, poorly differentiated    Tumor Size  Greatest dimension (Centimeters): 3.5 cm   Tumor Extent  Invades through muscularis propria into the pericolonic or perirectal tissue    Macroscopic Tumor Perforation  Not identified    Lymphovascular Invasion  Small vessel    Perineural Invasion  Present    Treatment Effect  No known presurgical therapy    MARGINS   Margin Status for Invasive Carcinoma  All margins negative for invasive carcinoma    Closest Margin(s) to Invasive Carcinoma  Radial (circumferential) or mesenteric    Distance from Invasive Carcinoma to Closest Margin  3.5 cm   Margin Status for Non-Invasive Tumor  All margins negative for high-grade dysplasia / intramucosal carcinoma and low-grade dysplasia    REGIONAL LYMPH NODES   Regional Lymph Node Status  Tumor present in regional lymph node(s)    Number of Lymph Nodes with Tumor  3    Number of Lymph Nodes Examined  33    Tumor Deposits  Present    Number of Tumor Deposits  1    PATHOLOGIC STAGE CLASSIFICATION (pTNM, AJCC 8th Edition)   Reporting of pT, pN, and  (when applicable) pM categories is based on information available to the pathologist at the time the report is issued. As per the AJCC (Chapter 1, 8th Ed.) it is the managing physician's responsibility to establish the final pathologic stage based upon all pertinent information, including but potentially not limited to this pathology report.   pT Category  pT3    pN Category  pN1b    ADDITIONAL FINDINGS   Additional Findings  None identified    .               Assessment & Plan     Diagnoses and all orders for this visit:    1. Primary adenocarcinoma of descending colon (Primary)  -     Ambulatory Referral to ONC Social Work  -     CBC & Differential; Future  -     Comprehensive Metabolic Panel; Future  -     CEA; Future  -     Ferritin; Future  -     Iron Profile; Future  -     Ambulatory Referral to OP ONC Nutrition Services        Lilian Pelaez is a 48 y.o. female who presents to [facility] HEMATOLOGY & ONCOLOGY  for discussion of systemic therapy for stage III colon cancer, patient status post resection of descending colon splenic flexure by Dr. Rolando Liu on 5/5/2023.     Prior to and reviewed NCCN guidelines for her stage III cancer, discussed high risk features seen on pathology report and discussed CapeOx for 3 months versus FOLFOX for 3 to 6 months with patient and  today.  Patient agreed to plan to undergo FOLFOX IV chemotherapy every 2 weeks for 6 months.    Placed orders for foundation 1 as well as [id number] liquid biopsy today.    Plan to rescan patient after 3 months of FOLFOX therapy.    Patient scheduled with general surgeon Dr. Liu to have chemotherapy port placed next week.    Also ordered CBC, CMP, CEA, iron profile, and ferritin.    Have messaged general surgeon Dr. Liu today to assess for surgical clearance prior to initiating chemotherapy, also notified him of bleeding from patient surgical incision which could be from seroma/hematoma. Dr. Liu communicated  with me that she is cleared to start chemotherapy as soon as the chemoport has been placed with him.     Placed chemotherapy orders today to facilitate insurance approval.    Plan patient follow-up in the next 1 to 2 weeks for cycle 1 day 1 of chemotherapy.    Patient verbalized understanding and agreement plan.    Please note that portions of this note were completed with a voice recognition program.    Electronically signed by Brian Marroquin MD, 05/31/23, 4:17 PM EDT.        Follow Up     I spent 60 minutes caring for Lilian on this date of service. This time includes time spent by me in the following activities:preparing for the visit, reviewing tests, obtaining and/or reviewing a separately obtained history, performing a medically appropriate examination and/or evaluation , counseling and educating the patient/family/caregiver, ordering medications, tests, or procedures, documenting information in the medical record and care coordination.    This is an acute or chronic illness that poses a threat to life or bodily function. The above treatment plan involves a high risk of complications and/or mortality of patient management.    The patient was seen and examined. Work by the provider also included review and/or ordering of lab tests, review and/or ordering of radiology tests, review and/or ordering of medicine tests, discussion with other physicians or providers, independent review of data, obtaining old records, review/summation of old records, and/or other review.    I have reviewed the family history, social history, and past medical history for this patient. Previous information and data has been reviewed and updated as needed. I have reviewed and verified the chief complaint, history, and other documentation. The patient was interviewed and examined in the clinic and the chart reviewed. The previous observations, recommendations, and conclusions were reviewed including those of other providers.     The plan was  discussed with the patient and/or family. The patient was given time to ask questions and these questions were answered. At the conclusion of their visit they had no additional questions or concerns and all questions were answered to their satisfaction.    Patient was given instructions and counseling regarding her condition or for health maintenance advice. Please see specific information pulled into the AVS if appropriate.

## 2025-03-26 LAB
GLUCOSE BLDC GLUCOMTR-MCNC: 143 MG/DL — HIGH (ref 70–99)
GLUCOSE BLDC GLUCOMTR-MCNC: 151 MG/DL — HIGH (ref 70–99)
GLUCOSE BLDC GLUCOMTR-MCNC: 158 MG/DL — HIGH (ref 70–99)
GLUCOSE BLDC GLUCOMTR-MCNC: 220 MG/DL — HIGH (ref 70–99)

## 2025-03-26 RX ORDER — DEXAMETHASONE 0.5 MG/1
4 TABLET ORAL EVERY 6 HOURS
Refills: 0 | Status: DISCONTINUED | OUTPATIENT
Start: 2025-03-26 | End: 2025-03-28

## 2025-03-26 RX ADMIN — Medication 3 MILLIGRAM(S): at 21:50

## 2025-03-26 RX ADMIN — Medication 40 MILLIGRAM(S): at 05:19

## 2025-03-26 RX ADMIN — FINASTERIDE 5 MILLIGRAM(S): 1 TABLET, FILM COATED ORAL at 12:09

## 2025-03-26 RX ADMIN — BRIMONIDINE TARTRATE 1 DROP(S): 1.5 SOLUTION/ DROPS OPHTHALMIC at 18:21

## 2025-03-26 RX ADMIN — Medication 1 TABLET(S): at 21:51

## 2025-03-26 RX ADMIN — MIDODRINE HYDROCHLORIDE 5 MILLIGRAM(S): 5 TABLET ORAL at 18:20

## 2025-03-26 RX ADMIN — DEXAMETHASONE 4 MILLIGRAM(S): 0.5 TABLET ORAL at 12:09

## 2025-03-26 RX ADMIN — DEXAMETHASONE 4 MILLIGRAM(S): 0.5 TABLET ORAL at 00:08

## 2025-03-26 RX ADMIN — DEXAMETHASONE 4 MILLIGRAM(S): 0.5 TABLET ORAL at 05:13

## 2025-03-26 RX ADMIN — INSULIN LISPRO 1: 100 INJECTION, SOLUTION INTRAVENOUS; SUBCUTANEOUS at 12:06

## 2025-03-26 RX ADMIN — DEXAMETHASONE 4 MILLIGRAM(S): 0.5 TABLET ORAL at 18:51

## 2025-03-26 RX ADMIN — Medication 25 MICROGRAM(S): at 05:13

## 2025-03-26 RX ADMIN — BRIMONIDINE TARTRATE 1 DROP(S): 1.5 SOLUTION/ DROPS OPHTHALMIC at 05:13

## 2025-03-26 RX ADMIN — INSULIN LISPRO 2: 100 INJECTION, SOLUTION INTRAVENOUS; SUBCUTANEOUS at 08:42

## 2025-03-26 RX ADMIN — POLYETHYLENE GLYCOL 3350 17 GRAM(S): 17 POWDER, FOR SOLUTION ORAL at 12:09

## 2025-03-26 RX ADMIN — TIMOLOL MALEATE 1 DROP(S): 6.8 SOLUTION OPHTHALMIC at 18:18

## 2025-03-26 RX ADMIN — TAMSULOSIN HYDROCHLORIDE 0.4 MILLIGRAM(S): 0.4 CAPSULE ORAL at 21:51

## 2025-03-26 RX ADMIN — INSULIN LISPRO 1: 100 INJECTION, SOLUTION INTRAVENOUS; SUBCUTANEOUS at 17:16

## 2025-03-26 RX ADMIN — MIDODRINE HYDROCHLORIDE 5 MILLIGRAM(S): 5 TABLET ORAL at 12:16

## 2025-03-26 RX ADMIN — TIMOLOL MALEATE 1 DROP(S): 6.8 SOLUTION OPHTHALMIC at 05:13

## 2025-03-26 RX ADMIN — DEXAMETHASONE 4 MILLIGRAM(S): 0.5 TABLET ORAL at 23:24

## 2025-03-26 NOTE — CHART NOTE - NSCHARTNOTEFT_GEN_A_CORE
Spoke with both Son Lul and HCP at bedside, they wish LTC with hospice clearly. Will be available physically tomorrow at 11am to meet with . Choices were given.

## 2025-03-26 NOTE — PROGRESS NOTE ADULT - PROBLEM SELECTOR PLAN 4
-No head trauma or LOC from recent fall (3/11/25)  -Pt complaining of trouble walking and pain in the left buttocks/hip  -CT pelvis negative for acute fracture  -likely due to loss of balance from brain mets and edema vs orthostatic hypotension  -C/w Tylenol for pain control  -family /HCP wishes LTC w/o hospice with plan hospice near future if pt deteriorates.

## 2025-03-26 NOTE — PROGRESS NOTE ADULT - ASSESSMENT
89 year old male with a history of lung cancer, A-fib, DM, HTN,   HLD, Hypothyroidism, Lumbar radiculopathy was admitted due to inability to walk  following a fall one week PTA.  Patient was found to have brain metastases.  Patient was transferred to ICU for close monitoring.  Neurology consult, Neurosurgery consult, Heme-onc. consult.  MRI of brain - metastases  MRI C-spine- no mets  Oncology consult is appreciated.  Continue Dexamethasone.  Palliative care consult.  Patient was downgraded to the regular floor.  Oncology and Palliative consults are appreciated.  Continue Dexamethsone.  Patient' LFT are up,  Abdominal ultrasound was done, no liver pathology was found, gallstones without cholecystitis.  BUN is trending up, most likely pre-renal.  Increase fluids.  Patient's proxy wanted patient to be discharged home with private home-care  to follow up with his oncologist.   I spoke to patient's son who was  trying to make arrangements for his dad to be discharged home.  Now patient's family is trying to make a decision in reference to patient's treatment at home vs. in long-term  facility with hospice.  Oncologist Dr. West's note is appreciated.  Patient's family will meet with palliative care service to discuss disposition.  Family decided in favor of placing patient to a nursing home without hospice services.  Case was discussed with house staff.

## 2025-03-26 NOTE — PROGRESS NOTE ADULT - PROBLEM SELECTOR PLAN 3
-elevated LFTs  -possible in the setting of erlotinib?  -no abd pain. tolerating diet well  -RUQ US negative  -LFTs stable

## 2025-03-26 NOTE — PROGRESS NOTE ADULT - PROBLEM SELECTOR PLAN 1
-CTH: Multiple new sites (since Oct 24') of edema in the left frontal, parietal lobes and bilateral temporal lobe suspicious for intracranial metastases. Largest area of edema is in the left frontal parietal lobes. There is local mass effect without herniation or hydrocephalus. Punctate focus of hyper-density in the right temporal lobe (series 2 image 20) in the area of edema. This may represent underlying intracranial metastasis versus punctate hemorrhage -Repeat CTH (6hrs): no changes   -MRI BRAIN: Multiple bilateral supratentorial and infratentorial metastatic lesions with moderate to severe associated vasogenic edema.  -MRI CERVICAL SPINE: No evidence of osseous metastasis. Multilevel cord impingement. No cord signal abnormality. No abnormal enhancement.  -MR Thoracic Spine w/wo IV Cont: Unremarkable MR of the thoracic spine.  -s/p ICU monitoring x 24hr.   -Started Decadron per heme/oncology    -Keep systolic BP < 150mmhg, on midodrine   -NeuroSx at [facility] following - Dr. Pringle, recs appreciated  -Neurology (dr Gillis) consulted  -Heme/Onc Dr. Drummond following  -c/w Decadron 4mg q6hrs and PPI  -per heme/onc would continue dexamethasone on d/c family understand risk of long term steroid per discussion with heme/onc

## 2025-03-26 NOTE — PROGRESS NOTE ADULT - ASSESSMENT
89 yo male (from home, uses walker at baseline) with PMHx of lung cancer on Erlotinib, HLD, HTN, BPH, hypothyroidism, A-fib on Xarelto, presents for ambulatory dysfunction since a fall 1 week ago (no head trauma). In the ED, Labs showed Hgb 10.9 ( baseline 12).  CT Head showed multiple new sites of edema in the left frontal, parietal lobes and bilateral temporal lobe suspicious for intracranial metastases. Largest area of edema is in the left frontal parietal lobes. Local mass effect without herniation or hydrocephalus. Punctate focus of hyperdensity in the right temporal lobe in the area of edema. This may represent intracranial metastasis versus punctate hemorrhage, In the ED, he was given Dexamethasone 10mg.   Patient is admitted to medicine for ambulatory dysfunction. Neurosurgery and Neurologist consulted and recommended ICU admission for 24hrs for closer monitoring.    In the ICU,  he remained  hemodynamically stable, saturating 100% on ambient air.  Started on Dexamethasone 4mg Q6hrs. Labs remained unremarkable. Neurological examination showed right pupil fixed, not reactive to light and accomodation. Repeat CT Head showed no changes, MRI Head showed  bilateral supratentorial and infratentorial metastatic lesions with moderate to severe associated vasogenic edema. MRI Cervical showed  multilevel cord impingement. MRI Thoracic was  negative. Neurologist and Heme/Onc are following as well as palliative. Pt is eligible for hospice.  PT recs home PT, unable to get 24hr home service.  Palliative following for GOC. Hospice appropriate.   Family/HCP spoke with hospice social worker. They decided LTC without hospice plan, with the plan to enroll in hospice if the patient deteriorates.

## 2025-03-26 NOTE — PROGRESS NOTE ADULT - PROBLEM SELECTOR PLAN 2
-Lung CA (Diagnosed 2 years ago per pt) and pt takes  Erlotinib 100mg QD  -Follows oupt Heme/Onc Doris Martinez  -CT and MR result above  -will stop Erlotinib  -c/w dexamethasone  -Heme/Onc Dr. Drummond following

## 2025-03-26 NOTE — PROGRESS NOTE ADULT - SUBJECTIVE AND OBJECTIVE BOX
This is 89 year old male with a history of lung cancer, A-fib, DM, HTN, Hypothyroidism,  HLD, Lumbar radiculopathy who was admitted after he developed mobility disfunction following a fall   one week ago. Patient denied head injury.  Musculo-skeletal imagings failed to show any acute fractures.  CT of brain showed picture compatible with brain metastases.  Patient was transferred to ICU for observation.  Neurology, Neurosurgery and heme-onc. services were summoned.  MRI of brain- brain metastases.  Heme-onc and palliative care inputs are appreciated.  Patient is alert, in NAD.  There were no new events over night .  Patient's family are trying to make  a decision in reference to patient's  disposition, long term facility with hospice vs. medical care at home.  Family decided in favor of placing  patient in a nursing home without hospice services.      Vital Signs Last 24 Hrs  T(C): 36.6 (26 Mar 2025 14:04), Max: 36.7 (25 Mar 2025 20:38)  T(F): 97.8 (26 Mar 2025 14:04), Max: 98 (25 Mar 2025 20:38)  HR: 98 (26 Mar 2025 14:04) (74 - 98)  BP: 93/61 (26 Mar 2025 14:04) (93/61 - 144/62)  BP(mean): 80 (25 Mar 2025 20:38) (80 - 80)  RR: 19 (26 Mar 2025 14:04) (18 - 19)  SpO2: 98% (26 Mar 2025 14:04) (97% - 99%)    Parameters below as of 26 Mar 2025 14:04  Patient On (Oxygen Delivery Method): room air    T(C): 36.6 (03-26-25 @ 14:04), Max: 36.7 (03-25-25 @ 20:38)  HR: 98 (03-26-25 @ 14:04) (74 - 98)  BP: 93/61 (03-26-25 @ 14:04) (93/61 - 144/62)  RR: 19 (03-26-25 @ 14:04) (18 - 19)  SpO2: 98% (03-26-25 @ 14:04) (97% - 99%)    CONSTITUTIONAL: Well groomed, no apparent distress  EYES: PERRLA and symmetric, EOMI, No conjunctival or scleral injection, non-icteric  ENMT: Oral mucosa with moist membranes. Normal dentition; no pharyngeal injection or exudates             NECK: Supple, symmetric and without tracheal deviation   RESP: No respiratory distress, no use of accessory muscles; CTA b/l, no WRR  CV: RRR, +S1S2, no MRG; no JVD; no peripheral edema  GI: Soft, NT, ND, no rebound, no guarding; no palpable masses; no hepatosplenomegaly; no hernia palpated  LYMPH: No cervical LAD or tenderness; no axillary LAD or tenderness; no inguinal LAD or tenderness  MSK: Normal gait; No digital clubbing or cyanosis; examination of the (head/neck/spine/ribs/pelvis, RUE, LUE, RLE, LLE) without misalignment,            Normal ROM without pain, no spinal tenderness, normal muscle strength/tone  SKIN: No rashes or ulcers noted; no subcutaneous nodules or induration palpable  NEURO: CN II-XII intact; normal reflexes in upper and lower extremities, sensation intact in upper and lower extremities b/l to light touch   PSYCH: Appropriate insight/judgment; A+O x 3, mood and affect appropriate, recent/remote memory intact      CBC Full  -  ( 25 Mar 2025 06:55 )  WBC Count : 8.19 K/uL  RBC Count : 4.16 M/uL  Hemoglobin : 13.5 g/dL  Hematocrit : 38.6 %  Platelet Count - Automated : 97 K/uL  Mean Cell Volume : 92.8 fl  Mean Cell Hemoglobin : 32.5 pg  Mean Cell Hemoglobin Concentration : 35.0 g/dL  Auto Neutrophil # : x  Auto Lymphocyte # : x  Auto Monocyte # : x  Auto Eosinophil # : x  Auto Basophil # : x  Auto Neutrophil % : x  Auto Lymphocyte % : x  Auto Monocyte % : x  Auto Eosinophil % : x  Auto Basophil % : x      03-25    141  |  111[H]  |  57[H]  ----------------------------<  157[H]  4.7   |  23  |  1.23    Ca    8.2[L]      25 Mar 2025 06:55    TPro  5.0[L]  /  Alb  2.7[L]  /  TBili  1.8[H]  /  DBili  x   /  AST  135[H]  /  ALT  483[H]  /  AlkPhos  144[H]  03-25      MEDICATIONS  (STANDING):  brimonidine 0.2% Ophthalmic Solution 1 Drop(s) Both EYES every 12 hours  dexAMETHasone     Tablet 4 milliGRAM(s) Oral every 6 hours  finasteride 5 milliGRAM(s) Oral daily  insulin lispro (ADMELOG) corrective regimen sliding scale   SubCutaneous three times a day before meals  insulin lispro (ADMELOG) corrective regimen sliding scale   SubCutaneous at bedtime  levothyroxine 25 MICROGram(s) Oral daily  midodrine. 5 milliGRAM(s) Oral three times a day  pantoprazole    Tablet 40 milliGRAM(s) Oral before breakfast  polyethylene glycol 3350 17 Gram(s) Oral daily  senna 1 Tablet(s) Oral daily  tamsulosin 0.4 milliGRAM(s) Oral at bedtime  timolol 0.5% Solution 1 Drop(s) Both EYES every 12 hours

## 2025-03-26 NOTE — PROGRESS NOTE ADULT - SUBJECTIVE AND OBJECTIVE BOX
NP Note discussed with  Primary Attending    INTERVAL HPI/OVERNIGHT EVENTS: seen at bedside, pt states feeling good, no acute overnight event.     MEDICATIONS  (STANDING):  brimonidine 0.2% Ophthalmic Solution 1 Drop(s) Both EYES every 12 hours  dexAMETHasone     Tablet 4 milliGRAM(s) Oral every 6 hours  finasteride 5 milliGRAM(s) Oral daily  insulin lispro (ADMELOG) corrective regimen sliding scale   SubCutaneous three times a day before meals  insulin lispro (ADMELOG) corrective regimen sliding scale   SubCutaneous at bedtime  levothyroxine 25 MICROGram(s) Oral daily  midodrine. 5 milliGRAM(s) Oral three times a day  pantoprazole    Tablet 40 milliGRAM(s) Oral before breakfast  polyethylene glycol 3350 17 Gram(s) Oral daily  senna 1 Tablet(s) Oral daily  tamsulosin 0.4 milliGRAM(s) Oral at bedtime  timolol 0.5% Solution 1 Drop(s) Both EYES every 12 hours    MEDICATIONS  (PRN):  acetaminophen     Tablet .. 650 milliGRAM(s) Oral every 6 hours PRN Temp greater or equal to 38C (100.4F), Mild Pain (1 - 3)  albuterol    90 MICROgram(s) HFA Inhaler 2 Puff(s) Inhalation every 6 hours PRN Bronchospasm  aluminum hydroxide/magnesium hydroxide/simethicone Suspension 30 milliLiter(s) Oral every 4 hours PRN Dyspepsia  melatonin 3 milliGRAM(s) Oral at bedtime PRN Insomnia      __________________________________________________  REVIEW OF SYSTEMS:    CONSTITUTIONAL: No fever,   EYES: no acute visual disturbances  NECK: No pain or stiffness  RESPIRATORY: No cough; No shortness of breath  CARDIOVASCULAR: No chest pain, no palpitations  GASTROINTESTINAL: No pain. No nausea or vomiting; No diarrhea   NEUROLOGICAL: No headache or numbness, no tremors  MUSCULOSKELETAL: No joint pain, no muscle pain  GENITOURINARY: no dysuria, no frequency, no hesitancy  PSYCHIATRY: no depression , no anxiety  ALL OTHER  ROS negative        Vital Signs Last 24 Hrs  T(C): 36.6 (26 Mar 2025 14:04), Max: 36.7 (25 Mar 2025 20:38)  T(F): 97.8 (26 Mar 2025 14:04), Max: 98 (25 Mar 2025 20:38)  HR: 98 (26 Mar 2025 14:04) (74 - 98)  BP: 93/61 (26 Mar 2025 14:04) (93/61 - 144/62)  BP(mean): 80 (25 Mar 2025 20:38) (80 - 80)  RR: 19 (26 Mar 2025 14:04) (18 - 19)  SpO2: 98% (26 Mar 2025 14:04) (97% - 99%)    Parameters below as of 26 Mar 2025 14:04  Patient On (Oxygen Delivery Method): room air    ________________________________________________  PHYSICAL EXAM:  GENERAL: NAD  HEENT: Normocephalic;  conjunctivae and sclerae clear; moist mucous membranes;   NECK : supple  CHEST/LUNG: Clear to auscultation bilaterally with good air entry   HEART: S1 S2  regular; no murmurs, gallops or rubs  ABDOMEN: Soft, Nontender, Nondistended; Bowel sounds present  EXTREMITIES: no cyanosis; no edema; no calf tenderness  SKIN: warm and dry; no rash  NERVOUS SYSTEM:  Awake and alert; Oriented  to place, person and time ; no new deficits    _________________________________________________  LABS:                        13.5   8.19  )-----------( 97       ( 25 Mar 2025 06:55 )             38.6     03-25    141  |  111[H]  |  57[H]  ----------------------------<  157[H]  4.7   |  23  |  1.23    Ca    8.2[L]      25 Mar 2025 06:55    TPro  5.0[L]  /  Alb  2.7[L]  /  TBili  1.8[H]  /  DBili  x   /  AST  135[H]  /  ALT  483[H]  /  AlkPhos  144[H]  03-25      Urinalysis Basic - ( 25 Mar 2025 06:55 )    Color: x / Appearance: x / SG: x / pH: x  Gluc: 157 mg/dL / Ketone: x  / Bili: x / Urobili: x   Blood: x / Protein: x / Nitrite: x   Leuk Esterase: x / RBC: x / WBC x   Sq Epi: x / Non Sq Epi: x / Bacteria: x      CAPILLARY BLOOD GLUCOSE      POCT Blood Glucose.: 158 mg/dL (26 Mar 2025 11:27)  POCT Blood Glucose.: 220 mg/dL (26 Mar 2025 08:13)  POCT Blood Glucose.: 141 mg/dL (25 Mar 2025 21:00)  POCT Blood Glucose.: 153 mg/dL (25 Mar 2025 17:03)        RADIOLOGY & ADDITIONAL TESTS:    Imaging  Reviewed:  YES    < from: MR Head w/wo IV Cont (03.17.25 @ 18:10) >    ACC: [id number] EXAM:  MR SPINE CERVICAL WAW IC   ORDERED BY: MONICA ZENDEJAS     ACC: [id number] EXAM:  MR BRAIN WAW IC   ORDERED BY: ED WINN     PROCEDURE DATE:  03/17/2025          INTERPRETATION:  CLINICAL INDICATIONS: suspect metastasisper CT head    COMPARISON: Head CT dated 3/16/2025. Head CT dated 3/11/2025    TECHNIQUE: MRI brain: Multiplanar, multisequence MR imaging of the brain   are obtained with and without the administration of 7.5 cc intravenous   Gadavist contrast. 0 ccof contrast was discarded.    FINDINGS:  Multiple supratentorial and infratentorial bilateral ring-enhancing   lesions. Severe vasogenic edema throughout the left frontal lobe, left   parietal lobe. Mild vasogenic edema in the right caudate head. Moderate   vasogenic edema in the anterior left temporal lobe and posterior right   temporal occipital lobe. Mild vasogenic edema in the right cerebellum.   Largest ring-enhancing lesion left frontal lobe measures 5.5 mm. Largest   lesion in the left parietal lobe measures 5.4 mm. Right caudate head   lesion measures 7.1 mm. Left temporal lobe evaluation measures 1.3 cm.   Right temporal occipital lobe lesion measures 1.4 cm. Right cerebellar   lesion measures 5.7 mm. Additional bilateral smaller metastatic lesions.   There is no abnormal restricted diffusion to suggest acute infarction.    Normal T2 flow-voids are seen within  the intracranial vasculature. The   lateral ventricles and cortical sulci are age-appropriate in size and   configuration. There is no extra-axial fluid collection. There is no   susceptibility artifact to suggest hemorrhage. Midline structures are   normal.  The visualized paranasal sinuses, mastoid air cells and orbits   are unremarkable.      ==============    CLINICAL HISTORY: suspect metastasis per CT head    COMPARISON: Broad-based ridging causing mild bilateral foraminal   narrowing. No spinal canal stenosis.    TECHNIQUE: Cervical spine MRI: Multiplanar, multisequence MR images of   the cervical spine are obtained with and without the administration of   7.5 cc intravenous Gadavist contrast. 0 cc of contrast was discarded.    FINDINGS:  Unremarkable heterogeneous T1 marrow signal. No cord signal abnormality.   No abnormal enhancement to suggest osseous metastasis. Severe disc   desiccation at C2/C3 C5/C6 levels. Moderate disc desiccation at C7/T1 and   T1/T2 levels. No bone marrow edema. There is no evidence for acute   fracture. A normal lordosis is noted. Craniocervical junction is normal.   The cervicovertebral body heights and remaining intervertebral disc   spaces are preserved. There is no prevertebral soft tissue abnormality.      Evaluation of the individual levels:  C2/C3 level: Broad-based ridging causing mild bilateral foraminal   narrowing. No spinal canal stenosis.  C3/C4 level: Broad-based disc disc complex causing severe right-sided   foraminal narrowing. Mild left-sided foraminal narrowing. Mild cord   impingement. Mild spinal canal stenosis. No cord signal abnormality.  C4/C5 level: Broad-based ridging causing mild to moderate cord   compression. Severe bilateral foraminal narrowing. Mild to moderate   spinal canal stenosis.  C5/C6 level: Broad-based ridging causing severe right-sided foraminal   narrowing. Moderate left-sided foraminal narrowing. Mild flattening of   the cord. No spinal canal stenosis.  C6/C7 level: Broad-based diffuse complex causing moderate bilateral   foraminal narrowing. No spinal canal stenosis.  C7/T1 level:  No spinal canal stenosis or foraminal narrowing.      ==============      IMPRESSION:    MRI BRAIN: Multiple bilateral supratentorial and infratentorial   metastatic lesions with moderate to severe associated vasogenic edema.    MRI CERVICAL SPINE: No evidence of osseous metastasis. Multilevel cord   impingement. No cord signal abnormality. No abnormal enhancement.    --- End of Report ---            YASSER N AYO MD; Attending Radiologist  This document has been electronically signed. Mar 17 2025  8:26PM    < end of copied text >  < from: CT Head No Cont (03.16.25 @ 23:44) >    ACC: [id number] EXAM:  CT BRAIN   ORDERED BY: BILL SANFORD     PROCEDURE DATE:  03/16/2025          INTERPRETATION:  CLINICAL INFORMATION: interval eval brain edema and   possible hemorrhage , lung cancer    COMPARISON: 03/16/2025 at 5:26 PM.    CONTRAST:  IV Contrast: NONE  .    FINDINGS:    The brain demonstrates unchanged vasogenic edema within the LEFT frontal   and parietal lobes with punctate hemorrhage or calcification seen within   a anterior LEFT frontal gyrus. Mild vasogenic edema in the LEFT temporal   and RIGHT occipital lobes with associated punctate hemorrhage or   calcification. No acute cerebral cortical infarct is seen. No mass effect   is found in the brain.    The ventricles, sulci and basal cisterns appear unremarkable.    The orbits are unremarkable.  The paranasal sinuses are clear.  The nasal   cavity appears intact.  The nasopharynx is symmetric.  The central skull   base, petrous temporal bones and calvarium remain intact.      IMPRESSION:   Unchanged vasogenic edema within the LEFT frontal and   parietal lobes with punctate calcifications seen within a anterior LEFT   frontal gyrus.  Mild vasogenic edema in the LEFT temporal and RIGHT   occipital lobes with associated punctate hemorrhage or calcification.   MRI with gadolinium recommended for complete evaluation.    --- End of Report ---            STEPHANIE REED MD; Attending Radiologist  This document has been electronically signed. Mar 17 2025  6:49AM    < end of copied text >  Consultant(s) Notes Reviewed:   YES      Plan of care was discussed with patient and /or primary care giver; all questions and concerns were addressed

## 2025-03-27 LAB
ALBUMIN SERPL ELPH-MCNC: 2.6 G/DL — LOW (ref 3.5–5)
ALP SERPL-CCNC: 129 U/L — HIGH (ref 40–120)
ALT FLD-CCNC: 274 U/L DA — HIGH (ref 10–60)
ANION GAP SERPL CALC-SCNC: 7 MMOL/L — SIGNIFICANT CHANGE UP (ref 5–17)
AST SERPL-CCNC: 42 U/L — HIGH (ref 10–40)
BILIRUB SERPL-MCNC: 1.5 MG/DL — HIGH (ref 0.2–1.2)
BUN SERPL-MCNC: 54 MG/DL — HIGH (ref 7–18)
CALCIUM SERPL-MCNC: 8.3 MG/DL — LOW (ref 8.4–10.5)
CHLORIDE SERPL-SCNC: 110 MMOL/L — HIGH (ref 96–108)
CO2 SERPL-SCNC: 23 MMOL/L — SIGNIFICANT CHANGE UP (ref 22–31)
CREAT SERPL-MCNC: 1.05 MG/DL — SIGNIFICANT CHANGE UP (ref 0.5–1.3)
EGFR: 66 ML/MIN/1.73M2 — SIGNIFICANT CHANGE UP
EGFR: 66 ML/MIN/1.73M2 — SIGNIFICANT CHANGE UP
GLUCOSE BLDC GLUCOMTR-MCNC: 112 MG/DL — HIGH (ref 70–99)
GLUCOSE BLDC GLUCOMTR-MCNC: 123 MG/DL — HIGH (ref 70–99)
GLUCOSE BLDC GLUCOMTR-MCNC: 162 MG/DL — HIGH (ref 70–99)
GLUCOSE BLDC GLUCOMTR-MCNC: 198 MG/DL — HIGH (ref 70–99)
GLUCOSE SERPL-MCNC: 140 MG/DL — HIGH (ref 70–99)
HCT VFR BLD CALC: 37.8 % — LOW (ref 39–50)
HGB BLD-MCNC: 12.8 G/DL — LOW (ref 13–17)
MCHC RBC-ENTMCNC: 31.9 PG — SIGNIFICANT CHANGE UP (ref 27–34)
MCHC RBC-ENTMCNC: 33.9 G/DL — SIGNIFICANT CHANGE UP (ref 32–36)
MCV RBC AUTO: 94.3 FL — SIGNIFICANT CHANGE UP (ref 80–100)
NRBC BLD AUTO-RTO: 0 /100 WBCS — SIGNIFICANT CHANGE UP (ref 0–0)
PLATELET # BLD AUTO: 88 K/UL — LOW (ref 150–400)
POTASSIUM SERPL-MCNC: 4.6 MMOL/L — SIGNIFICANT CHANGE UP (ref 3.5–5.3)
POTASSIUM SERPL-SCNC: 4.6 MMOL/L — SIGNIFICANT CHANGE UP (ref 3.5–5.3)
PROT SERPL-MCNC: 5.1 G/DL — LOW (ref 6–8.3)
RBC # BLD: 4.01 M/UL — LOW (ref 4.2–5.8)
RBC # FLD: 17.1 % — HIGH (ref 10.3–14.5)
SODIUM SERPL-SCNC: 140 MMOL/L — SIGNIFICANT CHANGE UP (ref 135–145)
WBC # BLD: 8.63 K/UL — SIGNIFICANT CHANGE UP (ref 3.8–10.5)
WBC # FLD AUTO: 8.63 K/UL — SIGNIFICANT CHANGE UP (ref 3.8–10.5)

## 2025-03-27 RX ADMIN — FINASTERIDE 5 MILLIGRAM(S): 1 TABLET, FILM COATED ORAL at 12:37

## 2025-03-27 RX ADMIN — Medication 1 TABLET(S): at 12:39

## 2025-03-27 RX ADMIN — INSULIN LISPRO 1: 100 INJECTION, SOLUTION INTRAVENOUS; SUBCUTANEOUS at 17:16

## 2025-03-27 RX ADMIN — Medication 25 MICROGRAM(S): at 05:55

## 2025-03-27 RX ADMIN — BRIMONIDINE TARTRATE 1 DROP(S): 1.5 SOLUTION/ DROPS OPHTHALMIC at 05:55

## 2025-03-27 RX ADMIN — Medication 40 MILLIGRAM(S): at 05:54

## 2025-03-27 RX ADMIN — TAMSULOSIN HYDROCHLORIDE 0.4 MILLIGRAM(S): 0.4 CAPSULE ORAL at 22:57

## 2025-03-27 RX ADMIN — MIDODRINE HYDROCHLORIDE 5 MILLIGRAM(S): 5 TABLET ORAL at 17:15

## 2025-03-27 RX ADMIN — INSULIN LISPRO 1: 100 INJECTION, SOLUTION INTRAVENOUS; SUBCUTANEOUS at 12:38

## 2025-03-27 RX ADMIN — TIMOLOL MALEATE 1 DROP(S): 6.8 SOLUTION OPHTHALMIC at 05:56

## 2025-03-27 RX ADMIN — DEXAMETHASONE 4 MILLIGRAM(S): 0.5 TABLET ORAL at 05:54

## 2025-03-27 RX ADMIN — BRIMONIDINE TARTRATE 1 DROP(S): 1.5 SOLUTION/ DROPS OPHTHALMIC at 18:07

## 2025-03-27 RX ADMIN — TIMOLOL MALEATE 1 DROP(S): 6.8 SOLUTION OPHTHALMIC at 18:07

## 2025-03-27 RX ADMIN — DEXAMETHASONE 4 MILLIGRAM(S): 0.5 TABLET ORAL at 12:39

## 2025-03-27 RX ADMIN — DEXAMETHASONE 4 MILLIGRAM(S): 0.5 TABLET ORAL at 17:16

## 2025-03-27 RX ADMIN — MIDODRINE HYDROCHLORIDE 5 MILLIGRAM(S): 5 TABLET ORAL at 13:43

## 2025-03-27 RX ADMIN — DEXAMETHASONE 4 MILLIGRAM(S): 0.5 TABLET ORAL at 23:01

## 2025-03-27 RX ADMIN — POLYETHYLENE GLYCOL 3350 17 GRAM(S): 17 POWDER, FOR SOLUTION ORAL at 12:37

## 2025-03-27 NOTE — CHART NOTE - NSCHARTNOTEFT_GEN_A_CORE
Spoke with son and HCP Earl today ([phone]). Confirmed plan to seek LTC facility that supports hospice when they are ready.   Offered support and answered questions.   Palliative Care will Palliative care will sign off. Please reconsult if needed.

## 2025-03-27 NOTE — PROGRESS NOTE ADULT - ASSESSMENT
89 yo male (from home, uses walker at baseline) with PMHx of lung cancer on Erlotinib, HLD, HTN, BPH, hypothyroidism, A-fib on Xarelto, presents for ambulatory dysfunction since a fall 1 week ago (no head trauma). In the ED, Labs showed Hgb 10.9 ( baseline 12).  CT Head showed multiple new sites of edema in the left frontal, parietal lobes and bilateral temporal lobe suspicious for intracranial metastases. Largest area of edema is in the left frontal parietal lobes. Local mass effect without herniation or hydrocephalus. Punctate focus of hyperdensity in the right temporal lobe in the area of edema. This may represent intracranial metastasis versus punctate hemorrhage, In the ED, he was given Dexamethasone 10mg.   Patient is admitted to medicine for ambulatory dysfunction. Neurosurgery and Neurologist consulted and recommended ICU admission for 24hrs for closer monitoring.    In the ICU,  he remained  hemodynamically stable, saturating 100% on ambient air.  Started on Dexamethasone 4mg Q6hrs. Labs remained unremarkable. Neurological examination showed right pupil fixed, not reactive to light and accomodation. Repeat CT Head showed no changes, MRI Head showed  bilateral supratentorial and infratentorial metastatic lesions with moderate to severe associated vasogenic edema. MRI Cervical showed  multilevel cord impingement. MRI Thoracic was  negative. Neurologist and Heme/Onc are following as well as palliative. Pt is eligible for hospice.  PT recs home PT, unable to get 24hr home service.  Palliative following for GOC. Hospice appropriate.   Family/HCP spoke with hospice social worker. They decided LTC without hospice plan, with the plan to enroll in hospice if the patient deteriorates.

## 2025-03-27 NOTE — PROGRESS NOTE ADULT - SUBJECTIVE AND OBJECTIVE BOX
NP Note discussed with  Primary Attending    INTERVAL HPI/OVERNIGHT EVENTS: no new complaints    MEDICATIONS  (STANDING):  brimonidine 0.2% Ophthalmic Solution 1 Drop(s) Both EYES every 12 hours  dexAMETHasone     Tablet 4 milliGRAM(s) Oral every 6 hours  finasteride 5 milliGRAM(s) Oral daily  insulin lispro (ADMELOG) corrective regimen sliding scale   SubCutaneous three times a day before meals  insulin lispro (ADMELOG) corrective regimen sliding scale   SubCutaneous at bedtime  levothyroxine 25 MICROGram(s) Oral daily  midodrine. 5 milliGRAM(s) Oral three times a day  pantoprazole    Tablet 40 milliGRAM(s) Oral before breakfast  polyethylene glycol 3350 17 Gram(s) Oral daily  senna 1 Tablet(s) Oral daily  tamsulosin 0.4 milliGRAM(s) Oral at bedtime  timolol 0.5% Solution 1 Drop(s) Both EYES every 12 hours    MEDICATIONS  (PRN):  acetaminophen     Tablet .. 650 milliGRAM(s) Oral every 6 hours PRN Temp greater or equal to 38C (100.4F), Mild Pain (1 - 3)  albuterol    90 MICROgram(s) HFA Inhaler 2 Puff(s) Inhalation every 6 hours PRN Bronchospasm  aluminum hydroxide/magnesium hydroxide/simethicone Suspension 30 milliLiter(s) Oral every 4 hours PRN Dyspepsia  melatonin 3 milliGRAM(s) Oral at bedtime PRN Insomnia      __________________________________________________  REVIEW OF SYSTEMS:    CONSTITUTIONAL: No fever,   EYES: no acute visual disturbances  NECK: No pain or stiffness  RESPIRATORY: No cough; No shortness of breath  CARDIOVASCULAR: No chest pain, no palpitations  GASTROINTESTINAL: No pain. No nausea or vomiting; No diarrhea   NEUROLOGICAL: No headache or numbness, no tremors  MUSCULOSKELETAL: No joint pain, no muscle pain  GENITOURINARY: no dysuria, no frequency, no hesitancy  PSYCHIATRY: no depression , no anxiety  ALL OTHER  ROS negative        Vital Signs Last 24 Hrs  T(C): 36.7 (27 Mar 2025 13:00), Max: 36.7 (27 Mar 2025 13:00)  T(F): 98.1 (27 Mar 2025 13:00), Max: 98.1 (27 Mar 2025 13:00)  HR: 78 (27 Mar 2025 13:00) (71 - 84)  BP: 95/57 (27 Mar 2025 13:00) (95/57 - 135/75)  BP(mean): --  RR: 18 (27 Mar 2025 13:00) (18 - 20)  SpO2: 98% (27 Mar 2025 13:00) (98% - 100%)    Parameters below as of 27 Mar 2025 13:00  Patient On (Oxygen Delivery Method): room air        ________________________________________________  PHYSICAL EXAM:  GENERAL: NAD  HEENT: Normocephalic;  conjunctivae and sclerae clear; moist mucous membranes;   NECK : supple  CHEST/LUNG: Clear to auscultation bilaterally with good air entry   HEART: S1 S2  regular; no murmurs, gallops or rubs  ABDOMEN: Soft, Nontender, Nondistended; Bowel sounds present  EXTREMITIES: no cyanosis; no edema; no calf tenderness  SKIN: warm and dry; no rash  NERVOUS SYSTEM:  Awake and alert; Oriented  to place, person and time ; no new deficits    _________________________________________________  LABS:                        12.8   8.63  )-----------( 88       ( 27 Mar 2025 06:04 )             37.8     03-27    140  |  110[H]  |  54[H]  ----------------------------<  140[H]  4.6   |  23  |  1.05    Ca    8.3[L]      27 Mar 2025 06:04    TPro  5.1[L]  /  Alb  2.6[L]  /  TBili  1.5[H]  /  DBili  x   /  AST  42[H]  /  ALT  274[H]  /  AlkPhos  129[H]  03-27      Urinalysis Basic - ( 27 Mar 2025 06:04 )    Color: x / Appearance: x / SG: x / pH: x  Gluc: 140 mg/dL / Ketone: x  / Bili: x / Urobili: x   Blood: x / Protein: x / Nitrite: x   Leuk Esterase: x / RBC: x / WBC x   Sq Epi: x / Non Sq Epi: x / Bacteria: x      CAPILLARY BLOOD GLUCOSE      POCT Blood Glucose.: 198 mg/dL (27 Mar 2025 11:48)  POCT Blood Glucose.: 112 mg/dL (27 Mar 2025 08:09)  POCT Blood Glucose.: 143 mg/dL (26 Mar 2025 20:54)  POCT Blood Glucose.: 151 mg/dL (26 Mar 2025 16:50)        RADIOLOGY & ADDITIONAL TESTS:    Imaging  Reviewed:  YES  no new imaging   Consultant(s) Notes Reviewed:   YES      Plan of care was discussed with patient and /or primary care giver; all questions and concerns were addressed

## 2025-03-27 NOTE — PROGRESS NOTE ADULT - PROBLEM SELECTOR PLAN 1
-CTH: Multiple new sites (since Oct 24') of edema in the left frontal, parietal lobes and bilateral temporal lobe suspicious for intracranial metastases. Largest area of edema is in the left frontal parietal lobes. There is local mass effect without herniation or hydrocephalus. Punctate focus of hyper-density in the right temporal lobe (series 2 image 20) in the area of edema. This may represent underlying intracranial metastasis versus punctate hemorrhage -Repeat CTH (6hrs): no changes   -MRI BRAIN: Multiple bilateral supratentorial and infratentorial metastatic lesions with moderate to severe associated vasogenic edema.  -MRI CERVICAL SPINE: No evidence of osseous metastasis. Multilevel cord impingement. No cord signal abnormality. No abnormal enhancement.  -MR Thoracic Spine w/wo IV Cont: Unremarkable MR of the thoracic spine.  -s/p ICU monitoring x 24hr.   -Started Decadron per heme/oncology    -Keep systolic BP < 150mmhg, on midodrine   -Ne[facility]Sx at [facility] following - Dr. Pringle, recs appreciated  -Neurology (dr Gillis) consulted  -Heme/Onc Dr. Drummond following  -c/w Decadron 4mg q6hrs and PPI  -per heme/onc would continue dexamethasone on d/c family understand risk of long term steroid per discussion with heme/onc

## 2025-03-27 NOTE — PROGRESS NOTE ADULT - ASSESSMENT
89 year old male with a history of lung cancer, A-fib, DM, HTN,   HLD, Hypothyroidism, Lumbar radiculopathy was admitted due to inability to walk  following a fall one week PTA.  Patient was found to have brain metastases.  Patient was transferred to ICU for close monitoring.  Neurology consult, Neurosurgery consult, Heme-onc. consult.  MRI of brain - metastases  MRI C-spine- no mets  Oncology consult is appreciated.  Continue Dexamethasone.  Palliative care consult.  Patient was downgraded to the regular floor.  Oncology and Palliative consults are appreciated.  Continue Dexamethsone.  Patient' LFT are up,  Abdominal ultrasound was done, no liver pathology was found, gallstones without cholecystitis.  BUN is trending up, most likely pre-renal.  Increase fluids.  Patient's proxy wanted patient to be discharged home with private home-care  to follow up with his oncologist.   I spoke to patient's son who was  trying to make arrangements for his dad to be discharged home.  Now patient's family is trying to make a decision in reference to patient's treatment at home vs. in long-term  facility with hospice.  Oncologist Dr. West's note is appreciated.  Patient's family  is now agreeable with LTC with hospice.  Case was discussed with house staff.

## 2025-03-27 NOTE — PROGRESS NOTE ADULT - PROBLEM SELECTOR PLAN 9
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   -Hgb 10.9 on admission, improved 12.8  -Thrombocytopenia Plt 88K.   -stable  -monitor CBC (no routine lab), will check 3x week  -heme/onc following

## 2025-03-27 NOTE — PROGRESS NOTE ADULT - SUBJECTIVE AND OBJECTIVE BOX
This is 89 year old male with a history of lung cancer, A-fib, DM, HTN, Hypothyroidism,  HLD, Lumbar radiculopathy who was admitted after he developed mobility disfunction following a fall   one week ago. Patient denied head injury.  Musculo-skeletal imagings failed to show any acute fractures.  CT of brain showed picture compatible with brain metastases.  Patient was transferred to ICU for observation.  Neurology, Neurosurgery and heme-onc. services were summoned.  MRI of brain- brain metastases.  Heme-onc and palliative care inputs are appreciated.  Patient is alert, in NAD.  There were no new events over night .  Family os now agreeable for patient to go to LTC with hospice.    Vital Signs Last 24 Hrs  T(C): 36.7 (27 Mar 2025 13:00), Max: 36.7 (27 Mar 2025 13:00)  T(F): 98.1 (27 Mar 2025 13:00), Max: 98.1 (27 Mar 2025 13:00)  HR: 78 (27 Mar 2025 13:00) (71 - 78)  BP: 95/57 (27 Mar 2025 13:00) (95/57 - 135/75)  BP(mean): --  RR: 18 (27 Mar 2025 13:00) (18 - 20)  SpO2: 98% (27 Mar 2025 13:00) (98% - 100%)    Parameters below as of 27 Mar 2025 13:00  Patient On (Oxygen Delivery Method): room air      T(C): 36.7 (03-27-25 @ 13:00), Max: 36.7 (03-27-25 @ 13:00)  HR: 78 (03-27-25 @ 13:00) (71 - 78)  BP: 95/57 (03-27-25 @ 13:00) (95/57 - 135/75)  RR: 18 (03-27-25 @ 13:00) (18 - 20)  SpO2: 98% (03-27-25 @ 13:00) (98% - 100%)    CONSTITUTIONAL: Well groomed, no apparent distress  EYES: PERRLA and symmetric, EOMI, No conjunctival or scleral injection, non-icteric  ENMT: Oral mucosa with moist membranes. Normal dentition; no pharyngeal injection or exudates             NECK: Supple, symmetric and without tracheal deviation   RESP: No respiratory distress, no use of accessory muscles; CTA b/l, no WRR  CV: RRR, +S1S2, no MRG; no JVD; no peripheral edema  GI: Soft, NT, ND, no rebound, no guarding; no palpable masses; no hepatosplenomegaly; no hernia palpated  LYMPH: No cervical LAD or tenderness; no axillary LAD or tenderness; no inguinal LAD or tenderness  MSK: Normal gait; No digital clubbing or cyanosis; examination of the (head/neck/spine/ribs/pelvis, RUE, LUE, RLE, LLE) without misalignment,            Normal ROM without pain, no spinal tenderness, normal muscle strength/tone  SKIN: No rashes or ulcers noted; no subcutaneous nodules or induration palpable  NEURO: CN II-XII intact; normal reflexes in upper and lower extremities, sensation intact in upper and lower extremities b/l to light touch   PSYCH: Appropriate insight/judgment; A+O x 3, mood and affect appropriate, recent/remote memory intact      CBC Full  -  ( 27 Mar 2025 06:04 )  WBC Count : 8.63 K/uL  RBC Count : 4.01 M/uL  Hemoglobin : 12.8 g/dL  Hematocrit : 37.8 %  Platelet Count - Automated : 88 K/uL  Mean Cell Volume : 94.3 fl  Mean Cell Hemoglobin : 31.9 pg  Mean Cell Hemoglobin Concentration : 33.9 g/dL  Auto Neutrophil # : x  Auto Lymphocyte # : x  Auto Monocyte # : x  Auto Eosinophil # : x  Auto Basophil # : x  Auto Neutrophil % : x  Auto Lymphocyte % : x  Auto Monocyte % : x  Auto Eosinophil % : x  Auto Basophil % : x    03-27    140  |  110[H]  |  54[H]  ----------------------------<  140[H]  4.6   |  23  |  1.05    Ca    8.3[L]      27 Mar 2025 06:04    TPro  5.1[L]  /  Alb  2.6[L]  /  TBili  1.5[H]  /  DBili  x   /  AST  42[H]  /  ALT  274[H]  /  AlkPhos  129[H]  03-27      MEDICATIONS  (STANDING):  brimonidine 0.2% Ophthalmic Solution 1 Drop(s) Both EYES every 12 hours  dexAMETHasone     Tablet 4 milliGRAM(s) Oral every 6 hours  finasteride 5 milliGRAM(s) Oral daily  insulin lispro (ADMELOG) corrective regimen sliding scale   SubCutaneous three times a day before meals  insulin lispro (ADMELOG) corrective regimen sliding scale   SubCutaneous at bedtime  levothyroxine 25 MICROGram(s) Oral daily  midodrine. 5 milliGRAM(s) Oral three times a day  pantoprazole    Tablet 40 milliGRAM(s) Oral before breakfast  polyethylene glycol 3350 17 Gram(s) Oral daily  senna 1 Tablet(s) Oral daily  tamsulosin 0.4 milliGRAM(s) Oral at bedtime  timolol 0.5% Solution 1 Drop(s) Both EYES every 12 hours

## 2025-03-27 NOTE — PROGRESS NOTE ADULT - PROBLEM SELECTOR PLAN 2
-Lung CA (Diagnosed 2 years ago per pt) and pt takes  Erlotinib 100mg QD  -Follows oupt Heme/Onc Doris Martinez  -CT and MR result above  -stopped Erlotinib  -c/w dexamethasone 4mg q6 with PPI.   -Heme/Onc Dr. Drummond following

## 2025-03-28 ENCOUNTER — TRANSCRIPTION ENCOUNTER (OUTPATIENT)
Age: 89
End: 2025-03-28

## 2025-03-28 VITALS — OXYGEN SATURATION: 96 % | HEART RATE: 96 BPM | SYSTOLIC BLOOD PRESSURE: 146 MMHG | DIASTOLIC BLOOD PRESSURE: 73 MMHG

## 2025-03-28 LAB
GLUCOSE BLDC GLUCOMTR-MCNC: 132 MG/DL — HIGH (ref 70–99)
GLUCOSE BLDC GLUCOMTR-MCNC: 176 MG/DL — HIGH (ref 70–99)
GLUCOSE BLDC GLUCOMTR-MCNC: 197 MG/DL — HIGH (ref 70–99)

## 2025-03-28 PROCEDURE — 97116 GAIT TRAINING THERAPY: CPT

## 2025-03-28 PROCEDURE — 97162 PT EVAL MOD COMPLEX 30 MIN: CPT

## 2025-03-28 PROCEDURE — 85610 PROTHROMBIN TIME: CPT

## 2025-03-28 PROCEDURE — 99285 EMERGENCY DEPT VISIT HI MDM: CPT | Mod: 25

## 2025-03-28 PROCEDURE — 73502 X-RAY EXAM HIP UNI 2-3 VIEWS: CPT

## 2025-03-28 PROCEDURE — 80053 COMPREHEN METABOLIC PANEL: CPT

## 2025-03-28 PROCEDURE — 85027 COMPLETE CBC AUTOMATED: CPT

## 2025-03-28 PROCEDURE — 80162 ASSAY OF DIGOXIN TOTAL: CPT

## 2025-03-28 PROCEDURE — 70450 CT HEAD/BRAIN W/O DYE: CPT | Mod: MC

## 2025-03-28 PROCEDURE — 85730 THROMBOPLASTIN TIME PARTIAL: CPT

## 2025-03-28 PROCEDURE — 72157 MRI CHEST SPINE W/O & W/DYE: CPT | Mod: MC

## 2025-03-28 PROCEDURE — 72156 MRI NECK SPINE W/O & W/DYE: CPT | Mod: MC

## 2025-03-28 PROCEDURE — 96374 THER/PROPH/DIAG INJ IV PUSH: CPT

## 2025-03-28 PROCEDURE — 96375 TX/PRO/DX INJ NEW DRUG ADDON: CPT

## 2025-03-28 PROCEDURE — 82962 GLUCOSE BLOOD TEST: CPT

## 2025-03-28 PROCEDURE — 84100 ASSAY OF PHOSPHORUS: CPT

## 2025-03-28 PROCEDURE — 93005 ELECTROCARDIOGRAM TRACING: CPT

## 2025-03-28 PROCEDURE — 97110 THERAPEUTIC EXERCISES: CPT

## 2025-03-28 PROCEDURE — 70553 MRI BRAIN STEM W/O & W/DYE: CPT | Mod: MC

## 2025-03-28 PROCEDURE — 71045 X-RAY EXAM CHEST 1 VIEW: CPT

## 2025-03-28 PROCEDURE — 96376 TX/PRO/DX INJ SAME DRUG ADON: CPT

## 2025-03-28 PROCEDURE — 87640 STAPH A DNA AMP PROBE: CPT

## 2025-03-28 PROCEDURE — 85025 COMPLETE CBC W/AUTO DIFF WBC: CPT

## 2025-03-28 PROCEDURE — 83735 ASSAY OF MAGNESIUM: CPT

## 2025-03-28 PROCEDURE — 97530 THERAPEUTIC ACTIVITIES: CPT

## 2025-03-28 PROCEDURE — 82550 ASSAY OF CK (CPK): CPT

## 2025-03-28 PROCEDURE — 76705 ECHO EXAM OF ABDOMEN: CPT

## 2025-03-28 PROCEDURE — 87641 MR-STAPH DNA AMP PROBE: CPT

## 2025-03-28 PROCEDURE — 72192 CT PELVIS W/O DYE: CPT | Mod: MC

## 2025-03-28 PROCEDURE — A9585: CPT

## 2025-03-28 PROCEDURE — 36415 COLL VENOUS BLD VENIPUNCTURE: CPT

## 2025-03-28 PROCEDURE — 85652 RBC SED RATE AUTOMATED: CPT

## 2025-03-28 PROCEDURE — 87637 SARSCOV2&INF A&B&RSV AMP PRB: CPT

## 2025-03-28 RX ORDER — ACETAMINOPHEN 500 MG/5ML
2 LIQUID (ML) ORAL
Qty: 0 | Refills: 0 | DISCHARGE
Start: 2025-03-28

## 2025-03-28 RX ORDER — ALBUTEROL SULFATE 2.5 MG/3ML
2 VIAL, NEBULIZER (ML) INHALATION
Qty: 0 | Refills: 0 | DISCHARGE
Start: 2025-03-28

## 2025-03-28 RX ORDER — SENNA 187 MG
1 TABLET ORAL
Qty: 0 | Refills: 0 | DISCHARGE
Start: 2025-03-28

## 2025-03-28 RX ORDER — TAMSULOSIN HYDROCHLORIDE 0.4 MG/1
1 CAPSULE ORAL
Qty: 0 | Refills: 0 | DISCHARGE
Start: 2025-03-28

## 2025-03-28 RX ORDER — DEXAMETHASONE 0.5 MG/1
1 TABLET ORAL
Qty: 0 | Refills: 0 | DISCHARGE
Start: 2025-03-28

## 2025-03-28 RX ORDER — ERLOTINIB 150 MG/1
1 TABLET, FILM COATED ORAL
Refills: 0 | DISCHARGE

## 2025-03-28 RX ORDER — POLYETHYLENE GLYCOL 3350 17 G/17G
17 POWDER, FOR SOLUTION ORAL
Qty: 0 | Refills: 0 | DISCHARGE
Start: 2025-03-28

## 2025-03-28 RX ORDER — FINASTERIDE 1 MG/1
1 TABLET, FILM COATED ORAL
Qty: 0 | Refills: 0 | DISCHARGE
Start: 2025-03-28

## 2025-03-28 RX ORDER — MIDODRINE HYDROCHLORIDE 5 MG/1
1 TABLET ORAL
Refills: 3 | DISCHARGE

## 2025-03-28 RX ORDER — MIDODRINE HYDROCHLORIDE 5 MG/1
1 TABLET ORAL
Qty: 0 | Refills: 0 | DISCHARGE
Start: 2025-03-28

## 2025-03-28 RX ORDER — LEVOTHYROXINE SODIUM 300 MCG
1 TABLET ORAL
Qty: 0 | Refills: 0 | DISCHARGE
Start: 2025-03-28

## 2025-03-28 RX ADMIN — BRIMONIDINE TARTRATE 1 DROP(S): 1.5 SOLUTION/ DROPS OPHTHALMIC at 17:34

## 2025-03-28 RX ADMIN — INSULIN LISPRO 1: 100 INJECTION, SOLUTION INTRAVENOUS; SUBCUTANEOUS at 12:02

## 2025-03-28 RX ADMIN — INSULIN LISPRO 1: 100 INJECTION, SOLUTION INTRAVENOUS; SUBCUTANEOUS at 17:32

## 2025-03-28 RX ADMIN — Medication 25 MICROGRAM(S): at 05:44

## 2025-03-28 RX ADMIN — MIDODRINE HYDROCHLORIDE 5 MILLIGRAM(S): 5 TABLET ORAL at 12:04

## 2025-03-28 RX ADMIN — Medication 1 TABLET(S): at 12:04

## 2025-03-28 RX ADMIN — FINASTERIDE 5 MILLIGRAM(S): 1 TABLET, FILM COATED ORAL at 12:03

## 2025-03-28 RX ADMIN — DEXAMETHASONE 4 MILLIGRAM(S): 0.5 TABLET ORAL at 17:32

## 2025-03-28 RX ADMIN — DEXAMETHASONE 4 MILLIGRAM(S): 0.5 TABLET ORAL at 12:03

## 2025-03-28 RX ADMIN — POLYETHYLENE GLYCOL 3350 17 GRAM(S): 17 POWDER, FOR SOLUTION ORAL at 12:03

## 2025-03-28 RX ADMIN — BRIMONIDINE TARTRATE 1 DROP(S): 1.5 SOLUTION/ DROPS OPHTHALMIC at 05:44

## 2025-03-28 RX ADMIN — MIDODRINE HYDROCHLORIDE 5 MILLIGRAM(S): 5 TABLET ORAL at 17:32

## 2025-03-28 RX ADMIN — TIMOLOL MALEATE 1 DROP(S): 6.8 SOLUTION OPHTHALMIC at 05:44

## 2025-03-28 RX ADMIN — Medication 40 MILLIGRAM(S): at 06:40

## 2025-03-28 RX ADMIN — DEXAMETHASONE 4 MILLIGRAM(S): 0.5 TABLET ORAL at 06:40

## 2025-03-28 RX ADMIN — TIMOLOL MALEATE 1 DROP(S): 6.8 SOLUTION OPHTHALMIC at 17:34

## 2025-03-28 NOTE — PROGRESS NOTE ADULT - SUBJECTIVE AND OBJECTIVE BOX
This is 89 year old male with a history of lung cancer, A-fib, DM, HTN, Hypothyroidism,  HLD, Lumbar radiculopathy who was admitted after he developed mobility disfunction following a fall   one week ago. Patient denied head injury.  Musculo-skeletal imagings failed to show any acute fractures.  CT of brain showed picture compatible with brain metastases.  Patient was transferred to ICU for observation.  Neurology, Neurosurgery and heme-onc. services were summoned.  MRI of brain- brain metastases.  Heme-onc and palliative care inputs are appreciated.  Patient is alert, in NAD.  There were no new events over night .  Family os now agreeable for patient to go to LTC.  Awaiting placement.    Vital Signs Last 24 Hrs  T(C): 36.5 (28 Mar 2025 05:12), Max: 36.5 (28 Mar 2025 05:12)  T(F): 97.7 (28 Mar 2025 05:12), Max: 97.7 (28 Mar 2025 05:12)  HR: 80 (28 Mar 2025 13:05) (75 - 80)  BP: 108/64 (28 Mar 2025 13:05) (102/62 - 144/81)  BP(mean): --  RR: 18 (28 Mar 2025 13:05) (16 - 18)  SpO2: 97% (28 Mar 2025 13:05) (97% - 99%)    Parameters below as of 28 Mar 2025 13:05  Patient On (Oxygen Delivery Method): room air      T(C): 36.5 (03-28-25 @ 05:12), Max: 36.5 (03-28-25 @ 05:12)  HR: 80 (03-28-25 @ 13:05) (75 - 80)  BP: 108/64 (03-28-25 @ 13:05) (102/62 - 144/81)  RR: 18 (03-28-25 @ 13:05) (16 - 18)  SpO2: 97% (03-28-25 @ 13:05) (97% - 99%)    CONSTITUTIONAL: Well groomed, no apparent distress  EYES: PERRLA and symmetric, EOMI, No conjunctival or scleral injection, non-icteric  ENMT: Oral mucosa with moist membranes. Normal dentition; no pharyngeal injection or exudates             NECK: Supple, symmetric and without tracheal deviation   RESP: No respiratory distress, no use of accessory muscles; CTA b/l, no WRR  CV: RRR, +S1S2, no MRG; no JVD; no peripheral edema  GI: Soft, NT, ND, no rebound, no guarding; no palpable masses; no hepatosplenomegaly; no hernia palpated  LYMPH: No cervical LAD or tenderness; no axillary LAD or tenderness; no inguinal LAD or tenderness  MSK: Normal gait; No digital clubbing or cyanosis; examination of the (head/neck/spine/ribs/pelvis, RUE, LUE, RLE, LLE) without misalignment,            Normal ROM without pain, no spinal tenderness, normal muscle strength/tone  SKIN: No rashes or ulcers noted; no subcutaneous nodules or induration palpable  NEURO: CN II-XII intact; normal reflexes in upper and lower extremities, sensation intact in upper and lower extremities b/l to light touch   PSYCH: Appropriate insight/judgment; A+O x 3, mood and affect appropriate, recent/remote memory intact                          12.8   8.63  )-----------( 88       ( 27 Mar 2025 06:04 )             37.8       03-27    140  |  110[H]  |  54[H]  ----------------------------<  140[H]  4.6   |  23  |  1.05    Ca    8.3[L]      27 Mar 2025 06:04    TPro  5.1[L]  /  Alb  2.6[L]  /  TBili  1.5[H]  /  DBili  x   /  AST  42[H]  /  ALT  274[H]  /  AlkPhos  129[H]  03-27      MEDICATIONS  (STANDING):  brimonidine 0.2% Ophthalmic Solution 1 Drop(s) Both EYES every 12 hours  dexAMETHasone     Tablet 4 milliGRAM(s) Oral every 6 hours  finasteride 5 milliGRAM(s) Oral daily  insulin lispro (ADMELOG) corrective regimen sliding scale   SubCutaneous three times a day before meals  insulin lispro (ADMELOG) corrective regimen sliding scale   SubCutaneous at bedtime  levothyroxine 25 MICROGram(s) Oral daily  midodrine. 5 milliGRAM(s) Oral three times a day  pantoprazole    Tablet 40 milliGRAM(s) Oral before breakfast  polyethylene glycol 3350 17 Gram(s) Oral daily  senna 1 Tablet(s) Oral daily  tamsulosin 0.4 milliGRAM(s) Oral at bedtime  timolol 0.5% Solution 1 Drop(s) Both EYES every 12 hours

## 2025-03-28 NOTE — DISCHARGE NOTE NURSING/CASE MANAGEMENT/SOCIAL WORK - FINANCIAL ASSISTANCE
[facility] provides services at a reduced cost to those who are determined to be eligible through [facility]’s financial assistance program. Information regarding [facility]’s financial assistance program can be found by going to [email or web address] or by calling [phone].

## 2025-03-28 NOTE — PROGRESS NOTE ADULT - REASON FOR ADMISSION
suspect brain metastasis

## 2025-03-28 NOTE — CHART NOTE - NSCHARTNOTESELECT_GEN_ALL_CORE
Neurology Consult
Neurosurgery eval/Event Note
Nutrition Services
Palliative Care/Event Note
DOWNGRADE/Event Note
Event Note
Family update/Event Note
Neurology/Event Note
Nutrition Services
Nutrition Services
POC/Event Note
Palliative Care/Event Note

## 2025-03-28 NOTE — CHART NOTE - NSCHARTNOTEFT_GEN_A_CORE
Assessment:   89yMalePatient is a 89y old  Male who presents with a chief complaint of suspect brain metastasis (28 Mar 2025 14:15) Pt Visited. Friend at bedside. Pt is alert and oriented. Pt reports eating good. Po tolerated.  No swallowing difficulty Reported. Pt is on Ensure Plus HP BID. Pt was offered extra snacks , but Pt refused. Plan is for to D/C LTC. Palliative Notes Noted on 3/27.      Factors impacting intake: [x ] none [ ] nausea  [ ] vomiting [ ] diarrhea [ ] constipation  [ ]chewing problems [ ] swallowing issues  [ ] other:     Diet Prescription: Soft and Bite Size, Ensure  Plus HP BID..   Intake: ~75- 100 % of meal.         Pertinent Medications: MEDICATIONS  (STANDING):  brimonidine 0.2% Ophthalmic Solution 1 Drop(s) Both EYES every 12 hours  dexAMETHasone     Tablet 4 milliGRAM(s) Oral every 6 hours  finasteride 5 milliGRAM(s) Oral daily  insulin lispro (ADMELOG) corrective regimen sliding scale   SubCutaneous three times a day before meals  insulin lispro (ADMELOG) corrective regimen sliding scale   SubCutaneous at bedtime  levothyroxine 25 MICROGram(s) Oral daily  midodrine. 5 milliGRAM(s) Oral three times a day  pantoprazole    Tablet 40 milliGRAM(s) Oral before breakfast  polyethylene glycol 3350 17 Gram(s) Oral daily  senna 1 Tablet(s) Oral daily  tamsulosin 0.4 milliGRAM(s) Oral at bedtime  timolol 0.5% Solution 1 Drop(s) Both EYES every 12 hours    MEDICATIONS  (PRN):  acetaminophen     Tablet .. 650 milliGRAM(s) Oral every 6 hours PRN Temp greater or equal to 38C (100.4F), Mild Pain (1 - 3)  albuterol    90 MICROgram(s) HFA Inhaler 2 Puff(s) Inhalation every 6 hours PRN Bronchospasm  aluminum hydroxide/magnesium hydroxide/simethicone Suspension 30 milliLiter(s) Oral every 4 hours PRN Dyspepsia  melatonin 3 milliGRAM(s) Oral at bedtime PRN Insomnia    Pertinent Labs: 03-27 Na140 mmol/L Glu 140 mg/dL[H] K+ 4.6 mmol/L Cr  1.05 mg/dL BUN 54 mg/dL[H] 03-24 Phos 3.6 mg/dL 03-27 Alb 2.6 g/dL[L]     CAPILLARY BLOOD GLUCOSE      POCT Blood Glucose.: 176 mg/dL (28 Mar 2025 11:31)  POCT Blood Glucose.: 132 mg/dL (28 Mar 2025 08:16)  POCT Blood Glucose.: 123 mg/dL (27 Mar 2025 21:08)  POCT Blood Glucose.: 162 mg/dL (27 Mar 2025 16:56)    Skin: Stage 1 @ sacrum, B/L Heel.    Estimated Needs:   [ ] no change since previous assessment  [ ] recalculated:     Previous Nutrition Diagnosis:   [ ] Inadequate Energy Intake [ ]Inadequate Oral Intake [ ] Excessive Energy Intake   [ ] Underweight [ ] Increased Nutrient Needs [ ] Overweight/Obesity   [ ] Altered GI Function [ ] Unintended Weight Loss [ ] Food & Nutrition Related Knowledge Deficit [ x] Malnutrition MOD     Nutrition Diagnosis is [X ] ongoing  [ ] resolved [ ] not applicable     New Nutrition Diagnosis: [ ] not applicable       Interventions:   Recommend  [ ] Change Diet To:  [ ] Nutrition Supplement  [ ] Nutrition Support  [X ] Other: Continue with current diet and RX.     Monitoring and Evaluation:   [ ] PO intake [ x ] Tolerance to diet prescription [ x ] weights [ x ] labs[ x ] follow up per protocol  [ ] other:

## 2025-03-28 NOTE — PROGRESS NOTE ADULT - PROVIDER SPECIALTY LIST ADULT
Critical Care
Heme/Onc
Hepatology
Internal Medicine
Neurology
Heme/Onc
Internal Medicine
Critical Care
Critical Care
Heme/Onc
Internal Medicine

## 2025-03-28 NOTE — PROGRESS NOTE ADULT - PROBLEM SELECTOR PLAN 3
-elevated LFTs  -possible in the setting of erlotinib?  -no abd pain. tolerating diet well  -RUQ US- no focal abnormalities

## 2025-03-28 NOTE — PROGRESS NOTE ADULT - NUTRITIONAL ASSESSMENT
This patient has been assessed with a concern for Malnutrition and has been determined to have a diagnosis/diagnoses of Moderate protein-calorie malnutrition.    This patient is being managed with:   Diet Soft and Bite Sized-  Supplement Feeding Modality:  Oral  Ensure Plus High Protein Cans or Servings Per Day:  1       Frequency:  Two Times a day  Entered: Mar 18 2025 11:25AM  
This patient has been assessed with a concern for Malnutrition and has been determined to have a diagnosis/diagnoses of Moderate protein-calorie malnutrition.    This patient is being managed with:   Diet Soft and Bite Sized-  Supplement Feeding Modality:  Oral  Ensure Plus High Protein Cans or Servings Per Day:  1       Frequency:  Two Times a day  Entered: Mar 18 2025 11:25AM    This patient has been assessed with a concern for Malnutrition and has been determined to have a diagnosis/diagnoses of Moderate protein-calorie malnutrition.    This patient is being managed with:   Diet Soft and Bite Sized-  Supplement Feeding Modality:  Oral  Ensure Plus High Protein Cans or Servings Per Day:  1       Frequency:  Two Times a day  Entered: Mar 18 2025 11:25AM  
This patient has been assessed with a concern for Malnutrition and has been determined to have a diagnosis/diagnoses of Moderate protein-calorie malnutrition.    This patient is being managed with:   Diet Soft and Bite Sized-  Supplement Feeding Modality:  Oral  Ensure Plus High Protein Cans or Servings Per Day:  1       Frequency:  Two Times a day  Entered: Mar 18 2025 11:25AM  

## 2025-03-28 NOTE — PROGRESS NOTE ADULT - PROBLEM SELECTOR PLAN 1
-CTH: Multiple new sites (since Oct 24') of edema in the left frontal, parietal lobes and bilateral temporal lobe suspicious for intracranial metastases.   -CTH (6hrs): no changes   -MRI BRAIN: Multiple bilateral supratentorial and infratentorial metastatic lesions with moderate to severe associated vasogenic edema.  -MRI CERVICAL SPINE: No evidence of osseous metastasis. Multilevel cord impingement. No cord signal abnormality. No abnormal enhancement.  -MR Thoracic Spine w/wo IV Cont: Unremarkable MR of the thoracic spine.  -s/p ICU monitoring x 24hr.   -Started Decadron per heme/oncology    -Keep systolic BP < 150mmhg, on midodrine   -NeuroSx at [facility] following - Dr. Pringle, recs appreciated  -Neurology (dr Gillis) & Heme/Onc Dr. Drummond following  -c/w Decadron 4mg q6hrs and PPI  -per heme/onc would continue dexamethasone on d/c family understand risk of long term steroid per discussion with heme/onc

## 2025-03-28 NOTE — PROGRESS NOTE ADULT - ASSESSMENT
89 year old male with a history of lung cancer, A-fib, DM, HTN,   HLD, Hypothyroidism, Lumbar radiculopathy was admitted due to inability to walk  following a fall one week PTA.  Patient was found to have brain metastases.  Patient was transferred to ICU for close monitoring.  Neurology consult, Neurosurgery consult, Heme-onc. consult.  MRI of brain - metastases  MRI C-spine- no mets  Oncology consult is appreciated.  Continue Dexamethasone.  Palliative care consult.  Patient was downgraded to the regular floor.  Oncology and Palliative consults are appreciated.  Continue Dexamethsone.  Patient' LFT are up,  Abdominal ultrasound was done, no liver pathology was found, gallstones without cholecystitis.  BUN is trending up, most likely pre-renal.  Increase fluids.  Patient's proxy wanted patient to be discharged home with private home-care  to follow up with his oncologist.   I spoke to patient's son who was  trying to make arrangements for his dad to be discharged home.  Now patient's family is trying to make a decision in reference to patient's treatment at home vs. in long-term  facility with hospice.  Oncologist Dr. West's note is appreciated.  Patient's family  is now agreeable with LTC.  Awaiting placement.  Case was discussed with house staff.

## 2025-03-28 NOTE — PROGRESS NOTE ADULT - ASSESSMENT
89 yo male (from home, uses walker at baseline) with PMHx of lung cancer on Erlotinib, HLD, HTN, BPH, hypothyroidism, A-fib on Xarelto, presents for ambulatory dysfunction since a fall 1 week ago (no head trauma). CT Head showed multiple new sites of edema in the left frontal, parietal lobes and bilateral temporal lobe suspicious for intracranial metastases.   Patient is admitted to medicine for ambulatory dysfunction. Neurosurgery and Neurologist consulted     Family/HCP spoke with hospice social worker. They decided LTC without hospice plan, with the plan to enroll in hospice if the patient deteriorates.

## 2025-03-28 NOTE — PROGRESS NOTE ADULT - PROBLEM SELECTOR PLAN 5
-Pt has a history of Afib, takes Xarelto and metoprolol succinate 12.5mg qd, Digoxin at home  - Hold Xarelto in setting of brain mets vs hemorrhage  - Hold metoprolol succinate, Digoxin in the setting of bradycardia  - Rate controlled

## 2025-03-28 NOTE — PROGRESS NOTE ADULT - PROBLEM SELECTOR PLAN 11
-From home, HHA 4h/day  DC plan - LTC without hospice plan, with the plan to enroll in hospice if the patient deteriorates.    SW following. choices were given  lab check 3 x week
-From home, HHA 4h/day  -DC plan - LTC without hospice plan, with the plan to enroll in hospice if the patient deteriorates.    -SW following accepted to [facility] pending [employer] AUTH  -lab check 3 x week
-From home, HHA 4h/day  DC plan - LTC without hospice plan, with the plan to enroll in hospice if the patient deteriorates.    SW following. choices were given today.
-From home, HHA 4h/day  plan is for HOSPICE  HCPs is currently deciding between pt returning home  with 24/7 HHA vs LTC placement.
-From home, HHA 4h/day  f/u heme/onc regarding steroid taper  monitor LFTs  pending RUQ US    -DC plan:   family does not want Hospice  HCPs is currently deciding between pt returning home  with 24/7 HHA vs LTC placement.

## 2025-03-28 NOTE — PROGRESS NOTE ADULT - SUBJECTIVE AND OBJECTIVE BOX
Patient is a 89y old  Male who presents with a chief complaint of suspect brain metastasis (27 Mar 2025 15:13)    INTERVAL HPI/OVERNIGHT EVENTS: no overnight events    I&O's Summary    27 Mar 2025 07:01  -  28 Mar 2025 07:00  --------------------------------------------------------  IN: 0 mL / OUT: 600 mL / NET: -600 mL      Vital Signs Last 24 Hrs  T(C): 36.5 (28 Mar 2025 05:12), Max: 36.7 (27 Mar 2025 13:00)  T(F): 97.7 (28 Mar 2025 05:12), Max: 98.1 (27 Mar 2025 13:00)  HR: 75 (28 Mar 2025 05:12) (75 - 78)  BP: 144/81 (28 Mar 2025 05:12) (95/57 - 144/81)  BP(mean): --  RR: 18 (28 Mar 2025 05:12) (16 - 18)  SpO2: 97% (28 Mar 2025 05:12) (97% - 99%)    Parameters below as of 28 Mar 2025 05:12  Patient On (Oxygen Delivery Method): room air      PAST MEDICAL & SURGICAL HISTORY:  Essential hypertension      Hyperlipidemia, unspecified hyperlipidemia type      BPH without obstruction/lower urinary tract symptoms      Glaucoma (increased eye pressure)  both eyes      Atrial fibrillation      Metastasis to brain      H/O hemicolectomy  1973      Cataract extraction status of left eye  w/lens implant - 2014      History of lung biopsy  2016 - negative results          SOCIAL HISTORY  Alcohol:  Tobacco:  Illicit substance use:      FAMILY HISTORY:      LABS:                        12.8   8.63  )-----------( 88       ( 27 Mar 2025 06:04 )             37.8     03-27    140  |  110[H]  |  54[H]  ----------------------------<  140[H]  4.6   |  23  |  1.05    Ca    8.3[L]      27 Mar 2025 06:04    TPro  5.1[L]  /  Alb  2.6[L]  /  TBili  1.5[H]  /  DBili  x   /  AST  42[H]  /  ALT  274[H]  /  AlkPhos  129[H]  03-27      Urinalysis Basic - ( 27 Mar 2025 06:04 )    Color: x / Appearance: x / SG: x / pH: x  Gluc: 140 mg/dL / Ketone: x  / Bili: x / Urobili: x   Blood: x / Protein: x / Nitrite: x   Leuk Esterase: x / RBC: x / WBC x   Sq Epi: x / Non Sq Epi: x / Bacteria: x      CAPILLARY BLOOD GLUCOSE      POCT Blood Glucose.: 132 mg/dL (28 Mar 2025 08:16)  POCT Blood Glucose.: 123 mg/dL (27 Mar 2025 21:08)  POCT Blood Glucose.: 162 mg/dL (27 Mar 2025 16:56)  POCT Blood Glucose.: 198 mg/dL (27 Mar 2025 11:48)        Urinalysis Basic - ( 27 Mar 2025 06:04 )    Color: x / Appearance: x / SG: x / pH: x  Gluc: 140 mg/dL / Ketone: x  / Bili: x / Urobili: x   Blood: x / Protein: x / Nitrite: x   Leuk Esterase: x / RBC: x / WBC x   Sq Epi: x / Non Sq Epi: x / Bacteria: x        MEDICATIONS  (STANDING):  brimonidine 0.2% Ophthalmic Solution 1 Drop(s) Both EYES every 12 hours  dexAMETHasone     Tablet 4 milliGRAM(s) Oral every 6 hours  finasteride 5 milliGRAM(s) Oral daily  insulin lispro (ADMELOG) corrective regimen sliding scale   SubCutaneous three times a day before meals  insulin lispro (ADMELOG) corrective regimen sliding scale   SubCutaneous at bedtime  levothyroxine 25 MICROGram(s) Oral daily  midodrine. 5 milliGRAM(s) Oral three times a day  pantoprazole    Tablet 40 milliGRAM(s) Oral before breakfast  polyethylene glycol 3350 17 Gram(s) Oral daily  senna 1 Tablet(s) Oral daily  tamsulosin 0.4 milliGRAM(s) Oral at bedtime  timolol 0.5% Solution 1 Drop(s) Both EYES every 12 hours    MEDICATIONS  (PRN):  acetaminophen     Tablet .. 650 milliGRAM(s) Oral every 6 hours PRN Temp greater or equal to 38C (100.4F), Mild Pain (1 - 3)  albuterol    90 MICROgram(s) HFA Inhaler 2 Puff(s) Inhalation every 6 hours PRN Bronchospasm  aluminum hydroxide/magnesium hydroxide/simethicone Suspension 30 milliLiter(s) Oral every 4 hours PRN Dyspepsia  melatonin 3 milliGRAM(s) Oral at bedtime PRN Insomnia      REVIEW OF SYSTEMS:  CONSTITUTIONAL: No fever  EYES: No eye pain, visual disturbances  ENMT: No sinus or throat pain  NECK: No pain  RESPIRATORY: No cough, wheezing, No shortness of breath  CARDIOVASCULAR: No chest pain, palpitations, dizziness  GASTROINTESTINAL: No abdominal pain. No nausea, vomiting. No diarrhea or constipation.   GENITOURINARY: No dysuria, frequency, hematuria  NEUROLOGICAL: No headaches  SKIN: No rashes  MUSCULOSKELETAL: No joint pain     RADIOLOGY & ADDITIONAL TESTS:  < from: Xray Chest 1 View AP/PA (03.16.25 @ 13:21) >  ACC: [id number] EXAM:  XR CHEST AP OR PA 1V   ORDERED BY: KENZIE ALFARO     PROCEDURE DATE:  03/16/2025          INTERPRETATION:  CLINICAL HISTORY: 89 years old Male with Left hip pain.    TECHNIQUE: Single frontal view of the chest    COMPARISON: Chest x-ray from 11/17/2024..    FINDINGS:    Lungs/Pleura: Right sided paratracheal opacity and widened right   mediastinum, better characterized on PET CT scan on 3/11/2025.  Additional pulmonary nodules not clearly visualized on this AP film..   Interval resolution of bilateral pleural effusions as compared to   11/17/2024. No pneumothorax    Heart/Mediastinum: The cardiomediastinal silhouette is within normal   limits.    Osseous Structures: No acute findings.    IMPRESSION:    Right sided roundsuprahilar lesion and widened right mediastinum, better   characterized on PET CT scan on 3/11/2025.    Resolution of bilateral pleural effusions as compared to prior x-ray on   11/17/2024. No new focal consolidations or pneumothorax.    --- End of Report ---    < end of copied text >  ACC: [id number] EXAM:  XR HIP WITH PELV 2-3V LT   ORDERED BY: KENZIE ALFARO     PROCEDURE DATE:  03/16/2025          INTERPRETATION:  Clinical history left hip and buttock pain after fall    3 views of the pelvis and left hip demonstrate no evidence of acute   fracture or dislocation.    IMPRESSION:    No radiographic evidence of acute osseous injury.    --- End of Report ---    ACC: [id number] EXAM:  CT PELVIS BONY ONLY   ORDERED BY: KENZIE ALFARO     PROCEDURE DATE:  03/16/2025      INTERPRETATION:  CT of the pelvis    CLINICAL INFORMATION: Left hip pain. Status post fall  TECHNIQUE: Axial CT images were obtained of the pelvis with coronal and   sagittal reconstructions. No contrast was administered. Three dimensional   reconstructions were reformed.    COMPARISON: PET CT 3/11/2025 was reviewed.    FINDINGS:    No acute fracture or dislocation. Mild bilateral hip arthrosis. Chronic   compression deformity of L5 with discogenic degenerative disease at   L5/S1. Mild degenerative changes of the sacroiliac joints and pubic   symphysis. There is chondrocalcinosis of the pubic symphysis. No fluid   collection or hematoma. There are vascular calcifications. Imaged pelvic   viscera is grossly unremarkable.    IMPRESSION:    No acute fracture.    --- End of Report ---    ACC: [id number] EXAM:  CT BRAIN   ORDERED BY: ED WINN     PROCEDURE DATE:  03/16/2025      INTERPRETATION:  CLINICAL INFORMATION: Fall, Per the electronic medical   record history of lung cancer    COMPARISON: CT head 10/4/2024    CONTRAST:  IV Contrast: NONE  .    TECHNIQUE:  Serial axial images were obtained from the skull base to the   vertex using multi-slice helical technique. Sagittal and coronal   reformats were obtained.    FINDINGS:    VENTRICLES AND SULCI: There is no hydrocephalus  INTRA-AXIAL: There is interval development of a large area of vasogenic   edema in the left parasagittal frontal and parietal lobes. There is small   area of vasogenic edema in the left frontal lobe laterally. There are   additional smaller areas of vasogenic edema in the bilateral temporal   lobes. Given patient's history these are suspicious for sites of   intracranial metastases. Again demonstrated are multiple calcified   lesions in the left frontal, parietal and temporal lobes. There kim new   punctate hyperdensity in the right temporal lobe (series 2 image 20) in   the area of edema. This may represent tumor versus tiny focal hemorrhage.  EXTRA-AXIAL: No mass or fluid collection. Basal cisterns are normal in   appearance.    VISUALIZED SINUSES:  Scattered mucosal thickening.  TYMPANOMASTOID CAVITIES:  Clear.  VISUALIZED ORBITS: Bilateral lens replacement.  CALVARIUM: Intact.    IMPRESSION:  Since prior CT head 10/4/2024:    1.  Multiple new sites of edema in the left frontal, parietal lobes and   bilateral temporal lobe suspicious for intracranial metastases. Largest   area of edema is in the left frontal parietal lobes. There is local mass   effect without herniation or hydrocephalus. This can be further evaluated   with dedicated MRI brain with and without IV contrast as clinically   warranted.    2.  Punctate focus of hyperdensity in the right temporal lobe (series 2   image 20) in the area of edema. This may represent underlying   intracranial metastasis versus punctate hemorrhage and continued   follow-up is recommended.    Findings were discussed with Dr. ED WINN [phone] 3/16/2025 5:43   PM by Dr. Rome with read back confirmation.    --- End of Report ---    ACC: [id number] EXAM:  CT BRAIN   ORDERED BY: BILL SANFORD     PROCEDURE DATE:  03/16/2025      INTERPRETATION:  CLINICAL INFORMATION: interval eval brain edema and   possible hemorrhage , lung cancer    COMPARISON: 03/16/2025 at 5:26 PM.    CONTRAST:  IV Contrast: NONE  .    FINDINGS:    The brain demonstrates unchanged vasogenic edema within the LEFT frontal   and parietal lobes with punctate hemorrhage or calcification seen within   a anterior LEFT frontal gyrus. Mild vasogenic edema in the LEFT temporal   and RIGHT occipital lobes with associated punctate hemorrhage or   calcification. No acute cerebral cortical infarct is seen. No mass effect   is found in the brain.    The ventricles, sulci and basal cisterns appear unremarkable.    The orbits are unremarkable.  The paranasal sinuses are clear.  The nasal   cavity appears intact.  The nasopharynx is symmetric.  The central skull   base, petrous temporal bones and calvarium remain intact.      IMPRESSION:   Unchanged vasogenic edema within the LEFT frontal and   parietal lobes with punctate calcifications seen within a anterior LEFT   frontal gyrus.  Mild vasogenic edema in the LEFT temporal and RIGHT   occipital lobes with associated punctate hemorrhage or calcification.   MRI with gadolinium recommended for complete evaluation.    --- End of Report ---  ACC: [id number] EXAM:  MR SPINE THORACIC WAW IC   ORDERED BY: MONICA ZENDEJAS     PROCEDURE DATE:  03/17/2025      INTERPRETATION:  MR thoracic with and without gadolinium    CLINICAL INDICATION:  Structural abnormalities  Thoracic spinal stenosis.    TECHNIQUE:   Sagittal and axial T1-weighted images, sagittal STIR images,    and sagittal and axial T2-weighted images of the thoracic spine were   obtained.   Following 7.5 cc of Gadavist administration intravenously/ 0   cc discarded , sagittal and axial T1-weighted fat-saturated images were   obtained.    FINDINGS:   No prior similar studies are available for review.    Thoracic vertebral alignment is preserved.  Thoracic vertebral body   heights are maintained.  Marrow signal intensity within thoracic   vertebral bodies and posterior elements is unremarkable with the   exception of scattered hemangiomas the largest in T12..  There is no   abnormal vertebral or paraspinal enhancement.  No osseous expansion,   epidural disease orparaspinal abnormality is found.    Thoracic intervertebral discs maintain intact disc heights and signal   intensity.  No central canal or foraminal compromise is recognized.    The thoracic cord maintains intact morphology.  Thoracic cord signal   intensity is intact.  No abnormal enhancement occurs within the canal.    Small BILATERAL pleural effusions are noted.      IMPRESSION:  Unremarkable MR of the thoracic spine.    --- End of Report ---      ACC: [id number] EXAM:  MR SPINE CERVICAL WAW IC   ORDERED BY: MONICA ZENDEJAS     ACC: [id number] EXAM:  MR BRAIN WAW IC   ORDERED BY: ED WINN     PROCEDURE DATE:  03/17/2025      INTERPRETATION:  CLINICAL INDICATIONS: suspect metastasisper CT head    COMPARISON: Head CT dated 3/16/2025. Head CT dated 3/11/2025    TECHNIQUE: MRI brain: Multiplanar, multisequence MR imaging of the brain   are obtained with and without the administration of 7.5 cc intravenous   Gadavist contrast. 0 ccof contrast was discarded.    FINDINGS:  Multiple supratentorial and infratentorial bilateral ring-enhancing   lesions. Severe vasogenic edema throughout the left frontal lobe, left   parietal lobe. Mild vasogenic edema in the right caudate head. Moderate   vasogenic edema in the anterior left temporal lobe and posterior right   temporal occipital lobe. Mild vasogenic edema in the right cerebellum.   Largest ring-enhancing lesion left frontal lobe measures 5.5 mm. Largest   lesion in the left parietal lobe measures 5.4 mm. Right caudate head   lesion measures 7.1 mm. Left temporal lobe evaluation measures 1.3 cm.   Right temporal occipital lobe lesion measures 1.4 cm. Right cerebellar   lesion measures 5.7 mm. Additional bilateral smaller metastatic lesions.   There is no abnormal restricted diffusion to suggest acute infarction.    Normal T2 flow-voids are seen within  the intracranial vasculature. The   lateral ventricles and cortical sulci are age-appropriate in size and   configuration. There is no extra-axial fluid collection. There is no   susceptibility artifact to suggest hemorrhage. Midline structures are   normal.  The visualized paranasal sinuses, mastoid air cells and orbits   are unremarkable.      ==============    CLINICAL HISTORY: suspect metastasis per CT head    COMPARISON: Broad-based ridging causing mild bilateral foraminal   narrowing. No spinal canal stenosis.    TECHNIQUE: Cervical spine MRI: Multiplanar, multisequence MR images of   the cervical spine are obtained with and without the administration of   7.5 cc intravenous Gadavist contrast. 0 cc of contrast was discarded.    FINDINGS:  Unremarkable heterogeneous T1 marrow signal. No cord signal abnormality.   No abnormal enhancement to suggest osseous metastasis. Severe disc   desiccation at C2/C3 C5/C6 levels. Moderate disc desiccation at C7/T1 and   T1/T2 levels. No bone marrow edema. There is no evidence for acute   fracture. A normal lordosis is noted. Craniocervical junction is normal.   The cervicovertebral body heights and remaining intervertebral disc   spaces are preserved. There is no prevertebral soft tissue abnormality.      Evaluation of the individual levels:  C2/C3 level: Broad-based ridging causing mild bilateral foraminal   narrowing. No spinal canal stenosis.  C3/C4 level: Broad-based disc disc complex causing severe right-sided   foraminal narrowing. Mild left-sided foraminal narrowing. Mild cord   impingement. Mild spinal canal stenosis. No cord signal abnormality.  C4/C5 level: Broad-based ridging causing mild to moderate cord   compression. Severe bilateral foraminal narrowing. Mild to moderate   spinal canal stenosis.  C5/C6 level: Broad-based ridging causing severe right-sided foraminal   narrowing. Moderate left-sided foraminal narrowing. Mild flattening of   the cord. No spinal canal stenosis.  C6/C7 level: Broad-based diffuse complex causing moderate bilateral   foraminal narrowing. No spinal canal stenosis.  C7/T1 level:  No spinal canal stenosis or foraminal narrowing.    ==============    IMPRESSION:    MRI BRAIN: Multiple bilateral supratentorial and infratentorial   metastatic lesions with moderate to severe associated vasogenic edema.    MRI CERVICAL SPINE: No evidence of osseous metastasis. Multilevel cord   impingement. No cord signal abnormality. No abnormal enhancement.    --- End of Report ---    Imaging Personally Reviewed:  [x ] YES  [ ] NO    Consultant(s) Notes Reviewed:  [x ] YES  [ ] NO    PHYSICAL EXAM:  GENERAL: NAD  HEAD:  Atraumatic, Normocephalic  EYES:  conjunctiva and sclera clear  ENMT: Moist mucous membranes  NECK: Supple  NERVOUS SYSTEM:  Alert & Oriented X3  CHEST/LUNG: CTA bilaterally; No rales, rhonchi, wheezing  HEART: Regular rate   ABDOMEN: Soft, Nontender, Nondistended; Bowel sounds present  EXTREMITIES:  No clubbing, cyanosis, or edema  SKIN: No rashes     Care Collaborated Discussed with Consultants/Other Providers [x ] YES  [ ] NO

## 2025-03-28 NOTE — PROGRESS NOTE ADULT - PROBLEM SELECTOR PLAN 2
-Lung CA (Diagnosed 2 years ago per pt) and pt takes  Erlotinib 100mg QD  -Follows oupt Heme/Onc Doris Martinez  -CT and MR result above  -stopped Erlotinib likely 2/2 worsening LFTs  -c/w dexamethasone 4mg q6 with PPI.   -Heme/Onc Dr. Drummond following

## 2025-03-28 NOTE — DISCHARGE NOTE NURSING/CASE MANAGEMENT/SOCIAL WORK - PATIENT PORTAL LINK FT
You can access the FollowMyHealth Patient Portal offered by [facility] by registering at the following website: [email or web address]. By joining [employer]’s FollowMyHealth portal, you will also be able to view your health information using other applications (apps) compatible with our system.

## 2025-03-29 ENCOUNTER — NON-APPOINTMENT (OUTPATIENT)
Age: 89
End: 2025-03-29

## 2025-03-31 ENCOUNTER — NON-APPOINTMENT (OUTPATIENT)
Age: 89
End: 2025-03-31

## 2025-04-03 ENCOUNTER — TRANSCRIPTION ENCOUNTER (OUTPATIENT)
Age: 89
End: 2025-04-03

## 2025-04-22 ENCOUNTER — APPOINTMENT (OUTPATIENT)
Dept: HOME HEALTH SERVICES | Facility: HOME HEALTH | Age: 89
End: 2025-04-22
Payer: MEDICARE

## 2025-04-22 VITALS
DIASTOLIC BLOOD PRESSURE: 60 MMHG | OXYGEN SATURATION: 96 % | SYSTOLIC BLOOD PRESSURE: 120 MMHG | RESPIRATION RATE: 16 BRPM | HEART RATE: 109 BPM

## 2025-04-22 DIAGNOSIS — L02.01 CUTANEOUS ABSCESS OF FACE: ICD-10-CM

## 2025-04-22 DIAGNOSIS — Z71.89 OTHER SPECIFIED COUNSELING: ICD-10-CM

## 2025-04-22 DIAGNOSIS — I95.89 OTHER HYPOTENSION: ICD-10-CM

## 2025-04-22 DIAGNOSIS — C79.9 SECONDARY MALIGNANT NEOPLASM OF UNSPECIFIED SITE: ICD-10-CM

## 2025-04-22 DIAGNOSIS — E03.9 HYPOTHYROIDISM, UNSPECIFIED: ICD-10-CM

## 2025-04-22 DIAGNOSIS — C34.90 MALIGNANT NEOPLASM OF UNSPECIFIED PART OF UNSPECIFIED BRONCHUS OR LUNG: ICD-10-CM

## 2025-04-22 DIAGNOSIS — K59.09 OTHER CONSTIPATION: ICD-10-CM

## 2025-04-22 DIAGNOSIS — Z86.79 PERSONAL HISTORY OF OTHER DISEASES OF THE CIRCULATORY SYSTEM: ICD-10-CM

## 2025-04-22 DIAGNOSIS — S31.801A LACERATION W/OUT FOREIGN BODY OF UNSPECIFIED BUTTOCK, INITIAL ENCOUNTER: ICD-10-CM

## 2025-04-22 DIAGNOSIS — J20.9 ACUTE BRONCHITIS, UNSPECIFIED: ICD-10-CM

## 2025-04-22 PROBLEM — C79.31 SECONDARY MALIGNANT NEOPLASM OF BRAIN: Chronic | Status: ACTIVE | Noted: 2025-01-01

## 2025-04-22 PROCEDURE — 99350 HOME/RES VST EST HIGH MDM 60: CPT

## 2025-04-22 RX ORDER — DEXAMETHASONE 4 MG/1
4 TABLET ORAL EVERY 6 HOURS
Qty: 120 | Refills: 1 | Status: ACTIVE | COMMUNITY
Start: 2025-04-22

## 2025-04-22 RX ORDER — LORATADINE 5 MG
17 TABLET,CHEWABLE ORAL DAILY
Refills: 0 | Status: ACTIVE | COMMUNITY
Start: 2025-04-22

## 2025-04-22 RX ORDER — ALBUTEROL SULFATE 2.5 MG/3ML
(2.5 MG/3ML) SOLUTION RESPIRATORY (INHALATION)
Qty: 1 | Refills: 1 | Status: ACTIVE | COMMUNITY
Start: 2025-04-22 | End: 1900-01-01

## 2025-04-22 RX ORDER — MIDODRINE HYDROCHLORIDE 5 MG/1
5 TABLET ORAL
Qty: 270 | Refills: 3 | Status: ACTIVE | COMMUNITY
Start: 2025-04-22

## 2025-04-22 RX ORDER — AZITHROMYCIN 250 MG/1
250 TABLET, FILM COATED ORAL
Qty: 1 | Refills: 0 | Status: ACTIVE | COMMUNITY
Start: 2025-04-22 | End: 1900-01-01

## 2025-04-22 RX ORDER — SENNOSIDES 8.6 MG TABLETS 8.6 MG/1
8.6 TABLET ORAL
Qty: 50 | Refills: 0 | Status: ACTIVE | COMMUNITY
Start: 2025-04-22

## 2025-04-24 DIAGNOSIS — J45.909 UNSPECIFIED ASTHMA, UNCOMPLICATED: ICD-10-CM

## 2025-05-08 ENCOUNTER — NON-APPOINTMENT (OUTPATIENT)
Age: 89
End: 2025-05-08

## 2025-06-04 NOTE — DIETITIAN INITIAL EVALUATION ADULT - OTHER CALCULATIONS
Estimated protein requirement lowered due to high BUN. I personally performed the service described in the documentation recorded by the scribe in my presence, and it accurately and completely records my words and actions.

## (undated) DEVICE — POSITIONER STRAP ARMBOARD VELCRO TS-30

## (undated) DEVICE — SOL IRR POUR NS 0.9% 500ML

## (undated) DEVICE — WARMING BLANKET FULL ADULT

## (undated) DEVICE — ELCTR GROUNDING PAD ADULT COVIDIEN

## (undated) DEVICE — TUBING SUCTION NONCONDUCTIVE 6MM X 12FT

## (undated) DEVICE — PACK MINOR NO DRAPE

## (undated) DEVICE — SUT VICRYL 3-0 27" SH UNDYED

## (undated) DEVICE — DRAPE LAPAROTOMY TRANSVERSE

## (undated) DEVICE — FOR-ESU VALLEYLAB T7E14999DX: Type: DURABLE MEDICAL EQUIPMENT

## (undated) DEVICE — DRSG STERISTRIPS 0.25 X 3"

## (undated) DEVICE — CANISTER DISPOSABLE THIN WALL 3000CC

## (undated) DEVICE — SUT MONOCRYL 4-0 27" PS-2 UNDYED

## (undated) DEVICE — GLV 7.5 PROTEXIS (WHITE)

## (undated) DEVICE — VENODYNE/SCD SLEEVE CALF MEDIUM

## (undated) DEVICE — GLV 8 PROTEXIS (CREAM) NEU-THERA